# Patient Record
Sex: FEMALE | Race: WHITE | HISPANIC OR LATINO | Employment: OTHER | URBAN - METROPOLITAN AREA
[De-identification: names, ages, dates, MRNs, and addresses within clinical notes are randomized per-mention and may not be internally consistent; named-entity substitution may affect disease eponyms.]

---

## 2017-01-01 ENCOUNTER — OUTPATIENT INFUSION SERVICES (OUTPATIENT)
Dept: ONCOLOGY | Facility: MEDICAL CENTER | Age: 72
End: 2017-01-01
Attending: INTERNAL MEDICINE
Payer: MEDICARE

## 2017-01-01 VITALS
RESPIRATION RATE: 18 BRPM | SYSTOLIC BLOOD PRESSURE: 119 MMHG | WEIGHT: 143.74 LBS | TEMPERATURE: 98.6 F | HEART RATE: 70 BPM | HEIGHT: 60 IN | OXYGEN SATURATION: 94 % | BODY MASS INDEX: 28.22 KG/M2 | DIASTOLIC BLOOD PRESSURE: 58 MMHG

## 2017-01-01 DIAGNOSIS — C24.1 AMPULLARY CARCINOMA (HCC): ICD-10-CM

## 2017-01-01 PROCEDURE — 96375 TX/PRO/DX INJ NEW DRUG ADDON: CPT

## 2017-01-01 PROCEDURE — 700105 HCHG RX REV CODE 258

## 2017-01-01 PROCEDURE — 96417 CHEMO IV INFUS EACH ADDL SEQ: CPT

## 2017-01-01 PROCEDURE — 700111 HCHG RX REV CODE 636 W/ 250 OVERRIDE (IP)

## 2017-01-01 PROCEDURE — 96413 CHEMO IV INFUSION 1 HR: CPT

## 2017-01-01 PROCEDURE — 700111 HCHG RX REV CODE 636 W/ 250 OVERRIDE (IP): Performed by: NURSE PRACTITIONER

## 2017-01-01 PROCEDURE — A4212 NON CORING NEEDLE OR STYLET: HCPCS

## 2017-01-01 PROCEDURE — 700105 HCHG RX REV CODE 258: Performed by: NURSE PRACTITIONER

## 2017-01-01 RX ORDER — DEXAMETHASONE SODIUM PHOSPHATE 4 MG/ML
12 INJECTION, SOLUTION INTRA-ARTICULAR; INTRALESIONAL; INTRAMUSCULAR; INTRAVENOUS; SOFT TISSUE ONCE
Status: COMPLETED | OUTPATIENT
Start: 2017-01-01 | End: 2017-01-01

## 2017-01-01 RX ADMIN — DEXAMETHASONE SODIUM PHOSPHATE 12 MG: 4 INJECTION, SOLUTION INTRAMUSCULAR; INTRAVENOUS at 13:48

## 2017-01-01 RX ADMIN — HEPARIN 500 UNITS: 100 SYRINGE at 16:41

## 2017-01-01 RX ADMIN — GEMCITABINE 1661 MG: 1 INJECTION, POWDER, LYOPHILIZED, FOR SOLUTION INTRAVENOUS at 15:53

## 2017-01-01 RX ADMIN — PACLITAXEL 207.5 MG: 100 INJECTION, POWDER, LYOPHILIZED, FOR SUSPENSION INTRAVENOUS at 15:12

## 2017-01-01 ASSESSMENT — PAIN SCALES - GENERAL: PAINLEVEL: NO PAIN

## 2017-01-01 ASSESSMENT — ENCOUNTER SYMPTOMS
CONSTIPATION: 0
TINGLING: 1

## 2017-01-03 ENCOUNTER — OFFICE VISIT (OUTPATIENT)
Dept: HEMATOLOGY ONCOLOGY | Facility: MEDICAL CENTER | Age: 72
End: 2017-01-03
Payer: MEDICARE

## 2017-01-03 VITALS
BODY MASS INDEX: 24.91 KG/M2 | DIASTOLIC BLOOD PRESSURE: 72 MMHG | TEMPERATURE: 97 F | RESPIRATION RATE: 16 BRPM | WEIGHT: 126.9 LBS | OXYGEN SATURATION: 98 % | HEIGHT: 60 IN | HEART RATE: 101 BPM | SYSTOLIC BLOOD PRESSURE: 118 MMHG

## 2017-01-03 DIAGNOSIS — R12 HEART BURN: ICD-10-CM

## 2017-01-03 DIAGNOSIS — C78.7 METASTATIC ADENOCARCINOMA TO LIVER (HCC): ICD-10-CM

## 2017-01-03 DIAGNOSIS — C24.1 AMPULLARY CARCINOMA (HCC): ICD-10-CM

## 2017-01-03 PROCEDURE — 99213 OFFICE O/P EST LOW 20 MIN: CPT | Performed by: NURSE PRACTITIONER

## 2017-01-03 RX ORDER — RANITIDINE 150 MG/1
150 TABLET ORAL 2 TIMES DAILY
Qty: 60 TAB | Refills: 3 | Status: SHIPPED | OUTPATIENT
Start: 2017-01-03 | End: 2017-04-04 | Stop reason: CLARIF

## 2017-01-03 ASSESSMENT — ENCOUNTER SYMPTOMS
CONSTIPATION: 0
SHORTNESS OF BREATH: 0
NAUSEA: 0
WEIGHT LOSS: 0
FEVER: 0
HEADACHES: 0
WHEEZING: 0
TINGLING: 1
DIARRHEA: 0
PALPITATIONS: 0
VOMITING: 0
CHILLS: 0
DIZZINESS: 0
COUGH: 0
INSOMNIA: 0

## 2017-01-03 NOTE — MR AVS SNAPSHOT
Elin Abby Poole   1/3/2017 10:30 AM   Office Visit   MRN: [id number]    Department:  [facility]   Dept Phone:  [phone]    Description:  Female : 1945   Provider:  Gabby Galvan A.P.N.           Reason for Visit     Follow-Up 1 wk       Allergies as of 1/3/2017     Not on File      You were diagnosed with     Ampullary carcinoma (HCC)   [151538]       Metastatic adenocarcinoma to liver (HCC)   [493175]       Heart burn   [439675]         Vital Signs     Blood Pressure Pulse Temperature Respirations Height Weight    118/72 mmHg 101 36.1 °C (97 °F) 16 1.524 m (5') 57.561 kg (126 lb 14.4 oz)    Body Mass Index Oxygen Saturation Breastfeeding? Smoking Status          24.78 kg/m2 98% No Never Smoker         Basic Information     Date Of Birth Sex Race Ethnicity Preferred Language    1945 Female White Non- English      Your appointments     2017  2:00 PM   Non Provider 1 with ONC RN 1   [facility] (--)    [address] [address] [phone]           You will be receiving a confirmation call a few days before your appointment from our automated call confirmation system.            2017  3:00 PM   ONCOLOGY EST PATIENT 30 MIN with Gabby Galvan A.P.N.   [facility] (--)    75 [facility] [address]  [facility] [id number] [phone]            2017  4:00 PM   Est Chemo 1 with RN 6   [facility] ([facility])    [address] [address]   [phone]            2017  3:00 PM   Est Chemo 1 with RN 3   [facility] ([facility])    [address]  [facility] [address]   [phone]            2017  1:00 PM   Non Provider 1 with ONC RN 1   [facility] (--)    [address] [id number] [phone]           You will be receiving a confirmation call a few days before your appointment from our automated call confirmation  system.            Jan 25, 2017  2:20 PM   ONCOLOGY EST PATIENT 30 MIN with Talya Krause M.D.   [facility] (--)    [address], [address]  [place] [id number] [phone]            Jan 25, 2017  3:00 PM   Est Chemo 1 with RN 4   [facility] ([facility])    [address] [facility] [address]  [place] [id number] [phone]              Problem List              ICD-10-CM Priority Class Noted - Resolved    Ampullary carcinoma (HCC) C24.1   8/15/2016 - Present    Cholecystitis with cholelithiasis K80.10   8/15/2016 - Present    Metastatic adenocarcinoma to liver (HCC) C78.7   8/15/2016 - Present    Obstructive jaundice due to malignant neoplasm K83.8   8/15/2016 - Present    Leukocytosis D72.829   10/21/2016 - Present    Sepsis (HCC) A41.9   10/21/2016 - Present    Metabolic acidosis E87.2   10/22/2016 - Present      Health Maintenance        Date Due Completion Dates    IMM DTaP/Tdap/Td Vaccine (1 - Tdap) 6/19/1964 ---    PAP SMEAR 6/19/1966 ---    COLONOSCOPY 6/19/1995 ---    IMM ZOSTER VACCINE 6/19/2005 ---    IMM PNEUMOCOCCAL 65+ (ADULT) LOW/MEDIUM RISK SERIES (2 of 2 - PPSV23) 10/15/2014 10/15/2013    MAMMOGRAM 1/14/2016 1/14/2015, 1/14/2015, 1/14/2015    IMM INFLUENZA (1) 9/1/2016 10/15/2015    BONE DENSITY 9/26/2019 9/26/2014            Current Immunizations     13-VALENT PCV PREVNAR 10/15/2013    Influenza Vaccine Adult HD 10/15/2015      Below and/or attached are the medications your provider expects you to take. Review all of your home medications and newly ordered medications with your provider and/or pharmacist. Follow medication instructions as directed by your provider and/or pharmacist. Please keep your medication list with you and share with your provider. Update the information when medications are discontinued, doses are changed, or new medications (including over-the-counter products) are added; and carry medication information at all times in the event of emergency situations      Allergies:  No Known Allergies          Medications  Valid as of: January 03, 2017 - 11:17 AM    Generic Name Brand Name Tablet Size Instructions for use    Aspirin (Tablet Delayed Response) ECOTRIN 325 MG Take 325 mg by mouth every morning.        Losartan Potassium-HCTZ (Tab) HYZAAR 100-25 MG Take 1 Tab by mouth every morning. Indications: High Blood Pressure        Misc Natural Products (Powder) FIBER 7  Take  by mouth.        Ondansetron HCl (Tab) ZOFRAN 4 MG Take 1 Tab by mouth every four hours as needed for Nausea/Vomiting.        Potassium Chloride Mariama CR (Tab CR) K-DUR 10 MEQ Take 2 Tabs by mouth every morning.        Prochlorperazine Maleate (Tab) COMPAZINE 10 MG Take 10 mg by mouth every 6 hours as needed for Nausea/Vomiting (alternating Q3H with Zofran).        RaNITidine HCl (Tab) ZANTAC 150 MG Take 1 Tab by mouth 2 times a day.        .                 Medicines prescribed today were sent to:     [facility] #[id number] - [place], [place] - [address]    [address] [address]    Phone: [phone] Fax: [phone]    Open 24 Hours?: No    [facility] - [place], [place] - [address]    [address] [address]    Phone: [phone] Fax: [phone]    Open 24 Hours?: No      Medication refill instructions:       If your prescription bottle indicates you have medication refills left, it is not necessary to call your provider’s office. Please contact your pharmacy and they will refill your medication.    If your prescription bottle indicates you do not have any refills left, you may request refills at any time through one of the following ways: The online STEARCLEAR system (except Urgent Care), by calling your provider’s office, or by asking your pharmacy to contact your provider’s office with a refill request. Medication refills are processed only during regular business hours and may not be available until the next business day. Your provider may  request additional information or to have a follow-up visit with you prior to refilling your medication.   *Please Note: Medication refills are assigned a new Rx number when refilled electronically. Your pharmacy may indicate that no refills were authorized even though a new prescription for the same medication is available at the pharmacy. Please request the medicine by name with the pharmacy before contacting your provider for a refill.           ENEFprohart Access Code: Activation code not generated  Current Swapper Tradet Status: Active

## 2017-01-03 NOTE — PROGRESS NOTES
Subjective:      Elin Poole is a 71 y.o. 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  She is currently on her week off from treatment with Joppa/Abraxane.  Patient is accompanied by her son for today's visit.     Fever - None  Chills - None  Appetite - Appetite is slowly improving. She has gained 1 pounds since last week.   Fatigue - Still present very slightly but it is improving and she naps as needed. She is a little more active during the day.   N/V - She has not had nausea this last week. She did  prescription for Zofran 4 mg now.   Constipation/Diarrhea - Normal BMs per patient's routine. Last BM was yesterday. Diarrhea has resolved. Her C-Diff test was negative last week. Reviewed with patient and son how to take Imodium if needed.   Pain - She does still have some pain in her knees which has affected her ability to get out and move around more often.   Neuropathy - She does have some on the bottom of her toes intermittently. None in her hands.   Other - Denies mouth sores.     Not on File  Current Outpatient Prescriptions on File Prior to Visit   Medication Sig Dispense Refill   • ondansetron (ZOFRAN) 4 MG Tab tablet Take 1 Tab by mouth every four hours as needed for Nausea/Vomiting. 30 Tab 3   • potassium chloride SA (K-DUR) 10 MEQ Tab CR Take 2 Tabs by mouth every morning. 60 Tab 1   • Misc Natural Products (FIBER 7) Powder Take  by mouth.     • omeprazole (PRILOSEC) 20 MG delayed-release capsule Take 20 mg by mouth every day.  5   • aspirin EC (ECOTRIN) 325 MG Tablet Delayed Response Take 325 mg by mouth every morning.     • prochlorperazine (COMPAZINE) 10 MG Tab Take 10 mg by mouth every 6 hours as needed for Nausea/Vomiting (alternating Q3H with Zofran).     • losartan-hydrochlorothiazide (HYZAAR) 100-25 MG per tablet Take 1 Tab by mouth every morning. Indications: High  Blood Pressure       No current facility-administered medications on file prior to visit.       Review of Systems   Constitutional: Negative for fever, chills, weight loss and malaise/fatigue.   Respiratory: Negative for cough, shortness of breath and wheezing.    Cardiovascular: Positive for leg swelling (very mild intermittently). Negative for chest pain and palpitations.   Gastrointestinal: Negative for nausea, vomiting, diarrhea and constipation.   Genitourinary: Negative for dysuria.   Musculoskeletal: Positive for joint pain (bilateral knee pain).   Neurological: Positive for tingling (mild in her toes). Negative for dizziness and headaches.   Psychiatric/Behavioral: The patient does not have insomnia.           Objective:     /72 mmHg  Pulse 101  Temp(Src) 36.1 °C (97 °F)  Resp 16  Ht 1.524 m (5')  Wt 57.561 kg (126 lb 14.4 oz)  BMI 24.78 kg/m2  SpO2 98%  Breastfeeding? No     Physical Exam   Constitutional: She is oriented to person, place, and time. She appears well-developed and well-nourished. No distress.   HENT:   Head: Normocephalic and atraumatic.   Mouth/Throat: Oropharynx is clear and moist. No oropharyngeal exudate.   Eyes: Conjunctivae and EOM are normal. Pupils are equal, round, and reactive to light. Right eye exhibits no discharge. Left eye exhibits no discharge. No scleral icterus.   Cardiovascular: Regular rhythm, normal heart sounds and intact distal pulses.  Exam reveals no gallop and no friction rub.    No murmur heard.  Slightly tachycardic   Pulmonary/Chest: Effort normal and breath sounds normal. No respiratory distress. She has no wheezes.   Abdominal: Soft. Bowel sounds are normal. She exhibits no distension. There is no tenderness.   Musculoskeletal: She exhibits tenderness.   Ambulates with a walker for stability and d/t pain in her knees.    Neurological: She is alert and oriented to person, place, and time.   Skin: Skin is warm and dry. No rash noted. She is not  diaphoretic. No erythema. No pallor.   Psychiatric: She has a normal mood and affect. Her behavior is normal.   Vitals reviewed.              Assessment/Plan:     1. Ampullary carcinoma (HCC)  ranitidine (ZANTAC) 150 MG Tab   2. Metastatic adenocarcinoma to liver (HCC)     3. Heart burn  ranitidine (ZANTAC) 150 MG Tab       Plan  1. Patient is feeling much better and doing better this week. She stated her fatigue is not as severe and the diarrhea has resolved completely. She did have a formed stool yesterday. Her C. difficile test was negative last week. Patient is slowly improving and feeling better. Patient had repeat labs on Friday, December 30 and her labs had improved. Her sodium level had gone up to 133 and her ANC and platelet count had increased as well from Tuesday, December 27. Discussed with Dr. Krause in the past and patient's CT scan did show good response to treatment. Dr. Krause mentioned recommendation to go ahead and treat patient with single agent Gemzar due to tolerability with the Gemzar and Abraxane. I discussed with the patient and the son today and they're in agreement to proceed with single agent Gemzar. I will have patient start with single agent Gemzar next week by giving her another week off this week to fully recover. Patient and son are in agreement with the plan. I discussed this with Dr. Duran, covering physician for Dr. Krause and he is also in agreement for patient to start treatment next week.     2. Patient requesting refill for Zantac. Her medical record states that she is taking omeprazole but according to the patient and the son she is taking Zantac. Refill was given.     3. I will have patient follow-up with me prior to her next cycle of chemotherapy start next week or sooner if needed.

## 2017-01-11 ENCOUNTER — OUTPATIENT INFUSION SERVICES (OUTPATIENT)
Dept: ONCOLOGY | Facility: MEDICAL CENTER | Age: 72
End: 2017-01-11
Attending: INTERNAL MEDICINE
Payer: MEDICARE

## 2017-01-11 ENCOUNTER — HOSPITAL ENCOUNTER (OUTPATIENT)
Dept: RADIOLOGY | Facility: MEDICAL CENTER | Age: 72
End: 2017-01-11
Attending: NURSE PRACTITIONER
Payer: MEDICARE

## 2017-01-11 ENCOUNTER — OFFICE VISIT (OUTPATIENT)
Dept: HEMATOLOGY ONCOLOGY | Facility: MEDICAL CENTER | Age: 72
End: 2017-01-11
Payer: MEDICARE

## 2017-01-11 ENCOUNTER — HOSPITAL ENCOUNTER (OUTPATIENT)
Facility: MEDICAL CENTER | Age: 72
End: 2017-01-11
Attending: NURSE PRACTITIONER
Payer: MEDICARE

## 2017-01-11 ENCOUNTER — NON-PROVIDER VISIT (OUTPATIENT)
Dept: HEMATOLOGY ONCOLOGY | Facility: MEDICAL CENTER | Age: 72
End: 2017-01-11
Payer: MEDICARE

## 2017-01-11 VITALS
HEART RATE: 100 BPM | HEIGHT: 60 IN | BODY MASS INDEX: 24.91 KG/M2 | WEIGHT: 126.9 LBS | OXYGEN SATURATION: 96 % | SYSTOLIC BLOOD PRESSURE: 96 MMHG | TEMPERATURE: 98.5 F | DIASTOLIC BLOOD PRESSURE: 54 MMHG | RESPIRATION RATE: 16 BRPM

## 2017-01-11 VITALS
BODY MASS INDEX: 24.71 KG/M2 | OXYGEN SATURATION: 98 % | HEART RATE: 100 BPM | DIASTOLIC BLOOD PRESSURE: 68 MMHG | WEIGHT: 125.88 LBS | TEMPERATURE: 97.2 F | RESPIRATION RATE: 18 BRPM | HEIGHT: 60 IN | SYSTOLIC BLOOD PRESSURE: 106 MMHG

## 2017-01-11 VITALS
SYSTOLIC BLOOD PRESSURE: 96 MMHG | HEIGHT: 60 IN | TEMPERATURE: 98.5 F | WEIGHT: 126.9 LBS | BODY MASS INDEX: 24.91 KG/M2 | HEART RATE: 100 BPM | OXYGEN SATURATION: 96 % | RESPIRATION RATE: 16 BRPM | DIASTOLIC BLOOD PRESSURE: 54 MMHG

## 2017-01-11 DIAGNOSIS — C24.1 AMPULLARY CARCINOMA (HCC): ICD-10-CM

## 2017-01-11 DIAGNOSIS — C78.7 METASTATIC ADENOCARCINOMA TO LIVER (HCC): ICD-10-CM

## 2017-01-11 LAB
ALBUMIN SERPL BCP-MCNC: 2.3 G/DL (ref 3.2–4.9)
ALBUMIN/GLOB SERPL: 0.7 G/DL
ALP SERPL-CCNC: 177 U/L (ref 30–99)
ALT SERPL-CCNC: 20 U/L (ref 2–50)
ANION GAP SERPL CALC-SCNC: 8 MMOL/L (ref 0–11.9)
APPEARANCE UR: CLEAR
AST SERPL-CCNC: 29 U/L (ref 12–45)
BACTERIA #/AREA URNS HPF: ABNORMAL /HPF
BASOPHILS # BLD AUTO: 0.07 K/UL (ref 0–0.12)
BASOPHILS NFR BLD AUTO: 0.4 % (ref 0–1.8)
BILIRUB SERPL-MCNC: 0.5 MG/DL (ref 0.1–1.5)
BILIRUB UR QL STRIP.AUTO: NEGATIVE
BUN SERPL-MCNC: 14 MG/DL (ref 8–22)
CALCIUM SERPL-MCNC: 8.2 MG/DL (ref 8.5–10.5)
CHLORIDE SERPL-SCNC: 105 MMOL/L (ref 96–112)
CO2 SERPL-SCNC: 22 MMOL/L (ref 20–33)
COLOR UR: YELLOW
CREAT SERPL-MCNC: 0.74 MG/DL (ref 0.5–1.4)
CULTURE IF INDICATED INDCX: YES UA CULTURE
EOSINOPHIL # BLD: 0.04 K/UL (ref 0–0.51)
EOSINOPHIL NFR BLD AUTO: 0.2 % (ref 0–6.9)
EPI CELLS #/AREA URNS HPF: ABNORMAL /HPF
ERYTHROCYTE [DISTWIDTH] IN BLOOD BY AUTOMATED COUNT: 54.3 FL (ref 35.9–50)
GLOBULIN SER CALC-MCNC: 3.2 G/DL (ref 1.9–3.5)
GLUCOSE SERPL-MCNC: 93 MG/DL (ref 65–99)
GLUCOSE UR STRIP.AUTO-MCNC: NEGATIVE MG/DL
HCT VFR BLD AUTO: 39.8 % (ref 37–47)
HGB BLD-MCNC: 13.6 G/DL (ref 12–16)
HYALINE CASTS #/AREA URNS LPF: ABNORMAL /LPF
IMM GRANULOCYTES # BLD AUTO: 0.6 K/UL (ref 0–0.11)
IMM GRANULOCYTES NFR BLD AUTO: 3.1 % (ref 0–0.9)
KETONES UR STRIP.AUTO-MCNC: NEGATIVE MG/DL
LEUKOCYTE ESTERASE UR QL STRIP.AUTO: ABNORMAL
LYMPHOCYTES # BLD: 2.23 K/UL (ref 1–4.8)
LYMPHOCYTES NFR BLD AUTO: 11.5 % (ref 22–41)
MCH RBC QN AUTO: 32.3 PG (ref 27–33)
MCHC RBC AUTO-ENTMCNC: 34.2 G/DL (ref 33.6–35)
MCV RBC AUTO: 94.5 FL (ref 81.4–97.8)
MICRO URNS: ABNORMAL
MONOCYTES # BLD: 1.96 K/UL (ref 0–0.85)
MONOCYTES NFR BLD AUTO: 10.1 % (ref 0–13.4)
MUCOUS THREADS #/AREA URNS HPF: ABNORMAL /HPF
NEUTROPHILS # BLD: 14.44 K/UL (ref 2–7.15)
NEUTROPHILS NFR BLD AUTO: 74.7 % (ref 44–72)
NITRITE UR QL STRIP.AUTO: NEGATIVE
NRBC # BLD AUTO: 0 K/UL
NRBC BLD-RTO: 0 /100 WBC
PH UR STRIP.AUTO: 6.5 [PH]
PLATELET # BLD AUTO: 355 K/UL (ref 164–446)
PMV BLD AUTO: 10.1 FL (ref 9–12.9)
POTASSIUM SERPL-SCNC: 4.1 MMOL/L (ref 3.6–5.5)
PROT SERPL-MCNC: 5.5 G/DL (ref 6–8.2)
PROT UR QL STRIP: NEGATIVE MG/DL
RBC # BLD AUTO: 4.21 M/UL (ref 4.2–5.4)
RBC # URNS HPF: ABNORMAL /HPF
RBC UR QL AUTO: NEGATIVE
SODIUM SERPL-SCNC: 135 MMOL/L (ref 135–145)
SP GR UR STRIP.AUTO: 1.01
WBC # BLD AUTO: 19.3 K/UL (ref 4.8–10.8)
WBC #/AREA URNS HPF: ABNORMAL /HPF

## 2017-01-11 PROCEDURE — 81001 URINALYSIS AUTO W/SCOPE: CPT

## 2017-01-11 PROCEDURE — 87086 URINE CULTURE/COLONY COUNT: CPT

## 2017-01-11 PROCEDURE — 36591 DRAW BLOOD OFF VENOUS DEVICE: CPT | Performed by: NURSE PRACTITIONER

## 2017-01-11 PROCEDURE — 99213 OFFICE O/P EST LOW 20 MIN: CPT | Performed by: NURSE PRACTITIONER

## 2017-01-11 PROCEDURE — 96523 IRRIG DRUG DELIVERY DEVICE: CPT

## 2017-01-11 RX ADMIN — Medication 500 UNITS: at 14:15

## 2017-01-11 ASSESSMENT — ENCOUNTER SYMPTOMS
WEIGHT LOSS: 0
CONSTIPATION: 0
VOMITING: 0
CHILLS: 0
TINGLING: 1
INSOMNIA: 0
FEVER: 0
HEADACHES: 0
DIARRHEA: 0
DIZZINESS: 0
PALPITATIONS: 0
COUGH: 0
WHEEZING: 0
SHORTNESS OF BREATH: 0
NAUSEA: 1

## 2017-01-11 NOTE — PROGRESS NOTES
Subjective:      Elin Poole is a 71 y.o. female who presents for Follow-Up for ampullary carcinoma.        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  She is here for cycle 1, day 1 of single agent Gemzar.  Patient is accompanied by her son for today's visit.  Patient completed 3 full cycles of Gemzar and Abraxane but d/t tolerability she will go to single agent Gemzar.     Fever - None  Chills - She is cold d/t the weather but not associated with a fever.   Appetite - Her appetite is okay. Her weight is stable.   Fatigue - She does have fatigue but it is slowly improving with the week off.   N/V - She does have an underlying nausea feeling but no vomiting. She takes the anti-emetics as needed.   Constipation/Diarrhea - Normal BMs per patient's routine. Last BM was this morning.  Pain - Pain in her knees are still present. It appears that the marijuana oil is helping her pain as well.   Neuropathy - She does have numbness and tingling in her feet. It gets better at times. Sometimes she will notice that she cannot walk but today she is okay and can walk without her walker. This is only present in her feet.   Other - She does c/o dry lip.     Not on File  Current Outpatient Prescriptions on File Prior to Visit   Medication Sig Dispense Refill   • ranitidine (ZANTAC) 150 MG Tab Take 1 Tab by mouth 2 times a day. 60 Tab 3   • ondansetron (ZOFRAN) 4 MG Tab tablet Take 1 Tab by mouth every four hours as needed for Nausea/Vomiting. 30 Tab 3   • potassium chloride SA (K-DUR) 10 MEQ Tab CR Take 2 Tabs by mouth every morning. 60 Tab 1   • Misc Natural Products (FIBER 7) Powder Take  by mouth.     • aspirin EC (ECOTRIN) 325 MG Tablet Delayed Response Take 325 mg by mouth every morning.     • prochlorperazine (COMPAZINE) 10 MG Tab Take 10 mg by mouth every 6 hours as needed for Nausea/Vomiting (alternating Q3H with Zofran).     • losartan-hydrochlorothiazide (HYZAAR) 100-25 MG per tablet Take 1  Tab by mouth every morning. Indications: High Blood Pressure       No current facility-administered medications on file prior to visit.       Review of Systems   Constitutional: Positive for malaise/fatigue. Negative for fever, chills and weight loss.   Respiratory: Negative for cough, shortness of breath and wheezing.    Cardiovascular: Negative for chest pain, palpitations and leg swelling.   Gastrointestinal: Positive for nausea. Negative for vomiting, diarrhea and constipation.   Genitourinary: Negative for dysuria.   Musculoskeletal: Positive for joint pain (knees).   Skin: Negative for itching and rash.   Neurological: Positive for tingling (feet). Negative for dizziness and headaches.   Psychiatric/Behavioral: The patient does not have insomnia.           Objective:     BP 96/54 mmHg  Pulse 100  Temp(Src) 36.9 °C (98.5 °F)  Resp 16  Ht 1.524 m (5')  Wt 57.561 kg (126 lb 14.4 oz)  BMI 24.78 kg/m2  SpO2 96%     Physical Exam   Constitutional: She is oriented to person, place, and time. No distress.   HENT:   Head: Normocephalic and atraumatic.   Mouth/Throat: Oropharynx is clear and moist. No oropharyngeal exudate.   Alopecia   Cardiovascular: Normal rate, regular rhythm, normal heart sounds and intact distal pulses.  Exam reveals no gallop and no friction rub.    No murmur heard.  Pulmonary/Chest: Effort normal and breath sounds normal. No respiratory distress. She has no wheezes.   Lung sounds clear throughout.   Abdominal: Soft. Bowel sounds are normal. She exhibits no distension. There is no tenderness.   Musculoskeletal: She exhibits no edema or tenderness.   Patient ambulating with a walker for stability.   Neurological: She is alert and oriented to person, place, and time.   Skin: Skin is warm and dry. No rash noted. She is not diaphoretic. No erythema. No pallor.   Psychiatric: She has a normal mood and affect. Her behavior is normal.   Vitals reviewed.      Outpatient [facility]fusion Services on  01/11/2017   Component Date Value Ref Range Status   • Micro Urine Req 01/11/2017 Microscopic   Final   • Color 01/11/2017 Yellow   Final   • Character 01/11/2017 Clear   Final   • Specific Gravity 01/11/2017 1.012  <1.035 Final   • Ph 01/11/2017 6.5  5.0-8.0 Final   • Glucose 01/11/2017 Negative  Negative mg/dL Final   • Ketones 01/11/2017 Negative  Negative mg/dL Final   • Protein 01/11/2017 Negative  Negative mg/dL Final   • Bilirubin 01/11/2017 Negative  Negative Final   • Nitrite 01/11/2017 Negative  Negative Final   • Leukocyte Esterase 01/11/2017 Small* Negative Final   • Occult Blood 01/11/2017 Negative  Negative Final   • Culture Indicated 01/11/2017 Yes   Final   • WBC 01/11/2017 2-5   Final    Comment: Female  <12 Yr 0-2  >12 Yr 0-5  Male   None     • RBC 01/11/2017 0-2   Final    Comment: Female  >12 Yr 0-2  Male   None     • Bacteria 01/11/2017 Few* None /hpf Final   • Epithelial Cells 01/11/2017 Few   Final   • Mucous Threads 01/11/2017 Few   Final   • Hyaline Cast 01/11/2017 0-2   Final   Hospital Outpatient Visit on 01/11/2017   Component Date Value Ref Range Status   • Sodium 01/11/2017 135  135 - 145 mmol/L Final   • Potassium 01/11/2017 4.1  3.6 - 5.5 mmol/L Final   • Chloride 01/11/2017 105  96 - 112 mmol/L Final   • Co2 01/11/2017 22  20 - 33 mmol/L Final   • Anion Gap 01/11/2017 8.0  0.0 - 11.9 Final   • Glucose 01/11/2017 93  65 - 99 mg/dL Final   • Bun 01/11/2017 14  8 - 22 mg/dL Final   • Creatinine 01/11/2017 0.74  0.50 - 1.40 mg/dL Final   • Calcium 01/11/2017 8.2* 8.5 - 10.5 mg/dL Final   • AST(SGOT) 01/11/2017 29  12 - 45 U/L Final   • ALT(SGPT) 01/11/2017 20  2 - 50 U/L Final   • Alkaline Phosphatase 01/11/2017 177* 30 - 99 U/L Final   • Total Bilirubin 01/11/2017 0.5  0.1 - 1.5 mg/dL Final   • Albumin 01/11/2017 2.3* 3.2 - 4.9 g/dL Final   • Total Protein 01/11/2017 5.5* 6.0 - 8.2 g/dL Final   • Globulin 01/11/2017 3.2  1.9 - 3.5 g/dL Final   • A-G Ratio 01/11/2017 0.7   Final   • WBC  01/11/2017 19.3* 4.8 - 10.8 K/uL Final   • RBC 01/11/2017 4.21  4.20 - 5.40 M/uL Final   • Hemoglobin 01/11/2017 13.6  12.0 - 16.0 g/dL Final   • Hematocrit 01/11/2017 39.8  37.0 - 47.0 % Final   • MCV 01/11/2017 94.5  81.4 - 97.8 fL Final   • MCH 01/11/2017 32.3  27.0 - 33.0 pg Final   • MCHC 01/11/2017 34.2  33.6 - 35.0 g/dL Final   • RDW 01/11/2017 54.3* 35.9 - 50.0 fL Final   • Platelet Count 01/11/2017 355  164 - 446 K/uL Final   • MPV 01/11/2017 10.1  9.0 - 12.9 fL Final   • Neutrophils-Polys 01/11/2017 74.70* 44.00 - 72.00 % Final   • Lymphocytes 01/11/2017 11.50* 22.00 - 41.00 % Final   • Monocytes 01/11/2017 10.10  0.00 - 13.40 % Final   • Eosinophils 01/11/2017 0.20  0.00 - 6.90 % Final   • Basophils 01/11/2017 0.40  0.00 - 1.80 % Final   • Immature Granulocytes 01/11/2017 3.10* 0.00 - 0.90 % Final   • Nucleated RBC 01/11/2017 0.00   Final   • Neutrophils (Absolute) 01/11/2017 14.44* 2.00 - 7.15 K/uL Final    Includes immature neutrophils, if present.   • Lymphs (Absolute) 01/11/2017 2.23  1.00 - 4.80 K/uL Final   • Monos (Absolute) 01/11/2017 1.96* 0.00 - 0.85 K/uL Final   • Eos (Absolute) 01/11/2017 0.04  0.00 - 0.51 K/uL Final   • Baso (Absolute) 01/11/2017 0.07  0.00 - 0.12 K/uL Final   • Immature Granulocytes (abs) 01/11/2017 0.60* 0.00 - 0.11 K/uL Final   • NRBC (Absolute) 01/11/2017 0.00   Final   • GFR If  01/11/2017 >60  >60 mL/min/1.73 m 2 Final   • GFR If Non  01/11/2017 >60  >60 mL/min/1.73 m 2 Final          Assessment/Plan:     1. Ampullary carcinoma (HCC)  COMMUNICATION PRIOR AUTH    CBC(OP [facility] ONLY)    CBC WITH DIFFERENTIAL    COMP METABOLIC PANEL    DX-CHEST-2 VIEWS   2. Metastatic adenocarcinoma to liver (HCC)  DX-CHEST-2 VIEWS     Plan  1. Patient is scheduled to receive her first cycle of single agent Gemzar. She was receiving Gemzar and Abraxane and completed 3 full cycles, but due to tolerability Dr. Krause would like to start patient on  single agent Gemzar. Her most recent CT scan shows very stable disease noted as well. Patient stated she is been having fatigue which is very slowly improving, but still present. I did review patient's CBC and CMP and she does have leukocytosis noted. Patient denies any signs or symptoms of infection. She denies fevers, cough, pain or burning with urination. Her lung sounds were clear throughout. Patient slightly hypotensive today as well with elevated white blood cell count. After discussion with Dr. Krause we will hold treatment one more week and proceed with a urinalysis and chest x-ray to be completed. Patient is in agreement with the plan. I will have her follow-up in one week to be seen in anticipation for starting cycle one of single agent Gemzar.    2. Patient's chest x-ray came back negative. UA came back with very small amount of leukocytes and few bacteria. Patient with a history of C. difficile in the past, so at this time we will wait for culture to be completed to determine whether we need to treat patient with antibiotics are not for UTI. I discussed with Dr. Krause and she is in agreement to wait until we get the culture back before starting patient on antibiotic and determine if it's needed.    3. Patient to follow up in one week or sooner if needed.

## 2017-01-11 NOTE — MR AVS SNAPSHOT
Elin Mora Virgilio   2017 2:00 PM   Non-Provider Visit   MRN: [id number]    Department:  [facility]   Dept Phone:  [phone]    Description:  Female : 1945   Provider:  ONC RN 1           Reason for Visit     Labs Only           Allergies as of 2017     Not on File      Vital Signs     Blood Pressure Pulse Temperature Respirations Height Weight    96/54 mmHg 100 36.9 °C (98.5 °F) 16 1.524 m (5') 57.561 kg (126 lb 14.4 oz)    Body Mass Index Oxygen Saturation Smoking Status             24.78 kg/m2 96% Never Smoker          Basic Information     Date Of Birth Sex Race Ethnicity Preferred Language    1945 Female White Non- English      Your appointments     2017  4:00 PM   Est Chemo 1 with RN 6   [facility] ([facility])    [address] [facility] [address]  [place] [address] [phone]            2017  3:00 PM   Est Chemo 1 with RN 3   [facility] ([facility])    [address] [facility] [address]  [place] [id number] [phone]            2017  1:00 PM   Non Provider 1 with ONC RN 1   [facility] (--)    [address]  [facility] [address] [phone]           You will be receiving a confirmation call a few days before your appointment from our automated call confirmation system.            2017  2:20 PM   ONCOLOGY EST PATIENT 30 MIN with Talya Krause M.D.   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   8/15/2016 - Present    Cholecystitis with cholelithiasis K80.10   8/15/2016 - Present    Metastatic adenocarcinoma to liver (HCC) C78.7   8/15/2016 - Present    Obstructive jaundice due to malignant  neoplasm K83.8   8/15/2016 - Present    Leukocytosis D72.829   10/21/2016 - Present    Sepsis (HCC) A41.9   10/21/2016 - Present    Metabolic acidosis E87.2   10/22/2016 - Present      Health Maintenance        Date Due Completion Dates    IMM DTaP/Tdap/Td Vaccine (1 - Tdap) 6/19/1964 ---    PAP SMEAR 6/19/1966 ---    COLONOSCOPY 6/19/1995 ---    IMM ZOSTER VACCINE 6/19/2005 ---    IMM PNEUMOCOCCAL 65+ (ADULT) LOW/MEDIUM RISK SERIES (2 of 2 - PPSV23) 10/15/2014 10/15/2013    MAMMOGRAM 1/14/2016 1/14/2015, 1/14/2015, 1/14/2015    IMM INFLUENZA (1) 9/1/2016 10/15/2015    BONE DENSITY 9/26/2019 9/26/2014            Current Immunizations     13-VALENT PCV PREVNAR 10/15/2013    Influenza Vaccine Adult HD 10/15/2015      Below and/or attached are the medications your provider expects you to take. Review all of your home medications and newly ordered medications with your provider and/or pharmacist. Follow medication instructions as directed by your provider and/or pharmacist. Please keep your medication list with you and share with your provider. Update the information when medications are discontinued, doses are changed, or new medications (including over-the-counter products) are added; and carry medication information at all times in the event of emergency situations     Allergies:  No Known Allergies          Medications  Valid as of: January 11, 2017 -  3:15 PM    Generic Name Brand Name Tablet Size Instructions for use    Aspirin (Tablet Delayed Response) ECOTRIN 325 MG Take 325 mg by mouth every morning.        Losartan Potassium-HCTZ (Tab) HYZAAR 100-25 MG Take 1 Tab by mouth every morning. Indications: High Blood Pressure        Misc Natural Products (Powder) FIBER 7  Take  by mouth.        Ondansetron HCl (Tab) ZOFRAN 4 MG Take 1 Tab by mouth every four hours as needed for Nausea/Vomiting.        Potassium Chloride Mariama CR (Tab CR) K-DUR 10 MEQ Take 2 Tabs by mouth every morning.        Prochlorperazine Maleate  (Tab) COMPAZINE 10 MG Take 10 mg by mouth every 6 hours as needed for Nausea/Vomiting (alternating Q3H with Zofran).        RaNITidine HCl (Tab) ZANTAC 150 MG Take 1 Tab by mouth 2 times a day.        .                 Medicines prescribed today were sent to:     [facility]/PHARMACY #[id number] - [place], [place] - [address]    [address] [address]    Phone: [phone] Fax: [phone]    Open 24 Hours?: No    [facility] - [place], [place] - [address]    [address] [address]    Phone: [phone] Fax: [phone]    Open 24 Hours?: No      Medication refill instructions:       If your prescription bottle indicates you have medication refills left, it is not necessary to call your provider’s office. Please contact your pharmacy and they will refill your medication.    If your prescription bottle indicates you do not have any refills left, you may request refills at any time through one of the following ways: The online [facility] system (except Urgent Care), by calling your provider’s office, or by asking your pharmacy to contact your provider’s office with a refill request. Medication refills are processed only during regular business hours and may not be available until the next business day. Your provider may request additional information or to have a follow-up visit with you prior to refilling your medication.   *Please Note: Medication refills are assigned a new Rx number when refilled electronically. Your pharmacy may indicate that no refills were authorized even though a new prescription for the same medication is available at the pharmacy. Please request the medicine by name with the pharmacy before contacting your provider for a refill.           [facility] Access Code: Activation code not generated  Current [facility] Status: Active

## 2017-01-11 NOTE — NON-PROVIDER
Pt presents to clinic today for labs and prechemo appointment with ELISE Galvan.  Plan of care discussed with patient and son who is also in the room.  They verbalize agreement with plan.  Port accessed in sterile fashion; brisk blood return noted.  Labs drawn per orders in [place].  Port flushed per protocol with heparin and NS.  Port remains accessed for Gemzar infusion later this afternoon.  Pt tolerated well.

## 2017-01-11 NOTE — MR AVS SNAPSHOT
Elin Mora Virgilio   2017 3:00 PM   Office Visit   MRN: [id number]    Department:  [facility]   Dept Phone:  [phone]    Description:  Female : 1945   Provider:  Gabby Galvan A.P.N.           Reason for Visit     Follow-Up prechemo      Allergies as of 2017     Not on File      You were diagnosed with     Ampullary carcinoma (HCC)   [649966]       Metastatic adenocarcinoma to liver (HCC)   [426410]         Vital Signs     Blood Pressure Pulse Temperature Respirations Height Weight    96/54 mmHg 100 36.9 °C (98.5 °F) 16 1.524 m (5') 57.561 kg (126 lb 14.4 oz)    Body Mass Index Oxygen Saturation Smoking Status             24.78 kg/m2 96% Never Smoker          Basic Information     Date Of Birth Sex Race Ethnicity Preferred Language    1945 Female White Non- English      Your appointments     2017  4:00 PM   Est Chemo 1 with RN 6   [facility] ([facility])    [address] [facility] [address]  [place] [address] [phone]            2017  1:30 PM   Non Provider 1 with ONC RN 1   [facility] (--)    [address] [facility] [address]  [facility] [address] [phone]           You will be receiving a confirmation call a few days before your appointment from our automated call confirmation system.            2017  2:00 PM   ONCOLOGY EST PATIENT 30 MIN with Gabby Galvan, A.P.N.   [facility] (--)    [address] [facility] [facility] [address]  [place] [address] [phone]            2017  3:00 PM   Est Chemo 1 with RN 3   [facility] ([facility])    [address] [facility] [address]  [place] [address]   [phone]            2017  1:00 PM   Non Provider 1 with ONC RN 1   [facility] (--)    [address] [facility] [address]  [facility] [address] [phone]           You will be receiving a confirmation call a few days before your appointment from our automated call confirmation system.            2017  2:20 PM    ONCOLOGY EST PATIENT 30 MIN with Talya Krause M.D.   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   8/15/2016 - Present    Cholecystitis with cholelithiasis K80.10   8/15/2016 - Present    Metastatic adenocarcinoma to liver (HCC) C78.7   8/15/2016 - Present    Obstructive jaundice due to malignant neoplasm K83.8   8/15/2016 - Present    Leukocytosis D72.829   10/21/2016 - Present    Sepsis (HCC) A41.9   10/21/2016 - Present    Metabolic acidosis E87.2   10/22/2016 - Present      Health Maintenance        Date Due Completion Dates    IMM DTaP/Tdap/Td Vaccine (1 - Tdap) 6/19/1964 ---    PAP SMEAR 6/19/1966 ---    COLONOSCOPY 6/19/1995 ---    IMM ZOSTER VACCINE 6/19/2005 ---    IMM PNEUMOCOCCAL 65+ (ADULT) LOW/MEDIUM RISK SERIES (2 of 2 - PPSV23) 10/15/2014 10/15/2013    MAMMOGRAM 1/14/2016 1/14/2015, 1/14/2015, 1/14/2015    IMM INFLUENZA (1) 9/1/2016 10/15/2015    BONE DENSITY 9/26/2019 9/26/2014            Current Immunizations     13-VALENT PCV PREVNAR 10/15/2013    Influenza Vaccine Adult HD 10/15/2015      Below and/or attached are the medications your provider expects you to take. Review all of your home medications and newly ordered medications with your provider and/or pharmacist. Follow medication instructions as directed by your provider and/or pharmacist. Please keep your medication list with you and share with your provider. Update the information when medications are discontinued, doses are changed, or new medications (including over-the-counter products) are added; and carry medication information at all times in the event of emergency situations     Allergies:  No Known Allergies             Medications  Valid as of: January 11, 2017 -  3:59 PM    Generic Name Brand Name Tablet Size Instructions for use    Aspirin (Tablet Delayed Response) ECOTRIN 325 MG Take 325 mg by mouth every morning.        Losartan Potassium-HCTZ (Tab) HYZAAR 100-25 MG Take 1 Tab by mouth every morning. Indications: High Blood Pressure        Misc Natural Products (Powder) FIBER 7  Take  by mouth.        Ondansetron HCl (Tab) ZOFRAN 4 MG Take 1 Tab by mouth every four hours as needed for Nausea/Vomiting.        Potassium Chloride Mariama CR (Tab CR) K-DUR 10 MEQ Take 2 Tabs by mouth every morning.        Prochlorperazine Maleate (Tab) COMPAZINE 10 MG Take 10 mg by mouth every 6 hours as needed for Nausea/Vomiting (alternating Q3H with Zofran).        RaNITidine HCl (Tab) ZANTAC 150 MG Take 1 Tab by mouth 2 times a day.        .                 Medicines prescribed today were sent to:     [facility]/PHARMACY #[id number] - [place], [place] - [address]    [address] [facility] [address]    Phone: [phone] Fax: [phone]    Open 24 Hours?: No    [facility] - [place], [place] - [address] [facility] [facility]    [address] [facility] [address]    Phone: [phone] Fax: [phone]    Open 24 Hours?: No      Medication refill instructions:       If your prescription bottle indicates you have medication refills left, it is not necessary to call your provider’s office. Please contact your pharmacy and they will refill your medication.    If your prescription bottle indicates you do not have any refills left, you may request refills at any time through one of the following ways: The online [facility] system (except Urgent Care), by calling your provider’s office, or by asking your pharmacy to contact your provider’s office with a refill request. Medication refills are processed only during regular business hours and may not be available until the next business day. Your provider may request additional information or to  have a follow-up visit with you prior to refilling your medication.   *Please Note: Medication refills are assigned a new Rx number when refilled electronically. Your pharmacy may indicate that no refills were authorized even though a new prescription for the same medication is available at the pharmacy. Please request the medicine by name with the pharmacy before contacting your provider for a refill.        Your To Do List     Future Labs/Procedures Complete By Expires    DX-CHEST-2 VIEWS  As directed 1/11/2018    Standing Orders Interval Expires    CBC WITH DIFFERENTIAL   1/11/2018    CBC(OP [facility]ION [facility] ONLY)   1/11/2018    COMP METABOLIC PANEL   1/11/2018         MyChart Access Code: Activation code not generated  Current MyChart Status: Active

## 2017-01-12 NOTE — PROGRESS NOTES
Patient arrives to Infusion Center. She was originally scheduled for single-agent Gemzar, however patient's WBC = 19.3. Treatment plan deferred to next week. UA collected per order. Port de-accessed. Gauze applied over site. Patient to go next door to get a chest x-ray done. Next appointment scheduled. Discharged to self care; no apparent distress noted.

## 2017-01-13 LAB
BACTERIA UR CULT: NORMAL
SIGNIFICANT IND 70042: NORMAL
SITE SITE: NORMAL
SOURCE SOURCE: NORMAL

## 2017-01-18 ENCOUNTER — OFFICE VISIT (OUTPATIENT)
Dept: HEMATOLOGY ONCOLOGY | Facility: MEDICAL CENTER | Age: 72
End: 2017-01-18
Payer: MEDICARE

## 2017-01-18 ENCOUNTER — NON-PROVIDER VISIT (OUTPATIENT)
Dept: HEMATOLOGY ONCOLOGY | Facility: MEDICAL CENTER | Age: 72
End: 2017-01-18
Payer: MEDICARE

## 2017-01-18 ENCOUNTER — HOSPITAL ENCOUNTER (OUTPATIENT)
Facility: MEDICAL CENTER | Age: 72
End: 2017-01-18
Attending: NURSE PRACTITIONER
Payer: MEDICARE

## 2017-01-18 ENCOUNTER — OUTPATIENT INFUSION SERVICES (OUTPATIENT)
Dept: ONCOLOGY | Facility: MEDICAL CENTER | Age: 72
End: 2017-01-18
Attending: INTERNAL MEDICINE
Payer: MEDICARE

## 2017-01-18 ENCOUNTER — TELEPHONE (OUTPATIENT)
Dept: HEMATOLOGY ONCOLOGY | Facility: MEDICAL CENTER | Age: 72
End: 2017-01-18

## 2017-01-18 VITALS
DIASTOLIC BLOOD PRESSURE: 68 MMHG | HEART RATE: 100 BPM | RESPIRATION RATE: 18 BRPM | OXYGEN SATURATION: 98 % | BODY MASS INDEX: 24.67 KG/M2 | TEMPERATURE: 97.7 F | SYSTOLIC BLOOD PRESSURE: 114 MMHG | WEIGHT: 125.66 LBS | HEIGHT: 60 IN

## 2017-01-18 VITALS
BODY MASS INDEX: 24.94 KG/M2 | WEIGHT: 127 LBS | HEART RATE: 103 BPM | HEIGHT: 60 IN | DIASTOLIC BLOOD PRESSURE: 68 MMHG | SYSTOLIC BLOOD PRESSURE: 102 MMHG | RESPIRATION RATE: 16 BRPM | TEMPERATURE: 98.8 F | OXYGEN SATURATION: 97 %

## 2017-01-18 DIAGNOSIS — C78.7 METASTATIC ADENOCARCINOMA TO LIVER (HCC): ICD-10-CM

## 2017-01-18 DIAGNOSIS — C24.1 AMPULLARY CARCINOMA (HCC): ICD-10-CM

## 2017-01-18 LAB
ALBUMIN SERPL BCP-MCNC: 2.7 G/DL (ref 3.2–4.9)
ALBUMIN/GLOB SERPL: 0.7 G/DL
ALP SERPL-CCNC: 181 U/L (ref 30–99)
ALT SERPL-CCNC: 23 U/L (ref 2–50)
ANION GAP SERPL CALC-SCNC: 9 MMOL/L (ref 0–11.9)
ANISOCYTOSIS BLD QL SMEAR: ABNORMAL
AST SERPL-CCNC: 37 U/L (ref 12–45)
BASOPHILS # BLD AUTO: 0 K/UL (ref 0–0.12)
BASOPHILS NFR BLD AUTO: 0 % (ref 0–1.8)
BILIRUB SERPL-MCNC: 0.5 MG/DL (ref 0.1–1.5)
BUN SERPL-MCNC: 10 MG/DL (ref 8–22)
CALCIUM SERPL-MCNC: 8.8 MG/DL (ref 8.5–10.5)
CHLORIDE SERPL-SCNC: 104 MMOL/L (ref 96–112)
CO2 SERPL-SCNC: 20 MMOL/L (ref 20–33)
CREAT SERPL-MCNC: 0.61 MG/DL (ref 0.5–1.4)
EOSINOPHIL # BLD: 0.12 K/UL (ref 0–0.51)
EOSINOPHIL NFR BLD AUTO: 1 % (ref 0–6.9)
ERYTHROCYTE [DISTWIDTH] IN BLOOD BY AUTOMATED COUNT: 57.6 FL (ref 35.9–50)
GLOBULIN SER CALC-MCNC: 4 G/DL (ref 1.9–3.5)
GLUCOSE SERPL-MCNC: 120 MG/DL (ref 65–99)
HCT VFR BLD AUTO: 41.6 % (ref 37–47)
HGB BLD-MCNC: 14.1 G/DL (ref 12–16)
LYMPHOCYTES # BLD: 2.18 K/UL (ref 1–4.8)
LYMPHOCYTES NFR BLD AUTO: 18 % (ref 22–41)
MANUAL DIFF BLD: NORMAL
MCH RBC QN AUTO: 31.8 PG (ref 27–33)
MCHC RBC AUTO-ENTMCNC: 33.9 G/DL (ref 33.6–35)
MCV RBC AUTO: 93.7 FL (ref 81.4–97.8)
MONOCYTES # BLD: 1.69 K/UL (ref 0–0.85)
MONOCYTES NFR BLD AUTO: 14 % (ref 0–13.4)
MORPHOLOGY BLD-IMP: NORMAL
NEUTROPHILS # BLD: 8.11 K/UL (ref 2–7.15)
NEUTROPHILS NFR BLD AUTO: 67 % (ref 44–72)
PLATELET # BLD AUTO: 250 K/UL (ref 164–446)
PLATELET BLD QL SMEAR: NORMAL
PMV BLD AUTO: 10.5 FL (ref 9–12.9)
POTASSIUM SERPL-SCNC: 5.2 MMOL/L (ref 3.6–5.5)
PROT SERPL-MCNC: 6.7 G/DL (ref 6–8.2)
RBC # BLD AUTO: 4.44 M/UL (ref 4.2–5.4)
RBC BLD AUTO: PRESENT
SODIUM SERPL-SCNC: 133 MMOL/L (ref 135–145)
WBC # BLD AUTO: 12.1 K/UL (ref 4.8–10.8)

## 2017-01-18 PROCEDURE — 36591 DRAW BLOOD OFF VENOUS DEVICE: CPT | Performed by: NURSE PRACTITIONER

## 2017-01-18 PROCEDURE — 99213 OFFICE O/P EST LOW 20 MIN: CPT | Performed by: NURSE PRACTITIONER

## 2017-01-18 PROCEDURE — 700105 HCHG RX REV CODE 258: Performed by: NURSE PRACTITIONER

## 2017-01-18 PROCEDURE — 96413 CHEMO IV INFUSION 1 HR: CPT

## 2017-01-18 PROCEDURE — 96375 TX/PRO/DX INJ NEW DRUG ADDON: CPT

## 2017-01-18 PROCEDURE — 700111 HCHG RX REV CODE 636 W/ 250 OVERRIDE (IP): Mod: JW

## 2017-01-18 PROCEDURE — 700105 HCHG RX REV CODE 258

## 2017-01-18 PROCEDURE — 700101 HCHG RX REV CODE 250: Performed by: NURSE PRACTITIONER

## 2017-01-18 PROCEDURE — 700111 HCHG RX REV CODE 636 W/ 250 OVERRIDE (IP): Performed by: NURSE PRACTITIONER

## 2017-01-18 PROCEDURE — 36593 DECLOT VASCULAR DEVICE: CPT

## 2017-01-18 RX ORDER — ONDANSETRON 2 MG/ML
8 INJECTION INTRAMUSCULAR; INTRAVENOUS ONCE
Status: COMPLETED | OUTPATIENT
Start: 2017-01-18 | End: 2017-01-18

## 2017-01-18 RX ADMIN — HEPARIN 500 UNITS: 100 SYRINGE at 17:05

## 2017-01-18 RX ADMIN — Medication 20 ML: at 17:05

## 2017-01-18 RX ADMIN — GEMCITABINE HYDROCHLORIDE 1550 MG: 1 INJECTION, POWDER, LYOPHILIZED, FOR SOLUTION INTRAVENOUS at 16:24

## 2017-01-18 RX ADMIN — ALTEPLASE 2 MG: 2.2 INJECTION, POWDER, LYOPHILIZED, FOR SOLUTION INTRAVENOUS at 15:12

## 2017-01-18 RX ADMIN — ONDANSETRON 8 MG: 2 INJECTION, SOLUTION INTRAMUSCULAR; INTRAVENOUS at 15:59

## 2017-01-18 ASSESSMENT — ENCOUNTER SYMPTOMS
WEIGHT LOSS: 0
TINGLING: 1
VOMITING: 0
MYALGIAS: 0
COUGH: 0
NAUSEA: 1
DIARRHEA: 0
WHEEZING: 0
DIZZINESS: 0
FEVER: 0
CHILLS: 0
WEAKNESS: 1
PALPITATIONS: 0
CONSTIPATION: 0
HEADACHES: 0
SHORTNESS OF BREATH: 0

## 2017-01-18 NOTE — NON-PROVIDER
Pt here today for labs and pre chemo appointment with ELISE Galvan.  She is due for c1d1 of Gemzar.  Port accessed in sterile fashion; unable to receive blood return after flushing with NS and several position changes.  Port has been left accessed for alteplase administration in infusion services.  Labs were drawn peripherally from [facility].  Pt tolerated well.

## 2017-01-18 NOTE — PROGRESS NOTES
Subjective:      Elin Poole is a 71 y.o. female who presents for Follow-Up for metastatic ampullary carcinoma.           HPI    Patient seen today in follow up for metastatic ampullary carcinoma.  She is here for cycle 1, day 1 of single agent Gemzar.  Patient is accompanied by her son for today's visit.     Fever - None  Chills - None  Appetite - Her appetite is okay and slowly improving. She has gained 2 pounds in the last week.   Fatigue - Present but slowly improving since being off treatment.   N/V - Sometimes she will feel a slight nausea before eating and will take anti-emetic with positive results.   Constipation/Diarrhea - Patient stated she had one diarrhea episode which has resolved and she is having normal soft to formed stool. Last BM was yesterday.   Pain - None  Neuropathy - Patient with persistent neuropathy in her toes. She is able to ambulate and and feel the ground when walking.   Other - Denies mouth sores.     No Known Allergies  Current Outpatient Prescriptions on File Prior to Visit   Medication Sig Dispense Refill   • ranitidine (ZANTAC) 150 MG Tab Take 1 Tab by mouth 2 times a day. 60 Tab 3   • ondansetron (ZOFRAN) 4 MG Tab tablet Take 1 Tab by mouth every four hours as needed for Nausea/Vomiting. 30 Tab 3   • potassium chloride SA (K-DUR) 10 MEQ Tab CR Take 2 Tabs by mouth every morning. 60 Tab 1   • aspirin EC (ECOTRIN) 325 MG Tablet Delayed Response Take 325 mg by mouth every morning.     • losartan-hydrochlorothiazide (HYZAAR) 100-25 MG per tablet Take 1 Tab by mouth every morning. Indications: High Blood Pressure     • Misc Natural Products (FIBER 7) Powder Take  by mouth.     • prochlorperazine (COMPAZINE) 10 MG Tab Take 10 mg by mouth every 6 hours as needed for Nausea/Vomiting (alternating Q3H with Zofran).       No current facility-administered medications on file prior to visit.       Review of Systems   Constitutional: Positive for malaise/fatigue. Negative for fever,  chills and weight loss.   Respiratory: Negative for cough, shortness of breath and wheezing.    Cardiovascular: Negative for chest pain, palpitations and leg swelling.   Gastrointestinal: Positive for nausea. Negative for vomiting, diarrhea and constipation.   Genitourinary: Negative for dysuria.   Musculoskeletal: Negative for myalgias.   Neurological: Positive for tingling (more so in her toes, not worsening) and weakness (slowly improving). Negative for dizziness and headaches.          Objective:     /68 mmHg  Pulse 117  Temp(Src) 35.6 °C (96 °F)  Resp 16  Ht 1.524 m (5')  Wt 57.607 kg (127 lb)  BMI 24.80 kg/m2  SpO2 97%     Physical Exam   Constitutional: She is oriented to person, place, and time. She appears well-developed and well-nourished. No distress.   HENT:   Head: Normocephalic and atraumatic.   Mouth/Throat: Oropharynx is clear and moist. No oropharyngeal exudate.   Eyes: Conjunctivae and EOM are normal. Pupils are equal, round, and reactive to light. Right eye exhibits no discharge. Left eye exhibits no discharge. No scleral icterus.   Cardiovascular: Normal rate, regular rhythm and normal heart sounds.  Exam reveals no gallop and no friction rub.    No murmur heard.  Pulmonary/Chest: Effort normal and breath sounds normal. No respiratory distress. She has no wheezes.   Abdominal: Soft. Bowel sounds are normal. She exhibits no distension. There is no tenderness.   Musculoskeletal: She exhibits no edema or tenderness.   Ambulates with a walker for stability   Neurological: She is alert and oriented to person, place, and time.   Skin: Skin is warm and dry. No rash noted. She is not diaphoretic. No erythema. No pallor.   Psychiatric: Her behavior is normal.   Vitals reviewed.              Assessment/Plan:     1. Metastatic adenocarcinoma to liver (CMS-HCC)     2. Ampullary carcinoma (CMS-HCC)       Plan  1. Patient is scheduled to receive her first cycle of single agent Gemzar today. We  have postponed her chemotherapy the last 2 weeks due to symptoms and questionable infection. Patient's white blood cell count was elevated last week at 19.3 and is down to 12.1 today. I did do a chest x-ray and urine which was negative for infection. I have not received the differential of the CBC or the CMP just yet. Patient into the office slightly tachycardic at 117. Repeat pulse showed her to be at 103 after sitting down in the office. Temporal temperature was 98.8. Her blood pressure is slightly better this week at 102/68. Patient denies fevers or chills at home. If all other labs meet parameters okay to proceed with treatment today. I did discuss the case with Dr. Duran as well and he is in agreement with the plan.    2. Patient is to follow up in one week or sooner if needed prior to day 8 of her treatment.

## 2017-01-18 NOTE — PROGRESS NOTES
Pharmacy Chemotherapy calculation:    Patient Name: Elin Poole  DX: Metastatic Pancreatic Cancer (poorly differentiated adenocarcinoma)    Cycle 1, Day 1  Previous treatment = 12/20/16- gemcitabine + abraxane    Protocol: Gemzar   Gemcitabine 1000mg/m2 IV over 30 min on Days 1, 8, and 15  q28 days until DP/UT  NCCN Guidelines for Pancreatic Cancer V.2.2016  Tasia VELAZQUEZ, et al - J Clin Oncol. 1997 Jun;15(6):2403-13.  Alexanderptlouisa JACQUELINE, et al - ALEXIS. 2010 Sep 8;304(10):1073-81.     /68 mmHg  Pulse 100  Temp(Src) 36.5 °C (97.7 °F)  Resp 18  Ht 1.524 m (5')  Wt 57 kg (125 lb 10.6 oz)  BMI 24.54 kg/m2  SpO2 98% Body surface area is 1.55 meters squared.     ANC~ 8110 Plt = 250k   Hgb = 14.1     SCr = 0.61mg/dL CrCl ~ 66mL/min   LFT's = WNL, except AP = 181 TBili = 0.5        Gemcitabine 1000mg/m2 IV x 1.55m2 = 1550mg    <5% difference, ok to treat with final dose = 1550mg IV      RODOLFO Balderas Pharm.D.

## 2017-01-18 NOTE — MR AVS SNAPSHOT
Elin Mora Virgilio   2017 1:30 PM   Appointment   MRN: [id number]    Department:  [facility]   Dept Phone:  [phone]    Description:  Female : 1945   Provider:  ONC RN 1           Allergies as of 2017     Not on File      Vital Signs     Smoking Status                   Never Smoker            Basic Information     Date Of Birth Sex Race Ethnicity Preferred Language    1945 Female White Non- English      Your appointments     2017  3:00 PM   Est Chemo 1 with RN 3   [facility] ([facility])    [address] [facility] [place] [address]  [facility] [id number] [phone]            2017  1:00 PM   Non Provider 1 with ONC RN 1   [facility] (--)    [address], [address]  [facility] [address] [phone]           You will be receiving a confirmation call a few days before your appointment from our automated call confirmation system.            2017  2:20 PM   ONCOLOGY EST PATIENT 30 MIN with Talya Krause M.D.   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   8/15/2016 - Present    Cholecystitis with cholelithiasis K80.10   8/15/2016 - Present    Metastatic adenocarcinoma to liver (CMS-HCC) C78.7   8/15/2016 - Present    Obstructive jaundice due to malignant neoplasm K83.8   8/15/2016 - Present    Leukocytosis D72.829   10/21/2016 - Present    Sepsis (CMS-HCC) A41.9   10/21/2016 - Present    Metabolic acidosis E87.2   10/22/2016 - Present      Health Maintenance        Date Due Completion Dates    IMM DTaP/Tdap/Td Vaccine (1 - Tdap) 1964 ---    PAP SMEAR 1966 ---    COLONOSCOPY 1995 ---    IMM ZOSTER VACCINE  6/19/2005 ---    IMM PNEUMOCOCCAL 65+ (ADULT) LOW/MEDIUM RISK SERIES (2 of 2 - PPSV23) 10/15/2014 10/15/2013    MAMMOGRAM 1/14/2016 1/14/2015, 1/14/2015, 1/14/2015    IMM INFLUENZA (1) 9/1/2016 10/15/2015    BONE DENSITY 9/26/2019 9/26/2014            Current Immunizations     13-VALENT PCV PREVNAR 10/15/2013    Influenza Vaccine Adult HD 10/15/2015      Below and/or attached are the medications your provider expects you to take. Review all of your home medications and newly ordered medications with your provider and/or pharmacist. Follow medication instructions as directed by your provider and/or pharmacist. Please keep your medication list with you and share with your provider. Update the information when medications are discontinued, doses are changed, or new medications (including over-the-counter products) are added; and carry medication information at all times in the event of emergency situations     Allergies:  No Known Allergies          Medications  Valid as of: January 18, 2017 -  2:08 PM    Generic Name Brand Name Tablet Size Instructions for use    Aspirin (Tablet Delayed Response) ECOTRIN 325 MG Take 325 mg by mouth every morning.        Losartan Potassium-HCTZ (Tab) HYZAAR 100-25 MG Take 1 Tab by mouth every morning. Indications: High Blood Pressure        Misc Natural Products (Powder) FIBER 7  Take  by mouth.        Ondansetron HCl (Tab) ZOFRAN 4 MG Take 1 Tab by mouth every four hours as needed for Nausea/Vomiting.        Potassium Chloride Mariama CR (Tab CR) K-DUR 10 MEQ Take 2 Tabs by mouth every morning.        Prochlorperazine Maleate (Tab) COMPAZINE 10 MG Take 10 mg by mouth every 6 hours as needed for Nausea/Vomiting (alternating Q3H with Zofran).        RaNITidine HCl (Tab) ZANTAC 150 MG Take 1 Tab by mouth 2 times a day.        .                 Medicines prescribed today were sent to:     [facility]/PHARMACY #[id number] - [place], [place] - [address]    [address] [place] [address]       Phone: [phone] Fax: [phone]    Open 24 Hours?: No    [facility] - [place], [place] - [address] [facility]    [address] [facility] [address]    Phone: [phone] Fax: [phone]    Open 24 Hours?: No      Medication refill instructions:       If your prescription bottle indicates you have medication refills left, it is not necessary to call your provider’s office. Please contact your pharmacy and they will refill your medication.    If your prescription bottle indicates you do not have any refills left, you may request refills at any time through one of the following ways: The online [facility] [employer] system (except Urgent Care), by calling your provider’s office, or by asking your pharmacy to contact your provider’s office with a refill request. Medication refills are processed only during regular business hours and may not be available until the next business day. Your provider may request additional information or to have a follow-up visit with you prior to refilling your medication.   *Please Note: Medication refills are assigned a new Rx number when refilled electronically. Your pharmacy may indicate that no refills were authorized even though a new prescription for the same medication is available at the pharmacy. Please request the medicine by name with the pharmacy before contacting your provider for a refill.           [facility] Access Code: Activation code not generated  Current [facility] Status: Active

## 2017-01-18 NOTE — MR AVS SNAPSHOT
Elin Abby Virgilio   2017 2:00 PM   Office Visit   MRN: [id number]    Department:  [facility]   Dept Phone:  [phone]    Description:  Female : 1945   Provider:  FERNANDO GomezPCHRIS.           Reason for Visit     Follow-Up           Allergies as of 2017     No Known Allergies      You were diagnosed with     Metastatic adenocarcinoma to liver (CMS-HCC)   [506209]       Ampullary carcinoma (CMS-HCC)   [913987]         Vital Signs     Blood Pressure Pulse Temperature Respirations Height Weight    102/68 mmHg 103 37.1 °C (98.8 °F) 16 1.524 m (5') 57.607 kg (127 lb)    Body Mass Index Oxygen Saturation Smoking Status             24.80 kg/m2 97% Never Smoker          Basic Information     Date Of Birth Sex Race Ethnicity Preferred Language    1945 Female White Non- English      Your appointments     2017  3:00 PM   Est Chemo 1 with RN 3   [facility] ([facility])    [address] [facility] [address]  [place] [address] [phone]            2017  1:00 PM   Non Provider 1 with ONC RN 1   [facility] (--)    [address], [facility] [address]  [facility] [address] [phone]           You will be receiving a confirmation call a few days before your appointment from our automated call confirmation system.            2017  2:20 PM   ONCOLOGY EST PATIENT 30 MIN with Talya Krause M.D.   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   8/15/2016 - Present    Cholecystitis with  cholelithiasis K80.10   8/15/2016 - Present    Metastatic adenocarcinoma to liver (CMS-HCC) C78.7   8/15/2016 - Present    Obstructive jaundice due to malignant neoplasm K83.8   8/15/2016 - Present    Leukocytosis D72.829   10/21/2016 - Present    Sepsis (CMS-HCC) A41.9   10/21/2016 - Present    Metabolic acidosis E87.2   10/22/2016 - Present      Health Maintenance        Date Due Completion Dates    IMM DTaP/Tdap/Td Vaccine (1 - Tdap) 6/19/1964 ---    PAP SMEAR 6/19/1966 ---    COLONOSCOPY 6/19/1995 ---    IMM ZOSTER VACCINE 6/19/2005 ---    IMM PNEUMOCOCCAL 65+ (ADULT) LOW/MEDIUM RISK SERIES (2 of 2 - PPSV23) 10/15/2014 10/15/2013    MAMMOGRAM 1/14/2016 1/14/2015, 1/14/2015, 1/14/2015    IMM INFLUENZA (1) 9/1/2016 10/15/2015    BONE DENSITY 9/26/2019 9/26/2014            Current Immunizations     13-VALENT PCV PREVNAR 10/15/2013    Influenza Vaccine Adult HD 10/15/2015      Below and/or attached are the medications your provider expects you to take. Review all of your home medications and newly ordered medications with your provider and/or pharmacist. Follow medication instructions as directed by your provider and/or pharmacist. Please keep your medication list with you and share with your provider. Update the information when medications are discontinued, doses are changed, or new medications (including over-the-counter products) are added; and carry medication information at all times in the event of emergency situations     Allergies:  No Known Allergies          Medications  Valid as of: January 18, 2017 -  2:38 PM    Generic Name Brand Name Tablet Size Instructions for use    Aspirin (Tablet Delayed Response) ECOTRIN 325 MG Take 325 mg by mouth every morning.        Losartan Potassium-HCTZ (Tab) HYZAAR 100-25 MG Take 1 Tab by mouth every morning. Indications: High Blood Pressure        Misc Natural Products (Powder) FIBER 7  Take  by mouth.        Ondansetron HCl (Tab) ZOFRAN 4 MG Take 1 Tab by mouth every  four hours as needed for Nausea/Vomiting.        Potassium Chloride Mariama CR (Tab CR) K-DUR 10 MEQ Take 2 Tabs by mouth every morning.        Prochlorperazine Maleate (Tab) COMPAZINE 10 MG Take 10 mg by mouth every 6 hours as needed for Nausea/Vomiting (alternating Q3H with Zofran).        RaNITidine HCl (Tab) ZANTAC 150 MG Take 1 Tab by mouth 2 times a day.        .                 Medicines prescribed today were sent to:     [facility]/PHARMACY #[id number] - [place], [place] - [address]    [address] [facility] [address]    Phone: [phone] Fax: [phone]    Open 24 Hours?: [facility] - [place], [place] - [address]    [address] [facility] [address]    Phone: [phone] Fax: [phone]    Open 24 Hours?: No      Medication refill instructions:       If your prescription bottle indicates you have medication refills left, it is not necessary to call your provider’s office. Please contact your pharmacy and they will refill your medication.    If your prescription bottle indicates you do not have any refills left, you may request refills at any time through one of the following ways: The online [employer] system (except Urgent Care), by calling your provider’s office, or by asking your pharmacy to contact your provider’s office with a refill request. Medication refills are processed only during regular business hours and may not be available until the next business day. Your provider may request additional information or to have a follow-up visit with you prior to refilling your medication.   *Please Note: Medication refills are assigned a new Rx number when refilled electronically. Your pharmacy may indicate that no refills were authorized even though a new prescription for the same medication is available at the pharmacy. Please request the medicine by name with the pharmacy before contacting your provider for a refill.        Referral     A referral request has been sent  to our patient care coordination department. Please allow 3-5 business days for us to process this request and contact you either by phone or mail. If you do not hear from us by the 5th business day, please call us at [phone].           [email or web address] Access Code: Activation code not generated  Current [email or web address] Status: Active

## 2017-01-18 NOTE — PROGRESS NOTES
Chemotherapy Verification - PRIMARY RN      Height = 152.4 cm  Weight = 57 kg  BSA = 1.55 m^2       Medication: Gemzar  Dose: 1000 mg/m^2  Calculated Dose: 1550 mg                             (In mg/m2, AUC, mg/kg)     I confirm this process was performed independently with the BSA and all final chemotherapy dosing calculations congruent.  Any discrepancies of 5% or greater have been addressed with the chemotherapy pharmacist. The resolution of the discrepancy has been documented in the EPIC progress notes.

## 2017-01-19 NOTE — PROGRESS NOTES
Chemotherapy Verification - SECONDARY RN       Height = 60in  Weight = 57kg  BSA = 1.55       Medication: Gemzar  Dose: 1000mg/m2  Calculated Dose: 1550mg                             (In mg/m2, AUC, mg/kg)               I confirm that this process was performed independently.

## 2017-01-19 NOTE — PROGRESS NOTES
Patient here for Day 1, Cycle 1 of Gemzar. Port previously accessed; no blood return noted. Alteplase given per MAR. After 30 minutes; brisk blood return noted. Pre-medications given per MAR. Gemzar given per MAR. Heparin instilled prior to de-accessing port. Port de-accessed; gauze applied over site. Next appointment scheduled. Discharged to self care; no apparent distress noted.

## 2017-01-24 ENCOUNTER — TELEPHONE (OUTPATIENT)
Dept: HEMATOLOGY ONCOLOGY | Facility: MEDICAL CENTER | Age: 72
End: 2017-01-24

## 2017-01-24 NOTE — TELEPHONE ENCOUNTER
Patient son called back and I notified him that Dr. Krause will be discussing the potassium during her appointment tomorrow on 01/25/2017.

## 2017-01-24 NOTE — TELEPHONE ENCOUNTER
I called left message regarding a medication refill on potassium chloride SA (K-DUR) 10 MEQ Tab CR pre Gabby Wallisp APRN Dr Krause can refill it tomorrow and will recheck her potassium level .

## 2017-01-25 ENCOUNTER — OUTPATIENT INFUSION SERVICES (OUTPATIENT)
Dept: ONCOLOGY | Facility: MEDICAL CENTER | Age: 72
End: 2017-01-25
Attending: INTERNAL MEDICINE
Payer: MEDICARE

## 2017-01-25 ENCOUNTER — OFFICE VISIT (OUTPATIENT)
Dept: HEMATOLOGY ONCOLOGY | Facility: MEDICAL CENTER | Age: 72
End: 2017-01-25
Payer: MEDICARE

## 2017-01-25 ENCOUNTER — HOSPITAL ENCOUNTER (OUTPATIENT)
Facility: MEDICAL CENTER | Age: 72
End: 2017-01-25
Attending: NURSE PRACTITIONER
Payer: MEDICARE

## 2017-01-25 ENCOUNTER — NON-PROVIDER VISIT (OUTPATIENT)
Dept: HEMATOLOGY ONCOLOGY | Facility: MEDICAL CENTER | Age: 72
End: 2017-01-25
Payer: MEDICARE

## 2017-01-25 VITALS
OXYGEN SATURATION: 97 % | WEIGHT: 126.1 LBS | SYSTOLIC BLOOD PRESSURE: 122 MMHG | HEIGHT: 60 IN | TEMPERATURE: 97.2 F | HEART RATE: 110 BPM | DIASTOLIC BLOOD PRESSURE: 74 MMHG | RESPIRATION RATE: 16 BRPM | BODY MASS INDEX: 24.76 KG/M2

## 2017-01-25 VITALS
HEART RATE: 110 BPM | HEIGHT: 60 IN | DIASTOLIC BLOOD PRESSURE: 74 MMHG | OXYGEN SATURATION: 97 % | SYSTOLIC BLOOD PRESSURE: 122 MMHG | RESPIRATION RATE: 16 BRPM | TEMPERATURE: 97.2 F

## 2017-01-25 DIAGNOSIS — C78.7 METASTATIC ADENOCARCINOMA TO LIVER (HCC): ICD-10-CM

## 2017-01-25 DIAGNOSIS — E87.6 HYPOKALEMIA: ICD-10-CM

## 2017-01-25 DIAGNOSIS — C24.1 AMPULLARY CARCINOMA (HCC): ICD-10-CM

## 2017-01-25 LAB
ANION GAP SERPL CALC-SCNC: 8 MMOL/L (ref 0–11.9)
BASOPHILS # BLD AUTO: 0.01 K/UL (ref 0–0.12)
BASOPHILS NFR BLD AUTO: 0.1 % (ref 0–1.8)
BUN SERPL-MCNC: 9 MG/DL (ref 8–22)
CALCIUM SERPL-MCNC: 8.5 MG/DL (ref 8.5–10.5)
CHLORIDE SERPL-SCNC: 105 MMOL/L (ref 96–112)
CO2 SERPL-SCNC: 22 MMOL/L (ref 20–33)
CREAT SERPL-MCNC: 0.63 MG/DL (ref 0.5–1.4)
EOSINOPHIL # BLD: 0.07 K/UL (ref 0–0.51)
EOSINOPHIL NFR BLD AUTO: 0.9 % (ref 0–6.9)
ERYTHROCYTE [DISTWIDTH] IN BLOOD BY AUTOMATED COUNT: 52 FL (ref 35.9–50)
GLUCOSE SERPL-MCNC: 168 MG/DL (ref 65–99)
HCT VFR BLD AUTO: 33.8 % (ref 37–47)
HGB BLD-MCNC: 11.4 G/DL (ref 12–16)
LYMPHOCYTES # BLD: 1.5 K/UL (ref 1–4.8)
LYMPHOCYTES NFR BLD AUTO: 20.2 % (ref 22–41)
MCH RBC QN AUTO: 31.7 PG (ref 27–33)
MCHC RBC AUTO-ENTMCNC: 33.7 G/DL (ref 33.6–35)
MCV RBC AUTO: 93.9 FL (ref 81.4–97.8)
MONOCYTES # BLD: 0.64 K/UL (ref 0–0.85)
MONOCYTES NFR BLD AUTO: 8.6 % (ref 0–13.4)
NEUTROPHILS # BLD: 5.19 K/UL (ref 2–7.15)
NEUTROPHILS NFR BLD AUTO: 70.2 % (ref 44–72)
PLATELET # BLD AUTO: 194 K/UL (ref 164–446)
PMV BLD AUTO: 8.8 FL (ref 9–12.9)
POTASSIUM SERPL-SCNC: 3.8 MMOL/L (ref 3.6–5.5)
RBC # BLD AUTO: 3.6 M/UL (ref 4.2–5.4)
SODIUM SERPL-SCNC: 135 MMOL/L (ref 135–145)
WBC # BLD AUTO: 7.4 K/UL (ref 4.8–10.8)

## 2017-01-25 PROCEDURE — 80048 BASIC METABOLIC PNL TOTAL CA: CPT

## 2017-01-25 PROCEDURE — 36592 COLLECT BLOOD FROM PICC: CPT | Performed by: NURSE PRACTITIONER

## 2017-01-25 PROCEDURE — 3014F SCREEN MAMMO DOC REV: CPT | Performed by: INTERNAL MEDICINE

## 2017-01-25 PROCEDURE — 4040F PNEUMOC VAC/ADMIN/RCVD: CPT | Performed by: INTERNAL MEDICINE

## 2017-01-25 PROCEDURE — 1036F TOBACCO NON-USER: CPT | Performed by: INTERNAL MEDICINE

## 2017-01-25 PROCEDURE — G8420 CALC BMI NORM PARAMETERS: HCPCS | Performed by: INTERNAL MEDICINE

## 2017-01-25 PROCEDURE — 99214 OFFICE O/P EST MOD 30 MIN: CPT | Performed by: INTERNAL MEDICINE

## 2017-01-25 PROCEDURE — 85025 COMPLETE CBC W/AUTO DIFF WBC: CPT

## 2017-01-25 PROCEDURE — G8484 FLU IMMUNIZE NO ADMIN: HCPCS | Performed by: INTERNAL MEDICINE

## 2017-01-25 PROCEDURE — 1101F PT FALLS ASSESS-DOCD LE1/YR: CPT | Mod: 8P | Performed by: INTERNAL MEDICINE

## 2017-01-25 PROCEDURE — 3017F COLORECTAL CA SCREEN DOC REV: CPT | Performed by: INTERNAL MEDICINE

## 2017-01-25 PROCEDURE — G8432 DEP SCR NOT DOC, RNG: HCPCS | Performed by: INTERNAL MEDICINE

## 2017-01-25 RX ORDER — SENNA AND DOCUSATE SODIUM 50; 8.6 MG/1; MG/1
1 TABLET, FILM COATED ORAL 2 TIMES DAILY
Qty: 60 TAB | Refills: 3 | Status: SHIPPED | OUTPATIENT
Start: 2017-01-25 | End: 2018-01-01

## 2017-01-25 NOTE — MR AVS SNAPSHOT
Elin Abby Virgilio   2017 2:20 PM   Office Visit   MRN: [id number]    Department:  [facility]   Dept Phone:  [phone]    Description:  Female : 1945   Provider:  Talya Krause M.D.           Reason for Visit     Follow-Up Prechemo      Allergies as of 2017     No Known Allergies      You were diagnosed with     Ampullary carcinoma (CMS-HCC)   [996687]       Metastatic adenocarcinoma to liver (CMS-HCC)   [889407]       Hypokalemia   [907055]         Vital Signs     Blood Pressure Pulse Temperature Respirations Height Weight    122/74 mmHg 110 36.2 °C (97.2 °F) 16 1.524 m (5') 57.2 kg (126 lb 1.7 oz)    Body Mass Index Oxygen Saturation Breastfeeding? Smoking Status          24.63 kg/m2 97% No Never Smoker         Basic Information     Date Of Birth Sex Race Ethnicity Preferred Language    1945 Female White Non- English      Your appointments     2017  3:00 PM   Est Chemo 1 with RN 3   [facility] ([facility])    [address]  [facility] [address]   [phone]            2017  1:40 PM   ONCOLOGY EST PATIENT 30 MIN with Talya Krause M.D.   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   8/15/2016 - Present    Cholecystitis with cholelithiasis K80.10   8/15/2016 - Present    Metastatic adenocarcinoma to liver (CMS-HCC) C78.7   8/15/2016 - Present    Obstructive jaundice due to malignant neoplasm K83.8   8/15/2016 - Present    Leukocytosis D72.829   10/21/2016 - Present    Sepsis (CMS-[facility]) A41.9   10/21/2016 - Present    Metabolic acidosis E87.2   10/22/2016 - Present      Health Maintenance        Date Due Completion Dates    IMM DTaP/Tdap/Td Vaccine (1 - Tdap) 6/19/1964 ---    PAP SMEAR 6/19/1966 ---    COLONOSCOPY 6/19/1995 ---    IMM ZOSTER VACCINE 6/19/2005 ---    IMM PNEUMOCOCCAL 65+ (ADULT) LOW/MEDIUM RISK SERIES (2 of 2 - PPSV23) 10/15/2014 10/15/2013    MAMMOGRAM 1/14/2016 1/14/2015, 1/14/2015, 1/14/2015    IMM INFLUENZA (1) 9/1/2016 10/15/2015    BONE DENSITY 9/26/2019 9/26/2014            Current Immunizations     13-VALENT PCV PREVNAR 10/15/2013    Influenza Vaccine Adult HD 10/15/2015      Below and/or attached are the medications your provider expects you to take. Review all of your home medications and newly ordered medications with your provider and/or pharmacist. Follow medication instructions as directed by your provider and/or pharmacist. Please keep your medication list with you and share with your provider. Update the information when medications are discontinued, doses are changed, or new medications (including over-the-counter products) are added; and carry medication information at all times in the event of emergency situations     Allergies:  No Known Allergies          Medications  Valid as of: January 25, 2017 -  2:25 PM    Generic Name Brand Name Tablet Size Instructions for use    Aspirin (Tablet Delayed Response) ECOTRIN 325 MG Take 325 mg by mouth every morning.        Losartan Potassium-HCTZ (Tab) HYZAAR 100-25 MG Take 1 Tab by mouth every morning. Indications: High Blood Pressure        Misc Natural Products (Powder) FIBER 7  Take  by mouth.        Ondansetron HCl (Tab) ZOFRAN 4 MG Take 1 Tab by mouth every four hours as needed for Nausea/Vomiting.        Prochlorperazine Maleate (Tab) COMPAZINE 10 MG Take 10 mg by mouth every 6 hours as needed for Nausea/Vomiting (alternating Q3H with Zofran).        RaNITidine HCl (Tab) ZANTAC 150 MG Take 1 Tab by  mouth 2 times a day.        Sennosides-Docusate Sodium (Tab) SENOKOT-S 8.6-50 MG Take 1 Tab by mouth 2 times a day.        .                 Medicines prescribed today were sent to:     [facility]/PHARMACY #[id number] - [place], [place] - [address]    [address] [facility] [address]    Phone: [phone] Fax: [phone]    Open 24 Hours?: [facility] - [place], [place] - [address]    [address] [address]    Phone: [phone] Fax: [phone]    Open 24 Hours?: No      Medication refill instructions:       If your prescription bottle indicates you have medication refills left, it is not necessary to call your provider’s office. Please contact your pharmacy and they will refill your medication.    If your prescription bottle indicates you do not have any refills left, you may request refills at any time through one of the following ways: The online [employer] system (except Urgent Care), by calling your provider’s office, or by asking your pharmacy to contact your provider’s office with a refill request. Medication refills are processed only during regular business hours and may not be available until the next business day. Your provider may request additional information or to have a follow-up visit with you prior to refilling your medication.   *Please Note: Medication refills are assigned a new Rx number when refilled electronically. Your pharmacy may indicate that no refills were authorized even though a new prescription for the same medication is available at the pharmacy. Please request the medicine by name with the pharmacy before contacting your provider for a refill.           [employer] Access Code: Activation code not generated  Current [employer] Status: Active

## 2017-01-25 NOTE — PROGRESS NOTES
Follow Up Note:  Oncology    Date: 1/25/17  Time: 2:20 pm      Referring Physician: Dr. Kurtis Jacobs   Primary Care:  Wally Knox D.O.  Surgeon: Dr. Kurtis Jacobs     Diagnosis: Metastatic adenocarcinoma of ampulla of vater    Chief compliant: She is here for a follow up visit    History of Presenting Illness:  Elin Poole is a 71 y.o. female with adenocarcinoma of likely primary ampulla of vater neck no staining 7/2016. At diagnosis she was found to have elevated liver enzymes as well as acute renal insufficiency. She underwent further imaging as well as stent placement with decompression. Biopsy was positive for adenocarcinoma primary ampulla abated. He was seen by Dr. Jacobs. Initially she was felt not to have any evidence of metastatic disease. She underwent an attempted Whipple’s/2016 however was found to have multiple liver lesions. Surgery was aborted and she underwent wedge resection of the liver which was positive for poorly differentiated adenocarcinoma. Post surgical imaging including PET scan showed numerous hepatic lesions with increased uptake as well as uptake in the liver or pancreas at the ampulla. He was also found to have mild activity in bilateral pulmonary hilar area and a small pulmonary nodule in the right middle lobe without uptake. She underwent chemotherapy with gemcitabine and Abraxane and tolerated 3 cycles fairly well. After the third cycle however she started to have increased fatigue as well as diarrhea. CT scan showed response therapy. Post CT scan she continued to have diarrhea that lasted about 3 days.  Her last dose of combination chemotherapy was on 12/30/16. Secondary to increased fatigue she was given a short in break from chemotherapy and was started back on single agent gemcitabine    Interval history:  She is here for a follow-up visit. She was started on single agent gemcitabine on 1/18/17. She was well 1-2 days after chemotherapy  however started to have increased fatigue from Friday. The fatigue persisted through the weekend and patient states that yesterday was more significant fatigue than before. Yesterday she had to rest most of the day secondary to the fatigue. Today she states that she is starting to feel better. She has noticed worsening of her knee pain especially with the weather changes.  She has been having intermittent constipation but is trying to have a bowel movement every day. She denies any more diarrhea. She has not been taking stool softeners. She has noticed persistent lack of taste and hence low appetite. She has had fairly stable weight. She continues to have intermittent nausea but no vomiting. She has noticed mild numbness in her bilateral toes which has been stable. She denies any fevers, chills or night sweats.         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[facility])            Allergies as of 01/25/2017   • (No Known Allergies)         Current outpatient prescriptions:   •  ranitidine (ZANTAC) 150 MG Tab, Take 1 Tab by mouth 2 times a day., Disp: 60 Tab, Rfl: 3  •  ondansetron (ZOFRAN) 4 MG Tab tablet, Take 1 Tab by mouth every four hours as needed for Nausea/Vomiting., Disp: 30 Tab, Rfl: 3  •  potassium chloride SA (K-DUR) 10 MEQ Tab CR, Take 2 Tabs by mouth every morning., Disp: 60 Tab, Rfl: 1  •  aspirin EC (ECOTRIN) 325 MG Tablet Delayed Response, Take 325 mg by mouth every morning., Disp: , Rfl:   •  prochlorperazine (COMPAZINE) 10 MG Tab, Take 10 mg by mouth every 6 hours as needed for Nausea/Vomiting (alternating Q3H with Zofran)., Disp: , Rfl:   •  Misc Natural Products (FIBER 7) Powder, Take  by mouth., Disp: , Rfl:   •   losartan-hydrochlorothiazide (HYZAAR) 100-25 MG per tablet, Take 1 Tab by mouth every morning. Indications: High Blood Pressure, Disp: , Rfl:     Review of Systems:  All other review of systems are negative except what was mentioned above in the HPI.  Constitutional: Negative for fever and chills. Negative for weight loss. Positive for  Malaise/fatigue incrased.    HENT: Negative for ear pain and nosebleeds     Eyes: Negative for blurred vision.    Respiratory: Negative for cough, sputum production, and shortness of breath.    Cardiovascular: Negative for chest pain and leg swelling.    Gastrointestinal: Positive for intermittent nausea. Negative for vomiting and abdominal pain.    Genitourinary: Negative for dysuria    Musculoskeletal: Negative for myalgias. Positive for joint pain.    Skin: Negative for rash and itching.    Neurological: Negative for dizziness, Positive for mild sensory change, Negative for focal weakness and headaches.    Endo/Heme/Allergies: No bruise/bleed easily.    Psychiatric/Behavioral: Negative for depression and memory loss.      Physical Exam:  Filed Vitals:    01/25/17 1352   BP: 122/74   Pulse: 110   Temp: 36.2 °C (97.2 °F)   Resp: 16   Height: 1.524 m (5')   Weight: 57.2 kg (126 lb 1.7 oz)   SpO2: 97%       General: Not in acute distress, alert and oriented x 3  HEENT: normalcephalic, atraumatic, extra ocular muscles intact, moist oral mucus membranes, and oral cavity without any lesions.  Neck: Supple neck and full range of motion  Lymph nodes: No palpable bilateral cervical and supraclavicular lymphadenopathy.    CVS: regular rate and rhythm  RESP: Clear to auscultate bilaterally.   ABD: Soft, non distended, positive bowel sounds, and no palpable hepatomegaly.    EXT: No edema or cyanosis  CNS: Alert and oriented x3, muscle strength grossly intact, and normal gait.     Labs:  Hospital Outpatient Visit on 01/25/2017   Component Date Value Ref Range Status   • WBC 01/25/2017 7.4  4.8  - 10.8 K/uL Final   • RBC 01/25/2017 3.60* 4.20 - 5.40 M/uL Final   • Hemoglobin 01/25/2017 11.4* 12.0 - 16.0 g/dL Final   • Hematocrit 01/25/2017 33.8* 37.0 - 47.0 % Final   • MCV 01/25/2017 93.9  81.4 - 97.8 fL Final   • MCH 01/25/2017 31.7  27.0 - 33.0 pg Final   • MCHC 01/25/2017 33.7  33.6 - 35.0 g/dL Final   • RDW 01/25/2017 52.0* 35.9 - 50.0 fL Final   • Platelet Count 01/25/2017 194  164 - 446 K/uL Final   • MPV 01/25/2017 8.8* 9.0 - 12.9 fL Final   • Neutrophils-Polys 01/25/2017 70.20  44.00 - 72.00 % Final   • Lymphocytes 01/25/2017 20.20* 22.00 - 41.00 % Final   • Monocytes 01/25/2017 8.60  0.00 - 13.40 % Final   • Eosinophils 01/25/2017 0.90  0.00 - 6.90 % Final   • Basophils 01/25/2017 0.10  0.00 - 1.80 % Final   • Neutrophils (Absolute) 01/25/2017 5.19  2.00 - 7.15 K/uL Final    Includes immature neutrophils, if present.   • Lymphs (Absolute) 01/25/2017 1.50  1.00 - 4.80 K/uL Final   • Monos (Absolute) 01/25/2017 0.64  0.00 - 0.85 K/uL Final   • Eos (Absolute) 01/25/2017 0.07  0.00 - 0.51 K/uL Final   • Baso (Absolute) 01/25/2017 0.01  0.00 - 0.12 K/uL Final   • Sodium 01/25/2017 135  135 - 145 mmol/L Final   • Potassium 01/25/2017 3.8  3.6 - 5.5 mmol/L Final   • Chloride 01/25/2017 105  96 - 112 mmol/L Final   • Co2 01/25/2017 22  20 - 33 mmol/L Final   • Glucose 01/25/2017 168* 65 - 99 mg/dL Final   • Bun 01/25/2017 9  8 - 22 mg/dL Final   • Creatinine 01/25/2017 0.63  0.50 - 1.40 mg/dL Final   • Calcium 01/25/2017 8.5  8.5 - 10.5 mg/dL Final   • Anion Gap 01/25/2017 8.0  0.0 - 11.9 Final   • GFR If  01/25/2017 >60  >60 mL/min/1.73 m 2 Final   • GFR If Non  01/25/2017 >60  >60 mL/min/1.73 m 2 Final   ]    Assessment & Plan:    1. Metastatic poorly differentiated adenocarcinoma of likely primary ampulla: She was found to have multiple liver lesions at diagnosis as well as bilateral hilar adenopathy and small pulmonary nodule. She is S/P 3 cycles of gemcitabine and  Abraxane after which she developed increased fatigue. CT scans have shown response to therapy. After a short treatment break she was started back on single agent gemcitabine. After the first dose of gemcitabine she has noticed increased fatigue which has been persistent. Patient also has low appetite secondary to alteration in her taste. She has 2 rest most of the day yesterday secondary to persistent fatigue. I recommend holding chemotherapy until she has further recovery. She was found to have fairly good responses on imaging hands I will give her a 2 week break. Based on her recovery plan is to continue her on single agent gemcitabine.    2. Nutrition: He has a lower appetite secondary to alteration in her taste. Patient however has been able to maintain her weight. We’ll continue to monitor closely.    3. Constipation: Patient has been having intermittent constipation. I recommend that she start on a daily stool softener with Brittany-Colace. She is cautioned in regards to diarrhea. She is advised to maintain one to 3 bowel movements a day.    4. Hypokalemia: Patient had history of hyperkalemia likely secondary to her diarrhea. Her potassium levels have been in the normal range. I will hold potassium and we’ll continue to monitor closely.    5. Normocytic anemia: Her hemoglobin continues to fluctuate. Clinically she is asymptomatic. We’ll continue to monitor.    6. She is advised to follow up again in 2 weeks or sooner as needed.          she agreed and verbalized her agreement and understanding with the current plan. I answered all questions and concerns she has at this time and advised her to call at any time in the interim with questions or concerns in regards to her care. Thank you for allowing me to participate in her care, I will continue to follow.    Please note that this dictation was created using voice recognition software. I have made every reasonable attempt to correct obvious errors, but I expect that  there are errors of grammar and possibly content that I did not discover before finalizing the note.

## 2017-01-25 NOTE — MR AVS SNAPSHOT
Elin Abby Poole   2017 1:00 PM   Appointment   MRN: [id number]    Department:  [facility]   Dept Phone:  [phone]    Description:  Female : 1945   Provider:  ONC RN 1           Allergies as of 2017     No Known Allergies      Vital Signs     Smoking Status                   Never Smoker            Basic Information     Date Of Birth Sex Race Ethnicity Preferred Language    1945 Female White Non- English      Your appointments     2017  2:20 PM   ONCOLOGY EST PATIENT 30 MIN with Talya Krause M.D.   [facility] (--)    [address], [address]  [facility] [address]   [phone]            2017  3:00 PM   Est Chemo 1 with RN 3   [facility] ([facility])    [address]  [facility] [id number] [phone]            2017  3:00 PM   Est Chemo 1 with RN 3   Infusion [facility] ([facility])    [address]  [facility] [id number] [phone]              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   8/15/2016 - Present    Cholecystitis with cholelithiasis K80.10   8/15/2016 - Present    Metastatic adenocarcinoma to liver (CMS-HCC) C78.7   8/15/2016 - Present    Obstructive jaundice due to malignant neoplasm K83.8   8/15/2016 - Present    Leukocytosis D72.829   10/21/2016 - Present    Sepsis (CMS-HCC) A41.9   10/21/2016 - Present    Metabolic acidosis E87.2   10/22/2016 - Present      Health Maintenance        Date Due Completion Dates    IMM DTaP/Tdap/Td Vaccine (1 - Tdap) 1964 ---    PAP SMEAR 1966 ---    COLONOSCOPY 1995 ---    IMM ZOSTER VACCINE 2005 ---    IMM PNEUMOCOCCAL 65+ (ADULT) LOW/MEDIUM RISK SERIES (2 of 2 - PPSV23) 10/15/2014 10/15/2013    MAMMOGRAM 2016, 2015, 2015    IMM INFLUENZA (1) 2016 10/15/2015    BONE DENSITY 2019            Current Immunizations     13-VALENT  PCV PREVNAR 10/15/2013    Influenza Vaccine Adult HD 10/15/2015      Below and/or attached are the medications your provider expects you to take. Review all of your home medications and newly ordered medications with your provider and/or pharmacist. Follow medication instructions as directed by your provider and/or pharmacist. Please keep your medication list with you and share with your provider. Update the information when medications are discontinued, doses are changed, or new medications (including over-the-counter products) are added; and carry medication information at all times in the event of emergency situations     Allergies:  No Known Allergies          Medications  Valid as of: January 25, 2017 -  1:33 PM    Generic Name Brand Name Tablet Size Instructions for use    Aspirin (Tablet Delayed Response) ECOTRIN 325 MG Take 325 mg by mouth every morning.        Losartan Potassium-HCTZ (Tab) HYZAAR 100-25 MG Take 1 Tab by mouth every morning. Indications: High Blood Pressure        Misc Natural Products (Powder) FIBER 7  Take  by mouth.        Ondansetron HCl (Tab) ZOFRAN 4 MG Take 1 Tab by mouth every four hours as needed for Nausea/Vomiting.        Potassium Chloride Mariama CR (Tab CR) K-DUR 10 MEQ Take 2 Tabs by mouth every morning.        Prochlorperazine Maleate (Tab) COMPAZINE 10 MG Take 10 mg by mouth every 6 hours as needed for Nausea/Vomiting (alternating Q3H with Zofran).        RaNITidine HCl (Tab) ZANTAC 150 MG Take 1 Tab by mouth 2 times a day.        .                 Medicines prescribed today were sent to:     [facility]/PHARMACY #[id number] - [place], [place] - [address]    [address] [address]    Phone: [phone] Fax: [phone]    Open 24 Hours?: No    [facility] - [place], [place] - [address]    [address] [address]    Phone: [phone] Fax: [phone]    Open 24 Hours?: No      Medication refill instructions:       If your  prescription bottle indicates you have medication refills left, it is not necessary to call your provider’s office. Please contact your pharmacy and they will refill your medication.    If your prescription bottle indicates you do not have any refills left, you may request refills at any time through one of the following ways: The online [employer] system (except Urgent Care), by calling your provider’s office, or by asking your pharmacy to contact your provider’s office with a refill request. Medication refills are processed only during regular business hours and may not be available until the next business day. Your provider may request additional information or to have a follow-up visit with you prior to refilling your medication.   *Please Note: Medication refills are assigned a new Rx number when refilled electronically. Your pharmacy may indicate that no refills were authorized even though a new prescription for the same medication is available at the pharmacy. Please request the medicine by name with the pharmacy before contacting your provider for a refill.           [employer] Access Code: Activation code not generated  Current [employer] Status: Active

## 2017-01-26 ENCOUNTER — NON-PROVIDER VISIT (OUTPATIENT)
Dept: HEMATOLOGY ONCOLOGY | Facility: MEDICAL CENTER | Age: 72
End: 2017-01-26
Payer: MEDICARE

## 2017-01-26 ASSESSMENT — PAIN SCALES - GENERAL: PAINLEVEL: NO PAIN

## 2017-01-26 NOTE — PROGRESS NOTES
Pt presents for port de-accessed from yesterday.  Treatment was held, per Dr. Krause.  Heparin instilled yesterday.  Port de-accessed and covered with sterile gauze and tap.  Pt tolerated well and left clinic in NAD.

## 2017-01-26 NOTE — MR AVS SNAPSHOT
Elin Mora Virgilio   2017 1:00 PM   Non-Provider Visit   MRN: [id number]    Department:  [facility]   Dept Phone:  [phone]    Description:  Female : 1945   Provider:  ONC RN 1           Reason for Visit     Other port de-access      Allergies as of 2017     No Known Allergies      Vital Signs     Smoking Status                   Never Smoker            Basic Information     Date Of Birth Sex Race Ethnicity Preferred Language    1945 Female White Non- English      Your appointments     2017  1:00 PM   Non Provider 1 with ONC RN 1   [facility] (--)    [address], [address]  [facility] [address] [phone]           You will be receiving a confirmation call a few days before your appointment from our automated call confirmation system.            2017  1:40 PM   ONCOLOGY EST PATIENT 30 MIN with Talya Krause M.D.   [facility] (--)    [address]  [facility] [address] [phone]            2017  3:00 PM   Est Chemo 1 with RN 3   Infusion Services ([facility])    [address]  [facility] [address]   [phone]            Feb 15, 2017  3:00 PM   Est Chemo 1 with RN 3   Infusion [facility] ([facility])    [address]  [facility] [address]   [phone]              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   8/15/2016 - Present    Cholecystitis with cholelithiasis K80.10   8/15/2016 - Present    Obstructive jaundice due to malignant neoplasm K83.8   8/15/2016 - Present    Leukocytosis D72.829   10/21/2016 - Present    Sepsis (CMS-HCC) A41.9   10/21/2016 - Present    Metabolic acidosis E87.2   10/22/2016 - Present      Health Maintenance        Date Due Completion Dates    IMM DTaP/Tdap/Td Vaccine (1 - Tdap) 1964 ---    PAP SMEAR 1966 ---    COLONOSCOPY 1995 ---    IMM ZOSTER VACCINE 2005 ---    IMM  PNEUMOCOCCAL 65+ (ADULT) LOW/MEDIUM RISK SERIES (2 of 2 - PPSV23) 10/15/2014 10/15/2013    MAMMOGRAM 1/14/2016 1/14/2015, 1/14/2015, 1/14/2015    IMM INFLUENZA (1) 9/1/2016 10/15/2015    BONE DENSITY 9/26/2019 9/26/2014            Current Immunizations     13-VALENT PCV PREVNAR 10/15/2013    Influenza Vaccine Adult HD 10/15/2015      Below and/or attached are the medications your provider expects you to take. Review all of your home medications and newly ordered medications with your provider and/or pharmacist. Follow medication instructions as directed by your provider and/or pharmacist. Please keep your medication list with you and share with your provider. Update the information when medications are discontinued, doses are changed, or new medications (including over-the-counter products) are added; and carry medication information at all times in the event of emergency situations     Allergies:  No Known Allergies          Medications  Valid as of: January 26, 2017 -  1:39 PM    Generic Name Brand Name Tablet Size Instructions for use    Aspirin (Tablet Delayed Response) ECOTRIN 325 MG Take 325 mg by mouth every morning.        Losartan Potassium-HCTZ (Tab) HYZAAR 100-25 MG Take 1 Tab by mouth every morning. Indications: High Blood Pressure        Misc Natural Products (Powder) FIBER 7  Take  by mouth.        Ondansetron HCl (Tab) ZOFRAN 4 MG Take 1 Tab by mouth every four hours as needed for Nausea/Vomiting.        Prochlorperazine Maleate (Tab) COMPAZINE 10 MG Take 10 mg by mouth every 6 hours as needed for Nausea/Vomiting (alternating Q3H with Zofran).        RaNITidine HCl (Tab) ZANTAC 150 MG Take 1 Tab by mouth 2 times a day.        Sennosides-Docusate Sodium (Tab) SENOKOT-S 8.6-50 MG Take 1 Tab by mouth 2 times a day.        .                 Medicines prescribed today were sent to:     [facility]/PHARMACY #[id number] - [place], [place] - [address]    [address] [address]    Phone:  [phone] Fax: [phone]    Open 24 Hours?: No    [facility] - [place], [place] - [address]    [address] [address]    Phone: [phone] Fax: [phone]    Open 24 Hours?: No      Medication refill instructions:       If your prescription bottle indicates you have medication refills left, it is not necessary to call your provider’s office. Please contact your pharmacy and they will refill your medication.    If your prescription bottle indicates you do not have any refills left, you may request refills at any time through one of the following ways: The online [employer] system (except Urgent Care), by calling your provider’s office, or by asking your pharmacy to contact your provider’s office with a refill request. Medication refills are processed only during regular business hours and may not be available until the next business day. Your provider may request additional information or to have a follow-up visit with you prior to refilling your medication.   *Please Note: Medication refills are assigned a new Rx number when refilled electronically. Your pharmacy may indicate that no refills were authorized even though a new prescription for the same medication is available at the pharmacy. Please request the medicine by name with the pharmacy before contacting your provider for a refill.           [employer] Access Code: Activation code not generated  Current [employer] Status: Active

## 2017-02-01 ENCOUNTER — APPOINTMENT (OUTPATIENT)
Dept: ONCOLOGY | Facility: MEDICAL CENTER | Age: 72
End: 2017-02-01
Attending: INTERNAL MEDICINE
Payer: MEDICARE

## 2017-02-08 ENCOUNTER — HOSPITAL ENCOUNTER (OUTPATIENT)
Facility: MEDICAL CENTER | Age: 72
End: 2017-02-08
Attending: INTERNAL MEDICINE
Payer: MEDICARE

## 2017-02-08 ENCOUNTER — OUTPATIENT INFUSION SERVICES (OUTPATIENT)
Dept: ONCOLOGY | Facility: MEDICAL CENTER | Age: 72
End: 2017-02-08
Attending: INTERNAL MEDICINE
Payer: MEDICARE

## 2017-02-08 ENCOUNTER — OFFICE VISIT (OUTPATIENT)
Dept: HEMATOLOGY ONCOLOGY | Facility: MEDICAL CENTER | Age: 72
End: 2017-02-08
Payer: MEDICARE

## 2017-02-08 ENCOUNTER — NON-PROVIDER VISIT (OUTPATIENT)
Dept: HEMATOLOGY ONCOLOGY | Facility: MEDICAL CENTER | Age: 72
End: 2017-02-08
Payer: MEDICARE

## 2017-02-08 VITALS
HEART RATE: 94 BPM | OXYGEN SATURATION: 97 % | HEIGHT: 60 IN | RESPIRATION RATE: 16 BRPM | BODY MASS INDEX: 24.07 KG/M2 | DIASTOLIC BLOOD PRESSURE: 70 MMHG | WEIGHT: 122.58 LBS | SYSTOLIC BLOOD PRESSURE: 110 MMHG | TEMPERATURE: 99.3 F

## 2017-02-08 VITALS
BODY MASS INDEX: 23.95 KG/M2 | OXYGEN SATURATION: 97 % | WEIGHT: 122 LBS | HEART RATE: 94 BPM | DIASTOLIC BLOOD PRESSURE: 70 MMHG | HEIGHT: 60 IN | TEMPERATURE: 99.3 F | RESPIRATION RATE: 16 BRPM | SYSTOLIC BLOOD PRESSURE: 110 MMHG

## 2017-02-08 DIAGNOSIS — C24.1 AMPULLARY CARCINOMA (HCC): ICD-10-CM

## 2017-02-08 LAB
ALBUMIN SERPL BCP-MCNC: 2.5 G/DL (ref 3.2–4.9)
ALBUMIN/GLOB SERPL: 0.9 G/DL
ALP SERPL-CCNC: 153 U/L (ref 30–99)
ALT SERPL-CCNC: 20 U/L (ref 2–50)
ANION GAP SERPL CALC-SCNC: 8 MMOL/L (ref 0–11.9)
AST SERPL-CCNC: 24 U/L (ref 12–45)
BASOPHILS # BLD AUTO: 0.01 K/UL (ref 0–0.12)
BASOPHILS NFR BLD AUTO: 0.1 % (ref 0–1.8)
BILIRUB SERPL-MCNC: 0.4 MG/DL (ref 0.1–1.5)
BUN SERPL-MCNC: 8 MG/DL (ref 8–22)
CALCIUM SERPL-MCNC: 8.3 MG/DL (ref 8.5–10.5)
CHLORIDE SERPL-SCNC: 102 MMOL/L (ref 96–112)
CO2 SERPL-SCNC: 22 MMOL/L (ref 20–33)
CREAT SERPL-MCNC: 0.56 MG/DL (ref 0.5–1.4)
EOSINOPHIL # BLD: 0.06 K/UL (ref 0–0.51)
EOSINOPHIL NFR BLD AUTO: 0.6 % (ref 0–6.9)
ERYTHROCYTE [DISTWIDTH] IN BLOOD BY AUTOMATED COUNT: 56.1 FL (ref 35.9–50)
GLOBULIN SER CALC-MCNC: 2.9 G/DL (ref 1.9–3.5)
GLUCOSE SERPL-MCNC: 89 MG/DL (ref 65–99)
HCT VFR BLD AUTO: 38.1 % (ref 37–47)
HGB BLD-MCNC: 13.1 G/DL (ref 12–16)
LYMPHOCYTES # BLD: 1.89 K/UL (ref 1–4.8)
LYMPHOCYTES NFR BLD AUTO: 17.8 % (ref 22–41)
MCH RBC QN AUTO: 32.5 PG (ref 27–33)
MCHC RBC AUTO-ENTMCNC: 34.4 G/DL (ref 33.6–35)
MCV RBC AUTO: 94.5 FL (ref 81.4–97.8)
MONOCYTES # BLD: 1.22 K/UL (ref 0–0.85)
MONOCYTES NFR BLD AUTO: 11.5 % (ref 0–13.4)
NEUTROPHILS # BLD: 7.45 K/UL (ref 2–7.15)
NEUTROPHILS NFR BLD AUTO: 70 % (ref 44–72)
PLATELET # BLD AUTO: 309 K/UL (ref 164–446)
PMV BLD AUTO: 9.5 FL (ref 9–12.9)
POTASSIUM SERPL-SCNC: 3.2 MMOL/L (ref 3.6–5.5)
PROT SERPL-MCNC: 5.4 G/DL (ref 6–8.2)
RBC # BLD AUTO: 4.03 M/UL (ref 4.2–5.4)
SODIUM SERPL-SCNC: 132 MMOL/L (ref 135–145)
WBC # BLD AUTO: 10.6 K/UL (ref 4.8–10.8)

## 2017-02-08 PROCEDURE — 85025 COMPLETE CBC W/AUTO DIFF WBC: CPT

## 2017-02-08 PROCEDURE — 99214 OFFICE O/P EST MOD 30 MIN: CPT | Performed by: INTERNAL MEDICINE

## 2017-02-08 PROCEDURE — 1036F TOBACCO NON-USER: CPT | Performed by: INTERNAL MEDICINE

## 2017-02-08 PROCEDURE — 1101F PT FALLS ASSESS-DOCD LE1/YR: CPT | Mod: 8P | Performed by: INTERNAL MEDICINE

## 2017-02-08 PROCEDURE — 4040F PNEUMOC VAC/ADMIN/RCVD: CPT | Performed by: INTERNAL MEDICINE

## 2017-02-08 PROCEDURE — G8420 CALC BMI NORM PARAMETERS: HCPCS | Performed by: INTERNAL MEDICINE

## 2017-02-08 PROCEDURE — 3017F COLORECTAL CA SCREEN DOC REV: CPT | Performed by: INTERNAL MEDICINE

## 2017-02-08 PROCEDURE — 80053 COMPREHEN METABOLIC PANEL: CPT

## 2017-02-08 PROCEDURE — G8432 DEP SCR NOT DOC, RNG: HCPCS | Performed by: INTERNAL MEDICINE

## 2017-02-08 PROCEDURE — 3014F SCREEN MAMMO DOC REV: CPT | Performed by: INTERNAL MEDICINE

## 2017-02-08 PROCEDURE — G8484 FLU IMMUNIZE NO ADMIN: HCPCS | Performed by: INTERNAL MEDICINE

## 2017-02-08 RX ORDER — MEGESTROL ACETATE 40 MG/ML
800 SUSPENSION ORAL DAILY
Qty: 600 ML | Refills: 1 | Status: SHIPPED | OUTPATIENT
Start: 2017-02-08 | End: 2018-01-01

## 2017-02-08 RX ORDER — FAMOTIDINE 20 MG/1
20 TABLET, FILM COATED ORAL 2 TIMES DAILY
Qty: 60 TAB | Refills: 3 | Status: SHIPPED | OUTPATIENT
Start: 2017-02-08 | End: 2018-01-01

## 2017-02-08 RX ADMIN — Medication 500 UNITS: at 13:50

## 2017-02-08 ASSESSMENT — PAIN SCALES - GENERAL
PAINLEVEL: 2=MINIMAL-SLIGHT
PAINLEVEL: 2=MINIMAL-SLIGHT

## 2017-02-08 NOTE — MR AVS SNAPSHOT
Elin Mora Virgilio   2017 1:40 PM   Office Visit   MRN: [id number]    Department:  [facility]   Dept Phone:  [phone]    Description:  Female : 1945   Provider:  Talya Krause M.D.           Reason for Visit     Follow-Up prechemo      Allergies as of 2017     No Known Allergies      You were diagnosed with     Ampullary carcinoma (CMS-HCC)   [655212]         Vital Signs     Blood Pressure Pulse Temperature Respirations Height Weight    110/70 mmHg 94 37.4 °C (99.3 °F) 16 1.524 m (5') 55.6 kg (122 lb 9.2 oz)    Body Mass Index Oxygen Saturation Breastfeeding? Smoking Status          23.94 kg/m2 97% No Never Smoker         Basic Information     Date Of Birth Sex Race Ethnicity Preferred Language    1945 Female White Non- English      Your appointments     2017  2:20 PM   ONCOLOGY EST PATIENT 30 MIN with Talya Krause M.D.   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   8/15/2016 - Present    Cholecystitis with cholelithiasis K80.10   8/15/2016 - Present    Obstructive jaundice due to malignant neoplasm K83.8   8/15/2016 - Present    Leukocytosis D72.829   10/21/2016 - Present    Sepsis (CMS-HCC) A41.9   10/21/2016 - Present    Metabolic acidosis E87.2   10/22/2016 - Present      Health Maintenance        Date Due Completion Dates    IMM DTaP/Tdap/Td Vaccine (1 - Tdap) 1964 ---    PAP SMEAR 1966 ---    COLONOSCOPY 1995 ---    IMM ZOSTER VACCINE 2005 ---    IMM PNEUMOCOCCAL 65+ (ADULT) LOW/MEDIUM RISK SERIES (2 of 2 - PPSV23) 10/15/2014 10/15/2013    MAMMOGRAM 2016, 2015, 2015    IMM INFLUENZA (1) 2016 10/15/2015    BONE DENSITY 2019            Current Immunizations     13-VALENT PCV PREVNAR 10/15/2013    Influenza Vaccine  Adult HD 10/15/2015      Below and/or attached are the medications your provider expects you to take. Review all of your home medications and newly ordered medications with your provider and/or pharmacist. Follow medication instructions as directed by your provider and/or pharmacist. Please keep your medication list with you and share with your provider. Update the information when medications are discontinued, doses are changed, or new medications (including over-the-counter products) are added; and carry medication information at all times in the event of emergency situations     Allergies:  No Known Allergies          Medications  Valid as of: February 08, 2017 -  3:25 PM    Generic Name Brand Name Tablet Size Instructions for use    Aspirin (Tablet Delayed Response) ECOTRIN 325 MG Take 325 mg by mouth every morning.        Famotidine (Tab) PEPCID 20 MG Take 1 Tab by mouth 2 times a day.        Losartan Potassium-HCTZ (Tab) HYZAAR 100-25 MG Take 1 Tab by mouth every morning. Indications: High Blood Pressure        Megestrol Acetate (Suspension) MEGACE 400 MG/10ML Take 20 mL by mouth every day.        Misc Natural Products (Powder) FIBER 7  Take  by mouth.        Ondansetron HCl (Tab) ZOFRAN 4 MG Take 1 Tab by mouth every four hours as needed for Nausea/Vomiting.        Prochlorperazine Maleate (Tab) COMPAZINE 10 MG Take 10 mg by mouth every 6 hours as needed for Nausea/Vomiting (alternating Q3H with Zofran).        RaNITidine HCl (Tab) ZANTAC 150 MG Take 1 Tab by mouth 2 times a day.        Sennosides-Docusate Sodium (Tab) SENOKOT-S 8.6-50 MG Take 1 Tab by mouth 2 times a day.        .                 Medicines prescribed today were sent to:     [facility]/PHARMACY #[id number] - [place], [place] - [address]    [address] [address]    Phone: [phone] Fax: [phone]    Open 24 Hours?: [facility] - [place], [place] - [address]    [address]  [address]    Phone: [phone] Fax: [phone]    Open 24 Hours?: No      Medication refill instructions:       If your prescription bottle indicates you have medication refills left, it is not necessary to call your provider’s office. Please contact your pharmacy and they will refill your medication.    If your prescription bottle indicates you do not have any refills left, you may request refills at any time through one of the following ways: The online [employer] system (except Urgent Care), by calling your provider’s office, or by asking your pharmacy to contact your provider’s office with a refill request. Medication refills are processed only during regular business hours and may not be available until the next business day. Your provider may request additional information or to have a follow-up visit with you prior to refilling your medication.   *Please Note: Medication refills are assigned a new Rx number when refilled electronically. Your pharmacy may indicate that no refills were authorized even though a new prescription for the same medication is available at the pharmacy. Please request the medicine by name with the pharmacy before contacting your provider for a refill.           [employer] Access Code: Activation code not generated  Current [employer] Status: Active

## 2017-02-08 NOTE — PROGRESS NOTES
Follow Up Note:  Oncology    Date: 2/8/17  Time: 1:40 pm      Referring Physician: Dr. Kurtis Jacobs   Primary Care:  Wally Knox D.O.  Surgeon: Dr. Kurtis Jacobs     Diagnosis: Metastatic adenocarcinoma of ampulla of vater    Chief compliant: She is here for a follow up visit    History of Presenting Illness:  Elin Poole is a 71 y.o. female in [facility] 7/2016 with adenocarcinoma of primary ampulla. She was diagnosed secondary to elevated liver enzymes and was also found to have acute renal insufficiency. She underwent ERCP with stent placement and good decompression. Biopsy was positive for adenocarcinoma of primary ampulla. She was seen by Dr. Jacobs and underwent an attempted Whipple’s 8/2016. The surgery was aborted secondary to multiple liver lesions. She underwent wedge resection of the liver which was positive for poorly differentiated adenocarcinoma. PET scan showed uptake in multiple hepatic lesions as well as his uptake in the ampulla. He also had uptake in bilateral hilar area with increased uptake and small pulmonary nodules in the right middle lobe without uptake. She was started on chemotherapy with gemcitabine and Abraxane. She tolerated the treatment fairly well initially. After cycle 3 of chemotherapy she started to have increased fatigue and diarrhea. Repeat imaging showed response to therapy. The diarrhea did improve and her regimen was changed to single agent gemcitabine. She received chemotherapy on 1/18/17. Further cycles have been held secondary to increased fatigue.    Interval history:   She is here for a follow-up visit is accompanied by her son who was present in the room. She continues to have fatigue without significant improvement since her last visit. She has noticed lack of taste sensation and also low appetite. She has some weight loss since her last visit. She is having good days and bad days. She has about 3 good days in a week. She has  noticed a mild intermittent discomfort in the epigastric area. She denies any nausea, vomiting or diarrhea. She has started to use CBD oils and pills for the past one month and this has not improved her appetite. She denies any dizziness, headaches, cough or shortness of breath. She denies any fevers, chills or night sweats.       Past Medical History   Diagnosis Date   • Jaundice    • Hypertension    • Heart burn    • Indigestion    • Dental disorder      upper partial   • Cancer (CMS-HCC) 2016     Ampullary CA   • Cataract 09-     bilat.,no surgery   • Asthma    • Arthritis      hands/fingers/right knee   • Ampullary carcinoma (CMS-[facility])            Allergies as of 02/08/2017   • (No Known Allergies)         Current outpatient prescriptions:   •  sennosides-docusate sodium (SENOKOT-S) 8.6-50 MG tablet, Take 1 Tab by mouth 2 times a day., Disp: 60 Tab, Rfl: 3  •  ranitidine (ZANTAC) 150 MG Tab, Take 1 Tab by mouth 2 times a day., Disp: 60 Tab, Rfl: 3  •  ondansetron (ZOFRAN) 4 MG Tab tablet, Take 1 Tab by mouth every four hours as needed for Nausea/Vomiting., Disp: 30 Tab, Rfl: 3  •  Misc Natural Products (FIBER 7) Powder, Take  by mouth., Disp: , Rfl:   •  aspirin EC (ECOTRIN) 325 MG Tablet Delayed Response, Take 325 mg by mouth every morning., Disp: , Rfl:   •  prochlorperazine (COMPAZINE) 10 MG Tab, Take 10 mg by mouth every 6 hours as needed for Nausea/Vomiting (alternating Q3H with Zofran)., Disp: , Rfl:   •  losartan-hydrochlorothiazide (HYZAAR) 100-25 MG per tablet, Take 1 Tab by mouth every morning. Indications: High Blood Pressure, Disp: , Rfl:     Review of Systems:  All other review of systems are negative except what was mentioned above in the HPI.  Constitutional: Negative for fever and chills. Positive for weight loss. Positive for Malaise/fatigue.    HENT: Negative for ear pain and nosebleeds     Eyes: Negative for blurred vision.    Respiratory: Negative for cough, sputum production, and  shortness of breath.    Cardiovascular: Negative for chest pain and leg swelling.    Gastrointestinal: Positive for intermittent nausea. Positive of epigastric discomfort.  Negative for vomiting and abdominal pain.    Genitourinary: Negative for dysuria    Musculoskeletal: Negative for myalgias. Positive for joint pain stable.    Skin: Negative for rash and itching.    Neurological: Negative for dizziness, Positive for mild sensory change minimal, Negative for focal weakness and headaches.    Endo/Heme/Allergies: No bruise/bleed easily.    Psychiatric/Behavioral: Negative for depression and memory loss.      Physical Exam:  Filed Vitals:    02/08/17 1409   BP: 110/70   Pulse: 94   Temp: 37.4 °C (99.3 °F)   Resp: 16   Height: 1.524 m (5')   Weight: 55.6 kg (122 lb 9.2 oz)   SpO2: 97%       General: Not in acute distress, alert and oriented x 3  HEENT: normalcephalic, atraumatic, extra ocular muscles intact, moist oral mucus membranes, and oral cavity without any lesions.  Neck: Supple neck and full range of motion  Lymph nodes: No palpable bilateral cervical and supraclavicular lymphadenopathy.    CVS: regular rate and rhythm  RESP: Clear to auscultate bilaterally.   ABD: Soft, non distended, positive bowel sounds, and no palpable hepatomegaly.    EXT: No edema  CNS: Alert and oriented x3, muscle strength grossly intact, and normal gait.     Labs:  Hospital Outpatient Visit on 02/08/2017   Component Date Value Ref Range Status   • WBC 02/08/2017 10.6  4.8 - 10.8 K/uL Final   • RBC 02/08/2017 4.03* 4.20 - 5.40 M/uL Final   • Hemoglobin 02/08/2017 13.1  12.0 - 16.0 g/dL Final   • Hematocrit 02/08/2017 38.1  37.0 - 47.0 % Final   • MCV 02/08/2017 94.5  81.4 - 97.8 fL Final   • MCH 02/08/2017 32.5  27.0 - 33.0 pg Final   • MCHC 02/08/2017 34.4  33.6 - 35.0 g/dL Final   • RDW 02/08/2017 56.1* 35.9 - 50.0 fL Final   • Platelet Count 02/08/2017 309  164 - 446 K/uL Final   • MPV 02/08/2017 9.5  9.0 - 12.9 fL Final   •  Neutrophils-Polys 02/08/2017 70.00  44.00 - 72.00 % Final   • Lymphocytes 02/08/2017 17.80* 22.00 - 41.00 % Final   • Monocytes 02/08/2017 11.50  0.00 - 13.40 % Final   • Eosinophils 02/08/2017 0.60  0.00 - 6.90 % Final   • Basophils 02/08/2017 0.10  0.00 - 1.80 % Final   • Neutrophils (Absolute) 02/08/2017 7.45* 2.00 - 7.15 K/uL Final    Includes immature neutrophils, if present.   • Lymphs (Absolute) 02/08/2017 1.89  1.00 - 4.80 K/uL Final   • Monos (Absolute) 02/08/2017 1.22* 0.00 - 0.85 K/uL Final   • Eos (Absolute) 02/08/2017 0.06  0.00 - 0.51 K/uL Final   • Baso (Absolute) 02/08/2017 0.01  0.00 - 0.12 K/uL Final   • Sodium 02/08/2017 132* 135 - 145 mmol/L Final   • Potassium 02/08/2017 3.2* 3.6 - 5.5 mmol/L Final   • Chloride 02/08/2017 102  96 - 112 mmol/L Final   • Co2 02/08/2017 22  20 - 33 mmol/L Final   • Anion Gap 02/08/2017 8.0  0.0 - 11.9 Final   • Glucose 02/08/2017 89  65 - 99 mg/dL Final   • Bun 02/08/2017 8  8 - 22 mg/dL Final   • Creatinine 02/08/2017 0.56  0.50 - 1.40 mg/dL Final   • Calcium 02/08/2017 8.3* 8.5 - 10.5 mg/dL Final   • AST(SGOT) 02/08/2017 24  12 - 45 U/L Final   • ALT(SGPT) 02/08/2017 20  2 - 50 U/L Final   • Alkaline Phosphatase 02/08/2017 153* 30 - 99 U/L Final   • Total Bilirubin 02/08/2017 0.4  0.1 - 1.5 mg/dL Final   • Albumin 02/08/2017 2.5* 3.2 - 4.9 g/dL Final   • Total Protein 02/08/2017 5.4* 6.0 - 8.2 g/dL Final   • Globulin 02/08/2017 2.9  1.9 - 3.5 g/dL Final   • A-G Ratio 02/08/2017 0.9   Final   • GFR If  02/08/2017 >60  >60 mL/min/1.73 m 2 Final   • GFR If Non  02/08/2017 >60  >60 mL/min/1.73 m 2 Final   ]    Assessment & Plan:  1. Metastatic poorly differentiated adenocarcinoma of likely primary ampulla: She was found to have multiple liver lesions as well as lateral hilar adenopathy at diagnosis. She was treated with gemcitabine and Abraxane ×3 cycles. Repeat imaging showed good response. Secondary to fatigue she was started on  single agent gemcitabine. She however continues to have significant fatigue without improvement since her last visit. Secondary to fatigue I recommend holding chemotherapy for 2 more weeks. We’ll reevaluate in 2 weeks and plan is to restart her on single agent gemcitabine if she has some recovery.  2. Nutrition: She has noticed alteration in her taste as well as low appetite. She has not been eating as much secondary to this. She has also lost some weight since her last visit. She has been on CBD pills and orders without significant improvement. I will start her on me gaze for appetite simulation. She was recommended nutritional consultation. He would prefer a phone consultation for the nutritionist which has been placed. She is advised to increase her caloric intake.  3. Bowel movements: Patient has had intermittent constipation as well as diarrhea in the past. At present she is having regular bowel movements without significant diarrhea or constipation.  6. Normocytic anemia: Her hemoglobin has improved and normalized. We’ll continue to monitor.  7. Epigastric discomfort: I will start her on Pepcid twice a day.  8. She is advised to follow up again in 2 weeks or sooner as needed.      she agreed and verbalized her agreement and understanding with the current plan. I answered all questions and concerns she has at this time and advised her to call at any time in the interim with questions or concerns in regards to her care. Thank you for allowing me to participate in her care, I will continue to follow.    Please note that this dictation was created using voice recognition software. I have made every reasonable attempt to correct obvious errors, but I expect that there are errors of grammar and possibly content that I did not discover before finalizing the note.

## 2017-02-08 NOTE — PROGRESS NOTES
Pt presents via wheelchair for labs via port access and prechemo appointment with Dr. Krause.  Pt is accompanied by her son for today's visit.  Plan of care discussed and pt and son verbalize agreement.  She does state that she has no noticed much improve in her fatigue.      1350 Port accessed in sterile fashion; brisk blood return noted.  Labs drawn per written orders in [place] from Dr. Krause.  Port flushed per protocol with NS and heparin.  Port left accessed for possible treatment today.  Pt tolerated well.

## 2017-02-08 NOTE — MR AVS SNAPSHOT
Elin Abby Virgilio   2017 1:00 PM   Appointment   MRN: [id number]    Department:  [facility]   Dept Phone:  [phone]    Description:  Female : 1945   Provider:  ONC RN 1           Allergies as of 2017     No Known Allergies      Vital Signs     Smoking Status                   Never Smoker            Basic Information     Date Of Birth Sex Race Ethnicity Preferred Language    1945 Female White Non- English      Your appointments     2017  3:00 PM   Est Chemo 1 with RN 3   [facility]    [email or web address]  [place] [id number] [phone]            Feb 15, 2017  3:00 PM   Est Chemo 1 with RN 3   [facility] ([email or web address]    [email or web address]  [place] [address] [phone]              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   8/15/2016 - Present    Cholecystitis with cholelithiasis K80.10   8/15/2016 - Present    Obstructive jaundice due to malignant neoplasm K83.8   8/15/2016 - Present    Leukocytosis D72.829   10/21/2016 - Present    Sepsis (CMS-[facility]) A41.9   10/21/2016 - Present    Metabolic acidosis E87.2   10/22/2016 - Present      Health Maintenance        Date Due Completion Dates    IMM DTaP/Tdap/Td Vaccine (1 - Tdap) 1964 ---    PAP SMEAR 1966 ---    COLONOSCOPY 1995 ---    IMM ZOSTER VACCINE 2005 ---    IMM PNEUMOCOCCAL 65+ (ADULT) LOW/MEDIUM RISK SERIES (2 of 2 - PPSV23) 10/15/2014 10/15/2013    MAMMOGRAM 2016, 2015, 2015    IMM INFLUENZA (1) 2016 10/15/2015    BONE DENSITY 2019            Current Immunizations     13-VALENT PCV PREVNAR 10/15/2013    Influenza Vaccine Adult HD 10/15/2015      Below and/or attached are the medications your provider expects you to take. Review all of your home medications and newly ordered medications with your provider and/or pharmacist. Follow  medication instructions as directed by your provider and/or pharmacist. Please keep your medication list with you and share with your provider. Update the information when medications are discontinued, doses are changed, or new medications (including over-the-counter products) are added; and carry medication information at all times in the event of emergency situations     Allergies:  No Known Allergies          Medications  Valid as of: February 08, 2017 -  2:18 PM    Generic Name Brand Name Tablet Size Instructions for use    Aspirin (Tablet Delayed Response) ECOTRIN 325 MG Take 325 mg by mouth every morning.        Losartan Potassium-HCTZ (Tab) HYZAAR 100-25 MG Take 1 Tab by mouth every morning. Indications: High Blood Pressure        Misc Natural Products (Powder) FIBER 7  Take  by mouth.        Ondansetron HCl (Tab) ZOFRAN 4 MG Take 1 Tab by mouth every four hours as needed for Nausea/Vomiting.        Prochlorperazine Maleate (Tab) COMPAZINE 10 MG Take 10 mg by mouth every 6 hours as needed for Nausea/Vomiting (alternating Q3H with Zofran).        RaNITidine HCl (Tab) ZANTAC 150 MG Take 1 Tab by mouth 2 times a day.        Sennosides-Docusate Sodium (Tab) SENOKOT-S 8.6-50 MG Take 1 Tab by mouth 2 times a day.        .                 Medicines prescribed today were sent to:     [facility]/PHARMACY #[id number] - [place], [place] - [address]    [address] [address]    Phone: [phone] Fax: [phone]    Open 24 Hours?: [facility] - [place], [place] - [address]    [address] [address]    Phone: [phone] Fax: [phone]    Open 24 Hours?: No      Medication refill instructions:       If your prescription bottle indicates you have medication refills left, it is not necessary to call your provider’s office. Please contact your pharmacy and they will refill your medication.    If your prescription bottle indicates you do not have any refills left,  you may request refills at any time through one of the following ways: The online [employer] system (except Urgent Care), by calling your provider’s office, or by asking your pharmacy to contact your provider’s office with a refill request. Medication refills are processed only during regular business hours and may not be available until the next business day. Your provider may request additional information or to have a follow-up visit with you prior to refilling your medication.   *Please Note: Medication refills are assigned a new Rx number when refilled electronically. Your pharmacy may indicate that no refills were authorized even though a new prescription for the same medication is available at the pharmacy. Please request the medicine by name with the pharmacy before contacting your provider for a refill.           [employer] Access Code: Activation code not generated  Current [employer] Status: Active

## 2017-02-13 ENCOUNTER — TELEPHONE (OUTPATIENT)
Dept: HEMATOLOGY ONCOLOGY | Facility: MEDICAL CENTER | Age: 72
End: 2017-02-13

## 2017-02-14 ENCOUNTER — TELEPHONE (OUTPATIENT)
Dept: HEMATOLOGY ONCOLOGY | Facility: MEDICAL CENTER | Age: 72
End: 2017-02-14

## 2017-02-14 NOTE — TELEPHONE ENCOUNTER
Pt son (Sea) called asking if we had sent authorization for her Megestrol ( Megrace) 400mg. They had called the pharmacy and they said they still needed auth from Dr. Krause. Son also stated that Mireya MCKEON was working on the prior auth for this medication.  Do you know anything about this?

## 2017-02-14 NOTE — TELEPHONE ENCOUNTER
[facility]:     RD referral received via [employer], called pt's son to schedule in person consultation, reports will have his mother (pt) call back to schedule an appointment.     If pt does not return phone call, RD to attempt to f/u on Wednesday 2/22 while she is here for MD appointment.

## 2017-02-15 ENCOUNTER — APPOINTMENT (OUTPATIENT)
Dept: ONCOLOGY | Facility: MEDICAL CENTER | Age: 72
End: 2017-02-15
Attending: INTERNAL MEDICINE
Payer: MEDICARE

## 2017-02-15 ENCOUNTER — TELEPHONE (OUTPATIENT)
Dept: HEMATOLOGY ONCOLOGY | Facility: MEDICAL CENTER | Age: 72
End: 2017-02-15

## 2017-02-15 NOTE — TELEPHONE ENCOUNTER
Called insurance and got the approval for her megestrol. Then called patient and spoke with Sea her son and told him we did get the approval.

## 2017-02-22 ENCOUNTER — OFFICE VISIT (OUTPATIENT)
Dept: HEMATOLOGY ONCOLOGY | Facility: MEDICAL CENTER | Age: 72
End: 2017-02-22
Payer: MEDICARE

## 2017-02-22 ENCOUNTER — TELEPHONE (OUTPATIENT)
Dept: HEMATOLOGY ONCOLOGY | Facility: MEDICAL CENTER | Age: 72
End: 2017-02-22

## 2017-02-22 VITALS
OXYGEN SATURATION: 98 % | TEMPERATURE: 97.7 F | SYSTOLIC BLOOD PRESSURE: 118 MMHG | DIASTOLIC BLOOD PRESSURE: 64 MMHG | RESPIRATION RATE: 12 BRPM | BODY MASS INDEX: 24.19 KG/M2 | WEIGHT: 123.24 LBS | HEART RATE: 88 BPM | HEIGHT: 60 IN

## 2017-02-22 DIAGNOSIS — C24.1 AMPULLARY CARCINOMA (HCC): ICD-10-CM

## 2017-02-22 PROCEDURE — 3014F SCREEN MAMMO DOC REV: CPT | Performed by: NURSE PRACTITIONER

## 2017-02-22 PROCEDURE — G8432 DEP SCR NOT DOC, RNG: HCPCS | Performed by: NURSE PRACTITIONER

## 2017-02-22 PROCEDURE — 4040F PNEUMOC VAC/ADMIN/RCVD: CPT | Performed by: NURSE PRACTITIONER

## 2017-02-22 PROCEDURE — G8484 FLU IMMUNIZE NO ADMIN: HCPCS | Performed by: NURSE PRACTITIONER

## 2017-02-22 PROCEDURE — 99213 OFFICE O/P EST LOW 20 MIN: CPT | Performed by: NURSE PRACTITIONER

## 2017-02-22 PROCEDURE — 3017F COLORECTAL CA SCREEN DOC REV: CPT | Performed by: NURSE PRACTITIONER

## 2017-02-22 PROCEDURE — 1036F TOBACCO NON-USER: CPT | Performed by: NURSE PRACTITIONER

## 2017-02-22 PROCEDURE — 1101F PT FALLS ASSESS-DOCD LE1/YR: CPT | Performed by: NURSE PRACTITIONER

## 2017-02-22 PROCEDURE — G8420 CALC BMI NORM PARAMETERS: HCPCS | Performed by: NURSE PRACTITIONER

## 2017-02-22 ASSESSMENT — ENCOUNTER SYMPTOMS
VOMITING: 0
CHILLS: 0
WEIGHT LOSS: 0
PALPITATIONS: 0
BACK PAIN: 0
NAUSEA: 1
DIARRHEA: 0
TINGLING: 1
HEADACHES: 0
FEVER: 0
SHORTNESS OF BREATH: 0
MYALGIAS: 0
COUGH: 0
CONSTIPATION: 0
WHEEZING: 0

## 2017-02-22 ASSESSMENT — PAIN SCALES - GENERAL: PAINLEVEL: NO PAIN

## 2017-02-22 NOTE — MR AVS SNAPSHOT
Elin Mora Virgilio   2017 2:20 PM   Office Visit   MRN: [id number]    Department:  [facility]   Dept Phone:  [phone]    Description:  Female : 1945   Provider:  GREG Gomez.P.N.           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)   [[id number]]         Vital Signs     Blood Pressure Pulse Temperature Respirations Height Weight    118/64 mmHg 88 36.5 °C (97.7 °F) 12 1.524 m (5') 55.9 kg (123 lb 3.8 oz)    Body Mass Index Oxygen Saturation Smoking Status             24.07 kg/m2 98% Never Smoker          Basic Information     Date Of Birth Sex Race Ethnicity Preferred Language    1945 Female White Non- English      Your appointments     2017  1:00 PM   Non Provider 1 with ONC RN 1   [facility] (--)    [address] [facility] [address]  [facility] [address] [phone]           You will be receiving a confirmation call a few days before your appointment from our automated call confirmation system.            2017  2:00 PM   Est Chemo 1 with RN 3   [facility] ([place])    [address] [facility] [address]  [place] [address] [phone]            Mar 06, 2017  1:30 PM   Non Provider 1 with ONC RN 2   [facility] (--)    [address], [facility] [address]  [facility] [address] [phone]           You will be receiving a confirmation call a few days before your appointment from our automated call confirmation system.            Mar 06, 2017  2:00 PM   ONCOLOGY EST PATIENT 30 MIN with Gabby Galvan, A.P.N.   [facility] (--)    [address], [facility] [address]  [facility] [address] [phone]            Mar 06, 2017  3:00 PM   Est Chemo 1 with RN 6   [facility] ([place])    [address] [facility] [address]  [place] [address]   [phone]            Mar 13, 2017  1:00 PM   Non Provider 1 with ONC RN 1   [facility] (--)    [address] [facility], [address]  [place] [address] [phone]           You will be receiving a confirmation call a few days before your appointment from our automated call confirmation system.            Mar 13, 2017  2:00 PM   ONCOLOGY EST PATIENT 30 MIN with ALVA Gomez   [facility] (--)    [address] [facility]  [place] [address] [phone]            Mar 13, 2017  3:00 PM   Est Chemo 1 with RN 3   [facility] ([facility])    [address] [facility] [address]  [place] [address] [phone]              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   8/15/2016 - Present    Cholecystitis with cholelithiasis K80.10   8/15/2016 - Present    Obstructive jaundice due to malignant neoplasm K83.8   8/15/2016 - Present    Leukocytosis D72.829   10/21/2016 - Present    Sepsis (CMS-HCC) A41.9   10/21/2016 - Present    Metabolic acidosis E87.2   10/22/2016 - Present      Health Maintenance        Date Due Completion Dates    IMM DTaP/Tdap/Td Vaccine (1 - Tdap) 6/19/1964 ---    PAP SMEAR 6/19/1966 ---    COLONOSCOPY 6/19/1995 ---    IMM ZOSTER VACCINE 6/19/2005 ---    IMM PNEUMOCOCCAL 65+ (ADULT) LOW/MEDIUM RISK SERIES (2 of 2 - PPSV23) 10/15/2014 10/15/2013    MAMMOGRAM 1/14/2016 1/14/2015, 1/14/2015, 1/14/2015    IMM INFLUENZA (1) 9/1/2016 10/15/2015    BONE DENSITY 9/26/2019 9/26/2014            Current Immunizations     13-VALENT PCV PREVNAR 10/15/2013    Influenza Vaccine Adult HD 10/15/2015      Below and/or attached are the medications your provider expects you to take. Review all of your home medications and newly ordered medications with your provider and/or pharmacist. Follow medication instructions as directed by your provider and/or pharmacist. Please keep your medication list with you and share with your provider. Update the information when medications are discontinued, doses are changed, or new  medications (including over-the-counter products) are added; and carry medication information at all times in the event of emergency situations     Allergies:  No Known Allergies          Medications  Valid as of: February 22, 2017 -  3:27 PM    Generic Name Brand Name Tablet Size Instructions for use    Aspirin (Tablet Delayed Response) ECOTRIN 325 MG Take 325 mg by mouth every morning.        Famotidine (Tab) PEPCID 20 MG Take 1 Tab by mouth 2 times a day.        Losartan Potassium-HCTZ (Tab) HYZAAR 100-25 MG Take 1 Tab by mouth every morning. Indications: High Blood Pressure        Megestrol Acetate (Suspension) MEGACE 400 MG/10ML Take 20 mL by mouth every day.        Misc Natural Products (Powder) FIBER 7  Take  by mouth.        Ondansetron HCl (Tab) ZOFRAN 4 MG Take 1 Tab by mouth every four hours as needed for Nausea/Vomiting.        Prochlorperazine Maleate (Tab) COMPAZINE 10 MG Take 10 mg by mouth every 6 hours as needed for Nausea/Vomiting (alternating Q3H with Zofran).        RaNITidine HCl (Tab) ZANTAC 150 MG Take 1 Tab by mouth 2 times a day.        Sennosides-Docusate Sodium (Tab) SENOKOT-S 8.6-50 MG Take 1 Tab by mouth 2 times a day.        .                 Medicines prescribed today were sent to:     [facility]/PHARMACY #[id number] - [facility] [address] [facility]    [address] [facility] [address]    Phone: [phone] Fax: [phone]    Open 24 Hours?: [facility] - [place], [place] - [address] [facility]    [address] [facility] [address]    Phone: [phone] Fax: [phone]    Open 24 Hours?: No      Medication refill instructions:       If your prescription bottle indicates you have medication refills left, it is not necessary to call your provider’s office. Please contact your pharmacy and they will refill your medication.    If your prescription bottle indicates you do not have any refills left, you may request refills at any time through one of the following  ways: The online [employer] system (except Urgent Care), by calling your provider’s office, or by asking your pharmacy to contact your provider’s office with a refill request. Medication refills are processed only during regular business hours and may not be available until the next business day. Your provider may request additional information or to have a follow-up visit with you prior to refilling your medication.   *Please Note: Medication refills are assigned a new Rx number when refilled electronically. Your pharmacy may indicate that no refills were authorized even though a new prescription for the same medication is available at the pharmacy. Please request the medicine by name with the pharmacy before contacting your provider for a refill.           [employer] Access Code: Activation code not generated  Current Neurop[employer] Status: Active

## 2017-02-22 NOTE — TELEPHONE ENCOUNTER
Nutrition Update: patient was scheduled to meet with dietitian s/p APN appointment, but they've left campus already, per nurse mack.  RD contacted patient's son at phone number provided by NN and left .  Please have patient contact dietitian at [phone] to re-schedule interview.  Thank you.

## 2017-02-24 DIAGNOSIS — C24.1 AMPULLARY CARCINOMA (HCC): ICD-10-CM

## 2017-02-27 ENCOUNTER — NON-PROVIDER VISIT (OUTPATIENT)
Dept: HEMATOLOGY ONCOLOGY | Facility: MEDICAL CENTER | Age: 72
End: 2017-02-27
Payer: MEDICARE

## 2017-02-27 ENCOUNTER — OUTPATIENT INFUSION SERVICES (OUTPATIENT)
Dept: ONCOLOGY | Facility: MEDICAL CENTER | Age: 72
End: 2017-02-27
Attending: INTERNAL MEDICINE
Payer: MEDICARE

## 2017-02-27 ENCOUNTER — HOSPITAL ENCOUNTER (OUTPATIENT)
Facility: MEDICAL CENTER | Age: 72
End: 2017-02-27
Attending: INTERNAL MEDICINE
Payer: MEDICARE

## 2017-02-27 VITALS
WEIGHT: 126.76 LBS | RESPIRATION RATE: 18 BRPM | TEMPERATURE: 97.1 F | HEART RATE: 85 BPM | DIASTOLIC BLOOD PRESSURE: 59 MMHG | SYSTOLIC BLOOD PRESSURE: 118 MMHG | BODY MASS INDEX: 24.89 KG/M2 | HEIGHT: 60 IN | OXYGEN SATURATION: 99 %

## 2017-02-27 DIAGNOSIS — C24.1 AMPULLARY CARCINOMA (HCC): ICD-10-CM

## 2017-02-27 LAB
ALBUMIN SERPL BCP-MCNC: 2.6 G/DL (ref 3.2–4.9)
ALBUMIN/GLOB SERPL: 0.7 G/DL
ALP SERPL-CCNC: 86 U/L (ref 30–99)
ALT SERPL-CCNC: 10 U/L (ref 2–50)
ANION GAP SERPL CALC-SCNC: 6 MMOL/L (ref 0–11.9)
AST SERPL-CCNC: 13 U/L (ref 12–45)
BASOPHILS # BLD AUTO: 0.04 K/UL (ref 0–0.12)
BASOPHILS NFR BLD AUTO: 0.3 % (ref 0–1.8)
BILIRUB SERPL-MCNC: 0.3 MG/DL (ref 0.1–1.5)
BUN SERPL-MCNC: 15 MG/DL (ref 8–22)
CALCIUM SERPL-MCNC: 8.6 MG/DL (ref 8.5–10.5)
CHLORIDE SERPL-SCNC: 109 MMOL/L (ref 96–112)
CO2 SERPL-SCNC: 21 MMOL/L (ref 20–33)
CREAT SERPL-MCNC: 0.66 MG/DL (ref 0.5–1.4)
EOSINOPHIL # BLD: 0.04 K/UL (ref 0–0.51)
EOSINOPHIL NFR BLD AUTO: 0.3 % (ref 0–6.9)
ERYTHROCYTE [DISTWIDTH] IN BLOOD BY AUTOMATED COUNT: 59.2 FL (ref 35.9–50)
GLOBULIN SER CALC-MCNC: 3.5 G/DL (ref 1.9–3.5)
GLUCOSE SERPL-MCNC: 155 MG/DL (ref 65–99)
HCT VFR BLD AUTO: 36.9 % (ref 37–47)
HGB BLD-MCNC: 12.3 G/DL (ref 12–16)
IMM GRANULOCYTES # BLD AUTO: 0.05 K/UL (ref 0–0.11)
IMM GRANULOCYTES NFR BLD AUTO: 0.4 % (ref 0–0.9)
LYMPHOCYTES # BLD: 2.28 K/UL (ref 1–4.8)
LYMPHOCYTES NFR BLD AUTO: 18.9 % (ref 22–41)
MCH RBC QN AUTO: 32.5 PG (ref 27–33)
MCHC RBC AUTO-ENTMCNC: 33.3 G/DL (ref 33.6–35)
MCV RBC AUTO: 97.4 FL (ref 81.4–97.8)
MONOCYTES # BLD: 0.58 K/UL (ref 0–0.85)
MONOCYTES NFR BLD AUTO: 4.8 % (ref 0–13.4)
NEUTROPHILS # BLD: 9.08 K/UL (ref 2–7.15)
NEUTROPHILS NFR BLD AUTO: 75.3 % (ref 44–72)
NRBC # BLD AUTO: 0 K/UL
NRBC BLD-RTO: 0 /100 WBC
PLATELET # BLD AUTO: 301 K/UL (ref 164–446)
PMV BLD AUTO: 9.3 FL (ref 9–12.9)
POTASSIUM SERPL-SCNC: 3.5 MMOL/L (ref 3.6–5.5)
PROT SERPL-MCNC: 6.1 G/DL (ref 6–8.2)
RBC # BLD AUTO: 3.79 M/UL (ref 4.2–5.4)
SODIUM SERPL-SCNC: 136 MMOL/L (ref 135–145)
WBC # BLD AUTO: 12.1 K/UL (ref 4.8–10.8)

## 2017-02-27 PROCEDURE — 96413 CHEMO IV INFUSION 1 HR: CPT

## 2017-02-27 PROCEDURE — 36591 DRAW BLOOD OFF VENOUS DEVICE: CPT | Performed by: INTERNAL MEDICINE

## 2017-02-27 PROCEDURE — 700111 HCHG RX REV CODE 636 W/ 250 OVERRIDE (IP): Performed by: NURSE PRACTITIONER

## 2017-02-27 PROCEDURE — 700102 HCHG RX REV CODE 250 W/ 637 OVERRIDE(OP): Performed by: INTERNAL MEDICINE

## 2017-02-27 PROCEDURE — 700105 HCHG RX REV CODE 258

## 2017-02-27 PROCEDURE — 700111 HCHG RX REV CODE 636 W/ 250 OVERRIDE (IP): Mod: JW

## 2017-02-27 PROCEDURE — 700105 HCHG RX REV CODE 258: Performed by: NURSE PRACTITIONER

## 2017-02-27 PROCEDURE — 96375 TX/PRO/DX INJ NEW DRUG ADDON: CPT

## 2017-02-27 PROCEDURE — 700111 HCHG RX REV CODE 636 W/ 250 OVERRIDE (IP)

## 2017-02-27 PROCEDURE — A9270 NON-COVERED ITEM OR SERVICE: HCPCS | Performed by: INTERNAL MEDICINE

## 2017-02-27 RX ORDER — POTASSIUM CHLORIDE 20 MEQ/1
40 TABLET, EXTENDED RELEASE ORAL ONCE
Status: COMPLETED | OUTPATIENT
Start: 2017-02-27 | End: 2017-02-27

## 2017-02-27 RX ORDER — ONDANSETRON 2 MG/ML
8 INJECTION INTRAMUSCULAR; INTRAVENOUS ONCE
Status: COMPLETED | OUTPATIENT
Start: 2017-02-27 | End: 2017-02-27

## 2017-02-27 RX ADMIN — POTASSIUM CHLORIDE 40 MEQ: 1500 TABLET, EXTENDED RELEASE ORAL at 15:43

## 2017-02-27 RX ADMIN — HEPARIN 500 UNITS: 100 SYRINGE at 15:45

## 2017-02-27 RX ADMIN — ONDANSETRON 8 MG: 2 INJECTION, SOLUTION INTRAMUSCULAR; INTRAVENOUS at 14:34

## 2017-02-27 RX ADMIN — GEMCITABINE 1562 MG: 1 INJECTION, POWDER, LYOPHILIZED, FOR SOLUTION INTRAVENOUS at 15:08

## 2017-02-27 NOTE — PROGRESS NOTES
"Pharmacy Chemotherapy calculation:    Patient Name: Elin Poole  DX: Metastatic Pancreatic Cancer (poorly differentiated adenocarcinoma)    Cycle 2, Day 1 Previous treatment = C1D1 1/18/17 (Days 8 and 15 canceled for SE)    Protocol: Gemzar   Gemcitabine 1000mg/m2 IV over 30 min on Days 1, 8, and 15  q28 days until DP/UT  NCCN Guidelines for Pancreatic Cancer V.2.2016  Tasia VELAZQUEZ, et al - J Clin Oncol. 1997 Jun;15(6):2408-13.  Shelia PHILLIPS et al - ALEXIS. 2010 Sep 8;304(10):1077-92.     /59 mmHg  Pulse 85  Temp(Src) 36.2 °C (97.1 °F)  Resp 18  Ht 1.518 m (4' 11.75\")  Wt 57.5 kg (126 lb 12.2 oz)  BMI 24.95 kg/m2  SpO2 99% Body surface area is 1.56 meters squared.     ANC~9080 Plt = 301k   Hgb = 12.3     SCr = 0.66mg/dL CrCl ~67mL/min   LFT's = WNL TBili = 0.3        Gemcitabine 1000mg/m2 IV x 1.56 m2 = 1560 mg    <5% difference, ok to treat with final dose = 1562 mg IV      Janell Karimi, PharmD             "

## 2017-02-27 NOTE — MR AVS SNAPSHOT
Elin Mora Virgilio   2017 1:00 PM   Non-Provider Visit   MRN: [id number]    Department:  [facility]   Dept Phone:  [phone]    Description:  Female : 1945   Provider:  ONC RN 1           Allergies as of 2017     No Known Allergies      You were diagnosed with     Ampullary carcinoma (CMS-HCC)   [[id number]]         Vital Signs     Smoking Status                   Never Smoker            Basic Information     Date Of Birth Sex Race Ethnicity Preferred Language    1945 Female White Non- English      Your appointments     2017  2:00 PM   Est Chemo 1 with RN 3   [facility] ([facility] [place])    [address] [facility] [address]  [facility] [address] [phone]            Mar 06, 2017  1:30 PM   Non Provider 1 with ONC RN 2   [facility] (--)    [address], [facility] [address]  [facility] [address] [phone]           You will be receiving a confirmation call a few days before your appointment from our automated call confirmation system.            Mar 06, 2017  2:00 PM   ONCOLOGY EST PATIENT 30 MIN with Gabby Galvan, A.P.NCecil   [facility] (--)    [address], [facility] [address]  [facility] [address] [phone]            Mar 06, 2017  3:00 PM   Est Chemo 1 with RN 6   [facility] ([facility] [place])    [address] [facility] [address]  [facility] [address] [phone]            Mar 13, 2017  1:00 PM   Non Provider 1 with ONC RN 1   [facility] (--)    [address], [facility] [address]  [facility] [address] [phone]           You will be receiving a confirmation call a few days before your appointment from our automated call confirmation system.            Mar 13, 2017  2:00 PM   ONCOLOGY EST PATIENT 30 MIN with Gabby Galvan A.P.NCecil   [facility] (--)    [address] [facility]  [place] [address] [phone]            Mar 13, 2017  3:00 PM   Est Chemo 1 with RN 3   [facility] ([facility])    [address] 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   8/15/2016 - Present    Cholecystitis with cholelithiasis K80.10   8/15/2016 - Present    Obstructive jaundice due to malignant neoplasm K83.8   8/15/2016 - Present    Leukocytosis D72.829   10/21/2016 - Present    Sepsis (CMS-HCC) A41.9   10/21/2016 - Present    Metabolic acidosis E87.2   10/22/2016 - Present      Health Maintenance        Date Due Completion Dates    IMM DTaP/Tdap/Td Vaccine (1 - Tdap) 6/19/1964 ---    PAP SMEAR 6/19/1966 ---    COLONOSCOPY 6/19/1995 ---    IMM ZOSTER VACCINE 6/19/2005 ---    IMM PNEUMOCOCCAL 65+ (ADULT) LOW/MEDIUM RISK SERIES (2 of 2 - PPSV23) 10/15/2014 10/15/2013    MAMMOGRAM 1/14/2016 1/14/2015, 1/14/2015, 1/14/2015    IMM INFLUENZA (1) 9/1/2016 10/15/2015    BONE DENSITY 9/26/2019 9/26/2014            Current Immunizations     13-VALENT PCV PREVNAR 10/15/2013    Influenza Vaccine Adult HD 10/15/2015      Below and/or attached are the medications your provider expects you to take. Review all of your home medications and newly ordered medications with your provider and/or pharmacist. Follow medication instructions as directed by your provider and/or pharmacist. Please keep your medication list with you and share with your provider. Update the information when medications are discontinued, doses are changed, or new medications (including over-the-counter products) are added; and carry medication information at all times in the event of emergency situations     Allergies:  No Known Allergies          Medications  Valid as of: February 27, 2017 -  1:27 PM    Generic Name Brand Name Tablet Size Instructions for use    Aspirin (Tablet Delayed Response) ECOTRIN 325 MG Take 325 mg by mouth every morning.        Famotidine (Tab) PEPCID 20 MG Take 1 Tab by mouth 2 times a day.        Losartan Potassium-HCTZ (Tab) HYZAAR 100-25 MG Take 1 Tab  by mouth every morning. Indications: High Blood Pressure        Megestrol Acetate (Suspension) MEGACE 400 MG/10ML Take 20 mL by mouth every day.        Misc Natural Products (Powder) FIBER 7  Take  by mouth.        Ondansetron HCl (Tab) ZOFRAN 4 MG Take 1 Tab by mouth every four hours as needed for Nausea/Vomiting.        Prochlorperazine Maleate (Tab) COMPAZINE 10 MG Take 10 mg by mouth every 6 hours as needed for Nausea/Vomiting (alternating Q3H with Zofran).        RaNITidine HCl (Tab) ZANTAC 150 MG Take 1 Tab by mouth 2 times a day.        Sennosides-Docusate Sodium (Tab) SENOKOT-S 8.6-50 MG Take 1 Tab by mouth 2 times a day.        .                 Medicines prescribed today were sent to:     [facility]/PHARMACY #[id number] - [place], [place] - [address] [facility]    [address] [facility] [address]    Phone: [phone] Fax: [phone]    Open 24 Hours?: [facility] - [place], [place] - [address]    [address] [facility] [address]    Phone: [phone] Fax: [phone]    Open 24 Hours?: No      Medication refill instructions:       If your prescription bottle indicates you have medication refills left, it is not necessary to call your provider’s office. Please contact your pharmacy and they will refill your medication.    If your prescription bottle indicates you do not have any refills left, you may request refills at any time through one of the following ways: The online [employer] system (except Urgent Care), by calling your provider’s office, or by asking your pharmacy to contact your provider’s office with a refill request. Medication refills are processed only during regular business hours and may not be available until the next business day. Your provider may request additional information or to have a follow-up visit with you prior to refilling your medication.   *Please Note: Medication refills are assigned a new Rx number when refilled electronically. Your pharmacy may  indicate that no refills were authorized even though a new prescription for the same medication is available at the pharmacy. Please request the medicine by name with the pharmacy before contacting your provider for a refill.           [employer] Access Code: Activation code not generated  Current [employer] Status: Active

## 2017-02-27 NOTE — PROGRESS NOTES
Pt presents via wheelchair for labs via port access prior to chemotherapy this afternoon.  Pt is accompanied by her daughter for today's visit.  Plan of care discussed and pt and daughter verbalize agreement.  She does state that she has no noticed much improve in her fatigue.  Port accessed in sterile fashion;brisk blood return.  Labs drawn per orders in [place].  Port flushed per protocol with NS, no heparin instilled as she is going directly to [facility] for chemotherapy.  Port remains accessed.  Pt tolerated well.

## 2017-02-27 NOTE — PROGRESS NOTES
Chemotherapy Verification - SECONDARY RN       Height = 151.8 cm  Weight = 57.5 kg  BSA = 1.557 m2       Medication: Gemzar  Dose: 1000 mg/m2  Calculated Dose: 1557 mg                             (In mg/m2, AUC, mg/kg)       I confirm that this process was performed independently.

## 2017-02-27 NOTE — PROGRESS NOTES
Chemotherapy Verification - PRIMARY RN      Height = 151cm  Weight = 57.5kg  BSA = 1.55m2       Medication: gemzar  Dose: 1000mg/m2  Calculated Dose: 1552mg      I confirm this process was performed independently with the BSA and all final chemotherapy dosing calculations congruent.  Any discrepancies of 5% or greater have been addressed with the chemotherapy pharmacist. The resolution of the discrepancy has been documented in the EPIC progress notes.

## 2017-02-27 NOTE — PROGRESS NOTES
"Pharmacy chemotherapy verification:    Patient Name: Elin Poole  Dx: pancreatic cancer (ampullary adenocarcinoma)    Protocol: gemcitabine (Gemzar)   Gemcitabine 1000mg/m2 IV over 30 min on Days 1, 8, and 15  Q28 days until DP/UT  NCCN Guidelines for Pancreatic Cancer V.2.2016  Tasia VELAZQUEZ, et al - J Clin Oncol. 1997 Jun;15(6):2408-13.  Neoptlouisa JACQUELINE, et al - ALEXIS. 2010 Sep 8;304(10):1078-70.    Allergies:  Review of patient's allergies indicates no known allergies.     /59 mmHg  Pulse 85  Temp(Src) 36.2 °C (97.1 °F)  Resp 18  Ht 1.518 m (4' 11.75\")  Wt 57.5 kg (126 lb 12.2 oz)  BMI 24.95 kg/m2  SpO2 99% Body surface area is 1.56 meters squared.     ANC~ 9080    Plt = 301k   Hgb = 12.3       SCr = 0.66 mg/dL CrCl ~ 67 mL/min (using min SCr = 0.7)  LFT's = WNL T bili = 0.3    Drug Order   (Drug name, dose, route, IV Fluid & volume, frequency, number of doses) Cycle: 2 Day 1 (days 8 and 15 skipped due to significant fatigue)  Previous treatment: 1/18/17     Medication = Gemcitabine   Base Dose= 1000 mg/m2  Calc Dose: Base Dose x 1.56 m2 = 1560 mg  Final Dose = 1562 mg  Route = IV  Fluid & Volume =  mL  Admin Duration = Over 30 min          <5% difference, ok to treat with final dose       By my signature below, I confirm this process was performed independently with the BSA and all final chemotherapy dosing calculations congruent. I have reviewed the above chemotherapy order and that my calculation of the final dose and BSA (when applicable) corroborate those calculations of the  pharmacist. Discrepancies of 5% or greater in the written dose have been addressed and documented within the [facility] Progress notes.    Keaton Salazar, PharmD    "

## 2017-02-28 ENCOUNTER — TELEPHONE (OUTPATIENT)
Dept: HEMATOLOGY ONCOLOGY | Facility: MEDICAL CENTER | Age: 72
End: 2017-02-28

## 2017-02-28 NOTE — PROGRESS NOTES
Pt arrives ambulatory to [facility] accompanied by family.  Pt is here for chemo.  Port was accessed in MD office, brisk blood return noted.  Lab results reviewed.  Chemo infused as ordered, pt tolerated well, no evidence of adverse effects noted or expressed.  Port flushed per heparin protocol, NCN removed intact, pressure drsg to site.  Pt dc'd to care of family in no apparent distress.  Future appts confirmed.

## 2017-03-01 ENCOUNTER — TELEPHONE (OUTPATIENT)
Dept: HEMATOLOGY ONCOLOGY | Facility: MEDICAL CENTER | Age: 72
End: 2017-03-01

## 2017-03-01 NOTE — TELEPHONE ENCOUNTER
Pt called stating that the day she went in for her chemo they told her she needed to take potassium, she would like to know if she needs to continue taking the potassium.

## 2017-03-01 NOTE — TELEPHONE ENCOUNTER
Patient's daughter would like to know if the patient is suppose to continue oral potassium. She stated that patient was given potassium at the infusion center but they would like to know if the patient should continue. Please advise, if there is not answer please leave a message.

## 2017-03-06 ENCOUNTER — NON-PROVIDER VISIT (OUTPATIENT)
Dept: HEMATOLOGY ONCOLOGY | Facility: MEDICAL CENTER | Age: 72
End: 2017-03-06
Payer: MEDICARE

## 2017-03-06 ENCOUNTER — OUTPATIENT INFUSION SERVICES (OUTPATIENT)
Dept: ONCOLOGY | Facility: MEDICAL CENTER | Age: 72
End: 2017-03-06
Attending: INTERNAL MEDICINE
Payer: MEDICARE

## 2017-03-06 ENCOUNTER — OFFICE VISIT (OUTPATIENT)
Dept: HEMATOLOGY ONCOLOGY | Facility: MEDICAL CENTER | Age: 72
End: 2017-03-06
Payer: MEDICARE

## 2017-03-06 ENCOUNTER — HOSPITAL ENCOUNTER (OUTPATIENT)
Facility: MEDICAL CENTER | Age: 72
End: 2017-03-06
Attending: NURSE PRACTITIONER
Payer: MEDICARE

## 2017-03-06 VITALS
DIASTOLIC BLOOD PRESSURE: 51 MMHG | OXYGEN SATURATION: 98 % | SYSTOLIC BLOOD PRESSURE: 113 MMHG | WEIGHT: 126.54 LBS | BODY MASS INDEX: 24.84 KG/M2 | RESPIRATION RATE: 18 BRPM | HEART RATE: 86 BPM | TEMPERATURE: 98.4 F | HEIGHT: 60 IN

## 2017-03-06 VITALS
HEART RATE: 92 BPM | BODY MASS INDEX: 24.24 KG/M2 | RESPIRATION RATE: 18 BRPM | OXYGEN SATURATION: 100 % | SYSTOLIC BLOOD PRESSURE: 118 MMHG | WEIGHT: 123.46 LBS | DIASTOLIC BLOOD PRESSURE: 66 MMHG | HEIGHT: 60 IN | TEMPERATURE: 98.1 F

## 2017-03-06 VITALS
OXYGEN SATURATION: 100 % | BODY MASS INDEX: 24.24 KG/M2 | WEIGHT: 123.46 LBS | RESPIRATION RATE: 18 BRPM | SYSTOLIC BLOOD PRESSURE: 118 MMHG | DIASTOLIC BLOOD PRESSURE: 66 MMHG | TEMPERATURE: 98.1 F | HEART RATE: 92 BPM | HEIGHT: 60 IN

## 2017-03-06 DIAGNOSIS — C24.1 AMPULLARY CARCINOMA (HCC): ICD-10-CM

## 2017-03-06 LAB
ANION GAP SERPL CALC-SCNC: 7 MMOL/L (ref 0–11.9)
BASOPHILS # BLD AUTO: 0.01 K/UL (ref 0–0.12)
BASOPHILS NFR BLD AUTO: 0.2 % (ref 0–1.8)
BUN SERPL-MCNC: 18 MG/DL (ref 8–22)
CALCIUM SERPL-MCNC: 8.9 MG/DL (ref 8.5–10.5)
CHLORIDE SERPL-SCNC: 107 MMOL/L (ref 96–112)
CO2 SERPL-SCNC: 22 MMOL/L (ref 20–33)
CREAT SERPL-MCNC: 0.61 MG/DL (ref 0.5–1.4)
EOSINOPHIL # BLD: 0.01 K/UL (ref 0–0.51)
EOSINOPHIL NFR BLD AUTO: 0.2 % (ref 0–6.9)
ERYTHROCYTE [DISTWIDTH] IN BLOOD BY AUTOMATED COUNT: 54.8 FL (ref 35.9–50)
GLUCOSE SERPL-MCNC: 129 MG/DL (ref 65–99)
HCT VFR BLD AUTO: 34.5 % (ref 37–47)
HGB BLD-MCNC: 11.8 G/DL (ref 12–16)
IMM GRANULOCYTES # BLD AUTO: 0.11 K/UL (ref 0–0.11)
IMM GRANULOCYTES NFR BLD AUTO: 2.4 % (ref 0–0.9)
LYMPHOCYTES # BLD: 1.67 K/UL (ref 1–4.8)
LYMPHOCYTES NFR BLD AUTO: 36.5 % (ref 22–41)
MCH RBC QN AUTO: 33.1 PG (ref 27–33)
MCHC RBC AUTO-ENTMCNC: 34.2 G/DL (ref 33.6–35)
MCV RBC AUTO: 96.9 FL (ref 81.4–97.8)
MONOCYTES # BLD: 0.47 K/UL (ref 0–0.85)
MONOCYTES NFR BLD AUTO: 10.3 % (ref 0–13.4)
NEUTROPHILS # BLD: 2.31 K/UL (ref 2–7.15)
NEUTROPHILS NFR BLD AUTO: 50.4 % (ref 44–72)
NRBC # BLD AUTO: 0.02 K/UL
NRBC BLD-RTO: 0.4 /100 WBC
PLATELET # BLD AUTO: 156 K/UL (ref 164–446)
PMV BLD AUTO: 8.8 FL (ref 9–12.9)
POTASSIUM SERPL-SCNC: 3.8 MMOL/L (ref 3.6–5.5)
RBC # BLD AUTO: 3.56 M/UL (ref 4.2–5.4)
SODIUM SERPL-SCNC: 136 MMOL/L (ref 135–145)
WBC # BLD AUTO: 4.6 K/UL (ref 4.8–10.8)

## 2017-03-06 PROCEDURE — 700105 HCHG RX REV CODE 258: Performed by: NURSE PRACTITIONER

## 2017-03-06 PROCEDURE — 700111 HCHG RX REV CODE 636 W/ 250 OVERRIDE (IP): Mod: JW

## 2017-03-06 PROCEDURE — 700111 HCHG RX REV CODE 636 W/ 250 OVERRIDE (IP): Performed by: NURSE PRACTITIONER

## 2017-03-06 PROCEDURE — 36591 DRAW BLOOD OFF VENOUS DEVICE: CPT | Performed by: NURSE PRACTITIONER

## 2017-03-06 PROCEDURE — G8420 CALC BMI NORM PARAMETERS: HCPCS | Performed by: NURSE PRACTITIONER

## 2017-03-06 PROCEDURE — 1036F TOBACCO NON-USER: CPT | Performed by: NURSE PRACTITIONER

## 2017-03-06 PROCEDURE — 96375 TX/PRO/DX INJ NEW DRUG ADDON: CPT

## 2017-03-06 PROCEDURE — 4040F PNEUMOC VAC/ADMIN/RCVD: CPT | Performed by: NURSE PRACTITIONER

## 2017-03-06 PROCEDURE — 3014F SCREEN MAMMO DOC REV: CPT | Performed by: NURSE PRACTITIONER

## 2017-03-06 PROCEDURE — G8432 DEP SCR NOT DOC, RNG: HCPCS | Performed by: NURSE PRACTITIONER

## 2017-03-06 PROCEDURE — 1101F PT FALLS ASSESS-DOCD LE1/YR: CPT | Performed by: NURSE PRACTITIONER

## 2017-03-06 PROCEDURE — G8484 FLU IMMUNIZE NO ADMIN: HCPCS | Performed by: NURSE PRACTITIONER

## 2017-03-06 PROCEDURE — 99213 OFFICE O/P EST LOW 20 MIN: CPT | Performed by: NURSE PRACTITIONER

## 2017-03-06 PROCEDURE — 3017F COLORECTAL CA SCREEN DOC REV: CPT | Performed by: NURSE PRACTITIONER

## 2017-03-06 PROCEDURE — 96413 CHEMO IV INFUSION 1 HR: CPT

## 2017-03-06 PROCEDURE — 700105 HCHG RX REV CODE 258

## 2017-03-06 PROCEDURE — 700111 HCHG RX REV CODE 636 W/ 250 OVERRIDE (IP)

## 2017-03-06 RX ORDER — SODIUM CHLORIDE 9 MG/ML
INJECTION, SOLUTION INTRAVENOUS CONTINUOUS
Status: DISCONTINUED | OUTPATIENT
Start: 2017-03-06 | End: 2017-03-06 | Stop reason: HOSPADM

## 2017-03-06 RX ORDER — ONDANSETRON 2 MG/ML
8 INJECTION INTRAMUSCULAR; INTRAVENOUS ONCE
Status: COMPLETED | OUTPATIENT
Start: 2017-03-06 | End: 2017-03-06

## 2017-03-06 RX ADMIN — SODIUM CHLORIDE: 9 INJECTION, SOLUTION INTRAVENOUS at 15:33

## 2017-03-06 RX ADMIN — ONDANSETRON 8 MG: 2 INJECTION, SOLUTION INTRAMUSCULAR; INTRAVENOUS at 15:38

## 2017-03-06 RX ADMIN — GEMCITABINE 1562 MG: 1 INJECTION, POWDER, LYOPHILIZED, FOR SOLUTION INTRAVENOUS at 16:17

## 2017-03-06 RX ADMIN — HEPARIN 500 UNITS: 100 SYRINGE at 17:04

## 2017-03-06 ASSESSMENT — ENCOUNTER SYMPTOMS
CONSTIPATION: 0
MYALGIAS: 0
CHILLS: 0
PALPITATIONS: 0
WHEEZING: 0
TINGLING: 1
DIARRHEA: 0
WEIGHT LOSS: 1
FEVER: 0
COUGH: 0
DIZZINESS: 0
SHORTNESS OF BREATH: 0
VOMITING: 0
HEADACHES: 0
NAUSEA: 0

## 2017-03-06 ASSESSMENT — PAIN SCALES - GENERAL
PAINLEVEL: 6=MODERATE PAIN

## 2017-03-06 NOTE — PROGRESS NOTES
Subjective:      Elin Poole is a 71 y.o. female who presents for Follow-Up for ampullary carcinoma.         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  She is here for cycle 2, day 8 of single agent Gemzar.  Patient is accompanied by her son for today's visit.     Patient is doing well since being back on chemotherapy. She states she has not had any fevers or chills. She is eating well and feels her appetite is great with the addition of Megace. However, she has lost 3 pounds and was quite surprised. We will continue to monitor her weight closely. Patient denies any chest pain, heart palpitations. She does have some edema noted in the right lower extremity. She stated she has a very bad knees on the right side as well which is a little bit swollen today. She does have some mild edema noted on the left side as well. She denies coughing, wheezing, shortness of breath. She denies any nausea or vomiting diarrhea or constipation. She stated she had one night where she had multiple formed stools in the evening but has discontinued the Senokot and is taking it only if needed. She stated when she stopped the Senokot her bowel movements have been back to once a day. She denies any pain, pain or burning with urination, and states she does have some mild tingling on the tops of her feet at times. Patient did have a little bit of fatigue on days 3 and 4 but is slowly improving as the days go on. She is eager and ready to start treatment today.    No Known Allergies  Current Outpatient Prescriptions on File Prior to Visit   Medication Sig Dispense Refill   • megestrol (MEGACE) 400 MG/10ML Suspension Take 20 mL by mouth every day. 600 mL 1   • famotidine (PEPCID) 20 MG Tab Take 1 Tab by mouth 2 times a day. 60 Tab 3   • ondansetron (ZOFRAN) 4 MG Tab tablet Take 1 Tab by mouth every four hours as needed for Nausea/Vomiting. 30 Tab 3   • aspirin EC (ECOTRIN) 325 MG Tablet Delayed Response Take 325  "mg by mouth every morning.     • losartan-hydrochlorothiazide (HYZAAR) 100-25 MG per tablet Take 1 Tab by mouth every morning. Indications: High Blood Pressure     • sennosides-docusate sodium (SENOKOT-S) 8.6-50 MG tablet Take 1 Tab by mouth 2 times a day. 60 Tab 3   • ranitidine (ZANTAC) 150 MG Tab Take 1 Tab by mouth 2 times a day. 60 Tab 3   • Misc Natural Products (FIBER 7) Powder Take  by mouth.     • prochlorperazine (COMPAZINE) 10 MG Tab Take 10 mg by mouth every 6 hours as needed for Nausea/Vomiting (alternating Q3H with Zofran).       No current facility-administered medications on file prior to visit.         Review of Systems   Constitutional: Positive for weight loss and malaise/fatigue (mild but improving). Negative for fever and chills.   Respiratory: Negative for cough, shortness of breath and wheezing.    Cardiovascular: Positive for leg swelling (more so in the right lower extremity). Negative for chest pain and palpitations.   Gastrointestinal: Negative for nausea, vomiting, diarrhea and constipation.   Genitourinary: Negative for dysuria.   Musculoskeletal: Negative for myalgias.   Skin: Negative for itching and rash.   Neurological: Positive for tingling (mild on the top of her foot). Negative for dizziness and headaches.          Objective:     /66 mmHg  Pulse 92  Temp(Src) 36.7 °C (98.1 °F) (Temporal)  Resp 18  Ht 1.518 m (4' 11.76\")  Wt 56 kg (123 lb 7.3 oz)  BMI 24.30 kg/m2  SpO2 100%  Breastfeeding? No     Physical Exam   Constitutional: She is oriented to person, place, and time. She appears well-developed and well-nourished. No distress.   HENT:   Head: Normocephalic and atraumatic.   Mouth/Throat: Oropharynx is clear and moist. No oropharyngeal exudate.   Alopecia   Eyes: Conjunctivae and EOM are normal. Pupils are equal, round, and reactive to light. Right eye exhibits no discharge. Left eye exhibits no discharge. No scleral icterus.   Cardiovascular: Normal rate, regular " rhythm, normal heart sounds and intact distal pulses.  Exam reveals no gallop and no friction rub.    No murmur heard.  Pulmonary/Chest: Effort normal and breath sounds normal. No respiratory distress. She has no wheezes.   Musculoskeletal: She exhibits edema (lower extremity edema - R>L).   Weakness and fatigue but tolerable. Using wheelchair to the clinic d/t distance to travel.    Neurological: She is alert and oriented to person, place, and time.   Skin: Skin is warm and dry. No rash noted. She is not diaphoretic. No erythema. No pallor.   Psychiatric: She has a normal mood and affect. Her behavior is normal.   Vitals reviewed.      Hospital Outpatient Visit on 03/06/2017   Component Date Value Ref Range Status   • WBC 03/06/2017 4.6* 4.8 - 10.8 K/uL Final   • RBC 03/06/2017 3.56* 4.20 - 5.40 M/uL Final   • Hemoglobin 03/06/2017 11.8* 12.0 - 16.0 g/dL Final   • Hematocrit 03/06/2017 34.5* 37.0 - 47.0 % Final   • MCV 03/06/2017 96.9  81.4 - 97.8 fL Final   • MCH 03/06/2017 33.1* 27.0 - 33.0 pg Final   • MCHC 03/06/2017 34.2  33.6 - 35.0 g/dL Final   • RDW 03/06/2017 54.8* 35.9 - 50.0 fL Final   • Platelet Count 03/06/2017 156* 164 - 446 K/uL Final   • MPV 03/06/2017 8.8* 9.0 - 12.9 fL Final   • Neutrophils-Polys 03/06/2017 50.40  44.00 - 72.00 % Final   • Lymphocytes 03/06/2017 36.50  22.00 - 41.00 % Final   • Monocytes 03/06/2017 10.30  0.00 - 13.40 % Final   • Eosinophils 03/06/2017 0.20  0.00 - 6.90 % Final   • Basophils 03/06/2017 0.20  0.00 - 1.80 % Final   • Immature Granulocytes 03/06/2017 2.40* 0.00 - 0.90 % Final   • Nucleated RBC 03/06/2017 0.40   Final   • Neutrophils (Absolute) 03/06/2017 2.31  2.00 - 7.15 K/uL Final    Includes immature neutrophils, if present.   • Lymphs (Absolute) 03/06/2017 1.67  1.00 - 4.80 K/uL Final   • Monos (Absolute) 03/06/2017 0.47  0.00 - 0.85 K/uL Final   • Eos (Absolute) 03/06/2017 0.01  0.00 - 0.51 K/uL Final   • Baso (Absolute) 03/06/2017 0.01  0.00 - 0.12 K/uL Final    • Immature Granulocytes (abs) 03/06/2017 0.11  0.00 - 0.11 K/uL Final   • NRBC (Absolute) 03/06/2017 0.02   Final   • Sodium 03/06/2017 136  135 - 145 mmol/L Final   • Potassium 03/06/2017 3.8  3.6 - 5.5 mmol/L Final   • Chloride 03/06/2017 107  96 - 112 mmol/L Final   • Co2 03/06/2017 22  20 - 33 mmol/L Final   • Glucose 03/06/2017 129* 65 - 99 mg/dL Final   • Bun 03/06/2017 18  8 - 22 mg/dL Final   • Creatinine 03/06/2017 0.61  0.50 - 1.40 mg/dL Final   • Calcium 03/06/2017 8.9  8.5 - 10.5 mg/dL Final   • Anion Gap 03/06/2017 7.0  0.0 - 11.9 Final   • GFR If  03/06/2017 >60  >60 mL/min/1.73 m 2 Final   • GFR If Non  03/06/2017 >60  >60 mL/min/1.73 m 2 Final        Assessment/Plan:     1. Ampullary carcinoma (CMS-HCC)       Plan  1. Patient is scheduled for cycle 2, day 8 of single agent Gemzar today. I reviewed her CBC and BMP and okay to proceed with treatment as planned.    2. Hypokalemia - patient has been intermittent with hypokalemia requiring some potassium replacement. Repeated a BMP today and her potassium is at 3.8. Spoke with patient and son and she does not need to be taking at home potassium at this time and we will continue to monitor her potassium at next week's labs.    3. Patient and son questioning if she is going to be having a repeat CT scan. Last CT scan was approximately 3 months ago. I discussed with the patient and her son and stated that we would talk with Dr. Krause to see if she wanted to have a repeat CT scan and I would call them back once I'm able to get an answer. Both patient and son were in agreement with the plan.    4. Patient's to follow up in one week prior to day 15 of her second cycle of treatment, or sooner if needed.

## 2017-03-06 NOTE — MR AVS SNAPSHOT
Elin Mora Virgilio   3/6/2017 1:30 PM   Appointment   MRN: [id number]    Department:  [facility]   Dept Phone:  [phone]    Description:  Female : 1945   Provider:  ONC RN 2           Allergies as of 3/6/2017     No Known Allergies      Vital Signs     Smoking Status                   Never Smoker            Basic Information     Date Of Birth Sex Race Ethnicity Preferred Language    1945 Female White Non- English      Your appointments     Mar 06, 2017  2:00 PM   ONCOLOGY EST PATIENT 30 MIN with GREG Gomez.P.NCecil   [facility] (--)    [address]  [facility] [id number] [phone]            Mar 06, 2017  3:00 PM   Est Chemo 1 with RN 6   [facility] ([facility] [place])    [address]  [facility] [address] [phone]            Mar 13, 2017  1:00 PM   Non Provider 1 with ONC RN 1   [facility] (--)    [address], [facility] [address]  [facility] [address] [phone]           You will be receiving a confirmation call a few days before your appointment from our automated call confirmation system.            Mar 13, 2017  2:00 PM   ONCOLOGY EST PATIENT 30 MIN with GREG Gomez.P.NCecil   [facility] (--)    [address], [address]  [facility] [address] [phone]            Mar 13, 2017  3:00 PM   Est Chemo 1 with RN 3   [facility] ([facility] [place])    [address] [address]  [facility] [address] [phone]              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   8/15/2016 - Present    Cholecystitis with cholelithiasis K80.10   8/15/2016 - Present    Obstructive jaundice due to malignant neoplasm K83.8   8/15/2016 - Present    Leukocytosis D72.829   10/21/2016 - Present    Sepsis (CMS-HCC) A41.9   10/21/2016 - Present    Metabolic acidosis E87.2   10/22/2016 - Present      Health Maintenance        Date Due Completion Dates    IMM  DTaP/Tdap/Td Vaccine (1 - Tdap) 6/19/1964 ---    PAP SMEAR 6/19/1966 ---    COLONOSCOPY 6/19/1995 ---    IMM ZOSTER VACCINE 6/19/2005 ---    IMM PNEUMOCOCCAL 65+ (ADULT) LOW/MEDIUM RISK SERIES (2 of 2 - PPSV23) 10/15/2014 10/15/2013    MAMMOGRAM 1/14/2016 1/14/2015, 1/14/2015, 1/14/2015    IMM INFLUENZA (1) 9/1/2016 10/15/2015    BONE DENSITY 9/26/2019 9/26/2014            Current Immunizations     13-VALENT PCV PREVNAR 10/15/2013    Influenza Vaccine Adult HD 10/15/2015      Below and/or attached are the medications your provider expects you to take. Review all of your home medications and newly ordered medications with your provider and/or pharmacist. Follow medication instructions as directed by your provider and/or pharmacist. Please keep your medication list with you and share with your provider. Update the information when medications are discontinued, doses are changed, or new medications (including over-the-counter products) are added; and carry medication information at all times in the event of emergency situations     Allergies:  No Known Allergies          Medications  Valid as of: March 06, 2017 -  1:58 PM    Generic Name Brand Name Tablet Size Instructions for use    Aspirin (Tablet Delayed Response) ECOTRIN 325 MG Take 325 mg by mouth every morning.        Famotidine (Tab) PEPCID 20 MG Take 1 Tab by mouth 2 times a day.        Losartan Potassium-HCTZ (Tab) HYZAAR 100-25 MG Take 1 Tab by mouth every morning. Indications: High Blood Pressure        Megestrol Acetate (Suspension) MEGACE 400 MG/10ML Take 20 mL by mouth every day.        Misc Natural Products (Powder) FIBER 7  Take  by mouth.        Ondansetron HCl (Tab) ZOFRAN 4 MG Take 1 Tab by mouth every four hours as needed for Nausea/Vomiting.        Prochlorperazine Maleate (Tab) COMPAZINE 10 MG Take 10 mg by mouth every 6 hours as needed for Nausea/Vomiting (alternating Q3H with Zofran).        RaNITidine HCl (Tab) ZANTAC 150 MG Take 1 Tab by mouth  2 times a day.        Sennosides-Docusate Sodium (Tab) SENOKOT-S 8.6-50 MG Take 1 Tab by mouth 2 times a day.        .                 Medicines prescribed today were sent to:     [facility]ARMACY #[id number] - [place], [place] - [address]    [address] [address]    Phone: [phone] Fax: [phone]    Open 24 Hours?:     [facility] - [place], [place] - [address]    [address] [address]    Phone: [phone] Fax: [phone]    Open 24 Hours?: No      Medication refill instructions:       If your prescription bottle indicates you have medication refills left, it is not necessary to call your provider’s office. Please contact your pharmacy and they will refill your medication.    If your prescription bottle indicates you do not have any refills left, you may request refills at any time through one of the following ways: The online [employer] system (except Urgent Care), by calling your provider’s office, or by asking your pharmacy to contact your provider’s office with a refill request. Medication refills are processed only during regular business hours and may not be available until the next business day. Your provider may request additional information or to have a follow-up visit with you prior to refilling your medication.   *Please Note: Medication refills are assigned a new Rx number when refilled electronically. Your pharmacy may indicate that no refills were authorized even though a new prescription for the same medication is available at the pharmacy. Please request the medicine by name with the pharmacy before contacting your provider for a refill.           [employer] Access Code: Activation code not generated  Current [employer] Status: Active

## 2017-03-06 NOTE — PROGRESS NOTES
"Pharmacy chemotherapy verification:    Patient Name: Elin Poole  Dx: pancreatic cancer (ampullary adenocarcinoma)    Protocol: gemcitabine (Gemzar)   Gemcitabine 1000mg/m2 IV over 30 min on Days 1, 8, and 15  Q28 days until DP/UT  NCCN Guidelines for Pancreatic Cancer V.2.2016  Tasia VELAZQUEZ, et al - J Clin Oncol. 1997 Jun;15(6):240-13.  Alexanderptlouisa JACQUELINE, et al - ALEXIS. 2010 Sep 8;304(10):1074-60.    Allergies:  Review of patient's allergies indicates no known allergies.     /51 mmHg  Pulse 86  Temp(Src) 36.9 °C (98.4 °F)  Resp 18  Ht 1.518 m (4' 11.76\")  Wt 57.4 kg (126 lb 8.7 oz)  BMI 24.91 kg/m2  SpO2 98% Body surface area is 1.56 meters squared.     Labs 3/6/17:   ANC~ 2310 Plt = 156k   Hgb = 11.8   SCr = 0.61 mg/dL CrCl ~ 65 mL/min (min Scr 0.7)   2/27/17:  LFT's = WNL T bili = 0.3    Drug Order   (Drug name, dose, route, IV Fluid & volume, frequency, number of doses) Cycle: 2 Day 8   Previous treatment: C2D1 = 2/27/17      Medication = Gemcitabine   Base Dose= 1000 mg/m2  Calc Dose: Base Dose x 1.56 m2 = 1560 mg  Final Dose = 1562 mg  Route = IV  Fluid & Volume =  mL  Admin Duration = Over 30 min          <5% difference, ok to treat with final dose       By my signature below, I confirm this process was performed independently with the BSA and all final chemotherapy dosing calculations congruent. I have reviewed the above chemotherapy order and that my calculation of the final dose and BSA (when applicable) corroborate those calculations of the  pharmacist. Discrepancies of 5% or greater in the written dose have been addressed and documented within the [facility] Progress notes.    Sapphire SainiD.       "

## 2017-03-06 NOTE — PROGRESS NOTES
"Pharmacy Chemotherapy calculation:    Patient Name: Elin Poole  DX: Metastatic Pancreatic Cancer (poorly differentiated adenocarcinoma)    Cycle 2, Day 8 Previous treatment = 2/27/17  Protocol: Gemzar   Gemcitabine 1000mg/m2 IV over 30 min on Days 1, 8, and 15  q28 days until DP/UT  NCCN Guidelines for Pancreatic Cancer V.2.2016  Tasia VELAZQUEZ, et al - J Clin Oncol. 1997 Jun;15(6):2403-13.  Neoptlouisa JACQUELINE, et al - ALEXIS. 2010 Sep 8;304(10):1073-81.     /51 mmHg  Pulse 86  Temp(Src) 36.9 °C (98.4 °F)  Resp 18  Ht 1.518 m (4' 11.76\")  Wt 57.4 kg (126 lb 8.7 oz)  BMI 24.91 kg/m2  SpO2 98% Body surface area is 1.56 meters squared.     ANC~2310 Plt = 156k   Hgb = 11.8  SCr = 0.61 mg/dL CrCl ~66 mL/min  (min Cr 0.7)    2/27/17: LFT's = WNL TBili = 0.3     Gemcitabine 1000mg/m2 IV x 1.56 m2 = 1560 mg    <5% difference, ok to treat with final dose = 1562 mg IV    Keaton Salazar, PharmD    "

## 2017-03-06 NOTE — MR AVS SNAPSHOT
"        Elintito Poole   3/6/2017 2:00 PM   Office Visit   MRN: [id number]    Department:  [facility]   Dept Phone:  [phone]    Description:  Female : 1945   Provider:  FERNANDO GomezP.N.           Reason for Visit     Follow-Up           Allergies as of 3/6/2017     No Known Allergies      You were diagnosed with     Ampullary carcinoma (CMS-HCC)   [[id number]]         Vital Signs     Blood Pressure Pulse Temperature Respirations Height Weight    118/66 mmHg 92 36.7 °C (98.1 °F) (Temporal) 18 1.518 m (4' 11.76\") 56 kg (123 lb 7.3 oz)    Body Mass Index Oxygen Saturation Breastfeeding? Smoking Status          24.30 kg/m2 100% No Never Smoker         Basic Information     Date Of Birth Sex Race Ethnicity Preferred Language    1945 Female White Non- English      Your appointments     Mar 13, 2017  1:00 PM   Non Provider 1 with ONC RN 1   [facility] (--)    [address], [facility] [address]  [facility] [address] [phone]           You will be receiving a confirmation call a few days before your appointment from our automated call confirmation system.            Mar 13, 2017  2:00 PM   ONCOLOGY EST PATIENT 30 MIN with GREG Gomez.P.NCecil   [facility] (--)    [address], [facility] [address]  [facility] [address] [phone]            Mar 13, 2017  3:00 PM   Est Chemo 1 with RN 3   [facility] ([facility])    [address] [facility] [address]  [facility] [id number] [phone]              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   8/15/2016 - Present    Cholecystitis with cholelithiasis K80.10   8/15/2016 - Present    Obstructive jaundice due to malignant neoplasm K83.8   8/15/2016 - Present    Leukocytosis D72.829   10/21/2016 - Present    Sepsis (CMS-HCC) A41.9   10/21/2016 - Present    Metabolic acidosis E87.2   10/22/2016 - Present      Health Maintenance        Date Due Completion Dates    IMM " DTaP/Tdap/Td Vaccine (1 - Tdap) 6/19/1964 ---    PAP SMEAR 6/19/1966 ---    COLONOSCOPY 6/19/1995 ---    IMM ZOSTER VACCINE 6/19/2005 ---    IMM PNEUMOCOCCAL 65+ (ADULT) LOW/MEDIUM RISK SERIES (2 of 2 - PPSV23) 10/15/2014 10/15/2013    MAMMOGRAM 1/14/2016 1/14/2015, 1/14/2015, 1/14/2015    IMM INFLUENZA (1) 9/1/2016 10/15/2015    BONE DENSITY 9/26/2019 9/26/2014            Current Immunizations     13-VALENT PCV PREVNAR 10/15/2013    Influenza Vaccine Adult HD 10/15/2015      Below and/or attached are the medications your provider expects you to take. Review all of your home medications and newly ordered medications with your provider and/or pharmacist. Follow medication instructions as directed by your provider and/or pharmacist. Please keep your medication list with you and share with your provider. Update the information when medications are discontinued, doses are changed, or new medications (including over-the-counter products) are added; and carry medication information at all times in the event of emergency situations     Allergies:  No Known Allergies          Medications  Valid as of: March 06, 2017 -  3:09 PM    Generic Name Brand Name Tablet Size Instructions for use    Aspirin (Tablet Delayed Response) ECOTRIN 325 MG Take 325 mg by mouth every morning.        Famotidine (Tab) PEPCID 20 MG Take 1 Tab by mouth 2 times a day.        Losartan Potassium-HCTZ (Tab) HYZAAR 100-25 MG Take 1 Tab by mouth every morning. Indications: High Blood Pressure        Megestrol Acetate (Suspension) MEGACE 400 MG/10ML Take 20 mL by mouth every day.        Misc Natural Products (Powder) FIBER 7  Take  by mouth.        Ondansetron HCl (Tab) ZOFRAN 4 MG Take 1 Tab by mouth every four hours as needed for Nausea/Vomiting.        Prochlorperazine Maleate (Tab) COMPAZINE 10 MG Take 10 mg by mouth every 6 hours as needed for Nausea/Vomiting (alternating Q3H with Zofran).        RaNITidine HCl (Tab) ZANTAC 150 MG Take 1 Tab by mouth  2 times a day.        Sennosides-Docusate Sodium (Tab) SENOKOT-S 8.6-50 MG Take 1 Tab by mouth 2 times a day.        .                 Medicines prescribed today were sent to:     [facility]/PHARMACY #[id number] - [place], [place] - [address]    [address] [address]    Phone: [phone] Fax: [phone]    Open 24 Hours?:     [facility] - [place], [place] - [address]    [address] [address]    Phone: [phone] Fax: [phone]    Open 24 Hours?: No      Medication refill instructions:       If your prescription bottle indicates you have medication refills left, it is not necessary to call your provider’s office. Please contact your pharmacy and they will refill your medication.    If your prescription bottle indicates you do not have any refills left, you may request refills at any time through one of the following ways: The online [employer] system (except Urgent Care), by calling your provider’s office, or by asking your pharmacy to contact your provider’s office with a refill request. Medication refills are processed only during regular business hours and may not be available until the next business day. Your provider may request additional information or to have a follow-up visit with you prior to refilling your medication.   *Please Note: Medication refills are assigned a new Rx number when refilled electronically. Your pharmacy may indicate that no refills were authorized even though a new prescription for the same medication is available at the pharmacy. Please request the medicine by name with the pharmacy before contacting your provider for a refill.           [employer] Access Code: Activation code not generated  Current [employer] Status: Active

## 2017-03-06 NOTE — NON-PROVIDER
Patient arrived ambulatory with her four wheel walker accompanied by her son.  Pt seen by RN today for port access/lab draw prior to seeing ELISE Mancilla.  Plan of care discussed and pt in agreement.  Port accessed using sterile technique with brisk blood return.  CBC w/diff and CMP drawn per written treatment plan orders by Dr. Krause. Port flushed with 20mls NS. Port remains accessed for chemotherapy today at 3pm.

## 2017-03-06 NOTE — PROGRESS NOTES
Chemotherapy Verification - PRIMARY RN      Height = 59.76 inches  Weight = 57.4 kg  BSA = 1.552 m2       Medication: Gemzar  Dose: 1000 mg/m2  Calculated Dose: 1552 mg                             (In mg/m2, AUC, mg/kg)     I confirm this process was performed independently with the BSA and all final chemotherapy dosing calculations congruent.  Any discrepancies of 5% or greater have been addressed with the chemotherapy pharmacist. The resolution of the discrepancy has been documented in the EPIC progress notes.

## 2017-03-07 NOTE — PROGRESS NOTES
Pt to infusion services via wheelchair and accompanied by son.  Pt here for day 8, cycle 2 of Gemzar.  Plan of care reviewed.  Pt verbalizes understanding.  Pt reports + fatigue, otherwise no complaints or concerns offered.  Pt tells me she had labs done and was seen by APRN prior to appointment today.  Lab results reviewed and within parameters established by MD for treatment today.  Pt with right chest port previously accessed from lab draw in MD office earlier today.  Port flushed with normal saline.  Port flushes easily; + blood return verified.  Pre medication administered per MD orders.  Gemzar administered per MD orders.  Gemzar infusing at this time.  Pt resting in chair.  Call light hand.  Son at chairside.

## 2017-03-07 NOTE — PROGRESS NOTES
Gemzar infusion completed without incident.  Line flushed clear with normal saline.  Port flushed with normal saline; continued + blood return verified.  Heparin instilled.  Port de-accessed; needle intact.  Pressure dressing applied.  Pt released to self care and care of son in no apparent distress after completion of treatment, via wheelchair for transport.  Pt to return in one week; next appointment scheduled.

## 2017-03-07 NOTE — PROGRESS NOTES
Chemotherapy Verification - SECONDARY RN       Height = 151.8 cm  Weight = 57.4 kg  BSA = 1.555 m2       Medication: Gemzar  Dose: 1000 mg/m2  Calculated Dose: 1555.7 mg                             (In mg/m2, AUC, mg/kg)       I confirm that this process was performed independently.

## 2017-03-08 DIAGNOSIS — C24.1 AMPULLARY CARCINOMA (HCC): ICD-10-CM

## 2017-03-08 NOTE — PROGRESS NOTES
Discussed with Dr. Krause and she would like to proceed with a 3 month follow-up CT scan. This has been ordered and will schedule patient had a CT scan after the completion of this cycle of chemotherapy.

## 2017-03-10 DIAGNOSIS — C24.1 AMPULLARY CARCINOMA (HCC): ICD-10-CM

## 2017-03-13 ENCOUNTER — NON-PROVIDER VISIT (OUTPATIENT)
Dept: HEMATOLOGY ONCOLOGY | Facility: MEDICAL CENTER | Age: 72
End: 2017-03-13
Payer: MEDICARE

## 2017-03-13 ENCOUNTER — HOSPITAL ENCOUNTER (OUTPATIENT)
Facility: MEDICAL CENTER | Age: 72
End: 2017-03-13
Attending: NURSE PRACTITIONER
Payer: MEDICARE

## 2017-03-13 ENCOUNTER — OUTPATIENT INFUSION SERVICES (OUTPATIENT)
Dept: ONCOLOGY | Facility: MEDICAL CENTER | Age: 72
End: 2017-03-13
Attending: INTERNAL MEDICINE
Payer: MEDICARE

## 2017-03-13 ENCOUNTER — OFFICE VISIT (OUTPATIENT)
Dept: HEMATOLOGY ONCOLOGY | Facility: MEDICAL CENTER | Age: 72
End: 2017-03-13
Payer: MEDICARE

## 2017-03-13 VITALS
WEIGHT: 128.31 LBS | DIASTOLIC BLOOD PRESSURE: 59 MMHG | BODY MASS INDEX: 25.19 KG/M2 | TEMPERATURE: 98 F | HEIGHT: 60 IN | HEART RATE: 98 BPM | SYSTOLIC BLOOD PRESSURE: 113 MMHG | OXYGEN SATURATION: 97 % | RESPIRATION RATE: 18 BRPM

## 2017-03-13 VITALS
SYSTOLIC BLOOD PRESSURE: 106 MMHG | RESPIRATION RATE: 16 BRPM | OXYGEN SATURATION: 100 % | TEMPERATURE: 98.8 F | HEART RATE: 84 BPM | DIASTOLIC BLOOD PRESSURE: 62 MMHG | BODY MASS INDEX: 25.24 KG/M2 | WEIGHT: 128.2 LBS

## 2017-03-13 DIAGNOSIS — C24.1 AMPULLARY CARCINOMA (HCC): ICD-10-CM

## 2017-03-13 LAB
ANION GAP SERPL CALC-SCNC: 8 MMOL/L (ref 0–11.9)
BASOPHILS # BLD AUTO: 0.01 K/UL (ref 0–0.12)
BASOPHILS NFR BLD AUTO: 0.2 % (ref 0–1.8)
BUN SERPL-MCNC: 13 MG/DL (ref 8–22)
CALCIUM SERPL-MCNC: 8.3 MG/DL (ref 8.5–10.5)
CHLORIDE SERPL-SCNC: 105 MMOL/L (ref 96–112)
CO2 SERPL-SCNC: 19 MMOL/L (ref 20–33)
CREAT SERPL-MCNC: 0.64 MG/DL (ref 0.5–1.4)
EOSINOPHIL # BLD: 0 K/UL (ref 0–0.51)
EOSINOPHIL NFR BLD AUTO: 0 % (ref 0–6.9)
ERYTHROCYTE [DISTWIDTH] IN BLOOD BY AUTOMATED COUNT: 52.3 FL (ref 35.9–50)
GLUCOSE SERPL-MCNC: 140 MG/DL (ref 65–99)
HCT VFR BLD AUTO: 31.2 % (ref 37–47)
HGB BLD-MCNC: 10.5 G/DL (ref 12–16)
IMM GRANULOCYTES # BLD AUTO: 0.2 K/UL (ref 0–0.11)
IMM GRANULOCYTES NFR BLD AUTO: 3.5 % (ref 0–0.9)
LYMPHOCYTES # BLD: 1.37 K/UL (ref 1–4.8)
LYMPHOCYTES NFR BLD AUTO: 24 % (ref 22–41)
MCH RBC QN AUTO: 32.7 PG (ref 27–33)
MCHC RBC AUTO-ENTMCNC: 33.7 G/DL (ref 33.6–35)
MCV RBC AUTO: 97.2 FL (ref 81.4–97.8)
MONOCYTES # BLD: 0.26 K/UL (ref 0–0.85)
MONOCYTES NFR BLD AUTO: 4.6 % (ref 0–13.4)
NEUTROPHILS # BLD: 3.86 K/UL (ref 2–7.15)
NEUTROPHILS NFR BLD AUTO: 67.7 % (ref 44–72)
NRBC # BLD AUTO: 0.03 K/UL
NRBC BLD-RTO: 0.5 /100 WBC
PLATELET # BLD AUTO: 134 K/UL (ref 164–446)
PMV BLD AUTO: 9.3 FL (ref 9–12.9)
POTASSIUM SERPL-SCNC: 3.3 MMOL/L (ref 3.6–5.5)
RBC # BLD AUTO: 3.21 M/UL (ref 4.2–5.4)
SODIUM SERPL-SCNC: 132 MMOL/L (ref 135–145)
WBC # BLD AUTO: 5.7 K/UL (ref 4.8–10.8)

## 2017-03-13 PROCEDURE — 3014F SCREEN MAMMO DOC REV: CPT | Performed by: NURSE PRACTITIONER

## 2017-03-13 PROCEDURE — 1036F TOBACCO NON-USER: CPT | Performed by: NURSE PRACTITIONER

## 2017-03-13 PROCEDURE — 700111 HCHG RX REV CODE 636 W/ 250 OVERRIDE (IP)

## 2017-03-13 PROCEDURE — 700111 HCHG RX REV CODE 636 W/ 250 OVERRIDE (IP): Mod: JW

## 2017-03-13 PROCEDURE — 99213 OFFICE O/P EST LOW 20 MIN: CPT | Performed by: NURSE PRACTITIONER

## 2017-03-13 PROCEDURE — 700105 HCHG RX REV CODE 258: Performed by: NURSE PRACTITIONER

## 2017-03-13 PROCEDURE — 36591 DRAW BLOOD OFF VENOUS DEVICE: CPT | Performed by: NURSE PRACTITIONER

## 2017-03-13 PROCEDURE — 700105 HCHG RX REV CODE 258

## 2017-03-13 PROCEDURE — 1101F PT FALLS ASSESS-DOCD LE1/YR: CPT | Performed by: NURSE PRACTITIONER

## 2017-03-13 PROCEDURE — 96375 TX/PRO/DX INJ NEW DRUG ADDON: CPT

## 2017-03-13 PROCEDURE — 3017F COLORECTAL CA SCREEN DOC REV: CPT | Performed by: NURSE PRACTITIONER

## 2017-03-13 PROCEDURE — 4040F PNEUMOC VAC/ADMIN/RCVD: CPT | Performed by: NURSE PRACTITIONER

## 2017-03-13 PROCEDURE — G8432 DEP SCR NOT DOC, RNG: HCPCS | Performed by: NURSE PRACTITIONER

## 2017-03-13 PROCEDURE — 700111 HCHG RX REV CODE 636 W/ 250 OVERRIDE (IP): Performed by: NURSE PRACTITIONER

## 2017-03-13 PROCEDURE — 700102 HCHG RX REV CODE 250 W/ 637 OVERRIDE(OP): Performed by: INTERNAL MEDICINE

## 2017-03-13 PROCEDURE — 96413 CHEMO IV INFUSION 1 HR: CPT

## 2017-03-13 PROCEDURE — G8420 CALC BMI NORM PARAMETERS: HCPCS | Performed by: NURSE PRACTITIONER

## 2017-03-13 PROCEDURE — A9270 NON-COVERED ITEM OR SERVICE: HCPCS | Performed by: INTERNAL MEDICINE

## 2017-03-13 PROCEDURE — G8484 FLU IMMUNIZE NO ADMIN: HCPCS | Performed by: NURSE PRACTITIONER

## 2017-03-13 RX ORDER — ONDANSETRON 2 MG/ML
8 INJECTION INTRAMUSCULAR; INTRAVENOUS ONCE
Status: COMPLETED | OUTPATIENT
Start: 2017-03-13 | End: 2017-03-13

## 2017-03-13 RX ORDER — POTASSIUM CHLORIDE 20 MEQ/1
40 TABLET, EXTENDED RELEASE ORAL ONCE
Status: COMPLETED | OUTPATIENT
Start: 2017-03-13 | End: 2017-03-13

## 2017-03-13 RX ORDER — SODIUM CHLORIDE 9 MG/ML
INJECTION, SOLUTION INTRAVENOUS CONTINUOUS
Status: DISCONTINUED | OUTPATIENT
Start: 2017-03-13 | End: 2017-03-13 | Stop reason: HOSPADM

## 2017-03-13 RX ADMIN — HEPARIN 500 UNITS: 100 SYRINGE at 17:03

## 2017-03-13 RX ADMIN — ONDANSETRON 8 MG: 2 INJECTION, SOLUTION INTRAMUSCULAR; INTRAVENOUS at 15:28

## 2017-03-13 RX ADMIN — GEMCITABINE 1569 MG: 1 INJECTION, POWDER, LYOPHILIZED, FOR SOLUTION INTRAVENOUS at 16:13

## 2017-03-13 RX ADMIN — POTASSIUM CHLORIDE 40 MEQ: 1500 TABLET, EXTENDED RELEASE ORAL at 15:27

## 2017-03-13 RX ADMIN — SODIUM CHLORIDE: 9 INJECTION, SOLUTION INTRAVENOUS at 15:27

## 2017-03-13 ASSESSMENT — PAIN SCALES - GENERAL
PAINLEVEL: NO PAIN
PAINLEVEL: NO PAIN

## 2017-03-13 ASSESSMENT — ENCOUNTER SYMPTOMS
ABDOMINAL PAIN: 0
PALPITATIONS: 0
VOMITING: 0
FEVER: 0
HEADACHES: 0
WEIGHT LOSS: 0
COUGH: 0
TINGLING: 0
CHILLS: 0
MYALGIAS: 0
NAUSEA: 0
CONSTIPATION: 0
WHEEZING: 0
DIZZINESS: 0
DIARRHEA: 0
SHORTNESS OF BREATH: 0

## 2017-03-13 NOTE — MR AVS SNAPSHOT
Elin Mora Virgilio   3/13/2017 1:00 PM   Appointment   MRN: [id number]    Department:  [facility]   Dept Phone:  [phone]    Description:  Female : 1945   Provider:  ONC RN 1           Allergies as of 3/13/2017     No Known Allergies      Vital Signs     Smoking Status                   Never Smoker            Basic Information     Date Of Birth Sex Race Ethnicity Preferred Language    1945 Female White Non- English      Your appointments     Mar 13, 2017  2:00 PM   ONCOLOGY EST PATIENT 30 MIN with ALVA Gomez   [facility] (--)    [address], [address]  [facility] [address]   [phone]            Mar 13, 2017  3:00 PM   Est Chemo 1 with RN 3   [facility] ([facility])    [address]  [place] [address]   [phone]            Mar 20, 2017  3:00 PM   CT BODY WITH with [facility] 1   [facility] [place] ([place])    [address]  [place] [id number] [phone]           Taking medications as regularly scheduled is strongly encouraged.  *For Abdominal CT-Patient needs to  oral contrast and instruction from the department at least 2 hours prior to exam. Patient may  contrast at any imaging facility.            Mar 24, 2017  1:40 PM   ONCOLOGY EST PATIENT 30 MIN with Talya Krause M.D.   [facility] (--)    [address], [address]  [facility] [address]   [phone]            Mar 27, 2017  1:30 PM   Non Provider 1 with ONC RN 1   [facility] (--)    [address], [address]  [facility] [address]   [phone]           You will be receiving a confirmation call a few days before your appointment from our automated call confirmation system.            Mar 27, 2017  2:30 PM   Est Chemo 1 with RN 5   [facility] ([facility])    [address]  [place] [address]   [phone]            2017  1:00 PM   Non Provider 1 with ONC RN 1   [facility] (--)     [address] [facility] [address]  [facility] [address] [phone]           You will be receiving a confirmation call a few days before your appointment from our automated call confirmation system.            Apr 03, 2017  2:00 PM   ONCOLOGY EST PATIENT 30 MIN with ALVA Gomez   [facility] (--)    [address], [facility] [address]  [facility] [address]   [phone]            Apr 03, 2017  3:00 PM   Est Chemo 1 with RN 4   [facility] ([facility]    [address]  [facility] [address]   [phone]            Apr 10, 2017  1:00 PM   Non Provider 1 with ONC RN 1   [facility] (--)    [address], [facility] [address]  [facility] [address]   [phone]           You will be receiving a confirmation call a few days before your appointment from our automated call confirmation system.            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   8/15/2016 - Present    Cholecystitis with cholelithiasis K80.10   8/15/2016 - Present    Obstructive jaundice due to malignant neoplasm K83.8   8/15/2016 - Present    Leukocytosis D72.829   10/21/2016 - Present    Sepsis (CMS-HCC) A41.9   10/21/2016 - Present    Metabolic acidosis E87.2   10/22/2016 - Present      Health Maintenance        Date Due Completion Dates    IMM DTaP/Tdap/Td Vaccine (1 - Tdap) 6/19/1964 ---    PAP SMEAR 6/19/1966 ---    COLONOSCOPY 6/19/1995 ---    IMM ZOSTER VACCINE 6/19/2005 ---    IMM PNEUMOCOCCAL 65+ (ADULT) LOW/MEDIUM RISK SERIES (2 of 2 - PPSV23) 10/15/2014 10/15/2013    MAMMOGRAM 1/14/2016 1/14/2015, 1/14/2015, 1/14/2015     IMM INFLUENZA (1) 9/1/2016 10/15/2015    BONE DENSITY 9/26/2019 9/26/2014            Current Immunizations     13-VALENT PCV PREVNAR 10/15/2013    Influenza Vaccine Adult HD 10/15/2015      Below and/or attached are the medications your provider expects you to take. Review all of your home medications and newly ordered medications with your provider and/or pharmacist. Follow medication instructions as directed by your provider and/or pharmacist. Please keep your medication list with you and share with your provider. Update the information when medications are discontinued, doses are changed, or new medications (including over-the-counter products) are added; and carry medication information at all times in the event of emergency situations     Allergies:  No Known Allergies          Medications  Valid as of: March 13, 2017 -  1:25 PM    Generic Name Brand Name Tablet Size Instructions for use    Aspirin (Tablet Delayed Response) ECOTRIN 325 MG Take 325 mg by mouth every morning.        Famotidine (Tab) PEPCID 20 MG Take 1 Tab by mouth 2 times a day.        Losartan Potassium-HCTZ (Tab) HYZAAR 100-25 MG Take 1 Tab by mouth every morning. Indications: High Blood Pressure        Megestrol Acetate (Suspension) MEGACE 400 MG/10ML Take 20 mL by mouth every day.        Misc Natural Products (Powder) FIBER 7  Take  by mouth.        Ondansetron HCl (Tab) ZOFRAN 4 MG Take 1 Tab by mouth every four hours as needed for Nausea/Vomiting.        Prochlorperazine Maleate (Tab) COMPAZINE 10 MG Take 10 mg by mouth every 6 hours as needed for Nausea/Vomiting (alternating Q3H with Zofran).        RaNITidine HCl (Tab) ZANTAC 150 MG Take 1 Tab by mouth 2 times a day.        Sennosides-Docusate Sodium (Tab) SENOKOT-S 8.6-50 MG Take 1 Tab by mouth 2 times a day.        .                 Medicines prescribed today were sent to:     [facility][id number] - [place], [place] - [address]    [address] [place] [address]     Phone: [phone] Fax: [phone]    Open 24 Hours?: No    [facility] - [place], [place] - [address]    [address] [address]    Phone: [phone] Fax: [phone]    Open 24 Hours?: No      Medication refill instructions:       If your prescription bottle indicates you have medication refills left, it is not necessary to call your provider’s office. Please contact your pharmacy and they will refill your medication.    If your prescription bottle indicates you do not have any refills left, you may request refills at any time through one of the following ways: The online GetMaid system (except Urgent Care), by calling your provider’s office, or by asking your pharmacy to contact your provider’s office with a refill request. Medication refills are processed only during regular business hours and may not be available until the next business day. Your provider may request additional information or to have a follow-up visit with you prior to refilling your medication.   *Please Note: Medication refills are assigned a new Rx number when refilled electronically. Your pharmacy may indicate that no refills were authorized even though a new prescription for the same medication is available at the pharmacy. Please request the medicine by name with the pharmacy before contacting your provider for a refill.           GetMaid Access Code: Activation code not generated  Current GetMaid Status: Active

## 2017-03-13 NOTE — PROGRESS NOTES
Subjective:      Elin Poole is a 71 y.o. female who presents for Follow-Up for ampullary carcinoma.         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 She is accompanied today by her son. Patient is scheduled for cycle 2, day 15 of single agent Gemzar. She states she is feeling okay. She does have some fatigue but it is stable. Patient stated yesterday she was in her house dancing and feeling well. She denies any fevers or chills. Her appetite has been good and improved since getting on Megace. Her weight is up approximately 2 pounds since last seen last week. She denies any nausea or vomiting or diarrhea or constipation. Her last normal bowel movement was this morning. She denies any pain. She does have some mild peripheral neuropathy in her lower feet. She has been experiencing some edema in her lower extremities more so on the right than the left. An evaluation of her most recent labs patient with a low albumin level of 2.6 proximally 2 weeks ago which may be the cause of her edema. Patient denies any coughing wheezing or shortness of breath. She denies any chest pain or heart palpitations. She is urinating without difficulty and denies any pain at this time.    No Known Allergies  Current Outpatient Prescriptions on File Prior to Visit   Medication Sig Dispense Refill   • megestrol (MEGACE) 400 MG/10ML Suspension Take 20 mL by mouth every day. 600 mL 1   • famotidine (PEPCID) 20 MG Tab Take 1 Tab by mouth 2 times a day. 60 Tab 3   • ondansetron (ZOFRAN) 4 MG Tab tablet Take 1 Tab by mouth every four hours as needed for Nausea/Vomiting. 30 Tab 3   • aspirin EC (ECOTRIN) 325 MG Tablet Delayed Response Take 325 mg by mouth every morning.     • prochlorperazine (COMPAZINE) 10 MG Tab Take 10 mg by mouth every 6 hours as needed for Nausea/Vomiting (alternating Q3H with Zofran).     • losartan-hydrochlorothiazide (HYZAAR) 100-25 MG per tablet Take 1 Tab by mouth every morning.  Indications: High Blood Pressure     • sennosides-docusate sodium (SENOKOT-S) 8.6-50 MG tablet Take 1 Tab by mouth 2 times a day. 60 Tab 3   • ranitidine (ZANTAC) 150 MG Tab Take 1 Tab by mouth 2 times a day. 60 Tab 3   • Misc Natural Products (FIBER 7) Powder Take  by mouth.       No current facility-administered medications on file prior to visit.       Review of Systems   Constitutional: Positive for malaise/fatigue. Negative for fever, chills and weight loss.   Respiratory: Negative for cough, shortness of breath and wheezing.    Cardiovascular: Positive for leg swelling (right is greater than left of the lower extremity). Negative for chest pain and palpitations.   Gastrointestinal: Negative for nausea, vomiting, abdominal pain, diarrhea and constipation.   Genitourinary: Negative for dysuria.   Musculoskeletal: Negative for myalgias.   Neurological: Negative for dizziness, tingling and headaches.          Objective:     /62 mmHg  Pulse 84  Temp(Src) 37.1 °C (98.8 °F)  Resp 16  Wt 58.151 kg (128 lb 3.2 oz)  SpO2 100%     Physical Exam   Constitutional: She is oriented to person, place, and time. No distress.   HENT:   Head: Normocephalic and atraumatic.   Mouth/Throat: Oropharynx is clear and moist. No oropharyngeal exudate.   Alopecia   Eyes: Conjunctivae and EOM are normal. Pupils are equal, round, and reactive to light. Right eye exhibits no discharge. Left eye exhibits no discharge. No scleral icterus.   Cardiovascular: Normal rate, regular rhythm, normal heart sounds and intact distal pulses.  Exam reveals no gallop and no friction rub.    No murmur heard.  Pulmonary/Chest: Effort normal and breath sounds normal. No respiratory distress. She has no wheezes.   Abdominal: Soft. Bowel sounds are normal. She exhibits no distension. There is no tenderness.   Musculoskeletal: She exhibits edema (Lower extremity edema noted right greater than left.) and tenderness (Bilateral knee pain).   Patient with  increased weakness and pain in her knees. She is in a wheelchair due to significant distance to office. Patient is able to get around and move around in her home with a walker.   Neurological: She is alert and oriented to person, place, and time.   Skin: Skin is warm and dry. No rash noted. She is not diaphoretic. No erythema. No pallor.   Psychiatric: She has a normal mood and affect. Her behavior is normal.   Vitals reviewed.      Hospital Outpatient Visit on 03/13/2017   Component Date Value Ref Range Status   • WBC 03/13/2017 5.7  4.8 - 10.8 K/uL Final   • RBC 03/13/2017 3.21* 4.20 - 5.40 M/uL Final   • Hemoglobin 03/13/2017 10.5* 12.0 - 16.0 g/dL Final   • Hematocrit 03/13/2017 31.2* 37.0 - 47.0 % Final   • MCV 03/13/2017 97.2  81.4 - 97.8 fL Final   • MCH 03/13/2017 32.7  27.0 - 33.0 pg Final   • MCHC 03/13/2017 33.7  33.6 - 35.0 g/dL Final   • RDW 03/13/2017 52.3* 35.9 - 50.0 fL Final   • Platelet Count 03/13/2017 134* 164 - 446 K/uL Final   • MPV 03/13/2017 9.3  9.0 - 12.9 fL Final   • Neutrophils-Polys 03/13/2017 67.70  44.00 - 72.00 % Final   • Lymphocytes 03/13/2017 24.00  22.00 - 41.00 % Final   • Monocytes 03/13/2017 4.60  0.00 - 13.40 % Final   • Eosinophils 03/13/2017 0.00  0.00 - 6.90 % Final   • Basophils 03/13/2017 0.20  0.00 - 1.80 % Final   • Immature Granulocytes 03/13/2017 3.50* 0.00 - 0.90 % Final   • Nucleated RBC 03/13/2017 0.50   Final   • Neutrophils (Absolute) 03/13/2017 3.86  2.00 - 7.15 K/uL Final    Includes immature neutrophils, if present.   • Lymphs (Absolute) 03/13/2017 1.37  1.00 - 4.80 K/uL Final   • Monos (Absolute) 03/13/2017 0.26  0.00 - 0.85 K/uL Final   • Eos (Absolute) 03/13/2017 0.00  0.00 - 0.51 K/uL Final   • Baso (Absolute) 03/13/2017 0.01  0.00 - 0.12 K/uL Final   • Immature Granulocytes (abs) 03/13/2017 0.20* 0.00 - 0.11 K/uL Final   • NRBC (Absolute) 03/13/2017 0.03   Final   • Sodium 03/13/2017 132* 135 - 145 mmol/L Final   • Potassium 03/13/2017 3.3* 3.6 - 5.5  mmol/L Final   • Chloride 03/13/2017 105  96 - 112 mmol/L Final   • Co2 03/13/2017 19* 20 - 33 mmol/L Final   • Glucose 03/13/2017 140* 65 - 99 mg/dL Final   • Bun 03/13/2017 13  8 - 22 mg/dL Final   • Creatinine 03/13/2017 0.64  0.50 - 1.40 mg/dL Final   • Calcium 03/13/2017 8.3* 8.5 - 10.5 mg/dL Final   • Anion Gap 03/13/2017 8.0  0.0 - 11.9 Final   • GFR If  03/13/2017 >60  >60 mL/min/1.73 m 2 Final   • GFR If Non  03/13/2017 >60  >60 mL/min/1.73 m 2 Final          Assessment/Plan:     1. Ampullary carcinoma (CMS-HCC)         Plan  1. Patient is doing very well and feeling okay today. She does have persistent fatigue but it is stable. I reviewed her CBC and BMP and okay to proceed with cycle 2, day 15 of single agent Gemzar today.    2. Request Dr. Krause patient has not had a CT scan done in approximately 3 months. She is scheduled for CT scan of the chest, abdomen, and pelvis next week and is scheduled to follow-up with Dr. Krause to review the results prior to the next cycle of chemotherapy.    3. Patient has had intermittent hypokalemia. Today her potassium is 3.3. We will have the infusion nurse replace her potassium per electrolyte protocol and we will continue to monitor her potassium.    4. Lab results show mild hypocalcemia. Serum calcium is 8.3 but adjusted with her most recent albumin at 2.6 her calcium dissection 9.42 today.    5. Patient is to follow-up in approximately 2 weeks to review CT scan with Dr. Krause or sooner if needed.

## 2017-03-13 NOTE — PROGRESS NOTES
Pt to infusion services via wheelchair and accompanied by son.  Pt here for scheduled chemotherapy, day 15, cycle 2 of Gemzar.  Plan of care reviewed.  Pt verbalizes understanding.  No complaints or concerns offered.  Pt with right chest port previously accessed from lab draw at MD office today.  Lab results reviewed and within parameters established for treatment today, Potassium = 3.3 - will replace per electrolyte replacement protocol.  Port flushed with normal saline; + blood return verified.  KDur 40 mEq PO x one dose administered per MD order for electrolyte replacement per protocol/per MAR.  Pre medication administered per MD orders.

## 2017-03-13 NOTE — PROGRESS NOTES
"Pharmacy chemotherapy verification:    Patient Name: Elin Poole  Dx: pancreatic cancer (ampullary adenocarcinoma)    Protocol: gemcitabine (Gemzar)   Gemcitabine 1000mg/m2 IV over 30 min on Days 1, 8, and 15  Q28 days until DP/UT  NCCN Guidelines for Pancreatic Cancer V.2.2016  Tasia VELAZQUEZ, et al - J Clin Oncol. 1997 Jun;15(6):2403-13.  Neoptolemceli JACQUELINE, et al - ALEXIS. 2010 Sep 8;304(10):1078-10.    Allergies:  Review of patient's allergies indicates no known allergies.     /59 mmHg  Pulse 98  Temp(Src) 36.7 °C (98 °F)  Resp 18  Ht 1.53 m (5' 0.24\")  Wt 58.2 kg (128 lb 4.9 oz)  BMI 24.86 kg/m2  SpO2 97% Body surface area is 1.57 meters squared.     3/13/17:   ANC~3860 Plt = 134k   Hgb = 10.5   SCr = 0.64 mg/dL CrCl ~67 mL/min (min Scr 0.7)   2/27/17:  LFT's = WNL T bili = 0.3    Drug Order   (Drug name, dose, route, IV Fluid & volume, frequency, number of doses) Cycle: 2 Day 15   Previous treatment: C2D8 3/15/17      Medication = Gemcitabine   Base Dose= 1000 mg/m2  Calc Dose: Base Dose x 1.57 m2 = 1570 mg  Final Dose = 1569 mg  Route = IV  Fluid & Volume =  mL  Admin Duration = Over 30 min          <5% difference, ok to treat with final dose       By my signature below, I confirm this process was performed independently with the BSA and all final chemotherapy dosing calculations congruent. I have reviewed the above chemotherapy order and that my calculation of the final dose and BSA (when applicable) corroborate those calculations of the  pharmacist. Discrepancies of 5% or greater in the written dose have been addressed and documented within the [facility] Progress notes.    Janell Karimi, PharmD         "

## 2017-03-13 NOTE — NON-PROVIDER
1:10pm Patient presents today for port access/lab draw prior to prechemo appointment with ELISE Mancilla. Port accessed using sterile technique with brisk blood return verified.  Labs drawn per written treatment plan. Port flushed with 30ml NS.  Port remains accessed for chemotherapy appointment today. Patient tolerated well. Associated dx- ampullary carcinoma.

## 2017-03-13 NOTE — PROGRESS NOTES
Chemotherapy Verification - PRIMARY RN      Height = 60.24 inches  Weight = 58.2 kg  BSA = 1.57 m2       Medication: Gemzar  Dose: 1000 mg/m2  Calculated Dose: 1570 mg                             (In mg/m2, AUC, mg/kg)         I confirm this process was performed independently with the BSA and all final chemotherapy dosing calculations congruent.  Any discrepancies of 5% or greater have been addressed with the chemotherapy pharmacist. The resolution of the discrepancy has been documented in the EPIC progress notes.

## 2017-03-13 NOTE — PROGRESS NOTES
Chemotherapy Verification - SECONDARY RN       Height = 153 cm  Weight = 58.2 kg  BSA = 1.57 m2       Medication: GEmzar  Dose: 1000 mg/m2  Calculated Dose: 1570 mg                             (In mg/m2, AUC, mg/kg)     I confirm that this process was performed independently.

## 2017-03-13 NOTE — PROGRESS NOTES
"Pharmacy Chemotherapy calculation:    Patient Name: Elin Poole  DX: Metastatic Pancreatic Cancer (poorly differentiated adenocarcinoma)    Cycle 2, Day 15   Previous treatment = 3/6/17    Protocol: Gemzar   Gemcitabine 1000mg/m2 IV over 30 min on Days 1, 8, and 15  q28 days until DP/UT  NCCN Guidelines for Pancreatic Cancer V.2.2016  Tasia VELAZQUEZ, et al - J Clin Oncol. 1997 Jun;15(6):2403-13.  Neoptlouisa JACQUELINE, et al - ALEXIS. 2010 Sep 8;304(10):1073-81.     /59 mmHg  Pulse 98  Temp(Src) 36.7 °C (98 °F)  Resp 18  Ht 1.53 m (5' 0.24\")  Wt 58.2 kg (128 lb 4.9 oz)  BMI 24.86 kg/m2  SpO2 97% Body surface area is 1.57 meters squared.     ANC~ 3860 Plt = 134k   Hgb = 10.5     SCr = 0.64mg/dL CrCl ~ 68mL/min (min Scr = 0.7)     2/27/17: LFT's = WNL TBili = 0.3     Gemcitabine 1000mg/m2 IV x 1.57m2 = 1570mg    <5% difference, ok to treat with final dose = 1569mg IV    RODOLFO Balderas, Pharm.D.      "

## 2017-03-13 NOTE — MR AVS SNAPSHOT
Elin Mora Virgilio   3/13/2017 2:00 PM   Office Visit   MRN: [id number]    Department:  [facility]   Dept Phone:  [phone]    Description:  Female : 1945   Provider:  Gabby Galvan ACecilPCHRIS.           Reason for Visit     Follow-Up prechemo      Allergies as of 3/13/2017     No Known Allergies      You were diagnosed with     Ampullary carcinoma (CMS-HCC)   [[id number]]         Vital Signs     Blood Pressure Pulse Temperature Respirations Weight Oxygen Saturation    106/62 mmHg 84 37.1 °C (98.8 °F) 16 58.151 kg (128 lb 3.2 oz) 100%    Smoking Status                   Never Smoker            Basic Information     Date Of Birth Sex Race Ethnicity Preferred Language    1945 Female White Non- English      Your appointments     Mar 13, 2017  3:00 PM   Est Chemo 1 with RN 3   [facility] ([facility])    [address]  [place] [id number] [phone]            Mar 20, 2017  3:00 PM   CT BODY WITH with [facility] CT 1   [facility] [place] [facility]    [address] [facility] [place] [address] [phone]           Taking medications as regularly scheduled is strongly encouraged.  *For Abdominal CT-Patient needs to  oral contrast and instruction from the department at least 2 hours prior to exam. Patient may  contrast at any imaging facility.            Mar 24, 2017  1:40 PM   ONCOLOGY EST PATIENT 30 MIN with Talya Krause M.D.   [facility] (--)    [address], [address]  [facility] [address] [phone]            Mar 27, 2017  1:30 PM   Non Provider 1 with ONC RN 1   [facility] (--)    [address], [address]  [facility] [address] [phone]           You will be receiving a confirmation call a few days before your appointment from our automated call confirmation system.            Mar 27, 2017  2:30 PM   Est Chemo 1 with RN 5   [facility] ([facility])    [address] [facility] [address]  [place] [address]      [phone]            Apr 03, 2017  1:00 PM   Non Provider 1 with ONC RN 1   [facility] (--)    [address], [address]  [place] [address] [phone]           You will be receiving a confirmation call a few days before your appointment from our automated call confirmation system.            Apr 03, 2017  2:00 PM   ONCOLOGY EST PATIENT 30 MIN with GREG Gomez.P.NCecil   [facility] (--)    [address] [facility], [address]  [place] [id number] [phone]            Apr 03, 2017  3:00 PM   Est Chemo 1 with RN 4   [facility] ([facility])    [address] [facility] [address]  [place] [id number] [phone]            Apr 10, 2017  1:00 PM   Non Provider 1 with ONC RN 1   [facility] (--)    [address] [facility], [address]  [place] [address] [phone]           You will be receiving a confirmation call a few days before your appointment from our automated call confirmation system.            Apr 10, 2017  2:00 PM   ONCOLOGY EST PATIENT 30 MIN with GREG Gomez.P.NCecil   [facility] (--)    [address] [facility], [address]  [place] [address] [phone]            Apr 10, 2017  3:00 PM   Est Chemo 1 with RN 3   [facility] ([facility])    [address] [facility] [address]  [place] [address]   [phone]              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   8/15/2016 - Present    Cholecystitis with cholelithiasis K80.10   8/15/2016 - Present    Obstructive jaundice due to malignant neoplasm K83.8   8/15/2016 - Present    Leukocytosis D72.829   10/21/2016 - Present    Sepsis (CMS-HCC) A41.9   10/21/2016 - Present    Metabolic acidosis E87.2   10/22/2016 - Present      Health Maintenance        Date Due Completion Dates    IMM DTaP/Tdap/Td Vaccine (1 - Tdap) 6/19/1964 ---    PAP SMEAR 6/19/1966 ---    COLONOSCOPY 6/19/1995 ---    IMM ZOSTER VACCINE 6/19/2005 ---    IMM PNEUMOCOCCAL 65+ (ADULT) LOW/MEDIUM  RISK SERIES (2 of 2 - PPSV23) 10/15/2014 10/15/2013    MAMMOGRAM 1/14/2016 1/14/2015, 1/14/2015, 1/14/2015    IMM INFLUENZA (1) 9/1/2016 10/15/2015    BONE DENSITY 9/26/2019 9/26/2014            Current Immunizations     13-VALENT PCV PREVNAR 10/15/2013    Influenza Vaccine Adult HD 10/15/2015      Below and/or attached are the medications your provider expects you to take. Review all of your home medications and newly ordered medications with your provider and/or pharmacist. Follow medication instructions as directed by your provider and/or pharmacist. Please keep your medication list with you and share with your provider. Update the information when medications are discontinued, doses are changed, or new medications (including over-the-counter products) are added; and carry medication information at all times in the event of emergency situations     Allergies:  No Known Allergies          Medications  Valid as of: March 13, 2017 -  2:12 PM    Generic Name Brand Name Tablet Size Instructions for use    Aspirin (Tablet Delayed Response) ECOTRIN 325 MG Take 325 mg by mouth every morning.        Famotidine (Tab) PEPCID 20 MG Take 1 Tab by mouth 2 times a day.        Losartan Potassium-HCTZ (Tab) HYZAAR 100-25 MG Take 1 Tab by mouth every morning. Indications: High Blood Pressure        Megestrol Acetate (Suspension) MEGACE 400 MG/10ML Take 20 mL by mouth every day.        Misc Natural Products (Powder) FIBER 7  Take  by mouth.        Ondansetron HCl (Tab) ZOFRAN 4 MG Take 1 Tab by mouth every four hours as needed for Nausea/Vomiting.        Prochlorperazine Maleate (Tab) COMPAZINE 10 MG Take 10 mg by mouth every 6 hours as needed for Nausea/Vomiting (alternating Q3H with Zofran).        RaNITidine HCl (Tab) ZANTAC 150 MG Take 1 Tab by mouth 2 times a day.        Sennosides-Docusate Sodium (Tab) SENOKOT-S 8.6-50 MG Take 1 Tab by mouth 2 times a day.        .                 Medicines prescribed today were sent to:      [id number] - [place], [place] - [address]    [address] [address]    Phone: [phone] Fax: [phone]    Open 24 Hours?: No    [facility] - [place], [place] - [address]    [address] [address]    Phone: [phone] Fax: [phone]    Open 24 Hours?: No      Medication refill instructions:       If your prescription bottle indicates you have medication refills left, it is not necessary to call your provider’s office. Please contact your pharmacy and they will refill your medication.    If your prescription bottle indicates you do not have any refills left, you may request refills at any time through one of the following ways: The online [employer] system (except Urgent Care), by calling your provider’s office, or by asking your pharmacy to contact your provider’s office with a refill request. Medication refills are processed only during regular business hours and may not be available until the next business day. Your provider may request additional information or to have a follow-up visit with you prior to refilling your medication.   *Please Note: Medication refills are assigned a new Rx number when refilled electronically. Your pharmacy may indicate that no refills were authorized even though a new prescription for the same medication is available at the pharmacy. Please request the medicine by name with the pharmacy before contacting your provider for a refill.           [employer] Access Code: Activation code not generated  Current [employer] Status: Active

## 2017-03-14 NOTE — PROGRESS NOTES
Late entry for 1705:  Gemzar administered per MD orders.  Gemzar infusion completed without incident.  Line flushed clear with normal saline.  Port flushed with normal saline; continued + blood return verified.  Port flushed with normal saline and heparin.  Port de-accessed; needle intact.  Pressure dressing applied. Pt released to self care and care of son in no apparent distress after completion of treatment, via wheelchair for transportation.  Pt to return in 2 weeks; next appointments scheduled.

## 2017-03-20 ENCOUNTER — HOSPITAL ENCOUNTER (OUTPATIENT)
Dept: RADIOLOGY | Facility: MEDICAL CENTER | Age: 72
End: 2017-03-20
Attending: NURSE PRACTITIONER
Payer: MEDICARE

## 2017-03-20 ENCOUNTER — TELEPHONE (OUTPATIENT)
Dept: HEMATOLOGY ONCOLOGY | Facility: MEDICAL CENTER | Age: 72
End: 2017-03-20

## 2017-03-20 DIAGNOSIS — C24.1 AMPULLARY CARCINOMA (HCC): ICD-10-CM

## 2017-03-20 DIAGNOSIS — I82.499 DEEP VEIN THROMBOSIS (DVT) OF OTHER VEIN OF LOWER EXTREMITY: ICD-10-CM

## 2017-03-20 PROCEDURE — 71260 CT THORAX DX C+: CPT

## 2017-03-21 NOTE — TELEPHONE ENCOUNTER
Received call from radiology.  On CT, she is found to have new DVT. Spoke to patient's son Bony who stated that she has been having swelling of her left leg. I will order ultrasound of lower extremities. She will also be started on a loading dose of Xarelto. He is also advised to look for signs and symptoms of pulmonary embolus.

## 2017-03-23 ENCOUNTER — HOSPITAL ENCOUNTER (OUTPATIENT)
Dept: RADIOLOGY | Facility: MEDICAL CENTER | Age: 72
End: 2017-03-23
Attending: INTERNAL MEDICINE
Payer: MEDICARE

## 2017-03-23 DIAGNOSIS — C24.1 CANCER OF AMPULLA OF VATER (HCC): ICD-10-CM

## 2017-03-23 PROCEDURE — 93970 EXTREMITY STUDY: CPT

## 2017-03-24 ENCOUNTER — OFFICE VISIT (OUTPATIENT)
Dept: HEMATOLOGY ONCOLOGY | Facility: MEDICAL CENTER | Age: 72
End: 2017-03-24
Payer: MEDICARE

## 2017-03-24 VITALS
RESPIRATION RATE: 14 BRPM | SYSTOLIC BLOOD PRESSURE: 110 MMHG | DIASTOLIC BLOOD PRESSURE: 78 MMHG | HEIGHT: 60 IN | BODY MASS INDEX: 25.97 KG/M2 | HEART RATE: 75 BPM | TEMPERATURE: 98.7 F | WEIGHT: 132.28 LBS | OXYGEN SATURATION: 97 %

## 2017-03-24 DIAGNOSIS — C24.1 AMPULLARY CARCINOMA (HCC): ICD-10-CM

## 2017-03-24 DIAGNOSIS — I82.499 DEEP VEIN THROMBOSIS (DVT) OF OTHER VEIN OF LOWER EXTREMITY: ICD-10-CM

## 2017-03-24 PROCEDURE — 4040F PNEUMOC VAC/ADMIN/RCVD: CPT | Performed by: INTERNAL MEDICINE

## 2017-03-24 PROCEDURE — 1036F TOBACCO NON-USER: CPT | Performed by: INTERNAL MEDICINE

## 2017-03-24 PROCEDURE — G8432 DEP SCR NOT DOC, RNG: HCPCS | Performed by: INTERNAL MEDICINE

## 2017-03-24 PROCEDURE — G8419 CALC BMI OUT NRM PARAM NOF/U: HCPCS | Performed by: INTERNAL MEDICINE

## 2017-03-24 PROCEDURE — 3014F SCREEN MAMMO DOC REV: CPT | Performed by: INTERNAL MEDICINE

## 2017-03-24 PROCEDURE — 1101F PT FALLS ASSESS-DOCD LE1/YR: CPT | Performed by: INTERNAL MEDICINE

## 2017-03-24 PROCEDURE — 99214 OFFICE O/P EST MOD 30 MIN: CPT | Performed by: INTERNAL MEDICINE

## 2017-03-24 PROCEDURE — G8484 FLU IMMUNIZE NO ADMIN: HCPCS | Performed by: INTERNAL MEDICINE

## 2017-03-24 RX ORDER — LOSARTAN POTASSIUM AND HYDROCHLOROTHIAZIDE 12.5; 5 MG/1; MG/1
1 TABLET ORAL DAILY
Qty: 30 TAB | Refills: 3 | Status: SHIPPED | OUTPATIENT
Start: 2017-03-24 | End: 2017-10-11 | Stop reason: SDUPTHER

## 2017-03-24 ASSESSMENT — PAIN SCALES - GENERAL: PAINLEVEL: NO PAIN

## 2017-03-24 NOTE — PROGRESS NOTES
Follow Up Note:  Oncology    Date: 3/24/17  Time: 1:40 pm      Referring Physician: Dr. Kurtis Jacobs   Primary Care:  Wally Knox D.O.  Surgeon: Dr. Kurtis Jacobs     Diagnosis: Metastatic adenocarcinoma of ampulla of vater    Chief compliant: She is here for a follow up visit    History of Presenting Illness:  Elin Poole is a 71 y.o. female with adenocarcinoma of primary ampulla diagnosed in 7/2016.  She was diagnosed due to elevated liver enzymes and acute renal insufficiency.  She is s/p ERCP with stent with good decompression. Biopsy was positive for adenocarcinoma of primary ampulla. She was seen by Dr. Jacobs and underwent an attempted Whipple’s in 8/2016. The surgery was aborted secondary to multiple liver lesions and s/p wedge resection of the liver which was positive for poorly differentiated adenocarcinoma.  PET scan showed uptake in multiple hepatic lesions and uptake in the ampulla. He also had uptake in bilateral hilar area with increased uptake and small pulmonary nodules in the right middle lobe without uptake. She was treated with gemcitabine and Abraxane. She tolerated the treatment fairly well initially. After cycle 3 of chemotherapy she started to have increased fatigue and diarrhea. Repeat imaging showed response to therapy. The diarrhea improve and her regimen was changed to single agent gemcitabine. She received chemotherapy on 1/18/17. Further cycles have been held secondary to increased fatigue.   3/20/17 CT CAP showed thrombus seen in the right external iliac and common femoral vein. Previous foci in the liver are no longer seen and no residual or recurrent mass seen in the surgical bed.  She was started on Eliquis.      Interval history:   She is here for a follow-up visit is accompanied by her son and daughter who was present in the room.  She has had right leg swelling which started 1 week ago and this had started to improve.  She was started  Eliquis 2 days ago.  She continues to have fatigue which has improved.  She has been eating well. She denies any epigastric pain.  She denies diarrhea and constipation.  She denies fevers, chills and night sweats.         Past Medical History   Diagnosis Date   • Jaundice    • Hypertension    • Heart burn    • Indigestion    • Dental disorder      upper partial   • Cancer (CMS-HCC) 2016     Ampullary CA   • Cataract 09-     bilat.,no surgery   • Asthma    • Arthritis      hands/fingers/right knee   • Ampullary carcinoma (CMS-HCC)            Allergies as of 03/24/2017   • (No Known Allergies)         Current outpatient prescriptions:   •  rivaroxaban (XARELTO) 15 MG Tab tablet, Take 1 Tab by mouth 2 Times a Day., Disp: 42 Tab, Rfl: 0  •  rivaroxaban (XARELTO) 15 MG Tab tablet, Take 1 Tab by mouth 2 Times a Day., Disp: 42 Tab, Rfl: 0  •  apixaban (ELIQUIS) 5mg Tab, Take 1 Tab by mouth 2 Times a Day. Take 10 mg (2 pills) BID x 7 days followed by 5 mg BID, Disp: 60 Tab, Rfl: 1  •  megestrol (MEGACE) 400 MG/10ML Suspension, Take 20 mL by mouth every day., Disp: 600 mL, Rfl: 1  •  famotidine (PEPCID) 20 MG Tab, Take 1 Tab by mouth 2 times a day., Disp: 60 Tab, Rfl: 3  •  aspirin EC (ECOTRIN) 325 MG Tablet Delayed Response, Take 325 mg by mouth every morning., Disp: , Rfl:   •  prochlorperazine (COMPAZINE) 10 MG Tab, Take 10 mg by mouth every 6 hours as needed for Nausea/Vomiting (alternating Q3H with Zofran)., Disp: , Rfl:   •  losartan-hydrochlorothiazide (HYZAAR) 100-25 MG per tablet, Take 1 Tab by mouth every morning. Indications: High Blood Pressure, Disp: , Rfl:   •  sennosides-docusate sodium (SENOKOT-S) 8.6-50 MG tablet, Take 1 Tab by mouth 2 times a day., Disp: 60 Tab, Rfl: 3  •  ranitidine (ZANTAC) 150 MG Tab, Take 1 Tab by mouth 2 times a day., Disp: 60 Tab, Rfl: 3  •  ondansetron (ZOFRAN) 4 MG Tab tablet, Take 1 Tab by mouth every four hours as needed for Nausea/Vomiting., Disp: 30 Tab, Rfl: 3  •  Misc  "Natural Products (FIBER 7) Powder, Take  by mouth., Disp: , Rfl:     Review of Systems:  All other review of systems are negative except what was mentioned above in the HPI.  Constitutional: Negative for fever and chills. Positive for Malaise/fatigue.    HENT: Negative for ear pain and nosebleeds     Eyes: Negative for blurred vision.    Respiratory: Negative for cough, sputum production, and shortness of breath.    Cardiovascular: Negative for chest pain. Positive for leg swelling.    Gastrointestinal: Negative for nausea, vomiting and abdominal pain.    Genitourinary: Negative for dysuria    Musculoskeletal: Negative for myalgias. Positive for joint pain stable.    Skin: Negative for rash and itching.    Neurological: Negative for dizziness.  Negative for focal weakness and headaches.    Endo/Heme/Allergies: No bruise/bleed easily.    Psychiatric/Behavioral: Negative for depression and memory loss.      Physical Exam:  Filed Vitals:    03/24/17 1359   BP: 110/78   Pulse: 75   Temp: 37.1 °C (98.7 °F)   Resp: 14   Height: 1.53 m (5' 0.24\")   Weight: 60 kg (132 lb 4.4 oz)   SpO2: 97%       General: Not in acute distress, alert and oriented x 3  HEENT: normalcephalic, atraumatic, extra ocular muscles intact, moist oral mucus membranes, and oral cavity without any lesions.  Neck: Supple neck and full range of motion  Lymph nodes: No palpable bilateral cervical and supraclavicular lymphadenopathy.    CVS: regular rate and rhythm  RESP: Clear to auscultate bilaterally.   ABD: Soft, non distended, positive bowel sounds, and no palpable hepatomegaly.    EXT: bilateral leg swelling. More of the right than left  CNS: Alert and oriented x3, muscle strength grossly intact, and normal gait.     Labs:  No visits with results within 1 Day(s) from this visit.  Latest known visit with results is:    Hospital Outpatient Visit on 03/13/2017   Component Date Value Ref Range Status   • WBC 03/13/2017 5.7  4.8 - 10.8 K/uL Final   • RBC " 2017 3.21* 4.20 - 5.40 M/uL Final   • Hemoglobin 2017 10.5* 12.0 - 16.0 g/dL Final   • Hematocrit 2017 31.2* 37.0 - 47.0 % Final   • MCV 2017 97.2  81.4 - 97.8 fL Final   • MCH 2017 32.7  27.0 - 33.0 pg Final   • MCHC 2017 33.7  33.6 - 35.0 g/dL Final   • RDW 2017 52.3* 35.9 - 50.0 fL Final   • Platelet Count 2017 134* 164 - 446 K/uL Final   • MPV 2017 9.3  9.0 - 12.9 fL Final   • Neutrophils-Polys 2017 67.70  44.00 - 72.00 % Final   • Lymphocytes 2017 24.00  22.00 - 41.00 % Final   • Monocytes 2017 4.60  0.00 - 13.40 % Final   • Eosinophils 2017 0.00  0.00 - 6.90 % Final   • Basophils 2017 0.20  0.00 - 1.80 % Final   • Immature Granulocytes 2017 3.50* 0.00 - 0.90 % Final   • Nucleated RBC 2017 0.50   Final   • Neutrophils (Absolute) 2017 3.86  2.00 - 7.15 K/uL Final    Includes immature neutrophils, if present.   • Lymphs (Absolute) 2017 1.37  1.00 - 4.80 K/uL Final   • Monos (Absolute) 2017 0.26  0.00 - 0.85 K/uL Final   • Eos (Absolute) 2017 0.00  0.00 - 0.51 K/uL Final   • Baso (Absolute) 2017 0.01  0.00 - 0.12 K/uL Final   • Immature Granulocytes (abs) 2017 0.20* 0.00 - 0.11 K/uL Final   • NRBC (Absolute) 2017 0.03   Final   • Sodium 2017 132* 135 - 145 mmol/L Final   • Potassium 2017 3.3* 3.6 - 5.5 mmol/L Final   • Chloride 2017 105  96 - 112 mmol/L Final   • Co2 2017 19* 20 - 33 mmol/L Final   • Glucose 2017 140* 65 - 99 mg/dL Final   • Bun 2017 13  8 - 22 mg/dL Final   • Creatinine 2017 0.64  0.50 - 1.40 mg/dL Final   • Calcium 2017 8.3* 8.5 - 10.5 mg/dL Final   • Anion Gap 2017 8.0  0.0 - 11.9 Final   • GFR If  2017 >60  >60 mL/min/1.73 m 2 Final   • GFR If Non  2017 >60  >60 mL/min/1.73 m 2 Final   ]    Imagin. 3/23/17 ultrasound: Extensive RIGHT leg DVT involving  external iliac, common femoral, and  popliteal veins with extension into the proximal greater saphenous vein. No evidence for DVT in the LEFT lower extremity.  2. 3/20/17 CT CAP: Thrombus seen in the right external iliac and common femoral vein. Further evaluation with DVT ultrasound is recommended. Previous foci in the liver are no longer seen. No residual or recurrent mass seen in the surgical bed. No metastasis seen in the chest or pelvis.    Assessment & Plan:  1. 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 She completed 3 cycles of gemcitabine and Abraxane with good responses. Her regimen was then decreased to single agent gemcitabine. She had significant fatigue has chemotherapy has been held. Most recent imaging showed sustaining responses. She is continued on observation. Consider restarting single agent gemcitabine when she has clinical improvement.  2. Extensive DVT: On CT scan she was found to have right external iliac and common femoral DVT. Ultrasound showed extensive right leg DVT involving external iliac, common femoral and popliteal veins as well as the saphenous veins. She was initially started on Eliquis. May need to escalate her anticoagulation. I will refer her to Coumadin clinic.  3. Nutrition: He has noticed improvement in her appetite and appears to be maintaining her caloric intake.  6. Normocytic anemia: Her hemoglobin has been fluctuating but has been fairly stable. Continue to monitor.  7. Epigastric discomfort: Improved with Pepcid twice a day.  8. She is advised to follow up again in 3 weeks or sooner as needed.    I informed the patient that I will be leaving me know shortly. I went over various options for transition of care. After detailed discussion, she will be scheduled to see Dr. Cedeno.    she agreed and verbalized her agreement and understanding with the current plan. I  answered all questions and concerns she has at this time and advised her to call at any time in the interim with questions or concerns in regards to her care. Thank you for allowing me to participate in her care.    Please note that this dictation was created using voice recognition software. I have made every reasonable attempt to correct obvious errors, but I expect that there are errors of grammar and possibly content that I did not discover before finalizing the note.

## 2017-03-24 NOTE — MR AVS SNAPSHOT
"        Elin Mora Virgilio   3/24/2017 1:40 PM   Office Visit   MRN: [id number]    Department:  [facility]   Dept Phone:  [phone]    Description:  Female : 1945   Provider:  Talya Krause M.D.           Reason for Visit     Follow-Up           Allergies as of 3/24/2017     No Known Allergies      You were diagnosed with     Deep vein thrombosis (DVT) of other vein of lower extremity (CMS-HCC)   [9269727]       Ampullary carcinoma (CMS-HCC)   [632368]         Vital Signs     Blood Pressure Pulse Temperature Respirations Height Weight    110/78 mmHg 75 37.1 °C (98.7 °F) 14 1.53 m (5' 0.24\") 60 kg (132 lb 4.4 oz)    Body Mass Index Oxygen Saturation Breastfeeding? Smoking Status          25.63 kg/m2 97% No Never Smoker         Basic Information     Date Of Birth Sex Race Ethnicity Preferred Language    1945 Female White  Origin (Mongolian,Mosotho,Afghan,Paraguayan, etc) English      Your appointments     Mar 27, 2017  1:30 PM   Non Provider 1 with ONC RN 1   [facility] (--)    [address], [facility] [address]  [facility] [address] [phone]           You will be receiving a confirmation call a few days before your appointment from our automated call confirmation system.            2017  1:30 PM   ONCOLOGY EST PATIENT 30 MIN with ONC RN 1   [facility] (--)    [address], [address]  [facility] [address] [phone]            2017  2:40 PM   ONCOLOGY EST PATIENT 30 MIN with Claude Cedeno M.D.   [facility] (--)    [address], [address]  [facility] [address] [phone]              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   8/15/2016 - Present    Cholecystitis with cholelithiasis K80.10   8/15/2016 - Present    Obstructive jaundice due to malignant neoplasm K83.8   8/15/2016 - Present    Leukocytosis D72.829   10/21/2016 - Present    Sepsis (CMS-HCC) A41.9   " 10/21/2016 - Present    Metabolic acidosis E87.2   10/22/2016 - Present      Health Maintenance        Date Due Completion Dates    IMM DTaP/Tdap/Td Vaccine (1 - Tdap) 6/19/1964 ---    PAP SMEAR 6/19/1966 ---    COLONOSCOPY 6/19/1995 ---    IMM ZOSTER VACCINE 6/19/2005 ---    IMM PNEUMOCOCCAL 65+ (ADULT) LOW/MEDIUM RISK SERIES (2 of 2 - PPSV23) 10/15/2014 10/15/2013    MAMMOGRAM 1/14/2016 1/14/2015, 1/14/2015, 1/14/2015    IMM INFLUENZA (1) 9/1/2016 10/15/2015    BONE DENSITY 9/26/2019 9/26/2014            Current Immunizations     13-VALENT PCV PREVNAR 10/15/2013    Influenza Vaccine Adult HD 10/15/2015      Below and/or attached are the medications your provider expects you to take. Review all of your home medications and newly ordered medications with your provider and/or pharmacist. Follow medication instructions as directed by your provider and/or pharmacist. Please keep your medication list with you and share with your provider. Update the information when medications are discontinued, doses are changed, or new medications (including over-the-counter products) are added; and carry medication information at all times in the event of emergency situations     Allergies:  No Known Allergies          Medications  Valid as of: March 24, 2017 -  3:13 PM    Generic Name Brand Name Tablet Size Instructions for use    Apixaban (Tab) ELIQUIS 5mg Take 1 Tab by mouth 2 Times a Day. Take 10 mg (2 pills) BID x 7 days followed by 5 mg BID        Aspirin (Tablet Delayed Response) ECOTRIN 325 MG Take 325 mg by mouth every morning.        Famotidine (Tab) PEPCID 20 MG Take 1 Tab by mouth 2 times a day.        Losartan Potassium-HCTZ (Tab) HYZAAR 50-12.5 MG Take 1 Tab by mouth every day.        Megestrol Acetate (Suspension) MEGACE 400 MG/10ML Take 20 mL by mouth every day.        Misc Natural Products (Powder) FIBER 7  Take  by mouth.        Ondansetron HCl (Tab) ZOFRAN 4 MG Take 1 Tab by mouth every four hours as needed for  Nausea/Vomiting.        Prochlorperazine Maleate (Tab) COMPAZINE 10 MG Take 10 mg by mouth every 6 hours as needed for Nausea/Vomiting (alternating Q3H with Zofran).        RaNITidine HCl (Tab) ZANTAC 150 MG Take 1 Tab by mouth 2 times a day.        Sennosides-Docusate Sodium (Tab) SENOKOT-S 8.6-50 MG Take 1 Tab by mouth 2 times a day.        .                 Medicines prescribed today were sent to:     [id number] - [place], [place] - [address]    [address] [address]    Phone: [phone] Fax: [phone]    Open 24 Hours?: No    [facility] - [place], [place] - [address]    [address] [address]    Phone: [phone] Fax: [phone]    Open 24 Hours?: No    [id number] - [place], [place] - [address]    [address] [facility] [address]    Phone: [phone] Fax: [phone]    Open 24 Hours?: No      Medication refill instructions:       If your prescription bottle indicates you have medication refills left, it is not necessary to call your provider’s office. Please contact your pharmacy and they will refill your medication.    If your prescription bottle indicates you do not have any refills left, you may request refills at any time through one of the following ways: The online [email or web address] system (except Urgent Care), by calling your provider’s office, or by asking your pharmacy to contact your provider’s office with a refill request. Medication refills are processed only during regular business hours and may not be available until the next business day. Your provider may request additional information or to have a follow-up visit with you prior to refilling your medication.   *Please Note: Medication refills are assigned a new Rx number when refilled electronically. Your pharmacy may indicate that no refills were authorized even though a new prescription for the same medication is available at the pharmacy. Please request the  medicine by name with the pharmacy before contacting your provider for a refill.           MyChart Access Code: Activation code not generated  Current MyChart Status: Active

## 2017-03-24 NOTE — Clinical Note
[facility]    [address], [address]  [place] [address]      Date:3/27/2017                     Reference to Elin Poole    Follow Up Note:  Oncology      Date: 3/24/17  Time: 1:40 pm          Referring Physician: Dr. Kurtis Jacobs   Primary Care:  Wally Knox D.O.  Surgeon: Dr. Kurtis Jacobs       Diagnosis: Metastatic adenocarcinoma of ampulla of vater      Chief compliant: She is here for a follow up visit      History of Presenting Illness:  Elin Poole is a 71 y.o. female with adenocarcinoma of primary ampulla diagnosed in 7/2016.  She was diagnosed due to elevated liver enzymes and acute renal insufficiency.  She is s/p ERCP with stent with good decompression. Biopsy was positive for adenocarcinoma of primary ampulla. She was seen by Dr. Jacobs and underwent an attempted Whipple’s in 8/2016. The surgery was aborted secondary to multiple liver lesions and s/p wedge resection of the liver which was positive for poorly differentiated adenocarcinoma.  PET scan showed uptake in multiple hepatic lesions and uptake in the ampulla. He also had uptake in bilateral hilar area with increased uptake and small pulmonary nodules in the right middle lobe without uptake. She was treated with gemcitabine and Abraxane. She tolerated the treatment fairly well initially. After cycle 3 of chemotherapy she started to have increased fatigue and diarrhea. Repeat imaging showed response to therapy. The diarrhea improve and her regimen was changed to single agent gemcitabine. She received chemotherapy on 1/18/17. Further cycles have been held secondary to increased fatigue.   3/20/17 CT CAP showed thrombus seen in the right external iliac and common femoral vein. Previous foci in the liver are no longer seen and no residual or recurrent mass seen in the surgical bed.  She was started on Eliquis.        Interval history:    She is here for a follow-up  visit is accompanied by her son and daughter who was present in the room.  She has had right leg swelling which started 1 week ago and this had started to improve.  She was started Eliquis 2 days ago.  She continues to have fatigue which has improved.  She has been eating well. She denies any epigastric pain.  She denies diarrhea and constipation.  She denies fevers, chills and night sweats.             Past Medical History   Past Medical History    Diagnosis  Date    •  Jaundice      •  Hypertension      •  Heart burn      •  Indigestion      •  Dental disorder          upper partial    •  Cancer (CMS-HCC)  2016        Ampullary CA    •  Cataract  09-        bilat.,no surgery    •  Asthma      •  Arthritis          hands/fingers/right knee    •  Ampullary carcinoma (CMS-HCC)                       Allergies as of 03/24/2017    •  (No Known Allergies)          Current outpatient prescriptions:    •  rivaroxaban (XARELTO) 15 MG Tab tablet, Take 1 Tab by mouth 2 Times a Day., Disp: 42 Tab, Rfl: 0  •  rivaroxaban (XARELTO) 15 MG Tab tablet, Take 1 Tab by mouth 2 Times a Day., Disp: 42 Tab, Rfl: 0  •  apixaban (ELIQUIS) 5mg Tab, Take 1 Tab by mouth 2 Times a Day. Take 10 mg (2 pills) BID x 7 days followed by 5 mg BID, Disp: 60 Tab, Rfl: 1  •  megestrol (MEGACE) 400 MG/10ML Suspension, Take 20 mL by mouth every day., Disp: 600 mL, Rfl: 1  •  famotidine (PEPCID) 20 MG Tab, Take 1 Tab by mouth 2 times a day., Disp: 60 Tab, Rfl: 3  •  aspirin EC (ECOTRIN) 325 MG Tablet Delayed Response, Take 325 mg by mouth every morning., Disp: , Rfl:    •  prochlorperazine (COMPAZINE) 10 MG Tab, Take 10 mg by mouth every 6 hours as needed for Nausea/Vomiting (alternating Q3H with Zofran)., Disp: , Rfl:    •  losartan-hydrochlorothiazide (HYZAAR) 100-25 MG per tablet, Take 1 Tab by mouth every morning. Indications: High Blood Pressure, Disp: , Rfl:    •  sennosides-docusate sodium (SENOKOT-S) 8.6-50 MG tablet, Take 1 Tab by mouth 2  "times a day., Disp: 60 Tab, Rfl: 3  •  ranitidine (ZANTAC) 150 MG Tab, Take 1 Tab by mouth 2 times a day., Disp: 60 Tab, Rfl: 3  •  ondansetron (ZOFRAN) 4 MG Tab tablet, Take 1 Tab by mouth every four hours as needed for Nausea/Vomiting., Disp: 30 Tab, Rfl: 3  •  Misc Natural Products (FIBER 7) Powder, Take  by mouth., Disp: , Rfl:       Review of Systems:  All other review of systems are negative except what was mentioned above in the HPI.  Constitutional: Negative for fever and chills. Positive for Malaise/fatigue.    HENT: Negative for ear pain and nosebleeds     Eyes: Negative for blurred vision.    Respiratory: Negative for cough, sputum production, and shortness of breath.     Cardiovascular: Negative for chest pain. Positive for leg swelling.    Gastrointestinal: Negative for nausea, vomiting and abdominal pain.    Genitourinary: Negative for dysuria    Musculoskeletal: Negative for myalgias. Positive for joint pain stable.    Skin: Negative for rash and itching.    Neurological: Negative for dizziness.  Negative for focal weakness and headaches.    Endo/Heme/Allergies: No bruise/bleed easily.    Psychiatric/Behavioral: Negative for depression and memory loss.        Physical Exam:   Vitals   Filed Vitals:      03/24/17 1359    BP:  110/78    Pulse:  75    Temp:  37.1 °C (98.7 °F)    Resp:  14    Height:  1.53 m (5' 0.24\")    Weight:  60 kg (132 lb 4.4 oz)    SpO2:  97%             General: Not in acute distress, alert and oriented x 3  HEENT: normalcephalic, atraumatic, extra ocular muscles intact, moist oral mucus membranes, and oral cavity without any lesions.  Neck: Supple neck and full range of motion  Lymph nodes: No palpable bilateral cervical and supraclavicular lymphadenopathy.     CVS: regular rate and rhythm  RESP: Clear to auscultate bilaterally.    ABD: Soft, non distended, positive bowel sounds, and no palpable hepatomegaly.    EXT: bilateral leg swelling. More of the right than left  CNS: Alert " and oriented x3, muscle strength grossly intact, and normal gait.       Labs:  No visits with results within 1 Day(s) from this visit.  Latest known visit with results is:      Hospital Outpatient Visit on 03/13/2017    Component  Date  Value  Ref Range  Status    •  WBC  03/13/2017  5.7   4.8 - 10.8 K/uL  Final    •  RBC  03/13/2017  3.21*  4.20 - 5.40 M/uL  Final    •  Hemoglobin  03/13/2017  10.5*  12.0 - 16.0 g/dL  Final    •  Hematocrit  03/13/2017  31.2*  37.0 - 47.0 %  Final    •  MCV  03/13/2017  97.2   81.4 - 97.8 fL  Final    •  MCH  03/13/2017  32.7   27.0 - 33.0 pg  Final    •  MCHC  03/13/2017  33.7   33.6 - 35.0 g/dL  Final    •  RDW  03/13/2017  52.3*  35.9 - 50.0 fL  Final    •  Platelet Count  03/13/2017  134*  164 - 446 K/uL  Final    •  MPV  03/13/2017  9.3   9.0 - 12.9 fL  Final    •  Neutrophils-Polys  03/13/2017  67.70   44.00 - 72.00 %  Final    •  Lymphocytes  03/13/2017  24.00   22.00 - 41.00 %  Final    •  Monocytes  03/13/2017  4.60   0.00 - 13.40 %  Final    •  Eosinophils  03/13/2017  0.00   0.00 - 6.90 %  Final    •  Basophils  03/13/2017  0.20   0.00 - 1.80 %  Final    •  Immature Granulocytes  03/13/2017  3.50*  0.00 - 0.90 %  Final    •  Nucleated RBC  03/13/2017  0.50     Final    •  Neutrophils (Absolute)  03/13/2017  3.86   2.00 - 7.15 K/uL  Final      Includes immature neutrophils, if present.    •  Lymphs (Absolute)  03/13/2017  1.37   1.00 - 4.80 K/uL  Final    •  Monos (Absolute)  03/13/2017  0.26   0.00 - 0.85 K/uL  Final    •  Eos (Absolute)  03/13/2017  0.00   0.00 - 0.51 K/uL  Final    •  Baso (Absolute)  03/13/2017  0.01   0.00 - 0.12 K/uL  Final    •  Immature Granulocytes (abs)  03/13/2017  0.20*  0.00 - 0.11 K/uL  Final    •  NRBC (Absolute)  03/13/2017  0.03     Final    •  Sodium  03/13/2017  132*  135 - 145 mmol/L  Final    •  Potassium  03/13/2017  3.3*  3.6 - 5.5 mmol/L  Final    •  Chloride  03/13/2017  105   96 - 112 mmol/L  Final    •  Co2  03/13/2017  19*  20  - 33 mmol/L  Final    •  Glucose  2017  140*  65 - 99 mg/dL  Final    •  Bun  2017  13   8 - 22 mg/dL  Final    •  Creatinine  2017  0.64   0.50 - 1.40 mg/dL  Final    •  Calcium  2017  8.3*  8.5 - 10.5 mg/dL  Final    •  Anion Gap  2017  8.0   0.0 - 11.9  Final    •  GFR If   2017  >60   >60 mL/min/1.73 m 2  Final    •  GFR If Non   2017  >60   >60 mL/min/1.73 m 2  Final      ]      Imagin. 3/23/17 ultrasound: Extensive RIGHT leg DVT involving external iliac, common femoral, and  popliteal veins with extension into the proximal greater saphenous vein. No evidence for DVT in the LEFT lower extremity.  2. 3/20/17 CT CAP: Thrombus seen in the right external iliac and common femoral vein. Further evaluation with DVT ultrasound is recommended. Previous foci in the liver are no longer seen. No residual or recurrent mass seen in the surgical bed. No metastasis seen in the chest or pelvis.      Assessment & Plan:  Metastatic poorly differentiated adenocarcinoma of likely primary ampulla: She had multiple liver lesions at diagnosis as well as hilar adenopathy. She completed 3 cycles of gemcitabine and Abraxane with good responses. Her regimen was then decreased to single agent gemcitabine. She had significant fatigue has chemotherapy has been held. Most recent imaging showed sustaining responses. She is continued on observation. Consider restarting single agent gemcitabine when she has clinical improvement.  Extensive DVT: On CT scan she was found to have right external iliac and common femoral DVT. Ultrasound showed extensive right leg DVT involving external iliac, common femoral and popliteal veins as well as the saphenous veins. She was initially started on Eliquis. May need to escalate her anticoagulation. I will refer her to Coumadin clinic.  Nutrition: He has noticed improvement in her appetite and appears to be maintaining her caloric  intake.  Normocytic anemia: Her hemoglobin has been fluctuating but has been fairly stable. Continue to monitor.  Epigastric discomfort: Improved with Pepcid twice a day.  She is advised to follow up again in 3 weeks or sooner as needed.      I informed the patient that I will be leaving me know shortly. I went over various options for transition of care. After detailed discussion, she will be scheduled to see Dr. Cedeno.      she agreed and verbalized her agreement and understanding with the current plan. I answered all questions and concerns she has at this time and advised her to call at any time in the interim with questions or concerns in regards to her care. Thank you for allowing me to participate in her care.      Please note that this dictation was created using voice recognition software. I have made every reasonable attempt to correct obvious errors, but I expect that there are errors of grammar and possibly content that I did not discover before finalizing the note.      Sincerely,  Talya Krause  [address], [address]   [phone]

## 2017-03-27 ENCOUNTER — APPOINTMENT (OUTPATIENT)
Dept: HEMATOLOGY ONCOLOGY | Facility: MEDICAL CENTER | Age: 72
End: 2017-03-27
Payer: MEDICARE

## 2017-03-27 ENCOUNTER — APPOINTMENT (OUTPATIENT)
Dept: ONCOLOGY | Facility: MEDICAL CENTER | Age: 72
End: 2017-03-27
Attending: INTERNAL MEDICINE
Payer: MEDICARE

## 2017-04-03 ENCOUNTER — APPOINTMENT (OUTPATIENT)
Dept: ONCOLOGY | Facility: MEDICAL CENTER | Age: 72
End: 2017-04-03
Attending: INTERNAL MEDICINE
Payer: MEDICARE

## 2017-04-03 DIAGNOSIS — C24.1 AMPULLARY CARCINOMA (HCC): ICD-10-CM

## 2017-04-03 RX ORDER — OMEPRAZOLE 20 MG/1
20 CAPSULE, DELAYED RELEASE ORAL DAILY
Qty: 30 CAP | Refills: 3 | Status: SHIPPED | OUTPATIENT
Start: 2017-04-03 | End: 2017-04-04 | Stop reason: CLARIF

## 2017-04-03 NOTE — PROGRESS NOTES
Discussed with Dr. Hebert and she would like patient to be on ranitidine and omeprazole. Patient was prescribed famotidine and ranitidine. We'll have patient discontinue the famotidine and start the omeprazole. Omeprazole has been sent to her pharmacy.

## 2017-04-04 ENCOUNTER — ANTICOAGULATION VISIT (OUTPATIENT)
Dept: VASCULAR LAB | Facility: MEDICAL CENTER | Age: 72
End: 2017-04-04
Attending: INTERNAL MEDICINE
Payer: MEDICARE

## 2017-04-04 ENCOUNTER — TELEPHONE (OUTPATIENT)
Dept: HEMATOLOGY ONCOLOGY | Facility: MEDICAL CENTER | Age: 72
End: 2017-04-04

## 2017-04-04 DIAGNOSIS — Z79.01 LONG TERM (CURRENT) USE OF ANTICOAGULANTS: ICD-10-CM

## 2017-04-04 DIAGNOSIS — C24.1 AMPULLARY CARCINOMA (HCC): ICD-10-CM

## 2017-04-04 PROBLEM — I82.409 DEEP VEIN THROMBOSIS (HCC): Status: ACTIVE | Noted: 2017-04-04

## 2017-04-04 LAB — INR PPP: 1.1 (ref 2–3.5)

## 2017-04-04 PROCEDURE — 99213 OFFICE O/P EST LOW 20 MIN: CPT

## 2017-04-04 PROCEDURE — 85610 PROTHROMBIN TIME: CPT

## 2017-04-04 NOTE — TELEPHONE ENCOUNTER
Called patient and spoke with patient's son Sea.  Per Bimalp, APRN, pt will start taking prilosec and stop the pepcid.  Sea verbalizes understanding of new instructions.

## 2017-04-04 NOTE — MR AVS SNAPSHOT
Elin Mora Virgilio   2017 7:45 AM   Anticoagulation Visit   MRN: [id number]    Department:  Vascular Medicine   Dept Phone:  [phone]    Description:  Female : 1945   Provider:  [facility] EXAM 5           Allergies as of 2017     No Known Allergies      You were diagnosed with     Long term (current) use of anticoagulants   [V58.61.ICD-9-CM]       Ampullary carcinoma (CMS-HCC)   [515962]         Vital Signs     Smoking Status                   Never Smoker            Basic Information     Date Of Birth Sex Race Ethnicity Preferred Language    1945 Female White  Origin (Serbian,Martiniquais,Uruguayan,Forrest, etc) English      Your appointments     2017  2:30 PM   ONCOLOGY EST PATIENT 30 MIN with ONC RN 1   [facility] (--)    [address], [facility] [address]  [facility] [id number] [phone]            2017  3:20 PM   ONCOLOGY NEW PATIENT 60 MIN with Claude Cedeno M.D.   [facility] (--)    [address] [facility] [address]  [facility] [id number] [phone]            May 02, 2017  9:30 AM   Established Patient with [facility] EXAM 4   [facility]  (--)    [address] [facility] [address] [phone]              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   8/15/2016 - Present    Cholecystitis with cholelithiasis K80.10   8/15/2016 - Present    Obstructive jaundice due to malignant neoplasm K83.8   8/15/2016 - Present    Leukocytosis D72.829   10/21/2016 - Present    Sepsis (CMS-HCC) A41.9   10/21/2016 - Present    Metabolic acidosis E87.2   10/22/2016 - Present    Deep vein thrombosis (CMS-HCC) [I82.409] I82.409   2017 - Present    Long term (current) use of anticoagulants [Z79.01] Z79.01   2017 - Present      Health Maintenance        Date Due Completion Dates    IMM DTaP/Tdap/Td Vaccine (1 - Tdap) 1964 ---    PAP SMEAR  6/19/1966 ---    COLONOSCOPY 6/19/1995 ---    IMM ZOSTER VACCINE 6/19/2005 ---    IMM PNEUMOCOCCAL 65+ (ADULT) LOW/MEDIUM RISK SERIES (2 of 2 - PPSV23) 10/15/2014 10/15/2013    MAMMOGRAM 1/14/2016 1/14/2015, 1/14/2015, 1/14/2015    BONE DENSITY 9/26/2019 9/26/2014            Results     POCT Protime      Component    INR    1.1                        Current Immunizations     13-VALENT PCV PREVNAR 10/15/2013    Influenza Vaccine Adult HD 10/15/2015      Below and/or attached are the medications your provider expects you to take. Review all of your home medications and newly ordered medications with your provider and/or pharmacist. Follow medication instructions as directed by your provider and/or pharmacist. Please keep your medication list with you and share with your provider. Update the information when medications are discontinued, doses are changed, or new medications (including over-the-counter products) are added; and carry medication information at all times in the event of emergency situations     Allergies:  No Known Allergies          Medications  Valid as of: April 04, 2017 -  7:59 AM    Generic Name Brand Name Tablet Size Instructions for use    Apixaban (Tab) ELIQUIS 5mg Take 1 Tab by mouth 2 Times a Day. Take 10 mg (2 pills) BID x 7 days followed by 5 mg BID        Famotidine (Tab) PEPCID 20 MG Take 1 Tab by mouth 2 times a day.        Losartan Potassium-HCTZ (Tab) HYZAAR 50-12.5 MG Take 1 Tab by mouth every day.        Megestrol Acetate (Suspension) MEGACE 400 MG/10ML Take 20 mL by mouth every day.        Misc Natural Products (Powder) FIBER 7  Take  by mouth.        Ondansetron HCl (Tab) ZOFRAN 4 MG Take 1 Tab by mouth every four hours as needed for Nausea/Vomiting.        Prochlorperazine Maleate (Tab) COMPAZINE 10 MG Take 10 mg by mouth every 6 hours as needed for Nausea/Vomiting (alternating Q3H with Zofran).        Sennosides-Docusate Sodium (Tab) SENOKOT-S 8.6-50 MG Take 1 Tab by mouth 2 times  a day.        .                 Medicines prescribed today were sent to:     [id number] [place], [place] - [address] [place] [address]    [address] [place] [address]    Phone: [phone] Fax: [phone]    Open 24 Hours?: No    [facility] - [place], [place] - [address]    [address] [place] [address]    Phone: [phone] Fax: [phone]    Open 24 Hours?: No    [id number] - [place], [place] - [address] [place] [address]    [address] [place] [address] [place] [address]    Phone: [phone] Fax: [phone]    Open 24 Hours?: No      Medication refill instructions:       If your prescription bottle indicates you have medication refills left, it is not necessary to call your provider’s office. Please contact your pharmacy and they will refill your medication.    If your prescription bottle indicates you do not have any refills left, you may request refills at any time through one of the following ways: The online [employer] system (except Urgent Care), by calling your provider’s office, or by asking your pharmacy to contact your provider’s office with a refill request. Medication refills are processed only during regular business hours and may not be available until the next business day. Your provider may request additional information or to have a follow-up visit with you prior to refilling your medication.   *Please Note: Medication refills are assigned a new Rx number when refilled electronically. Your pharmacy may indicate that no refills were authorized even though a new prescription for the same medication is available at the pharmacy. Please request the medicine by name with the pharmacy before contacting your provider for a refill.        Your To Do List     Future Labs/Procedures Complete By Expires    COMP METABOLIC PANEL  As directed 4/4/2018      Warfarin Dosing Calendar   April 2017 Details    Sun Mon Tue Wed Thu Fri Sat           1                 2               3                 4   1.1      See details      5               6               7               8                 9               10               11               12               13               14               15                 16               17               18               19               20               21               22                 23               24               25               26               27               28               29                 30                      Date Details   04/04 This INR check   INR: 1.1      Date of next INR: No date specified              MyChart Access Code: Activation code not generated  Current MyChart Status: Active

## 2017-04-04 NOTE — PROGRESS NOTES
Anticoagulation Summary as of 4/4/2017     INR goal    Selected INR 1.1 (4/4/2017)   Maintenance plan No maintenance plan   Plan last modified Barry Leal, PHARMD (4/4/2017)   Next INR check    Target end date     Indications   Ampullary carcinoma (CMS-HCC) [C24.1]  Deep vein thrombosis (CMS-HCC) [I82.409] [I82.409]  Long term (current) use of anticoagulants [Z79.01] [Z79.01]         Anticoagulation Episode Summary     INR check location [facility]    Preferred lab     Send INR reminders to     Comments DOAC      Anticoagulation Care Providers     Provider Role Specialty Phone number    [facility] Responsible  [phone]    Wally Knox D.O. Responsible Family Medicine [phone]    Talya Krause M.D. Responsible Oncology [phone]        Anticoagulation Patient Findings      Past Medical History   Diagnosis Date   • Jaundice    • Hypertension    • Heart burn    • Indigestion    • Dental disorder      upper partial   • Cancer (CMS-HCC) 2016     Ampullary CA   • Cataract 09-     bilat.,no surgery   • Asthma    • Arthritis      hands/fingers/right knee   • Ampullary carcinoma (CMS-HCC)        Current Outpatient Prescriptions on File Prior to Visit   Medication Sig Dispense Refill   • losartan-hydrochlorothiazide (HYZAAR) 50-12.5 MG per tablet Take 1 Tab by mouth every day. 30 Tab 3   • apixaban (ELIQUIS) 5mg Tab Take 1 Tab by mouth 2 Times a Day. Take 10 mg (2 pills) BID x 7 days followed by 5 mg BID 60 Tab 1   • megestrol (MEGACE) 400 MG/10ML Suspension Take 20 mL by mouth every day. 600 mL 1   • famotidine (PEPCID) 20 MG Tab Take 1 Tab by mouth 2 times a day. 60 Tab 3   • sennosides-docusate sodium (SENOKOT-S) 8.6-50 MG tablet Take 1 Tab by mouth 2 times a day. 60 Tab 3   • ondansetron (ZOFRAN) 4 MG Tab tablet Take 1 Tab by mouth every four hours as needed for Nausea/Vomiting. 30 Tab 3   • Misc Natural Products (FIBER 7) Powder Take  by mouth.     •  prochlorperazine (COMPAZINE) 10 MG Tab Take 10 mg by mouth every 6 hours as needed for Nausea/Vomiting (alternating Q3H with Zofran).       No current facility-administered medications on file prior to visit.       Lab Results   Component Value Date/Time    SODIUM 132* 03/13/2017 01:08 PM    POTASSIUM 3.3* 03/13/2017 01:08 PM    CHLORIDE 105 03/13/2017 01:08 PM    CO2 19* 03/13/2017 01:08 PM    GLUCOSE 140* 03/13/2017 01:08 PM    BUN 13 03/13/2017 01:08 PM    CREATININE 0.64 03/13/2017 01:08 PM          Elin Poole referred to the [facility] for a thrombus seen in the right external iliac and common femoral vein.  She currently has a Ampullarry Carcinoma, Dr. Krause, Oncologist, has her on Eliquis. We will continue with Eliquis 10mg bid for 7 days then 5mg bid thereafter.  With her poor appetite she is a good candidate for a DOAC.  We also talked about Lovenox shots but she would like to use Eliquis.  We do not have a lot of data with Eliquis and cancer but she would likely have a low TTR on Warfarin. Her late  was on warfarin and she does not want to use it if possible. This is her 1st DVT, she has no family history but will likely need to continue on a anticoagulant with the risk factor of CA.     Pt gave verbal consent  to leave a VM with detailed medical information regarding INR and dose of warfarin.    Pt tolerating medication well, no s/s of bleeding.     Med rec done with pt (see above)    Pt education done (s/s of bleeding, when to f/u with clinic vs ER, foods with vitamin K, etc)    Follow up appointment in 4 week(s) with labs     Barry Leal, ANKURD

## 2017-04-06 ENCOUNTER — TELEPHONE (OUTPATIENT)
Dept: VASCULAR LAB | Facility: MEDICAL CENTER | Age: 72
End: 2017-04-06

## 2017-04-06 NOTE — TELEPHONE ENCOUNTER
Initial anticoagulation clinic note and most recent oncology note reviewed.    Patient started on anticoagulation with eliquis for extensive malignancy associated DVT.    Pending further recommendations from oncology, we will continue with indefinite anticoagulation. After 6-12 months can consider decreasing dose of eliquis to 2.5 twice a day    Michael J. Bloch, MD  [facility] Center    CC:    Wally Krause

## 2017-04-07 LAB — INR BLD: 1.1 (ref 0.9–1.2)

## 2017-04-12 DIAGNOSIS — C24.1 AMPULLARY CARCINOMA (HCC): ICD-10-CM

## 2017-04-13 ENCOUNTER — HOSPITAL ENCOUNTER (OUTPATIENT)
Facility: MEDICAL CENTER | Age: 72
End: 2017-04-13
Attending: INTERNAL MEDICINE
Payer: MEDICARE

## 2017-04-13 ENCOUNTER — TELEPHONE (OUTPATIENT)
Dept: VASCULAR LAB | Facility: MEDICAL CENTER | Age: 72
End: 2017-04-13

## 2017-04-13 ENCOUNTER — NON-PROVIDER VISIT (OUTPATIENT)
Dept: HEMATOLOGY ONCOLOGY | Facility: MEDICAL CENTER | Age: 72
End: 2017-04-13
Payer: MEDICARE

## 2017-04-13 ENCOUNTER — OFFICE VISIT (OUTPATIENT)
Dept: HEMATOLOGY ONCOLOGY | Facility: MEDICAL CENTER | Age: 72
End: 2017-04-13
Payer: MEDICARE

## 2017-04-13 VITALS
SYSTOLIC BLOOD PRESSURE: 120 MMHG | HEART RATE: 86 BPM | TEMPERATURE: 97.6 F | OXYGEN SATURATION: 99 % | BODY MASS INDEX: 27.09 KG/M2 | HEIGHT: 60 IN | DIASTOLIC BLOOD PRESSURE: 62 MMHG | WEIGHT: 138 LBS | RESPIRATION RATE: 16 BRPM

## 2017-04-13 DIAGNOSIS — C24.1 AMPULLARY CARCINOMA (HCC): ICD-10-CM

## 2017-04-13 DIAGNOSIS — Z79.01 LONG TERM (CURRENT) USE OF ANTICOAGULANTS: ICD-10-CM

## 2017-04-13 LAB
ALBUMIN SERPL BCP-MCNC: 2.9 G/DL (ref 3.2–4.9)
ALBUMIN/GLOB SERPL: 0.9 G/DL
ALP SERPL-CCNC: 76 U/L (ref 30–99)
ALT SERPL-CCNC: 14 U/L (ref 2–50)
ANION GAP SERPL CALC-SCNC: 9 MMOL/L (ref 0–11.9)
AST SERPL-CCNC: 15 U/L (ref 12–45)
BASOPHILS # BLD AUTO: 0.5 % (ref 0–1.8)
BASOPHILS # BLD: 0.07 K/UL (ref 0–0.12)
BILIRUB SERPL-MCNC: 0.4 MG/DL (ref 0.1–1.5)
BUN SERPL-MCNC: 19 MG/DL (ref 8–22)
CALCIUM SERPL-MCNC: 8.5 MG/DL (ref 8.5–10.5)
CHLORIDE SERPL-SCNC: 104 MMOL/L (ref 96–112)
CO2 SERPL-SCNC: 20 MMOL/L (ref 20–33)
CREAT SERPL-MCNC: 0.77 MG/DL (ref 0.5–1.4)
EOSINOPHIL # BLD AUTO: 0.04 K/UL (ref 0–0.51)
EOSINOPHIL NFR BLD: 0.3 % (ref 0–6.9)
ERYTHROCYTE [DISTWIDTH] IN BLOOD BY AUTOMATED COUNT: 61 FL (ref 35.9–50)
GFR SERPL CREATININE-BSD FRML MDRD: >60 ML/MIN/1.73 M 2
GLOBULIN SER CALC-MCNC: 3.3 G/DL (ref 1.9–3.5)
GLUCOSE SERPL-MCNC: 172 MG/DL (ref 65–99)
HCT VFR BLD AUTO: 38.2 % (ref 37–47)
HGB BLD-MCNC: 12.5 G/DL (ref 12–16)
IMM GRANULOCYTES # BLD AUTO: 0.43 K/UL (ref 0–0.11)
IMM GRANULOCYTES NFR BLD AUTO: 2.9 % (ref 0–0.9)
LYMPHOCYTES # BLD AUTO: 1.76 K/UL (ref 1–4.8)
LYMPHOCYTES NFR BLD: 11.9 % (ref 22–41)
MCH RBC QN AUTO: 34.4 PG (ref 27–33)
MCHC RBC AUTO-ENTMCNC: 32.7 G/DL (ref 33.6–35)
MCV RBC AUTO: 105.2 FL (ref 81.4–97.8)
MONOCYTES # BLD AUTO: 1.09 K/UL (ref 0–0.85)
MONOCYTES NFR BLD AUTO: 7.3 % (ref 0–13.4)
NEUTROPHILS # BLD AUTO: 11.44 K/UL (ref 2–7.15)
NEUTROPHILS NFR BLD: 77.1 % (ref 44–72)
NRBC # BLD AUTO: 0 K/UL
NRBC BLD AUTO-RTO: 0 /100 WBC
PLATELET # BLD AUTO: 205 K/UL (ref 164–446)
PMV BLD AUTO: 9.5 FL (ref 9–12.9)
POTASSIUM SERPL-SCNC: 3.4 MMOL/L (ref 3.6–5.5)
PROT SERPL-MCNC: 6.2 G/DL (ref 6–8.2)
RBC # BLD AUTO: 3.63 M/UL (ref 4.2–5.4)
SODIUM SERPL-SCNC: 133 MMOL/L (ref 135–145)
WBC # BLD AUTO: 14.8 K/UL (ref 4.8–10.8)

## 2017-04-13 PROCEDURE — 4040F PNEUMOC VAC/ADMIN/RCVD: CPT | Performed by: INTERNAL MEDICINE

## 2017-04-13 PROCEDURE — G8419 CALC BMI OUT NRM PARAM NOF/U: HCPCS | Performed by: INTERNAL MEDICINE

## 2017-04-13 PROCEDURE — 1036F TOBACCO NON-USER: CPT | Performed by: INTERNAL MEDICINE

## 2017-04-13 PROCEDURE — G8432 DEP SCR NOT DOC, RNG: HCPCS | Performed by: INTERNAL MEDICINE

## 2017-04-13 PROCEDURE — 36591 DRAW BLOOD OFF VENOUS DEVICE: CPT | Performed by: INTERNAL MEDICINE

## 2017-04-13 PROCEDURE — 1101F PT FALLS ASSESS-DOCD LE1/YR: CPT | Performed by: INTERNAL MEDICINE

## 2017-04-13 PROCEDURE — 85025 COMPLETE CBC W/AUTO DIFF WBC: CPT

## 2017-04-13 PROCEDURE — 80053 COMPREHEN METABOLIC PANEL: CPT

## 2017-04-13 PROCEDURE — 99215 OFFICE O/P EST HI 40 MIN: CPT | Performed by: INTERNAL MEDICINE

## 2017-04-13 PROCEDURE — 3014F SCREEN MAMMO DOC REV: CPT | Performed by: INTERNAL MEDICINE

## 2017-04-13 ASSESSMENT — PAIN SCALES - GENERAL
PAINLEVEL: NO PAIN
PAINLEVEL: NO PAIN

## 2017-04-13 NOTE — MR AVS SNAPSHOT
"        Elin Poole   2017 3:20 PM   Office Visit   MRN: [id number]    Department:  [facility]   Dept Phone:  [phone]    Description:  Female : 1945   Provider:  Claude Cedeno M.D.           Reason for Visit     New Patient 3wk FV       Allergies as of 2017     No Known Allergies      You were diagnosed with     Ampullary carcinoma (CMS-HCC)   [718864]         Vital Signs     Blood Pressure Pulse Temperature Respirations Height Weight    120/62 mmHg 86 36.4 °C (97.6 °F) 16 1.525 m (5' 0.04\") 62.596 kg (138 lb)    Body Mass Index Oxygen Saturation Smoking Status             26.92 kg/m2 99% Never Smoker          Basic Information     Date Of Birth Sex Race Ethnicity Preferred Language    1945 Female White  Origin (Mohawk,Haitian,Czech,Forrest, etc) English      Your appointments     May 02, 2017  9:30 AM   Established Patient with [facility] EXAM 4   [facility]  (--)    [address] [facility] [address]   [phone]            2017  2:40 PM   ONCOLOGY EST PATIENT 30 MIN with Claude Cedeno M.D.   [facility] (--)    [address], [address]  [facility] [id number] [phone]              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   8/15/2016 - Present    Cholecystitis with cholelithiasis K80.10   8/15/2016 - Present    Obstructive jaundice due to malignant neoplasm K83.8   8/15/2016 - Present    Leukocytosis D72.829   10/21/2016 - Present    Sepsis (CMS-HCC) A41.9   10/21/2016 - Present    Metabolic acidosis E87.2   10/22/2016 - Present    Deep vein thrombosis (CMS-HCC) [I82.409] I82.409   2017 - Present    Long term (current) use of anticoagulants [Z79.01] Z79.01   2017 - Present      Health Maintenance        Date Due Completion Dates    IMM DTaP/Tdap/Td Vaccine (1 - Tdap) 1964 ---    PAP SMEAR 1966 " ---    COLONOSCOPY 6/19/1995 ---    IMM ZOSTER VACCINE 6/19/2005 ---    IMM PNEUMOCOCCAL 65+ (ADULT) LOW/MEDIUM RISK SERIES (2 of 2 - PPSV23) 10/15/2014 10/15/2013    MAMMOGRAM 1/14/2016 1/14/2015, 1/14/2015, 1/14/2015    BONE DENSITY 9/26/2019 9/26/2014            Current Immunizations     13-VALENT PCV PREVNAR 10/15/2013    Influenza Vaccine Adult HD 10/15/2015      Below and/or attached are the medications your provider expects you to take. Review all of your home medications and newly ordered medications with your provider and/or pharmacist. Follow medication instructions as directed by your provider and/or pharmacist. Please keep your medication list with you and share with your provider. Update the information when medications are discontinued, doses are changed, or new medications (including over-the-counter products) are added; and carry medication information at all times in the event of emergency situations     Allergies:  No Known Allergies          Medications  Valid as of: April 13, 2017 -  4:00 PM    Generic Name Brand Name Tablet Size Instructions for use    Apixaban (Tab) ELIQUIS 5mg Take 1 Tab by mouth 2 Times a Day. Take 10 mg (2 pills) BID x 7 days followed by 5 mg BID        Famotidine (Tab) PEPCID 20 MG Take 1 Tab by mouth 2 times a day.        Losartan Potassium-HCTZ (Tab) HYZAAR 50-12.5 MG Take 1 Tab by mouth every day.        Megestrol Acetate (Suspension) MEGACE 400 MG/10ML Take 20 mL by mouth every day.        Misc Natural Products (Powder) FIBER 7  Take  by mouth.        Ondansetron HCl (Tab) ZOFRAN 4 MG Take 1 Tab by mouth every four hours as needed for Nausea/Vomiting.        Prochlorperazine Maleate (Tab) COMPAZINE 10 MG Take 10 mg by mouth every 6 hours as needed for Nausea/Vomiting (alternating Q3H with Zofran).        Sennosides-Docusate Sodium (Tab) SENOKOT-S 8.6-50 MG Take 1 Tab by mouth 2 times a day.        .                 Medicines prescribed today were sent to:      [id number] - [place], [place] - [address] [facility] [address]    [address] [facility] [address]    Phone: [phone] Fax: [phone]    Open 24 Hours?: No    [facility] - [place], [place] - [address]    [address] [facility] [place] [address]    Phone: [phone] Fax: [phone]    Open 24 Hours?: No    [id number] - [place], [place] - [address] [facility] [facility]    [address] [place] [address] [place] [address]    Phone: [phone] Fax: [phone]    Open 24 Hours?: No      Medication refill instructions:       If your prescription bottle indicates you have medication refills left, it is not necessary to call your provider’s office. Please contact your pharmacy and they will refill your medication.    If your prescription bottle indicates you do not have any refills left, you may request refills at any time through one of the following ways: The online [employer] system (except Urgent Care), by calling your provider’s office, or by asking your pharmacy to contact your provider’s office with a refill request. Medication refills are processed only during regular business hours and may not be available until the next business day. Your provider may request additional information or to have a follow-up visit with you prior to refilling your medication.   *Please Note: Medication refills are assigned a new Rx number when refilled electronically. Your pharmacy may indicate that no refills were authorized even though a new prescription for the same medication is available at the pharmacy. Please request the medicine by name with the pharmacy before contacting your provider for a refill.        Your To Do List     Future Labs/Procedures Complete By Expires    CBC WITH DIFFERENTIAL  6/19/2017 4/13/2018    COMP METABOLIC PANEL  6/20/2017 4/13/2018    CT ABDOMEN-PELVIS WITH & W/O IV CONTRAST  6/27/2017 4/13/2018         Zapproved Access Code: Activation code not generated  Current Zapproved Status: Active

## 2017-04-13 NOTE — PROGRESS NOTES
Patient arrived ambulatory via wheelchair accompanied by her son.  Pt seen by RN today for port access/lab draw prior to establishing care with Dr. Cedeno.  Plan of care discussed and pt in agreement.  Port accessed using sterile technique with brisk blood return.  CBC w/diff and CMP drawn per written treatment plan orders by. Port flushed with 20mls NS and heparin per protocol. De-accessed and covered with sterile gauze and tape.  Pt tolerated well.

## 2017-04-13 NOTE — MR AVS SNAPSHOT
Elin Poole   2017 2:30 PM   Appointment   MRN: [id number]    Department:  [facility]   Dept Phone:  [phone]    Description:  Female : 1945   Provider:  ONC RN 1           Allergies as of 2017     No Known Allergies      Vital Signs     Smoking Status                   Never Smoker            Basic Information     Date Of Birth Sex Race Ethnicity Preferred Language    1945 Female White  Origin (Sierra Leonean,Turkish,Brazilian,Malawian, etc) English      Your appointments     2017  3:20 PM   ONCOLOGY NEW PATIENT 60 MIN with Claude Cedeno M.D.   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   8/15/2016 - Present    Cholecystitis with cholelithiasis K80.10   8/15/2016 - Present    Obstructive jaundice due to malignant neoplasm K83.8   8/15/2016 - Present    Leukocytosis D72.829   10/21/2016 - Present    Sepsis (CMS-HCC) A41.9   10/21/2016 - Present    Metabolic acidosis E87.2   10/22/2016 - Present    Deep vein thrombosis (CMS-HCC) [I82.409] I82.409   2017 - Present    Long term (current) use of anticoagulants [Z79.01] Z79.01   2017 - Present      Health Maintenance        Date Due Completion Dates    IMM DTaP/Tdap/Td Vaccine (1 - Tdap) 1964 ---    PAP SMEAR 1966 ---    COLONOSCOPY 1995 ---    IMM ZOSTER VACCINE 2005 ---    IMM PNEUMOCOCCAL 65+ (ADULT) LOW/MEDIUM RISK SERIES (2 of 2 - PPSV23) 10/15/2014 10/15/2013    MAMMOGRAM 2016, 2015, 2015    BONE DENSITY 2019 2014            Current Immunizations     13-VALENT PCV PREVNAR  10/15/2013    Influenza Vaccine Adult HD 10/15/2015      Below and/or attached are the medications your provider expects you to take. Review all of your home medications and newly ordered medications with your provider and/or pharmacist. Follow medication instructions as directed by your provider and/or pharmacist. Please keep your medication list with you and share with your provider. Update the information when medications are discontinued, doses are changed, or new medications (including over-the-counter products) are added; and carry medication information at all times in the event of emergency situations     Allergies:  No Known Allergies          Medications  Valid as of: April 13, 2017 -  3:03 PM    Generic Name Brand Name Tablet Size Instructions for use    Apixaban (Tab) ELIQUIS 5mg Take 1 Tab by mouth 2 Times a Day. Take 10 mg (2 pills) BID x 7 days followed by 5 mg BID        Famotidine (Tab) PEPCID 20 MG Take 1 Tab by mouth 2 times a day.        Losartan Potassium-HCTZ (Tab) HYZAAR 50-12.5 MG Take 1 Tab by mouth every day.        Megestrol Acetate (Suspension) MEGACE 400 MG/10ML Take 20 mL by mouth every day.        Misc Natural Products (Powder) FIBER 7  Take  by mouth.        Ondansetron HCl (Tab) ZOFRAN 4 MG Take 1 Tab by mouth every four hours as needed for Nausea/Vomiting.        Prochlorperazine Maleate (Tab) COMPAZINE 10 MG Take 10 mg by mouth every 6 hours as needed for Nausea/Vomiting (alternating Q3H with Zofran).        Sennosides-Docusate Sodium (Tab) SENOKOT-S 8.6-50 MG Take 1 Tab by mouth 2 times a day.        .                 Medicines prescribed today were sent to:     [id number] - [place], [place] - [address] [facility]    [address] [facility] [address]    Phone: [phone] Fax: [phone]    Open 24 Hours?: [facility] - [place], [place] - [address] [facility]    [address] [facility] [address]    Phone: [phone] Fax: [phone]    Open  24 Hours?: No    [id number] - [place], [place] - [address] [place] [address]    [address] [place] [address] [place] [address]    Phone: [phone] Fax: [phone]    Open 24 Hours?: No      Medication refill instructions:       If your prescription bottle indicates you have medication refills left, it is not necessary to call your provider’s office. Please contact your pharmacy and they will refill your medication.    If your prescription bottle indicates you do not have any refills left, you may request refills at any time through one of the following ways: The online [employer] system (except Urgent Care), by calling your provider’s office, or by asking your pharmacy to contact your provider’s office with a refill request. Medication refills are processed only during regular business hours and may not be available until the next business day. Your provider may request additional information or to have a follow-up visit with you prior to refilling your medication.   *Please Note: Medication refills are assigned a new Rx number when refilled electronically. Your pharmacy may indicate that no refills were authorized even though a new prescription for the same medication is available at the pharmacy. Please request the medicine by name with the pharmacy before contacting your provider for a refill.           [employer] Access Code: Activation code not generated  Current [employer] Status: Active

## 2017-04-14 NOTE — TELEPHONE ENCOUNTER
Blood work notable for normal renal function, but K low.  Will ask MA to contact patient and have him call his PCP for further recommendations about what to do about his potassium.    Michael Bloch, MD  Anticoagulation Clinic    Cc:  RICHARD Knox

## 2017-04-14 NOTE — PROGRESS NOTES
Date of visit: 4/13/2017  7:37 PM      Referring Physician: Dr. Kurtis Jacobs   Primary Care:  Wally Knox D.O.  Surgeon: Dr. Kurtis Jacobs     Diagnosis: Metastatic adenocarcinoma of ampulla of vater    Chief compliant: She is here for a follow up visit and to establish care.    History of Presenting Illness:  Elin Poole is a 71 y.o. female with adenocarcinoma of primary ampulla diagnosed in 7/2016.  She was diagnosed due to elevated liver enzymes and acute renal insufficiency.  She is s/p ERCP with stent with good decompression. Biopsy was positive for adenocarcinoma of primary ampulla. She was seen by Dr. Jacobs and underwent an attempted Whipple’s in 8/2016. The surgery was aborted secondary to multiple liver lesions and s/p wedge resection of the liver which was positive for poorly differentiated adenocarcinoma.  PET scan showed uptake in multiple hepatic lesions and uptake in the ampulla. He also had uptake in bilateral hilar area with increased uptake and small pulmonary nodules in the right middle lobe without uptake. She was treated with gemcitabine and Abraxane. She tolerated the treatment fairly well initially. After cycle 3 of chemotherapy she started to have increased fatigue and diarrhea. Repeat imaging showed response to therapy. The diarrhea improve and her regimen was changed to single agent gemcitabine. She received chemotherapy on 1/18/17. Further cycles have been held secondary to increased fatigue.   3/20/17 CT CAP showed thrombus seen in the right external iliac and common femoral vein. Previous foci in the liver are no longer seen and no residual or recurrent mass seen in the surgical bed.  She was started on Eliquis.      She is here for a 3 week visit. Overall her fatigue symptoms have improved significantly. She still have significant lower extremity edema. However, this is improving. She is able to ambulate more. No bleeding with Eliquis      Past  Medical History:      Past Medical History   Diagnosis Date   • Jaundice    • Hypertension    • Heart burn    • Indigestion    • Dental disorder      upper partial   • Cancer (CMS-HCC) 2016     Ampullary CA   • Cataract 09-     bilat.,no surgery   • Asthma    • Arthritis      hands/fingers/right knee   • Ampullary carcinoma (CMS-HCC)        Past surgical history:       Past Surgical History   Procedure Laterality Date   • Stent placement  7/2016     gallbladder   • Whipple procedure  8/15/2016     Procedure: Exploratoy Laparotomy, Da-en-y;  Surgeon: Kurtis Jacobs M.D.;  Location: SURGERY [facility];  Service:    • Node dissection  8/15/2016     Procedure: omental flap, Liver Wedge, cholecystectomy ;  Surgeon: Kurtis Jacobs M.D.;  Location: SURGERY [facility];  Service:    • Cholecystectomy     • Cath placement Right 9/9/2016     Procedure: CATH PLACEMENT cephalic power port  ;  Surgeon: Kurtis Jacobs M.D.;  Location: SURGERY [facility];  Service:        Allergies:       Review of patient's allergies indicates no known allergies.    Medications:         Current Outpatient Prescriptions   Medication Sig Dispense Refill   • apixaban (ELIQUIS) 5mg Tab Take 1 Tab by mouth 2 Times a Day. Take 10 mg (2 pills) BID x 7 days followed by 5 mg BID 60 Tab 1   • megestrol (MEGACE) 400 MG/10ML Suspension Take 20 mL by mouth every day. 600 mL 1   • losartan-hydrochlorothiazide (HYZAAR) 50-12.5 MG per tablet Take 1 Tab by mouth every day. 30 Tab 3   • famotidine (PEPCID) 20 MG Tab Take 1 Tab by mouth 2 times a day. 60 Tab 3   • sennosides-docusate sodium (SENOKOT-S) 8.6-50 MG tablet Take 1 Tab by mouth 2 times a day. 60 Tab 3   • ondansetron (ZOFRAN) 4 MG Tab tablet Take 1 Tab by mouth every four hours as needed for Nausea/Vomiting. 30 Tab 3   • Misc Natural Products (FIBER 7) Powder Take  by mouth.     • prochlorperazine (COMPAZINE) 10 MG Tab Take 10 mg by mouth every 6 hours  "as needed for Nausea/Vomiting (alternating Q3H with Zofran).       No current facility-administered medications for this visit.         Social History:     Social History     Social History   • Marital Status:      Spouse Name: N/A   • Number of Children: N/A   • Years of Education: N/A     Occupational History   • Not on file.     Social History Main Topics   • Smoking status: Never Smoker    • Smokeless tobacco: Never Used   • Alcohol Use: No   • Drug Use: No   • Sexual Activity: Not on file     Other Topics Concern   • Not on file     Social History Narrative       Family History:    No family history on file.    Review of Systems:  All other review of systems are negative except what was mentioned above in the HPI.    Constitutional: Negative for fever, chills, weight loss, improving but persistent malaise/fatigue.    HEENT: No new auditory or visual complaints. No sore throat and neck pain.     Respiratory: Negative for cough, sputum production, shortness of breath and wheezing.    Cardiovascular: Negative for chest pain, palpitations, orthopnea and leg swelling.    Gastrointestinal: Negative for heartburn, nausea, vomiting and abdominal pain.    Genitourinary: Negative for dysuria, hematuria    Musculoskeletal: No new arthralgias or myalgias . Right lower episode is willing  Skin: Negative for rash and itching.    Neurological: Negative for focal weakness and headaches.    Endo/Heme/Allergies: No abnormal bleed/bruise.    Psychiatric/Behavioral: No new depression/anxiety.    Physical Exam:  Vitals: /62 mmHg  Pulse 86  Temp(Src) 36.4 °C (97.6 °F)  Resp 16  Ht 1.525 m (5' 0.04\")  Wt 62.596 kg (138 lb)  BMI 26.92 kg/m2  SpO2 99%    General: Not in acute distress, alert and oriented x 3  HEENT: No pallor, icterus. Oropharynx clear.   Neck: Supple, no palpable masses.  Lymph nodes: No palpable cervical, supraclavicular, axillary or inguinal lymphadenopathy.    CVS: regular rate and rhythm, no " rubs or gallops  RESP: Clear to auscultate bilaterally, no wheezing or crackles.   ABD: Soft, non tender, non distended, positive bowel sounds, no palpable organomegaly  EXT: 1-2+ right pedal edema  CNS: Alert and oriented x3, No focal deficits.  Skin- No rash      Labs:   Hospital Outpatient Visit on 04/13/2017   Component Date Value Ref Range Status   • Sodium 04/13/2017 133* 135 - 145 mmol/L Final   • Potassium 04/13/2017 3.4* 3.6 - 5.5 mmol/L Final   • Chloride 04/13/2017 104  96 - 112 mmol/L Final   • Co2 04/13/2017 20  20 - 33 mmol/L Final   • Anion Gap 04/13/2017 9.0  0.0 - 11.9 Final   • Glucose 04/13/2017 172* 65 - 99 mg/dL Final   • Bun 04/13/2017 19  8 - 22 mg/dL Final   • Creatinine 04/13/2017 0.77  0.50 - 1.40 mg/dL Final   • Calcium 04/13/2017 8.5  8.5 - 10.5 mg/dL Final   • AST(SGOT) 04/13/2017 15  12 - 45 U/L Final   • ALT(SGPT) 04/13/2017 14  2 - 50 U/L Final   • Alkaline Phosphatase 04/13/2017 76  30 - 99 U/L Final   • Total Bilirubin 04/13/2017 0.4  0.1 - 1.5 mg/dL Final   • Albumin 04/13/2017 2.9* 3.2 - 4.9 g/dL Final   • Total Protein 04/13/2017 6.2  6.0 - 8.2 g/dL Final   • Globulin 04/13/2017 3.3  1.9 - 3.5 g/dL Final   • A-G Ratio 04/13/2017 0.9   Final   • WBC 04/13/2017 14.8* 4.8 - 10.8 K/uL Final   • RBC 04/13/2017 3.63* 4.20 - 5.40 M/uL Final   • Hemoglobin 04/13/2017 12.5  12.0 - 16.0 g/dL Final   • Hematocrit 04/13/2017 38.2  37.0 - 47.0 % Final   • MCV 04/13/2017 105.2* 81.4 - 97.8 fL Final   • MCH 04/13/2017 34.4* 27.0 - 33.0 pg Final   • MCHC 04/13/2017 32.7* 33.6 - 35.0 g/dL Final   • RDW 04/13/2017 61.0* 35.9 - 50.0 fL Final   • Platelet Count 04/13/2017 205  164 - 446 K/uL Final   • MPV 04/13/2017 9.5  9.0 - 12.9 fL Final   • Neutrophils-Polys 04/13/2017 77.10* 44.00 - 72.00 % Final   • Lymphocytes 04/13/2017 11.90* 22.00 - 41.00 % Final   • Monocytes 04/13/2017 7.30  0.00 - 13.40 % Final   • Eosinophils 04/13/2017 0.30  0.00 - 6.90 % Final   • Basophils 04/13/2017 0.50  0.00 -  1.80 % Final   • Immature Granulocytes 2017 2.90* 0.00 - 0.90 % Final   • Nucleated RBC 2017 0.00   Final   • Neutrophils (Absolute) 2017 11.44* 2.00 - 7.15 K/uL Final    Includes immature neutrophils, if present.   • Lymphs (Absolute) 2017 1.76  1.00 - 4.80 K/uL Final   • Monos (Absolute) 2017 1.09* 0.00 - 0.85 K/uL Final   • Eos (Absolute) 2017 0.04  0.00 - 0.51 K/uL Final   • Baso (Absolute) 2017 0.07  0.00 - 0.12 K/uL Final   • Immature Granulocytes (abs) 2017 0.43* 0.00 - 0.11 K/uL Final   • NRBC (Absolute) 2017 0.00   Final   • GFR If  2017 >60  >60 mL/min/1.73 m 2 Final   • GFR If Non  2017 >60  >60 mL/min/1.73 m 2 Final     Imagin. 3/23/17 ultrasound: Extensive RIGHT leg DVT involving external iliac, common femoral, and  popliteal veins with extension into the proximal greater saphenous vein. No evidence for DVT in the LEFT lower extremity.  2. 3/20/17 CT CAP: Thrombus seen in the right external iliac and common femoral vein. Further evaluation with DVT ultrasound is recommended. Previous foci in the liver are no longer seen. No residual or recurrent mass seen in the surgical bed. No metastasis seen in the chest or pelvis.    Assessment & Plan:  1. 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 She completed 3 cycles of gemcitabine and Abraxane with good responses. Her regimen was then decreased to single agent gemcitabine. She had significant fatigue has chemotherapy has been held. Most recent imaging showed sustaining responses. She is continued on observation. I will do a restaging CT scan in 6 weeks from now. She does not have CA 19-9 elevation . Consider restarting single agent gemcitabine on alternate weeks if the CT scan shows new lesions . Her performance status is gradually improving, she is unable  to start chemotherapy at this time.  2. Extensive DVT: On CT scan she was found to have right external iliac and common femoral DVT. Ultrasound showed extensive right leg DVT involving external iliac, common femoral and popliteal veins as well as the saphenous veins. She is on Eliquis. Recent data shows safety of DOACs in malignancy induced DVT. However, the efficacy data is still unclear, even though there is suggestion of equivalent efficacy.. Coumadin may be difficult for her. If the DVT symptoms are not improving, I will repeat ultrasound and start her on Lovenox . Instructed her to go off of Megace , which may have precipitated the DVT.  3. Nutrition: He has noticed improvement in her appetite and appears to be maintaining her caloric intake.  4. Epigastric discomfort: Improved with Pepcid twice a day.  Hypokalemia-secondary to Hyzaar-instructed her to continue KCl 20 mg daily  She is advised to follow up again in 6 weeks with a restaging CT scan      Please note that this dictation was created using voice recognition software. I have made every reasonable attempt to correct obvious errors, but I expect that there are errors of grammar and possibly content that I did not discover before finalizing the note.      SIGNATURES:  Claude Cedeno    CC:  Wally Knox D.O.  No ref. provider found

## 2017-04-21 ENCOUNTER — TELEPHONE (OUTPATIENT)
Dept: VASCULAR LAB | Facility: MEDICAL CENTER | Age: 72
End: 2017-04-21

## 2017-04-21 NOTE — TELEPHONE ENCOUNTER
I left a message when 1st given to me. I am calling her right now. I let her know that her kidney function was fine, but her Potassium was low and to contact her PCP ASAP. She said Ok will do.   ===View-only below this line===    ----- Message -----     From: Michael J Bloch, M.D.     Sent: 4/20/2017  10:23 PM       To: [facility], *  Subject: RE: low potassium - PLEASE ADDRESS ASAP          ML:    I DON'T SEE THAT THIS HAS BEEN ADDRESSED YET.  PLS CALL PATIENT ASAP.    THANKS    ----- Message -----     From: Michael J Bloch, M.D.     Sent: 4/17/2017   7:00 PM       To: [facility], *  Subject: RE: low potassium                                Ml:    See below.  Did you talk to this patient?  I don't see a note.    Bloch  ----- Message -----     From: Michael J Bloch, M.D.     Sent: 4/13/2017  10:34 PM       To: [facility], *  Subject: low potassium                                    Ml:  Please call patient and let her know that on her recent blood work her kidney function was normal but her potassium was low.      She should get in touch with her PCP to find out what to do about that low potassium as soon as possible.    Please document conversation.    Let me know if unable to reach her or any other issues

## 2017-04-21 NOTE — TELEPHONE ENCOUNTER
----- Message from Michael J Bloch, M.D. sent at 4/20/2017 10:23 PM PDT -----  Regarding: RE: low potassium - PLEASE ADDRESS ASAP  ML:    I DON'T SEE THAT THIS HAS BEEN ADDRESSED YET.  PLS CALL PATIENT ASAP.    THANKS    ----- Message -----     From: Michael J Bloch, M.D.     Sent: 4/17/2017   7:00 PM       To: [facility], Med Ass't, #  Subject: RE: low potassium                                Ml:    See below.  Did you talk to this patient?  I don't see a note.    Bloch  ----- Message -----     From: Michael J Bloch, M.D.     Sent: 4/13/2017  10:34 PM       To: [facility], Med Ass't, #  Subject: low potassium                                    Ml:  Please call patient and let her know that on her recent blood work her kidney function was normal but her potassium was low.      She should get in touch with her PCP to find out what to do about that low potassium as soon as possible.    Please document conversation.    Let me know if unable to reach her or any other issues

## 2017-04-26 ENCOUNTER — HOSPITAL ENCOUNTER (OUTPATIENT)
Dept: LAB | Facility: MEDICAL CENTER | Age: 72
End: 2017-04-26
Attending: INTERNAL MEDICINE
Payer: MEDICARE

## 2017-04-26 DIAGNOSIS — C24.1 AMPULLARY CARCINOMA (HCC): ICD-10-CM

## 2017-04-26 LAB
ALBUMIN SERPL BCP-MCNC: 3 G/DL (ref 3.2–4.9)
ALBUMIN/GLOB SERPL: 0.8 G/DL
ALP SERPL-CCNC: 94 U/L (ref 30–99)
ALT SERPL-CCNC: 15 U/L (ref 2–50)
ANION GAP SERPL CALC-SCNC: 7 MMOL/L (ref 0–11.9)
AST SERPL-CCNC: 15 U/L (ref 12–45)
BASOPHILS # BLD AUTO: 0.5 % (ref 0–1.8)
BASOPHILS # BLD: 0.05 K/UL (ref 0–0.12)
BILIRUB SERPL-MCNC: 0.3 MG/DL (ref 0.1–1.5)
BUN SERPL-MCNC: 22 MG/DL (ref 8–22)
CALCIUM SERPL-MCNC: 8.8 MG/DL (ref 8.5–10.5)
CHLORIDE SERPL-SCNC: 110 MMOL/L (ref 96–112)
CO2 SERPL-SCNC: 24 MMOL/L (ref 20–33)
CREAT SERPL-MCNC: 0.87 MG/DL (ref 0.5–1.4)
EOSINOPHIL # BLD AUTO: 0.1 K/UL (ref 0–0.51)
EOSINOPHIL NFR BLD: 1.1 % (ref 0–6.9)
ERYTHROCYTE [DISTWIDTH] IN BLOOD BY AUTOMATED COUNT: 57.1 FL (ref 35.9–50)
ERYTHROCYTE [DISTWIDTH] IN BLOOD BY AUTOMATED COUNT: 58.2 FL (ref 35.9–50)
GFR SERPL CREATININE-BSD FRML MDRD: >60 ML/MIN/1.73 M 2
GLOBULIN SER CALC-MCNC: 3.8 G/DL (ref 1.9–3.5)
GLUCOSE SERPL-MCNC: 81 MG/DL (ref 65–99)
HCT VFR BLD AUTO: 41.4 % (ref 37–47)
HCT VFR BLD AUTO: 41.6 % (ref 37–47)
HGB BLD-MCNC: 13.3 G/DL (ref 12–16)
HGB BLD-MCNC: 13.4 G/DL (ref 12–16)
IMM GRANULOCYTES # BLD AUTO: 0.19 K/UL (ref 0–0.11)
IMM GRANULOCYTES NFR BLD AUTO: 2.1 % (ref 0–0.9)
LYMPHOCYTES # BLD AUTO: 2.52 K/UL (ref 1–4.8)
LYMPHOCYTES NFR BLD: 27.5 % (ref 22–41)
MCH RBC QN AUTO: 33.5 PG (ref 27–33)
MCH RBC QN AUTO: 33.7 PG (ref 27–33)
MCHC RBC AUTO-ENTMCNC: 32.1 G/DL (ref 33.6–35)
MCHC RBC AUTO-ENTMCNC: 32.2 G/DL (ref 33.6–35)
MCV RBC AUTO: 104.3 FL (ref 81.4–97.8)
MCV RBC AUTO: 104.5 FL (ref 81.4–97.8)
MONOCYTES # BLD AUTO: 0.57 K/UL (ref 0–0.85)
MONOCYTES NFR BLD AUTO: 6.2 % (ref 0–13.4)
NEUTROPHILS # BLD AUTO: 5.72 K/UL (ref 2–7.15)
NEUTROPHILS NFR BLD: 62.6 % (ref 44–72)
NRBC # BLD AUTO: 0 K/UL
NRBC BLD AUTO-RTO: 0 /100 WBC
PLATELET # BLD AUTO: 180 K/UL (ref 164–446)
PLATELET # BLD AUTO: 184 K/UL (ref 164–446)
PMV BLD AUTO: 10 FL (ref 9–12.9)
PMV BLD AUTO: 10 FL (ref 9–12.9)
POTASSIUM SERPL-SCNC: 4.1 MMOL/L (ref 3.6–5.5)
PROT SERPL-MCNC: 6.8 G/DL (ref 6–8.2)
RBC # BLD AUTO: 3.97 M/UL (ref 4.2–5.4)
RBC # BLD AUTO: 3.98 M/UL (ref 4.2–5.4)
SODIUM SERPL-SCNC: 141 MMOL/L (ref 135–145)
WBC # BLD AUTO: 9.2 K/UL (ref 4.8–10.8)
WBC # BLD AUTO: 9.7 K/UL (ref 4.8–10.8)

## 2017-04-26 PROCEDURE — 80053 COMPREHEN METABOLIC PANEL: CPT

## 2017-04-26 PROCEDURE — 36415 COLL VENOUS BLD VENIPUNCTURE: CPT

## 2017-04-26 PROCEDURE — 85025 COMPLETE CBC W/AUTO DIFF WBC: CPT

## 2017-04-26 PROCEDURE — 85027 COMPLETE CBC AUTOMATED: CPT

## 2017-05-02 ENCOUNTER — ANTICOAGULATION VISIT (OUTPATIENT)
Dept: VASCULAR LAB | Facility: MEDICAL CENTER | Age: 72
End: 2017-05-02
Attending: INTERNAL MEDICINE
Payer: MEDICARE

## 2017-05-02 VITALS — DIASTOLIC BLOOD PRESSURE: 68 MMHG | HEART RATE: 79 BPM | SYSTOLIC BLOOD PRESSURE: 135 MMHG

## 2017-05-02 DIAGNOSIS — Z79.01 LONG TERM (CURRENT) USE OF ANTICOAGULANTS: ICD-10-CM

## 2017-05-02 DIAGNOSIS — C24.1 AMPULLARY CARCINOMA (HCC): ICD-10-CM

## 2017-05-02 LAB — INR PPP: 1.2 (ref 2–3.5)

## 2017-05-02 PROCEDURE — 85610 PROTHROMBIN TIME: CPT

## 2017-05-02 PROCEDURE — 99212 OFFICE O/P EST SF 10 MIN: CPT

## 2017-05-02 NOTE — MR AVS SNAPSHOT
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  *For Abdominal CT-Patient needs to  oral contrast and instruction from the department at least 2 hours prior to exam. Patient may  contrast at any imaging facility.            Jul 10, 2017  2:40 PM   ONCOLOGY EST PATIENT 30 MIN with Claude Cedeno M.D.   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   8/15/2016 - Present    Cholecystitis with cholelithiasis K80.10   8/15/2016 - Present    Obstructive jaundice due to malignant neoplasm K83.1, C80.1   8/15/2016 - Present    Leukocytosis D72.829   10/21/2016 - Present    Sepsis (CMS-HCC) A41.9   10/21/2016 - Present    Metabolic acidosis E87.2   10/22/2016 - Present    Deep vein thrombosis (CMS-[facility]) [I82.409] I82.409   4/4/2017 - Present    Long term (current) use of anticoagulants [Z79.01] Z79.01   4/4/2017 - Present      Health Maintenance        Date Due Completion Dates    IMM DTaP/Tdap/Td Vaccine (1 - Tdap) 6/19/1964 ---    PAP SMEAR 6/19/1966 ---    COLONOSCOPY 6/19/1995 ---    IMM ZOSTER VACCINE 6/19/2005 ---    IMM PNEUMOCOCCAL 65+ (ADULT) LOW/MEDIUM RISK SERIES (2 of 2 - PPSV23) 10/15/2014 10/15/2013    MAMMOGRAM 1/14/2016 1/14/2015    BONE DENSITY 9/26/2019 9/26/2014            Results     POCT Protime      Component    INR    1.2                        Current Immunizations     13-VALENT PCV PREVNAR 10/15/2013    Influenza Vaccine Adult HD 10/15/2015      Below and/or attached are the medications your provider expects you to take. Review all of your home medications and newly ordered medications with your provider and/or pharmacist. Follow medication instructions as directed by your provider and/or pharmacist. Please keep your medication list with you and share with your provider. Update the information when medications are discontinued, doses are changed, or new medications (including over-the-counter products) are added; and carry medication information at all times in the event of emergency situations     Allergies:  No Known Allergies          Medications  Valid as of: May 02, 2017 -  9:31 AM    Generic Name Brand Name Tablet Size Instructions for use    Apixaban (Tab) ELIQUIS 5mg Take 1 Tab by mouth 2 Times a Day. Take 10 mg (2 pills) BID x 7 days followed by 5 mg BID        Famotidine (Tab) PEPCID 20 MG Take 1 Tab by mouth 2 times a day.        Losartan Potassium-HCTZ (Tab) HYZAAR 50-12.5 MG Take 1 Tab by mouth every day.        Megestrol Acetate (Suspension) MEGACE 400 MG/10ML Take 20 mL by mouth every day.        Misc Natural Products (Powder) FIBER 7  Take  by mouth.        Ondansetron HCl (Tab) ZOFRAN 4 MG Take 1 Tab by mouth every four hours as needed for  Nausea/Vomiting.        Prochlorperazine Maleate (Tab) COMPAZINE 10 MG Take 10 mg by mouth every 6 hours as needed for Nausea/Vomiting (alternating Q3H with Zofran).        Sennosides-Docusate Sodium (Tab) SENOKOT-S 8.6-50 MG Take 1 Tab by mouth 2 times a day.        .                 Medicines prescribed today were sent to:     [id number] - [place], [place] - [address]    [address] [facility] [place] [address]    Phone: [phone] Fax: [phone]    Open 24 Hours?: No    [facility] - [place], [place] - [address]    [address] [facility] [place] [address]    Phone: [phone] Fax: [phone]    Open 24 Hours?: No    [id number] - [place], [place] - [address] [place] [address]    [address] [place] [address] [place] [address]    Phone: [phone] Fax: [phone]    Open 24 Hours?: No      Medication refill instructions:       If your prescription bottle indicates you have medication refills left, it is not necessary to call your provider’s office. Please contact your pharmacy and they will refill your medication.    If your prescription bottle indicates you do not have any refills left, you may request refills at any time through one of the following ways: The online [employer] system (except Urgent Care), by calling your provider’s office, or by asking your pharmacy to contact your provider’s office with a refill request. Medication refills are processed only during regular business hours and may not be available until the next business day. Your provider may request additional information or to have a follow-up visit with you prior to refilling your medication.   *Please Note: Medication refills are assigned a new Rx number when refilled electronically. Your pharmacy may indicate that no refills were authorized even though a new prescription for the same medication is available at the pharmacy. Please request the medicine by name with the pharmacy before contacting your provider for a  refill.        Your To Do List     Future Labs/Procedures Complete By Expires    COMP METABOLIC PANEL  As directed 5/2/2018      Warfarin Dosing Calendar   May 2017 Details    Sun Mon Tue Wed Thu Fri Sat      1               2   1.2      See details      3               4               5               6                 7               8               9               10               11               12               13                 14               15               16               17               18               19               20                 21               22               23               24               25               26               27                 28               29               30               31                   Date Details   05/02 This INR check   INR: 1.2      Date of next INR: No date specified              MyChart Access Code: Activation code not generated  Current MyChart Status: Active

## 2017-05-02 NOTE — PROGRESS NOTES
Anticoagulation Summary as of 5/2/2017     INR goal    Selected INR 1.2 (5/2/2017)   Maintenance plan No maintenance plan   Plan last modified Barry Leal, PHARMD (4/4/2017)   Next INR check    Target end date Indefinite    Indications   Ampullary carcinoma (CMS-HCC) [C24.1]  Deep vein thrombosis (CMS-[facility]) [I82.409] [I82.409]  Long term (current) use of anticoagulants [Z79.01] [Z79.01]         Anticoagulation Episode Summary     INR check location [facility]    Preferred lab     Send INR reminders to     Comments DOAC      Anticoagulation Care Providers     Provider Role Specialty Phone number    [facility] Responsible  [phone]    Wally Knox D.O. Responsible Family Medicine [phone]    Talya Krause M.D. Responsible Oncology [phone]        Anticoagulation Patient Findings     Eliquis f/u with labs today     Health Status Since Last Assessment  Any new relevant medical problems, ED visits/hospitalizations, no  Any embolic events (stroke / TIA / systemic embolism), no    Adherence with DOAC Therapy  1 or more missed doses in an average week, no      Bleeding Risk Assessment  Severe epistaxis, no Hemoptysis , no  Excessive or unusual bruising / hematomas , no  GIB / melena / BRBPR / hematemesis , no  Hematuria? Abnormal vaginal bleeding , no  Concerning daily headache or subdural hematoma symptoms ,no  Decreasing hemoglobin or new anemia , no  Latest hemoglobin:   Lab Results   Component Value Date/Time    WBC 9.2 04/26/2017 09:02 AM    WBC 9.7 04/26/2017 09:02 AM    RBC 3.98* 04/26/2017 09:02 AM    RBC 3.97* 04/26/2017 09:02 AM    HEMOGLOBIN 13.4 04/26/2017 09:02 AM    HEMOGLOBIN 13.3 04/26/2017 09:02 AM    HEMATOCRIT 41.6 04/26/2017 09:02 AM    HEMATOCRIT 41.4 04/26/2017 09:02 AM    .5* 04/26/2017 09:02 AM    .3* 04/26/2017 09:02 AM    MCH 33.7* 04/26/2017 09:02 AM    MCH 33.5* 04/26/2017 09:02 AM    MCHC 32.2* 04/26/2017 09:02 AM    MCHC 32.1*  04/26/2017 09:02 AM    MPV 10.0 04/26/2017 09:02 AM    MPV 10.0 04/26/2017 09:02 AM    NEUTROPHILS-POLYS 62.60 04/26/2017 09:02 AM    LYMPHOCYTES 27.50 04/26/2017 09:02 AM    MONOCYTES 6.20 04/26/2017 09:02 AM    EOSINOPHILS 1.10 04/26/2017 09:02 AM    BASOPHILS 0.50 04/26/2017 09:02 AM    ANISOCYTOSIS 1+ 01/18/2017 01:45 PM      EtOH overuse, no  Falls, presyncope, syncope, or seizures, no  Uncontrolled hypertension, no  CREATININE CLEARANCE /    Creatinine Clearance/Renal Function  Latest eGFR / creatinine:  Lab Results   Component Value Date/Time    SODIUM 141 04/26/2017 09:02 AM    POTASSIUM 4.1 04/26/2017 09:02 AM    CHLORIDE 110 04/26/2017 09:02 AM    CO2 24 04/26/2017 09:02 AM    GLUCOSE 81 04/26/2017 09:02 AM    BUN 22 04/26/2017 09:02 AM    CREATININE 0.87 04/26/2017 09:02 AM      • Is eGFR less than 50ml/min, no  Drug Interactions  ASA / other antiplatelets., no  NSAID, no  Other drug interactions, no (Review med list / OTCs;)  Current Outpatient Prescriptions on File Prior to Visit   Medication Sig Dispense Refill   • losartan-hydrochlorothiazide (HYZAAR) 50-12.5 MG per tablet Take 1 Tab by mouth every day. 30 Tab 3   • apixaban (ELIQUIS) 5mg Tab Take 1 Tab by mouth 2 Times a Day. Take 10 mg (2 pills) BID x 7 days followed by 5 mg BID 60 Tab 1   • megestrol (MEGACE) 400 MG/10ML Suspension Take 20 mL by mouth every day. 600 mL 1   • famotidine (PEPCID) 20 MG Tab Take 1 Tab by mouth 2 times a day. 60 Tab 3   • sennosides-docusate sodium (SENOKOT-S) 8.6-50 MG tablet Take 1 Tab by mouth 2 times a day. 60 Tab 3   • ondansetron (ZOFRAN) 4 MG Tab tablet Take 1 Tab by mouth every four hours as needed for Nausea/Vomiting. 30 Tab 3   • Misc Natural Products (FIBER 7) Powder Take  by mouth.     • prochlorperazine (COMPAZINE) 10 MG Tab Take 10 mg by mouth every 6 hours as needed for Nausea/Vomiting (alternating Q3H with Zofran).       No current facility-administered medications on file prior to visit.        Examination  Blood pressure:, WNL       Final Assessment and Recommendations:  Patient appears stable from the anticoagulation standpoint  Benefits of continued DOAC therapy outweigh risks for this patient  Recommend continue current DOAC at same dose of Eliquis 5mg bid     Follow up:  Will follow up with patient in 6 months with labs     Barry Leal, ANKURD

## 2017-05-12 LAB — INR BLD: 1.2 (ref 0.9–1.2)

## 2017-05-15 ENCOUNTER — TELEPHONE (OUTPATIENT)
Dept: VASCULAR LAB | Facility: MEDICAL CENTER | Age: 72
End: 2017-05-15

## 2017-05-15 DIAGNOSIS — I82.499 DEEP VEIN THROMBOSIS (DVT) OF OTHER VEIN OF LOWER EXTREMITY: ICD-10-CM

## 2017-05-17 ENCOUNTER — TELEPHONE (OUTPATIENT)
Dept: HEMATOLOGY ONCOLOGY | Facility: MEDICAL CENTER | Age: 72
End: 2017-05-17

## 2017-05-17 NOTE — TELEPHONE ENCOUNTER
----- Message from Your Healthcare Team sent at 5/12/2017  6:25 PM PDT -----  Regarding: Prescription Question  Contact: [phone]  I called the office for a RX refill on monday called and it wasnt sent to the drug store i dont have any left and it is my eliquis    urgent jeremie keller    birthday [birth year]

## 2017-05-17 NOTE — TELEPHONE ENCOUNTER
Patient contact his office regarding the fact that she ran out of her Eliquis medication. She stated that she's been trying to get her medication refilled since Monday. However after reviewing the chart she was sent in a referral for her medication on Monday, May 15 by the nurse practitioner in the anticoagulation clinic and given 3 refills. We contact the patient's pharmacy and she had picked up her medication on Monday. Contacted the patient today and she stated she had picked up the medication and she had her medicine and has been taking it.

## 2017-05-17 NOTE — TELEPHONE ENCOUNTER
----- Message from Your Healthcare Team sent at 5/15/2017  6:54 AM PDT -----  Regarding: Prescription Question  Contact: [phone]  i have tried to get my RX since monday and i cant seam to get it CALLED IN TO  [facility] i dont have any more   ELIQUIS 5MGS PLEASE .

## 2017-06-07 ENCOUNTER — HOSPITAL ENCOUNTER (OUTPATIENT)
Dept: LAB | Facility: MEDICAL CENTER | Age: 72
End: 2017-06-07
Attending: FAMILY MEDICINE
Payer: MEDICARE

## 2017-06-07 LAB
ALBUMIN SERPL BCP-MCNC: 3.3 G/DL (ref 3.2–4.9)
ALBUMIN/GLOB SERPL: 0.9 G/DL
ALP SERPL-CCNC: 120 U/L (ref 30–99)
ALT SERPL-CCNC: 14 U/L (ref 2–50)
ANION GAP SERPL CALC-SCNC: 6 MMOL/L (ref 0–11.9)
AST SERPL-CCNC: 24 U/L (ref 12–45)
BILIRUB SERPL-MCNC: 0.7 MG/DL (ref 0.1–1.5)
BUN SERPL-MCNC: 12 MG/DL (ref 8–22)
CALCIUM SERPL-MCNC: 9.7 MG/DL (ref 8.5–10.5)
CHLORIDE SERPL-SCNC: 105 MMOL/L (ref 96–112)
CHOLEST SERPL-MCNC: 215 MG/DL (ref 100–199)
CO2 SERPL-SCNC: 27 MMOL/L (ref 20–33)
CREAT SERPL-MCNC: 0.91 MG/DL (ref 0.5–1.4)
GFR SERPL CREATININE-BSD FRML MDRD: >60 ML/MIN/1.73 M 2
GLOBULIN SER CALC-MCNC: 3.8 G/DL (ref 1.9–3.5)
GLUCOSE SERPL-MCNC: 93 MG/DL (ref 65–99)
HDLC SERPL-MCNC: 66 MG/DL
LDLC SERPL CALC-MCNC: 127 MG/DL
POTASSIUM SERPL-SCNC: 3.6 MMOL/L (ref 3.6–5.5)
PROT SERPL-MCNC: 7.1 G/DL (ref 6–8.2)
SODIUM SERPL-SCNC: 138 MMOL/L (ref 135–145)
TRIGL SERPL-MCNC: 111 MG/DL (ref 0–149)

## 2017-06-07 PROCEDURE — 80061 LIPID PANEL: CPT

## 2017-06-07 PROCEDURE — 80053 COMPREHEN METABOLIC PANEL: CPT

## 2017-06-07 PROCEDURE — 36415 COLL VENOUS BLD VENIPUNCTURE: CPT

## 2017-06-19 ENCOUNTER — HOSPITAL ENCOUNTER (OUTPATIENT)
Dept: RADIOLOGY | Facility: MEDICAL CENTER | Age: 72
End: 2017-06-19
Attending: FAMILY MEDICINE
Payer: MEDICARE

## 2017-06-19 ENCOUNTER — HOSPITAL ENCOUNTER (OUTPATIENT)
Dept: RADIOLOGY | Facility: MEDICAL CENTER | Age: 72
End: 2017-06-19
Attending: INTERNAL MEDICINE
Payer: MEDICARE

## 2017-06-19 DIAGNOSIS — M25.561 RIGHT KNEE PAIN, UNSPECIFIED CHRONICITY: ICD-10-CM

## 2017-06-19 DIAGNOSIS — M25.511 RIGHT SHOULDER PAIN, UNSPECIFIED CHRONICITY: ICD-10-CM

## 2017-06-19 DIAGNOSIS — C24.1 AMPULLARY CARCINOMA (HCC): ICD-10-CM

## 2017-06-19 PROCEDURE — 74178 CT ABD&PLV WO CNTR FLWD CNTR: CPT

## 2017-06-19 PROCEDURE — 73564 X-RAY EXAM KNEE 4 OR MORE: CPT | Mod: RT

## 2017-06-19 PROCEDURE — 73030 X-RAY EXAM OF SHOULDER: CPT | Mod: RT

## 2017-06-19 PROCEDURE — 700117 HCHG RX CONTRAST REV CODE 255: Performed by: INTERNAL MEDICINE

## 2017-06-19 RX ADMIN — IOHEXOL 100 ML: 350 INJECTION, SOLUTION INTRAVENOUS at 11:15

## 2017-07-10 ENCOUNTER — HOSPITAL ENCOUNTER (OUTPATIENT)
Facility: MEDICAL CENTER | Age: 72
End: 2017-07-10
Attending: INTERNAL MEDICINE
Payer: MEDICARE

## 2017-07-10 ENCOUNTER — OFFICE VISIT (OUTPATIENT)
Dept: HEMATOLOGY ONCOLOGY | Facility: MEDICAL CENTER | Age: 72
End: 2017-07-10
Payer: MEDICARE

## 2017-07-10 ENCOUNTER — NON-PROVIDER VISIT (OUTPATIENT)
Dept: HEMATOLOGY ONCOLOGY | Facility: MEDICAL CENTER | Age: 72
End: 2017-07-10
Payer: MEDICARE

## 2017-07-10 VITALS
HEIGHT: 60 IN | TEMPERATURE: 98.2 F | WEIGHT: 142.42 LBS | RESPIRATION RATE: 16 BRPM | BODY MASS INDEX: 27.96 KG/M2 | OXYGEN SATURATION: 97 % | SYSTOLIC BLOOD PRESSURE: 128 MMHG | DIASTOLIC BLOOD PRESSURE: 70 MMHG | HEART RATE: 83 BPM

## 2017-07-10 DIAGNOSIS — C24.1 AMPULLARY CARCINOMA (HCC): ICD-10-CM

## 2017-07-10 LAB
ALBUMIN SERPL BCP-MCNC: 3.4 G/DL (ref 3.2–4.9)
ALBUMIN/GLOB SERPL: 0.9 G/DL
ALP SERPL-CCNC: 118 U/L (ref 30–99)
ALT SERPL-CCNC: 15 U/L (ref 2–50)
ANION GAP SERPL CALC-SCNC: 8 MMOL/L (ref 0–11.9)
AST SERPL-CCNC: 19 U/L (ref 12–45)
BASOPHILS # BLD AUTO: 0.9 % (ref 0–1.8)
BASOPHILS # BLD: 0.05 K/UL (ref 0–0.12)
BILIRUB SERPL-MCNC: 0.4 MG/DL (ref 0.1–1.5)
BUN SERPL-MCNC: 16 MG/DL (ref 8–22)
CALCIUM SERPL-MCNC: 8.6 MG/DL (ref 8.5–10.5)
CHLORIDE SERPL-SCNC: 103 MMOL/L (ref 96–112)
CO2 SERPL-SCNC: 24 MMOL/L (ref 20–33)
CREAT SERPL-MCNC: 0.79 MG/DL (ref 0.5–1.4)
EOSINOPHIL # BLD AUTO: 0.38 K/UL (ref 0–0.51)
EOSINOPHIL NFR BLD: 6.6 % (ref 0–6.9)
ERYTHROCYTE [DISTWIDTH] IN BLOOD BY AUTOMATED COUNT: 43.8 FL (ref 35.9–50)
GFR SERPL CREATININE-BSD FRML MDRD: >60 ML/MIN/1.73 M 2
GLOBULIN SER CALC-MCNC: 3.7 G/DL (ref 1.9–3.5)
GLUCOSE SERPL-MCNC: 107 MG/DL (ref 65–99)
HCT VFR BLD AUTO: 41.9 % (ref 37–47)
HGB BLD-MCNC: 14.1 G/DL (ref 12–16)
LYMPHOCYTES # BLD AUTO: 1.21 K/UL (ref 1–4.8)
LYMPHOCYTES NFR BLD: 20.8 % (ref 22–41)
MANUAL DIFF BLD: NORMAL
MCH RBC QN AUTO: 31.1 PG (ref 27–33)
MCHC RBC AUTO-ENTMCNC: 33.7 G/DL (ref 33.6–35)
MCV RBC AUTO: 92.5 FL (ref 81.4–97.8)
MONOCYTES # BLD AUTO: 0.22 K/UL (ref 0–0.85)
MONOCYTES NFR BLD AUTO: 3.8 % (ref 0–13.4)
MORPHOLOGY BLD-IMP: NORMAL
NEUTROPHILS # BLD AUTO: 3.94 K/UL (ref 2–7.15)
NEUTROPHILS NFR BLD: 67.9 % (ref 44–72)
NRBC # BLD AUTO: 0 K/UL
NRBC BLD AUTO-RTO: 0 /100 WBC
PLATELET # BLD AUTO: 180 K/UL (ref 164–446)
PLATELET BLD QL SMEAR: NORMAL
PMV BLD AUTO: 9.8 FL (ref 9–12.9)
POTASSIUM SERPL-SCNC: 3.7 MMOL/L (ref 3.6–5.5)
PROT SERPL-MCNC: 7.1 G/DL (ref 6–8.2)
RBC # BLD AUTO: 4.53 M/UL (ref 4.2–5.4)
RBC BLD AUTO: NORMAL
SODIUM SERPL-SCNC: 135 MMOL/L (ref 135–145)
WBC # BLD AUTO: 5.8 K/UL (ref 4.8–10.8)

## 2017-07-10 PROCEDURE — 85007 BL SMEAR W/DIFF WBC COUNT: CPT

## 2017-07-10 PROCEDURE — 85027 COMPLETE CBC AUTOMATED: CPT

## 2017-07-10 PROCEDURE — 99214 OFFICE O/P EST MOD 30 MIN: CPT | Performed by: INTERNAL MEDICINE

## 2017-07-10 PROCEDURE — 80053 COMPREHEN METABOLIC PANEL: CPT

## 2017-07-10 PROCEDURE — 36591 DRAW BLOOD OFF VENOUS DEVICE: CPT | Performed by: INTERNAL MEDICINE

## 2017-07-10 ASSESSMENT — PAIN SCALES - GENERAL
PAINLEVEL: NO PAIN
PAINLEVEL: NO PAIN

## 2017-07-10 NOTE — MR AVS SNAPSHOT
Elin Mora Virgilio   7/10/2017 4:00 PM   Appointment   MRN: [id number]    Department:  [facility]   Dept Phone:  [phone]    Description:  Female : 1945   Provider:  ONC RN 2           Allergies as of 7/10/2017     No Known Allergies      Vital Signs     Smoking Status                   Never Smoker            Basic Information     Date Of Birth Sex Race Ethnicity Preferred Language    1945 Female White  Origin (Estonian,Bahraini,Swazi,[place]i, etc) English      Your appointments     Aug 21, 2017  1:00 PM   Non Provider 1 with ONC RN 2   [facility] (--)    [address], [address]  [place] [address] [phone]           You will be receiving a confirmation call a few days before your appointment from our automated call confirmation system.            Sep 25, 2017  1:00 PM   Non Provider 1 with ONC RN 2   [facility] (--)    [address], [address]  [place] [address] [phone]           You will be receiving a confirmation call a few days before your appointment from our automated call confirmation system.            Oct 09, 2017  1:00 PM   Non Provider 1 with ONC RN 2   [facility] (--)    [address], [address]  [place] [address] [phone]           You will be receiving a confirmation call a few days before your appointment from our automated call confirmation system.            2017 10:00 AM   CT BODY WITH with SureVisit[facility] CT 1   [facility] [place] [facility]    [address]  [place] [address] [phone]           Some exams require specific prep instructions that would have been given to you at time of scheduling. If you have any additional questions about the prep instructions, please call Imaging Scheduling at [phone] and press #2.            2017  1:20 PM   ONCOLOGY EST PATIENT 30 MIN with Claude Cedeno M.D.   [facility] (--)    [address], [address]  [place]  [address]   [phone]            Nov 07, 2017  9:30 AM   Established Patient with [facility] EXAM 4   [facility]  (--)    [address]  [place] [address]   [phone]            Nov 13, 2017  1:00 PM   Non Provider 1 with ONC RN 2   [facility] (--)    [address], [address]  [facility] [address]   [phone]           You will be receiving a confirmation call a few days before your appointment from our automated call confirmation system.              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   8/15/2016 - Present    Cholecystitis with cholelithiasis K80.10   8/15/2016 - Present    Obstructive jaundice due to malignant neoplasm K83.1, C80.1   8/15/2016 - Present    Leukocytosis D72.829   10/21/2016 - Present    Sepsis (CMS-[facility]) A41.9   10/21/2016 - Present    Metabolic acidosis E87.2   10/22/2016 - Present    Deep vein thrombosis (CMS-[facility]) [I82.409] I82.409   4/4/2017 - Present    Long term (current) use of anticoagulants [Z79.01] Z79.01   4/4/2017 - Present      Health Maintenance        Date Due Completion Dates    IMM DTaP/Tdap/Td Vaccine (1 - Tdap) 6/19/1964 ---    PAP SMEAR 6/19/1966 ---    COLONOSCOPY 6/19/1995 ---    IMM ZOSTER VACCINE 6/19/2005 ---    IMM PNEUMOCOCCAL 65+ (ADULT) LOW/MEDIUM RISK SERIES (2 of 2 - PPSV23) 10/15/2014 10/15/2013    MAMMOGRAM 1/14/2016 1/14/2015    IMM INFLUENZA (1) 9/1/2017 10/15/2015    BONE DENSITY 9/26/2019 9/26/2014            Current Immunizations     13-VALENT PCV PREVNAR 10/15/2013    Influenza Vaccine Adult HD 10/15/2015      Below and/or attached are the medications your provider expects you to take. Review all of your home medications and newly ordered medications with your provider and/or pharmacist. Follow medication instructions as directed by your provider and/or pharmacist. Please keep your medication list with you and share  with your provider. Update the information when medications are discontinued, doses are changed, or new medications (including over-the-counter products) are added; and carry medication information at all times in the event of emergency situations     Allergies:  No Known Allergies          Medications  Valid as of: July 10, 2017 -  4:42 PM    Generic Name Brand Name Tablet Size Instructions for use    Apixaban (Tab) ELIQUIS 5mg Take 1 Tab by mouth 2 Times a Day. Take 10 mg (2 pills) BID x 7 days followed by 5 mg BID        Apixaban (Tab) ELIQUIS 5mg Take 1 Tab by mouth 2 Times a Day.        Famotidine (Tab) PEPCID 20 MG Take 1 Tab by mouth 2 times a day.        Losartan Potassium-HCTZ (Tab) HYZAAR 50-12.5 MG Take 1 Tab by mouth every day.        Megestrol Acetate (Suspension) MEGACE 400 MG/10ML Take 20 mL by mouth every day.        Misc Natural Products (Powder) FIBER 7  Take  by mouth.        Ondansetron HCl (Tab) ZOFRAN 4 MG Take 1 Tab by mouth every four hours as needed for Nausea/Vomiting.        Potassium Chloride Mariama CR (Tab CR) K-DUR 10 MEQ TAKE 2 TABS BY MOUTH EVERY MORNING.        Prochlorperazine Maleate (Tab) COMPAZINE 10 MG Take 10 mg by mouth every 6 hours as needed for Nausea/Vomiting (alternating Q3H with Zofran).        Sennosides-Docusate Sodium (Tab) SENOKOT-S 8.6-50 MG Take 1 Tab by mouth 2 times a day.        .                 Medicines prescribed today were sent to:     [id number] - [place], [place] - [address]    [address] [address]    Phone: [phone] Fax: [phone]    Open 24 Hours?: No    [facility] - [place], [place] - [address]    [address] [address]    Phone: [phone] Fax: [phone]    Open 24 Hours?: No    [id number] - [place] - [address]    [address] [facility] [address]    Phone: [phone] Fax: [phone]    Open 24 Hours?: No      Medication refill instructions:        If your prescription bottle indicates you have medication refills left, it is not necessary to call your provider’s office. Please contact your pharmacy and they will refill your medication.    If your prescription bottle indicates you do not have any refills left, you may request refills at any time through one of the following ways: The online 4meee system (except Urgent Care), by calling your provider’s office, or by asking your pharmacy to contact your provider’s office with a refill request. Medication refills are processed only during regular business hours and may not be available until the next business day. Your provider may request additional information or to have a follow-up visit with you prior to refilling your medication.   *Please Note: Medication refills are assigned a new Rx number when refilled electronically. Your pharmacy may indicate that no refills were authorized even though a new prescription for the same medication is available at the pharmacy. Please request the medicine by name with the pharmacy before contacting your provider for a refill.           4meee Access Code: Activation code not generated  Current 4meee Status: Active

## 2017-07-10 NOTE — MR AVS SNAPSHOT
Elin Mora Virgilio   7/10/2017 2:40 PM   Office Visit   MRN: [id number]    Department:  [facility]   Dept Phone:  [phone]    Description:  Female : 1945   Provider:  Claude Cedeno M.D.           Reason for Visit     Follow-Up           Allergies as of 7/10/2017     No Known Allergies      You were diagnosed with     Ampullary carcinoma (CMS-HCC)   [590287]         Vital Signs     Blood Pressure Pulse Temperature Respirations Height Weight    128/70 mmHg 83 36.8 °C (98.2 °F) 16 1.524 m (5') 64.6 kg (142 lb 6.7 oz)    Body Mass Index Oxygen Saturation Smoking Status             27.81 kg/m2 97% Never Smoker          Basic Information     Date Of Birth Sex Race Ethnicity Preferred Language    1945 Female White  Origin (Q[place]ari,[place] citizen,Chinese,[place], etc) English      Your appointments     Jul 10, 2017  4:00 PM   Non Provider 1 with ONC RN 2   [facility] (--)    [address], [facility] [address]  [facility] [address] [phone]           You will be receiving a confirmation call a few days before your appointment from our automated call confirmation system.            Aug 21, 2017  1:00 PM   Non Provider 1 with ONC RN 2   [facility] (--)    [address], [facility] [address]  [place] [address] [phone]           You will be receiving a confirmation call a few days before your appointment from our automated call confirmation system.            Sep 25, 2017  1:00 PM   Non Provider 1 with ONC RN 2   [facility] (--)    [address], [facility] [address]  [facility] [address] [phone]           You will be receiving a confirmation call a few days before your appointment from our automated call confirmation system.            Oct 09, 2017  1:00 PM   Non Provider 1 with ONC RN 2   [facility] (--)    [address], [facility] [address]  [place] [address] [phone]           You will be receiving a confirmation call a few days before your  appointment from our automated call confirmation system.            Nov 01, 2017 10:00 AM   CT BODY WITH with [facility] CT 1   [facility]    [address]  [facility] [address] [phone]           Some exams require specific prep instructions that would have been given to you at time of scheduling. If you have any additional questions about the prep instructions, please call Imaging Scheduling at [phone] and press #2.            Nov 06, 2017  1:20 PM   ONCOLOGY EST PATIENT 30 MIN with Claude Cedeno M.D.   [facility] (--)    [address], [address]  [facility] [address] [phone]            Nov 07, 2017  9:30 AM   Established Patient with [facility] EXAM 4   [facility]  (--)    [address] [address]   [phone]            Nov 13, 2017  1:00 PM   Non Provider 1 with ONC RN 2   [facility] (--)    [address]  [facility] [address] [phone]           You will be receiving a confirmation call a few days before your appointment from our automated call confirmation system.              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   8/15/2016 - Present    Cholecystitis with cholelithiasis K80.10   8/15/2016 - Present    Obstructive jaundice due to malignant neoplasm K83.1, C80.1   8/15/2016 - Present    Leukocytosis D72.829   10/21/2016 - Present    Sepsis (CMS-HCC) A41.9   10/21/2016 - Present    Metabolic acidosis E87.2   10/22/2016 - Present    Deep vein thrombosis (CMS-HCC) [I82.409] I82.409   4/4/2017 - Present    Long term (current) use of anticoagulants [Z79.01] Z79.01   4/4/2017 - Present      Health Maintenance        Date Due Completion Dates    IMM DTaP/Tdap/Td Vaccine (1 - Tdap) 6/19/1964 ---    PAP SMEAR 6/19/1966 ---    COLONOSCOPY 6/19/1995 ---    IMM ZOSTER VACCINE 6/19/2005 ---    IMM PNEUMOCOCCAL 65+  (ADULT) LOW/MEDIUM RISK SERIES (2 of 2 - PPSV23) 10/15/2014 10/15/2013    MAMMOGRAM 1/14/2016 1/14/2015    IMM INFLUENZA (1) 9/1/2017 10/15/2015    BONE DENSITY 9/26/2019 9/26/2014            Current Immunizations     13-VALENT PCV PREVNAR 10/15/2013    Influenza Vaccine Adult HD 10/15/2015      Below and/or attached are the medications your provider expects you to take. Review all of your home medications and newly ordered medications with your provider and/or pharmacist. Follow medication instructions as directed by your provider and/or pharmacist. Please keep your medication list with you and share with your provider. Update the information when medications are discontinued, doses are changed, or new medications (including over-the-counter products) are added; and carry medication information at all times in the event of emergency situations     Allergies:  No Known Allergies          Medications  Valid as of: July 10, 2017 -  3:59 PM    Generic Name Brand Name Tablet Size Instructions for use    Apixaban (Tab) ELIQUIS 5mg Take 1 Tab by mouth 2 Times a Day. Take 10 mg (2 pills) BID x 7 days followed by 5 mg BID        Apixaban (Tab) ELIQUIS 5mg Take 1 Tab by mouth 2 Times a Day.        Famotidine (Tab) PEPCID 20 MG Take 1 Tab by mouth 2 times a day.        Losartan Potassium-HCTZ (Tab) HYZAAR 50-12.5 MG Take 1 Tab by mouth every day.        Megestrol Acetate (Suspension) MEGACE 400 MG/10ML Take 20 mL by mouth every day.        Misc Natural Products (Powder) FIBER 7  Take  by mouth.        Ondansetron HCl (Tab) ZOFRAN 4 MG Take 1 Tab by mouth every four hours as needed for Nausea/Vomiting.        Potassium Chloride Mariama CR (Tab CR) K-DUR 10 MEQ TAKE 2 TABS BY MOUTH EVERY MORNING.        Prochlorperazine Maleate (Tab) COMPAZINE 10 MG Take 10 mg by mouth every 6 hours as needed for Nausea/Vomiting (alternating Q3H with Zofran).        Sennosides-Docusate Sodium (Tab) SENOKOT-S 8.6-50 MG Take 1 Tab by mouth 2 times a  day.        .                 Medicines prescribed today were sent to:     [id number] - [place], [place] - [address]    [address] [facility] [place] [address]    Phone: [phone] Fax: [phone]    Open 24 Hours?: No    [facility] - [place], [place] - [address]    [address] [facility] [place] [address]    Phone: [phone] Fax: [phone]    Open 24 Hours?: No    [id number] - [place], [place] - [address] [place] [address]    [address] [place] [address] [place] [address]    Phone: [phone] Fax: [phone]    Open 24 Hours?: No      Medication refill instructions:       If your prescription bottle indicates you have medication refills left, it is not necessary to call your provider’s office. Please contact your pharmacy and they will refill your medication.    If your prescription bottle indicates you do not have any refills left, you may request refills at any time through one of the following ways: The online edenes system (except Urgent Care), by calling your provider’s office, or by asking your pharmacy to contact your provider’s office with a refill request. Medication refills are processed only during regular business hours and may not be available until the next business day. Your provider may request additional information or to have a follow-up visit with you prior to refilling your medication.   *Please Note: Medication refills are assigned a new Rx number when refilled electronically. Your pharmacy may indicate that no refills were authorized even though a new prescription for the same medication is available at the pharmacy. Please request the medicine by name with the pharmacy before contacting your provider for a refill.        Your To Do List     Future Labs/Procedures Complete By Expires    CT-ABDOMEN WITH  As directed 7/10/2018    Standing Orders Interval Expires    CBC WITH DIFFERENTIAL  4 mo until 7/10/2018 7/10/2018    COMP METABOLIC PANEL  4mo until 7/10/2018  7/10/2018      Referral     A referral request has been sent to our patient care coordination department. Please allow 3-5 business days for us to process this request and contact you either by phone or mail. If you do not hear from us by the 5th business day, please call us at [phone].           Online [employer] Access Code: Activation code not generated  Current Online [employer] Status: Active

## 2017-07-12 NOTE — NON-PROVIDER
Late entry  Elin Poole is a 72 y.o. female here for a non-provider visit for: Port flush with labs  on 7/12/2017 at 4 PM    Procedure Performed: Port Flush with Blood Draw:  Accessed using sterile technique with brisk blood return verified. Port flushed per protocol with NS and heparin.  Port deaccessed and covered with sterile gauze/paper tape.     Anatomical site: Right chest    Equipment used: Port Access Kit    Labs drawn: CBC w/diff and CMP    Ordering Provider: Dr. Claude Ortiz By: Ana Guillen R.N.

## 2017-08-21 ENCOUNTER — NON-PROVIDER VISIT (OUTPATIENT)
Dept: HEMATOLOGY ONCOLOGY | Facility: MEDICAL CENTER | Age: 72
End: 2017-08-21
Payer: MEDICARE

## 2017-08-21 VITALS
HEIGHT: 60 IN | RESPIRATION RATE: 18 BRPM | BODY MASS INDEX: 28.76 KG/M2 | HEART RATE: 86 BPM | OXYGEN SATURATION: 97 % | DIASTOLIC BLOOD PRESSURE: 64 MMHG | SYSTOLIC BLOOD PRESSURE: 118 MMHG | TEMPERATURE: 97.6 F | WEIGHT: 146.5 LBS

## 2017-08-21 DIAGNOSIS — C24.1 AMPULLARY CARCINOMA (HCC): ICD-10-CM

## 2017-08-21 PROCEDURE — 36591 DRAW BLOOD OFF VENOUS DEVICE: CPT | Performed by: INTERNAL MEDICINE

## 2017-08-21 ASSESSMENT — PAIN SCALES - GENERAL: PAINLEVEL: NO PAIN

## 2017-09-22 RX ORDER — LIDOCAINE HYDROCHLORIDE 10 MG/ML
0.5 INJECTION, SOLUTION INFILTRATION; PERINEURAL PRN
Status: CANCELLED | OUTPATIENT
Start: 2017-10-02

## 2017-09-25 ENCOUNTER — NON-PROVIDER VISIT (OUTPATIENT)
Dept: HEMATOLOGY ONCOLOGY | Facility: MEDICAL CENTER | Age: 72
End: 2017-09-25
Payer: MEDICARE

## 2017-09-25 ENCOUNTER — TELEPHONE (OUTPATIENT)
Dept: HEMATOLOGY ONCOLOGY | Facility: MEDICAL CENTER | Age: 72
End: 2017-09-25

## 2017-09-25 VITALS
DIASTOLIC BLOOD PRESSURE: 64 MMHG | BODY MASS INDEX: 28.65 KG/M2 | SYSTOLIC BLOOD PRESSURE: 124 MMHG | RESPIRATION RATE: 18 BRPM | HEIGHT: 60 IN | HEART RATE: 98 BPM | WEIGHT: 145.94 LBS | TEMPERATURE: 97.9 F | OXYGEN SATURATION: 98 %

## 2017-09-25 DIAGNOSIS — C24.1 AMPULLARY CARCINOMA (HCC): ICD-10-CM

## 2017-09-25 ASSESSMENT — PAIN SCALES - GENERAL: PAINLEVEL: NO PAIN

## 2017-09-25 NOTE — TELEPHONE ENCOUNTER
Made appointment for lab draw on 10/9/2017. Attempted to reach pt about appointment. Informed son and son verbally agreed to let Elin know.

## 2017-09-25 NOTE — PROGRESS NOTES
Pt here for port flush  Denies pain  Port accessed with sterile field  (+) blood return  Flushed without difficulty per protocol  Port heparinized   Port de accessed  Reviewed and corrected schedule to match Q 6 week port flush  Pt has no further needs or questions.

## 2017-10-09 ENCOUNTER — HOSPITAL ENCOUNTER (OUTPATIENT)
Dept: LAB | Facility: MEDICAL CENTER | Age: 72
End: 2017-10-09
Attending: FAMILY MEDICINE
Payer: MEDICARE

## 2017-10-09 ENCOUNTER — APPOINTMENT (OUTPATIENT)
Dept: HEMATOLOGY ONCOLOGY | Facility: MEDICAL CENTER | Age: 72
End: 2017-10-09
Payer: MEDICARE

## 2017-10-09 ENCOUNTER — HOSPITAL ENCOUNTER (OUTPATIENT)
Dept: LAB | Facility: MEDICAL CENTER | Age: 72
End: 2017-10-09
Attending: INTERNAL MEDICINE
Payer: MEDICARE

## 2017-10-09 DIAGNOSIS — Z79.01 LONG TERM CURRENT USE OF ANTICOAGULANT THERAPY: ICD-10-CM

## 2017-10-09 LAB
ALBUMIN SERPL BCP-MCNC: 3.3 G/DL (ref 3.2–4.9)
ALBUMIN SERPL BCP-MCNC: 3.4 G/DL (ref 3.2–4.9)
ALBUMIN/GLOB SERPL: 0.8 G/DL
ALBUMIN/GLOB SERPL: 0.9 G/DL
ALP SERPL-CCNC: 125 U/L (ref 30–99)
ALP SERPL-CCNC: 129 U/L (ref 30–99)
ALT SERPL-CCNC: 18 U/L (ref 2–50)
ALT SERPL-CCNC: 19 U/L (ref 2–50)
ANION GAP SERPL CALC-SCNC: 7 MMOL/L (ref 0–11.9)
ANION GAP SERPL CALC-SCNC: 9 MMOL/L (ref 0–11.9)
AST SERPL-CCNC: 27 U/L (ref 12–45)
AST SERPL-CCNC: 29 U/L (ref 12–45)
BILIRUB SERPL-MCNC: 0.4 MG/DL (ref 0.1–1.5)
BILIRUB SERPL-MCNC: 0.4 MG/DL (ref 0.1–1.5)
BUN SERPL-MCNC: 11 MG/DL (ref 8–22)
BUN SERPL-MCNC: 11 MG/DL (ref 8–22)
CALCIUM SERPL-MCNC: 9.7 MG/DL (ref 8.5–10.5)
CALCIUM SERPL-MCNC: 9.8 MG/DL (ref 8.5–10.5)
CHLORIDE SERPL-SCNC: 102 MMOL/L (ref 96–112)
CHLORIDE SERPL-SCNC: 103 MMOL/L (ref 96–112)
CHOLEST SERPL-MCNC: 208 MG/DL (ref 100–199)
CO2 SERPL-SCNC: 26 MMOL/L (ref 20–33)
CO2 SERPL-SCNC: 28 MMOL/L (ref 20–33)
CREAT SERPL-MCNC: 0.75 MG/DL (ref 0.5–1.4)
CREAT SERPL-MCNC: 0.76 MG/DL (ref 0.5–1.4)
ERYTHROCYTE [DISTWIDTH] IN BLOOD BY AUTOMATED COUNT: 41.3 FL (ref 35.9–50)
GFR SERPL CREATININE-BSD FRML MDRD: >60 ML/MIN/1.73 M 2
GFR SERPL CREATININE-BSD FRML MDRD: >60 ML/MIN/1.73 M 2
GLOBULIN SER CALC-MCNC: 3.8 G/DL (ref 1.9–3.5)
GLOBULIN SER CALC-MCNC: 3.9 G/DL (ref 1.9–3.5)
GLUCOSE SERPL-MCNC: 85 MG/DL (ref 65–99)
GLUCOSE SERPL-MCNC: 85 MG/DL (ref 65–99)
HCT VFR BLD AUTO: 43.8 % (ref 37–47)
HDLC SERPL-MCNC: 65 MG/DL
HGB BLD-MCNC: 14.6 G/DL (ref 12–16)
LDLC SERPL CALC-MCNC: 117 MG/DL
MCH RBC QN AUTO: 30.5 PG (ref 27–33)
MCHC RBC AUTO-ENTMCNC: 33.3 G/DL (ref 33.6–35)
MCV RBC AUTO: 91.4 FL (ref 81.4–97.8)
POTASSIUM SERPL-SCNC: 3.8 MMOL/L (ref 3.6–5.5)
POTASSIUM SERPL-SCNC: 4 MMOL/L (ref 3.6–5.5)
PROT SERPL-MCNC: 7.2 G/DL (ref 6–8.2)
PROT SERPL-MCNC: 7.2 G/DL (ref 6–8.2)
RBC # BLD AUTO: 4.79 M/UL (ref 4.2–5.4)
SODIUM SERPL-SCNC: 137 MMOL/L (ref 135–145)
SODIUM SERPL-SCNC: 138 MMOL/L (ref 135–145)
TRIGL SERPL-MCNC: 129 MG/DL (ref 0–149)
WBC # BLD AUTO: 6.2 K/UL (ref 4.8–10.8)

## 2017-10-09 PROCEDURE — 85041 AUTOMATED RBC COUNT: CPT

## 2017-10-09 PROCEDURE — 85018 HEMOGLOBIN: CPT

## 2017-10-09 PROCEDURE — 80061 LIPID PANEL: CPT

## 2017-10-09 PROCEDURE — 36415 COLL VENOUS BLD VENIPUNCTURE: CPT

## 2017-10-09 PROCEDURE — 80053 COMPREHEN METABOLIC PANEL: CPT

## 2017-10-09 PROCEDURE — 80053 COMPREHEN METABOLIC PANEL: CPT | Mod: 91

## 2017-10-09 PROCEDURE — 85048 AUTOMATED LEUKOCYTE COUNT: CPT

## 2017-10-09 PROCEDURE — 85014 HEMATOCRIT: CPT

## 2017-10-11 ENCOUNTER — TELEPHONE (OUTPATIENT)
Dept: HEMATOLOGY ONCOLOGY | Facility: MEDICAL CENTER | Age: 72
End: 2017-10-11

## 2017-10-11 DIAGNOSIS — C24.1 AMPULLARY CARCINOMA (HCC): ICD-10-CM

## 2017-10-11 RX ORDER — LOSARTAN POTASSIUM AND HYDROCHLOROTHIAZIDE 12.5; 5 MG/1; MG/1
1 TABLET ORAL DAILY
Qty: 30 TAB | Refills: 0 | Status: SHIPPED | OUTPATIENT
Start: 2017-10-11 | End: 2018-01-01 | Stop reason: CLARIF

## 2017-10-12 NOTE — TELEPHONE ENCOUNTER
One month refill for BP meds given to patient but patient to have PCP refill BP meds from now on. Oncology office to only give one more month refill.

## 2017-11-01 ENCOUNTER — HOSPITAL ENCOUNTER (OUTPATIENT)
Dept: RADIOLOGY | Facility: MEDICAL CENTER | Age: 72
End: 2017-11-01
Attending: INTERNAL MEDICINE
Payer: MEDICARE

## 2017-11-01 DIAGNOSIS — C24.1 AMPULLARY CARCINOMA (HCC): ICD-10-CM

## 2017-11-01 PROCEDURE — 700117 HCHG RX CONTRAST REV CODE 255: Performed by: INTERNAL MEDICINE

## 2017-11-01 PROCEDURE — 74160 CT ABDOMEN W/CONTRAST: CPT

## 2017-11-01 RX ADMIN — IOHEXOL 100 ML: 350 INJECTION, SOLUTION INTRAVENOUS at 10:30

## 2017-11-03 DIAGNOSIS — C24.1 AMPULLARY CARCINOMA (HCC): ICD-10-CM

## 2017-11-06 ENCOUNTER — NON-PROVIDER VISIT (OUTPATIENT)
Dept: HEMATOLOGY ONCOLOGY | Facility: MEDICAL CENTER | Age: 72
End: 2017-11-06
Payer: MEDICARE

## 2017-11-06 ENCOUNTER — TELEPHONE (OUTPATIENT)
Dept: HEMATOLOGY ONCOLOGY | Facility: MEDICAL CENTER | Age: 72
End: 2017-11-06

## 2017-11-06 ENCOUNTER — HOSPITAL ENCOUNTER (OUTPATIENT)
Facility: MEDICAL CENTER | Age: 72
End: 2017-11-06
Attending: INTERNAL MEDICINE
Payer: MEDICARE

## 2017-11-06 ENCOUNTER — OFFICE VISIT (OUTPATIENT)
Dept: HEMATOLOGY ONCOLOGY | Facility: MEDICAL CENTER | Age: 72
End: 2017-11-06
Payer: MEDICARE

## 2017-11-06 VITALS
WEIGHT: 148.48 LBS | BODY MASS INDEX: 29.15 KG/M2 | RESPIRATION RATE: 16 BRPM | HEART RATE: 87 BPM | DIASTOLIC BLOOD PRESSURE: 74 MMHG | TEMPERATURE: 97.9 F | OXYGEN SATURATION: 96 % | HEIGHT: 60 IN | SYSTOLIC BLOOD PRESSURE: 132 MMHG

## 2017-11-06 DIAGNOSIS — C24.1 AMPULLARY CARCINOMA (HCC): ICD-10-CM

## 2017-11-06 LAB
ALBUMIN SERPL BCP-MCNC: 3.6 G/DL (ref 3.2–4.9)
ALBUMIN/GLOB SERPL: 1 G/DL
ALP SERPL-CCNC: 130 U/L (ref 30–99)
ALT SERPL-CCNC: 13 U/L (ref 2–50)
ANION GAP SERPL CALC-SCNC: 8 MMOL/L (ref 0–11.9)
AST SERPL-CCNC: 25 U/L (ref 12–45)
BASOPHILS # BLD AUTO: 0.5 % (ref 0–1.8)
BASOPHILS # BLD: 0.03 K/UL (ref 0–0.12)
BILIRUB SERPL-MCNC: 0.4 MG/DL (ref 0.1–1.5)
BUN SERPL-MCNC: 13 MG/DL (ref 8–22)
CALCIUM SERPL-MCNC: 9.4 MG/DL (ref 8.5–10.5)
CANCER AG19-9 SERPL-ACNC: 3.7 U/ML (ref 0–35)
CHLORIDE SERPL-SCNC: 102 MMOL/L (ref 96–112)
CO2 SERPL-SCNC: 26 MMOL/L (ref 20–33)
CREAT SERPL-MCNC: 0.87 MG/DL (ref 0.5–1.4)
EOSINOPHIL # BLD AUTO: 0.07 K/UL (ref 0–0.51)
EOSINOPHIL NFR BLD: 1.1 % (ref 0–6.9)
ERYTHROCYTE [DISTWIDTH] IN BLOOD BY AUTOMATED COUNT: 43.2 FL (ref 35.9–50)
GFR SERPL CREATININE-BSD FRML MDRD: >60 ML/MIN/1.73 M 2
GLOBULIN SER CALC-MCNC: 3.7 G/DL (ref 1.9–3.5)
GLUCOSE SERPL-MCNC: 117 MG/DL (ref 65–99)
HCT VFR BLD AUTO: 42.3 % (ref 37–47)
HGB BLD-MCNC: 14.6 G/DL (ref 12–16)
IMM GRANULOCYTES # BLD AUTO: 0.02 K/UL (ref 0–0.11)
IMM GRANULOCYTES NFR BLD AUTO: 0.3 % (ref 0–0.9)
LYMPHOCYTES # BLD AUTO: 2.08 K/UL (ref 1–4.8)
LYMPHOCYTES NFR BLD: 31.2 % (ref 22–41)
MCH RBC QN AUTO: 31.1 PG (ref 27–33)
MCHC RBC AUTO-ENTMCNC: 34.5 G/DL (ref 33.6–35)
MCV RBC AUTO: 90 FL (ref 81.4–97.8)
MONOCYTES # BLD AUTO: 0.36 K/UL (ref 0–0.85)
MONOCYTES NFR BLD AUTO: 5.4 % (ref 0–13.4)
NEUTROPHILS # BLD AUTO: 4.1 K/UL (ref 2–7.15)
NEUTROPHILS NFR BLD: 61.5 % (ref 44–72)
NRBC # BLD AUTO: 0 K/UL
NRBC BLD AUTO-RTO: 0 /100 WBC
PLATELET # BLD AUTO: 243 K/UL (ref 164–446)
PMV BLD AUTO: 10.3 FL (ref 9–12.9)
POTASSIUM SERPL-SCNC: 3.8 MMOL/L (ref 3.6–5.5)
PROT SERPL-MCNC: 7.3 G/DL (ref 6–8.2)
RBC # BLD AUTO: 4.7 M/UL (ref 4.2–5.4)
SODIUM SERPL-SCNC: 136 MMOL/L (ref 135–145)
WBC # BLD AUTO: 6.7 K/UL (ref 4.8–10.8)

## 2017-11-06 PROCEDURE — 85025 COMPLETE CBC W/AUTO DIFF WBC: CPT

## 2017-11-06 PROCEDURE — 80053 COMPREHEN METABOLIC PANEL: CPT

## 2017-11-06 PROCEDURE — 86301 IMMUNOASSAY TUMOR CA 19-9: CPT

## 2017-11-06 PROCEDURE — 99215 OFFICE O/P EST HI 40 MIN: CPT | Performed by: INTERNAL MEDICINE

## 2017-11-06 PROCEDURE — 36591 DRAW BLOOD OFF VENOUS DEVICE: CPT | Performed by: INTERNAL MEDICINE

## 2017-11-06 RX ORDER — ONDANSETRON 8 MG/1
8 TABLET, ORALLY DISINTEGRATING ORAL
Status: CANCELLED | OUTPATIENT
Start: 2017-12-01

## 2017-11-06 RX ORDER — ONDANSETRON 8 MG/1
8 TABLET, ORALLY DISINTEGRATING ORAL
Status: CANCELLED | OUTPATIENT
Start: 2017-11-18

## 2017-11-06 RX ORDER — ONDANSETRON 2 MG/ML
4 INJECTION INTRAMUSCULAR; INTRAVENOUS
Status: CANCELLED | OUTPATIENT
Start: 2017-11-18

## 2017-11-06 RX ORDER — CYCLOSPORINE 0.5 MG/ML
EMULSION OPHTHALMIC
COMMUNITY
Start: 2017-10-31 | End: 2017-11-17

## 2017-11-06 RX ORDER — LIDOCAINE HYDROCHLORIDE 10 MG/ML
0.5 INJECTION, SOLUTION INFILTRATION; PERINEURAL SEE ADMIN INSTRUCTIONS
Status: CANCELLED | OUTPATIENT
Start: 2017-11-18

## 2017-11-06 RX ORDER — SODIUM CHLORIDE 9 MG/ML
INJECTION, SOLUTION INTRAVENOUS CONTINUOUS
Status: CANCELLED | OUTPATIENT
Start: 2017-11-18

## 2017-11-06 RX ORDER — SODIUM CHLORIDE 9 MG/ML
INJECTION, SOLUTION INTRAVENOUS CONTINUOUS
Status: CANCELLED | OUTPATIENT
Start: 2017-12-01

## 2017-11-06 RX ORDER — LIDOCAINE HYDROCHLORIDE 10 MG/ML
0.5 INJECTION, SOLUTION INFILTRATION; PERINEURAL SEE ADMIN INSTRUCTIONS
Status: CANCELLED | OUTPATIENT
Start: 2017-12-01

## 2017-11-06 RX ORDER — PROCHLORPERAZINE MALEATE 10 MG
10 TABLET ORAL EVERY 6 HOURS PRN
Status: CANCELLED | OUTPATIENT
Start: 2017-12-01

## 2017-11-06 RX ORDER — PROCHLORPERAZINE MALEATE 10 MG
10 TABLET ORAL EVERY 6 HOURS PRN
Status: CANCELLED | OUTPATIENT
Start: 2017-11-18

## 2017-11-06 RX ORDER — ONDANSETRON 2 MG/ML
4 INJECTION INTRAMUSCULAR; INTRAVENOUS
Status: CANCELLED | OUTPATIENT
Start: 2017-12-01

## 2017-11-06 ASSESSMENT — PAIN SCALES - GENERAL: PAINLEVEL: NO PAIN

## 2017-11-06 NOTE — PROGRESS NOTES
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     History of Presenting Illness:  Elin Poole is a 71 y.o. female with adenocarcinoma of primary ampulla diagnosed in 7/2016.  She was diagnosed due to elevated liver enzymes and acute renal insufficiency.  She is s/p ERCP with stent with good decompression. Biopsy was positive for adenocarcinoma of primary ampulla. She was seen by Dr. Jacobs and underwent an attempted Whipple’s in 8/2016. The surgery was aborted secondary to multiple liver lesions and s/p wedge resection of the liver which was positive for poorly differentiated adenocarcinoma.  PET scan showed uptake in multiple hepatic lesions and uptake in the ampulla. He also had uptake in bilateral hilar area with increased uptake and small pulmonary nodules in the right middle lobe without uptake. She was treated with gemcitabine and Abraxane. She tolerated the treatment fairly well initially. After cycle 3 of chemotherapy she started to have increased fatigue and diarrhea. Repeat imaging showed response to therapy. The diarrhea improve and her regimen was changed to single agent gemcitabine. She received chemotherapy on 1/18/17. Further cycles have been held secondary to increased fatigue.   3/20/17 CT CAP showed thrombus seen in the right external iliac and common femoral vein. Previous foci in the liver are no longer seen and no residual or recurrent mass seen in the surgical bed.  She was started on Eliquis.    -11/1/17-interim CT scan shows disease progression with a new enhancing mass in the uncinate process in. Ampullary area with some mass effect on the distal CBD. There is also new adenopathy surrounding the mass. new tumor thrombus in the posterior SMV with 180 degrees abutment of the adjacent mass. Other vascular structures are patent without  encasement.. No liver metastases mentioned.    She is mostly asymptomatic and denies having any jaundice or abdominal pain  Past Medical History:      Past Medical History:   Diagnosis Date   • Ampullary carcinoma (CMS-HCC)    • Arthritis     hands/fingers/right knee   • Asthma    • Cancer (CMS-HCC) 2016    Ampullary CA   • Cataract 09-    bilat.,no surgery   • Dental disorder     upper partial   • Heart burn    • Hypertension    • Indigestion    • Jaundice        Past surgical history:       Past Surgical History:   Procedure Laterality Date   • CATH PLACEMENT Right 9/9/2016    Procedure: CATH PLACEMENT cephalic power port  ;  Surgeon: Kurtis Jacobs M.D.;  Location: SURGERY [facility];  Service:    • WHIPPLE PROCEDURE  8/15/2016    Procedure: Exploratoy Laparotomy, Da-en-y;  Surgeon: Kurtis Jacobs M.D.;  Location: SURGERY [facility];  Service:    • NODE DISSECTION  8/15/2016    Procedure: omental flap, Liver Wedge, cholecystectomy ;  Surgeon: Kurtis Jacobs M.D.;  Location: SURGERY [facility];  Service:    • STENT PLACEMENT  7/2016    gallbladder   • CHOLECYSTECTOMY         Allergies:       Review of patient's allergies indicates no known allergies.    Medications:         Current Outpatient Prescriptions   Medication Sig Dispense Refill   • RESTASIS 0.05 % ophthalmic emulsion      • losartan-hydrochlorothiazide (HYZAAR) 50-12.5 MG per tablet Take 1 Tab by mouth every day. 30 Tab 0   • potassium chloride SA (K-DUR) 10 MEQ Tab CR TAKE 2 TABS BY MOUTH EVERY MORNING. 60 Tab 3   • apixaban (ELIQUIS) 5mg Tab Take 1 Tab by mouth 2 Times a Day. Take 10 mg (2 pills) BID x 7 days followed by 5 mg BID 60 Tab 1   • famotidine (PEPCID) 20 MG Tab Take 1 Tab by mouth 2 times a day. 60 Tab 3   • apixaban (ELIQUIS) 5mg Tab Take 1 Tab by mouth 2 Times a Day. 60 Tab 6   • megestrol (MEGACE) 400 MG/10ML Suspension Take 20 mL by mouth every day. 600 mL 1   • sennosides-docusate  sodium (SENOKOT-S) 8.6-50 MG tablet Take 1 Tab by mouth 2 times a day. 60 Tab 3   • ondansetron (ZOFRAN) 4 MG Tab tablet Take 1 Tab by mouth every four hours as needed for Nausea/Vomiting. 30 Tab 3   • Misc Natural Products (FIBER 7) Powder Take  by mouth.     • prochlorperazine (COMPAZINE) 10 MG Tab Take 10 mg by mouth every 6 hours as needed for Nausea/Vomiting (alternating Q3H with Zofran).       No current facility-administered medications for this visit.          Social History:     Social History     Social History   • Marital status:      Spouse name: N/A   • Number of children: N/A   • Years of education: N/A     Occupational History   • Not on file.     Social History Main Topics   • Smoking status: Never Smoker   • Smokeless tobacco: Never Used   • Alcohol use No   • Drug use: No   • Sexual activity: Not on file     Other Topics Concern   • Not on file     Social History Narrative   • No narrative on file       Family History:    No family history on file.    Review of Systems:  All other review of systems are negative except what was mentioned above in the HPI.    Constitutional: Negative for fever, chills, weight loss and malaise/fatigue.    HEENT: No new auditory or visual complaints. No sore throat and neck pain.     Respiratory: Negative for cough, sputum production, shortness of breath and wheezing.    Cardiovascular: Negative for chest pain, palpitations, orthopnea and leg swelling.    Gastrointestinal: Negative for heartburn, nausea, vomiting and abdominal pain.    Genitourinary: Negative for dysuria, hematuria    Musculoskeletal: No new arthralgias or myalgias   Skin: Negative for rash and itching.    Neurological: Negative for focal weakness and headaches.    Endo/Heme/Allergies: No abnormal bleed/bruise.    Psychiatric/Behavioral: No new depression/anxiety.    Physical Exam:  Vitals: /74   Pulse 87   Temp 36.6 °C (97.9 °F)   Resp 16   Ht 1.524 m (5')   Wt 67.4 kg (148 lb 7.7  oz)   SpO2 96%   Breastfeeding? No   BMI 29.00 kg/m²     General: Not in acute distress, alert and oriented x 3  HEENT: No pallor, icterus. Oropharynx clear.   Neck: Supple, no palpable masses.  Lymph nodes: No palpable cervical, supraclavicular, axillary or inguinal lymphadenopathy.    CVS: regular rate and rhythm, no rubs or gallops  RESP: Clear to auscultate bilaterally, no wheezing or crackles.   ABD: Soft, non tender, non distended, positive bowel sounds, no palpable organomegaly  EXT: No edema or cyanosis  CNS: Alert and oriented x3, No focal deficits.  Skin- No rash      Labs:   No visits with results within 1 Week(s) from this visit.   Latest known visit with results is:   Hospital Outpatient Visit on 10/09/2017   Component Date Value Ref Range Status   • Sodium 10/09/2017 137  135 - 145 mmol/L Final   • Potassium 10/09/2017 4.0  3.6 - 5.5 mmol/L Final   • Chloride 10/09/2017 102  96 - 112 mmol/L Final   • Co2 10/09/2017 26  20 - 33 mmol/L Final   • Anion Gap 10/09/2017 9.0  0.0 - 11.9 Final   • Glucose 10/09/2017 85  65 - 99 mg/dL Final   • Bun 10/09/2017 11  8 - 22 mg/dL Final   • Creatinine 10/09/2017 0.75  0.50 - 1.40 mg/dL Final   • Calcium 10/09/2017 9.7  8.5 - 10.5 mg/dL Final   • AST(SGOT) 10/09/2017 29  12 - 45 U/L Final   • ALT(SGPT) 10/09/2017 18  2 - 50 U/L Final   • Alkaline Phosphatase 10/09/2017 129* 30 - 99 U/L Final   • Total Bilirubin 10/09/2017 0.4  0.1 - 1.5 mg/dL Final   • Albumin 10/09/2017 3.4  3.2 - 4.9 g/dL Final   • Total Protein 10/09/2017 7.2  6.0 - 8.2 g/dL Final   • Globulin 10/09/2017 3.8* 1.9 - 3.5 g/dL Final   • A-G Ratio 10/09/2017 0.9  g/dL Final   • WBC 10/09/2017 6.2  4.8 - 10.8 K/uL Final   • RBC 10/09/2017 4.79  4.20 - 5.40 M/uL Final   • Hemoglobin 10/09/2017 14.6  12.0 - 16.0 g/dL Final   • Hematocrit 10/09/2017 43.8  37.0 - 47.0 % Final   • MCV 10/09/2017 91.4  81.4 - 97.8 fL Final   • MCH 10/09/2017 30.5  27.0 - 33.0 pg Final   • MCHC 10/09/2017 33.3* 33.6 - 35.0  g/dL Final   • RDW 10/09/2017 41.3  35.9 - 50.0 fL Final   • GFR If  10/09/2017 >60  >60 mL/min/1.73 m 2 Final   • GFR If Non  10/09/2017 >60  >60 mL/min/1.73 m 2 Final             Assessment and Plan:  1. 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 She completed 3 cycles of gemcitabine and Abraxane with good responses. Her regimen was then decreased to single agent gemcitabine. She had significant fatigue has chemotherapy has been held since early 2017.    Reviewed the recent CT images and reports with the patient and family. Informed her that she is having significant local progression. No mention of liver metastasis. I will also obtain a staging CT of the lungs. Long discussion about further management option. I recommended her resuming gemcitabine and Abraxane. Given her prior issues. I will switch her to alternate week treatment. I did recheck her LFTs and she has not developed any obstructive jaundice. Hopefully, she will have good response to gemcitabine and Abraxane. The plan will be to obtain restaging studies and consider Xeloda-based radiation if she has only local disease up to 3 months of chemotherapy. We will obtain NGS testing from her tumor specimen for MSI/TMB and BRCA testing. She did not develop gemcitabine induced febrile reaction and I instructed her to preemptively take Tylenol for day 2 and 3. She does not have any CA 19-9. Elevation to monitor.    Complex patient requiring complex decision making. Time spent with the patient and the family 45 minutes, 30 minutes was spent reviewing the clinical data, imaging, discussion regarding further chemotherapy and symptom alleviation.  She agreed and verbalized her agreement and understanding with the current plan.  I answered all questions and concerns she has at this time         Please note that this dictation was  created using voice recognition software. I have made every reasonable attempt to correct obvious errors, but I expect that there are errors of grammar and possibly content that I did not discover before finalizing the note.      SIGNATURES:  Claude Cedeno    CC:  Wally Knox D.O.  No ref. provider found

## 2017-11-06 NOTE — PROGRESS NOTES
Elin Poole is a 72 y.o. female here for a non-provider visit for: Lab Draws  on 11/6/2017 at 2:58 PM    Procedure Performed: Port Access with Blood Draw: Accessed using sterile technique with brisk blood return verified. Port flushed per protocol with NS and heparin.  Port deaccessed and covered with sterile gauze/paper tape.     Anatomical site: R chest    Equipment used: Port Access Kit    Labs drawn: CBC w/diff, CMP and CA 19-9    Ordering Provider: Dr. Claude Ortiz By: Meron Smith R.N.

## 2017-11-07 ENCOUNTER — ANTICOAGULATION VISIT (OUTPATIENT)
Dept: VASCULAR LAB | Facility: MEDICAL CENTER | Age: 72
End: 2017-11-07
Attending: INTERNAL MEDICINE
Payer: MEDICARE

## 2017-11-07 VITALS — HEART RATE: 62 BPM | SYSTOLIC BLOOD PRESSURE: 147 MMHG | DIASTOLIC BLOOD PRESSURE: 72 MMHG

## 2017-11-07 DIAGNOSIS — I82.4Y9 DEEP VEIN THROMBOSIS (DVT) OF PROXIMAL LOWER EXTREMITY, UNSPECIFIED CHRONICITY, UNSPECIFIED LATERALITY (HCC): ICD-10-CM

## 2017-11-07 PROCEDURE — 90662 IIV NO PRSV INCREASED AG IM: CPT

## 2017-11-07 PROCEDURE — 99211 OFF/OP EST MAY X REQ PHY/QHP: CPT | Mod: 25

## 2017-11-07 PROCEDURE — 90471 IMMUNIZATION ADMIN: CPT

## 2017-11-07 NOTE — PROGRESS NOTES
OP Anticoagulation Service Note    Date: 11/7/2017    Anticoaguation   Pt is on anticoagulation for DVT, duration of treatment indefinite     Health Status Since Last Assessment   Patient denies any new relevant medical problems, ED visits or hospitalizations   Patient denies any embolic events (stroke/tia/systemic embolism)    Adherence with DOAC Therapy   Pt has NOT missed any doses in the average week    Bleeding Risk Assessment     Denies Epistaxis   Pt denies any excessive or unusual bleeding/hematomas.  Pt denies any GI bleeds or hematemesis.  Pt denies any concerning daily headache or sub dural hematoma symptoms.     Pt denies any hematuria or abnormal vaginal bleeding.   Latest Hemoglobin 14.6   ETOH overuse : she does not drink ETOH     Creatinine Clearance/Renal Function     Latest SCr = 0.87     Drug Interactions   ASA/other antiplatelets : pt avoids   NSAID : pt avoids   Other drug interactions  NONE    Examination   Blood Pressure WNL   Symptomatic hypotension NONE   Significant gait impairment/imbalance/high risk for falls? NONE       Final Assessment and Recommendations:   Patient appears stable from the anticoagulation staindpoint.     Benefits of continued DOAC therapy ouyweigh risks for this patient   Recommend pt continue with current DOAC therapy  (with dose adjustment)     Pt consented to and received Influenza HD vaccine today. She was told by Dr. Cedeno it is ok to get this. She is starting chemotherapy in 10 days    Medication Eliquis 5 mg twice daily for remainder of treatment     DO NOT STOP anticoagulation unless directed by provider or our clinic.     Next Appointment: Tuesday, May 7th, 2018 at 9:30 AM     For questions, please contact Outpatient Anticoagulation Service [phone].     Zita Nguyen, DanaD

## 2017-11-14 ENCOUNTER — HOSPITAL ENCOUNTER (OUTPATIENT)
Dept: RADIOLOGY | Facility: MEDICAL CENTER | Age: 72
End: 2017-11-14
Attending: INTERNAL MEDICINE
Payer: MEDICARE

## 2017-11-14 DIAGNOSIS — C24.1 AMPULLARY CARCINOMA (HCC): ICD-10-CM

## 2017-11-14 PROCEDURE — 71260 CT THORAX DX C+: CPT

## 2017-11-14 PROCEDURE — 700117 HCHG RX CONTRAST REV CODE 255: Performed by: INTERNAL MEDICINE

## 2017-11-14 RX ADMIN — IOHEXOL 100 ML: 350 INJECTION, SOLUTION INTRAVENOUS at 14:40

## 2017-11-17 ENCOUNTER — OUTPATIENT INFUSION SERVICES (OUTPATIENT)
Dept: ONCOLOGY | Facility: MEDICAL CENTER | Age: 72
End: 2017-11-17
Attending: INTERNAL MEDICINE
Payer: MEDICARE

## 2017-11-17 VITALS
HEIGHT: 61 IN | TEMPERATURE: 98 F | DIASTOLIC BLOOD PRESSURE: 47 MMHG | OXYGEN SATURATION: 96 % | RESPIRATION RATE: 18 BRPM | BODY MASS INDEX: 27.72 KG/M2 | WEIGHT: 146.83 LBS | SYSTOLIC BLOOD PRESSURE: 132 MMHG | HEART RATE: 57 BPM

## 2017-11-17 DIAGNOSIS — C24.1 AMPULLARY CARCINOMA (HCC): ICD-10-CM

## 2017-11-17 LAB
ALBUMIN SERPL BCP-MCNC: 3.6 G/DL (ref 3.2–4.9)
ALBUMIN/GLOB SERPL: 1 G/DL
ALP SERPL-CCNC: 131 U/L (ref 30–99)
ALT SERPL-CCNC: 14 U/L (ref 2–50)
ANION GAP SERPL CALC-SCNC: 6 MMOL/L (ref 0–11.9)
AST SERPL-CCNC: 24 U/L (ref 12–45)
BASOPHILS # BLD AUTO: 0.5 % (ref 0–1.8)
BASOPHILS # BLD: 0.04 K/UL (ref 0–0.12)
BILIRUB SERPL-MCNC: 0.4 MG/DL (ref 0.1–1.5)
BUN SERPL-MCNC: 13 MG/DL (ref 8–22)
CALCIUM SERPL-MCNC: 9.4 MG/DL (ref 8.5–10.5)
CHLORIDE SERPL-SCNC: 103 MMOL/L (ref 96–112)
CO2 SERPL-SCNC: 25 MMOL/L (ref 20–33)
CREAT SERPL-MCNC: 0.83 MG/DL (ref 0.5–1.4)
EOSINOPHIL # BLD AUTO: 0.08 K/UL (ref 0–0.51)
EOSINOPHIL NFR BLD: 1.1 % (ref 0–6.9)
ERYTHROCYTE [DISTWIDTH] IN BLOOD BY AUTOMATED COUNT: 43.2 FL (ref 35.9–50)
GFR SERPL CREATININE-BSD FRML MDRD: >60 ML/MIN/1.73 M 2
GLOBULIN SER CALC-MCNC: 3.7 G/DL (ref 1.9–3.5)
GLUCOSE SERPL-MCNC: 109 MG/DL (ref 65–99)
HCT VFR BLD AUTO: 42.3 % (ref 37–47)
HGB BLD-MCNC: 14.4 G/DL (ref 12–16)
IMM GRANULOCYTES # BLD AUTO: 0.03 K/UL (ref 0–0.11)
IMM GRANULOCYTES NFR BLD AUTO: 0.4 % (ref 0–0.9)
LYMPHOCYTES # BLD AUTO: 2.58 K/UL (ref 1–4.8)
LYMPHOCYTES NFR BLD: 34 % (ref 22–41)
MCH RBC QN AUTO: 30.3 PG (ref 27–33)
MCHC RBC AUTO-ENTMCNC: 34 G/DL (ref 33.6–35)
MCV RBC AUTO: 88.9 FL (ref 81.4–97.8)
MONOCYTES # BLD AUTO: 0.36 K/UL (ref 0–0.85)
MONOCYTES NFR BLD AUTO: 4.7 % (ref 0–13.4)
NEUTROPHILS # BLD AUTO: 4.5 K/UL (ref 2–7.15)
NEUTROPHILS NFR BLD: 59.3 % (ref 44–72)
NRBC # BLD AUTO: 0 K/UL
NRBC BLD AUTO-RTO: 0 /100 WBC
PLATELET # BLD AUTO: 250 K/UL (ref 164–446)
PMV BLD AUTO: 9.5 FL (ref 9–12.9)
POTASSIUM SERPL-SCNC: 3.5 MMOL/L (ref 3.6–5.5)
PROT SERPL-MCNC: 7.3 G/DL (ref 6–8.2)
RBC # BLD AUTO: 4.76 M/UL (ref 4.2–5.4)
SODIUM SERPL-SCNC: 134 MMOL/L (ref 135–145)
WBC # BLD AUTO: 7.6 K/UL (ref 4.8–10.8)

## 2017-11-17 PROCEDURE — 80053 COMPREHEN METABOLIC PANEL: CPT

## 2017-11-17 PROCEDURE — 96413 CHEMO IV INFUSION 1 HR: CPT

## 2017-11-17 PROCEDURE — 85025 COMPLETE CBC W/AUTO DIFF WBC: CPT

## 2017-11-17 PROCEDURE — 700102 HCHG RX REV CODE 250 W/ 637 OVERRIDE(OP): Performed by: INTERNAL MEDICINE

## 2017-11-17 PROCEDURE — 700111 HCHG RX REV CODE 636 W/ 250 OVERRIDE (IP)

## 2017-11-17 PROCEDURE — 96417 CHEMO IV INFUS EACH ADDL SEQ: CPT

## 2017-11-17 PROCEDURE — 96375 TX/PRO/DX INJ NEW DRUG ADDON: CPT

## 2017-11-17 PROCEDURE — A4212 NON CORING NEEDLE OR STYLET: HCPCS

## 2017-11-17 PROCEDURE — 700111 HCHG RX REV CODE 636 W/ 250 OVERRIDE (IP): Performed by: INTERNAL MEDICINE

## 2017-11-17 PROCEDURE — 700105 HCHG RX REV CODE 258: Performed by: INTERNAL MEDICINE

## 2017-11-17 PROCEDURE — 700105 HCHG RX REV CODE 258

## 2017-11-17 PROCEDURE — A9270 NON-COVERED ITEM OR SERVICE: HCPCS | Performed by: INTERNAL MEDICINE

## 2017-11-17 RX ORDER — PREDNISOLONE ACETATE 10 MG/ML
SUSPENSION/ DROPS OPHTHALMIC
Refills: 2 | COMMUNITY
Start: 2017-09-09 | End: 2018-01-01

## 2017-11-17 RX ORDER — PROCHLORPERAZINE MALEATE 10 MG
10 TABLET ORAL EVERY 6 HOURS PRN
Qty: 30 TAB | Refills: 6 | Status: SHIPPED | OUTPATIENT
Start: 2017-11-17 | End: 2018-01-01

## 2017-11-17 RX ORDER — ONDANSETRON 4 MG/1
4 TABLET, FILM COATED ORAL EVERY 4 HOURS PRN
Qty: 30 TAB | Refills: 6 | Status: SHIPPED | OUTPATIENT
Start: 2017-11-17 | End: 2018-01-01

## 2017-11-17 RX ORDER — OFLOXACIN 3 MG/ML
SOLUTION/ DROPS OPHTHALMIC
Refills: 2 | COMMUNITY
Start: 2017-09-08 | End: 2018-01-01

## 2017-11-17 RX ORDER — DEXAMETHASONE SODIUM PHOSPHATE 4 MG/ML
12 INJECTION, SOLUTION INTRA-ARTICULAR; INTRALESIONAL; INTRAMUSCULAR; INTRAVENOUS; SOFT TISSUE ONCE
Status: COMPLETED | OUTPATIENT
Start: 2017-11-17 | End: 2017-11-17

## 2017-11-17 RX ORDER — SODIUM CHLORIDE 9 MG/ML
INJECTION, SOLUTION INTRAVENOUS CONTINUOUS
Status: DISCONTINUED | OUTPATIENT
Start: 2017-11-17 | End: 2017-11-21 | Stop reason: HOSPADM

## 2017-11-17 RX ORDER — POTASSIUM CHLORIDE 20 MEQ/1
10 TABLET, EXTENDED RELEASE ORAL ONCE
Status: DISCONTINUED | OUTPATIENT
Start: 2017-11-17 | End: 2017-11-17

## 2017-11-17 RX ORDER — POTASSIUM CHLORIDE 20 MEQ/1
40 TABLET, EXTENDED RELEASE ORAL ONCE
Status: COMPLETED | OUTPATIENT
Start: 2017-11-17 | End: 2017-11-17

## 2017-11-17 RX ADMIN — GEMCITABINE 1691 MG: 1 INJECTION, POWDER, LYOPHILIZED, FOR SOLUTION INTRAVENOUS at 16:47

## 2017-11-17 RX ADMIN — HEPARIN 500 UNITS: 100 SYRINGE at 17:34

## 2017-11-17 RX ADMIN — SODIUM CHLORIDE: 9 INJECTION, SOLUTION INTRAVENOUS at 14:33

## 2017-11-17 RX ADMIN — POTASSIUM CHLORIDE 40 MEQ: 1500 TABLET, EXTENDED RELEASE ORAL at 15:25

## 2017-11-17 RX ADMIN — DEXAMETHASONE SODIUM PHOSPHATE 12 MG: 4 INJECTION, SOLUTION INTRAMUSCULAR; INTRAVENOUS at 15:24

## 2017-11-17 RX ADMIN — PACLITAXEL 211.5 MG: 100 INJECTION, POWDER, LYOPHILIZED, FOR SUSPENSION INTRAVENOUS at 16:01

## 2017-11-17 ASSESSMENT — PAIN SCALES - GENERAL: PAINLEVEL: NO PAIN

## 2017-11-17 NOTE — PROGRESS NOTES
Chemotherapy Verification - SECONDARY RN       Height = 153.7cm  Weight = 66.6kg  BSA = 1.69m^2    Medication: Abraxane  Dose: 125mg/m^2 Calculated Dose: 211.25mg                             (In mg/m2, AUC, mg/kg)     Medication: Gemcitabine  Dose: 1000mg/m^2  Calculated Dose: 1690mg                             (In mg/m2, AUC, mg/kg)      I confirm that this process was performed independently.

## 2017-11-17 NOTE — PROGRESS NOTES
"Pharmacy Chemotherapy Calculations    Dx: Pancreatic Cancer (Ampullary carcinoma)    Cycle: 1 (Previous treatment = s/p gemcitabine monotherapy last 3/13/17 and 3 cycles Albumin bound Paclitaxel + gemcitabine last 12/20/16)  Regimen and Dosing Reference  Protocol: gemcitabine(gemzar) + albumin bound-paclitaxel (abraxane)    albumin bound-paclitaxel  125mg/m2 IV on days 1,8, and 15--MD dosing day 1 and day 15  Gemcitabine 1000mg/m2 IV over 30 min on days 1,8, and 15--MD dosing day 1 and day 15    28-day cycle until disease progression or unacceptable toxicity  NCCN Guidelines for Pancreatic adenocarcinoma V.2.2016  Jacob Pond DD, et al - N Engl J Med. 2013 Oct 31;369(18):1691-701.    Blood pressure 132/47, pulse (!) 57, temperature 36.7 °C (98 °F), resp. rate 18, height 1.537 m (5' 0.5\"), weight 66.6 kg (146 lb 13.2 oz), SpO2 96 %.      Ht:  60.5 in      Wt: 66.6 kg     BSA = 1.69 m2  Labs 11/17/17 ANC ~ 4500     Plt = 250 k  Hgb = 14.4   Scr =  0.83      Crcl ~64 ml/min    LFT = AST/ALT/AlkPhos/Tbili = 24/14/131/0.4  K = 3.5 replace with 40 mEq po    Albumin bound paclitaxel 125 mg/m2 x 1.69 m2 = 211.25 mg  <5% diff, ok to treat with final written dose =  211.5 mg    Gemcitabine 1000 mg/m2 x 1.69 m2 = 1690 mg  <5% diff, ok to treat with final written dose =  1691 mg      Yany Christiansen, DanaD      "

## 2017-11-17 NOTE — PROGRESS NOTES
"Pharmacy Chemotherapy Note    Second Check    Patient Name: JANELLE BALDERAS  MRN: [id number]    Oncologist: Pao    Protocol: OP PANCREATIC albumin-bound PACLItaxel + gemcitabine        Albumin bound Paclitaxel 125 mg/m2 over 30 minutes on Days 1, 8 , and 15  Gemcitabine 1000 mg/m2 IV ovr 30 minutes on Days 1,8, and 15    *Dosing Reference*  NCCN Guidelines for pancreatic adenocarcinoma V.2.2016  Jacob Pond DD, et al. N Engl J Med.2013:369(18):2027-729    PREVIOUS CHEMO:  Gemcitabine 1000 mg/m2 x 2 cycles (1/2017-3/2017), stopped due to disease progression  12/20/16- gemcitabine + abraxane (was stopped due to side effects, put on Single agent)      SIGNIFICANT EVENTS:  NONE    Dx: Pancreatic Adenocarcinoma         /47   Pulse (!) 57   Temp 36.7 °C (98 °F)   Resp 18   Ht 1.537 m (5' 0.5\")   Wt 66.6 kg (146 lb 13.2 oz)   SpO2 96%   BMI 28.20 kg/m²  Body surface area is 1.69 meters squared.    LABS 11/17/2017  ANC: 4500      WBC: 7.6     Plt: 250k   Hgb/Hct: 14.4/42.3     SCr: 0.83mg/dL CrCl: 64 mL/min     K: 3.5  Na: 134          Glu: 109    LFT's: 24/14/131 TBili = 0.4      Drug Order   (Drug name, dose, route, IV Fluid & volume, frequency, number of doses) Cycle: 1 Day 1     Previous treatment: Gemcitabine x 2 cycles     Medication = Gemcitabine  Base Dose= 1000 mg/m2  Calc Dose: Base Dose x 1.69 m2 = 1690 mg  Final Dose = 1691 mg  Route = IV  Fluid & Volume =  mL  Admin Duration = Over 30 min          <5% difference, OK to treat with final dose    Medication = Albumin Bound Paclitaxel  Base Dose= 125 mg/m2  Calc Dose: Base Dose x 1.69 m2 = 211.25 mg  Final Dose = 211.25 mg  Route = IV  Fluid & Volume (5 mg/ml) = 42.3 mL in an empty bag  Admin Duration = Over 30 min          <5% difference, OK to treat with final dose      By my signature below, I confirm this process was performed independently with the BSA and all final chemotherapy dosing calculations congruent. I have reviewed the above " chemotherapy order and that my calculation of the final dose and BSA (when applicable) corroborate those calculations of the  pharmacist. Discrepancies of 5% or greater in the written dose have been addressed and documented within the EPIC Progress notes.    JESSIE Pillai, DanaD

## 2017-11-17 NOTE — PROGRESS NOTES
"Chemotherapy Verification - SECONDARY RN       Height = 60.5\"  Weight = 66.6 kg  BSA = 1.68 m2       Medication: Abraxane  Dose: 125 mg/m2  Calculated Dose: 210.3 mg                             Medication: Gemzar  Dose: 1000 mg/m2  Calculated Dose: 1680 mg                               I confirm that this process was performed independently.   "

## 2017-11-17 NOTE — PROGRESS NOTES
Chemotherapy Verification - PRIMARY RN      Height = 153.7 cm  Weight = 66.6 kg  BSA = 1.69 m2       Medication: Gemzar  Dose: 1000 mg/m2  Calculated Dose: 1690 mg                                  Medication: Abraxane  Dose: 125 mg/m2  Calculated Dose: 211.2 mg                                  I confirm this process was performed independently with the BSA and all final chemotherapy dosing calculations congruent.  Any discrepancies of 5% or greater have been addressed with the chemotherapy pharmacist. The resolution of the discrepancy has been documented in the EPIC progress notes.

## 2017-11-18 NOTE — PROGRESS NOTES
Pt arrived to [facility] ambulatory, here with son, for D1C1 Gemzar/Abraxane. Pt with R chest port in place, declining offers of lidocaine to numb site. Port accessed in sterile field. Labs drawn as ordered. Pt stated she recently got her flu shot, 11/7. Call placed to Dr. Cedeno's office and clarified that chemo is OK to proceed with recent flu shot and OK to proceed. Lab results reviewed and WNL for chemo except potassium. Call placed to Dr. Cedeno's office and orders received to replace potassium per electrolyte replacement protocol. Pt opting for po replacement. Premed given by Greta AVENDANO. Potassium given. Abraxane and Gemzar infused as prescribed. Pt yogi well. Line flushed clear. Port flushed and blood return observed. Heparin instilled. Needle removed intact. Gauze dressing applied. Printout of future appts provided. Pt discharged home under care of son in no apparent distress.

## 2017-11-22 ENCOUNTER — OFFICE VISIT (OUTPATIENT)
Dept: HEMATOLOGY ONCOLOGY | Facility: MEDICAL CENTER | Age: 72
End: 2017-11-22
Payer: MEDICARE

## 2017-11-22 VITALS
RESPIRATION RATE: 14 BRPM | HEART RATE: 115 BPM | DIASTOLIC BLOOD PRESSURE: 68 MMHG | SYSTOLIC BLOOD PRESSURE: 98 MMHG | WEIGHT: 146.72 LBS | OXYGEN SATURATION: 96 % | BODY MASS INDEX: 28.18 KG/M2 | TEMPERATURE: 96.6 F

## 2017-11-22 DIAGNOSIS — C24.1 AMPULLARY CARCINOMA (HCC): ICD-10-CM

## 2017-11-22 PROCEDURE — 99213 OFFICE O/P EST LOW 20 MIN: CPT | Performed by: INTERNAL MEDICINE

## 2017-11-22 ASSESSMENT — PAIN SCALES - GENERAL: PAINLEVEL: NO PAIN

## 2017-11-22 NOTE — PROGRESS NOTES
Date of visit: 11/22/2017  11:38 AM      Chief Complaint- metastatic ampullary carcinoma, here for toxicity check      History of presenting illness: Elin Poole  is a 72 y.o. year old female who was restarted on gemcitabine and Abraxane for her progressive metastatic ampullary carcinoma. Please refer to my note from 11/6/2017 for further details. She tolerated the first dose of gemcitabine and Abraxane very well with no acute complaints. CT of the lung showed nonspecific 4 mm pulmonary nodule    Past Medical History:      Past Medical History:   Diagnosis Date   • Ampullary carcinoma (CMS-HCC)    • Arthritis     hands/fingers/right knee   • Asthma    • Cancer (CMS-HCC) 2016    Ampullary CA   • Cataract 09-    bilat.,no surgery   • Dental disorder     upper partial   • Heart burn    • Hypertension    • Indigestion    • Jaundice        Past surgical history:       Past Surgical History:   Procedure Laterality Date   • CATH PLACEMENT Right 9/9/2016    Procedure: CATH PLACEMENT cephalic power port  ;  Surgeon: Kurtis Jacobs M.D.;  Location: SURGERY [facility];  Service:    • WHIPPLE PROCEDURE  8/15/2016    Procedure: Exploratoy Laparotomy, Da-[id number];  Surgeon: Kurtis Jacobs M.D.;  Location: SURGERY [facility];  Service:    • NODE DISSECTION  8/15/2016    Procedure: omental flap, Liver Wedge, cholecystectomy ;  Surgeon: Kurtis Jacobs M.D.;  Location: SURGERY [facility];  Service:    • STENT PLACEMENT  7/2016    gallbladder   • CHOLECYSTECTOMY         Allergies:       Patient has no allergy information on record.    Medications:         Current Outpatient Prescriptions   Medication Sig Dispense Refill   • apixaban (ELIQUIS) 5mg Tab Take 1 Tab by mouth 2 Times a Day. 60 Tab 5   • losartan-hydrochlorothiazide (HYZAAR) 50-12.5 MG per tablet Take 1 Tab by mouth every day. 30 Tab 0   • potassium chloride SA (K-DUR) 10 MEQ Tab CR TAKE 2 TABS BY MOUTH EVERY  MORNING. 60 Tab 3   • ondansetron (ZOFRAN) 4 MG Tab tablet Take 1 Tab by mouth every four hours as needed for Nausea/Vomiting (for nausea, vomiting). 30 Tab 6   • prochlorperazine (COMPAZINE) 10 MG Tab Take 1 Tab by mouth every 6 hours as needed (for nausea, vomiting). 30 Tab 6   • prednisoLONE acetate (PRED FORTE) 1 % Suspension 1 DROP IN LEFT EYE FOUR TIMES A DAY START AFTER SURGERY  2   • ofloxacin (OCUFLOX) 0.3 % Solution INSTILLM 1 DROP INTO LEFT EYE 4 TIMES A DAY START 2 DAYS PRIOR TO SURGERY  2   • Polyethyl Glycol-Propyl Glycol (SYSTANE OP) by Ophthalmic route.     • megestrol (MEGACE) 400 MG/10ML Suspension Take 20 mL by mouth every day. 600 mL 1   • famotidine (PEPCID) 20 MG Tab Take 1 Tab by mouth 2 times a day. 60 Tab 3   • sennosides-docusate sodium (SENOKOT-S) 8.6-50 MG tablet Take 1 Tab by mouth 2 times a day. 60 Tab 3   • ondansetron (ZOFRAN) 4 MG Tab tablet Take 1 Tab by mouth every four hours as needed for Nausea/Vomiting. 30 Tab 3   • Misc Natural Products (FIBER 7) Powder Take  by mouth.     • prochlorperazine (COMPAZINE) 10 MG Tab Take 10 mg by mouth every 6 hours as needed for Nausea/Vomiting (alternating Q3H with Zofran).       No current facility-administered medications for this visit.          Social History:     Social History     Social History   • Marital status:      Spouse name: N/A   • Number of children: N/A   • Years of education: N/A     Occupational History   • Not on file.     Social History Main Topics   • Smoking status: Never Smoker   • Smokeless tobacco: Never Used   • Alcohol use No   • Drug use: No   • Sexual activity: Not on file     Other Topics Concern   • Not on file     Social History Narrative   • No narrative on file       Family History:    No family history on file.    Review of Systems:  All other review of systems are negative except what was mentioned above in the HPI.    Constitutional: Negative for fever, chills, weight loss and malaise/fatigue.     HEENT: No new auditory or visual complaints. No sore throat and neck pain.     Respiratory: Negative for cough, sputum production, shortness of breath and wheezing.    Cardiovascular: Negative for chest pain, palpitations, orthopnea and leg swelling.    Gastrointestinal: Negative for heartburn, nausea, vomiting and abdominal pain.    Genitourinary: Negative for dysuria, hematuria    Musculoskeletal: No new arthralgias or myalgias   Skin: Negative for rash and itching.    Neurological: Negative for focal weakness and headaches.    Endo/Heme/Allergies: No abnormal bleed/bruise.    Psychiatric/Behavioral: No new depression/anxiety.    Physical Exam:  Vitals: BP (!) 98/68   Pulse (!) 115   Temp 35.9 °C (96.6 °F)   Resp 14   Wt 66.6 kg (146 lb 11.5 oz)   SpO2 96%   BMI 28.18 kg/m²     General: Not in acute distress, alert and oriented x 3  HEENT: No pallor, icterus. Oropharynx clear.   Neck: Supple, no palpable masses.  Lymph nodes: No palpable cervical, supraclavicular, axillary or inguinal lymphadenopathy.    CVS: regular rate and rhythm, no rubs or gallops  RESP: Clear to auscultate bilaterally, no wheezing or crackles.   ABD: Soft, non tender, non distended, positive bowel sounds, no palpable organomegaly  EXT: No edema or cyanosis  CNS: Alert and oriented x3, No focal deficits.  Skin- No rash      Labs:   Outpatient Infusion Services on 11/17/2017   Component Date Value Ref Range Status   • WBC 11/17/2017 7.6  4.8 - 10.8 K/uL Final   • RBC 11/17/2017 4.76  4.20 - 5.40 M/uL Final   • Hemoglobin 11/17/2017 14.4  12.0 - 16.0 g/dL Final   • Hematocrit 11/17/2017 42.3  37.0 - 47.0 % Final   • MCV 11/17/2017 88.9  81.4 - 97.8 fL Final   • MCH 11/17/2017 30.3  27.0 - 33.0 pg Final   • MCHC 11/17/2017 34.0  33.6 - 35.0 g/dL Final   • RDW 11/17/2017 43.2  35.9 - 50.0 fL Final   • Platelet Count 11/17/2017 250  164 - 446 K/uL Final   • MPV 11/17/2017 9.5  9.0 - 12.9 fL Final   • Neutrophils-Polys 11/17/2017 59.30   44.00 - 72.00 % Final   • Lymphocytes 11/17/2017 34.00  22.00 - 41.00 % Final   • Monocytes 11/17/2017 4.70  0.00 - 13.40 % Final   • Eosinophils 11/17/2017 1.10  0.00 - 6.90 % Final   • Basophils 11/17/2017 0.50  0.00 - 1.80 % Final   • Immature Granulocytes 11/17/2017 0.40  0.00 - 0.90 % Final   • Nucleated RBC 11/17/2017 0.00  /100 WBC Final   • Neutrophils (Absolute) 11/17/2017 4.50  2.00 - 7.15 K/uL Final   • Lymphs (Absolute) 11/17/2017 2.58  1.00 - 4.80 K/uL Final   • Monos (Absolute) 11/17/2017 0.36  0.00 - 0.85 K/uL Final   • Eos (Absolute) 11/17/2017 0.08  0.00 - 0.51 K/uL Final   • Baso (Absolute) 11/17/2017 0.04  0.00 - 0.12 K/uL Final   • Immature Granulocytes (abs) 11/17/2017 0.03  0.00 - 0.11 K/uL Final   • NRBC (Absolute) 11/17/2017 0.00  K/uL Final   • Sodium 11/17/2017 134* 135 - 145 mmol/L Final   • Potassium 11/17/2017 3.5* 3.6 - 5.5 mmol/L Final   • Chloride 11/17/2017 103  96 - 112 mmol/L Final   • Co2 11/17/2017 25  20 - 33 mmol/L Final   • Anion Gap 11/17/2017 6.0  0.0 - 11.9 Final   • Glucose 11/17/2017 109* 65 - 99 mg/dL Final   • Bun 11/17/2017 13  8 - 22 mg/dL Final   • Creatinine 11/17/2017 0.83  0.50 - 1.40 mg/dL Final   • Calcium 11/17/2017 9.4  8.5 - 10.5 mg/dL Final   • AST(SGOT) 11/17/2017 24  12 - 45 U/L Final   • ALT(SGPT) 11/17/2017 14  2 - 50 U/L Final   • Alkaline Phosphatase 11/17/2017 131* 30 - 99 U/L Final   • Total Bilirubin 11/17/2017 0.4  0.1 - 1.5 mg/dL Final   • Albumin 11/17/2017 3.6  3.2 - 4.9 g/dL Final   • Total Protein 11/17/2017 7.3  6.0 - 8.2 g/dL Final   • Globulin 11/17/2017 3.7* 1.9 - 3.5 g/dL Final   • A-G Ratio 11/17/2017 1.0  g/dL Final   • GFR If  11/17/2017 >60  >60 mL/min/1.73 m 2 Final   • GFR If Non  11/17/2017 >60  >60 mL/min/1.73 m 2 Final             Assessment and Plan:  1. 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 She completed 3 cycles of gemcitabine and Abraxane with good responses. Her regimen was then decreased to single agent gemcitabine. She had significant fatigue has chemotherapy has been held since early 2017. She had recent progression mainly in the pancreatic bed. The plan will be to continue alternate week gemcitabine and Abraxane. We will consider referring her for any available clinical trials as she is not a candidate for any of our open trials.    She will proceed with the next dose next week and return to clinic in 3 weeks    Please note that this dictation was created using voice recognition software. I have made every reasonable attempt to correct obvious errors, but I expect that there are errors of grammar and possibly content that I did not discover before finalizing the note.      SIGNATURES:  Claude Cedeno    CC:  Wally Knox D.O.  No ref. provider found

## 2017-12-01 ENCOUNTER — OUTPATIENT INFUSION SERVICES (OUTPATIENT)
Dept: ONCOLOGY | Facility: MEDICAL CENTER | Age: 72
End: 2017-12-01
Attending: INTERNAL MEDICINE
Payer: MEDICARE

## 2017-12-01 VITALS
HEART RATE: 58 BPM | HEIGHT: 61 IN | BODY MASS INDEX: 28.22 KG/M2 | WEIGHT: 149.47 LBS | SYSTOLIC BLOOD PRESSURE: 135 MMHG | RESPIRATION RATE: 18 BRPM | OXYGEN SATURATION: 97 % | DIASTOLIC BLOOD PRESSURE: 51 MMHG | TEMPERATURE: 98.6 F

## 2017-12-01 DIAGNOSIS — C24.1 AMPULLARY CARCINOMA (HCC): ICD-10-CM

## 2017-12-01 LAB
ALBUMIN SERPL BCP-MCNC: 3.4 G/DL (ref 3.2–4.9)
ALBUMIN/GLOB SERPL: 1 G/DL
ALP SERPL-CCNC: 121 U/L (ref 30–99)
ALT SERPL-CCNC: 92 U/L (ref 2–50)
ANION GAP SERPL CALC-SCNC: 9 MMOL/L (ref 0–11.9)
AST SERPL-CCNC: 89 U/L (ref 12–45)
BASOPHILS # BLD AUTO: 1.1 % (ref 0–1.8)
BASOPHILS # BLD: 0.05 K/UL (ref 0–0.12)
BILIRUB SERPL-MCNC: 0.3 MG/DL (ref 0.1–1.5)
BUN SERPL-MCNC: 12 MG/DL (ref 8–22)
CALCIUM SERPL-MCNC: 8.9 MG/DL (ref 8.5–10.5)
CHLORIDE SERPL-SCNC: 103 MMOL/L (ref 96–112)
CO2 SERPL-SCNC: 25 MMOL/L (ref 20–33)
CREAT SERPL-MCNC: 0.85 MG/DL (ref 0.5–1.4)
EOSINOPHIL # BLD AUTO: 0.09 K/UL (ref 0–0.51)
EOSINOPHIL NFR BLD: 2 % (ref 0–6.9)
ERYTHROCYTE [DISTWIDTH] IN BLOOD BY AUTOMATED COUNT: 44 FL (ref 35.9–50)
GFR SERPL CREATININE-BSD FRML MDRD: >60 ML/MIN/1.73 M 2
GLOBULIN SER CALC-MCNC: 3.4 G/DL (ref 1.9–3.5)
GLUCOSE SERPL-MCNC: 100 MG/DL (ref 65–99)
HCT VFR BLD AUTO: 39.5 % (ref 37–47)
HGB BLD-MCNC: 13.4 G/DL (ref 12–16)
IMM GRANULOCYTES # BLD AUTO: 0.14 K/UL (ref 0–0.11)
IMM GRANULOCYTES NFR BLD AUTO: 3.1 % (ref 0–0.9)
LYMPHOCYTES # BLD AUTO: 1.76 K/UL (ref 1–4.8)
LYMPHOCYTES NFR BLD: 39.6 % (ref 22–41)
MCH RBC QN AUTO: 30.8 PG (ref 27–33)
MCHC RBC AUTO-ENTMCNC: 33.9 G/DL (ref 33.6–35)
MCV RBC AUTO: 90.8 FL (ref 81.4–97.8)
MONOCYTES # BLD AUTO: 0.44 K/UL (ref 0–0.85)
MONOCYTES NFR BLD AUTO: 9.9 % (ref 0–13.4)
NEUTROPHILS # BLD AUTO: 1.97 K/UL (ref 2–7.15)
NEUTROPHILS NFR BLD: 44.3 % (ref 44–72)
NRBC # BLD AUTO: 0 K/UL
NRBC BLD AUTO-RTO: 0 /100 WBC
PLATELET # BLD AUTO: 291 K/UL (ref 164–446)
PMV BLD AUTO: 9.3 FL (ref 9–12.9)
POTASSIUM SERPL-SCNC: 3.8 MMOL/L (ref 3.6–5.5)
PROT SERPL-MCNC: 6.8 G/DL (ref 6–8.2)
RBC # BLD AUTO: 4.35 M/UL (ref 4.2–5.4)
SODIUM SERPL-SCNC: 137 MMOL/L (ref 135–145)
WBC # BLD AUTO: 4.5 K/UL (ref 4.8–10.8)

## 2017-12-01 PROCEDURE — 700111 HCHG RX REV CODE 636 W/ 250 OVERRIDE (IP): Performed by: INTERNAL MEDICINE

## 2017-12-01 PROCEDURE — 96417 CHEMO IV INFUS EACH ADDL SEQ: CPT

## 2017-12-01 PROCEDURE — 80053 COMPREHEN METABOLIC PANEL: CPT

## 2017-12-01 PROCEDURE — A4212 NON CORING NEEDLE OR STYLET: HCPCS

## 2017-12-01 PROCEDURE — 700111 HCHG RX REV CODE 636 W/ 250 OVERRIDE (IP): Mod: JW

## 2017-12-01 PROCEDURE — 700105 HCHG RX REV CODE 258: Performed by: INTERNAL MEDICINE

## 2017-12-01 PROCEDURE — 96375 TX/PRO/DX INJ NEW DRUG ADDON: CPT

## 2017-12-01 PROCEDURE — 96413 CHEMO IV INFUSION 1 HR: CPT

## 2017-12-01 PROCEDURE — 85025 COMPLETE CBC W/AUTO DIFF WBC: CPT

## 2017-12-01 PROCEDURE — 700105 HCHG RX REV CODE 258

## 2017-12-01 RX ORDER — SODIUM CHLORIDE 9 MG/ML
INJECTION, SOLUTION INTRAVENOUS CONTINUOUS
Status: DISCONTINUED | OUTPATIENT
Start: 2017-12-01 | End: 2017-12-01 | Stop reason: HOSPADM

## 2017-12-01 RX ORDER — CYCLOSPORINE 0.5 MG/ML
1 EMULSION OPHTHALMIC 2 TIMES DAILY
COMMUNITY
Start: 2017-11-15 | End: 2018-01-01

## 2017-12-01 RX ORDER — DEXAMETHASONE SODIUM PHOSPHATE 4 MG/ML
12 INJECTION, SOLUTION INTRA-ARTICULAR; INTRALESIONAL; INTRAMUSCULAR; INTRAVENOUS; SOFT TISSUE ONCE
Status: COMPLETED | OUTPATIENT
Start: 2017-12-01 | End: 2017-12-01

## 2017-12-01 RX ADMIN — PACLITAXEL 212.5 MG: 100 INJECTION, POWDER, LYOPHILIZED, FOR SUSPENSION INTRAVENOUS at 15:48

## 2017-12-01 RX ADMIN — SODIUM CHLORIDE: 9 INJECTION, SOLUTION INTRAVENOUS at 14:58

## 2017-12-01 RX ADMIN — HEPARIN 500 UNITS: 100 SYRINGE at 17:40

## 2017-12-01 RX ADMIN — DEXAMETHASONE SODIUM PHOSPHATE 12 MG: 4 INJECTION, SOLUTION INTRAMUSCULAR; INTRAVENOUS at 14:58

## 2017-12-01 RX ADMIN — GEMCITABINE 1699 MG: 1 INJECTION, POWDER, LYOPHILIZED, FOR SOLUTION INTRAVENOUS at 16:42

## 2017-12-01 ASSESSMENT — PAIN SCALES - GENERAL: PAINLEVEL: NO PAIN

## 2017-12-01 NOTE — PROGRESS NOTES
"Pharmacy Chemotherapy Calculations    Dx: Pancreatic Cancer (Ampullary carcinoma)    Cycle: 1 day 15 (Previous treatment = 11/17/17 = cycle 1day 1--s/p gemcitabine monotherapy last 3/13/17 and 3 cycles Albumin bound Paclitaxel + gemcitabine last 12/20/16)  Regimen and Dosing Reference  Protocol: gemcitabine(gemzar) + albumin bound-paclitaxel (abraxane)    albumin bound-paclitaxel  125mg/m2 IV on days 1,8, and 15--MD dosing day 1 and day 15  Gemcitabine 1000mg/m2 IV over 30 min on days 1,8, and 15--MD dosing day 1 and day 15    28-day cycle until disease progression or unacceptable toxicity  NCCN Guidelines for Pancreatic adenocarcinoma V.2.2016  Von Dejah DD, et al - N Engl J Med. 2013 Oct 31;369(39):1698-702.    Blood pressure 135/51, pulse (!) 58, temperature 37 °C (98.6 °F), resp. rate 18, height 1.537 m (5' 0.51\"), weight 67.8 kg (149 lb 7.6 oz), SpO2 97 %.  Body surface area is 1.7 meters squared.      Ht:  60.5 in      Wt: 67.8 kg     BSA = 1.7 m2  Labs 12/1/17  ANC ~ 1970     Plt = 291 k  Hgb = 13.4  Scr =  0.85     Crcl ~64 ml/min    LFT = AST/ALT/AlkPhos/Tbili = 89/92/121/0.3  No dose adjustment recommended, MD aware and ok to proceed  K = 3.8    Albumin bound paclitaxel 125 mg/m2 x 1.7 m2 = 212.5 mg  <5% diff, ok to treat with final written dose =  212.5 mg    Gemcitabine 1000 mg/m2 x 1.7 m2 = 1700 mg  <5% diff, ok to treat with final written dose =  1699 mg      Yany Christiansen, PharmD      "

## 2017-12-01 NOTE — PROGRESS NOTES
Chemotherapy Verification - SECONDARY RN       Height = 153.7 cm  Weight = 67.8 kg  BSA = 1.7 m^2       Medication: Abraxane  Dose: 125 mg/m^2  Calculated Dose: 212.5 mg                             (In mg/m2, AUC, mg/kg)     Medication: Gemzar  Dose: 1000 mg/m^2  Calculated Dose: 1700 mg                             (In mg/m2, AUC, mg/kg)    I confirm that this process was performed independently.

## 2017-12-01 NOTE — PROGRESS NOTES
"Pharmacy Chemotherapy Note    Second Check    Patient Name: JANELLE BALDERAS  MRN: [id number]  Dx: Pancreatic Adenocarcinoma        Protocol: Albumin-bound PACLItaxel + gemcitabine      Albumin bound Paclitaxel 125 mg/m2 over 30 minutes on Days 1, 8 , and 15  Gemcitabine 1000 mg/m2 IV ovr 30 minutes on Days 1,8, and 15  NCCN Guidelines for pancreatic adenocarcinoma V.2.2016  Jacob Pond DD, et al. N Engl J Med.2013:369(18):1691-704    PREVIOUS CHEMO:  Gemcitabine 1000 mg/m2 x 2 cycles (1/2017-3/2017), stopped due to disease progression  12/20/16- gemcitabine + abraxane (was stopped due to side effects, put on Single agent)    /51   Pulse (!) 58   Temp 37 °C (98.6 °F)   Resp 18   Ht 1.537 m (5' 0.51\")   Wt 67.8 kg (149 lb 7.6 oz)   SpO2 97%   BMI 28.70 kg/m²  Body surface area is 1.7 meters squared.    Labs 12/1/17:  ANC~ 1970 Plt = 291k   Hgb = 13.4     SCr = 0.85 mg/dL CrCl ~ 64 mL/min   AST/ALT/AP = 89/92/121 (no dose adjustment recommended) TBili = 0.3  K+ = 3.8     Drug Order   (Drug name, dose, route, IV Fluid & volume, frequency, number of doses) Cycle: 1 Day 15     Previous treatment: C1D1: 11/17/17  Gemcitabine x 2 cycles     Medication = Gemcitabine  Base Dose= 1000 mg/m2  Calc Dose: Base Dose x 1.7 m2 = 1700 mg  Final Dose = 1699 mg  Route = IV  Fluid & Volume =  mL  Admin Duration = Over 30 min          <5% difference, OK to treat with final dose    Medication = Albumin Bound Paclitaxel  Base Dose= 125 mg/m2  Calc Dose: Base Dose x 1.7 m2 = 212.5 mg  Final Dose = 212.5 mg  Route = IV  Fluid & Volume (5 mg/ml) = 42.5 mL in an empty bag  Admin Duration = Over 30 min          <5% difference, OK to treat with final dose      By my signature below, I confirm this process was performed independently with the BSA and all final chemotherapy dosing calculations congruent. I have reviewed the above chemotherapy order and that my calculation of the final dose and BSA (when applicable) corroborate " those calculations of the  pharmacist. Discrepancies of 5% or greater in the written dose have been addressed and documented within the EPIC Progress notes.    Sapphire Garvey, PharmD

## 2017-12-01 NOTE — PROGRESS NOTES
Chemotherapy Verification - PRIMARY RN      Height = 153.7 cm  Weight = 67.8 kg  BSA = 1.7 m2       Medication: Abraxane  Dose: 125 mg/m2  Calculated Dose: 212.5 mg                                 Medication: Gemzar  Dose: 1000 mg/m2  Calculated Dose: 1700 mg                                I confirm this process was performed independently with the BSA and all final chemotherapy dosing calculations congruent.  Any discrepancies of 5% or greater have been addressed with the chemotherapy pharmacist. The resolution of the discrepancy has been documented in the EPIC progress notes.

## 2017-12-01 NOTE — PROGRESS NOTES
Chemotherapy Verification - SECONDARY RN       Height = 60.61 in  Weight = 149 lb  BSA = 1.7m2       Medication: Abraxane  Dose: 125 mg/m2  Calculated Dose: 212.5 mg                             (In mg/m2, AUC, mg/kg)     Medication: Gemzar  Dose: 1000 mg/m2  Calculated Dose: 1700 mg                             (In mg/m2, AUC, mg/kg)      I confirm that this process was performed independently.

## 2017-12-02 NOTE — PROGRESS NOTES
Pt arrived to [facility] ambulatory, here for D15 Abraxane/Gemzar. Pt with R chest port in place, declining offers of lidocaine to numb site. Port accessed in sterile field, brisk blood return observed. Labs drawn as ordered. Results reviewed and WNL for chemo to proceed except ALT/AST significantly elevated. Call placed to Dr. Cedeno and discussed results. Per Dr. Cedeno, OK to proceed with treatment. Pt will need to be seen in MD office next week for recheck of CMP. Discussed lab results with pt and informed her that she will need to see Dr. Cedeno next week. Pt and son verbalized understanding. Premed given. Abraxane infused over 30 min, Gemzar over one hour. Pt yogi both well. Line flushed clear. Port flushed and heparin instilled. Needle de-accessed, tip intact. Gauze dressing placed. At time of discharge, no appt in place for pt to see Dr. Cedeno. Encouraged pt to call Dr. Cedeno's office on Monday to get appt. Son verbalized he would assist with this. Pt discharged home under care of son in no apparent distress. Returns in two weeks for chemo.

## 2017-12-06 RX ORDER — APIXABAN 5 MG/1
5 TABLET, FILM COATED ORAL 2 TIMES DAILY
Qty: 60 TAB | Refills: 6 | Status: SHIPPED | OUTPATIENT
Start: 2017-12-06 | End: 2018-01-01

## 2017-12-12 ENCOUNTER — TELEPHONE (OUTPATIENT)
Dept: HEMATOLOGY ONCOLOGY | Facility: MEDICAL CENTER | Age: 72
End: 2017-12-12

## 2017-12-12 NOTE — TELEPHONE ENCOUNTER
I called left message regarding her refill on her BP medication losartan-hydrochlorothiazide (HYZAAR) 50-12.5 MG per tablet . We had refused her request she need to call her PCP ASAP

## 2017-12-14 ENCOUNTER — NON-PROVIDER VISIT (OUTPATIENT)
Dept: HEMATOLOGY ONCOLOGY | Facility: MEDICAL CENTER | Age: 72
End: 2017-12-14
Payer: MEDICARE

## 2017-12-14 ENCOUNTER — OFFICE VISIT (OUTPATIENT)
Dept: HEMATOLOGY ONCOLOGY | Facility: MEDICAL CENTER | Age: 72
End: 2017-12-14
Payer: MEDICARE

## 2017-12-14 ENCOUNTER — HOSPITAL ENCOUNTER (OUTPATIENT)
Facility: MEDICAL CENTER | Age: 72
End: 2017-12-14
Attending: NURSE PRACTITIONER
Payer: MEDICARE

## 2017-12-14 VITALS
SYSTOLIC BLOOD PRESSURE: 111 MMHG | WEIGHT: 146.61 LBS | TEMPERATURE: 98.4 F | OXYGEN SATURATION: 98 % | HEART RATE: 72 BPM | DIASTOLIC BLOOD PRESSURE: 60 MMHG | HEIGHT: 61 IN | BODY MASS INDEX: 27.68 KG/M2 | RESPIRATION RATE: 16 BRPM

## 2017-12-14 VITALS
TEMPERATURE: 98.4 F | BODY MASS INDEX: 27.56 KG/M2 | DIASTOLIC BLOOD PRESSURE: 60 MMHG | RESPIRATION RATE: 16 BRPM | HEIGHT: 61 IN | WEIGHT: 146 LBS | SYSTOLIC BLOOD PRESSURE: 111 MMHG | OXYGEN SATURATION: 98 % | HEART RATE: 72 BPM

## 2017-12-14 DIAGNOSIS — C24.1 AMPULLARY CARCINOMA (HCC): ICD-10-CM

## 2017-12-14 LAB
ALBUMIN SERPL BCP-MCNC: 3.7 G/DL (ref 3.2–4.9)
ALBUMIN/GLOB SERPL: 1.1 G/DL
ALP SERPL-CCNC: 156 U/L (ref 30–99)
ALT SERPL-CCNC: 75 U/L (ref 2–50)
ANION GAP SERPL CALC-SCNC: 12 MMOL/L (ref 0–11.9)
AST SERPL-CCNC: 51 U/L (ref 12–45)
BASOPHILS # BLD AUTO: 0.9 % (ref 0–1.8)
BASOPHILS # BLD: 0.05 K/UL (ref 0–0.12)
BILIRUB SERPL-MCNC: 0.4 MG/DL (ref 0.1–1.5)
BUN SERPL-MCNC: 14 MG/DL (ref 8–22)
CALCIUM SERPL-MCNC: 9.3 MG/DL (ref 8.5–10.5)
CHLORIDE SERPL-SCNC: 102 MMOL/L (ref 96–112)
CO2 SERPL-SCNC: 21 MMOL/L (ref 20–33)
CREAT SERPL-MCNC: 0.76 MG/DL (ref 0.5–1.4)
EOSINOPHIL # BLD AUTO: 0.06 K/UL (ref 0–0.51)
EOSINOPHIL NFR BLD: 1 % (ref 0–6.9)
ERYTHROCYTE [DISTWIDTH] IN BLOOD BY AUTOMATED COUNT: 47.1 FL (ref 35.9–50)
GFR SERPL CREATININE-BSD FRML MDRD: >60 ML/MIN/1.73 M 2
GLOBULIN SER CALC-MCNC: 3.4 G/DL (ref 1.9–3.5)
GLUCOSE SERPL-MCNC: 126 MG/DL (ref 65–99)
HCT VFR BLD AUTO: 40.5 % (ref 37–47)
HGB BLD-MCNC: 13.7 G/DL (ref 12–16)
IMM GRANULOCYTES # BLD AUTO: 0.05 K/UL (ref 0–0.11)
IMM GRANULOCYTES NFR BLD AUTO: 0.9 % (ref 0–0.9)
LYMPHOCYTES # BLD AUTO: 1.39 K/UL (ref 1–4.8)
LYMPHOCYTES NFR BLD: 23.8 % (ref 22–41)
MCH RBC QN AUTO: 30.6 PG (ref 27–33)
MCHC RBC AUTO-ENTMCNC: 33.8 G/DL (ref 33.6–35)
MCV RBC AUTO: 90.6 FL (ref 81.4–97.8)
MONOCYTES # BLD AUTO: 0.89 K/UL (ref 0–0.85)
MONOCYTES NFR BLD AUTO: 15.3 % (ref 0–13.4)
NEUTROPHILS # BLD AUTO: 3.39 K/UL (ref 2–7.15)
NEUTROPHILS NFR BLD: 58.1 % (ref 44–72)
NRBC # BLD AUTO: 0.03 K/UL
NRBC BLD AUTO-RTO: 0.5 /100 WBC
PLATELET # BLD AUTO: 203 K/UL (ref 164–446)
PMV BLD AUTO: 9.9 FL (ref 9–12.9)
POTASSIUM SERPL-SCNC: 3.7 MMOL/L (ref 3.6–5.5)
PROT SERPL-MCNC: 7.1 G/DL (ref 6–8.2)
RBC # BLD AUTO: 4.47 M/UL (ref 4.2–5.4)
SODIUM SERPL-SCNC: 135 MMOL/L (ref 135–145)
WBC # BLD AUTO: 5.8 K/UL (ref 4.8–10.8)

## 2017-12-14 PROCEDURE — 36415 COLL VENOUS BLD VENIPUNCTURE: CPT | Performed by: NURSE PRACTITIONER

## 2017-12-14 PROCEDURE — 85025 COMPLETE CBC W/AUTO DIFF WBC: CPT

## 2017-12-14 PROCEDURE — 99213 OFFICE O/P EST LOW 20 MIN: CPT | Performed by: NURSE PRACTITIONER

## 2017-12-14 PROCEDURE — 80053 COMPREHEN METABOLIC PANEL: CPT

## 2017-12-14 RX ORDER — SODIUM CHLORIDE 9 MG/ML
INJECTION, SOLUTION INTRAVENOUS CONTINUOUS
Status: CANCELLED | OUTPATIENT
Start: 2017-01-01

## 2017-12-14 RX ORDER — LIDOCAINE HYDROCHLORIDE 10 MG/ML
0.5 INJECTION, SOLUTION INFILTRATION; PERINEURAL SEE ADMIN INSTRUCTIONS
Status: CANCELLED | OUTPATIENT
Start: 2017-12-15

## 2017-12-14 RX ORDER — ONDANSETRON 2 MG/ML
4 INJECTION INTRAMUSCULAR; INTRAVENOUS
Status: CANCELLED | OUTPATIENT
Start: 2018-01-01

## 2017-12-14 RX ORDER — LIDOCAINE HYDROCHLORIDE 10 MG/ML
0.5 INJECTION, SOLUTION INFILTRATION; PERINEURAL SEE ADMIN INSTRUCTIONS
Status: CANCELLED | OUTPATIENT
Start: 2017-01-01

## 2017-12-14 RX ORDER — ONDANSETRON 2 MG/ML
4 INJECTION INTRAMUSCULAR; INTRAVENOUS
Status: CANCELLED | OUTPATIENT
Start: 2017-12-15

## 2017-12-14 RX ORDER — PROCHLORPERAZINE MALEATE 10 MG
10 TABLET ORAL EVERY 6 HOURS PRN
Status: CANCELLED | OUTPATIENT
Start: 2017-01-01

## 2017-12-14 RX ORDER — PROCHLORPERAZINE MALEATE 10 MG
10 TABLET ORAL EVERY 6 HOURS PRN
Status: CANCELLED | OUTPATIENT
Start: 2017-12-15

## 2017-12-14 RX ORDER — PROCHLORPERAZINE MALEATE 10 MG
10 TABLET ORAL EVERY 6 HOURS PRN
Status: CANCELLED | OUTPATIENT
Start: 2018-01-01

## 2017-12-14 RX ORDER — SODIUM CHLORIDE 9 MG/ML
INJECTION, SOLUTION INTRAVENOUS CONTINUOUS
Status: CANCELLED | OUTPATIENT
Start: 2017-12-15

## 2017-12-14 RX ORDER — ONDANSETRON 2 MG/ML
4 INJECTION INTRAMUSCULAR; INTRAVENOUS
Status: CANCELLED | OUTPATIENT
Start: 2017-01-01

## 2017-12-14 RX ORDER — LIDOCAINE HYDROCHLORIDE 10 MG/ML
0.5 INJECTION, SOLUTION INFILTRATION; PERINEURAL SEE ADMIN INSTRUCTIONS
Status: CANCELLED | OUTPATIENT
Start: 2018-01-01

## 2017-12-14 RX ORDER — SODIUM CHLORIDE 9 MG/ML
INJECTION, SOLUTION INTRAVENOUS CONTINUOUS
Status: CANCELLED | OUTPATIENT
Start: 2018-01-01

## 2017-12-14 RX ORDER — ONDANSETRON 8 MG/1
8 TABLET, ORALLY DISINTEGRATING ORAL
Status: CANCELLED | OUTPATIENT
Start: 2017-01-01

## 2017-12-14 RX ORDER — ONDANSETRON 8 MG/1
8 TABLET, ORALLY DISINTEGRATING ORAL
Status: CANCELLED | OUTPATIENT
Start: 2018-01-01

## 2017-12-14 RX ORDER — ONDANSETRON 8 MG/1
8 TABLET, ORALLY DISINTEGRATING ORAL
Status: CANCELLED | OUTPATIENT
Start: 2017-12-15

## 2017-12-14 ASSESSMENT — PAIN SCALES - GENERAL
PAINLEVEL: 2=MINIMAL-SLIGHT
PAINLEVEL: 2=MINIMAL-SLIGHT

## 2017-12-14 ASSESSMENT — ENCOUNTER SYMPTOMS
SHORTNESS OF BREATH: 0
CONSTIPATION: 0
MYALGIAS: 0
DIZZINESS: 0
WHEEZING: 0
TINGLING: 1
FEVER: 0
COUGH: 0
DIARRHEA: 0
WEIGHT LOSS: 0
PALPITATIONS: 0
VOMITING: 0
NAUSEA: 1
CHILLS: 0

## 2017-12-14 NOTE — PROGRESS NOTES
Subjective:      Elin Poole is a 72 y.o. female who presents for Follow-Up (prechemo) for ampullary carcinoma.         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 She is accompanied by her family for today's visit. Patient is scheduled to receive cycle #2, day one of Gemzar and Abraxane. We are alternating her treatment to the day 1, day 15, every 28 days.    Patient is doing well and tolerating treatment well. She does c/o fatigue which has been common for patient when on chemo. She denies any fevers, chills, or weight loss. Her appetite has been very stable. She denies coughing, wheezing or shortness of breath. She denies chest pain, hypertension since swelling in her legs. Patient did experience some mild nausea yesterday, and stated she had some more this morning and then she finally took an antinausea medication with positive results. She has not vomited. She is having normal formed bowel movements and her last one was this morning. She is 40 without difficulty and denies any pain. 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     No Known Allergies  Current Outpatient Prescriptions on File Prior to Visit   Medication Sig Dispense Refill   • potassium chloride SA (K-DUR) 10 MEQ Tab CR TAKE 2 TABS BY MOUTH EVERY MORNING. 60 Tab 3   • ELIQUIS 5 MG Tab TAKE 1 TAB BY MOUTH 2 TIMES A DAY. 60 Tab 6   • ondansetron (ZOFRAN) 4 MG Tab tablet Take 1 Tab by mouth every four hours as needed for Nausea/Vomiting (for nausea, vomiting). 30 Tab 6   • losartan-hydrochlorothiazide (HYZAAR) 50-12.5 MG per tablet Take 1 Tab by mouth every day. 30 Tab 0   • famotidine (PEPCID) 20 MG Tab Take 1 Tab by mouth 2 times a day. 60 Tab 3   • RESTASIS 0.05 % ophthalmic emulsion      • prochlorperazine (COMPAZINE) 10 MG Tab Take 1 Tab by mouth every 6 hours as needed (for nausea, vomiting). 30 Tab 6   • prednisoLONE acetate (PRED FORTE)  "1 % Suspension 1 DROP IN LEFT EYE FOUR TIMES A DAY START AFTER SURGERY  2   • ofloxacin (OCUFLOX) 0.3 % Solution INSTILLM 1 DROP INTO LEFT EYE 4 TIMES A DAY START 2 DAYS PRIOR TO SURGERY  2   • Polyethyl Glycol-Propyl Glycol (SYSTANE OP) by Ophthalmic route.     • megestrol (MEGACE) 400 MG/10ML Suspension Take 20 mL by mouth every day. 600 mL 1   • sennosides-docusate sodium (SENOKOT-S) 8.6-50 MG tablet Take 1 Tab by mouth 2 times a day. 60 Tab 3   • Misc Natural Products (FIBER 7) Powder Take  by mouth.     • prochlorperazine (COMPAZINE) 10 MG Tab Take 10 mg by mouth every 6 hours as needed for Nausea/Vomiting (alternating Q3H with Zofran).       No current facility-administered medications on file prior to visit.        Review of Systems   Constitutional: Positive for malaise/fatigue. Negative for chills, fever and weight loss.   Respiratory: Negative for cough, shortness of breath and wheezing.    Cardiovascular: Negative for chest pain, palpitations and leg swelling.   Gastrointestinal: Positive for nausea. Negative for constipation, diarrhea and vomiting.   Genitourinary: Negative for dysuria.   Musculoskeletal: Negative for myalgias.   Neurological: Positive for tingling (residual neuropathy from previous treatment, not worsening. ). Negative for dizziness.          Objective:     /60   Pulse 72   Temp 36.9 °C (98.4 °F)   Resp 16   Ht 1.537 m (5' 0.51\")   Wt 66.5 kg (146 lb 9.7 oz)   SpO2 98%   BMI 28.15 kg/m²      Physical Exam   Constitutional: She is oriented to person, place, and time. She appears well-developed and well-nourished. No distress.   HENT:   Head: Normocephalic and atraumatic.   Mouth/Throat: Oropharynx is clear and moist. No oropharyngeal exudate.   Alopecia   Cardiovascular: Normal rate, regular rhythm and normal heart sounds.  Exam reveals no gallop and no friction rub.    No murmur heard.  Pulmonary/Chest: Effort normal and breath sounds normal. No respiratory distress. She " has no wheezes.   Abdominal: Soft. Bowel sounds are normal. She exhibits no distension. There is no tenderness.   Musculoskeletal: Normal range of motion. She exhibits no edema or tenderness.   Neurological: She is alert and oriented to person, place, and time.   Skin: Skin is warm and dry. She is not diaphoretic.   Psychiatric: She has a normal mood and affect. Her behavior is normal.   Vitals reviewed.      Hospital Outpatient Visit on 12/14/2017   Component Date Value Ref Range Status   • WBC 12/14/2017 5.8  4.8 - 10.8 K/uL Final   • RBC 12/14/2017 4.47  4.20 - 5.40 M/uL Final   • Hemoglobin 12/14/2017 13.7  12.0 - 16.0 g/dL Final   • Hematocrit 12/14/2017 40.5  37.0 - 47.0 % Final   • MCV 12/14/2017 90.6  81.4 - 97.8 fL Final   • MCH 12/14/2017 30.6  27.0 - 33.0 pg Final   • MCHC 12/14/2017 33.8  33.6 - 35.0 g/dL Final   • RDW 12/14/2017 47.1  35.9 - 50.0 fL Final   • Platelet Count 12/14/2017 203  164 - 446 K/uL Final   • MPV 12/14/2017 9.9  9.0 - 12.9 fL Final   • Neutrophils-Polys 12/14/2017 58.10  44.00 - 72.00 % Final   • Lymphocytes 12/14/2017 23.80  22.00 - 41.00 % Final   • Monocytes 12/14/2017 15.30* 0.00 - 13.40 % Final   • Eosinophils 12/14/2017 1.00  0.00 - 6.90 % Final   • Basophils 12/14/2017 0.90  0.00 - 1.80 % Final   • Immature Granulocytes 12/14/2017 0.90  0.00 - 0.90 % Final   • Nucleated RBC 12/14/2017 0.50  /100 WBC Final   • Neutrophils (Absolute) 12/14/2017 3.39  2.00 - 7.15 K/uL Final   • Lymphs (Absolute) 12/14/2017 1.39  1.00 - 4.80 K/uL Final   • Monos (Absolute) 12/14/2017 0.89* 0.00 - 0.85 K/uL Final   • Eos (Absolute) 12/14/2017 0.06  0.00 - 0.51 K/uL Final   • Baso (Absolute) 12/14/2017 0.05  0.00 - 0.12 K/uL Final   • Immature Granulocytes (abs) 12/14/2017 0.05  0.00 - 0.11 K/uL Final   • NRBC (Absolute) 12/14/2017 0.03  K/uL Final   • Co2 12/14/2017 21  20 - 33 mmol/L Final   • Glucose 12/14/2017 126* 65 - 99 mg/dL Final   • Bun 12/14/2017 14  8 - 22 mg/dL Final   • Creatinine  12/14/2017 0.76  0.50 - 1.40 mg/dL Final   • Calcium 12/14/2017 9.3  8.5 - 10.5 mg/dL Final   • ALT(SGPT) 12/14/2017 75* 2 - 50 U/L Final   • Total Bilirubin 12/14/2017 0.4  0.1 - 1.5 mg/dL Final   • Total Protein 12/14/2017 7.1  6.0 - 8.2 g/dL Final   • Sodium 12/14/2017 135  135 - 145 mmol/L Final   • Potassium 12/14/2017 3.7  3.6 - 5.5 mmol/L Final   • Chloride 12/14/2017 102  96 - 112 mmol/L Final   • Anion Gap 12/14/2017 12.0* 0.0 - 11.9 Final   • AST(SGOT) 12/14/2017 51* 12 - 45 U/L Final   • Alkaline Phosphatase 12/14/2017 156* 30 - 99 U/L Final   • Albumin 12/14/2017 3.7  3.2 - 4.9 g/dL Final   • Globulin 12/14/2017 3.4  1.9 - 3.5 g/dL Final   • A-G Ratio 12/14/2017 1.1  g/dL Final   • GFR If  12/14/2017 >60  >60 mL/min/1.73 m 2 Final   • GFR If Non  12/14/2017 >60  >60 mL/min/1.73 m 2 Final              Assessment/Plan:     1. Ampullary carcinoma (CMS-HCC)  CBC WITH DIFFERENTIAL    COMP METABOLIC PANEL     Plan  1. Patient is doing very well and tolerating treatment well. She has completed one full cycle of chemotherapy and is scheduled to receive cycle #2, day #1 of Gemzar and Abraxane. Patient is scheduled to receive chemotherapy every other week due to tolerability from previous chemotherapy. I did review patient's CBC and CMP and labs are within acceptable limits to proceed with treatment tomorrow as planned. I discussed with Dr. Cedeno patient's liver function tests and we will continue to monitor. This is likely related to Gemzar.    2. I did confer with patient that she has discussed with her primary care provider and blood pressure medication will now be refilled by her PCP. I did refill patient's potassium. She states she has been taking 10 mEq by mouth twice a day and we have been monitoring her potassium with each lab. She is to continue on the current dose that she has been taking it 10 mEq.    3. Patient is to follow up again in 2 weeks or sooner if needed.

## 2017-12-14 NOTE — PROGRESS NOTES
Elin Poole is a 72 y.o. female here for a non-provider visit for: Lab Draws  on 12/14/2017 at 11:28 AM    Procedure Performed: Venipuncture     Anatomical site: Right Antecubital Area (AC) and Left Hand    Equipment used: 21g Butterfly     Labs drawn: CBC w/diff and CMP    Ordering Provider: ELISE Mancilla    Angel By: Tamika Reich, Med Ass'jesús Henley Medical Assistant attempted blood draw on right ac, unsuccessful. Successful blood draw by Tamika Reich Medical Assistant on left hand using 21g butterfly.

## 2017-12-14 NOTE — PROGRESS NOTES
"Pharmacy Chemotherapy Verification  Patient Name: Elin Poole  Dx: Pancreatic Cancer (Ampullary carcinoma)    Cycle: 2 Day 1 (Previous treatment = C1D15 12/1/17; s/p gemcitabine monotherapy last 3/13/17 and 3 cycles Albumin bound Paclitaxel + Gemcitabine last 12/20/16)  Regimen and Dosing Reference  Protocol: Gemcitabine( Gemzar) + Albumin bound-paclitaxel (Abraxane)    Albumin bound-paclitaxel  125 mg/m2 IV on days 1,8, and 15--MD dosing day 1 and day 15  Gemcitabine 1000 mg/m2 IV over 30 min on days 1,8, and 15--MD dosing day 1 and day 15    28-day cycle until disease progression or unacceptable toxicity  NCCN Guidelines for Pancreatic adenocarcinoma V.2.2016  Von Dejah DD, et al - N Engl J Med. 2013 Oct 31;369(18):1698-702.    Allergies:Patient has no known allergies.  Blood pressure 133/74, pulse 74, temperature 36.6 °C (97.9 °F), resp. rate 18, height 1.537 m (5' 0.51\"), weight 66.6 kg (146 lb 13.2 oz), SpO2 97 %.  Body surface area is 1.69 meters squared.    Labs 12/14/17  ANC ~ 3390     Plt = 203 k  Hgb = 13.7  SCr = 0.76     CrCl ~69.8 ml/min     AST/ALT/AlkPhos =  51/75/156 Tbili = 0.4  K = 3.7    Albumin bound paclitaxel 125 mg/m2 x 1.69 m2 = 211.25 mg   <5% diff, OK to treat with final written dose = 211.5 mg IV    Gemcitabine 1000 mg/m2 x 1.69 m2 = 1690 mg   <5% diff, OK to treat with final written dose = 1691 mg IV    Indu Hammond, PharmD, BCOP      "

## 2017-12-15 ENCOUNTER — OUTPATIENT INFUSION SERVICES (OUTPATIENT)
Dept: ONCOLOGY | Facility: MEDICAL CENTER | Age: 72
End: 2017-12-15
Attending: INTERNAL MEDICINE
Payer: MEDICARE

## 2017-12-15 VITALS
BODY MASS INDEX: 27.72 KG/M2 | OXYGEN SATURATION: 97 % | RESPIRATION RATE: 18 BRPM | SYSTOLIC BLOOD PRESSURE: 133 MMHG | DIASTOLIC BLOOD PRESSURE: 74 MMHG | WEIGHT: 146.83 LBS | HEART RATE: 74 BPM | HEIGHT: 61 IN | TEMPERATURE: 97.9 F

## 2017-12-15 DIAGNOSIS — C24.1 AMPULLARY CARCINOMA (HCC): ICD-10-CM

## 2017-12-15 PROCEDURE — 700111 HCHG RX REV CODE 636 W/ 250 OVERRIDE (IP): Mod: JW

## 2017-12-15 PROCEDURE — 700111 HCHG RX REV CODE 636 W/ 250 OVERRIDE (IP): Performed by: NURSE PRACTITIONER

## 2017-12-15 PROCEDURE — 700105 HCHG RX REV CODE 258

## 2017-12-15 PROCEDURE — 700105 HCHG RX REV CODE 258: Performed by: NURSE PRACTITIONER

## 2017-12-15 PROCEDURE — 96417 CHEMO IV INFUS EACH ADDL SEQ: CPT

## 2017-12-15 PROCEDURE — 96413 CHEMO IV INFUSION 1 HR: CPT

## 2017-12-15 PROCEDURE — 96375 TX/PRO/DX INJ NEW DRUG ADDON: CPT

## 2017-12-15 PROCEDURE — A4212 NON CORING NEEDLE OR STYLET: HCPCS

## 2017-12-15 PROCEDURE — 700111 HCHG RX REV CODE 636 W/ 250 OVERRIDE (IP)

## 2017-12-15 RX ORDER — ONDANSETRON 2 MG/ML
4 INJECTION INTRAMUSCULAR; INTRAVENOUS
Status: DISCONTINUED | OUTPATIENT
Start: 2017-12-15 | End: 2017-12-15 | Stop reason: HOSPADM

## 2017-12-15 RX ORDER — DEXAMETHASONE SODIUM PHOSPHATE 4 MG/ML
12 INJECTION, SOLUTION INTRA-ARTICULAR; INTRALESIONAL; INTRAMUSCULAR; INTRAVENOUS; SOFT TISSUE ONCE
Status: COMPLETED | OUTPATIENT
Start: 2017-12-15 | End: 2017-12-15

## 2017-12-15 RX ADMIN — PACLITAXEL 211.5 MG: 100 INJECTION, POWDER, LYOPHILIZED, FOR SUSPENSION INTRAVENOUS at 11:28

## 2017-12-15 RX ADMIN — DEXAMETHASONE SODIUM PHOSPHATE 12 MG: 4 INJECTION, SOLUTION INTRAMUSCULAR; INTRAVENOUS at 10:48

## 2017-12-15 RX ADMIN — GEMCITABINE 1691 MG: 1 INJECTION, POWDER, LYOPHILIZED, FOR SOLUTION INTRAVENOUS at 12:31

## 2017-12-15 RX ADMIN — HEPARIN 500 UNITS: 100 SYRINGE at 13:12

## 2017-12-15 RX ADMIN — ONDANSETRON 4 MG: 2 INJECTION, SOLUTION INTRAMUSCULAR; INTRAVENOUS at 10:57

## 2017-12-15 ASSESSMENT — PAIN SCALES - GENERAL: PAINLEVEL: NO PAIN

## 2017-12-15 NOTE — PROGRESS NOTES
Pt is here for her scheduled chemotherapy. Port accessed in sterile fashion - port tilted, but accessible. Labs reviewed. Pt has no concerns. Family present. Premedicated as ordered. During dexamethasone push pt has an episode of nausea - resolved with Zofran IV. Treatment completed without an incident. Discharged home to self care. Next appointment verified.

## 2017-12-15 NOTE — PROGRESS NOTES
Chemotherapy Verification - SECONDARY RN       Height = 153.7 cm  Weight = 66.6 kg  BSA = 1.686 m2       Medication: Abraxane  Dose: 125 mg/m2  Calculated Dose: 210.75 mg                             (In mg/m2, AUC, mg/kg)     Medication: Gemzar  Dose: 1000 mg/m2  Calculated Dose: 1686 mg                             (In mg/m2, AUC, mg/kg)      I confirm that this process was performed independently.

## 2017-12-15 NOTE — PROGRESS NOTES
Chemotherapy Verification - PRIMARY RN      Height = 153.7cm  Weight = 66.6kg  BSA = 1.69m2       Medication: Gemzar  Dose: 1000mg/m2  Calculated Dose: 1690mg                              Medication: Abraxane  Dose: 125mg/m2  Calculated Dose: 211.25mg                                   I confirm this process was performed independently with the BSA and all final chemotherapy dosing calculations congruent.  Any discrepancies of 5% or greater have been addressed with the chemotherapy pharmacist. The resolution of the discrepancy has been documented in the EPIC progress notes.

## 2017-12-15 NOTE — PROGRESS NOTES
"Pharmacy Chemotherapy Note    Second Check    Patient Name: JANELLE BALDERAS  MRN: [id number]  Dx: Pancreatic Adenocarcinoma        Protocol: Albumin-bound PACLItaxel + gemcitabine      Albumin bound Paclitaxel 125 mg/m2 over 30 minutes on Days 1, 8 , and 15  Gemcitabine 1000 mg/m2 IV ovr 30 minutes on Days 1,8, and 15  NCCN Guidelines for pancreatic adenocarcinoma V.2.2016  Jacob Pond DD, et al. N Engl J Med.2013:369(18):1691-705    PREVIOUS CHEMO:  Gemcitabine 1000 mg/m2 x 2 cycles (1/2017-3/2017), stopped due to disease progression  12/20/16- gemcitabine + abraxane (was stopped due to side effects, put on Single agent)    /74   Pulse 74   Temp 36.6 °C (97.9 °F)   Resp 18   Ht 1.537 m (5' 0.51\")   Wt 66.6 kg (146 lb 13.2 oz)   SpO2 97%   BMI 28.19 kg/m²  Body surface area is 1.69 meters squared.    Labs 12/14/17:  ANC~ 3390 Plt = 203k   Hgb/Hct = 13.7/40.5     SCr = 0.76 mg/dL CrCl ~ 70 mL/min   AST/ALT/AP = 51/75/156 (no dose adjustment recommended) TBili = 0.4  K+ = 3.7     Drug Order   (Drug name, dose, route, IV Fluid & volume, frequency, number of doses) Cycle: 2 Day 1     Previous treatment: C1D15: 12/1/2017  Gemcitabine x 2 cycles     Medication = Gemcitabine  Base Dose= 1000 mg/m2  Calc Dose: Base Dose x Body surface area is 1.69 meters squared. m2 = 1690 mg  Final Dose = 1691 mg  Route = IV  Fluid & Volume =  mL  Admin Duration = Over 30 min          <5% difference, OK to treat with final dose    Medication = Albumin Bound Paclitaxel  Base Dose= 125 mg/m2  Calc Dose: Base Dose x Body surface area is 1.69 meters squared. m2 = 211.2 mg  Final Dose = 211.5 mg  Route = IV  Fluid & Volume (5 mg/ml) = 42.3 mL in an empty bag  Admin Duration = Over 30 min          <5% difference, OK to treat with final dose      By my signature below, I confirm this process was performed independently with the BSA and all final chemotherapy dosing calculations congruent. I have reviewed the above chemotherapy " order and that my calculation of the final dose and BSA (when applicable) corroborate those calculations of the  pharmacist. Discrepancies of 5% or greater in the written dose have been addressed and documented within the EPIC Progress notes.    JESSIE Pillai, DanaD

## 2017-12-20 RX ORDER — ONDANSETRON 2 MG/ML
4 INJECTION INTRAMUSCULAR; INTRAVENOUS
Status: CANCELLED | OUTPATIENT
Start: 2018-01-01

## 2017-12-20 RX ORDER — PROCHLORPERAZINE MALEATE 10 MG
10 TABLET ORAL EVERY 6 HOURS PRN
Status: CANCELLED | OUTPATIENT
Start: 2018-01-01

## 2017-12-20 RX ORDER — LIDOCAINE HYDROCHLORIDE 10 MG/ML
0.5 INJECTION, SOLUTION INFILTRATION; PERINEURAL SEE ADMIN INSTRUCTIONS
Status: CANCELLED | OUTPATIENT
Start: 2018-01-01

## 2017-12-20 RX ORDER — ONDANSETRON 8 MG/1
8 TABLET, ORALLY DISINTEGRATING ORAL
Status: CANCELLED | OUTPATIENT
Start: 2018-01-01

## 2017-12-20 RX ORDER — SODIUM CHLORIDE 9 MG/ML
INJECTION, SOLUTION INTRAVENOUS CONTINUOUS
Status: CANCELLED | OUTPATIENT
Start: 2018-01-01

## 2017-12-27 DIAGNOSIS — C24.1 AMPULLARY CARCINOMA (HCC): ICD-10-CM

## 2017-12-28 ENCOUNTER — OFFICE VISIT (OUTPATIENT)
Dept: HEMATOLOGY ONCOLOGY | Facility: MEDICAL CENTER | Age: 72
End: 2017-12-28
Payer: MEDICARE

## 2017-12-28 ENCOUNTER — HOSPITAL ENCOUNTER (OUTPATIENT)
Facility: MEDICAL CENTER | Age: 72
End: 2017-12-28
Attending: NURSE PRACTITIONER
Payer: MEDICARE

## 2017-12-28 ENCOUNTER — NON-PROVIDER VISIT (OUTPATIENT)
Dept: HEMATOLOGY ONCOLOGY | Facility: MEDICAL CENTER | Age: 72
End: 2017-12-28
Payer: MEDICARE

## 2017-12-28 VITALS
TEMPERATURE: 98.5 F | HEART RATE: 74 BPM | WEIGHT: 144.73 LBS | RESPIRATION RATE: 16 BRPM | SYSTOLIC BLOOD PRESSURE: 120 MMHG | HEIGHT: 60 IN | DIASTOLIC BLOOD PRESSURE: 66 MMHG | OXYGEN SATURATION: 98 % | BODY MASS INDEX: 28.41 KG/M2

## 2017-12-28 DIAGNOSIS — Z51.11 ENCOUNTER FOR ANTINEOPLASTIC CHEMOTHERAPY: ICD-10-CM

## 2017-12-28 DIAGNOSIS — C24.1 AMPULLARY CARCINOMA (HCC): ICD-10-CM

## 2017-12-28 DIAGNOSIS — Z79.01 LONG TERM CURRENT USE OF ANTICOAGULANT THERAPY: ICD-10-CM

## 2017-12-28 DIAGNOSIS — I82.531 CHRONIC DEEP VEIN THROMBOSIS (DVT) OF POPLITEAL VEIN OF RIGHT LOWER EXTREMITY (HCC): ICD-10-CM

## 2017-12-28 LAB
BASOPHILS # BLD AUTO: 1.1 % (ref 0–1.8)
BASOPHILS # BLD: 0.04 K/UL (ref 0–0.12)
EOSINOPHIL # BLD AUTO: 0.12 K/UL (ref 0–0.51)
EOSINOPHIL NFR BLD: 3.3 % (ref 0–6.9)
ERYTHROCYTE [DISTWIDTH] IN BLOOD BY AUTOMATED COUNT: 58.6 FL (ref 35.9–50)
HCT VFR BLD AUTO: 39.6 % (ref 37–47)
HGB BLD-MCNC: 13.2 G/DL (ref 12–16)
IMM GRANULOCYTES # BLD AUTO: 0.06 K/UL (ref 0–0.11)
IMM GRANULOCYTES NFR BLD AUTO: 1.6 % (ref 0–0.9)
LYMPHOCYTES # BLD AUTO: 1.53 K/UL (ref 1–4.8)
LYMPHOCYTES NFR BLD: 41.7 % (ref 22–41)
MCH RBC QN AUTO: 32 PG (ref 27–33)
MCHC RBC AUTO-ENTMCNC: 33.3 G/DL (ref 33.6–35)
MCV RBC AUTO: 95.9 FL (ref 81.4–97.8)
MONOCYTES # BLD AUTO: 0.74 K/UL (ref 0–0.85)
MONOCYTES NFR BLD AUTO: 20.2 % (ref 0–13.4)
NEUTROPHILS # BLD AUTO: 1.18 K/UL (ref 2–7.15)
NEUTROPHILS NFR BLD: 32.1 % (ref 44–72)
NRBC # BLD AUTO: 0 K/UL
NRBC BLD-RTO: 0 /100 WBC
PLATELET # BLD AUTO: 246 K/UL (ref 164–446)
PMV BLD AUTO: 9.9 FL (ref 9–12.9)
RBC # BLD AUTO: 4.13 M/UL (ref 4.2–5.4)
WBC # BLD AUTO: 3.7 K/UL (ref 4.8–10.8)

## 2017-12-28 PROCEDURE — 36592 COLLECT BLOOD FROM PICC: CPT | Performed by: INTERNAL MEDICINE

## 2017-12-28 PROCEDURE — 85025 COMPLETE CBC W/AUTO DIFF WBC: CPT

## 2017-12-28 PROCEDURE — 99214 OFFICE O/P EST MOD 30 MIN: CPT | Performed by: NURSE PRACTITIONER

## 2017-12-28 ASSESSMENT — PAIN SCALES - GENERAL: PAINLEVEL: NO PAIN

## 2017-12-28 ASSESSMENT — ENCOUNTER SYMPTOMS
WHEEZING: 0
MYALGIAS: 0
COUGH: 0
WEIGHT LOSS: 1
CHILLS: 0
PALPITATIONS: 0
NAUSEA: 0
FEVER: 0
VOMITING: 0
HEADACHES: 0
SHORTNESS OF BREATH: 0
DIARRHEA: 0
DIZZINESS: 0

## 2017-12-28 NOTE — PROGRESS NOTES
Subjective:      Elin Poole is a 72 y.o. female who presents for Follow-Up (prechemo), for evaluation prior to cycle 2 day 15 of Abraxane/Gemzar for progressive metastatic ampullary carcinoma.      HPI   Ms. Poole presents today for evaluation prior to cycle 2 day 15 of Abraxane/Gemzar for progressive metastatic ampullary carcinoma. Cycles consist of days 1 and 15 every 28 days. She is accompanied by her son.    Patient reports that she is tolerating treatment fairly well. She has lost 3 pounds since her last visit but reports that she is actually eating well and denies nausea, vomiting. She is otherwise asymptomatic.       No Known Allergies    Current Outpatient Prescriptions on File Prior to Visit   Medication Sig Dispense Refill   • potassium chloride SA (K-DUR) 10 MEQ Tab CR TAKE 2 TABS BY MOUTH EVERY MORNING. 60 Tab 3   • ELIQUIS 5 MG Tab TAKE 1 TAB BY MOUTH 2 TIMES A DAY. 60 Tab 6   • RESTASIS 0.05 % ophthalmic emulsion      • losartan-hydrochlorothiazide (HYZAAR) 50-12.5 MG per tablet Take 1 Tab by mouth every day. 30 Tab 0   • famotidine (PEPCID) 20 MG Tab Take 1 Tab by mouth 2 times a day. 60 Tab 3   • ondansetron (ZOFRAN) 4 MG Tab tablet Take 1 Tab by mouth every four hours as needed for Nausea/Vomiting (for nausea, vomiting). 30 Tab 6   • prochlorperazine (COMPAZINE) 10 MG Tab Take 1 Tab by mouth every 6 hours as needed (for nausea, vomiting). 30 Tab 6   • prednisoLONE acetate (PRED FORTE) 1 % Suspension 1 DROP IN LEFT EYE FOUR TIMES A DAY START AFTER SURGERY  2   • ofloxacin (OCUFLOX) 0.3 % Solution INSTILLM 1 DROP INTO LEFT EYE 4 TIMES A DAY START 2 DAYS PRIOR TO SURGERY  2   • Polyethyl Glycol-Propyl Glycol (SYSTANE OP) by Ophthalmic route.     • megestrol (MEGACE) 400 MG/10ML Suspension Take 20 mL by mouth every day. 600 mL 1   • sennosides-docusate sodium (SENOKOT-S) 8.6-50 MG tablet Take 1 Tab by mouth 2 times a day. 60 Tab 3   • Misc Natural Products (FIBER 7) Powder Take  by mouth.  "    • prochlorperazine (COMPAZINE) 10 MG Tab Take 10 mg by mouth every 6 hours as needed for Nausea/Vomiting (alternating Q3H with Zofran).       No current facility-administered medications on file prior to visit.        Review of Systems   Constitutional: Positive for weight loss (down 3 pounds). Negative for chills, fever and malaise/fatigue (completing ususal ADLs).   Respiratory: Negative for cough, shortness of breath and wheezing.    Cardiovascular: Negative for chest pain, palpitations and leg swelling.   Gastrointestinal: Negative for constipation (Last BM today), diarrhea, nausea (took zofran with cycle 1) and vomiting.   Genitourinary: Negative for dysuria.   Musculoskeletal: Positive for joint pain (bilateral knees ache more on day 3, use topical creams). Negative for myalgias (days 1-3 following dosing).        RLE DVT - on Eloquis   Neurological: Positive for tingling (Baseline at both feet bottoms - prior to chemo). Negative for dizziness and headaches.        Objective:     /66   Pulse 74   Temp 36.9 °C (98.5 °F)   Resp 16   Ht 1.525 m (5' 0.05\")   Wt 65.6 kg (144 lb 11.7 oz)   SpO2 98%   BMI 28.22 kg/m²      Physical Exam   Constitutional: She is oriented to person, place, and time. She appears well-developed and well-nourished. No distress.   HENT:   Head: Normocephalic and atraumatic.   Mouth/Throat: Oropharynx is clear and moist. No oropharyngeal exudate.   Eyes: Conjunctivae and EOM are normal. Pupils are equal, round, and reactive to light. Right eye exhibits no discharge. Left eye exhibits no discharge. No scleral icterus.   Neck: Normal range of motion. Neck supple. No thyromegaly present.   Cardiovascular: Normal rate, regular rhythm, normal heart sounds and intact distal pulses.  Exam reveals no gallop and no friction rub.    No murmur heard.  Pulmonary/Chest: Effort normal and breath sounds normal. No respiratory distress. She has no wheezes.   Abdominal: Soft. Bowel sounds " are normal. She exhibits no distension. There is no tenderness.   Musculoskeletal: Normal range of motion. She exhibits no edema or tenderness.   Lymphadenopathy:        Head (right side): No submental, no submandibular, no tonsillar, no preauricular, no posterior auricular and no occipital adenopathy present.        Head (left side): No submental, no submandibular, no tonsillar, no preauricular, no posterior auricular and no occipital adenopathy present.     She has no cervical adenopathy.        Right cervical: No superficial cervical and no deep cervical adenopathy present.       Left cervical: No superficial cervical and no deep cervical adenopathy present.        Right: No supraclavicular adenopathy present.        Left: No supraclavicular adenopathy present.   Neurological: She is alert and oriented to person, place, and time.   Skin: Skin is warm and dry. No rash noted. She is not diaphoretic. No erythema. No pallor.   Psychiatric: She has a normal mood and affect. Her behavior is normal.   Vitals reviewed.    Hospital Outpatient Visit on 12/28/2017   Component Date Value Ref Range Status   • WBC 12/28/2017 3.7* 4.8 - 10.8 K/uL Final   • RBC 12/28/2017 4.13* 4.20 - 5.40 M/uL Final   • Hemoglobin 12/28/2017 13.2  12.0 - 16.0 g/dL Final   • Hematocrit 12/28/2017 39.6  37.0 - 47.0 % Final   • MCV 12/28/2017 95.9  81.4 - 97.8 fL Final   • MCH 12/28/2017 32.0  27.0 - 33.0 pg Final   • MCHC 12/28/2017 33.3* 33.6 - 35.0 g/dL Final   • RDW 12/28/2017 58.6* 35.9 - 50.0 fL Final   • Platelet Count 12/28/2017 246  164 - 446 K/uL Final   • MPV 12/28/2017 9.9  9.0 - 12.9 fL Final   • Neutrophils-Polys 12/28/2017 32.10* 44.00 - 72.00 % Final   • Lymphocytes 12/28/2017 41.70* 22.00 - 41.00 % Final   • Monocytes 12/28/2017 20.20* 0.00 - 13.40 % Final   • Eosinophils 12/28/2017 3.30  0.00 - 6.90 % Final   • Basophils 12/28/2017 1.10  0.00 - 1.80 % Final   • Immature Granulocytes 12/28/2017 1.60* 0.00 - 0.90 % Final   •  Nucleated RBC 12/28/2017 0.00  /100 WBC Final   • Neutrophils (Absolute) 12/28/2017 1.18* 2.00 - 7.15 K/uL Final   • Lymphs (Absolute) 12/28/2017 1.53  1.00 - 4.80 K/uL Final   • Monos (Absolute) 12/28/2017 0.74  0.00 - 0.85 K/uL Final   • Eos (Absolute) 12/28/2017 0.12  0.00 - 0.51 K/uL Final   • Baso (Absolute) 12/28/2017 0.04  0.00 - 0.12 K/uL Final   • Immature Granulocytes (abs) 12/28/2017 0.06  0.00 - 0.11 K/uL Final   • NRBC (Absolute) 12/28/2017 0.00  K/uL Final        Assessment/Plan:     1. Ampullary carcinoma (CMS-HCC)  CT-CHEST,ABDOMEN WITH   2. Long term (current) use of anticoagulants [Z79.01]     3. Chronic deep vein thrombosis (DVT) of popliteal vein of right lower extremity (CMS-[facility])     4. Encounter for antineoplastic chemotherapy         1.  RLE DVT: Stable. Continues on Eliquis    2.  Ampullary carcinoma: Stable. She continues on Abraxane/Gemzar days 1 and 15 every 28 days. CBC has been reviewed and found to be within acceptable limits. She will proceed with cycle 2 day 15 of treatment and return in 2 weeks for evaluation prior to cycle 3, sooner as needed. Given that she is tolerating treatment very well she will need to be evaluated prior to each cycle, with labs in between, not prior to each infusion.    CT will be completed after completion of cycle 3 for discussion prior to cycle 4 visit.        Please note that this dictation was created using voice recognition software. I have made every reasonable attempt to correct obvious errors, but I expect that there are errors of grammar and possibly content that I did not discover before finalizing the note.

## 2017-12-29 NOTE — PROGRESS NOTES
"Pharmacy Chemotherapy Note    Second Check    Patient Name: JANELLE BALDERAS  MRN: [id number]  Dx: Pancreatic Adenocarcinoma        Protocol: Albumin-bound PACLItaxel + gemcitabine      Albumin bound Paclitaxel 125 mg/m2 over 30 minutes on Days 1, 8 , and 15-MD dosing day 1 and 15  Gemcitabine 1000 mg/m2 IV ovr 30 minutes on Days 1,8, and 15-MD dosing day 1 and 15    NCCN Guidelines for pancreatic adenocarcinoma V.2.2016  Jacob Pond DD, et al. N Engl J Med.2013:369(18):1695-909    PREVIOUS CHEMO:  Gemcitabine 1000 mg/m2 x 2 cycles (1/2017-3/2017), stopped due to disease progression  12/20/16- gemcitabine + abraxane (was stopped due to side effects, put on Single agent)    /58   Pulse 70   Temp 37 °C (98.6 °F)   Resp 18   Ht 1.53 m (5' 0.24\")   Wt 65.2 kg (143 lb 11.8 oz)   SpO2 94%   BMI 27.85 kg/m²  Body surface area is 1.66 meters squared.    LABS 12/28/2017:  ANC~ 1180 Plt = 246k   Hgb/Hct = 13.2/39.6    LABS 12/14/2017:     SCr = 0.76 mg/dL CrCl ~ 70 mL/min   AST/ALT/AP = 51/75/156 (no dose adjustment recommended) TBili = 0.4  K+ = 3.7     Drug Order   (Drug name, dose, route, IV Fluid & volume, frequency, number of doses) Cycle: 2 Day 15     Previous treatment: C2D1: 12/15/2017   Gemcitabine x 2 cycles     Medication = Gemcitabine  Base Dose= 1000 mg/m2  Calc Dose: Base Dose x Body surface area is 1.66 meters squared. m2 = 1660 mg  Final Dose = 1661 mg  Route = IV  Fluid & Volume =  mL  Admin Duration = Over 30 min          <5% difference, OK to treat with final dose    Medication = Albumin Bound Paclitaxel  Base Dose= 125 mg/m2  Calc Dose: Base Dose x Body surface area is 1.66 meters squared. m2 = 207.5 mg  Final Dose = 207.5 mg  Route = IV  Fluid & Volume (5 mg/ml) = 41.5 mL in an empty bag  Admin Duration = Over 30 min          <5% difference, OK to treat with final dose      By my signature below, I confirm this process was performed independently with the BSA and all final chemotherapy " dosing calculations congruent. I have reviewed the above chemotherapy order and that my calculation of the final dose and BSA (when applicable) corroborate those calculations of the  pharmacist. Discrepancies of 5% or greater in the written dose have been addressed and documented within the EPIC Progress notes.    JESSIE Pillai, PharmD

## 2017-12-29 NOTE — PROGRESS NOTES
Chemotherapy Verification - SECONDARY RN       Height = 60.24 in  Weight = 65.2 kg  BSA = 1.66 m2       Medication: Abraxane  Dose: 125 mg/m2  Calculated Dose: 207.5 mg                             (In mg/m2, AUC, mg/kg)     Medication: Gemzar  Dose: 1000 mg/m2  Calculated Dose: 1660 mg                             (In mg/m2, AUC, mg/kg)    I confirm that this process was performed independently.

## 2017-12-29 NOTE — PROGRESS NOTES
Elin Poole is a 72 y.o. female here for a non-provider visit for: Lab Draws  on 12/28/2017 at 4:24 PM    Procedure Performed: Venipuncture     Anatomical site: Right Wrist     Equipment used: 25 Butterfly    Labs drawn: CBC w/diff    Ordering Provider: Dr. Claude Ortiz By: Meron Smith R.N.

## 2017-12-29 NOTE — PROGRESS NOTES
Chemotherapy Verification - PRIMARY RN      Height = 153 cm  Weight = 65.2 kg  BSA = 1.66 m^2       Medication: Abraxane  Dose: 125 mg/m^2  Calculated Dose: 207.5 mg                             (In mg/m2, AUC, mg/kg)     Medication: Gemzar  Dose: 1000 mg/m^2  Calculated Dose: 1660 mg                             (In mg/m2, AUC, mg/kg)    I confirm this process was performed independently with the BSA and all final chemotherapy dosing calculations congruent.  Any discrepancies of 5% or greater have been addressed with the chemotherapy pharmacist. The resolution of the discrepancy has been documented in the EPIC progress notes.

## 2017-12-29 NOTE — PROGRESS NOTES
"Pharmacy Chemotherapy Verification    Patient Name: Elin Pooel    Dx: Pancreatic Cancer (Ampullary carcinoma)    Cycle: 2 Day 15 (Previous treatment = C2D1 12/15/17; s/p gemcitabine monotherapy last 3/13/17 and 3 cycles Albumin bound Paclitaxel + Gemcitabine last 12/20/16)  Regimen and Dosing Reference  Protocol: Gemcitabine( Gemzar) + Albumin bound-paclitaxel (Abraxane)    Albumin bound-paclitaxel  125 mg/m2 IV on days 1,8, and 15--MD dosing day 1 and day 15  Gemcitabine 1000 mg/m2 IV over 30 min on days 1,8, and 15--MD dosing day 1 and day 15    28-day cycle until disease progression or unacceptable toxicity  NCCN Guidelines for Pancreatic adenocarcinoma V.2.2016  Von Dejah DD, et al - N Engl J Med. 2013 Oct 31;369(29):1698-700.    Allergies:Patient has no known allergies.  Blood pressure 119/58, pulse 70, temperature 37 °C (98.6 °F), resp. rate 18, height 1.53 m (5' 0.24\"), weight 65.2 kg (143 lb 11.8 oz), SpO2 94 %.  Body surface area is 1.66 meters squared.     Labs 12/28/17  ANC ~ 1180 (OK if > 1000)     Plt = 246 k  Hgb = 13.2      12/14/17  SCr = 0.76     CrCl ~70 ml/min     K = 3.7  AST/ALT/AlkPhos =  51/75/156 Tbili = 0.4 No liver adjustment recommended    Albumin bound paclitaxel 125 mg/m2 x 1.66 m2 = 208 mg   <5% diff, OK to treat with final dose = 207.5 mg IV    Gemcitabine 1000 mg/m2 x 1.66 m2 = 1660 mg   <5% diff, OK to treat with final dose = 1661 mg IV    Keaton Salazar, PharmD    "

## 2017-12-30 NOTE — PROGRESS NOTES
Patient here Abraxane/Gemzar. Port accessed using sterile technique; brisk blood return noted. Pre-medication given MAR. Abraxane given per MAR. Gemzar given per MAR. Heparin instilled prior to de-accessing port. Port de-accessed; gauze/tape applied over site. Next appointment scheduled. Discharged to self care; no apparent distress noted.

## 2018-01-01 ENCOUNTER — HOME CARE VISIT (OUTPATIENT)
Dept: HOME HEALTH SERVICES | Facility: HOME HEALTHCARE | Age: 73
End: 2018-01-01
Payer: MEDICARE

## 2018-01-01 ENCOUNTER — OFFICE VISIT (OUTPATIENT)
Dept: HEMATOLOGY ONCOLOGY | Facility: MEDICAL CENTER | Age: 73
End: 2018-01-01
Payer: MEDICARE

## 2018-01-01 ENCOUNTER — TELEPHONE (OUTPATIENT)
Dept: HEMATOLOGY ONCOLOGY | Facility: MEDICAL CENTER | Age: 73
End: 2018-01-01

## 2018-01-01 ENCOUNTER — APPOINTMENT (OUTPATIENT)
Dept: RADIOLOGY | Facility: MEDICAL CENTER | Age: 73
DRG: 853 | End: 2018-01-01
Attending: HOSPITALIST
Payer: MEDICARE

## 2018-01-01 ENCOUNTER — OUTPATIENT INFUSION SERVICES (OUTPATIENT)
Dept: ONCOLOGY | Facility: MEDICAL CENTER | Age: 73
End: 2018-01-01
Attending: INTERNAL MEDICINE
Payer: MEDICARE

## 2018-01-01 ENCOUNTER — APPOINTMENT (OUTPATIENT)
Dept: RADIOLOGY | Facility: MEDICAL CENTER | Age: 73
DRG: 853 | End: 2018-01-01
Attending: INTERNAL MEDICINE
Payer: MEDICARE

## 2018-01-01 ENCOUNTER — APPOINTMENT (OUTPATIENT)
Dept: RADIOLOGY | Facility: MEDICAL CENTER | Age: 73
End: 2018-01-01
Attending: RADIOLOGY
Payer: MEDICARE

## 2018-01-01 ENCOUNTER — HOME CARE VISIT (OUTPATIENT)
Dept: HOSPICE | Facility: HOSPICE | Age: 73
End: 2018-01-01
Payer: MEDICARE

## 2018-01-01 ENCOUNTER — TELEPHONE (OUTPATIENT)
Dept: VASCULAR LAB | Facility: MEDICAL CENTER | Age: 73
End: 2018-01-01

## 2018-01-01 ENCOUNTER — HOSPITAL ENCOUNTER (OUTPATIENT)
Dept: RADIATION ONCOLOGY | Facility: MEDICAL CENTER | Age: 73
End: 2018-11-08

## 2018-01-01 ENCOUNTER — APPOINTMENT (OUTPATIENT)
Dept: RADIOLOGY | Facility: MEDICAL CENTER | Age: 73
DRG: 314 | End: 2018-01-01
Attending: FAMILY MEDICINE
Payer: MEDICARE

## 2018-01-01 ENCOUNTER — HOSPITAL ENCOUNTER (OUTPATIENT)
Facility: MEDICAL CENTER | Age: 73
End: 2018-06-20
Attending: NURSE PRACTITIONER
Payer: MEDICARE

## 2018-01-01 ENCOUNTER — HOSPITAL ENCOUNTER (OUTPATIENT)
Facility: MEDICAL CENTER | Age: 73
End: 2018-02-16
Attending: INTERNAL MEDICINE
Payer: MEDICARE

## 2018-01-01 ENCOUNTER — DOCUMENTATION (OUTPATIENT)
Dept: VASCULAR LAB | Facility: MEDICAL CENTER | Age: 73
End: 2018-01-01

## 2018-01-01 ENCOUNTER — ANTICOAGULATION VISIT (OUTPATIENT)
Dept: VASCULAR LAB | Facility: MEDICAL CENTER | Age: 73
End: 2018-01-01
Attending: INTERNAL MEDICINE
Payer: MEDICARE

## 2018-01-01 ENCOUNTER — APPOINTMENT (OUTPATIENT)
Dept: HEMATOLOGY ONCOLOGY | Facility: MEDICAL CENTER | Age: 73
End: 2018-01-01
Payer: MEDICARE

## 2018-01-01 ENCOUNTER — APPOINTMENT (OUTPATIENT)
Dept: RADIOLOGY | Facility: MEDICAL CENTER | Age: 73
DRG: 314 | End: 2018-01-01
Attending: INTERNAL MEDICINE
Payer: MEDICARE

## 2018-01-01 ENCOUNTER — TELEPHONE (OUTPATIENT)
Dept: INFECTIOUS DISEASES | Facility: MEDICAL CENTER | Age: 73
End: 2018-01-01

## 2018-01-01 ENCOUNTER — HOSPITAL ENCOUNTER (OUTPATIENT)
Dept: RADIATION ONCOLOGY | Facility: MEDICAL CENTER | Age: 73
End: 2018-08-31
Attending: RADIOLOGY
Payer: MEDICARE

## 2018-01-01 ENCOUNTER — HOSPITAL ENCOUNTER (INPATIENT)
Facility: MEDICAL CENTER | Age: 73
LOS: 11 days | DRG: 640 | End: 2018-12-28
Attending: EMERGENCY MEDICINE | Admitting: HOSPITALIST
Payer: MEDICARE

## 2018-01-01 ENCOUNTER — HOSPITAL ENCOUNTER (OUTPATIENT)
Dept: RADIOLOGY | Facility: MEDICAL CENTER | Age: 73
End: 2018-03-27
Attending: NURSE PRACTITIONER
Payer: MEDICARE

## 2018-01-01 ENCOUNTER — NON-PROVIDER VISIT (OUTPATIENT)
Dept: HEMATOLOGY ONCOLOGY | Facility: MEDICAL CENTER | Age: 73
End: 2018-01-01
Payer: MEDICARE

## 2018-01-01 ENCOUNTER — HOSPITAL ENCOUNTER (OUTPATIENT)
Dept: CARDIOLOGY | Facility: MEDICAL CENTER | Age: 73
End: 2018-03-16
Attending: FAMILY MEDICINE
Payer: MEDICARE

## 2018-01-01 ENCOUNTER — HOSPITAL ENCOUNTER (OUTPATIENT)
Facility: MEDICAL CENTER | Age: 73
End: 2018-03-02
Attending: INTERNAL MEDICINE
Payer: MEDICARE

## 2018-01-01 ENCOUNTER — PATIENT OUTREACH (OUTPATIENT)
Dept: OTHER | Facility: MEDICAL CENTER | Age: 73
End: 2018-01-01

## 2018-01-01 ENCOUNTER — PATIENT OUTREACH (OUTPATIENT)
Dept: HEALTH INFORMATION MANAGEMENT | Facility: OTHER | Age: 73
End: 2018-01-01

## 2018-01-01 ENCOUNTER — HOSPITAL ENCOUNTER (OUTPATIENT)
Dept: RADIOLOGY | Facility: MEDICAL CENTER | Age: 73
End: 2018-07-12
Attending: INTERNAL MEDICINE
Payer: MEDICARE

## 2018-01-01 ENCOUNTER — OFFICE VISIT (OUTPATIENT)
Dept: INFECTIOUS DISEASES | Facility: MEDICAL CENTER | Age: 73
End: 2018-01-01
Payer: MEDICARE

## 2018-01-01 ENCOUNTER — HOSPITAL ENCOUNTER (OUTPATIENT)
Facility: MEDICAL CENTER | Age: 73
End: 2018-01-11
Attending: NURSE PRACTITIONER
Payer: MEDICARE

## 2018-01-01 ENCOUNTER — HOSPITAL ENCOUNTER (OUTPATIENT)
Facility: MEDICAL CENTER | Age: 73
End: 2018-04-23
Attending: NURSE PRACTITIONER
Payer: MEDICARE

## 2018-01-01 ENCOUNTER — HOSPITAL ENCOUNTER (OUTPATIENT)
Facility: MEDICAL CENTER | Age: 73
End: 2018-02-26
Attending: NURSE PRACTITIONER
Payer: MEDICARE

## 2018-01-01 ENCOUNTER — APPOINTMENT (OUTPATIENT)
Dept: RADIOLOGY | Facility: MEDICAL CENTER | Age: 73
DRG: 640 | End: 2018-01-01
Attending: HOSPITALIST
Payer: MEDICARE

## 2018-01-01 ENCOUNTER — HOSPITAL ENCOUNTER (OUTPATIENT)
Dept: LAB | Facility: MEDICAL CENTER | Age: 73
End: 2018-10-17
Attending: FAMILY MEDICINE
Payer: MEDICARE

## 2018-01-01 ENCOUNTER — HOME HEALTH ADMISSION (OUTPATIENT)
Dept: HOME HEALTH SERVICES | Facility: HOME HEALTHCARE | Age: 73
End: 2018-01-01
Payer: MEDICARE

## 2018-01-01 ENCOUNTER — TELEPHONE (OUTPATIENT)
Dept: HEALTH INFORMATION MANAGEMENT | Facility: OTHER | Age: 73
End: 2018-01-01

## 2018-01-01 ENCOUNTER — APPOINTMENT (OUTPATIENT)
Dept: RADIOLOGY | Facility: MEDICAL CENTER | Age: 73
DRG: 640 | End: 2018-01-01
Attending: EMERGENCY MEDICINE
Payer: MEDICARE

## 2018-01-01 ENCOUNTER — HOSPITAL ENCOUNTER (INPATIENT)
Facility: MEDICAL CENTER | Age: 73
LOS: 24 days | DRG: 853 | End: 2018-12-06
Attending: EMERGENCY MEDICINE | Admitting: INTERNAL MEDICINE
Payer: MEDICARE

## 2018-01-01 ENCOUNTER — HOSPITAL ENCOUNTER (OUTPATIENT)
Facility: MEDICAL CENTER | Age: 73
End: 2018-10-09
Attending: RADIOLOGY | Admitting: RADIOLOGY
Payer: MEDICARE

## 2018-01-01 ENCOUNTER — ANTICOAGULATION VISIT (OUTPATIENT)
Dept: VASCULAR LAB | Facility: MEDICAL CENTER | Age: 73
End: 2018-01-01
Attending: PSYCHIATRY & NEUROLOGY
Payer: MEDICARE

## 2018-01-01 ENCOUNTER — APPOINTMENT (OUTPATIENT)
Dept: RADIOLOGY | Facility: MEDICAL CENTER | Age: 73
DRG: 314 | End: 2018-01-01
Attending: HOSPITALIST
Payer: MEDICARE

## 2018-01-01 ENCOUNTER — HOSPITAL ENCOUNTER (OUTPATIENT)
Facility: MEDICAL CENTER | Age: 73
End: 2018-04-30
Attending: NURSE PRACTITIONER
Payer: MEDICARE

## 2018-01-01 ENCOUNTER — HOSPITAL ENCOUNTER (OUTPATIENT)
Dept: RADIOLOGY | Facility: MEDICAL CENTER | Age: 73
End: 2018-09-10
Attending: SURGERY

## 2018-01-01 ENCOUNTER — APPOINTMENT (OUTPATIENT)
Dept: CARDIOLOGY | Facility: MEDICAL CENTER | Age: 73
DRG: 853 | End: 2018-01-01
Attending: HOSPITALIST
Payer: MEDICARE

## 2018-01-01 ENCOUNTER — HOSPITAL ENCOUNTER (OUTPATIENT)
Facility: MEDICAL CENTER | Age: 73
End: 2018-05-09
Attending: INTERNAL MEDICINE
Payer: MEDICARE

## 2018-01-01 ENCOUNTER — HOSPITAL ENCOUNTER (OUTPATIENT)
Dept: RADIOLOGY | Facility: MEDICAL CENTER | Age: 73
End: 2018-05-03
Attending: NURSE PRACTITIONER
Payer: MEDICARE

## 2018-01-01 ENCOUNTER — HOSPITAL ENCOUNTER (OUTPATIENT)
Dept: RADIOLOGY | Facility: MEDICAL CENTER | Age: 73
End: 2018-02-05
Attending: NURSE PRACTITIONER
Payer: MEDICARE

## 2018-01-01 ENCOUNTER — HOSPITAL ENCOUNTER (OUTPATIENT)
Dept: LAB | Facility: MEDICAL CENTER | Age: 73
End: 2018-10-17
Attending: RADIOLOGY
Payer: MEDICARE

## 2018-01-01 ENCOUNTER — HOSPITAL ENCOUNTER (OUTPATIENT)
Facility: MEDICAL CENTER | Age: 73
End: 2018-03-16
Attending: INTERNAL MEDICINE
Payer: MEDICARE

## 2018-01-01 ENCOUNTER — HOSPITAL ENCOUNTER (INPATIENT)
Facility: MEDICAL CENTER | Age: 73
LOS: 5 days | DRG: 314 | End: 2018-10-30
Attending: EMERGENCY MEDICINE | Admitting: HOSPITALIST
Payer: MEDICARE

## 2018-01-01 ENCOUNTER — APPOINTMENT (OUTPATIENT)
Dept: RADIOLOGY | Facility: MEDICAL CENTER | Age: 73
DRG: 853 | End: 2018-01-01
Attending: EMERGENCY MEDICINE
Payer: MEDICARE

## 2018-01-01 ENCOUNTER — HOSPITAL ENCOUNTER (OUTPATIENT)
Facility: MEDICAL CENTER | Age: 73
End: 2018-06-29
Attending: INTERNAL MEDICINE
Payer: MEDICARE

## 2018-01-01 ENCOUNTER — OUTPATIENT INFUSION SERVICES (OUTPATIENT)
Dept: ONCOLOGY | Facility: MEDICAL CENTER | Age: 73
End: 2018-01-01
Attending: PSYCHIATRY & NEUROLOGY
Payer: MEDICARE

## 2018-01-01 ENCOUNTER — HOSPITAL ENCOUNTER (INPATIENT)
Facility: MEDICAL CENTER | Age: 73
LOS: 1 days | DRG: 951 | End: 2018-12-29
Attending: INTERNAL MEDICINE | Admitting: INTERNAL MEDICINE
Payer: COMMERCIAL

## 2018-01-01 ENCOUNTER — HOSPITAL ENCOUNTER (OUTPATIENT)
Dept: LAB | Facility: MEDICAL CENTER | Age: 73
End: 2018-08-27
Attending: NURSE PRACTITIONER
Payer: MEDICARE

## 2018-01-01 ENCOUNTER — HOSPITAL ENCOUNTER (OUTPATIENT)
Facility: MEDICAL CENTER | Age: 73
End: 2018-03-19
Attending: NURSE PRACTITIONER
Payer: MEDICARE

## 2018-01-01 ENCOUNTER — HOSPITAL ENCOUNTER (OUTPATIENT)
Facility: MEDICAL CENTER | Age: 73
End: 2018-03-16
Attending: NURSE PRACTITIONER
Payer: MEDICARE

## 2018-01-01 ENCOUNTER — HOSPITAL ENCOUNTER (OUTPATIENT)
Facility: MEDICAL CENTER | Age: 73
End: 2018-05-04
Attending: NURSE PRACTITIONER
Payer: MEDICARE

## 2018-01-01 ENCOUNTER — HOSPITAL ENCOUNTER (OUTPATIENT)
Dept: RADIATION ONCOLOGY | Facility: MEDICAL CENTER | Age: 73
End: 2018-11-30
Attending: RADIOLOGY
Payer: MEDICARE

## 2018-01-01 ENCOUNTER — APPOINTMENT (OUTPATIENT)
Dept: RADIOLOGY | Facility: MEDICAL CENTER | Age: 73
DRG: 640 | End: 2018-01-01
Attending: INTERNAL MEDICINE
Payer: MEDICARE

## 2018-01-01 ENCOUNTER — HOSPITAL ENCOUNTER (OUTPATIENT)
Facility: MEDICAL CENTER | Age: 73
End: 2018-10-01
Attending: RADIOLOGY | Admitting: RADIOLOGY
Payer: MEDICARE

## 2018-01-01 ENCOUNTER — HOSPITAL ENCOUNTER (OUTPATIENT)
Dept: RADIOLOGY | Facility: MEDICAL CENTER | Age: 73
End: 2018-10-22
Attending: RADIOLOGY

## 2018-01-01 ENCOUNTER — HOSPITAL ENCOUNTER (OUTPATIENT)
Facility: MEDICAL CENTER | Age: 73
End: 2018-02-08
Attending: INTERNAL MEDICINE
Payer: MEDICARE

## 2018-01-01 ENCOUNTER — HOSPITAL ENCOUNTER (OUTPATIENT)
Facility: MEDICAL CENTER | Age: 73
End: 2018-05-23
Attending: INTERNAL MEDICINE
Payer: MEDICARE

## 2018-01-01 ENCOUNTER — HOSPITAL ENCOUNTER (OUTPATIENT)
Facility: MEDICAL CENTER | Age: 73
End: 2018-06-06
Attending: NURSE PRACTITIONER
Payer: MEDICARE

## 2018-01-01 ENCOUNTER — APPOINTMENT (OUTPATIENT)
Dept: RADIOLOGY | Facility: MEDICAL CENTER | Age: 73
DRG: 314 | End: 2018-01-01
Attending: EMERGENCY MEDICINE
Payer: MEDICARE

## 2018-01-01 ENCOUNTER — HOSPICE ADMISSION (OUTPATIENT)
Dept: HOSPICE | Facility: HOSPICE | Age: 73
End: 2018-01-01
Payer: MEDICARE

## 2018-01-01 ENCOUNTER — HOSPITAL ENCOUNTER (OUTPATIENT)
Dept: RADIOLOGY | Facility: MEDICAL CENTER | Age: 73
End: 2018-08-06
Attending: INTERNAL MEDICINE
Payer: MEDICARE

## 2018-01-01 VITALS
BODY MASS INDEX: 27.57 KG/M2 | RESPIRATION RATE: 18 BRPM | SYSTOLIC BLOOD PRESSURE: 124 MMHG | OXYGEN SATURATION: 98 % | WEIGHT: 140.43 LBS | HEART RATE: 93 BPM | TEMPERATURE: 97.7 F | DIASTOLIC BLOOD PRESSURE: 70 MMHG | HEIGHT: 60 IN

## 2018-01-01 VITALS
HEIGHT: 61 IN | SYSTOLIC BLOOD PRESSURE: 132 MMHG | TEMPERATURE: 97.9 F | OXYGEN SATURATION: 98 % | DIASTOLIC BLOOD PRESSURE: 70 MMHG | RESPIRATION RATE: 16 BRPM | WEIGHT: 137.79 LBS | HEART RATE: 102 BPM | BODY MASS INDEX: 26.01 KG/M2

## 2018-01-01 VITALS
HEIGHT: 60 IN | DIASTOLIC BLOOD PRESSURE: 57 MMHG | TEMPERATURE: 98.2 F | HEART RATE: 94 BPM | RESPIRATION RATE: 18 BRPM | WEIGHT: 134.7 LBS | SYSTOLIC BLOOD PRESSURE: 107 MMHG | OXYGEN SATURATION: 98 % | BODY MASS INDEX: 26.45 KG/M2

## 2018-01-01 VITALS
RESPIRATION RATE: 18 BRPM | HEIGHT: 60 IN | TEMPERATURE: 97.8 F | HEART RATE: 103 BPM | WEIGHT: 138.45 LBS | BODY MASS INDEX: 27.18 KG/M2 | OXYGEN SATURATION: 97 % | SYSTOLIC BLOOD PRESSURE: 120 MMHG | DIASTOLIC BLOOD PRESSURE: 69 MMHG

## 2018-01-01 VITALS — BODY MASS INDEX: 23.63 KG/M2 | HEIGHT: 66 IN | WEIGHT: 147 LBS

## 2018-01-01 VITALS
OXYGEN SATURATION: 98 % | SYSTOLIC BLOOD PRESSURE: 141 MMHG | BODY MASS INDEX: 27.75 KG/M2 | DIASTOLIC BLOOD PRESSURE: 60 MMHG | HEIGHT: 61 IN | RESPIRATION RATE: 16 BRPM | WEIGHT: 147 LBS | TEMPERATURE: 98 F | HEART RATE: 65 BPM

## 2018-01-01 VITALS
BODY MASS INDEX: 27.12 KG/M2 | DIASTOLIC BLOOD PRESSURE: 52 MMHG | WEIGHT: 143.63 LBS | OXYGEN SATURATION: 98 % | RESPIRATION RATE: 14 BRPM | HEART RATE: 78 BPM | TEMPERATURE: 98.7 F | HEIGHT: 61 IN | SYSTOLIC BLOOD PRESSURE: 104 MMHG

## 2018-01-01 VITALS
SYSTOLIC BLOOD PRESSURE: 124 MMHG | OXYGEN SATURATION: 98 % | DIASTOLIC BLOOD PRESSURE: 63 MMHG | TEMPERATURE: 97.7 F | RESPIRATION RATE: 18 BRPM | HEART RATE: 94 BPM

## 2018-01-01 VITALS
RESPIRATION RATE: 16 BRPM | OXYGEN SATURATION: 97 % | HEIGHT: 61 IN | HEART RATE: 90 BPM | WEIGHT: 139.44 LBS | SYSTOLIC BLOOD PRESSURE: 110 MMHG | TEMPERATURE: 97.5 F | BODY MASS INDEX: 26.33 KG/M2 | DIASTOLIC BLOOD PRESSURE: 74 MMHG

## 2018-01-01 VITALS
RESPIRATION RATE: 18 BRPM | HEIGHT: 60 IN | WEIGHT: 140.65 LBS | SYSTOLIC BLOOD PRESSURE: 109 MMHG | BODY MASS INDEX: 28.52 KG/M2 | OXYGEN SATURATION: 98 % | BODY MASS INDEX: 27.61 KG/M2 | TEMPERATURE: 98 F | DIASTOLIC BLOOD PRESSURE: 60 MMHG | SYSTOLIC BLOOD PRESSURE: 112 MMHG | HEART RATE: 78 BPM | HEIGHT: 60 IN | TEMPERATURE: 98.9 F | RESPIRATION RATE: 18 BRPM | HEART RATE: 85 BPM | DIASTOLIC BLOOD PRESSURE: 72 MMHG | OXYGEN SATURATION: 97 % | WEIGHT: 145.28 LBS

## 2018-01-01 VITALS
DIASTOLIC BLOOD PRESSURE: 52 MMHG | HEIGHT: 60 IN | OXYGEN SATURATION: 99 % | SYSTOLIC BLOOD PRESSURE: 105 MMHG | RESPIRATION RATE: 16 BRPM | WEIGHT: 143.08 LBS | HEART RATE: 75 BPM | BODY MASS INDEX: 28.09 KG/M2 | TEMPERATURE: 96.9 F

## 2018-01-01 VITALS
SYSTOLIC BLOOD PRESSURE: 102 MMHG | RESPIRATION RATE: 18 BRPM | OXYGEN SATURATION: 98 % | TEMPERATURE: 98.4 F | BODY MASS INDEX: 26.74 KG/M2 | DIASTOLIC BLOOD PRESSURE: 60 MMHG | WEIGHT: 141.65 LBS | HEART RATE: 92 BPM | HEIGHT: 61 IN

## 2018-01-01 VITALS
HEART RATE: 75 BPM | WEIGHT: 147.71 LBS | SYSTOLIC BLOOD PRESSURE: 118 MMHG | RESPIRATION RATE: 20 BRPM | BODY MASS INDEX: 27.89 KG/M2 | DIASTOLIC BLOOD PRESSURE: 70 MMHG | OXYGEN SATURATION: 96 % | HEIGHT: 61 IN | TEMPERATURE: 97.2 F

## 2018-01-01 VITALS
HEIGHT: 61 IN | WEIGHT: 141.09 LBS | RESPIRATION RATE: 16 BRPM | TEMPERATURE: 99.4 F | OXYGEN SATURATION: 99 % | HEART RATE: 92 BPM | SYSTOLIC BLOOD PRESSURE: 102 MMHG | BODY MASS INDEX: 26.64 KG/M2 | DIASTOLIC BLOOD PRESSURE: 60 MMHG

## 2018-01-01 VITALS
WEIGHT: 141.98 LBS | HEART RATE: 80 BPM | TEMPERATURE: 98.8 F | DIASTOLIC BLOOD PRESSURE: 62 MMHG | HEIGHT: 60 IN | BODY MASS INDEX: 27.87 KG/M2 | RESPIRATION RATE: 14 BRPM | SYSTOLIC BLOOD PRESSURE: 108 MMHG | OXYGEN SATURATION: 97 %

## 2018-01-01 VITALS
OXYGEN SATURATION: 96 % | HEART RATE: 88 BPM | TEMPERATURE: 97.5 F | RESPIRATION RATE: 18 BRPM | BODY MASS INDEX: 27.38 KG/M2 | BODY MASS INDEX: 27.05 KG/M2 | TEMPERATURE: 98.6 F | OXYGEN SATURATION: 97 % | HEIGHT: 60 IN | HEART RATE: 100 BPM | RESPIRATION RATE: 14 BRPM | SYSTOLIC BLOOD PRESSURE: 110 MMHG | WEIGHT: 139.44 LBS | SYSTOLIC BLOOD PRESSURE: 104 MMHG | DIASTOLIC BLOOD PRESSURE: 84 MMHG | HEIGHT: 60 IN | WEIGHT: 137.79 LBS | DIASTOLIC BLOOD PRESSURE: 62 MMHG

## 2018-01-01 VITALS
OXYGEN SATURATION: 97 % | DIASTOLIC BLOOD PRESSURE: 71 MMHG | RESPIRATION RATE: 18 BRPM | HEART RATE: 79 BPM | TEMPERATURE: 97 F | SYSTOLIC BLOOD PRESSURE: 101 MMHG

## 2018-01-01 VITALS
TEMPERATURE: 97.7 F | BODY MASS INDEX: 24.66 KG/M2 | HEIGHT: 62 IN | HEART RATE: 119 BPM | RESPIRATION RATE: 18 BRPM | WEIGHT: 134 LBS | OXYGEN SATURATION: 96 % | SYSTOLIC BLOOD PRESSURE: 98 MMHG | DIASTOLIC BLOOD PRESSURE: 48 MMHG

## 2018-01-01 VITALS
DIASTOLIC BLOOD PRESSURE: 55 MMHG | TEMPERATURE: 99 F | HEART RATE: 85 BPM | OXYGEN SATURATION: 100 % | HEIGHT: 60 IN | BODY MASS INDEX: 27.79 KG/M2 | SYSTOLIC BLOOD PRESSURE: 127 MMHG | RESPIRATION RATE: 18 BRPM | WEIGHT: 141.54 LBS

## 2018-01-01 VITALS
OXYGEN SATURATION: 96 % | WEIGHT: 141.76 LBS | HEIGHT: 60 IN | SYSTOLIC BLOOD PRESSURE: 98 MMHG | RESPIRATION RATE: 16 BRPM | HEART RATE: 90 BPM | TEMPERATURE: 98.7 F | DIASTOLIC BLOOD PRESSURE: 64 MMHG | BODY MASS INDEX: 27.83 KG/M2

## 2018-01-01 VITALS
DIASTOLIC BLOOD PRESSURE: 62 MMHG | TEMPERATURE: 97.9 F | HEIGHT: 60 IN | WEIGHT: 143.74 LBS | BODY MASS INDEX: 28.22 KG/M2 | HEART RATE: 75 BPM | OXYGEN SATURATION: 97 % | RESPIRATION RATE: 18 BRPM | SYSTOLIC BLOOD PRESSURE: 122 MMHG

## 2018-01-01 VITALS
TEMPERATURE: 97.5 F | WEIGHT: 142.86 LBS | OXYGEN SATURATION: 98 % | BODY MASS INDEX: 28.05 KG/M2 | WEIGHT: 137.46 LBS | RESPIRATION RATE: 16 BRPM | DIASTOLIC BLOOD PRESSURE: 70 MMHG | HEART RATE: 86 BPM | DIASTOLIC BLOOD PRESSURE: 58 MMHG | SYSTOLIC BLOOD PRESSURE: 110 MMHG | RESPIRATION RATE: 18 BRPM | HEIGHT: 60 IN | SYSTOLIC BLOOD PRESSURE: 134 MMHG | HEART RATE: 69 BPM | HEIGHT: 60 IN | BODY MASS INDEX: 26.99 KG/M2 | OXYGEN SATURATION: 97 % | TEMPERATURE: 98.1 F

## 2018-01-01 VITALS
DIASTOLIC BLOOD PRESSURE: 56 MMHG | OXYGEN SATURATION: 98 % | SYSTOLIC BLOOD PRESSURE: 143 MMHG | HEIGHT: 60 IN | HEART RATE: 62 BPM | BODY MASS INDEX: 26.84 KG/M2 | WEIGHT: 136.69 LBS | RESPIRATION RATE: 18 BRPM | SYSTOLIC BLOOD PRESSURE: 93 MMHG | DIASTOLIC BLOOD PRESSURE: 62 MMHG | HEART RATE: 75 BPM

## 2018-01-01 VITALS
WEIGHT: 147.93 LBS | TEMPERATURE: 98.5 F | HEIGHT: 66 IN | RESPIRATION RATE: 16 BRPM | HEART RATE: 122 BPM | SYSTOLIC BLOOD PRESSURE: 151 MMHG | BODY MASS INDEX: 23.77 KG/M2 | DIASTOLIC BLOOD PRESSURE: 55 MMHG | OXYGEN SATURATION: 87 %

## 2018-01-01 VITALS
HEART RATE: 95 BPM | DIASTOLIC BLOOD PRESSURE: 60 MMHG | BODY MASS INDEX: 26.9 KG/M2 | SYSTOLIC BLOOD PRESSURE: 106 MMHG | OXYGEN SATURATION: 97 % | HEIGHT: 60 IN | TEMPERATURE: 98 F | WEIGHT: 137 LBS

## 2018-01-01 VITALS
HEART RATE: 94 BPM | OXYGEN SATURATION: 98 % | TEMPERATURE: 98.3 F | RESPIRATION RATE: 16 BRPM | WEIGHT: 135.58 LBS | BODY MASS INDEX: 26.62 KG/M2 | HEIGHT: 60 IN | SYSTOLIC BLOOD PRESSURE: 102 MMHG | DIASTOLIC BLOOD PRESSURE: 47 MMHG

## 2018-01-01 VITALS
SYSTOLIC BLOOD PRESSURE: 109 MMHG | RESPIRATION RATE: 18 BRPM | TEMPERATURE: 98.9 F | BODY MASS INDEX: 27.57 KG/M2 | HEART RATE: 87 BPM | OXYGEN SATURATION: 96 % | WEIGHT: 140.43 LBS | DIASTOLIC BLOOD PRESSURE: 64 MMHG | HEIGHT: 60 IN

## 2018-01-01 VITALS
HEART RATE: 96 BPM | SYSTOLIC BLOOD PRESSURE: 113 MMHG | DIASTOLIC BLOOD PRESSURE: 78 MMHG | BODY MASS INDEX: 27.7 KG/M2 | OXYGEN SATURATION: 97 % | TEMPERATURE: 98 F | WEIGHT: 141.09 LBS | HEIGHT: 60 IN | RESPIRATION RATE: 18 BRPM

## 2018-01-01 VITALS
DIASTOLIC BLOOD PRESSURE: 54 MMHG | OXYGEN SATURATION: 97 % | HEART RATE: 94 BPM | RESPIRATION RATE: 18 BRPM | TEMPERATURE: 97 F | SYSTOLIC BLOOD PRESSURE: 96 MMHG

## 2018-01-01 VITALS
WEIGHT: 141.87 LBS | HEIGHT: 60 IN | HEART RATE: 80 BPM | SYSTOLIC BLOOD PRESSURE: 108 MMHG | RESPIRATION RATE: 14 BRPM | TEMPERATURE: 98.7 F | BODY MASS INDEX: 27.85 KG/M2 | OXYGEN SATURATION: 97 % | DIASTOLIC BLOOD PRESSURE: 62 MMHG

## 2018-01-01 VITALS
DIASTOLIC BLOOD PRESSURE: 70 MMHG | HEIGHT: 61 IN | HEART RATE: 78 BPM | SYSTOLIC BLOOD PRESSURE: 102 MMHG | TEMPERATURE: 98 F | WEIGHT: 137.68 LBS | RESPIRATION RATE: 18 BRPM | BODY MASS INDEX: 25.99 KG/M2 | OXYGEN SATURATION: 99 %

## 2018-01-01 VITALS
DIASTOLIC BLOOD PRESSURE: 62 MMHG | BODY MASS INDEX: 26.92 KG/M2 | HEIGHT: 60 IN | WEIGHT: 137.13 LBS | OXYGEN SATURATION: 98 % | RESPIRATION RATE: 18 BRPM | TEMPERATURE: 98 F | SYSTOLIC BLOOD PRESSURE: 104 MMHG | HEART RATE: 98 BPM

## 2018-01-01 VITALS
HEART RATE: 62 BPM | RESPIRATION RATE: 16 BRPM | TEMPERATURE: 98.2 F | DIASTOLIC BLOOD PRESSURE: 64 MMHG | OXYGEN SATURATION: 99 % | SYSTOLIC BLOOD PRESSURE: 106 MMHG

## 2018-01-01 VITALS
HEART RATE: 88 BPM | OXYGEN SATURATION: 100 % | SYSTOLIC BLOOD PRESSURE: 104 MMHG | TEMPERATURE: 97.7 F | WEIGHT: 104.25 LBS | BODY MASS INDEX: 19.68 KG/M2 | DIASTOLIC BLOOD PRESSURE: 62 MMHG | RESPIRATION RATE: 16 BRPM | HEIGHT: 61 IN

## 2018-01-01 VITALS
DIASTOLIC BLOOD PRESSURE: 72 MMHG | WEIGHT: 143.19 LBS | SYSTOLIC BLOOD PRESSURE: 108 MMHG | OXYGEN SATURATION: 97 % | HEART RATE: 89 BPM | TEMPERATURE: 97.5 F | BODY MASS INDEX: 28.11 KG/M2 | RESPIRATION RATE: 16 BRPM | HEIGHT: 60 IN

## 2018-01-01 VITALS
TEMPERATURE: 97.8 F | OXYGEN SATURATION: 98 % | DIASTOLIC BLOOD PRESSURE: 70 MMHG | WEIGHT: 137.9 LBS | HEIGHT: 61 IN | SYSTOLIC BLOOD PRESSURE: 132 MMHG | BODY MASS INDEX: 26.04 KG/M2 | HEART RATE: 102 BPM | RESPIRATION RATE: 16 BRPM

## 2018-01-01 VITALS
HEART RATE: 119 BPM | BODY MASS INDEX: 26.47 KG/M2 | TEMPERATURE: 97.7 F | OXYGEN SATURATION: 96 % | RESPIRATION RATE: 18 BRPM | DIASTOLIC BLOOD PRESSURE: 48 MMHG | HEIGHT: 60 IN | SYSTOLIC BLOOD PRESSURE: 98 MMHG | WEIGHT: 134.81 LBS

## 2018-01-01 VITALS
HEIGHT: 60 IN | WEIGHT: 138.23 LBS | OXYGEN SATURATION: 99 % | BODY MASS INDEX: 27.14 KG/M2 | DIASTOLIC BLOOD PRESSURE: 65 MMHG | SYSTOLIC BLOOD PRESSURE: 117 MMHG | HEART RATE: 86 BPM | RESPIRATION RATE: 18 BRPM | TEMPERATURE: 97.5 F

## 2018-01-01 VITALS
HEIGHT: 60 IN | OXYGEN SATURATION: 99 % | DIASTOLIC BLOOD PRESSURE: 65 MMHG | TEMPERATURE: 98.1 F | BODY MASS INDEX: 26.99 KG/M2 | HEART RATE: 114 BPM | RESPIRATION RATE: 16 BRPM | SYSTOLIC BLOOD PRESSURE: 102 MMHG | WEIGHT: 137.46 LBS

## 2018-01-01 VITALS
RESPIRATION RATE: 16 BRPM | DIASTOLIC BLOOD PRESSURE: 54 MMHG | BODY MASS INDEX: 26.11 KG/M2 | SYSTOLIC BLOOD PRESSURE: 98 MMHG | TEMPERATURE: 98.2 F | HEART RATE: 109 BPM | WEIGHT: 133 LBS | OXYGEN SATURATION: 98 % | HEIGHT: 60 IN

## 2018-01-01 VITALS
OXYGEN SATURATION: 99 % | BODY MASS INDEX: 27.35 KG/M2 | DIASTOLIC BLOOD PRESSURE: 45 MMHG | TEMPERATURE: 97.1 F | HEIGHT: 60 IN | WEIGHT: 139.33 LBS | SYSTOLIC BLOOD PRESSURE: 108 MMHG | HEART RATE: 69 BPM | RESPIRATION RATE: 18 BRPM

## 2018-01-01 VITALS
OXYGEN SATURATION: 97 % | RESPIRATION RATE: 18 BRPM | HEART RATE: 88 BPM | DIASTOLIC BLOOD PRESSURE: 60 MMHG | SYSTOLIC BLOOD PRESSURE: 114 MMHG | TEMPERATURE: 97.3 F

## 2018-01-01 VITALS
HEART RATE: 113 BPM | WEIGHT: 138.56 LBS | DIASTOLIC BLOOD PRESSURE: 70 MMHG | SYSTOLIC BLOOD PRESSURE: 110 MMHG | OXYGEN SATURATION: 97 % | BODY MASS INDEX: 27.2 KG/M2 | TEMPERATURE: 98.6 F | HEIGHT: 60 IN | RESPIRATION RATE: 18 BRPM

## 2018-01-01 VITALS
OXYGEN SATURATION: 97 % | SYSTOLIC BLOOD PRESSURE: 108 MMHG | WEIGHT: 143.08 LBS | DIASTOLIC BLOOD PRESSURE: 72 MMHG | RESPIRATION RATE: 16 BRPM | RESPIRATION RATE: 16 BRPM | DIASTOLIC BLOOD PRESSURE: 65 MMHG | BODY MASS INDEX: 26.99 KG/M2 | HEIGHT: 60 IN | WEIGHT: 137.46 LBS | HEIGHT: 60 IN | SYSTOLIC BLOOD PRESSURE: 102 MMHG | BODY MASS INDEX: 28.09 KG/M2 | HEART RATE: 114 BPM | TEMPERATURE: 97.5 F | TEMPERATURE: 98.1 F | HEART RATE: 89 BPM | OXYGEN SATURATION: 99 %

## 2018-01-01 VITALS
DIASTOLIC BLOOD PRESSURE: 56 MMHG | SYSTOLIC BLOOD PRESSURE: 114 MMHG | WEIGHT: 143.96 LBS | BODY MASS INDEX: 28.26 KG/M2 | RESPIRATION RATE: 14 BRPM | TEMPERATURE: 99 F | HEART RATE: 77 BPM | HEIGHT: 60 IN | OXYGEN SATURATION: 98 %

## 2018-01-01 VITALS
DIASTOLIC BLOOD PRESSURE: 70 MMHG | OXYGEN SATURATION: 97 % | HEART RATE: 113 BPM | SYSTOLIC BLOOD PRESSURE: 110 MMHG | RESPIRATION RATE: 18 BRPM | BODY MASS INDEX: 27.22 KG/M2 | HEIGHT: 60 IN | WEIGHT: 138.67 LBS | TEMPERATURE: 98.6 F

## 2018-01-01 VITALS
RESPIRATION RATE: 18 BRPM | OXYGEN SATURATION: 97 % | HEART RATE: 103 BPM | SYSTOLIC BLOOD PRESSURE: 122 MMHG | TEMPERATURE: 97.3 F | DIASTOLIC BLOOD PRESSURE: 72 MMHG

## 2018-01-01 VITALS
SYSTOLIC BLOOD PRESSURE: 114 MMHG | HEIGHT: 60 IN | TEMPERATURE: 99 F | OXYGEN SATURATION: 98 % | HEART RATE: 77 BPM | DIASTOLIC BLOOD PRESSURE: 56 MMHG | RESPIRATION RATE: 14 BRPM | BODY MASS INDEX: 28.26 KG/M2 | WEIGHT: 143.96 LBS

## 2018-01-01 VITALS
HEART RATE: 75 BPM | BODY MASS INDEX: 27.26 KG/M2 | OXYGEN SATURATION: 97 % | RESPIRATION RATE: 15 BRPM | HEIGHT: 61 IN | DIASTOLIC BLOOD PRESSURE: 71 MMHG | SYSTOLIC BLOOD PRESSURE: 124 MMHG | TEMPERATURE: 97.7 F | WEIGHT: 144.4 LBS

## 2018-01-01 VITALS
HEIGHT: 60 IN | TEMPERATURE: 97.4 F | BODY MASS INDEX: 27.33 KG/M2 | OXYGEN SATURATION: 95 % | RESPIRATION RATE: 16 BRPM | WEIGHT: 139.22 LBS | SYSTOLIC BLOOD PRESSURE: 108 MMHG | DIASTOLIC BLOOD PRESSURE: 62 MMHG | HEART RATE: 117 BPM

## 2018-01-01 VITALS
BODY MASS INDEX: 27.53 KG/M2 | WEIGHT: 145.8 LBS | TEMPERATURE: 97.2 F | OXYGEN SATURATION: 97 % | SYSTOLIC BLOOD PRESSURE: 133 MMHG | RESPIRATION RATE: 16 BRPM | DIASTOLIC BLOOD PRESSURE: 53 MMHG | HEIGHT: 61 IN | HEART RATE: 72 BPM

## 2018-01-01 VITALS
BODY MASS INDEX: 27.29 KG/M2 | SYSTOLIC BLOOD PRESSURE: 104 MMHG | HEIGHT: 60 IN | OXYGEN SATURATION: 96 % | DIASTOLIC BLOOD PRESSURE: 62 MMHG | WEIGHT: 139 LBS | TEMPERATURE: 97.5 F | HEART RATE: 88 BPM | RESPIRATION RATE: 14 BRPM

## 2018-01-01 VITALS
WEIGHT: 141.2 LBS | BODY MASS INDEX: 27.72 KG/M2 | DIASTOLIC BLOOD PRESSURE: 62 MMHG | HEIGHT: 60 IN | OXYGEN SATURATION: 96 % | RESPIRATION RATE: 16 BRPM | SYSTOLIC BLOOD PRESSURE: 118 MMHG | HEART RATE: 91 BPM | TEMPERATURE: 97.1 F

## 2018-01-01 VITALS
TEMPERATURE: 97 F | SYSTOLIC BLOOD PRESSURE: 110 MMHG | OXYGEN SATURATION: 98 % | RESPIRATION RATE: 16 BRPM | HEART RATE: 87 BPM | DIASTOLIC BLOOD PRESSURE: 62 MMHG

## 2018-01-01 VITALS
RESPIRATION RATE: 18 BRPM | TEMPERATURE: 97.5 F | WEIGHT: 145.94 LBS | SYSTOLIC BLOOD PRESSURE: 136 MMHG | HEIGHT: 61 IN | DIASTOLIC BLOOD PRESSURE: 66 MMHG | OXYGEN SATURATION: 95 % | HEART RATE: 73 BPM | BODY MASS INDEX: 27.55 KG/M2

## 2018-01-01 VITALS
WEIGHT: 141.76 LBS | OXYGEN SATURATION: 96 % | SYSTOLIC BLOOD PRESSURE: 98 MMHG | TEMPERATURE: 98.7 F | DIASTOLIC BLOOD PRESSURE: 64 MMHG | RESPIRATION RATE: 16 BRPM | BODY MASS INDEX: 27.83 KG/M2 | HEIGHT: 60 IN | HEART RATE: 90 BPM

## 2018-01-01 VITALS
DIASTOLIC BLOOD PRESSURE: 56 MMHG | OXYGEN SATURATION: 98 % | TEMPERATURE: 97.2 F | RESPIRATION RATE: 18 BRPM | BODY MASS INDEX: 26.27 KG/M2 | WEIGHT: 133.82 LBS | SYSTOLIC BLOOD PRESSURE: 97 MMHG | HEIGHT: 60 IN | HEART RATE: 101 BPM

## 2018-01-01 VITALS
BODY MASS INDEX: 27.09 KG/M2 | RESPIRATION RATE: 18 BRPM | WEIGHT: 138.01 LBS | SYSTOLIC BLOOD PRESSURE: 104 MMHG | OXYGEN SATURATION: 97 % | HEIGHT: 60 IN | DIASTOLIC BLOOD PRESSURE: 62 MMHG | TEMPERATURE: 97.5 F | HEART RATE: 87 BPM

## 2018-01-01 VITALS
BODY MASS INDEX: 27.31 KG/M2 | DIASTOLIC BLOOD PRESSURE: 64 MMHG | WEIGHT: 139.11 LBS | HEIGHT: 60 IN | RESPIRATION RATE: 18 BRPM | TEMPERATURE: 98.1 F | SYSTOLIC BLOOD PRESSURE: 115 MMHG | OXYGEN SATURATION: 96 % | HEART RATE: 98 BPM

## 2018-01-01 VITALS
TEMPERATURE: 97 F | OXYGEN SATURATION: 98 % | DIASTOLIC BLOOD PRESSURE: 64 MMHG | HEART RATE: 91 BPM | RESPIRATION RATE: 18 BRPM | SYSTOLIC BLOOD PRESSURE: 126 MMHG

## 2018-01-01 VITALS
DIASTOLIC BLOOD PRESSURE: 83 MMHG | RESPIRATION RATE: 18 BRPM | SYSTOLIC BLOOD PRESSURE: 119 MMHG | TEMPERATURE: 98 F | WEIGHT: 144.4 LBS | OXYGEN SATURATION: 98 % | HEIGHT: 60 IN | BODY MASS INDEX: 28.35 KG/M2 | HEART RATE: 84 BPM

## 2018-01-01 VITALS
TEMPERATURE: 97.5 F | DIASTOLIC BLOOD PRESSURE: 91 MMHG | OXYGEN SATURATION: 95 % | RESPIRATION RATE: 16 BRPM | HEART RATE: 90 BPM | HEIGHT: 61 IN | WEIGHT: 129.19 LBS | BODY MASS INDEX: 24.39 KG/M2 | SYSTOLIC BLOOD PRESSURE: 137 MMHG

## 2018-01-01 VITALS
OXYGEN SATURATION: 99 % | BODY MASS INDEX: 27.35 KG/M2 | WEIGHT: 139.33 LBS | HEIGHT: 60 IN | TEMPERATURE: 97.5 F | DIASTOLIC BLOOD PRESSURE: 51 MMHG | RESPIRATION RATE: 18 BRPM | SYSTOLIC BLOOD PRESSURE: 110 MMHG | HEART RATE: 85 BPM

## 2018-01-01 VITALS
TEMPERATURE: 97.8 F | DIASTOLIC BLOOD PRESSURE: 60 MMHG | HEART RATE: 104 BPM | OXYGEN SATURATION: 98 % | SYSTOLIC BLOOD PRESSURE: 110 MMHG | RESPIRATION RATE: 18 BRPM

## 2018-01-01 VITALS
HEIGHT: 60 IN | WEIGHT: 136.69 LBS | SYSTOLIC BLOOD PRESSURE: 107 MMHG | BODY MASS INDEX: 26.84 KG/M2 | OXYGEN SATURATION: 100 % | TEMPERATURE: 97.3 F | RESPIRATION RATE: 16 BRPM | HEART RATE: 95 BPM | DIASTOLIC BLOOD PRESSURE: 46 MMHG

## 2018-01-01 VITALS
DIASTOLIC BLOOD PRESSURE: 54 MMHG | OXYGEN SATURATION: 98 % | TEMPERATURE: 98.2 F | SYSTOLIC BLOOD PRESSURE: 98 MMHG | HEART RATE: 109 BPM | HEIGHT: 60 IN | BODY MASS INDEX: 26.14 KG/M2 | RESPIRATION RATE: 16 BRPM | WEIGHT: 133.16 LBS

## 2018-01-01 VITALS
BODY MASS INDEX: 28.87 KG/M2 | HEIGHT: 60 IN | SYSTOLIC BLOOD PRESSURE: 114 MMHG | HEART RATE: 88 BPM | RESPIRATION RATE: 18 BRPM | WEIGHT: 147.05 LBS | TEMPERATURE: 99 F | OXYGEN SATURATION: 97 % | DIASTOLIC BLOOD PRESSURE: 69 MMHG

## 2018-01-01 VITALS
HEIGHT: 60 IN | SYSTOLIC BLOOD PRESSURE: 99 MMHG | HEART RATE: 68 BPM | OXYGEN SATURATION: 95 % | BODY MASS INDEX: 27.79 KG/M2 | DIASTOLIC BLOOD PRESSURE: 73 MMHG | RESPIRATION RATE: 18 BRPM | TEMPERATURE: 97 F | WEIGHT: 141.54 LBS

## 2018-01-01 VITALS
HEART RATE: 72 BPM | DIASTOLIC BLOOD PRESSURE: 47 MMHG | OXYGEN SATURATION: 98 % | SYSTOLIC BLOOD PRESSURE: 108 MMHG | RESPIRATION RATE: 18 BRPM | BODY MASS INDEX: 27.92 KG/M2 | WEIGHT: 142.2 LBS | TEMPERATURE: 98.2 F | HEIGHT: 60 IN

## 2018-01-01 VITALS
HEART RATE: 78 BPM | TEMPERATURE: 98 F | DIASTOLIC BLOOD PRESSURE: 70 MMHG | BODY MASS INDEX: 25.86 KG/M2 | HEIGHT: 61 IN | OXYGEN SATURATION: 99 % | WEIGHT: 137 LBS | SYSTOLIC BLOOD PRESSURE: 102 MMHG | RESPIRATION RATE: 18 BRPM

## 2018-01-01 VITALS
HEART RATE: 104 BPM | OXYGEN SATURATION: 96 % | RESPIRATION RATE: 18 BRPM | DIASTOLIC BLOOD PRESSURE: 62 MMHG | TEMPERATURE: 97 F | SYSTOLIC BLOOD PRESSURE: 105 MMHG

## 2018-01-01 VITALS
DIASTOLIC BLOOD PRESSURE: 60 MMHG | SYSTOLIC BLOOD PRESSURE: 110 MMHG | TEMPERATURE: 97.8 F | HEART RATE: 88 BPM | OXYGEN SATURATION: 98 % | RESPIRATION RATE: 18 BRPM

## 2018-01-01 VITALS
SYSTOLIC BLOOD PRESSURE: 118 MMHG | TEMPERATURE: 98.6 F | WEIGHT: 149.03 LBS | BODY MASS INDEX: 28.14 KG/M2 | RESPIRATION RATE: 18 BRPM | HEART RATE: 78 BPM | OXYGEN SATURATION: 97 % | HEIGHT: 61 IN | DIASTOLIC BLOOD PRESSURE: 79 MMHG

## 2018-01-01 VITALS
WEIGHT: 137 LBS | BODY MASS INDEX: 26.76 KG/M2 | SYSTOLIC BLOOD PRESSURE: 130 MMHG | DIASTOLIC BLOOD PRESSURE: 80 MMHG | HEART RATE: 70 BPM

## 2018-01-01 DIAGNOSIS — D70.1 CHEMOTHERAPY-INDUCED NEUTROPENIA (HCC): ICD-10-CM

## 2018-01-01 DIAGNOSIS — E87.6 HYPOKALEMIA: ICD-10-CM

## 2018-01-01 DIAGNOSIS — A41.51 E. COLI SEPSIS (HCC): ICD-10-CM

## 2018-01-01 DIAGNOSIS — C24.1 AMPULLARY CARCINOMA (HCC): ICD-10-CM

## 2018-01-01 DIAGNOSIS — T45.1X5A CHEMOTHERAPY-INDUCED NEUTROPENIA (HCC): ICD-10-CM

## 2018-01-01 DIAGNOSIS — R63.0 POOR APPETITE: ICD-10-CM

## 2018-01-01 DIAGNOSIS — Z00.6 RESEARCH STUDY PATIENT: ICD-10-CM

## 2018-01-01 DIAGNOSIS — C80.1 OBSTRUCTIVE JAUNDICE DUE TO MALIGNANT NEOPLASM (HCC): ICD-10-CM

## 2018-01-01 DIAGNOSIS — I82.531 CHRONIC DEEP VEIN THROMBOSIS (DVT) OF POPLITEAL VEIN OF RIGHT LOWER EXTREMITY (HCC): ICD-10-CM

## 2018-01-01 DIAGNOSIS — Z51.11 ENCOUNTER FOR ANTINEOPLASTIC CHEMOTHERAPY: ICD-10-CM

## 2018-01-01 DIAGNOSIS — R05.9 COUGH: ICD-10-CM

## 2018-01-01 DIAGNOSIS — K59.1 FUNCTIONAL DIARRHEA: ICD-10-CM

## 2018-01-01 DIAGNOSIS — C78.7 SECONDARY MALIGNANT NEOPLASM OF LIVER (HCC): ICD-10-CM

## 2018-01-01 DIAGNOSIS — C24.1 AMPULLARY CARCINOMA (HCC): Primary | ICD-10-CM

## 2018-01-01 DIAGNOSIS — R19.7 DIARRHEA, UNSPECIFIED TYPE: ICD-10-CM

## 2018-01-01 DIAGNOSIS — Z79.01 LONG TERM (CURRENT) USE OF ANTICOAGULANTS: ICD-10-CM

## 2018-01-01 DIAGNOSIS — E86.0 DEHYDRATION: ICD-10-CM

## 2018-01-01 DIAGNOSIS — Z86.718 HISTORY OF DVT (DEEP VEIN THROMBOSIS): ICD-10-CM

## 2018-01-01 DIAGNOSIS — J90 PLEURAL EFFUSION: ICD-10-CM

## 2018-01-01 DIAGNOSIS — E87.1 HYPONATREMIA: ICD-10-CM

## 2018-01-01 DIAGNOSIS — B49 FUNGEMIA: ICD-10-CM

## 2018-01-01 DIAGNOSIS — K83.1 OBSTRUCTIVE JAUNDICE DUE TO MALIGNANT NEOPLASM (HCC): ICD-10-CM

## 2018-01-01 DIAGNOSIS — I72.9 PSEUDOANEURYSM FOLLOWING PROCEDURE (HCC): ICD-10-CM

## 2018-01-01 DIAGNOSIS — R11.0 CHEMOTHERAPY-INDUCED NAUSEA: ICD-10-CM

## 2018-01-01 DIAGNOSIS — Z01.812 PRE-OPERATIVE LABORATORY EXAMINATION: ICD-10-CM

## 2018-01-01 DIAGNOSIS — Z86.19 HISTORY OF CLOSTRIDIUM DIFFICILE INFECTION: ICD-10-CM

## 2018-01-01 DIAGNOSIS — C24.1 MALIGNANT NEOPLASM OF AMPULLA OF VATER (HCC): ICD-10-CM

## 2018-01-01 DIAGNOSIS — R63.4 WEIGHT LOSS: ICD-10-CM

## 2018-01-01 DIAGNOSIS — A41.9 SEPSIS, DUE TO UNSPECIFIED ORGANISM: ICD-10-CM

## 2018-01-01 DIAGNOSIS — Z79.01 LONG TERM CURRENT USE OF ANTICOAGULANT THERAPY: ICD-10-CM

## 2018-01-01 DIAGNOSIS — C80.1 CANCER (HCC): Primary | ICD-10-CM

## 2018-01-01 DIAGNOSIS — R78.81 BACTEREMIA: ICD-10-CM

## 2018-01-01 DIAGNOSIS — D72.829 LEUKOCYTOSIS, UNSPECIFIED TYPE: ICD-10-CM

## 2018-01-01 DIAGNOSIS — R00.0 TACHYCARDIA: ICD-10-CM

## 2018-01-01 DIAGNOSIS — T45.1X5A CHEMOTHERAPY-INDUCED NAUSEA: ICD-10-CM

## 2018-01-01 DIAGNOSIS — N39.0 ACUTE UTI: ICD-10-CM

## 2018-01-01 DIAGNOSIS — C80.1 CANCER (HCC): ICD-10-CM

## 2018-01-01 DIAGNOSIS — R53.1 GENERALIZED WEAKNESS: ICD-10-CM

## 2018-01-01 DIAGNOSIS — C78.7 METASTATIC ADENOCARCINOMA TO LIVER (HCC): ICD-10-CM

## 2018-01-01 DIAGNOSIS — T81.718A PSEUDOANEURYSM FOLLOWING PROCEDURE (HCC): ICD-10-CM

## 2018-01-01 DIAGNOSIS — R25.1 SHAKING: ICD-10-CM

## 2018-01-01 DIAGNOSIS — K13.79 MOUTH SORES: ICD-10-CM

## 2018-01-01 DIAGNOSIS — C78.7 LIVER METASTASIS: Primary | ICD-10-CM

## 2018-01-01 DIAGNOSIS — D72.828 OTHER ELEVATED WHITE BLOOD CELL (WBC) COUNT: ICD-10-CM

## 2018-01-01 DIAGNOSIS — K75.0 LIVER ABSCESS: ICD-10-CM

## 2018-01-01 DIAGNOSIS — I82.411 DVT OF DEEP FEMORAL VEIN, RIGHT (HCC): ICD-10-CM

## 2018-01-01 LAB
ABO GROUP BLD: NORMAL
ALBUMIN SERPL BCP-MCNC: 1.9 G/DL (ref 3.2–4.9)
ALBUMIN SERPL BCP-MCNC: 2 G/DL (ref 3.2–4.9)
ALBUMIN SERPL BCP-MCNC: 2.1 G/DL (ref 3.2–4.9)
ALBUMIN SERPL BCP-MCNC: 2.2 G/DL (ref 3.2–4.9)
ALBUMIN SERPL BCP-MCNC: 2.2 G/DL (ref 3.2–4.9)
ALBUMIN SERPL BCP-MCNC: 2.3 G/DL (ref 3.2–4.9)
ALBUMIN SERPL BCP-MCNC: 2.4 G/DL (ref 3.2–4.9)
ALBUMIN SERPL BCP-MCNC: 2.6 G/DL (ref 3.2–4.9)
ALBUMIN SERPL BCP-MCNC: 2.6 G/DL (ref 3.2–4.9)
ALBUMIN SERPL BCP-MCNC: 2.7 G/DL (ref 3.2–4.9)
ALBUMIN SERPL BCP-MCNC: 2.7 G/DL (ref 3.2–4.9)
ALBUMIN SERPL BCP-MCNC: 2.8 G/DL (ref 3.2–4.9)
ALBUMIN SERPL BCP-MCNC: 2.8 G/DL (ref 3.2–4.9)
ALBUMIN SERPL BCP-MCNC: 2.9 G/DL (ref 3.2–4.9)
ALBUMIN SERPL BCP-MCNC: 3 G/DL (ref 3.2–4.9)
ALBUMIN SERPL BCP-MCNC: 3.2 G/DL (ref 3.2–4.9)
ALBUMIN SERPL BCP-MCNC: 3.3 G/DL (ref 3.2–4.9)
ALBUMIN SERPL BCP-MCNC: 3.3 G/DL (ref 3.2–4.9)
ALBUMIN SERPL BCP-MCNC: 3.4 G/DL (ref 3.2–4.9)
ALBUMIN SERPL BCP-MCNC: 3.6 G/DL (ref 3.2–4.9)
ALBUMIN SERPL BCP-MCNC: 3.8 G/DL (ref 3.2–4.9)
ALBUMIN SERPL BCP-MCNC: 3.9 G/DL (ref 3.2–4.9)
ALBUMIN/GLOB SERPL: 0.4 G/DL
ALBUMIN/GLOB SERPL: 0.5 G/DL
ALBUMIN/GLOB SERPL: 0.6 G/DL
ALBUMIN/GLOB SERPL: 0.6 G/DL
ALBUMIN/GLOB SERPL: 0.7 G/DL
ALBUMIN/GLOB SERPL: 0.8 G/DL
ALBUMIN/GLOB SERPL: 0.9 G/DL
ALBUMIN/GLOB SERPL: 1 G/DL
ALBUMIN/GLOB SERPL: 1.1 G/DL
ALBUMIN/GLOB SERPL: 1.2 G/DL
ALBUMIN/GLOB SERPL: 1.4 G/DL
ALP SERPL-CCNC: 100 U/L (ref 30–99)
ALP SERPL-CCNC: 104 U/L (ref 30–99)
ALP SERPL-CCNC: 107 U/L (ref 30–99)
ALP SERPL-CCNC: 123 U/L (ref 30–99)
ALP SERPL-CCNC: 127 U/L (ref 30–99)
ALP SERPL-CCNC: 130 U/L (ref 30–99)
ALP SERPL-CCNC: 132 U/L (ref 30–99)
ALP SERPL-CCNC: 133 U/L (ref 30–99)
ALP SERPL-CCNC: 134 U/L (ref 30–99)
ALP SERPL-CCNC: 135 U/L (ref 30–99)
ALP SERPL-CCNC: 137 U/L (ref 30–99)
ALP SERPL-CCNC: 137 U/L (ref 30–99)
ALP SERPL-CCNC: 139 U/L (ref 30–99)
ALP SERPL-CCNC: 143 U/L (ref 30–99)
ALP SERPL-CCNC: 145 U/L (ref 30–99)
ALP SERPL-CCNC: 146 U/L (ref 30–99)
ALP SERPL-CCNC: 147 U/L (ref 30–99)
ALP SERPL-CCNC: 147 U/L (ref 30–99)
ALP SERPL-CCNC: 153 U/L (ref 30–99)
ALP SERPL-CCNC: 156 U/L (ref 30–99)
ALP SERPL-CCNC: 160 U/L (ref 30–99)
ALP SERPL-CCNC: 169 U/L (ref 30–99)
ALP SERPL-CCNC: 177 U/L (ref 30–99)
ALP SERPL-CCNC: 192 U/L (ref 30–99)
ALP SERPL-CCNC: 193 U/L (ref 30–99)
ALP SERPL-CCNC: 218 U/L (ref 30–99)
ALP SERPL-CCNC: 218 U/L (ref 30–99)
ALP SERPL-CCNC: 235 U/L (ref 30–99)
ALP SERPL-CCNC: 251 U/L (ref 30–99)
ALP SERPL-CCNC: 267 U/L (ref 30–99)
ALP SERPL-CCNC: 69 U/L (ref 30–99)
ALP SERPL-CCNC: 73 U/L (ref 30–99)
ALP SERPL-CCNC: 74 U/L (ref 30–99)
ALP SERPL-CCNC: 82 U/L (ref 30–99)
ALP SERPL-CCNC: 87 U/L (ref 30–99)
ALP SERPL-CCNC: 89 U/L (ref 30–99)
ALP SERPL-CCNC: 89 U/L (ref 30–99)
ALP SERPL-CCNC: 91 U/L (ref 30–99)
ALP SERPL-CCNC: 94 U/L (ref 30–99)
ALP SERPL-CCNC: 94 U/L (ref 30–99)
ALP SERPL-CCNC: 96 U/L (ref 30–99)
ALT SERPL-CCNC: 10 U/L (ref 2–50)
ALT SERPL-CCNC: 11 U/L (ref 2–50)
ALT SERPL-CCNC: 12 U/L (ref 2–50)
ALT SERPL-CCNC: 13 U/L (ref 2–50)
ALT SERPL-CCNC: 14 U/L (ref 2–50)
ALT SERPL-CCNC: 15 U/L (ref 2–50)
ALT SERPL-CCNC: 16 U/L (ref 2–50)
ALT SERPL-CCNC: 17 U/L (ref 2–50)
ALT SERPL-CCNC: 18 U/L (ref 2–50)
ALT SERPL-CCNC: 19 U/L (ref 2–50)
ALT SERPL-CCNC: 20 U/L (ref 2–50)
ALT SERPL-CCNC: 21 U/L (ref 2–50)
ALT SERPL-CCNC: 22 U/L (ref 2–50)
ALT SERPL-CCNC: 24 U/L (ref 2–50)
ALT SERPL-CCNC: 24 U/L (ref 2–50)
ALT SERPL-CCNC: 27 U/L (ref 2–50)
ALT SERPL-CCNC: 28 U/L (ref 2–50)
ALT SERPL-CCNC: 30 U/L (ref 2–50)
ALT SERPL-CCNC: 31 U/L (ref 2–50)
ALT SERPL-CCNC: 33 U/L (ref 2–50)
ALT SERPL-CCNC: 9 U/L (ref 2–50)
ALT SERPL-CCNC: 9 U/L (ref 2–50)
AMMONIA PLAS-SCNC: 29 UMOL/L (ref 11–45)
AMYLASE FLD-CCNC: 18 U/L
ANION GAP SERPL CALC-SCNC: 10 MMOL/L (ref 0–11.9)
ANION GAP SERPL CALC-SCNC: 11 MMOL/L (ref 0–11.9)
ANION GAP SERPL CALC-SCNC: 11 MMOL/L (ref 0–11.9)
ANION GAP SERPL CALC-SCNC: 17 MMOL/L (ref 0–11.9)
ANION GAP SERPL CALC-SCNC: 4 MMOL/L (ref 0–11.9)
ANION GAP SERPL CALC-SCNC: 5 MMOL/L (ref 0–11.9)
ANION GAP SERPL CALC-SCNC: 6 MMOL/L (ref 0–11.9)
ANION GAP SERPL CALC-SCNC: 7 MMOL/L (ref 0–11.9)
ANION GAP SERPL CALC-SCNC: 8 MMOL/L (ref 0–11.9)
ANION GAP SERPL CALC-SCNC: 9 MMOL/L (ref 0–11.9)
ANISOCYTOSIS BLD QL SMEAR: ABNORMAL
APPEARANCE FLD: NORMAL
APPEARANCE FLD: NORMAL
APPEARANCE UR: ABNORMAL
APPEARANCE UR: CLEAR
APPEARANCE UR: CLEAR
APTT PPP: 34.8 SEC (ref 24.7–36)
APTT PPP: 41 SEC (ref 24.7–36)
AST SERPL-CCNC: 11 U/L (ref 12–45)
AST SERPL-CCNC: 12 U/L (ref 12–45)
AST SERPL-CCNC: 14 U/L (ref 12–45)
AST SERPL-CCNC: 14 U/L (ref 12–45)
AST SERPL-CCNC: 15 U/L (ref 12–45)
AST SERPL-CCNC: 16 U/L (ref 12–45)
AST SERPL-CCNC: 17 U/L (ref 12–45)
AST SERPL-CCNC: 17 U/L (ref 12–45)
AST SERPL-CCNC: 18 U/L (ref 12–45)
AST SERPL-CCNC: 19 U/L (ref 12–45)
AST SERPL-CCNC: 20 U/L (ref 12–45)
AST SERPL-CCNC: 21 U/L (ref 12–45)
AST SERPL-CCNC: 22 U/L (ref 12–45)
AST SERPL-CCNC: 23 U/L (ref 12–45)
AST SERPL-CCNC: 23 U/L (ref 12–45)
AST SERPL-CCNC: 24 U/L (ref 12–45)
AST SERPL-CCNC: 24 U/L (ref 12–45)
AST SERPL-CCNC: 25 U/L (ref 12–45)
AST SERPL-CCNC: 26 U/L (ref 12–45)
AST SERPL-CCNC: 26 U/L (ref 12–45)
AST SERPL-CCNC: 27 U/L (ref 12–45)
AST SERPL-CCNC: 28 U/L (ref 12–45)
AST SERPL-CCNC: 28 U/L (ref 12–45)
AST SERPL-CCNC: 29 U/L (ref 12–45)
AST SERPL-CCNC: 32 U/L (ref 12–45)
AST SERPL-CCNC: 33 U/L (ref 12–45)
AST SERPL-CCNC: 34 U/L (ref 12–45)
AST SERPL-CCNC: 37 U/L (ref 12–45)
AST SERPL-CCNC: 38 U/L (ref 12–45)
AST SERPL-CCNC: 40 U/L (ref 12–45)
AST SERPL-CCNC: 40 U/L (ref 12–45)
AST SERPL-CCNC: 45 U/L (ref 12–45)
AST SERPL-CCNC: 45 U/L (ref 12–45)
AST SERPL-CCNC: 66 U/L (ref 12–45)
AST SERPL-CCNC: 7 U/L (ref 12–45)
AST SERPL-CCNC: 74 U/L (ref 12–45)
BACTERIA #/AREA URNS HPF: ABNORMAL /HPF
BACTERIA #/AREA URNS HPF: NEGATIVE /HPF
BACTERIA BLD CULT: ABNORMAL
BACTERIA BLD CULT: NORMAL
BACTERIA FLD AEROBE CULT: NORMAL
BACTERIA UR CULT: NORMAL
BACTERIA WND AEROBE CULT: ABNORMAL
BACTERIA WND AEROBE CULT: NORMAL
BARCODED ABORH UBTYP: 5100
BARCODED PRD CODE UBPRD: NORMAL
BARCODED UNIT NUM UBUNT: NORMAL
BASOPHILS # BLD AUTO: 0 % (ref 0–1.8)
BASOPHILS # BLD AUTO: 0.1 % (ref 0–1.8)
BASOPHILS # BLD AUTO: 0.1 % (ref 0–1.8)
BASOPHILS # BLD AUTO: 0.2 % (ref 0–1.8)
BASOPHILS # BLD AUTO: 0.2 % (ref 0–1.8)
BASOPHILS # BLD AUTO: 0.3 % (ref 0–1.8)
BASOPHILS # BLD AUTO: 0.3 % (ref 0–1.8)
BASOPHILS # BLD AUTO: 0.4 % (ref 0–1.8)
BASOPHILS # BLD AUTO: 0.5 % (ref 0–1.8)
BASOPHILS # BLD AUTO: 0.6 % (ref 0–1.8)
BASOPHILS # BLD AUTO: 0.7 % (ref 0–1.8)
BASOPHILS # BLD AUTO: 0.7 % (ref 0–1.8)
BASOPHILS # BLD AUTO: 0.9 % (ref 0–1.8)
BASOPHILS # BLD AUTO: 0.9 % (ref 0–1.8)
BASOPHILS # BLD AUTO: 1.3 % (ref 0–1.8)
BASOPHILS # BLD AUTO: 1.8 % (ref 0–1.8)
BASOPHILS # BLD: 0 K/UL (ref 0–0.12)
BASOPHILS # BLD: 0.01 K/UL (ref 0–0.12)
BASOPHILS # BLD: 0.02 K/UL (ref 0–0.12)
BASOPHILS # BLD: 0.03 K/UL (ref 0–0.12)
BASOPHILS # BLD: 0.04 K/UL (ref 0–0.12)
BASOPHILS # BLD: 0.05 K/UL (ref 0–0.12)
BASOPHILS # BLD: 0.06 K/UL (ref 0–0.12)
BASOPHILS # BLD: 0.06 K/UL (ref 0–0.12)
BILIRUB SERPL-MCNC: 0.1 MG/DL (ref 0.1–1.5)
BILIRUB SERPL-MCNC: 0.3 MG/DL (ref 0.1–1.5)
BILIRUB SERPL-MCNC: 0.4 MG/DL (ref 0.1–1.5)
BILIRUB SERPL-MCNC: 0.5 MG/DL (ref 0.1–1.5)
BILIRUB SERPL-MCNC: 0.6 MG/DL (ref 0.1–1.5)
BILIRUB SERPL-MCNC: 0.7 MG/DL (ref 0.1–1.5)
BILIRUB SERPL-MCNC: 0.8 MG/DL (ref 0.1–1.5)
BILIRUB UR QL STRIP.AUTO: ABNORMAL
BILIRUB UR QL STRIP.AUTO: NEGATIVE
BILIRUB UR QL STRIP.AUTO: NEGATIVE
BLD GP AB SCN SERPL QL: NORMAL
BLOOD CULTURE HOLD CXBCH: NORMAL
BODY FLD TYPE: NORMAL
BUN SERPL-MCNC: 10 MG/DL (ref 8–22)
BUN SERPL-MCNC: 11 MG/DL (ref 8–22)
BUN SERPL-MCNC: 12 MG/DL (ref 8–22)
BUN SERPL-MCNC: 13 MG/DL (ref 8–22)
BUN SERPL-MCNC: 14 MG/DL (ref 8–22)
BUN SERPL-MCNC: 15 MG/DL (ref 8–22)
BUN SERPL-MCNC: 15 MG/DL (ref 8–22)
BUN SERPL-MCNC: 16 MG/DL (ref 8–22)
BUN SERPL-MCNC: 17 MG/DL (ref 8–22)
BUN SERPL-MCNC: 17 MG/DL (ref 8–22)
BUN SERPL-MCNC: 5 MG/DL (ref 8–22)
BUN SERPL-MCNC: 6 MG/DL (ref 8–22)
BUN SERPL-MCNC: 7 MG/DL (ref 8–22)
BUN SERPL-MCNC: 8 MG/DL (ref 8–22)
BUN SERPL-MCNC: 9 MG/DL (ref 8–22)
BURR CELLS BLD QL SMEAR: NORMAL
C DIFF DNA SPEC QL NAA+PROBE: NEGATIVE
C DIFF TOX GENS STL QL NAA+PROBE: NEGATIVE
CALCIUM SERPL-MCNC: 7.5 MG/DL (ref 8.5–10.5)
CALCIUM SERPL-MCNC: 7.6 MG/DL (ref 8.5–10.5)
CALCIUM SERPL-MCNC: 7.7 MG/DL (ref 8.5–10.5)
CALCIUM SERPL-MCNC: 7.8 MG/DL (ref 8.5–10.5)
CALCIUM SERPL-MCNC: 7.9 MG/DL (ref 8.5–10.5)
CALCIUM SERPL-MCNC: 8 MG/DL (ref 8.5–10.5)
CALCIUM SERPL-MCNC: 8.1 MG/DL (ref 8.5–10.5)
CALCIUM SERPL-MCNC: 8.2 MG/DL (ref 8.5–10.5)
CALCIUM SERPL-MCNC: 8.3 MG/DL (ref 8.5–10.5)
CALCIUM SERPL-MCNC: 8.4 MG/DL (ref 8.5–10.5)
CALCIUM SERPL-MCNC: 8.5 MG/DL (ref 8.5–10.5)
CALCIUM SERPL-MCNC: 8.5 MG/DL (ref 8.5–10.5)
CALCIUM SERPL-MCNC: 8.6 MG/DL (ref 8.5–10.5)
CALCIUM SERPL-MCNC: 8.7 MG/DL (ref 8.5–10.5)
CALCIUM SERPL-MCNC: 8.8 MG/DL (ref 8.5–10.5)
CALCIUM SERPL-MCNC: 8.9 MG/DL (ref 8.5–10.5)
CALCIUM SERPL-MCNC: 8.9 MG/DL (ref 8.5–10.5)
CALCIUM SERPL-MCNC: 9 MG/DL (ref 8.5–10.5)
CALCIUM SERPL-MCNC: 9.1 MG/DL (ref 8.5–10.5)
CALCIUM SERPL-MCNC: 9.1 MG/DL (ref 8.5–10.5)
CALCIUM SERPL-MCNC: 9.2 MG/DL (ref 8.5–10.5)
CALCIUM SERPL-MCNC: 9.3 MG/DL (ref 8.5–10.5)
CALCIUM SERPL-MCNC: 9.6 MG/DL (ref 8.5–10.5)
CALCIUM SERPL-MCNC: 9.8 MG/DL (ref 8.5–10.5)
CHLORIDE SERPL-SCNC: 100 MMOL/L (ref 96–112)
CHLORIDE SERPL-SCNC: 101 MMOL/L (ref 96–112)
CHLORIDE SERPL-SCNC: 101 MMOL/L (ref 96–112)
CHLORIDE SERPL-SCNC: 102 MMOL/L (ref 96–112)
CHLORIDE SERPL-SCNC: 102 MMOL/L (ref 96–112)
CHLORIDE SERPL-SCNC: 103 MMOL/L (ref 96–112)
CHLORIDE SERPL-SCNC: 104 MMOL/L (ref 96–112)
CHLORIDE SERPL-SCNC: 105 MMOL/L (ref 96–112)
CHLORIDE SERPL-SCNC: 106 MMOL/L (ref 96–112)
CHLORIDE SERPL-SCNC: 107 MMOL/L (ref 96–112)
CHLORIDE SERPL-SCNC: 107 MMOL/L (ref 96–112)
CHLORIDE SERPL-SCNC: 108 MMOL/L (ref 96–112)
CHLORIDE SERPL-SCNC: 109 MMOL/L (ref 96–112)
CHLORIDE SERPL-SCNC: 110 MMOL/L (ref 96–112)
CHLORIDE SERPL-SCNC: 111 MMOL/L (ref 96–112)
CHLORIDE SERPL-SCNC: 112 MMOL/L (ref 96–112)
CHLORIDE SERPL-SCNC: 112 MMOL/L (ref 96–112)
CHLORIDE SERPL-SCNC: 113 MMOL/L (ref 96–112)
CHLORIDE SERPL-SCNC: 114 MMOL/L (ref 96–112)
CHLORIDE SERPL-SCNC: 115 MMOL/L (ref 96–112)
CHLORIDE SERPL-SCNC: 116 MMOL/L (ref 96–112)
CHLORIDE SERPL-SCNC: 86 MMOL/L (ref 96–112)
CHLORIDE SERPL-SCNC: 88 MMOL/L (ref 96–112)
CHLORIDE SERPL-SCNC: 88 MMOL/L (ref 96–112)
CHLORIDE SERPL-SCNC: 89 MMOL/L (ref 96–112)
CHLORIDE SERPL-SCNC: 90 MMOL/L (ref 96–112)
CHLORIDE SERPL-SCNC: 91 MMOL/L (ref 96–112)
CHLORIDE SERPL-SCNC: 91 MMOL/L (ref 96–112)
CHLORIDE SERPL-SCNC: 92 MMOL/L (ref 96–112)
CHLORIDE SERPL-SCNC: 93 MMOL/L (ref 96–112)
CHLORIDE SERPL-SCNC: 94 MMOL/L (ref 96–112)
CHLORIDE SERPL-SCNC: 95 MMOL/L (ref 96–112)
CHLORIDE SERPL-SCNC: 97 MMOL/L (ref 96–112)
CHLORIDE SERPL-SCNC: 98 MMOL/L (ref 96–112)
CHLORIDE SERPL-SCNC: 99 MMOL/L (ref 96–112)
CHOLEST SERPL-MCNC: 173 MG/DL (ref 100–199)
CHOLEST SERPL-MCNC: 82 MG/DL (ref 100–199)
CK SERPL-CCNC: 12 U/L (ref 0–154)
CK SERPL-CCNC: 14 U/L (ref 0–154)
CK SERPL-CCNC: <10 U/L (ref 0–154)
CO2 SERPL-SCNC: 10 MMOL/L (ref 20–33)
CO2 SERPL-SCNC: 11 MMOL/L (ref 20–33)
CO2 SERPL-SCNC: 13 MMOL/L (ref 20–33)
CO2 SERPL-SCNC: 14 MMOL/L (ref 20–33)
CO2 SERPL-SCNC: 15 MMOL/L (ref 20–33)
CO2 SERPL-SCNC: 16 MMOL/L (ref 20–33)
CO2 SERPL-SCNC: 16 MMOL/L (ref 20–33)
CO2 SERPL-SCNC: 17 MMOL/L (ref 20–33)
CO2 SERPL-SCNC: 18 MMOL/L (ref 20–33)
CO2 SERPL-SCNC: 19 MMOL/L (ref 20–33)
CO2 SERPL-SCNC: 20 MMOL/L (ref 20–33)
CO2 SERPL-SCNC: 21 MMOL/L (ref 20–33)
CO2 SERPL-SCNC: 22 MMOL/L (ref 20–33)
CO2 SERPL-SCNC: 23 MMOL/L (ref 20–33)
CO2 SERPL-SCNC: 24 MMOL/L (ref 20–33)
CO2 SERPL-SCNC: 25 MMOL/L (ref 20–33)
CO2 SERPL-SCNC: 27 MMOL/L (ref 20–33)
CO2 SERPL-SCNC: 27 MMOL/L (ref 20–33)
CO2 SERPL-SCNC: 29 MMOL/L (ref 20–33)
CO2 SERPL-SCNC: 29 MMOL/L (ref 20–33)
COLOR FLD: NORMAL
COLOR FLD: YELLOW
COLOR UR: ABNORMAL
COLOR UR: YELLOW
COLOR UR: YELLOW
COMMENT 1642: NORMAL
COMMENT 1642: NORMAL
COMPONENT R 8504R: NORMAL
CREAT SERPL-MCNC: 0.39 MG/DL (ref 0.5–1.4)
CREAT SERPL-MCNC: 0.42 MG/DL (ref 0.5–1.4)
CREAT SERPL-MCNC: 0.43 MG/DL (ref 0.5–1.4)
CREAT SERPL-MCNC: 0.45 MG/DL (ref 0.5–1.4)
CREAT SERPL-MCNC: 0.47 MG/DL (ref 0.5–1.4)
CREAT SERPL-MCNC: 0.48 MG/DL (ref 0.5–1.4)
CREAT SERPL-MCNC: 0.49 MG/DL (ref 0.5–1.4)
CREAT SERPL-MCNC: 0.49 MG/DL (ref 0.5–1.4)
CREAT SERPL-MCNC: 0.51 MG/DL (ref 0.5–1.4)
CREAT SERPL-MCNC: 0.52 MG/DL (ref 0.5–1.4)
CREAT SERPL-MCNC: 0.52 MG/DL (ref 0.5–1.4)
CREAT SERPL-MCNC: 0.53 MG/DL (ref 0.5–1.4)
CREAT SERPL-MCNC: 0.53 MG/DL (ref 0.5–1.4)
CREAT SERPL-MCNC: 0.54 MG/DL (ref 0.5–1.4)
CREAT SERPL-MCNC: 0.55 MG/DL (ref 0.5–1.4)
CREAT SERPL-MCNC: 0.56 MG/DL (ref 0.5–1.4)
CREAT SERPL-MCNC: 0.57 MG/DL (ref 0.5–1.4)
CREAT SERPL-MCNC: 0.58 MG/DL (ref 0.5–1.4)
CREAT SERPL-MCNC: 0.59 MG/DL (ref 0.5–1.4)
CREAT SERPL-MCNC: 0.61 MG/DL (ref 0.5–1.4)
CREAT SERPL-MCNC: 0.62 MG/DL (ref 0.5–1.4)
CREAT SERPL-MCNC: 0.63 MG/DL (ref 0.5–1.4)
CREAT SERPL-MCNC: 0.63 MG/DL (ref 0.5–1.4)
CREAT SERPL-MCNC: 0.67 MG/DL (ref 0.5–1.4)
CREAT SERPL-MCNC: 0.69 MG/DL (ref 0.5–1.4)
CREAT SERPL-MCNC: 0.7 MG/DL (ref 0.5–1.4)
CREAT SERPL-MCNC: 0.7 MG/DL (ref 0.5–1.4)
CREAT SERPL-MCNC: 0.71 MG/DL (ref 0.5–1.4)
CREAT SERPL-MCNC: 0.72 MG/DL (ref 0.5–1.4)
CREAT SERPL-MCNC: 0.74 MG/DL (ref 0.5–1.4)
CREAT SERPL-MCNC: 0.77 MG/DL (ref 0.5–1.4)
CREAT SERPL-MCNC: 0.78 MG/DL (ref 0.5–1.4)
CREAT SERPL-MCNC: 0.79 MG/DL (ref 0.5–1.4)
CREAT SERPL-MCNC: 0.8 MG/DL (ref 0.5–1.4)
CREAT SERPL-MCNC: 0.8 MG/DL (ref 0.5–1.4)
CREAT SERPL-MCNC: 0.81 MG/DL (ref 0.5–1.4)
CREAT SERPL-MCNC: 0.83 MG/DL (ref 0.5–1.4)
CREAT SERPL-MCNC: 0.84 MG/DL (ref 0.5–1.4)
CREAT SERPL-MCNC: 0.84 MG/DL (ref 0.5–1.4)
CREAT SERPL-MCNC: 0.86 MG/DL (ref 0.5–1.4)
CREAT SERPL-MCNC: 0.93 MG/DL (ref 0.5–1.4)
CREAT SERPL-MCNC: 0.94 MG/DL (ref 0.5–1.4)
CREAT SERPL-MCNC: 1 MG/DL (ref 0.5–1.4)
CREAT SERPL-MCNC: 1.05 MG/DL (ref 0.5–1.4)
CREAT SERPL-MCNC: 1.09 MG/DL (ref 0.5–1.4)
CREAT UR-MCNC: 40.3 MG/DL
CRP SERPL HS-MCNC: 15.84 MG/DL (ref 0–0.75)
CSF COMMENTS 1658: NORMAL
CSF COMMENTS 1658: NORMAL
CYTOLOGY REG CYTOL: NORMAL
CYTOLOGY REG CYTOL: NORMAL
EKG IMPRESSION: NORMAL
EOSINOPHIL # BLD AUTO: 0 K/UL (ref 0–0.51)
EOSINOPHIL # BLD AUTO: 0.02 K/UL (ref 0–0.51)
EOSINOPHIL # BLD AUTO: 0.03 K/UL (ref 0–0.51)
EOSINOPHIL # BLD AUTO: 0.04 K/UL (ref 0–0.51)
EOSINOPHIL # BLD AUTO: 0.04 K/UL (ref 0–0.51)
EOSINOPHIL # BLD AUTO: 0.05 K/UL (ref 0–0.51)
EOSINOPHIL # BLD AUTO: 0.06 K/UL (ref 0–0.51)
EOSINOPHIL # BLD AUTO: 0.07 K/UL (ref 0–0.51)
EOSINOPHIL # BLD AUTO: 0.08 K/UL (ref 0–0.51)
EOSINOPHIL # BLD AUTO: 0.09 K/UL (ref 0–0.51)
EOSINOPHIL # BLD AUTO: 0.1 K/UL (ref 0–0.51)
EOSINOPHIL # BLD AUTO: 0.1 K/UL (ref 0–0.51)
EOSINOPHIL # BLD AUTO: 0.12 K/UL (ref 0–0.51)
EOSINOPHIL # BLD AUTO: 0.12 K/UL (ref 0–0.51)
EOSINOPHIL # BLD AUTO: 0.16 K/UL (ref 0–0.51)
EOSINOPHIL # BLD AUTO: 0.18 K/UL (ref 0–0.51)
EOSINOPHIL # BLD AUTO: 0.19 K/UL (ref 0–0.51)
EOSINOPHIL # BLD AUTO: 0.33 K/UL (ref 0–0.51)
EOSINOPHIL # BLD AUTO: 0.66 K/UL (ref 0–0.51)
EOSINOPHIL NFR BLD: 0 % (ref 0–6.9)
EOSINOPHIL NFR BLD: 0.2 % (ref 0–6.9)
EOSINOPHIL NFR BLD: 0.3 % (ref 0–6.9)
EOSINOPHIL NFR BLD: 0.3 % (ref 0–6.9)
EOSINOPHIL NFR BLD: 0.6 % (ref 0–6.9)
EOSINOPHIL NFR BLD: 0.8 % (ref 0–6.9)
EOSINOPHIL NFR BLD: 0.9 % (ref 0–6.9)
EOSINOPHIL NFR BLD: 1 % (ref 0–6.9)
EOSINOPHIL NFR BLD: 1.1 % (ref 0–6.9)
EOSINOPHIL NFR BLD: 1.2 % (ref 0–6.9)
EOSINOPHIL NFR BLD: 1.3 % (ref 0–6.9)
EOSINOPHIL NFR BLD: 1.4 % (ref 0–6.9)
EOSINOPHIL NFR BLD: 1.7 % (ref 0–6.9)
EOSINOPHIL NFR BLD: 1.8 % (ref 0–6.9)
EOSINOPHIL NFR BLD: 1.8 % (ref 0–6.9)
EOSINOPHIL NFR BLD: 1.9 % (ref 0–6.9)
EOSINOPHIL NFR BLD: 2.4 % (ref 0–6.9)
EOSINOPHIL NFR BLD: 2.6 % (ref 0–6.9)
EOSINOPHIL NFR BLD: 2.6 % (ref 0–6.9)
EOSINOPHIL NFR BLD: 3.2 % (ref 0–6.9)
EOSINOPHIL NFR BLD: 3.4 % (ref 0–6.9)
EOSINOPHIL NFR BLD: 3.6 % (ref 0–6.9)
EPI CELLS #/AREA URNS HPF: ABNORMAL /HPF
EPI CELLS #/AREA URNS HPF: ABNORMAL /HPF
ERYTHROCYTE [DISTWIDTH] IN BLOOD BY AUTOMATED COUNT: 41.8 FL (ref 35.9–50)
ERYTHROCYTE [DISTWIDTH] IN BLOOD BY AUTOMATED COUNT: 42.9 FL (ref 35.9–50)
ERYTHROCYTE [DISTWIDTH] IN BLOOD BY AUTOMATED COUNT: 43.1 FL (ref 35.9–50)
ERYTHROCYTE [DISTWIDTH] IN BLOOD BY AUTOMATED COUNT: 43.6 FL (ref 35.9–50)
ERYTHROCYTE [DISTWIDTH] IN BLOOD BY AUTOMATED COUNT: 43.7 FL (ref 35.9–50)
ERYTHROCYTE [DISTWIDTH] IN BLOOD BY AUTOMATED COUNT: 43.8 FL (ref 35.9–50)
ERYTHROCYTE [DISTWIDTH] IN BLOOD BY AUTOMATED COUNT: 43.8 FL (ref 35.9–50)
ERYTHROCYTE [DISTWIDTH] IN BLOOD BY AUTOMATED COUNT: 44 FL (ref 35.9–50)
ERYTHROCYTE [DISTWIDTH] IN BLOOD BY AUTOMATED COUNT: 47.8 FL (ref 35.9–50)
ERYTHROCYTE [DISTWIDTH] IN BLOOD BY AUTOMATED COUNT: 47.8 FL (ref 35.9–50)
ERYTHROCYTE [DISTWIDTH] IN BLOOD BY AUTOMATED COUNT: 48.2 FL (ref 35.9–50)
ERYTHROCYTE [DISTWIDTH] IN BLOOD BY AUTOMATED COUNT: 48.3 FL (ref 35.9–50)
ERYTHROCYTE [DISTWIDTH] IN BLOOD BY AUTOMATED COUNT: 48.6 FL (ref 35.9–50)
ERYTHROCYTE [DISTWIDTH] IN BLOOD BY AUTOMATED COUNT: 48.9 FL (ref 35.9–50)
ERYTHROCYTE [DISTWIDTH] IN BLOOD BY AUTOMATED COUNT: 49 FL (ref 35.9–50)
ERYTHROCYTE [DISTWIDTH] IN BLOOD BY AUTOMATED COUNT: 49.2 FL (ref 35.9–50)
ERYTHROCYTE [DISTWIDTH] IN BLOOD BY AUTOMATED COUNT: 49.3 FL (ref 35.9–50)
ERYTHROCYTE [DISTWIDTH] IN BLOOD BY AUTOMATED COUNT: 49.3 FL (ref 35.9–50)
ERYTHROCYTE [DISTWIDTH] IN BLOOD BY AUTOMATED COUNT: 49.4 FL (ref 35.9–50)
ERYTHROCYTE [DISTWIDTH] IN BLOOD BY AUTOMATED COUNT: 49.7 FL (ref 35.9–50)
ERYTHROCYTE [DISTWIDTH] IN BLOOD BY AUTOMATED COUNT: 49.8 FL (ref 35.9–50)
ERYTHROCYTE [DISTWIDTH] IN BLOOD BY AUTOMATED COUNT: 50.6 FL (ref 35.9–50)
ERYTHROCYTE [DISTWIDTH] IN BLOOD BY AUTOMATED COUNT: 51 FL (ref 35.9–50)
ERYTHROCYTE [DISTWIDTH] IN BLOOD BY AUTOMATED COUNT: 51.2 FL (ref 35.9–50)
ERYTHROCYTE [DISTWIDTH] IN BLOOD BY AUTOMATED COUNT: 51.7 FL (ref 35.9–50)
ERYTHROCYTE [DISTWIDTH] IN BLOOD BY AUTOMATED COUNT: 52.1 FL (ref 35.9–50)
ERYTHROCYTE [DISTWIDTH] IN BLOOD BY AUTOMATED COUNT: 52.5 FL (ref 35.9–50)
ERYTHROCYTE [DISTWIDTH] IN BLOOD BY AUTOMATED COUNT: 52.6 FL (ref 35.9–50)
ERYTHROCYTE [DISTWIDTH] IN BLOOD BY AUTOMATED COUNT: 53.1 FL (ref 35.9–50)
ERYTHROCYTE [DISTWIDTH] IN BLOOD BY AUTOMATED COUNT: 53.2 FL (ref 35.9–50)
ERYTHROCYTE [DISTWIDTH] IN BLOOD BY AUTOMATED COUNT: 53.4 FL (ref 35.9–50)
ERYTHROCYTE [DISTWIDTH] IN BLOOD BY AUTOMATED COUNT: 53.6 FL (ref 35.9–50)
ERYTHROCYTE [DISTWIDTH] IN BLOOD BY AUTOMATED COUNT: 54.1 FL (ref 35.9–50)
ERYTHROCYTE [DISTWIDTH] IN BLOOD BY AUTOMATED COUNT: 54.4 FL (ref 35.9–50)
ERYTHROCYTE [DISTWIDTH] IN BLOOD BY AUTOMATED COUNT: 54.4 FL (ref 35.9–50)
ERYTHROCYTE [DISTWIDTH] IN BLOOD BY AUTOMATED COUNT: 54.5 FL (ref 35.9–50)
ERYTHROCYTE [DISTWIDTH] IN BLOOD BY AUTOMATED COUNT: 54.6 FL (ref 35.9–50)
ERYTHROCYTE [DISTWIDTH] IN BLOOD BY AUTOMATED COUNT: 54.8 FL (ref 35.9–50)
ERYTHROCYTE [DISTWIDTH] IN BLOOD BY AUTOMATED COUNT: 54.9 FL (ref 35.9–50)
ERYTHROCYTE [DISTWIDTH] IN BLOOD BY AUTOMATED COUNT: 55.1 FL (ref 35.9–50)
ERYTHROCYTE [DISTWIDTH] IN BLOOD BY AUTOMATED COUNT: 55.2 FL (ref 35.9–50)
ERYTHROCYTE [DISTWIDTH] IN BLOOD BY AUTOMATED COUNT: 55.6 FL (ref 35.9–50)
ERYTHROCYTE [DISTWIDTH] IN BLOOD BY AUTOMATED COUNT: 56 FL (ref 35.9–50)
ERYTHROCYTE [DISTWIDTH] IN BLOOD BY AUTOMATED COUNT: 56.2 FL (ref 35.9–50)
ERYTHROCYTE [DISTWIDTH] IN BLOOD BY AUTOMATED COUNT: 56.5 FL (ref 35.9–50)
ERYTHROCYTE [DISTWIDTH] IN BLOOD BY AUTOMATED COUNT: 58.2 FL (ref 35.9–50)
ERYTHROCYTE [DISTWIDTH] IN BLOOD BY AUTOMATED COUNT: 58.4 FL (ref 35.9–50)
ERYTHROCYTE [DISTWIDTH] IN BLOOD BY AUTOMATED COUNT: 58.5 FL (ref 35.9–50)
ERYTHROCYTE [DISTWIDTH] IN BLOOD BY AUTOMATED COUNT: 58.5 FL (ref 35.9–50)
ERYTHROCYTE [DISTWIDTH] IN BLOOD BY AUTOMATED COUNT: 59 FL (ref 35.9–50)
ERYTHROCYTE [DISTWIDTH] IN BLOOD BY AUTOMATED COUNT: 59.4 FL (ref 35.9–50)
ERYTHROCYTE [DISTWIDTH] IN BLOOD BY AUTOMATED COUNT: 61.5 FL (ref 35.9–50)
ERYTHROCYTE [DISTWIDTH] IN BLOOD BY AUTOMATED COUNT: 62.6 FL (ref 35.9–50)
ERYTHROCYTE [DISTWIDTH] IN BLOOD BY AUTOMATED COUNT: 63.7 FL (ref 35.9–50)
ERYTHROCYTE [DISTWIDTH] IN BLOOD BY AUTOMATED COUNT: 64.6 FL (ref 35.9–50)
ERYTHROCYTE [DISTWIDTH] IN BLOOD BY AUTOMATED COUNT: 65.2 FL (ref 35.9–50)
ERYTHROCYTE [DISTWIDTH] IN BLOOD BY AUTOMATED COUNT: 65.9 FL (ref 35.9–50)
ERYTHROCYTE [DISTWIDTH] IN BLOOD BY AUTOMATED COUNT: 66.5 FL (ref 35.9–50)
ERYTHROCYTE [SEDIMENTATION RATE] IN BLOOD BY WESTERGREN METHOD: >120 MM/HOUR (ref 0–30)
FERRITIN SERPL-MCNC: 324.7 NG/ML (ref 10–291)
FLUAV RNA SPEC QL NAA+PROBE: NEGATIVE
FLUBV RNA SPEC QL NAA+PROBE: NEGATIVE
FUNGAL MIC INTERP  7292: NORMAL
FUNGUS SPEC CULT: NORMAL
GLOBULIN SER CALC-MCNC: 2.3 G/DL (ref 1.9–3.5)
GLOBULIN SER CALC-MCNC: 2.4 G/DL (ref 1.9–3.5)
GLOBULIN SER CALC-MCNC: 2.6 G/DL (ref 1.9–3.5)
GLOBULIN SER CALC-MCNC: 2.7 G/DL (ref 1.9–3.5)
GLOBULIN SER CALC-MCNC: 2.8 G/DL (ref 1.9–3.5)
GLOBULIN SER CALC-MCNC: 2.9 G/DL (ref 1.9–3.5)
GLOBULIN SER CALC-MCNC: 3 G/DL (ref 1.9–3.5)
GLOBULIN SER CALC-MCNC: 3 G/DL (ref 1.9–3.5)
GLOBULIN SER CALC-MCNC: 3.1 G/DL (ref 1.9–3.5)
GLOBULIN SER CALC-MCNC: 3.1 G/DL (ref 1.9–3.5)
GLOBULIN SER CALC-MCNC: 3.2 G/DL (ref 1.9–3.5)
GLOBULIN SER CALC-MCNC: 3.3 G/DL (ref 1.9–3.5)
GLOBULIN SER CALC-MCNC: 3.3 G/DL (ref 1.9–3.5)
GLOBULIN SER CALC-MCNC: 3.4 G/DL (ref 1.9–3.5)
GLOBULIN SER CALC-MCNC: 3.5 G/DL (ref 1.9–3.5)
GLOBULIN SER CALC-MCNC: 3.8 G/DL (ref 1.9–3.5)
GLOBULIN SER CALC-MCNC: 3.9 G/DL (ref 1.9–3.5)
GLOBULIN SER CALC-MCNC: 4 G/DL (ref 1.9–3.5)
GLOBULIN SER CALC-MCNC: 4.1 G/DL (ref 1.9–3.5)
GLOBULIN SER CALC-MCNC: 4.2 G/DL (ref 1.9–3.5)
GLOBULIN SER CALC-MCNC: 4.3 G/DL (ref 1.9–3.5)
GLOBULIN SER CALC-MCNC: 4.4 G/DL (ref 1.9–3.5)
GLOBULIN SER CALC-MCNC: 4.5 G/DL (ref 1.9–3.5)
GLOBULIN SER CALC-MCNC: 4.5 G/DL (ref 1.9–3.5)
GLOBULIN SER CALC-MCNC: 4.6 G/DL (ref 1.9–3.5)
GLOBULIN SER CALC-MCNC: 4.7 G/DL (ref 1.9–3.5)
GLOBULIN SER CALC-MCNC: 4.8 G/DL (ref 1.9–3.5)
GLOBULIN SER CALC-MCNC: 4.9 G/DL (ref 1.9–3.5)
GLOBULIN SER CALC-MCNC: 4.9 G/DL (ref 1.9–3.5)
GLOBULIN SER CALC-MCNC: 5.1 G/DL (ref 1.9–3.5)
GLOBULIN SER CALC-MCNC: 5.2 G/DL (ref 1.9–3.5)
GLOBULIN SER CALC-MCNC: 5.3 G/DL (ref 1.9–3.5)
GLOBULIN SER CALC-MCNC: 5.8 G/DL (ref 1.9–3.5)
GLUCOSE BLD-MCNC: 112 MG/DL (ref 65–99)
GLUCOSE BLD-MCNC: 119 MG/DL (ref 65–99)
GLUCOSE BLD-MCNC: 121 MG/DL (ref 65–99)
GLUCOSE BLD-MCNC: 138 MG/DL (ref 65–99)
GLUCOSE BLD-MCNC: 139 MG/DL (ref 65–99)
GLUCOSE BLD-MCNC: 152 MG/DL (ref 65–99)
GLUCOSE BLD-MCNC: 85 MG/DL (ref 65–99)
GLUCOSE BLD-MCNC: 86 MG/DL (ref 65–99)
GLUCOSE BLD-MCNC: 92 MG/DL (ref 65–99)
GLUCOSE FLD-MCNC: 99 MG/DL
GLUCOSE SERPL-MCNC: 100 MG/DL (ref 65–99)
GLUCOSE SERPL-MCNC: 102 MG/DL (ref 65–99)
GLUCOSE SERPL-MCNC: 102 MG/DL (ref 65–99)
GLUCOSE SERPL-MCNC: 103 MG/DL (ref 65–99)
GLUCOSE SERPL-MCNC: 104 MG/DL (ref 65–99)
GLUCOSE SERPL-MCNC: 105 MG/DL (ref 65–99)
GLUCOSE SERPL-MCNC: 105 MG/DL (ref 65–99)
GLUCOSE SERPL-MCNC: 106 MG/DL (ref 65–99)
GLUCOSE SERPL-MCNC: 107 MG/DL (ref 65–99)
GLUCOSE SERPL-MCNC: 108 MG/DL (ref 65–99)
GLUCOSE SERPL-MCNC: 109 MG/DL (ref 65–99)
GLUCOSE SERPL-MCNC: 110 MG/DL (ref 65–99)
GLUCOSE SERPL-MCNC: 111 MG/DL (ref 65–99)
GLUCOSE SERPL-MCNC: 112 MG/DL (ref 65–99)
GLUCOSE SERPL-MCNC: 112 MG/DL (ref 65–99)
GLUCOSE SERPL-MCNC: 113 MG/DL (ref 65–99)
GLUCOSE SERPL-MCNC: 114 MG/DL (ref 65–99)
GLUCOSE SERPL-MCNC: 115 MG/DL (ref 65–99)
GLUCOSE SERPL-MCNC: 116 MG/DL (ref 65–99)
GLUCOSE SERPL-MCNC: 119 MG/DL (ref 65–99)
GLUCOSE SERPL-MCNC: 121 MG/DL (ref 65–99)
GLUCOSE SERPL-MCNC: 122 MG/DL (ref 65–99)
GLUCOSE SERPL-MCNC: 122 MG/DL (ref 65–99)
GLUCOSE SERPL-MCNC: 123 MG/DL (ref 65–99)
GLUCOSE SERPL-MCNC: 126 MG/DL (ref 65–99)
GLUCOSE SERPL-MCNC: 129 MG/DL (ref 65–99)
GLUCOSE SERPL-MCNC: 130 MG/DL (ref 65–99)
GLUCOSE SERPL-MCNC: 130 MG/DL (ref 65–99)
GLUCOSE SERPL-MCNC: 132 MG/DL (ref 65–99)
GLUCOSE SERPL-MCNC: 132 MG/DL (ref 65–99)
GLUCOSE SERPL-MCNC: 135 MG/DL (ref 65–99)
GLUCOSE SERPL-MCNC: 135 MG/DL (ref 65–99)
GLUCOSE SERPL-MCNC: 139 MG/DL (ref 65–99)
GLUCOSE SERPL-MCNC: 141 MG/DL (ref 65–99)
GLUCOSE SERPL-MCNC: 147 MG/DL (ref 65–99)
GLUCOSE SERPL-MCNC: 150 MG/DL (ref 65–99)
GLUCOSE SERPL-MCNC: 169 MG/DL (ref 65–99)
GLUCOSE SERPL-MCNC: 170 MG/DL (ref 65–99)
GLUCOSE SERPL-MCNC: 171 MG/DL (ref 65–99)
GLUCOSE SERPL-MCNC: 173 MG/DL (ref 65–99)
GLUCOSE SERPL-MCNC: 178 MG/DL (ref 65–99)
GLUCOSE SERPL-MCNC: 198 MG/DL (ref 65–99)
GLUCOSE SERPL-MCNC: 198 MG/DL (ref 65–99)
GLUCOSE SERPL-MCNC: 66 MG/DL (ref 65–99)
GLUCOSE SERPL-MCNC: 66 MG/DL (ref 65–99)
GLUCOSE SERPL-MCNC: 68 MG/DL (ref 65–99)
GLUCOSE SERPL-MCNC: 68 MG/DL (ref 65–99)
GLUCOSE SERPL-MCNC: 74 MG/DL (ref 65–99)
GLUCOSE SERPL-MCNC: 75 MG/DL (ref 65–99)
GLUCOSE SERPL-MCNC: 76 MG/DL (ref 65–99)
GLUCOSE SERPL-MCNC: 79 MG/DL (ref 65–99)
GLUCOSE SERPL-MCNC: 79 MG/DL (ref 65–99)
GLUCOSE SERPL-MCNC: 82 MG/DL (ref 65–99)
GLUCOSE SERPL-MCNC: 84 MG/DL (ref 65–99)
GLUCOSE SERPL-MCNC: 86 MG/DL (ref 65–99)
GLUCOSE SERPL-MCNC: 89 MG/DL (ref 65–99)
GLUCOSE SERPL-MCNC: 91 MG/DL (ref 65–99)
GLUCOSE SERPL-MCNC: 91 MG/DL (ref 65–99)
GLUCOSE SERPL-MCNC: 93 MG/DL (ref 65–99)
GLUCOSE SERPL-MCNC: 93 MG/DL (ref 65–99)
GLUCOSE SERPL-MCNC: 94 MG/DL (ref 65–99)
GLUCOSE SERPL-MCNC: 94 MG/DL (ref 65–99)
GLUCOSE SERPL-MCNC: 95 MG/DL (ref 65–99)
GLUCOSE SERPL-MCNC: 96 MG/DL (ref 65–99)
GLUCOSE SERPL-MCNC: 99 MG/DL (ref 65–99)
GLUCOSE UR STRIP.AUTO-MCNC: NEGATIVE MG/DL
GRAM STN SPEC: ABNORMAL
GRAM STN SPEC: ABNORMAL
GRAM STN SPEC: NORMAL
HCT VFR BLD AUTO: 22.1 % (ref 37–47)
HCT VFR BLD AUTO: 22.3 % (ref 37–47)
HCT VFR BLD AUTO: 23.1 % (ref 37–47)
HCT VFR BLD AUTO: 26.8 % (ref 37–47)
HCT VFR BLD AUTO: 27 % (ref 37–47)
HCT VFR BLD AUTO: 27.1 % (ref 37–47)
HCT VFR BLD AUTO: 27.5 % (ref 37–47)
HCT VFR BLD AUTO: 27.7 % (ref 37–47)
HCT VFR BLD AUTO: 27.7 % (ref 37–47)
HCT VFR BLD AUTO: 28 % (ref 37–47)
HCT VFR BLD AUTO: 28.6 % (ref 37–47)
HCT VFR BLD AUTO: 28.7 % (ref 37–47)
HCT VFR BLD AUTO: 29.2 % (ref 37–47)
HCT VFR BLD AUTO: 29.6 % (ref 37–47)
HCT VFR BLD AUTO: 29.9 % (ref 37–47)
HCT VFR BLD AUTO: 30.2 % (ref 37–47)
HCT VFR BLD AUTO: 30.2 % (ref 37–47)
HCT VFR BLD AUTO: 30.5 % (ref 37–47)
HCT VFR BLD AUTO: 30.6 % (ref 37–47)
HCT VFR BLD AUTO: 30.7 % (ref 37–47)
HCT VFR BLD AUTO: 31 % (ref 37–47)
HCT VFR BLD AUTO: 31 % (ref 37–47)
HCT VFR BLD AUTO: 31.1 % (ref 37–47)
HCT VFR BLD AUTO: 31.3 % (ref 37–47)
HCT VFR BLD AUTO: 31.7 % (ref 37–47)
HCT VFR BLD AUTO: 32.1 % (ref 37–47)
HCT VFR BLD AUTO: 33.2 % (ref 37–47)
HCT VFR BLD AUTO: 33.4 % (ref 37–47)
HCT VFR BLD AUTO: 33.4 % (ref 37–47)
HCT VFR BLD AUTO: 34 % (ref 37–47)
HCT VFR BLD AUTO: 34.9 % (ref 37–47)
HCT VFR BLD AUTO: 35 % (ref 37–47)
HCT VFR BLD AUTO: 35.1 % (ref 37–47)
HCT VFR BLD AUTO: 35.2 % (ref 37–47)
HCT VFR BLD AUTO: 35.5 % (ref 37–47)
HCT VFR BLD AUTO: 35.5 % (ref 37–47)
HCT VFR BLD AUTO: 36.5 % (ref 37–47)
HCT VFR BLD AUTO: 36.5 % (ref 37–47)
HCT VFR BLD AUTO: 36.8 % (ref 37–47)
HCT VFR BLD AUTO: 37.1 % (ref 37–47)
HCT VFR BLD AUTO: 37.2 % (ref 37–47)
HCT VFR BLD AUTO: 37.2 % (ref 37–47)
HCT VFR BLD AUTO: 37.4 % (ref 37–47)
HCT VFR BLD AUTO: 37.8 % (ref 37–47)
HCT VFR BLD AUTO: 38.1 % (ref 37–47)
HCT VFR BLD AUTO: 38.2 % (ref 37–47)
HCT VFR BLD AUTO: 38.2 % (ref 37–47)
HCT VFR BLD AUTO: 38.6 % (ref 37–47)
HCT VFR BLD AUTO: 39.2 % (ref 37–47)
HCT VFR BLD AUTO: 39.6 % (ref 37–47)
HCT VFR BLD AUTO: 40.1 % (ref 37–47)
HCT VFR BLD AUTO: 41.1 % (ref 37–47)
HCT VFR BLD AUTO: 41.5 % (ref 37–47)
HCT VFR BLD AUTO: 42.9 % (ref 37–47)
HCT VFR BLD AUTO: 43 % (ref 37–47)
HCT VFR BLD AUTO: 44.6 % (ref 37–47)
HDLC SERPL-MCNC: 38 MG/DL
HDLC SERPL-MCNC: 65 MG/DL
HGB BLD-MCNC: 10 G/DL (ref 12–16)
HGB BLD-MCNC: 10 G/DL (ref 12–16)
HGB BLD-MCNC: 10.2 G/DL (ref 12–16)
HGB BLD-MCNC: 10.2 G/DL (ref 12–16)
HGB BLD-MCNC: 10.3 G/DL (ref 12–16)
HGB BLD-MCNC: 10.5 G/DL (ref 12–16)
HGB BLD-MCNC: 10.6 G/DL (ref 12–16)
HGB BLD-MCNC: 10.7 G/DL (ref 12–16)
HGB BLD-MCNC: 10.8 G/DL (ref 12–16)
HGB BLD-MCNC: 10.9 G/DL (ref 12–16)
HGB BLD-MCNC: 11.3 G/DL (ref 12–16)
HGB BLD-MCNC: 11.3 G/DL (ref 12–16)
HGB BLD-MCNC: 11.5 G/DL (ref 12–16)
HGB BLD-MCNC: 11.6 G/DL (ref 12–16)
HGB BLD-MCNC: 11.6 G/DL (ref 12–16)
HGB BLD-MCNC: 11.7 G/DL (ref 12–16)
HGB BLD-MCNC: 11.8 G/DL (ref 12–16)
HGB BLD-MCNC: 11.9 G/DL (ref 12–16)
HGB BLD-MCNC: 11.9 G/DL (ref 12–16)
HGB BLD-MCNC: 12 G/DL (ref 12–16)
HGB BLD-MCNC: 12.2 G/DL (ref 12–16)
HGB BLD-MCNC: 12.3 G/DL (ref 12–16)
HGB BLD-MCNC: 12.3 G/DL (ref 12–16)
HGB BLD-MCNC: 12.4 G/DL (ref 12–16)
HGB BLD-MCNC: 12.5 G/DL (ref 12–16)
HGB BLD-MCNC: 12.6 G/DL (ref 12–16)
HGB BLD-MCNC: 12.8 G/DL (ref 12–16)
HGB BLD-MCNC: 12.9 G/DL (ref 12–16)
HGB BLD-MCNC: 13 G/DL (ref 12–16)
HGB BLD-MCNC: 13.4 G/DL (ref 12–16)
HGB BLD-MCNC: 13.7 G/DL (ref 12–16)
HGB BLD-MCNC: 13.8 G/DL (ref 12–16)
HGB BLD-MCNC: 14.3 G/DL (ref 12–16)
HGB BLD-MCNC: 14.3 G/DL (ref 12–16)
HGB BLD-MCNC: 14.8 G/DL (ref 12–16)
HGB BLD-MCNC: 15 G/DL (ref 12–16)
HGB BLD-MCNC: 6.8 G/DL (ref 12–16)
HGB BLD-MCNC: 7 G/DL (ref 12–16)
HGB BLD-MCNC: 7.1 G/DL (ref 12–16)
HGB BLD-MCNC: 7.2 G/DL (ref 12–16)
HGB BLD-MCNC: 7.3 G/DL (ref 12–16)
HGB BLD-MCNC: 7.3 G/DL (ref 12–16)
HGB BLD-MCNC: 8 G/DL (ref 12–16)
HGB BLD-MCNC: 8.3 G/DL (ref 12–16)
HGB BLD-MCNC: 8.6 G/DL (ref 12–16)
HGB BLD-MCNC: 8.6 G/DL (ref 12–16)
HGB BLD-MCNC: 8.7 G/DL (ref 12–16)
HGB BLD-MCNC: 8.7 G/DL (ref 12–16)
HGB BLD-MCNC: 8.8 G/DL (ref 12–16)
HGB BLD-MCNC: 8.9 G/DL (ref 12–16)
HGB BLD-MCNC: 9.2 G/DL (ref 12–16)
HGB BLD-MCNC: 9.4 G/DL (ref 12–16)
HGB BLD-MCNC: 9.5 G/DL (ref 12–16)
HGB BLD-MCNC: 9.5 G/DL (ref 12–16)
HGB BLD-MCNC: 9.7 G/DL (ref 12–16)
HGB BLD-MCNC: 9.8 G/DL (ref 12–16)
HGB BLD-MCNC: 9.9 G/DL (ref 12–16)
HGB BLD-MCNC: 9.9 G/DL (ref 12–16)
HISTIOCYTES NFR FLD: 2 %
HYALINE CASTS #/AREA URNS LPF: ABNORMAL /LPF
HYALINE CASTS #/AREA URNS LPF: ABNORMAL /LPF
IMM GRANULOCYTES # BLD AUTO: 0 K/UL (ref 0–0.11)
IMM GRANULOCYTES # BLD AUTO: 0.01 K/UL (ref 0–0.11)
IMM GRANULOCYTES # BLD AUTO: 0.02 K/UL (ref 0–0.11)
IMM GRANULOCYTES # BLD AUTO: 0.04 K/UL (ref 0–0.11)
IMM GRANULOCYTES # BLD AUTO: 0.04 K/UL (ref 0–0.11)
IMM GRANULOCYTES # BLD AUTO: 0.05 K/UL (ref 0–0.11)
IMM GRANULOCYTES # BLD AUTO: 0.06 K/UL (ref 0–0.11)
IMM GRANULOCYTES # BLD AUTO: 0.07 K/UL (ref 0–0.11)
IMM GRANULOCYTES # BLD AUTO: 0.07 K/UL (ref 0–0.11)
IMM GRANULOCYTES # BLD AUTO: 0.08 K/UL (ref 0–0.11)
IMM GRANULOCYTES # BLD AUTO: 0.1 K/UL (ref 0–0.11)
IMM GRANULOCYTES # BLD AUTO: 0.11 K/UL (ref 0–0.11)
IMM GRANULOCYTES # BLD AUTO: 0.11 K/UL (ref 0–0.11)
IMM GRANULOCYTES # BLD AUTO: 0.14 K/UL (ref 0–0.11)
IMM GRANULOCYTES # BLD AUTO: 0.15 K/UL (ref 0–0.11)
IMM GRANULOCYTES # BLD AUTO: 0.27 K/UL (ref 0–0.11)
IMM GRANULOCYTES # BLD AUTO: 0.52 K/UL (ref 0–0.11)
IMM GRANULOCYTES NFR BLD AUTO: 0 % (ref 0–0.9)
IMM GRANULOCYTES NFR BLD AUTO: 0.2 % (ref 0–0.9)
IMM GRANULOCYTES NFR BLD AUTO: 0.2 % (ref 0–0.9)
IMM GRANULOCYTES NFR BLD AUTO: 0.3 % (ref 0–0.9)
IMM GRANULOCYTES NFR BLD AUTO: 0.4 % (ref 0–0.9)
IMM GRANULOCYTES NFR BLD AUTO: 0.5 % (ref 0–0.9)
IMM GRANULOCYTES NFR BLD AUTO: 0.5 % (ref 0–0.9)
IMM GRANULOCYTES NFR BLD AUTO: 0.6 % (ref 0–0.9)
IMM GRANULOCYTES NFR BLD AUTO: 0.6 % (ref 0–0.9)
IMM GRANULOCYTES NFR BLD AUTO: 0.7 % (ref 0–0.9)
IMM GRANULOCYTES NFR BLD AUTO: 0.7 % (ref 0–0.9)
IMM GRANULOCYTES NFR BLD AUTO: 0.8 % (ref 0–0.9)
IMM GRANULOCYTES NFR BLD AUTO: 1 % (ref 0–0.9)
IMM GRANULOCYTES NFR BLD AUTO: 1 % (ref 0–0.9)
IMM GRANULOCYTES NFR BLD AUTO: 1.1 % (ref 0–0.9)
IMM GRANULOCYTES NFR BLD AUTO: 1.4 % (ref 0–0.9)
IMM GRANULOCYTES NFR BLD AUTO: 1.6 % (ref 0–0.9)
IMM GRANULOCYTES NFR BLD AUTO: 3 % (ref 0–0.9)
IMM GRANULOCYTES NFR BLD AUTO: 3.2 % (ref 0–0.9)
IMM GRANULOCYTES NFR BLD AUTO: 4.8 % (ref 0–0.9)
INR PPP: 0.85 (ref 0.87–1.13)
INR PPP: 1.24 (ref 0.87–1.13)
INR PPP: 1.3 (ref 2–3.5)
INR PPP: 1.34 (ref 0.87–1.13)
INR PPP: 1.36 (ref 0.87–1.13)
INR PPP: 1.39 (ref 0.87–1.13)
INR PPP: 1.41 (ref 0.87–1.13)
INR PPP: 1.49 (ref 0.87–1.13)
INR PPP: 2.07 (ref 0.87–1.13)
INR PPP: 2.91 (ref 0.87–1.13)
IRON SATN MFR SERPL: 7 % (ref 15–55)
IRON SERPL-MCNC: 15 UG/DL (ref 40–170)
KETONES UR STRIP.AUTO-MCNC: ABNORMAL MG/DL
KETONES UR STRIP.AUTO-MCNC: NEGATIVE MG/DL
KETONES UR STRIP.AUTO-MCNC: NEGATIVE MG/DL
LACTATE BLD-SCNC: 1.2 MMOL/L (ref 0.5–2)
LACTATE BLD-SCNC: 1.6 MMOL/L (ref 0.5–2)
LACTATE BLD-SCNC: 2.3 MMOL/L (ref 0.5–2)
LACTATE BLD-SCNC: 2.8 MMOL/L (ref 0.5–2)
LACTATE BLD-SCNC: 2.9 MMOL/L (ref 0.5–2)
LACTATE BLD-SCNC: 4.2 MMOL/L (ref 0.5–2)
LACTATE BLD-SCNC: 7.4 MMOL/L (ref 0.5–2)
LDH FLD L TO P-CCNC: 203 U/L
LDLC SERPL CALC-MCNC: 29 MG/DL
LDLC SERPL CALC-MCNC: 87 MG/DL
LEUKOCYTE ESTERASE UR QL STRIP.AUTO: ABNORMAL
LEUKOCYTE ESTERASE UR QL STRIP.AUTO: NEGATIVE
LEUKOCYTE ESTERASE UR QL STRIP.AUTO: NEGATIVE
LG PLATELETS BLD QL SMEAR: NORMAL
LIPASE SERPL-CCNC: 102 U/L (ref 11–82)
LIPASE SERPL-CCNC: 47 U/L (ref 11–82)
LIPASE SERPL-CCNC: 95 U/L (ref 11–82)
LV EJECT FRACT  99904: 65
LV EJECT FRACT MOD 2C 99903: 80.8
LV EJECT FRACT MOD 4C 99902: 61.65
LV EJECT FRACT MOD BP 99901: 69.83
LYMPHOCYTES # BLD AUTO: 0.11 K/UL (ref 1–4.8)
LYMPHOCYTES # BLD AUTO: 0.36 K/UL (ref 1–4.8)
LYMPHOCYTES # BLD AUTO: 0.38 K/UL (ref 1–4.8)
LYMPHOCYTES # BLD AUTO: 0.46 K/UL (ref 1–4.8)
LYMPHOCYTES # BLD AUTO: 0.48 K/UL (ref 1–4.8)
LYMPHOCYTES # BLD AUTO: 0.55 K/UL (ref 1–4.8)
LYMPHOCYTES # BLD AUTO: 0.55 K/UL (ref 1–4.8)
LYMPHOCYTES # BLD AUTO: 0.59 K/UL (ref 1–4.8)
LYMPHOCYTES # BLD AUTO: 0.67 K/UL (ref 1–4.8)
LYMPHOCYTES # BLD AUTO: 0.68 K/UL (ref 1–4.8)
LYMPHOCYTES # BLD AUTO: 0.71 K/UL (ref 1–4.8)
LYMPHOCYTES # BLD AUTO: 0.72 K/UL (ref 1–4.8)
LYMPHOCYTES # BLD AUTO: 1.16 K/UL (ref 1–4.8)
LYMPHOCYTES # BLD AUTO: 1.19 K/UL (ref 1–4.8)
LYMPHOCYTES # BLD AUTO: 1.34 K/UL (ref 1–4.8)
LYMPHOCYTES # BLD AUTO: 1.44 K/UL (ref 1–4.8)
LYMPHOCYTES # BLD AUTO: 1.55 K/UL (ref 1–4.8)
LYMPHOCYTES # BLD AUTO: 1.58 K/UL (ref 1–4.8)
LYMPHOCYTES # BLD AUTO: 1.78 K/UL (ref 1–4.8)
LYMPHOCYTES # BLD AUTO: 1.85 K/UL (ref 1–4.8)
LYMPHOCYTES # BLD AUTO: 1.86 K/UL (ref 1–4.8)
LYMPHOCYTES # BLD AUTO: 1.9 K/UL (ref 1–4.8)
LYMPHOCYTES # BLD AUTO: 1.93 K/UL (ref 1–4.8)
LYMPHOCYTES # BLD AUTO: 1.98 K/UL (ref 1–4.8)
LYMPHOCYTES # BLD AUTO: 2.16 K/UL (ref 1–4.8)
LYMPHOCYTES # BLD AUTO: 2.19 K/UL (ref 1–4.8)
LYMPHOCYTES # BLD AUTO: 2.2 K/UL (ref 1–4.8)
LYMPHOCYTES # BLD AUTO: 2.24 K/UL (ref 1–4.8)
LYMPHOCYTES # BLD AUTO: 2.44 K/UL (ref 1–4.8)
LYMPHOCYTES # BLD AUTO: 2.58 K/UL (ref 1–4.8)
LYMPHOCYTES # BLD AUTO: 2.66 K/UL (ref 1–4.8)
LYMPHOCYTES # BLD AUTO: 3.06 K/UL (ref 1–4.8)
LYMPHOCYTES # BLD AUTO: 3.89 K/UL (ref 1–4.8)
LYMPHOCYTES # BLD AUTO: 7.14 K/UL (ref 1–4.8)
LYMPHOCYTES NFR BLD: 1.1 % (ref 22–41)
LYMPHOCYTES NFR BLD: 16.9 % (ref 22–41)
LYMPHOCYTES NFR BLD: 2.2 % (ref 22–41)
LYMPHOCYTES NFR BLD: 21.4 % (ref 22–41)
LYMPHOCYTES NFR BLD: 23.1 % (ref 22–41)
LYMPHOCYTES NFR BLD: 25.6 % (ref 22–41)
LYMPHOCYTES NFR BLD: 26.1 % (ref 22–41)
LYMPHOCYTES NFR BLD: 27.4 % (ref 22–41)
LYMPHOCYTES NFR BLD: 28.2 % (ref 22–41)
LYMPHOCYTES NFR BLD: 29.8 % (ref 22–41)
LYMPHOCYTES NFR BLD: 3.7 % (ref 22–41)
LYMPHOCYTES NFR BLD: 3.9 % (ref 22–41)
LYMPHOCYTES NFR BLD: 30.3 % (ref 22–41)
LYMPHOCYTES NFR BLD: 31.6 % (ref 22–41)
LYMPHOCYTES NFR BLD: 34.9 % (ref 22–41)
LYMPHOCYTES NFR BLD: 36.2 % (ref 22–41)
LYMPHOCYTES NFR BLD: 37.3 % (ref 22–41)
LYMPHOCYTES NFR BLD: 37.7 % (ref 22–41)
LYMPHOCYTES NFR BLD: 38.4 % (ref 22–41)
LYMPHOCYTES NFR BLD: 38.8 % (ref 22–41)
LYMPHOCYTES NFR BLD: 4.4 % (ref 22–41)
LYMPHOCYTES NFR BLD: 44.7 % (ref 22–41)
LYMPHOCYTES NFR BLD: 5.1 % (ref 22–41)
LYMPHOCYTES NFR BLD: 5.2 % (ref 22–41)
LYMPHOCYTES NFR BLD: 5.5 % (ref 22–41)
LYMPHOCYTES NFR BLD: 5.6 % (ref 22–41)
LYMPHOCYTES NFR BLD: 53.1 % (ref 22–41)
LYMPHOCYTES NFR BLD: 54.8 % (ref 22–41)
LYMPHOCYTES NFR BLD: 6.2 % (ref 22–41)
LYMPHOCYTES NFR BLD: 6.6 % (ref 22–41)
LYMPHOCYTES NFR BLD: 60.9 % (ref 22–41)
LYMPHOCYTES NFR BLD: 63.7 % (ref 22–41)
LYMPHOCYTES NFR BLD: 67.8 % (ref 22–41)
LYMPHOCYTES NFR BLD: 8 % (ref 22–41)
LYMPHOCYTES NFR FLD: 0 %
LYMPHOCYTES NFR FLD: 13 %
MACROCYTES BLD QL SMEAR: ABNORMAL
MAGNESIUM SERPL-MCNC: 1.5 MG/DL (ref 1.5–2.5)
MAGNESIUM SERPL-MCNC: 1.6 MG/DL (ref 1.5–2.5)
MAGNESIUM SERPL-MCNC: 1.7 MG/DL (ref 1.5–2.5)
MAGNESIUM SERPL-MCNC: 1.8 MG/DL (ref 1.5–2.5)
MAGNESIUM SERPL-MCNC: 1.9 MG/DL (ref 1.5–2.5)
MAGNESIUM SERPL-MCNC: 2 MG/DL (ref 1.5–2.5)
MAGNESIUM SERPL-MCNC: 2 MG/DL (ref 1.5–2.5)
MAGNESIUM SERPL-MCNC: 2.3 MG/DL (ref 1.5–2.5)
MANUAL DIFF BLD: ABNORMAL
MANUAL DIFF BLD: ABNORMAL
MANUAL DIFF BLD: NORMAL
MCH RBC QN AUTO: 27.7 PG (ref 27–33)
MCH RBC QN AUTO: 27.7 PG (ref 27–33)
MCH RBC QN AUTO: 27.8 PG (ref 27–33)
MCH RBC QN AUTO: 27.8 PG (ref 27–33)
MCH RBC QN AUTO: 28 PG (ref 27–33)
MCH RBC QN AUTO: 28 PG (ref 27–33)
MCH RBC QN AUTO: 28.2 PG (ref 27–33)
MCH RBC QN AUTO: 28.2 PG (ref 27–33)
MCH RBC QN AUTO: 28.3 PG (ref 27–33)
MCH RBC QN AUTO: 28.4 PG (ref 27–33)
MCH RBC QN AUTO: 28.5 PG (ref 27–33)
MCH RBC QN AUTO: 28.5 PG (ref 27–33)
MCH RBC QN AUTO: 28.6 PG (ref 27–33)
MCH RBC QN AUTO: 28.7 PG (ref 27–33)
MCH RBC QN AUTO: 28.7 PG (ref 27–33)
MCH RBC QN AUTO: 28.8 PG (ref 27–33)
MCH RBC QN AUTO: 28.8 PG (ref 27–33)
MCH RBC QN AUTO: 28.9 PG (ref 27–33)
MCH RBC QN AUTO: 29 PG (ref 27–33)
MCH RBC QN AUTO: 29.2 PG (ref 27–33)
MCH RBC QN AUTO: 29.3 PG (ref 27–33)
MCH RBC QN AUTO: 29.6 PG (ref 27–33)
MCH RBC QN AUTO: 29.6 PG (ref 27–33)
MCH RBC QN AUTO: 29.7 PG (ref 27–33)
MCH RBC QN AUTO: 29.7 PG (ref 27–33)
MCH RBC QN AUTO: 29.8 PG (ref 27–33)
MCH RBC QN AUTO: 29.8 PG (ref 27–33)
MCH RBC QN AUTO: 30.1 PG (ref 27–33)
MCH RBC QN AUTO: 30.3 PG (ref 27–33)
MCH RBC QN AUTO: 30.5 PG (ref 27–33)
MCH RBC QN AUTO: 30.7 PG (ref 27–33)
MCH RBC QN AUTO: 30.8 PG (ref 27–33)
MCH RBC QN AUTO: 30.9 PG (ref 27–33)
MCH RBC QN AUTO: 31 PG (ref 27–33)
MCH RBC QN AUTO: 31.1 PG (ref 27–33)
MCH RBC QN AUTO: 31.2 PG (ref 27–33)
MCH RBC QN AUTO: 31.3 PG (ref 27–33)
MCH RBC QN AUTO: 31.6 PG (ref 27–33)
MCH RBC QN AUTO: 31.6 PG (ref 27–33)
MCH RBC QN AUTO: 32.1 PG (ref 27–33)
MCH RBC QN AUTO: 32.1 PG (ref 27–33)
MCH RBC QN AUTO: 32.2 PG (ref 27–33)
MCH RBC QN AUTO: 32.2 PG (ref 27–33)
MCH RBC QN AUTO: 32.5 PG (ref 27–33)
MCH RBC QN AUTO: 32.6 PG (ref 27–33)
MCH RBC QN AUTO: 32.6 PG (ref 27–33)
MCH RBC QN AUTO: 32.9 PG (ref 27–33)
MCH RBC QN AUTO: 33.2 PG (ref 27–33)
MCH RBC QN AUTO: 34.2 PG (ref 27–33)
MCHC RBC AUTO-ENTMCNC: 30.7 G/DL (ref 33.6–35)
MCHC RBC AUTO-ENTMCNC: 31 G/DL (ref 33.6–35)
MCHC RBC AUTO-ENTMCNC: 31.1 G/DL (ref 33.6–35)
MCHC RBC AUTO-ENTMCNC: 31.3 G/DL (ref 33.6–35)
MCHC RBC AUTO-ENTMCNC: 31.5 G/DL (ref 33.6–35)
MCHC RBC AUTO-ENTMCNC: 31.5 G/DL (ref 33.6–35)
MCHC RBC AUTO-ENTMCNC: 31.6 G/DL (ref 33.6–35)
MCHC RBC AUTO-ENTMCNC: 31.7 G/DL (ref 33.6–35)
MCHC RBC AUTO-ENTMCNC: 31.7 G/DL (ref 33.6–35)
MCHC RBC AUTO-ENTMCNC: 31.8 G/DL (ref 33.6–35)
MCHC RBC AUTO-ENTMCNC: 31.9 G/DL (ref 33.6–35)
MCHC RBC AUTO-ENTMCNC: 31.9 G/DL (ref 33.6–35)
MCHC RBC AUTO-ENTMCNC: 32.1 G/DL (ref 33.6–35)
MCHC RBC AUTO-ENTMCNC: 32.2 G/DL (ref 33.6–35)
MCHC RBC AUTO-ENTMCNC: 32.3 G/DL (ref 33.6–35)
MCHC RBC AUTO-ENTMCNC: 32.3 G/DL (ref 33.6–35)
MCHC RBC AUTO-ENTMCNC: 32.4 G/DL (ref 33.6–35)
MCHC RBC AUTO-ENTMCNC: 32.5 G/DL (ref 33.6–35)
MCHC RBC AUTO-ENTMCNC: 32.6 G/DL (ref 33.6–35)
MCHC RBC AUTO-ENTMCNC: 32.6 G/DL (ref 33.6–35)
MCHC RBC AUTO-ENTMCNC: 32.7 G/DL (ref 33.6–35)
MCHC RBC AUTO-ENTMCNC: 32.8 G/DL (ref 33.6–35)
MCHC RBC AUTO-ENTMCNC: 32.8 G/DL (ref 33.6–35)
MCHC RBC AUTO-ENTMCNC: 32.9 G/DL (ref 33.6–35)
MCHC RBC AUTO-ENTMCNC: 33 G/DL (ref 33.6–35)
MCHC RBC AUTO-ENTMCNC: 33 G/DL (ref 33.6–35)
MCHC RBC AUTO-ENTMCNC: 33.1 G/DL (ref 33.6–35)
MCHC RBC AUTO-ENTMCNC: 33.1 G/DL (ref 33.6–35)
MCHC RBC AUTO-ENTMCNC: 33.2 G/DL (ref 33.6–35)
MCHC RBC AUTO-ENTMCNC: 33.2 G/DL (ref 33.6–35)
MCHC RBC AUTO-ENTMCNC: 33.3 G/DL (ref 33.6–35)
MCHC RBC AUTO-ENTMCNC: 33.3 G/DL (ref 33.6–35)
MCHC RBC AUTO-ENTMCNC: 33.4 G/DL (ref 33.6–35)
MCHC RBC AUTO-ENTMCNC: 33.4 G/DL (ref 33.6–35)
MCHC RBC AUTO-ENTMCNC: 33.6 G/DL (ref 33.6–35)
MCHC RBC AUTO-ENTMCNC: 33.8 G/DL (ref 33.6–35)
MCHC RBC AUTO-ENTMCNC: 33.9 G/DL (ref 33.6–35)
MCHC RBC AUTO-ENTMCNC: 34 G/DL (ref 33.6–35)
MCHC RBC AUTO-ENTMCNC: 34.2 G/DL (ref 33.6–35)
MCHC RBC AUTO-ENTMCNC: 34.3 G/DL (ref 33.6–35)
MCHC RBC AUTO-ENTMCNC: 34.5 G/DL (ref 33.6–35)
MCHC RBC AUTO-ENTMCNC: 34.8 G/DL (ref 33.6–35)
MCHC RBC AUTO-ENTMCNC: 34.9 G/DL (ref 33.6–35)
MCHC RBC AUTO-ENTMCNC: 35.4 G/DL (ref 33.6–35)
MCHC RBC AUTO-ENTMCNC: 36 G/DL (ref 33.6–35)
MCHC RBC AUTO-ENTMCNC: 36.1 G/DL (ref 33.6–35)
MCV RBC AUTO: 85.3 FL (ref 81.4–97.8)
MCV RBC AUTO: 85.7 FL (ref 81.4–97.8)
MCV RBC AUTO: 86.2 FL (ref 81.4–97.8)
MCV RBC AUTO: 86.6 FL (ref 81.4–97.8)
MCV RBC AUTO: 86.8 FL (ref 81.4–97.8)
MCV RBC AUTO: 87.1 FL (ref 81.4–97.8)
MCV RBC AUTO: 87.1 FL (ref 81.4–97.8)
MCV RBC AUTO: 87.3 FL (ref 81.4–97.8)
MCV RBC AUTO: 87.3 FL (ref 81.4–97.8)
MCV RBC AUTO: 87.4 FL (ref 81.4–97.8)
MCV RBC AUTO: 87.4 FL (ref 81.4–97.8)
MCV RBC AUTO: 87.6 FL (ref 81.4–97.8)
MCV RBC AUTO: 87.8 FL (ref 81.4–97.8)
MCV RBC AUTO: 87.9 FL (ref 81.4–97.8)
MCV RBC AUTO: 88 FL (ref 81.4–97.8)
MCV RBC AUTO: 88.1 FL (ref 81.4–97.8)
MCV RBC AUTO: 88.2 FL (ref 81.4–97.8)
MCV RBC AUTO: 88.2 FL (ref 81.4–97.8)
MCV RBC AUTO: 88.5 FL (ref 81.4–97.8)
MCV RBC AUTO: 88.7 FL (ref 81.4–97.8)
MCV RBC AUTO: 88.9 FL (ref 81.4–97.8)
MCV RBC AUTO: 89.1 FL (ref 81.4–97.8)
MCV RBC AUTO: 89.3 FL (ref 81.4–97.8)
MCV RBC AUTO: 89.6 FL (ref 81.4–97.8)
MCV RBC AUTO: 89.7 FL (ref 81.4–97.8)
MCV RBC AUTO: 90.1 FL (ref 81.4–97.8)
MCV RBC AUTO: 90.1 FL (ref 81.4–97.8)
MCV RBC AUTO: 90.3 FL (ref 81.4–97.8)
MCV RBC AUTO: 90.6 FL (ref 81.4–97.8)
MCV RBC AUTO: 90.6 FL (ref 81.4–97.8)
MCV RBC AUTO: 90.8 FL (ref 81.4–97.8)
MCV RBC AUTO: 90.9 FL (ref 81.4–97.8)
MCV RBC AUTO: 91.1 FL (ref 81.4–97.8)
MCV RBC AUTO: 91.7 FL (ref 81.4–97.8)
MCV RBC AUTO: 91.9 FL (ref 81.4–97.8)
MCV RBC AUTO: 92.1 FL (ref 81.4–97.8)
MCV RBC AUTO: 92.3 FL (ref 81.4–97.8)
MCV RBC AUTO: 92.7 FL (ref 81.4–97.8)
MCV RBC AUTO: 93.1 FL (ref 81.4–97.8)
MCV RBC AUTO: 93.3 FL (ref 81.4–97.8)
MCV RBC AUTO: 93.4 FL (ref 81.4–97.8)
MCV RBC AUTO: 93.5 FL (ref 81.4–97.8)
MCV RBC AUTO: 93.6 FL (ref 81.4–97.8)
MCV RBC AUTO: 94.7 FL (ref 81.4–97.8)
MCV RBC AUTO: 94.9 FL (ref 81.4–97.8)
MCV RBC AUTO: 95.1 FL (ref 81.4–97.8)
MCV RBC AUTO: 95.3 FL (ref 81.4–97.8)
MCV RBC AUTO: 95.4 FL (ref 81.4–97.8)
MCV RBC AUTO: 97 FL (ref 81.4–97.8)
MCV RBC AUTO: 97.1 FL (ref 81.4–97.8)
MCV RBC AUTO: 97.3 FL (ref 81.4–97.8)
MCV RBC AUTO: 97.6 FL (ref 81.4–97.8)
MCV RBC AUTO: 98.4 FL (ref 81.4–97.8)
MCV RBC AUTO: 99.2 FL (ref 81.4–97.8)
MESOTHL CELL NFR FLD: 18 %
METAMYELOCYTES NFR BLD MANUAL: 1.7 %
METAMYELOCYTES NFR BLD MANUAL: 1.8 %
METAMYELOCYTES NFR BLD MANUAL: 2.7 %
METAMYELOCYTES NFR BLD MANUAL: 2.7 %
MICRO URNS: ABNORMAL
MICRO URNS: ABNORMAL
MICRO URNS: NORMAL
MICROCYTES BLD QL SMEAR: ABNORMAL
MICROCYTES BLD QL SMEAR: ABNORMAL
MONOCYTES # BLD AUTO: 0 K/UL (ref 0–0.85)
MONOCYTES # BLD AUTO: 0.16 K/UL (ref 0–0.85)
MONOCYTES # BLD AUTO: 0.18 K/UL (ref 0–0.85)
MONOCYTES # BLD AUTO: 0.21 K/UL (ref 0–0.85)
MONOCYTES # BLD AUTO: 0.21 K/UL (ref 0–0.85)
MONOCYTES # BLD AUTO: 0.22 K/UL (ref 0–0.85)
MONOCYTES # BLD AUTO: 0.24 K/UL (ref 0–0.85)
MONOCYTES # BLD AUTO: 0.28 K/UL (ref 0–0.85)
MONOCYTES # BLD AUTO: 0.28 K/UL (ref 0–0.85)
MONOCYTES # BLD AUTO: 0.3 K/UL (ref 0–0.85)
MONOCYTES # BLD AUTO: 0.3 K/UL (ref 0–0.85)
MONOCYTES # BLD AUTO: 0.31 K/UL (ref 0–0.85)
MONOCYTES # BLD AUTO: 0.33 K/UL (ref 0–0.85)
MONOCYTES # BLD AUTO: 0.44 K/UL (ref 0–0.85)
MONOCYTES # BLD AUTO: 0.45 K/UL (ref 0–0.85)
MONOCYTES # BLD AUTO: 0.46 K/UL (ref 0–0.85)
MONOCYTES # BLD AUTO: 0.47 K/UL (ref 0–0.85)
MONOCYTES # BLD AUTO: 0.49 K/UL (ref 0–0.85)
MONOCYTES # BLD AUTO: 0.5 K/UL (ref 0–0.85)
MONOCYTES # BLD AUTO: 0.52 K/UL (ref 0–0.85)
MONOCYTES # BLD AUTO: 0.55 K/UL (ref 0–0.85)
MONOCYTES # BLD AUTO: 0.56 K/UL (ref 0–0.85)
MONOCYTES # BLD AUTO: 0.58 K/UL (ref 0–0.85)
MONOCYTES # BLD AUTO: 0.69 K/UL (ref 0–0.85)
MONOCYTES # BLD AUTO: 0.7 K/UL (ref 0–0.85)
MONOCYTES # BLD AUTO: 0.7 K/UL (ref 0–0.85)
MONOCYTES # BLD AUTO: 0.77 K/UL (ref 0–0.85)
MONOCYTES # BLD AUTO: 0.85 K/UL (ref 0–0.85)
MONOCYTES # BLD AUTO: 0.91 K/UL (ref 0–0.85)
MONOCYTES # BLD AUTO: 0.93 K/UL (ref 0–0.85)
MONOCYTES # BLD AUTO: 0.96 K/UL (ref 0–0.85)
MONOCYTES # BLD AUTO: 1.04 K/UL (ref 0–0.85)
MONOCYTES # BLD AUTO: 1.29 K/UL (ref 0–0.85)
MONOCYTES # BLD AUTO: 1.66 K/UL (ref 0–0.85)
MONOCYTES NFR BLD AUTO: 0 % (ref 0–13.4)
MONOCYTES NFR BLD AUTO: 0.9 % (ref 0–13.4)
MONOCYTES NFR BLD AUTO: 10.6 % (ref 0–13.4)
MONOCYTES NFR BLD AUTO: 11.5 % (ref 0–13.4)
MONOCYTES NFR BLD AUTO: 12.2 % (ref 0–13.4)
MONOCYTES NFR BLD AUTO: 13.4 % (ref 0–13.4)
MONOCYTES NFR BLD AUTO: 15.6 % (ref 0–13.4)
MONOCYTES NFR BLD AUTO: 2 % (ref 0–13.4)
MONOCYTES NFR BLD AUTO: 2.1 % (ref 0–13.4)
MONOCYTES NFR BLD AUTO: 2.3 % (ref 0–13.4)
MONOCYTES NFR BLD AUTO: 2.5 % (ref 0–13.4)
MONOCYTES NFR BLD AUTO: 20.2 % (ref 0–13.4)
MONOCYTES NFR BLD AUTO: 22.6 % (ref 0–13.4)
MONOCYTES NFR BLD AUTO: 23 % (ref 0–13.4)
MONOCYTES NFR BLD AUTO: 3 % (ref 0–13.4)
MONOCYTES NFR BLD AUTO: 3.1 % (ref 0–13.4)
MONOCYTES NFR BLD AUTO: 3.5 % (ref 0–13.4)
MONOCYTES NFR BLD AUTO: 5.2 % (ref 0–13.4)
MONOCYTES NFR BLD AUTO: 5.7 % (ref 0–13.4)
MONOCYTES NFR BLD AUTO: 5.8 % (ref 0–13.4)
MONOCYTES NFR BLD AUTO: 5.9 % (ref 0–13.4)
MONOCYTES NFR BLD AUTO: 6 % (ref 0–13.4)
MONOCYTES NFR BLD AUTO: 6.6 % (ref 0–13.4)
MONOCYTES NFR BLD AUTO: 6.8 % (ref 0–13.4)
MONOCYTES NFR BLD AUTO: 7.2 % (ref 0–13.4)
MONOCYTES NFR BLD AUTO: 7.5 % (ref 0–13.4)
MONOCYTES NFR BLD AUTO: 7.6 % (ref 0–13.4)
MONOCYTES NFR BLD AUTO: 7.8 % (ref 0–13.4)
MONOCYTES NFR BLD AUTO: 8.4 % (ref 0–13.4)
MONOCYTES NFR BLD AUTO: 8.4 % (ref 0–13.4)
MONOCYTES NFR BLD AUTO: 8.5 % (ref 0–13.4)
MONOCYTES NFR BLD AUTO: 8.7 % (ref 0–13.4)
MONOCYTES NFR BLD AUTO: 9 % (ref 0–13.4)
MONOCYTES NFR BLD AUTO: 9.2 % (ref 0–13.4)
MONONUC CELLS NFR FLD: 0 %
MONONUC CELLS NFR FLD: 20 %
MORPHOLOGY BLD-IMP: NORMAL
MUCOUS THREADS #/AREA URNS HPF: ABNORMAL /HPF
MYELOCYTES NFR BLD MANUAL: 0.8 %
MYELOCYTES NFR BLD MANUAL: 0.9 %
MYELOCYTES NFR BLD MANUAL: 1.8 %
NEUTROPHILS # BLD AUTO: 0.47 K/UL (ref 2–7.15)
NEUTROPHILS # BLD AUTO: 0.57 K/UL (ref 2–7.15)
NEUTROPHILS # BLD AUTO: 0.85 K/UL (ref 2–7.15)
NEUTROPHILS # BLD AUTO: 0.96 K/UL (ref 2–7.15)
NEUTROPHILS # BLD AUTO: 1.05 K/UL (ref 2–7.15)
NEUTROPHILS # BLD AUTO: 1.29 K/UL (ref 2–7.15)
NEUTROPHILS # BLD AUTO: 1.3 K/UL (ref 2–7.15)
NEUTROPHILS # BLD AUTO: 1.31 K/UL (ref 2–7.15)
NEUTROPHILS # BLD AUTO: 10.52 K/UL (ref 2–7.15)
NEUTROPHILS # BLD AUTO: 12.23 K/UL (ref 2–7.15)
NEUTROPHILS # BLD AUTO: 13.16 K/UL (ref 2–7.15)
NEUTROPHILS # BLD AUTO: 2.65 K/UL (ref 2–7.15)
NEUTROPHILS # BLD AUTO: 2.94 K/UL (ref 2–7.15)
NEUTROPHILS # BLD AUTO: 20.03 K/UL (ref 2–7.15)
NEUTROPHILS # BLD AUTO: 3.37 K/UL (ref 2–7.15)
NEUTROPHILS # BLD AUTO: 3.73 K/UL (ref 2–7.15)
NEUTROPHILS # BLD AUTO: 4.14 K/UL (ref 2–7.15)
NEUTROPHILS # BLD AUTO: 4.17 K/UL (ref 2–7.15)
NEUTROPHILS # BLD AUTO: 4.37 K/UL (ref 2–7.15)
NEUTROPHILS # BLD AUTO: 4.74 K/UL (ref 2–7.15)
NEUTROPHILS # BLD AUTO: 5.09 K/UL (ref 2–7.15)
NEUTROPHILS # BLD AUTO: 5.16 K/UL (ref 2–7.15)
NEUTROPHILS # BLD AUTO: 5.53 K/UL (ref 2–7.15)
NEUTROPHILS # BLD AUTO: 6.27 K/UL (ref 2–7.15)
NEUTROPHILS # BLD AUTO: 7.79 K/UL (ref 2–7.15)
NEUTROPHILS # BLD AUTO: 7.95 K/UL (ref 2–7.15)
NEUTROPHILS # BLD AUTO: 8.04 K/UL (ref 2–7.15)
NEUTROPHILS # BLD AUTO: 8.1 K/UL (ref 2–7.15)
NEUTROPHILS # BLD AUTO: 8.88 K/UL (ref 2–7.15)
NEUTROPHILS # BLD AUTO: 8.91 K/UL (ref 2–7.15)
NEUTROPHILS # BLD AUTO: 9.31 K/UL (ref 2–7.15)
NEUTROPHILS # BLD AUTO: 9.76 K/UL (ref 2–7.15)
NEUTROPHILS # BLD AUTO: 9.82 K/UL (ref 2–7.15)
NEUTROPHILS # BLD AUTO: 9.83 K/UL (ref 2–7.15)
NEUTROPHILS NFR BLD: 10.4 % (ref 44–72)
NEUTROPHILS NFR BLD: 10.7 % (ref 44–72)
NEUTROPHILS NFR BLD: 12.4 % (ref 44–72)
NEUTROPHILS NFR BLD: 28 % (ref 44–72)
NEUTROPHILS NFR BLD: 30.2 % (ref 44–72)
NEUTROPHILS NFR BLD: 32.2 % (ref 44–72)
NEUTROPHILS NFR BLD: 35.4 % (ref 44–72)
NEUTROPHILS NFR BLD: 36 % (ref 44–72)
NEUTROPHILS NFR BLD: 43 % (ref 44–72)
NEUTROPHILS NFR BLD: 45.5 % (ref 44–72)
NEUTROPHILS NFR BLD: 52.3 % (ref 44–72)
NEUTROPHILS NFR BLD: 52.5 % (ref 44–72)
NEUTROPHILS NFR BLD: 56.4 % (ref 44–72)
NEUTROPHILS NFR BLD: 57.5 % (ref 44–72)
NEUTROPHILS NFR BLD: 58.8 % (ref 44–72)
NEUTROPHILS NFR BLD: 58.8 % (ref 44–72)
NEUTROPHILS NFR BLD: 60.1 % (ref 44–72)
NEUTROPHILS NFR BLD: 60.7 % (ref 44–72)
NEUTROPHILS NFR BLD: 61.1 % (ref 44–72)
NEUTROPHILS NFR BLD: 65.3 % (ref 44–72)
NEUTROPHILS NFR BLD: 66.6 % (ref 44–72)
NEUTROPHILS NFR BLD: 78.3 % (ref 44–72)
NEUTROPHILS NFR BLD: 83.4 % (ref 44–72)
NEUTROPHILS NFR BLD: 86.6 % (ref 44–72)
NEUTROPHILS NFR BLD: 86.8 % (ref 44–72)
NEUTROPHILS NFR BLD: 87.1 % (ref 44–72)
NEUTROPHILS NFR BLD: 87.2 % (ref 44–72)
NEUTROPHILS NFR BLD: 88.7 % (ref 44–72)
NEUTROPHILS NFR BLD: 90.3 % (ref 44–72)
NEUTROPHILS NFR BLD: 90.9 % (ref 44–72)
NEUTROPHILS NFR BLD: 91 % (ref 44–72)
NEUTROPHILS NFR BLD: 91.2 % (ref 44–72)
NEUTROPHILS NFR BLD: 92.2 % (ref 44–72)
NEUTROPHILS NFR BLD: 95.8 % (ref 44–72)
NEUTROPHILS NFR FLD: 0 %
NEUTROPHILS NFR FLD: 46 %
NEUTS BAND NFR BLD MANUAL: 10.4 % (ref 0–10)
NEUTS BAND NFR BLD MANUAL: 2.5 % (ref 0–10)
NEUTS BAND NFR BLD MANUAL: 2.6 % (ref 0–10)
NEUTS BAND NFR BLD MANUAL: 26.8 % (ref 0–10)
NEUTS BAND NFR BLD MANUAL: 3.6 % (ref 0–10)
NEUTS BAND NFR BLD MANUAL: 7.2 % (ref 0–10)
NEUTS BAND NFR BLD MANUAL: 8 % (ref 0–10)
NITRITE UR QL STRIP.AUTO: NEGATIVE
NRBC # BLD AUTO: 0 K/UL
NRBC # BLD AUTO: 0.02 K/UL
NRBC # BLD AUTO: 0.03 K/UL
NRBC # BLD AUTO: 0.03 K/UL
NRBC # BLD AUTO: 0.08 K/UL
NRBC # BLD AUTO: 0.08 K/UL
NRBC BLD-RTO: 0 /100 WBC
NRBC BLD-RTO: 0.2 /100 WBC
NRBC BLD-RTO: 0.3 /100 WBC
NRBC BLD-RTO: 0.3 /100 WBC
NRBC BLD-RTO: 0.4 /100 WBC
NRBC BLD-RTO: 0.4 /100 WBC
NRBC BLD-RTO: 0.6 /100 WBC
NRBC BLD-RTO: 0.7 /100 WBC
OSMOLALITY UR: 437 MOSM/KG H2O (ref 300–900)
OVALOCYTES BLD QL SMEAR: NORMAL
PH FLD: 7 [PH]
PH UR STRIP.AUTO: 5.5 [PH]
PH UR STRIP.AUTO: 6 [PH]
PH UR STRIP.AUTO: 7 [PH]
PLATELET # BLD AUTO: 102 K/UL (ref 164–446)
PLATELET # BLD AUTO: 110 K/UL (ref 164–446)
PLATELET # BLD AUTO: 117 K/UL (ref 164–446)
PLATELET # BLD AUTO: 133 K/UL (ref 164–446)
PLATELET # BLD AUTO: 163 K/UL (ref 164–446)
PLATELET # BLD AUTO: 166 K/UL (ref 164–446)
PLATELET # BLD AUTO: 168 K/UL (ref 164–446)
PLATELET # BLD AUTO: 172 K/UL (ref 164–446)
PLATELET # BLD AUTO: 174 K/UL (ref 164–446)
PLATELET # BLD AUTO: 178 K/UL (ref 164–446)
PLATELET # BLD AUTO: 200 K/UL (ref 164–446)
PLATELET # BLD AUTO: 207 K/UL (ref 164–446)
PLATELET # BLD AUTO: 209 K/UL (ref 164–446)
PLATELET # BLD AUTO: 214 K/UL (ref 164–446)
PLATELET # BLD AUTO: 217 K/UL (ref 164–446)
PLATELET # BLD AUTO: 219 K/UL (ref 164–446)
PLATELET # BLD AUTO: 220 K/UL (ref 164–446)
PLATELET # BLD AUTO: 223 K/UL (ref 164–446)
PLATELET # BLD AUTO: 223 K/UL (ref 164–446)
PLATELET # BLD AUTO: 225 K/UL (ref 164–446)
PLATELET # BLD AUTO: 227 K/UL (ref 164–446)
PLATELET # BLD AUTO: 229 K/UL (ref 164–446)
PLATELET # BLD AUTO: 238 K/UL (ref 164–446)
PLATELET # BLD AUTO: 239 K/UL (ref 164–446)
PLATELET # BLD AUTO: 240 K/UL (ref 164–446)
PLATELET # BLD AUTO: 241 K/UL (ref 164–446)
PLATELET # BLD AUTO: 244 K/UL (ref 164–446)
PLATELET # BLD AUTO: 244 K/UL (ref 164–446)
PLATELET # BLD AUTO: 248 K/UL (ref 164–446)
PLATELET # BLD AUTO: 250 K/UL (ref 164–446)
PLATELET # BLD AUTO: 252 K/UL (ref 164–446)
PLATELET # BLD AUTO: 257 K/UL (ref 164–446)
PLATELET # BLD AUTO: 260 K/UL (ref 164–446)
PLATELET # BLD AUTO: 260 K/UL (ref 164–446)
PLATELET # BLD AUTO: 278 K/UL (ref 164–446)
PLATELET # BLD AUTO: 281 K/UL (ref 164–446)
PLATELET # BLD AUTO: 283 K/UL (ref 164–446)
PLATELET # BLD AUTO: 296 K/UL (ref 164–446)
PLATELET # BLD AUTO: 303 K/UL (ref 164–446)
PLATELET # BLD AUTO: 304 K/UL (ref 164–446)
PLATELET # BLD AUTO: 322 K/UL (ref 164–446)
PLATELET # BLD AUTO: 324 K/UL (ref 164–446)
PLATELET # BLD AUTO: 327 K/UL (ref 164–446)
PLATELET # BLD AUTO: 333 K/UL (ref 164–446)
PLATELET # BLD AUTO: 334 K/UL (ref 164–446)
PLATELET # BLD AUTO: 339 K/UL (ref 164–446)
PLATELET # BLD AUTO: 344 K/UL (ref 164–446)
PLATELET # BLD AUTO: 350 K/UL (ref 164–446)
PLATELET # BLD AUTO: 353 K/UL (ref 164–446)
PLATELET # BLD AUTO: 362 K/UL (ref 164–446)
PLATELET # BLD AUTO: 391 K/UL (ref 164–446)
PLATELET # BLD AUTO: 409 K/UL (ref 164–446)
PLATELET # BLD AUTO: 57 K/UL (ref 164–446)
PLATELET # BLD AUTO: 68 K/UL (ref 164–446)
PLATELET # BLD AUTO: 69 K/UL (ref 164–446)
PLATELET # BLD AUTO: 79 K/UL (ref 164–446)
PLATELET # BLD AUTO: 87 K/UL (ref 164–446)
PLATELET # BLD AUTO: 87 K/UL (ref 164–446)
PLATELET BLD QL SMEAR: NORMAL
PMV BLD AUTO: 10 FL (ref 9–12.9)
PMV BLD AUTO: 10.1 FL (ref 9–12.9)
PMV BLD AUTO: 10.1 FL (ref 9–12.9)
PMV BLD AUTO: 10.2 FL (ref 9–12.9)
PMV BLD AUTO: 10.3 FL (ref 9–12.9)
PMV BLD AUTO: 10.6 FL (ref 9–12.9)
PMV BLD AUTO: 10.7 FL (ref 9–12.9)
PMV BLD AUTO: 10.8 FL (ref 9–12.9)
PMV BLD AUTO: 10.8 FL (ref 9–12.9)
PMV BLD AUTO: 11 FL (ref 9–12.9)
PMV BLD AUTO: 11.1 FL (ref 9–12.9)
PMV BLD AUTO: 11.3 FL (ref 9–12.9)
PMV BLD AUTO: 11.5 FL (ref 9–12.9)
PMV BLD AUTO: 11.5 FL (ref 9–12.9)
PMV BLD AUTO: 11.7 FL (ref 9–12.9)
PMV BLD AUTO: 8.7 FL (ref 9–12.9)
PMV BLD AUTO: 9.2 FL (ref 9–12.9)
PMV BLD AUTO: 9.4 FL (ref 9–12.9)
PMV BLD AUTO: 9.5 FL (ref 9–12.9)
PMV BLD AUTO: 9.6 FL (ref 9–12.9)
PMV BLD AUTO: 9.7 FL (ref 9–12.9)
PMV BLD AUTO: 9.8 FL (ref 9–12.9)
PMV BLD AUTO: 9.9 FL (ref 9–12.9)
PMV BLD AUTO: 9.9 FL (ref 9–12.9)
POIKILOCYTOSIS BLD QL SMEAR: NORMAL
POLYCHROMASIA BLD QL SMEAR: NORMAL
POTASSIUM SERPL-SCNC: 2.5 MMOL/L (ref 3.6–5.5)
POTASSIUM SERPL-SCNC: 2.7 MMOL/L (ref 3.6–5.5)
POTASSIUM SERPL-SCNC: 2.7 MMOL/L (ref 3.6–5.5)
POTASSIUM SERPL-SCNC: 3 MMOL/L (ref 3.6–5.5)
POTASSIUM SERPL-SCNC: 3.1 MMOL/L (ref 3.6–5.5)
POTASSIUM SERPL-SCNC: 3.2 MMOL/L (ref 3.6–5.5)
POTASSIUM SERPL-SCNC: 3.3 MMOL/L (ref 3.6–5.5)
POTASSIUM SERPL-SCNC: 3.4 MMOL/L (ref 3.6–5.5)
POTASSIUM SERPL-SCNC: 3.5 MMOL/L (ref 3.6–5.5)
POTASSIUM SERPL-SCNC: 3.6 MMOL/L (ref 3.6–5.5)
POTASSIUM SERPL-SCNC: 3.7 MMOL/L (ref 3.6–5.5)
POTASSIUM SERPL-SCNC: 3.8 MMOL/L (ref 3.6–5.5)
POTASSIUM SERPL-SCNC: 3.9 MMOL/L (ref 3.6–5.5)
POTASSIUM SERPL-SCNC: 4 MMOL/L (ref 3.6–5.5)
POTASSIUM SERPL-SCNC: 4.1 MMOL/L (ref 3.6–5.5)
POTASSIUM SERPL-SCNC: 4.2 MMOL/L (ref 3.6–5.5)
POTASSIUM SERPL-SCNC: 4.3 MMOL/L (ref 3.6–5.5)
POTASSIUM SERPL-SCNC: 4.3 MMOL/L (ref 3.6–5.5)
POTASSIUM SERPL-SCNC: 4.4 MMOL/L (ref 3.6–5.5)
POTASSIUM SERPL-SCNC: 4.5 MMOL/L (ref 3.6–5.5)
POTASSIUM SERPL-SCNC: 4.6 MMOL/L (ref 3.6–5.5)
POTASSIUM SERPL-SCNC: 4.7 MMOL/L (ref 3.6–5.5)
POTASSIUM SERPL-SCNC: 4.8 MMOL/L (ref 3.6–5.5)
POTASSIUM SERPL-SCNC: 4.9 MMOL/L (ref 3.6–5.5)
POTASSIUM SERPL-SCNC: 4.9 MMOL/L (ref 3.6–5.5)
POTASSIUM SERPL-SCNC: 5 MMOL/L (ref 3.6–5.5)
POTASSIUM SERPL-SCNC: 5.1 MMOL/L (ref 3.6–5.5)
POTASSIUM SERPL-SCNC: 5.3 MMOL/L (ref 3.6–5.5)
POTASSIUM SERPL-SCNC: 5.4 MMOL/L (ref 3.6–5.5)
POTASSIUM SERPL-SCNC: 5.6 MMOL/L (ref 3.6–5.5)
PROCALCITONIN SERPL-MCNC: 2.04 NG/ML
PRODUCT TYPE UPROD: NORMAL
PROMYELOCYTES NFR BLD MANUAL: 0.9 %
PROMYELOCYTES NFR BLD MANUAL: 1.8 %
PROT FLD-MCNC: 4.6 G/DL
PROT SERPL-MCNC: 4.5 G/DL (ref 6–8.2)
PROT SERPL-MCNC: 5.1 G/DL (ref 6–8.2)
PROT SERPL-MCNC: 5.2 G/DL (ref 6–8.2)
PROT SERPL-MCNC: 5.4 G/DL (ref 6–8.2)
PROT SERPL-MCNC: 5.6 G/DL (ref 6–8.2)
PROT SERPL-MCNC: 5.8 G/DL (ref 6–8.2)
PROT SERPL-MCNC: 5.9 G/DL (ref 6–8.2)
PROT SERPL-MCNC: 6.1 G/DL (ref 6–8.2)
PROT SERPL-MCNC: 6.2 G/DL (ref 6–8.2)
PROT SERPL-MCNC: 6.3 G/DL (ref 6–8.2)
PROT SERPL-MCNC: 6.3 G/DL (ref 6–8.2)
PROT SERPL-MCNC: 6.4 G/DL (ref 6–8.2)
PROT SERPL-MCNC: 6.5 G/DL (ref 6–8.2)
PROT SERPL-MCNC: 6.6 G/DL (ref 6–8.2)
PROT SERPL-MCNC: 6.6 G/DL (ref 6–8.2)
PROT SERPL-MCNC: 6.7 G/DL (ref 6–8.2)
PROT SERPL-MCNC: 6.7 G/DL (ref 6–8.2)
PROT SERPL-MCNC: 6.8 G/DL (ref 6–8.2)
PROT SERPL-MCNC: 6.8 G/DL (ref 6–8.2)
PROT SERPL-MCNC: 7 G/DL (ref 6–8.2)
PROT SERPL-MCNC: 7.2 G/DL (ref 6–8.2)
PROT SERPL-MCNC: 7.3 G/DL (ref 6–8.2)
PROT SERPL-MCNC: 7.4 G/DL (ref 6–8.2)
PROT SERPL-MCNC: 7.5 G/DL (ref 6–8.2)
PROT SERPL-MCNC: 7.8 G/DL (ref 6–8.2)
PROT SERPL-MCNC: 7.9 G/DL (ref 6–8.2)
PROT SERPL-MCNC: 7.9 G/DL (ref 6–8.2)
PROT SERPL-MCNC: 8.6 G/DL (ref 6–8.2)
PROT UR QL STRIP: 100 MG/DL
PROT UR QL STRIP: 30 MG/DL
PROT UR QL STRIP: NEGATIVE MG/DL
PROTHROMBIN TIME: 11.7 SEC (ref 12–14.6)
PROTHROMBIN TIME: 15.7 SEC (ref 12–14.6)
PROTHROMBIN TIME: 16.7 SEC (ref 12–14.6)
PROTHROMBIN TIME: 16.9 SEC (ref 12–14.6)
PROTHROMBIN TIME: 17.2 SEC (ref 12–14.6)
PROTHROMBIN TIME: 17.4 SEC (ref 12–14.6)
PROTHROMBIN TIME: 18.1 SEC (ref 12–14.6)
PROTHROMBIN TIME: 23.4 SEC (ref 12–14.6)
PROTHROMBIN TIME: 30.5 SEC (ref 12–14.6)
RBC # BLD AUTO: 2.43 M/UL (ref 4.2–5.4)
RBC # BLD AUTO: 2.43 M/UL (ref 4.2–5.4)
RBC # BLD AUTO: 2.55 M/UL (ref 4.2–5.4)
RBC # BLD AUTO: 2.99 M/UL (ref 4.2–5.4)
RBC # BLD AUTO: 3.03 M/UL (ref 4.2–5.4)
RBC # BLD AUTO: 3.04 M/UL (ref 4.2–5.4)
RBC # BLD AUTO: 3.05 M/UL (ref 4.2–5.4)
RBC # BLD AUTO: 3.05 M/UL (ref 4.2–5.4)
RBC # BLD AUTO: 3.07 M/UL (ref 4.2–5.4)
RBC # BLD AUTO: 3.13 M/UL (ref 4.2–5.4)
RBC # BLD AUTO: 3.14 M/UL (ref 4.2–5.4)
RBC # BLD AUTO: 3.15 M/UL (ref 4.2–5.4)
RBC # BLD AUTO: 3.2 M/UL (ref 4.2–5.4)
RBC # BLD AUTO: 3.23 M/UL (ref 4.2–5.4)
RBC # BLD AUTO: 3.24 M/UL (ref 4.2–5.4)
RBC # BLD AUTO: 3.29 M/UL (ref 4.2–5.4)
RBC # BLD AUTO: 3.35 M/UL (ref 4.2–5.4)
RBC # BLD AUTO: 3.46 M/UL (ref 4.2–5.4)
RBC # BLD AUTO: 3.49 M/UL (ref 4.2–5.4)
RBC # BLD AUTO: 3.5 M/UL (ref 4.2–5.4)
RBC # BLD AUTO: 3.5 M/UL (ref 4.2–5.4)
RBC # BLD AUTO: 3.54 M/UL (ref 4.2–5.4)
RBC # BLD AUTO: 3.54 M/UL (ref 4.2–5.4)
RBC # BLD AUTO: 3.55 M/UL (ref 4.2–5.4)
RBC # BLD AUTO: 3.57 M/UL (ref 4.2–5.4)
RBC # BLD AUTO: 3.58 M/UL (ref 4.2–5.4)
RBC # BLD AUTO: 3.58 M/UL (ref 4.2–5.4)
RBC # BLD AUTO: 3.61 M/UL (ref 4.2–5.4)
RBC # BLD AUTO: 3.62 M/UL (ref 4.2–5.4)
RBC # BLD AUTO: 3.64 M/UL (ref 4.2–5.4)
RBC # BLD AUTO: 3.68 M/UL (ref 4.2–5.4)
RBC # BLD AUTO: 3.75 M/UL (ref 4.2–5.4)
RBC # BLD AUTO: 3.77 M/UL (ref 4.2–5.4)
RBC # BLD AUTO: 3.78 M/UL (ref 4.2–5.4)
RBC # BLD AUTO: 3.8 M/UL (ref 4.2–5.4)
RBC # BLD AUTO: 3.82 M/UL (ref 4.2–5.4)
RBC # BLD AUTO: 3.82 M/UL (ref 4.2–5.4)
RBC # BLD AUTO: 3.85 M/UL (ref 4.2–5.4)
RBC # BLD AUTO: 3.86 M/UL (ref 4.2–5.4)
RBC # BLD AUTO: 3.87 M/UL (ref 4.2–5.4)
RBC # BLD AUTO: 3.9 M/UL (ref 4.2–5.4)
RBC # BLD AUTO: 3.98 M/UL (ref 4.2–5.4)
RBC # BLD AUTO: 4.01 M/UL (ref 4.2–5.4)
RBC # BLD AUTO: 4.03 M/UL (ref 4.2–5.4)
RBC # BLD AUTO: 4.03 M/UL (ref 4.2–5.4)
RBC # BLD AUTO: 4.07 M/UL (ref 4.2–5.4)
RBC # BLD AUTO: 4.09 M/UL (ref 4.2–5.4)
RBC # BLD AUTO: 4.12 M/UL (ref 4.2–5.4)
RBC # BLD AUTO: 4.16 M/UL (ref 4.2–5.4)
RBC # BLD AUTO: 4.31 M/UL (ref 4.2–5.4)
RBC # BLD AUTO: 4.31 M/UL (ref 4.2–5.4)
RBC # BLD AUTO: 4.33 M/UL (ref 4.2–5.4)
RBC # BLD AUTO: 4.35 M/UL (ref 4.2–5.4)
RBC # BLD AUTO: 4.63 M/UL (ref 4.2–5.4)
RBC # BLD AUTO: 4.72 M/UL (ref 4.2–5.4)
RBC # BLD AUTO: 5.04 M/UL (ref 4.2–5.4)
RBC # FLD: 5000 CELLS/UL
RBC # URNS HPF: ABNORMAL /HPF
RBC # URNS HPF: ABNORMAL /HPF
RBC BLD AUTO: PRESENT
RBC UR QL AUTO: ABNORMAL
RBC UR QL AUTO: NEGATIVE
RBC UR QL AUTO: NEGATIVE
RH BLD: NORMAL
RHODAMINE-AURAMINE STN SPEC: NORMAL
SCHISTOCYTES BLD QL SMEAR: NORMAL
SIGNIFICANT IND 70042: ABNORMAL
SIGNIFICANT IND 70042: NORMAL
SITE SITE: ABNORMAL
SITE SITE: NORMAL
SMUDGE CELLS BLD QL SMEAR: NORMAL
SODIUM SERPL-SCNC: 110 MMOL/L (ref 135–145)
SODIUM SERPL-SCNC: 112 MMOL/L (ref 135–145)
SODIUM SERPL-SCNC: 112 MMOL/L (ref 135–145)
SODIUM SERPL-SCNC: 113 MMOL/L (ref 135–145)
SODIUM SERPL-SCNC: 114 MMOL/L (ref 135–145)
SODIUM SERPL-SCNC: 115 MMOL/L (ref 135–145)
SODIUM SERPL-SCNC: 116 MMOL/L (ref 135–145)
SODIUM SERPL-SCNC: 116 MMOL/L (ref 135–145)
SODIUM SERPL-SCNC: 117 MMOL/L (ref 135–145)
SODIUM SERPL-SCNC: 119 MMOL/L (ref 135–145)
SODIUM SERPL-SCNC: 121 MMOL/L (ref 135–145)
SODIUM SERPL-SCNC: 124 MMOL/L (ref 135–145)
SODIUM SERPL-SCNC: 126 MMOL/L (ref 135–145)
SODIUM SERPL-SCNC: 128 MMOL/L (ref 135–145)
SODIUM SERPL-SCNC: 129 MMOL/L (ref 135–145)
SODIUM SERPL-SCNC: 130 MMOL/L (ref 135–145)
SODIUM SERPL-SCNC: 131 MMOL/L (ref 135–145)
SODIUM SERPL-SCNC: 132 MMOL/L (ref 135–145)
SODIUM SERPL-SCNC: 133 MMOL/L (ref 135–145)
SODIUM SERPL-SCNC: 134 MMOL/L (ref 135–145)
SODIUM SERPL-SCNC: 135 MMOL/L (ref 135–145)
SODIUM SERPL-SCNC: 136 MMOL/L (ref 135–145)
SODIUM SERPL-SCNC: 137 MMOL/L (ref 135–145)
SODIUM SERPL-SCNC: 138 MMOL/L (ref 135–145)
SODIUM SERPL-SCNC: 139 MMOL/L (ref 135–145)
SODIUM SERPL-SCNC: 140 MMOL/L (ref 135–145)
SODIUM SERPL-SCNC: 140 MMOL/L (ref 135–145)
SODIUM UR-SCNC: 89 MMOL/L
SOURCE SOURCE: ABNORMAL
SOURCE SOURCE: NORMAL
SP GR UR STRIP.AUTO: 1.01
SP GR UR STRIP.AUTO: 1.02
SP GR UR STRIP.AUTO: 1.03
T4 FREE SERPL-MCNC: 0.7 NG/DL (ref 0.53–1.43)
TIBC SERPL-MCNC: 203 UG/DL (ref 250–450)
TRIGL SERPL-MCNC: 105 MG/DL (ref 0–149)
TRIGL SERPL-MCNC: 73 MG/DL (ref 0–149)
TSH SERPL DL<=0.005 MIU/L-ACNC: 5.66 UIU/ML (ref 0.38–5.33)
TSH SERPL DL<=0.005 MIU/L-ACNC: 8.77 UIU/ML (ref 0.38–5.33)
UNIT STATUS USTAT: NORMAL
UROBILINOGEN UR STRIP.AUTO-MCNC: 0.2 MG/DL
UROBILINOGEN UR STRIP.AUTO-MCNC: 0.2 MG/DL
UROBILINOGEN UR STRIP.AUTO-MCNC: 1 MG/DL
VARIANT LYMPHS BLD QL SMEAR: NORMAL
WBC # BLD AUTO: 10.2 K/UL (ref 4.8–10.8)
WBC # BLD AUTO: 10.3 K/UL (ref 4.8–10.8)
WBC # BLD AUTO: 10.5 K/UL (ref 4.8–10.8)
WBC # BLD AUTO: 10.6 K/UL (ref 4.8–10.8)
WBC # BLD AUTO: 10.7 K/UL (ref 4.8–10.8)
WBC # BLD AUTO: 10.7 K/UL (ref 4.8–10.8)
WBC # BLD AUTO: 10.8 K/UL (ref 4.8–10.8)
WBC # BLD AUTO: 10.9 K/UL (ref 4.8–10.8)
WBC # BLD AUTO: 11 K/UL (ref 4.8–10.8)
WBC # BLD AUTO: 11.7 K/UL (ref 4.8–10.8)
WBC # BLD AUTO: 12.1 K/UL (ref 4.8–10.8)
WBC # BLD AUTO: 12.8 K/UL (ref 4.8–10.8)
WBC # BLD AUTO: 12.9 K/UL (ref 4.8–10.8)
WBC # BLD AUTO: 13.3 K/UL (ref 4.8–10.8)
WBC # BLD AUTO: 13.4 K/UL (ref 4.8–10.8)
WBC # BLD AUTO: 15 K/UL (ref 4.8–10.8)
WBC # BLD AUTO: 15.3 K/UL (ref 4.8–10.8)
WBC # BLD AUTO: 15.6 K/UL (ref 4.8–10.8)
WBC # BLD AUTO: 16.1 K/UL (ref 4.8–10.8)
WBC # BLD AUTO: 16.7 K/UL (ref 4.8–10.8)
WBC # BLD AUTO: 17.4 K/UL (ref 4.8–10.8)
WBC # BLD AUTO: 18.2 K/UL (ref 4.8–10.8)
WBC # BLD AUTO: 18.4 K/UL (ref 4.8–10.8)
WBC # BLD AUTO: 2.8 K/UL (ref 4.8–10.8)
WBC # BLD AUTO: 22 K/UL (ref 4.8–10.8)
WBC # BLD AUTO: 3 K/UL (ref 4.8–10.8)
WBC # BLD AUTO: 3 K/UL (ref 4.8–10.8)
WBC # BLD AUTO: 3.3 K/UL (ref 4.8–10.8)
WBC # BLD AUTO: 3.5 K/UL (ref 4.8–10.8)
WBC # BLD AUTO: 3.7 K/UL (ref 4.8–10.8)
WBC # BLD AUTO: 4 K/UL (ref 4.8–10.8)
WBC # BLD AUTO: 4.6 K/UL (ref 4.8–10.8)
WBC # BLD AUTO: 5 K/UL (ref 4.8–10.8)
WBC # BLD AUTO: 5 K/UL (ref 4.8–10.8)
WBC # BLD AUTO: 5.5 K/UL (ref 4.8–10.8)
WBC # BLD AUTO: 5.9 K/UL (ref 4.8–10.8)
WBC # BLD AUTO: 6.1 K/UL (ref 4.8–10.8)
WBC # BLD AUTO: 6.8 K/UL (ref 4.8–10.8)
WBC # BLD AUTO: 7.1 K/UL (ref 4.8–10.8)
WBC # BLD AUTO: 7.1 K/UL (ref 4.8–10.8)
WBC # BLD AUTO: 7.3 K/UL (ref 4.8–10.8)
WBC # BLD AUTO: 7.3 K/UL (ref 4.8–10.8)
WBC # BLD AUTO: 7.4 K/UL (ref 4.8–10.8)
WBC # BLD AUTO: 7.6 K/UL (ref 4.8–10.8)
WBC # BLD AUTO: 7.7 K/UL (ref 4.8–10.8)
WBC # BLD AUTO: 7.8 K/UL (ref 4.8–10.8)
WBC # BLD AUTO: 8.1 K/UL (ref 4.8–10.8)
WBC # BLD AUTO: 8.4 K/UL (ref 4.8–10.8)
WBC # BLD AUTO: 8.4 K/UL (ref 4.8–10.8)
WBC # BLD AUTO: 8.9 K/UL (ref 4.8–10.8)
WBC # BLD AUTO: 9 K/UL (ref 4.8–10.8)
WBC # BLD AUTO: 9.3 K/UL (ref 4.8–10.8)
WBC # BLD AUTO: 9.3 K/UL (ref 4.8–10.8)
WBC # BLD AUTO: 9.7 K/UL (ref 4.8–10.8)
WBC # BLD AUTO: 9.8 K/UL (ref 4.8–10.8)
WBC # BLD AUTO: 9.8 K/UL (ref 4.8–10.8)
WBC # BLD AUTO: 9.9 K/UL (ref 4.8–10.8)
WBC # BLD AUTO: 9.9 K/UL (ref 4.8–10.8)
WBC # FLD: 2752 CELLS/UL
WBC # FLD: NORMAL CELLS/UL
WBC #/AREA URNS HPF: ABNORMAL /HPF
WBC #/AREA URNS HPF: ABNORMAL /HPF
WBC OTHER NFR FLD: 1 %

## 2018-01-01 PROCEDURE — 700102 HCHG RX REV CODE 250 W/ 637 OVERRIDE(OP): Performed by: INTERNAL MEDICINE

## 2018-01-01 PROCEDURE — 700111 HCHG RX REV CODE 636 W/ 250 OVERRIDE (IP): Performed by: INTERNAL MEDICINE

## 2018-01-01 PROCEDURE — 85007 BL SMEAR W/DIFF WBC COUNT: CPT

## 2018-01-01 PROCEDURE — 700111 HCHG RX REV CODE 636 W/ 250 OVERRIDE (IP): Performed by: NURSE PRACTITIONER

## 2018-01-01 PROCEDURE — A9270 NON-COVERED ITEM OR SERVICE: HCPCS | Performed by: INTERNAL MEDICINE

## 2018-01-01 PROCEDURE — 665999 HH PPS REVENUE DEBIT

## 2018-01-01 PROCEDURE — 700105 HCHG RX REV CODE 258: Performed by: INTERNAL MEDICINE

## 2018-01-01 PROCEDURE — 99213 OFFICE O/P EST LOW 20 MIN: CPT | Performed by: NURSE PRACTITIONER

## 2018-01-01 PROCEDURE — 99223 1ST HOSP IP/OBS HIGH 75: CPT | Performed by: INTERNAL MEDICINE

## 2018-01-01 PROCEDURE — 80053 COMPREHEN METABOLIC PANEL: CPT

## 2018-01-01 PROCEDURE — 77300 RADIATION THERAPY DOSE PLAN: CPT | Mod: 26 | Performed by: RADIOLOGY

## 2018-01-01 PROCEDURE — 99233 SBSQ HOSP IP/OBS HIGH 50: CPT | Performed by: INTERNAL MEDICINE

## 2018-01-01 PROCEDURE — 0F9030Z DRAINAGE OF LIVER WITH DRAINAGE DEVICE, PERCUTANEOUS APPROACH: ICD-10-PCS | Performed by: RADIOLOGY

## 2018-01-01 PROCEDURE — 700102 HCHG RX REV CODE 250 W/ 637 OVERRIDE(OP): Performed by: FAMILY MEDICINE

## 2018-01-01 PROCEDURE — 36569 INSJ PICC 5 YR+ W/O IMAGING: CPT

## 2018-01-01 PROCEDURE — 85610 PROTHROMBIN TIME: CPT | Mod: GZ

## 2018-01-01 PROCEDURE — 665998 HH PPS REVENUE CREDIT

## 2018-01-01 PROCEDURE — 96374 THER/PROPH/DIAG INJ IV PUSH: CPT

## 2018-01-01 PROCEDURE — G0498 CHEMO EXTEND IV INFUS W/PUMP: HCPCS

## 2018-01-01 PROCEDURE — 99291 CRITICAL CARE FIRST HOUR: CPT | Performed by: INTERNAL MEDICINE

## 2018-01-01 PROCEDURE — A4212 NON CORING NEEDLE OR STYLET: HCPCS

## 2018-01-01 PROCEDURE — 84145 PROCALCITONIN (PCT): CPT

## 2018-01-01 PROCEDURE — 87040 BLOOD CULTURE FOR BACTERIA: CPT

## 2018-01-01 PROCEDURE — G0152 HHCP-SERV OF OT,EA 15 MIN: HCPCS

## 2018-01-01 PROCEDURE — A9270 NON-COVERED ITEM OR SERVICE: HCPCS | Performed by: HOSPITALIST

## 2018-01-01 PROCEDURE — 700105 HCHG RX REV CODE 258: Performed by: HOSPITALIST

## 2018-01-01 PROCEDURE — 80048 BASIC METABOLIC PNL TOTAL CA: CPT

## 2018-01-01 PROCEDURE — 99285 EMERGENCY DEPT VISIT HI MDM: CPT

## 2018-01-01 PROCEDURE — 770004 HCHG ROOM/CARE - ONCOLOGY PRIVATE *

## 2018-01-01 PROCEDURE — 85025 COMPLETE CBC W/AUTO DIFF WBC: CPT

## 2018-01-01 PROCEDURE — 3E02340 INTRODUCTION OF INFLUENZA VACCINE INTO MUSCLE, PERCUTANEOUS APPROACH: ICD-10-PCS | Performed by: FAMILY MEDICINE

## 2018-01-01 PROCEDURE — 32555 ASPIRATE PLEURA W/ IMAGING: CPT | Mod: RT

## 2018-01-01 PROCEDURE — 99232 SBSQ HOSP IP/OBS MODERATE 35: CPT | Performed by: FAMILY MEDICINE

## 2018-01-01 PROCEDURE — 96413 CHEMO IV INFUSION 1 HR: CPT

## 2018-01-01 PROCEDURE — 51798 US URINE CAPACITY MEASURE: CPT

## 2018-01-01 PROCEDURE — 700111 HCHG RX REV CODE 636 W/ 250 OVERRIDE (IP): Performed by: RADIOLOGY

## 2018-01-01 PROCEDURE — 700105 HCHG RX REV CODE 258: Performed by: RADIOLOGY

## 2018-01-01 PROCEDURE — A4216 STERILE WATER/SALINE, 10 ML: HCPCS

## 2018-01-01 PROCEDURE — 83540 ASSAY OF IRON: CPT

## 2018-01-01 PROCEDURE — A9585 GADOBUTROL INJECTION: HCPCS | Performed by: INTERNAL MEDICINE

## 2018-01-01 PROCEDURE — 700111 HCHG RX REV CODE 636 W/ 250 OVERRIDE (IP): Mod: JG | Performed by: NURSE PRACTITIONER

## 2018-01-01 PROCEDURE — A6212 FOAM DRG <=16 SQ IN W/BORDER: HCPCS

## 2018-01-01 PROCEDURE — 77300 RADIATION THERAPY DOSE PLAN: CPT | Performed by: RADIOLOGY

## 2018-01-01 PROCEDURE — 83690 ASSAY OF LIPASE: CPT

## 2018-01-01 PROCEDURE — 85027 COMPLETE CBC AUTOMATED: CPT

## 2018-01-01 PROCEDURE — G0151 HHCP-SERV OF PT,EA 15 MIN: HCPCS

## 2018-01-01 PROCEDURE — 700111 HCHG RX REV CODE 636 W/ 250 OVERRIDE (IP): Mod: JG | Performed by: INTERNAL MEDICINE

## 2018-01-01 PROCEDURE — 700111 HCHG RX REV CODE 636 W/ 250 OVERRIDE (IP): Performed by: HOSPITALIST

## 2018-01-01 PROCEDURE — 82150 ASSAY OF AMYLASE: CPT

## 2018-01-01 PROCEDURE — 82570 ASSAY OF URINE CREATININE: CPT

## 2018-01-01 PROCEDURE — 99233 SBSQ HOSP IP/OBS HIGH 50: CPT | Performed by: HOSPITALIST

## 2018-01-01 PROCEDURE — 700117 HCHG RX CONTRAST REV CODE 255: Performed by: RADIOLOGY

## 2018-01-01 PROCEDURE — 700111 HCHG RX REV CODE 636 W/ 250 OVERRIDE (IP)

## 2018-01-01 PROCEDURE — 74170 CT ABD WO CNTRST FLWD CNTRST: CPT

## 2018-01-01 PROCEDURE — 700101 HCHG RX REV CODE 250

## 2018-01-01 PROCEDURE — 02PY33Z REMOVAL OF INFUSION DEVICE FROM GREAT VESSEL, PERCUTANEOUS APPROACH: ICD-10-PCS | Performed by: RADIOLOGY

## 2018-01-01 PROCEDURE — 85652 RBC SED RATE AUTOMATED: CPT

## 2018-01-01 PROCEDURE — 85610 PROTHROMBIN TIME: CPT

## 2018-01-01 PROCEDURE — 04V33DZ RESTRICTION OF HEPATIC ARTERY WITH INTRALUMINAL DEVICE, PERCUTANEOUS APPROACH: ICD-10-PCS | Performed by: RADIOLOGY

## 2018-01-01 PROCEDURE — 99232 SBSQ HOSP IP/OBS MODERATE 35: CPT | Performed by: HOSPITALIST

## 2018-01-01 PROCEDURE — 99214 OFFICE O/P EST MOD 30 MIN: CPT | Performed by: RADIOLOGY

## 2018-01-01 PROCEDURE — 700102 HCHG RX REV CODE 250 W/ 637 OVERRIDE(OP): Performed by: HOSPITALIST

## 2018-01-01 PROCEDURE — 700105 HCHG RX REV CODE 258: Performed by: NURSE PRACTITIONER

## 2018-01-01 PROCEDURE — 700101 HCHG RX REV CODE 250: Performed by: INTERNAL MEDICINE

## 2018-01-01 PROCEDURE — 700105 HCHG RX REV CODE 258

## 2018-01-01 PROCEDURE — 83735 ASSAY OF MAGNESIUM: CPT

## 2018-01-01 PROCEDURE — G0299 HHS/HOSPICE OF RN EA 15 MIN: HCPCS

## 2018-01-01 PROCEDURE — T2045 HOSPICE GENERAL CARE: HCPCS

## 2018-01-01 PROCEDURE — A9270 NON-COVERED ITEM OR SERVICE: HCPCS | Performed by: FAMILY MEDICINE

## 2018-01-01 PROCEDURE — 99214 OFFICE O/P EST MOD 30 MIN: CPT | Performed by: NURSE PRACTITIONER

## 2018-01-01 PROCEDURE — 93010 ELECTROCARDIOGRAM REPORT: CPT | Performed by: INTERNAL MEDICINE

## 2018-01-01 PROCEDURE — 96365 THER/PROPH/DIAG IV INF INIT: CPT

## 2018-01-01 PROCEDURE — 94640 AIRWAY INHALATION TREATMENT: CPT

## 2018-01-01 PROCEDURE — A9270 NON-COVERED ITEM OR SERVICE: HCPCS | Performed by: EMERGENCY MEDICINE

## 2018-01-01 PROCEDURE — 96360 HYDRATION IV INFUSION INIT: CPT | Performed by: NURSE PRACTITIONER

## 2018-01-01 PROCEDURE — 96367 TX/PROPH/DG ADDL SEQ IV INF: CPT

## 2018-01-01 PROCEDURE — 70450 CT HEAD/BRAIN W/O DYE: CPT

## 2018-01-01 PROCEDURE — 87070 CULTURE OTHR SPECIMN AEROBIC: CPT

## 2018-01-01 PROCEDURE — 96375 TX/PRO/DX INJ NEW DRUG ADDON: CPT

## 2018-01-01 PROCEDURE — 36592 COLLECT BLOOD FROM PICC: CPT | Performed by: INTERNAL MEDICINE

## 2018-01-01 PROCEDURE — 99223 1ST HOSP IP/OBS HIGH 75: CPT | Performed by: HOSPITALIST

## 2018-01-01 PROCEDURE — 89051 BODY FLUID CELL COUNT: CPT

## 2018-01-01 PROCEDURE — 82945 GLUCOSE OTHER FLUID: CPT

## 2018-01-01 PROCEDURE — BB4BZZZ ULTRASONOGRAPHY OF PLEURA: ICD-10-PCS | Performed by: RADIOLOGY

## 2018-01-01 PROCEDURE — 770006 HCHG ROOM/CARE - MED/SURG/GYN SEMI*

## 2018-01-01 PROCEDURE — 87102 FUNGUS ISOLATION CULTURE: CPT

## 2018-01-01 PROCEDURE — 93306 TTE W/DOPPLER COMPLETE: CPT | Mod: 26 | Performed by: INTERNAL MEDICINE

## 2018-01-01 PROCEDURE — 36591 DRAW BLOOD OFF VENOUS DEVICE: CPT

## 2018-01-01 PROCEDURE — 4410569 US-THORACENTESIS PUNCTURE

## 2018-01-01 PROCEDURE — 36415 COLL VENOUS BLD VENIPUNCTURE: CPT | Performed by: INTERNAL MEDICINE

## 2018-01-01 PROCEDURE — 86900 BLOOD TYPING SEROLOGIC ABO: CPT

## 2018-01-01 PROCEDURE — 99232 SBSQ HOSP IP/OBS MODERATE 35: CPT | Performed by: INTERNAL MEDICINE

## 2018-01-01 PROCEDURE — 82550 ASSAY OF CK (CPK): CPT

## 2018-01-01 PROCEDURE — 96523 IRRIG DRUG DELIVERY DEVICE: CPT

## 2018-01-01 PROCEDURE — 96415 CHEMO IV INFUSION ADDL HR: CPT

## 2018-01-01 PROCEDURE — 770022 HCHG ROOM/CARE - ICU (200)

## 2018-01-01 PROCEDURE — 84439 ASSAY OF FREE THYROXINE: CPT

## 2018-01-01 PROCEDURE — 88305 TISSUE EXAM BY PATHOLOGIST: CPT

## 2018-01-01 PROCEDURE — 86923 COMPATIBILITY TEST ELECTRIC: CPT

## 2018-01-01 PROCEDURE — 90662 IIV NO PRSV INCREASED AG IM: CPT | Performed by: FAMILY MEDICINE

## 2018-01-01 PROCEDURE — 97166 OT EVAL MOD COMPLEX 45 MIN: CPT

## 2018-01-01 PROCEDURE — 76937 US GUIDE VASCULAR ACCESS: CPT

## 2018-01-01 PROCEDURE — P9016 RBC LEUKOCYTES REDUCED: HCPCS

## 2018-01-01 PROCEDURE — 02HV33Z INSERTION OF INFUSION DEVICE INTO SUPERIOR VENA CAVA, PERCUTANEOUS APPROACH: ICD-10-PCS | Performed by: HOSPITALIST

## 2018-01-01 PROCEDURE — 0FP030Z REMOVAL OF DRAINAGE DEVICE FROM LIVER, PERCUTANEOUS APPROACH: ICD-10-PCS | Performed by: RADIOLOGY

## 2018-01-01 PROCEDURE — 82140 ASSAY OF AMMONIA: CPT

## 2018-01-01 PROCEDURE — 99239 HOSP IP/OBS DSCHRG MGMT >30: CPT | Performed by: INTERNAL MEDICINE

## 2018-01-01 PROCEDURE — 74183 MRI ABD W/O CNTR FLWD CNTR: CPT

## 2018-01-01 PROCEDURE — 77470 SPECIAL RADIATION TREATMENT: CPT | Performed by: RADIOLOGY

## 2018-01-01 PROCEDURE — 83615 LACTATE (LD) (LDH) ENZYME: CPT

## 2018-01-01 PROCEDURE — 770021 HCHG ROOM/CARE - ISO PRIVATE

## 2018-01-01 PROCEDURE — 700117 HCHG RX CONTRAST REV CODE 255: Performed by: HOSPITALIST

## 2018-01-01 PROCEDURE — 87186 SC STD MICRODIL/AGAR DIL: CPT

## 2018-01-01 PROCEDURE — 700105 HCHG RX REV CODE 258: Performed by: EMERGENCY MEDICINE

## 2018-01-01 PROCEDURE — 700111 HCHG RX REV CODE 636 W/ 250 OVERRIDE (IP): Performed by: EMERGENCY MEDICINE

## 2018-01-01 PROCEDURE — 81003 URINALYSIS AUTO W/O SCOPE: CPT

## 2018-01-01 PROCEDURE — 93005 ELECTROCARDIOGRAM TRACING: CPT | Performed by: HOSPITALIST

## 2018-01-01 PROCEDURE — 87205 SMEAR GRAM STAIN: CPT

## 2018-01-01 PROCEDURE — 304561 HCHG STAT O2

## 2018-01-01 PROCEDURE — 700101 HCHG RX REV CODE 250: Performed by: HOSPITALIST

## 2018-01-01 PROCEDURE — 99231 SBSQ HOSP IP/OBS SF/LOW 25: CPT | Performed by: FAMILY MEDICINE

## 2018-01-01 PROCEDURE — 85730 THROMBOPLASTIN TIME PARTIAL: CPT

## 2018-01-01 PROCEDURE — 99214 OFFICE O/P EST MOD 30 MIN: CPT | Performed by: INTERNAL MEDICINE

## 2018-01-01 PROCEDURE — 96361 HYDRATE IV INFUSION ADD-ON: CPT

## 2018-01-01 PROCEDURE — G8988 SELF CARE GOAL STATUS: HCPCS | Mod: CI

## 2018-01-01 PROCEDURE — 87040 BLOOD CULTURE FOR BACTERIA: CPT | Mod: 91

## 2018-01-01 PROCEDURE — 71045 X-RAY EXAM CHEST 1 VIEW: CPT

## 2018-01-01 PROCEDURE — 77301 RADIOTHERAPY DOSE PLAN IMRT: CPT | Performed by: RADIOLOGY

## 2018-01-01 PROCEDURE — A9552 F18 FDG: HCPCS

## 2018-01-01 PROCEDURE — 85018 HEMOGLOBIN: CPT | Mod: 91

## 2018-01-01 PROCEDURE — G8978 MOBILITY CURRENT STATUS: HCPCS | Mod: CK

## 2018-01-01 PROCEDURE — 80061 LIPID PANEL: CPT

## 2018-01-01 PROCEDURE — 36415 COLL VENOUS BLD VENIPUNCTURE: CPT | Performed by: NURSE PRACTITIONER

## 2018-01-01 PROCEDURE — 82962 GLUCOSE BLOOD TEST: CPT

## 2018-01-01 PROCEDURE — 97535 SELF CARE MNGMENT TRAINING: CPT

## 2018-01-01 PROCEDURE — C9113 INJ PANTOPRAZOLE SODIUM, VIA: HCPCS | Performed by: INTERNAL MEDICINE

## 2018-01-01 PROCEDURE — 84300 ASSAY OF URINE SODIUM: CPT

## 2018-01-01 PROCEDURE — 96417 CHEMO IV INFUS EACH ADDL SEQ: CPT

## 2018-01-01 PROCEDURE — 77290 THER RAD SIMULAJ FIELD CPLX: CPT | Performed by: RADIOLOGY

## 2018-01-01 PROCEDURE — 99213 OFFICE O/P EST LOW 20 MIN: CPT | Performed by: INTERNAL MEDICINE

## 2018-01-01 PROCEDURE — 99497 ADVNCD CARE PLAN 30 MIN: CPT | Performed by: INTERNAL MEDICINE

## 2018-01-01 PROCEDURE — A9540 TC99M MAA: HCPCS

## 2018-01-01 PROCEDURE — 93005 ELECTROCARDIOGRAM TRACING: CPT | Performed by: EMERGENCY MEDICINE

## 2018-01-01 PROCEDURE — 87077 CULTURE AEROBIC IDENTIFY: CPT

## 2018-01-01 PROCEDURE — 93005 ELECTROCARDIOGRAM TRACING: CPT | Performed by: INTERNAL MEDICINE

## 2018-01-01 PROCEDURE — 80053 COMPREHEN METABOLIC PANEL: CPT | Mod: 91

## 2018-01-01 PROCEDURE — 81001 URINALYSIS AUTO W/SCOPE: CPT

## 2018-01-01 PROCEDURE — 96372 THER/PROPH/DIAG INJ SC/IM: CPT

## 2018-01-01 PROCEDURE — G8979 MOBILITY GOAL STATUS: HCPCS | Mod: CI

## 2018-01-01 PROCEDURE — 97530 THERAPEUTIC ACTIVITIES: CPT

## 2018-01-01 PROCEDURE — 36415 COLL VENOUS BLD VENIPUNCTURE: CPT

## 2018-01-01 PROCEDURE — 83986 ASSAY PH BODY FLUID NOS: CPT

## 2018-01-01 PROCEDURE — 99215 OFFICE O/P EST HI 40 MIN: CPT | Performed by: NURSE PRACTITIONER

## 2018-01-01 PROCEDURE — 36247 INS CATH ABD/L-EXT ART 3RD: CPT

## 2018-01-01 PROCEDURE — 97161 PT EVAL LOW COMPLEX 20 MIN: CPT

## 2018-01-01 PROCEDURE — 96368 THER/DIAG CONCURRENT INF: CPT

## 2018-01-01 PROCEDURE — 87015 SPECIMEN INFECT AGNT CONCNTJ: CPT

## 2018-01-01 PROCEDURE — 77334 RADIATION TREATMENT AID(S): CPT | Performed by: RADIOLOGY

## 2018-01-01 PROCEDURE — 700111 HCHG RX REV CODE 636 W/ 250 OVERRIDE (IP): Mod: TB

## 2018-01-01 PROCEDURE — 74018 RADEX ABDOMEN 1 VIEW: CPT

## 2018-01-01 PROCEDURE — 96360 HYDRATION IV INFUSION INIT: CPT

## 2018-01-01 PROCEDURE — 77263 THER RADIOLOGY TX PLNG CPLX: CPT | Performed by: RADIOLOGY

## 2018-01-01 PROCEDURE — 0W993ZX DRAINAGE OF RIGHT PLEURAL CAVITY, PERCUTANEOUS APPROACH, DIAGNOSTIC: ICD-10-PCS | Performed by: RADIOLOGY

## 2018-01-01 PROCEDURE — 77334 RADIATION TREATMENT AID(S): CPT | Mod: 26 | Performed by: RADIOLOGY

## 2018-01-01 PROCEDURE — 77338 DESIGN MLC DEVICE FOR IMRT: CPT | Performed by: RADIOLOGY

## 2018-01-01 PROCEDURE — 94760 N-INVAS EAR/PLS OXIMETRY 1: CPT

## 2018-01-01 PROCEDURE — B548ZZA ULTRASONOGRAPHY OF SUPERIOR VENA CAVA, GUIDANCE: ICD-10-PCS | Performed by: INTERNAL MEDICINE

## 2018-01-01 PROCEDURE — 99231 SBSQ HOSP IP/OBS SF/LOW 25: CPT | Performed by: INTERNAL MEDICINE

## 2018-01-01 PROCEDURE — 700111 HCHG RX REV CODE 636 W/ 250 OVERRIDE (IP): Mod: JW,TB | Performed by: NURSE PRACTITIONER

## 2018-01-01 PROCEDURE — 36556 INSERT NON-TUNNEL CV CATH: CPT

## 2018-01-01 PROCEDURE — 700117 HCHG RX CONTRAST REV CODE 255: Performed by: INTERNAL MEDICINE

## 2018-01-01 PROCEDURE — 36248 INS CATH ABD/L-EXT ART ADDL: CPT

## 2018-01-01 PROCEDURE — 770001 HCHG ROOM/CARE - MED/SURG/GYN PRIV*

## 2018-01-01 PROCEDURE — 71046 X-RAY EXAM CHEST 2 VIEWS: CPT

## 2018-01-01 PROCEDURE — 83605 ASSAY OF LACTIC ACID: CPT | Mod: 91

## 2018-01-01 PROCEDURE — 88112 CYTOPATH CELL ENHANCE TECH: CPT

## 2018-01-01 PROCEDURE — 78215 LVR&SPLEEN IMG STATIC ONLY: CPT

## 2018-01-01 PROCEDURE — 99153 MOD SED SAME PHYS/QHP EA: CPT

## 2018-01-01 PROCEDURE — 77301 RADIOTHERAPY DOSE PLAN IMRT: CPT | Mod: 26 | Performed by: RADIOLOGY

## 2018-01-01 PROCEDURE — 70355 PANORAMIC X-RAY OF JAWS: CPT

## 2018-01-01 PROCEDURE — 302135 SEQUENTIAL COMPRESSION MACHINE: Performed by: INTERNAL MEDICINE

## 2018-01-01 PROCEDURE — 99233 SBSQ HOSP IP/OBS HIGH 50: CPT | Performed by: RADIOLOGY

## 2018-01-01 PROCEDURE — 71260 CT THORAX DX C+: CPT

## 2018-01-01 PROCEDURE — 99215 OFFICE O/P EST HI 40 MIN: CPT | Performed by: INTERNAL MEDICINE

## 2018-01-01 PROCEDURE — 87116 MYCOBACTERIA CULTURE: CPT

## 2018-01-01 PROCEDURE — 90471 IMMUNIZATION ADMIN: CPT

## 2018-01-01 PROCEDURE — 160002 HCHG RECOVERY MINUTES (STAT)

## 2018-01-01 PROCEDURE — 99205 OFFICE O/P NEW HI 60 MIN: CPT | Performed by: RADIOLOGY

## 2018-01-01 PROCEDURE — 96361 HYDRATE IV INFUSION ADD-ON: CPT | Performed by: NURSE PRACTITIONER

## 2018-01-01 PROCEDURE — 80048 BASIC METABOLIC PNL TOTAL CA: CPT | Mod: 91

## 2018-01-01 PROCEDURE — 83935 ASSAY OF URINE OSMOLALITY: CPT

## 2018-01-01 PROCEDURE — 87502 INFLUENZA DNA AMP PROBE: CPT

## 2018-01-01 PROCEDURE — 0F9130Z DRAINAGE OF RIGHT LOBE LIVER WITH DRAINAGE DEVICE, PERCUTANEOUS APPROACH: ICD-10-PCS | Performed by: RADIOLOGY

## 2018-01-01 PROCEDURE — 37243 VASC EMBOLIZE/OCCLUDE ORGAN: CPT

## 2018-01-01 PROCEDURE — 99233 SBSQ HOSP IP/OBS HIGH 50: CPT | Mod: 25 | Performed by: INTERNAL MEDICINE

## 2018-01-01 PROCEDURE — 83605 ASSAY OF LACTIC ACID: CPT

## 2018-01-01 PROCEDURE — G8991 OTHER PT/OT GOAL STATUS: HCPCS | Mod: CH

## 2018-01-01 PROCEDURE — 99233 SBSQ HOSP IP/OBS HIGH 50: CPT | Mod: 25 | Performed by: HOSPITALIST

## 2018-01-01 PROCEDURE — 74177 CT ABD & PELVIS W/CONTRAST: CPT

## 2018-01-01 PROCEDURE — 99212 OFFICE O/P EST SF 10 MIN: CPT

## 2018-01-01 PROCEDURE — 87086 URINE CULTURE/COLONY COUNT: CPT | Mod: 91

## 2018-01-01 PROCEDURE — 78598 LUNG PERF&VENTILAT DIFERENTL: CPT

## 2018-01-01 PROCEDURE — 99291 CRITICAL CARE FIRST HOUR: CPT

## 2018-01-01 PROCEDURE — 93010 ELECTROCARDIOGRAM REPORT: CPT | Mod: 77 | Performed by: INTERNAL MEDICINE

## 2018-01-01 PROCEDURE — 74150 CT ABDOMEN W/O CONTRAST: CPT

## 2018-01-01 PROCEDURE — 99239 HOSP IP/OBS DSCHRG MGMT >30: CPT | Performed by: FAMILY MEDICINE

## 2018-01-01 PROCEDURE — C1751 CATH, INF, PER/CENT/MIDLINE: HCPCS

## 2018-01-01 PROCEDURE — 85018 HEMOGLOBIN: CPT

## 2018-01-01 PROCEDURE — 77470 SPECIAL RADIATION TREATMENT: CPT | Mod: 26 | Performed by: RADIOLOGY

## 2018-01-01 PROCEDURE — 86850 RBC ANTIBODY SCREEN: CPT

## 2018-01-01 PROCEDURE — 99221 1ST HOSP IP/OBS SF/LOW 40: CPT | Performed by: INTERNAL MEDICINE

## 2018-01-01 PROCEDURE — 05HB33Z INSERTION OF INFUSION DEVICE INTO RIGHT BASILIC VEIN, PERCUTANEOUS APPROACH: ICD-10-PCS | Performed by: INTERNAL MEDICINE

## 2018-01-01 PROCEDURE — 82728 ASSAY OF FERRITIN: CPT

## 2018-01-01 PROCEDURE — 99215 OFFICE O/P EST HI 40 MIN: CPT | Mod: 25 | Performed by: NURSE PRACTITIONER

## 2018-01-01 PROCEDURE — BW20ZZZ COMPUTERIZED TOMOGRAPHY (CT SCAN) OF ABDOMEN: ICD-10-PCS | Performed by: RADIOLOGY

## 2018-01-01 PROCEDURE — 86140 C-REACTIVE PROTEIN: CPT

## 2018-01-01 PROCEDURE — 87077 CULTURE AEROBIC IDENTIFY: CPT | Mod: 91

## 2018-01-01 PROCEDURE — 84443 ASSAY THYROID STIM HORMONE: CPT

## 2018-01-01 PROCEDURE — 87086 URINE CULTURE/COLONY COUNT: CPT

## 2018-01-01 PROCEDURE — 700111 HCHG RX REV CODE 636 W/ 250 OVERRIDE (IP): Performed by: FAMILY MEDICINE

## 2018-01-01 PROCEDURE — 77338 DESIGN MLC DEVICE FOR IMRT: CPT | Mod: 26 | Performed by: RADIOLOGY

## 2018-01-01 PROCEDURE — 74174 CTA ABD&PLVS W/CONTRAST: CPT

## 2018-01-01 PROCEDURE — 93005 ELECTROCARDIOGRAM TRACING: CPT

## 2018-01-01 PROCEDURE — 97162 PT EVAL MOD COMPLEX 30 MIN: CPT

## 2018-01-01 PROCEDURE — B54MZZA ULTRASONOGRAPHY OF RIGHT UPPER EXTREMITY VEINS, GUIDANCE: ICD-10-PCS | Performed by: INTERNAL MEDICINE

## 2018-01-01 PROCEDURE — G8989 SELF CARE D/C STATUS: HCPCS | Mod: CI

## 2018-01-01 PROCEDURE — G0493 RN CARE EA 15 MIN HH/HOSPICE: HCPCS

## 2018-01-01 PROCEDURE — 93005 ELECTROCARDIOGRAM TRACING: CPT | Performed by: NURSE PRACTITIONER

## 2018-01-01 PROCEDURE — 700102 HCHG RX REV CODE 250 W/ 637 OVERRIDE(OP): Performed by: EMERGENCY MEDICINE

## 2018-01-01 PROCEDURE — 87493 C DIFF AMPLIFIED PROBE: CPT

## 2018-01-01 PROCEDURE — 82962 GLUCOSE BLOOD TEST: CPT | Mod: 91

## 2018-01-01 PROCEDURE — 83550 IRON BINDING TEST: CPT

## 2018-01-01 PROCEDURE — 36430 TRANSFUSION BLD/BLD COMPNT: CPT

## 2018-01-01 PROCEDURE — 700117 HCHG RX CONTRAST REV CODE 255: Performed by: NURSE PRACTITIONER

## 2018-01-01 PROCEDURE — 87206 SMEAR FLUORESCENT/ACID STAI: CPT

## 2018-01-01 PROCEDURE — 30243N1 TRANSFUSION OF NONAUTOLOGOUS RED BLOOD CELLS INTO CENTRAL VEIN, PERCUTANEOUS APPROACH: ICD-10-PCS | Performed by: INTERNAL MEDICINE

## 2018-01-01 PROCEDURE — 96366 THER/PROPH/DIAG IV INF ADDON: CPT

## 2018-01-01 PROCEDURE — 86901 BLOOD TYPING SEROLOGIC RH(D): CPT

## 2018-01-01 PROCEDURE — 665036 HSPC NOTICE OF ELECTION NOE

## 2018-01-01 PROCEDURE — 665001 SOC-HOME HEALTH

## 2018-01-01 PROCEDURE — G8987 SELF CARE CURRENT STATUS: HCPCS | Mod: CK

## 2018-01-01 PROCEDURE — 36556 INSERT NON-TUNNEL CV CATH: CPT | Performed by: HOSPITALIST

## 2018-01-01 PROCEDURE — 93306 TTE W/DOPPLER COMPLETE: CPT

## 2018-01-01 PROCEDURE — G8990 OTHER PT/OT CURRENT STATUS: HCPCS | Mod: CH

## 2018-01-01 PROCEDURE — 96372 THER/PROPH/DIAG INJ SC/IM: CPT | Mod: XU

## 2018-01-01 PROCEDURE — 02HV33Z INSERTION OF INFUSION DEVICE INTO SUPERIOR VENA CAVA, PERCUTANEOUS APPROACH: ICD-10-PCS | Performed by: INTERNAL MEDICINE

## 2018-01-01 PROCEDURE — 99212 OFFICE O/P EST SF 10 MIN: CPT | Performed by: NURSE PRACTITIONER

## 2018-01-01 PROCEDURE — A5120 SKIN BARRIER, WIPE OR SWAB: HCPCS

## 2018-01-01 PROCEDURE — 77012 CT SCAN FOR NEEDLE BIOPSY: CPT

## 2018-01-01 PROCEDURE — 84157 ASSAY OF PROTEIN OTHER: CPT

## 2018-01-01 RX ORDER — METRONIDAZOLE 500 MG/1
500 TABLET ORAL EVERY 8 HOURS
Status: DISCONTINUED | OUTPATIENT
Start: 2018-01-01 | End: 2018-01-01

## 2018-01-01 RX ORDER — ONDANSETRON 8 MG/1
8 TABLET, ORALLY DISINTEGRATING ORAL
Status: CANCELLED | OUTPATIENT
Start: 2018-01-01

## 2018-01-01 RX ORDER — ALBUTEROL SULFATE 90 UG/1
2 AEROSOL, METERED RESPIRATORY (INHALATION) EVERY 4 HOURS PRN
Status: DISCONTINUED | OUTPATIENT
Start: 2018-01-01 | End: 2018-01-01 | Stop reason: HOSPADM

## 2018-01-01 RX ORDER — 0.9 % SODIUM CHLORIDE 0.9 %
10-20 VIAL (ML) INJECTION PRN
Status: CANCELLED | OUTPATIENT
Start: 2018-01-01

## 2018-01-01 RX ORDER — VERAPAMIL HYDROCHLORIDE 2.5 MG/ML
INJECTION, SOLUTION INTRAVENOUS
Status: COMPLETED
Start: 2018-01-01 | End: 2018-01-01

## 2018-01-01 RX ORDER — POLYETHYLENE GLYCOL 3350 17 G/17G
1 POWDER, FOR SOLUTION ORAL
Status: DISCONTINUED | OUTPATIENT
Start: 2018-01-01 | End: 2018-01-01 | Stop reason: HOSPADM

## 2018-01-01 RX ORDER — ACETAMINOPHEN 325 MG/1
650 TABLET ORAL EVERY 6 HOURS PRN
Qty: 30 TAB | Refills: 0 | Status: SHIPPED | OUTPATIENT
Start: 2018-01-01 | End: 2018-01-01

## 2018-01-01 RX ORDER — ONDANSETRON 2 MG/ML
4 INJECTION INTRAMUSCULAR; INTRAVENOUS PRN
Status: ACTIVE | OUTPATIENT
Start: 2018-01-01 | End: 2018-01-01

## 2018-01-01 RX ORDER — FLUCONAZOLE 200 MG/1
400 TABLET ORAL DAILY
Qty: 26 TAB | Refills: 0 | Status: SHIPPED | OUTPATIENT
Start: 2018-01-01 | End: 2018-01-01

## 2018-01-01 RX ORDER — MIDAZOLAM HYDROCHLORIDE 1 MG/ML
INJECTION INTRAMUSCULAR; INTRAVENOUS
Status: COMPLETED
Start: 2018-01-01 | End: 2018-01-01

## 2018-01-01 RX ORDER — FLUCONAZOLE 100 MG/1
400 TABLET ORAL DAILY
Status: DISCONTINUED | OUTPATIENT
Start: 2018-01-01 | End: 2018-01-01

## 2018-01-01 RX ORDER — 0.9 % SODIUM CHLORIDE 0.9 %
20 VIAL (ML) INJECTION PRN
Status: CANCELLED | OUTPATIENT
Start: 2018-01-01

## 2018-01-01 RX ORDER — CEFDINIR 300 MG/1
300 CAPSULE ORAL EVERY 12 HOURS
Qty: 28 CAP | Refills: 0 | Status: SHIPPED | OUTPATIENT
Start: 2018-01-01 | End: 2018-01-01

## 2018-01-01 RX ORDER — SODIUM CHLORIDE AND POTASSIUM CHLORIDE 150; 900 MG/100ML; MG/100ML
INJECTION, SOLUTION INTRAVENOUS ONCE
Status: COMPLETED | OUTPATIENT
Start: 2018-01-01 | End: 2018-01-01

## 2018-01-01 RX ORDER — SCOLOPAMINE TRANSDERMAL SYSTEM 1 MG/1
1 PATCH, EXTENDED RELEASE TRANSDERMAL
Status: DISCONTINUED | OUTPATIENT
Start: 2018-01-01 | End: 2018-01-01 | Stop reason: HOSPADM

## 2018-01-01 RX ORDER — SODIUM CHLORIDE 9 MG/ML
500 INJECTION, SOLUTION INTRAVENOUS
Status: ACTIVE | OUTPATIENT
Start: 2018-01-01 | End: 2018-01-01

## 2018-01-01 RX ORDER — 0.9 % SODIUM CHLORIDE 0.9 %
5 VIAL (ML) INJECTION PRN
Status: CANCELLED | OUTPATIENT
Start: 2018-01-01

## 2018-01-01 RX ORDER — OXYCODONE HYDROCHLORIDE 5 MG/1
5 TABLET ORAL
Status: DISCONTINUED | OUTPATIENT
Start: 2018-01-01 | End: 2018-01-01 | Stop reason: HOSPADM

## 2018-01-01 RX ORDER — 0.9 % SODIUM CHLORIDE 0.9 %
VIAL (ML) INJECTION PRN
Status: CANCELLED | OUTPATIENT
Start: 2018-01-01

## 2018-01-01 RX ORDER — SODIUM CHLORIDE 9 MG/ML
INJECTION, SOLUTION INTRAVENOUS CONTINUOUS
Status: DISCONTINUED | OUTPATIENT
Start: 2018-01-01 | End: 2018-01-01

## 2018-01-01 RX ORDER — POTASSIUM CHLORIDE 20 MEQ/1
40 TABLET, EXTENDED RELEASE ORAL 2 TIMES DAILY
Status: COMPLETED | OUTPATIENT
Start: 2018-01-01 | End: 2018-01-01

## 2018-01-01 RX ORDER — MEGESTROL ACETATE 40 MG/ML
800 SUSPENSION ORAL DAILY
Qty: 600 ML | Refills: 1 | Status: SHIPPED | OUTPATIENT
Start: 2018-01-01 | End: 2018-01-01

## 2018-01-01 RX ORDER — CAPECITABINE 500 MG/1
1250 TABLET, FILM COATED ORAL 2 TIMES DAILY
Qty: 25 TAB | Refills: 1 | Status: SHIPPED | OUTPATIENT
Start: 2018-01-01 | End: 2018-01-01 | Stop reason: CLARIF

## 2018-01-01 RX ORDER — MORPHINE SULFATE 10 MG/ML
10 INJECTION, SOLUTION INTRAMUSCULAR; INTRAVENOUS
Status: DISCONTINUED | OUTPATIENT
Start: 2018-01-01 | End: 2018-01-01 | Stop reason: HOSPADM

## 2018-01-01 RX ORDER — ONDANSETRON 2 MG/ML
4 INJECTION INTRAMUSCULAR; INTRAVENOUS ONCE
Status: COMPLETED | OUTPATIENT
Start: 2018-01-01 | End: 2018-01-01

## 2018-01-01 RX ORDER — BISACODYL 10 MG
10 SUPPOSITORY, RECTAL RECTAL
Status: DISCONTINUED | OUTPATIENT
Start: 2018-01-01 | End: 2018-01-01

## 2018-01-01 RX ORDER — FLUCONAZOLE 100 MG/1
400 TABLET ORAL DAILY
Status: DISCONTINUED | OUTPATIENT
Start: 2018-01-01 | End: 2018-01-01 | Stop reason: HOSPADM

## 2018-01-01 RX ORDER — ONDANSETRON 2 MG/ML
4 INJECTION INTRAMUSCULAR; INTRAVENOUS EVERY 8 HOURS PRN
Status: DISCONTINUED | OUTPATIENT
Start: 2018-01-01 | End: 2018-01-01 | Stop reason: HOSPADM

## 2018-01-01 RX ORDER — 0.9 % SODIUM CHLORIDE 0.9 %
20 VIAL (ML) INJECTION ONCE
Status: CANCELLED | OUTPATIENT
Start: 2018-01-01 | End: 2018-01-01

## 2018-01-01 RX ORDER — DEXTROSE MONOHYDRATE 25 G/50ML
25 INJECTION, SOLUTION INTRAVENOUS
Status: DISCONTINUED | OUTPATIENT
Start: 2018-01-01 | End: 2018-01-01

## 2018-01-01 RX ORDER — FLUCONAZOLE 200 MG/1
400 TABLET ORAL DAILY
Status: DISCONTINUED | OUTPATIENT
Start: 2018-01-01 | End: 2018-01-01

## 2018-01-01 RX ORDER — MORPHINE SULFATE 100 MG/5ML
10 SOLUTION ORAL
Status: DISCONTINUED | OUTPATIENT
Start: 2018-01-01 | End: 2018-01-01

## 2018-01-01 RX ORDER — SODIUM CHLORIDE 9 MG/ML
1000 INJECTION, SOLUTION INTRAVENOUS ONCE
Status: COMPLETED | OUTPATIENT
Start: 2018-01-01 | End: 2018-01-01

## 2018-01-01 RX ORDER — VERAPAMIL HYDROCHLORIDE 2.5 MG/ML
2.5 INJECTION, SOLUTION INTRAVENOUS ONCE
Status: COMPLETED | OUTPATIENT
Start: 2018-01-01 | End: 2018-01-01

## 2018-01-01 RX ORDER — ONDANSETRON 4 MG/1
4 TABLET, ORALLY DISINTEGRATING ORAL EVERY 4 HOURS PRN
Status: DISCONTINUED | OUTPATIENT
Start: 2018-01-01 | End: 2018-01-01 | Stop reason: HOSPADM

## 2018-01-01 RX ORDER — SODIUM CHLORIDE AND POTASSIUM CHLORIDE 150; 900 MG/100ML; MG/100ML
INJECTION, SOLUTION INTRAVENOUS CONTINUOUS
Status: DISCONTINUED | OUTPATIENT
Start: 2018-01-01 | End: 2018-01-01

## 2018-01-01 RX ORDER — DEXAMETHASONE SODIUM PHOSPHATE 4 MG/ML
12 INJECTION, SOLUTION INTRA-ARTICULAR; INTRALESIONAL; INTRAMUSCULAR; INTRAVENOUS; SOFT TISSUE ONCE
Status: COMPLETED | OUTPATIENT
Start: 2018-01-01 | End: 2018-01-01

## 2018-01-01 RX ORDER — OXYCODONE HYDROCHLORIDE 5 MG/1
5 TABLET ORAL
Qty: 30 TAB | Refills: 0 | Status: SHIPPED | OUTPATIENT
Start: 2018-01-01 | End: 2018-01-01 | Stop reason: CLARIF

## 2018-01-01 RX ORDER — OMEPRAZOLE 20 MG/1
20 CAPSULE, DELAYED RELEASE ORAL DAILY
Status: DISCONTINUED | OUTPATIENT
Start: 2018-01-01 | End: 2018-01-01

## 2018-01-01 RX ORDER — MIDAZOLAM HYDROCHLORIDE 1 MG/ML
.5-2 INJECTION INTRAMUSCULAR; INTRAVENOUS PRN
Status: ACTIVE | OUTPATIENT
Start: 2018-01-01 | End: 2018-01-01

## 2018-01-01 RX ORDER — METRONIDAZOLE 500 MG/1
500 TABLET ORAL EVERY 8 HOURS
Qty: 42 TAB | Refills: 0 | Status: SHIPPED | OUTPATIENT
Start: 2018-01-01 | End: 2018-01-01

## 2018-01-01 RX ORDER — ONDANSETRON 2 MG/ML
4 INJECTION INTRAMUSCULAR; INTRAVENOUS PRN
Status: DISCONTINUED | OUTPATIENT
Start: 2018-01-01 | End: 2018-01-01 | Stop reason: HOSPADM

## 2018-01-01 RX ORDER — POTASSIUM CHLORIDE 7.45 MG/ML
10 INJECTION INTRAVENOUS
Status: COMPLETED | OUTPATIENT
Start: 2018-01-01 | End: 2018-01-01

## 2018-01-01 RX ORDER — ONDANSETRON 2 MG/ML
4 INJECTION INTRAMUSCULAR; INTRAVENOUS
Status: CANCELLED | OUTPATIENT
Start: 2018-01-01

## 2018-01-01 RX ORDER — LOPERAMIDE HYDROCHLORIDE 2 MG/1
2 CAPSULE ORAL PRN
Status: CANCELLED | OUTPATIENT
Start: 2018-01-01

## 2018-01-01 RX ORDER — POTASSIUM CHLORIDE 20 MEQ/1
20 TABLET, EXTENDED RELEASE ORAL 2 TIMES DAILY
Status: DISCONTINUED | OUTPATIENT
Start: 2018-01-01 | End: 2018-01-01

## 2018-01-01 RX ORDER — MORPHINE SULFATE 100 MG/5ML
10-20 SOLUTION ORAL
Status: DISCONTINUED | OUTPATIENT
Start: 2018-01-01 | End: 2018-01-01 | Stop reason: HOSPADM

## 2018-01-01 RX ORDER — LORAZEPAM 2 MG/ML
1 INJECTION INTRAMUSCULAR
Status: DISCONTINUED | OUTPATIENT
Start: 2018-01-01 | End: 2018-01-01 | Stop reason: HOSPADM

## 2018-01-01 RX ORDER — POTASSIUM CHLORIDE 7.45 MG/ML
10 INJECTION INTRAVENOUS ONCE
Status: COMPLETED | OUTPATIENT
Start: 2018-01-01 | End: 2018-01-01

## 2018-01-01 RX ORDER — 3% SODIUM CHLORIDE 3 G/100ML
INJECTION, SOLUTION INTRAVENOUS CONTINUOUS
Status: DISCONTINUED | OUTPATIENT
Start: 2018-01-01 | End: 2018-01-01

## 2018-01-01 RX ORDER — GLYCOPYRROLATE 0.2 MG/ML
0.2 INJECTION INTRAMUSCULAR; INTRAVENOUS EVERY 4 HOURS
Status: DISCONTINUED | OUTPATIENT
Start: 2018-01-01 | End: 2018-01-01 | Stop reason: HOSPADM

## 2018-01-01 RX ORDER — LIDOCAINE HYDROCHLORIDE 20 MG/ML
INJECTION, SOLUTION INFILTRATION; PERINEURAL
Status: COMPLETED
Start: 2018-01-01 | End: 2018-01-01

## 2018-01-01 RX ORDER — OXYCODONE HYDROCHLORIDE 10 MG/1
10 TABLET ORAL
Status: DISCONTINUED | OUTPATIENT
Start: 2018-01-01 | End: 2018-01-01 | Stop reason: HOSPADM

## 2018-01-01 RX ORDER — SODIUM CHLORIDE 9 MG/ML
INJECTION, SOLUTION INTRAVENOUS CONTINUOUS
Status: DISCONTINUED | OUTPATIENT
Start: 2018-01-01 | End: 2018-01-01 | Stop reason: HOSPADM

## 2018-01-01 RX ORDER — CEFTRIAXONE SODIUM 10 G/100ML
1 INJECTION, POWDER, FOR SOLUTION INTRAVENOUS
Status: DISCONTINUED | OUTPATIENT
Start: 2018-01-01 | End: 2018-01-01

## 2018-01-01 RX ORDER — MAGNESIUM SULFATE HEPTAHYDRATE 40 MG/ML
2 INJECTION, SOLUTION INTRAVENOUS ONCE
Status: COMPLETED | OUTPATIENT
Start: 2018-01-01 | End: 2018-01-01

## 2018-01-01 RX ORDER — POTASSIUM CHLORIDE 750 MG/1
20 TABLET, EXTENDED RELEASE ORAL 2 TIMES DAILY
Qty: 180 TAB | Refills: 3 | Status: SHIPPED | OUTPATIENT
Start: 2018-01-01

## 2018-01-01 RX ORDER — HEPARIN SODIUM,PORCINE 1000/ML
VIAL (ML) INJECTION
Status: COMPLETED
Start: 2018-01-01 | End: 2018-01-01

## 2018-01-01 RX ORDER — MIDAZOLAM HYDROCHLORIDE 1 MG/ML
.5-2 INJECTION INTRAMUSCULAR; INTRAVENOUS PRN
Status: DISCONTINUED | OUTPATIENT
Start: 2018-01-01 | End: 2018-01-01 | Stop reason: HOSPADM

## 2018-01-01 RX ORDER — AMOXICILLIN 250 MG
2 CAPSULE ORAL 2 TIMES DAILY
Status: DISCONTINUED | OUTPATIENT
Start: 2018-01-01 | End: 2018-01-01 | Stop reason: HOSPADM

## 2018-01-01 RX ORDER — CAPECITABINE 500 MG/1
1250 TABLET, FILM COATED ORAL 2 TIMES DAILY
Qty: 50 TAB | Refills: 1 | Status: SHIPPED | OUTPATIENT
Start: 2018-01-01 | End: 2018-01-01 | Stop reason: SDUPTHER

## 2018-01-01 RX ORDER — MAGNESIUM SULFATE 1 G/100ML
1 INJECTION INTRAVENOUS ONCE
Status: COMPLETED | OUTPATIENT
Start: 2018-01-01 | End: 2018-01-01

## 2018-01-01 RX ORDER — AMOXICILLIN 250 MG
2 CAPSULE ORAL 2 TIMES DAILY
Status: DISCONTINUED | OUTPATIENT
Start: 2018-01-01 | End: 2018-01-01

## 2018-01-01 RX ORDER — PROCHLORPERAZINE MALEATE 10 MG
10 TABLET ORAL EVERY 6 HOURS PRN
Status: CANCELLED | OUTPATIENT
Start: 2018-01-01

## 2018-01-01 RX ORDER — SODIUM CHLORIDE 9 MG/ML
30 INJECTION, SOLUTION INTRAVENOUS ONCE
Status: COMPLETED | OUTPATIENT
Start: 2018-01-01 | End: 2018-01-01

## 2018-01-01 RX ORDER — SODIUM CHLORIDE 9 MG/ML
INJECTION, SOLUTION INTRAVENOUS CONTINUOUS
Status: CANCELLED | OUTPATIENT
Start: 2018-01-01

## 2018-01-01 RX ORDER — SODIUM CHLORIDE 9 MG/ML
500 INJECTION, SOLUTION INTRAVENOUS
Status: DISCONTINUED | OUTPATIENT
Start: 2018-01-01 | End: 2018-01-01 | Stop reason: HOSPADM

## 2018-01-01 RX ORDER — SODIUM CHLORIDE 1 G/1
1 TABLET ORAL
Status: DISCONTINUED | OUTPATIENT
Start: 2018-01-01 | End: 2018-01-01

## 2018-01-01 RX ORDER — AMOXICILLIN AND CLAVULANATE POTASSIUM 875; 125 MG/1; MG/1
1 TABLET, FILM COATED ORAL 2 TIMES DAILY
Qty: 10 TAB | Refills: 0 | Status: SHIPPED | OUTPATIENT
Start: 2018-01-01 | End: 2018-01-01

## 2018-01-01 RX ORDER — LIDOCAINE HYDROCHLORIDE 10 MG/ML
INJECTION, SOLUTION EPIDURAL; INFILTRATION; INTRACAUDAL; PERINEURAL
Status: COMPLETED
Start: 2018-01-01 | End: 2018-01-01

## 2018-01-01 RX ORDER — SODIUM CHLORIDE 9 MG/ML
500 INJECTION, SOLUTION INTRAVENOUS ONCE
Status: CANCELLED | OUTPATIENT
Start: 2018-01-01 | End: 2018-01-01

## 2018-01-01 RX ORDER — MORPHINE SULFATE 10 MG/ML
5 INJECTION, SOLUTION INTRAMUSCULAR; INTRAVENOUS
Status: DISCONTINUED | OUTPATIENT
Start: 2018-01-01 | End: 2018-01-01

## 2018-01-01 RX ORDER — LINEZOLID 600 MG/1
600 TABLET, FILM COATED ORAL EVERY 12 HOURS
Qty: 20 TAB | Refills: 0 | Status: SHIPPED | OUTPATIENT
Start: 2018-01-01 | End: 2018-01-01

## 2018-01-01 RX ORDER — MORPHINE SULFATE 10 MG/ML
10 INJECTION, SOLUTION INTRAMUSCULAR; INTRAVENOUS
Status: DISCONTINUED | OUTPATIENT
Start: 2018-01-01 | End: 2018-01-01

## 2018-01-01 RX ORDER — ONDANSETRON 2 MG/ML
INJECTION INTRAMUSCULAR; INTRAVENOUS
Status: COMPLETED
Start: 2018-01-01 | End: 2018-01-01

## 2018-01-01 RX ORDER — SODIUM CHLORIDE 9 MG/ML
INJECTION, SOLUTION INTRAVENOUS
Status: ACTIVE
Start: 2018-01-01 | End: 2018-01-01

## 2018-01-01 RX ORDER — POLYVINYL ALCOHOL 14 MG/ML
1-2 SOLUTION/ DROPS OPHTHALMIC
Status: DISCONTINUED | OUTPATIENT
Start: 2018-01-01 | End: 2018-01-01 | Stop reason: HOSPADM

## 2018-01-01 RX ORDER — BISACODYL 10 MG
10 SUPPOSITORY, RECTAL RECTAL
Status: DISCONTINUED | OUTPATIENT
Start: 2018-01-01 | End: 2018-01-01 | Stop reason: HOSPADM

## 2018-01-01 RX ORDER — METRONIDAZOLE 500 MG/1
500 TABLET ORAL EVERY 8 HOURS
Status: DISCONTINUED | OUTPATIENT
Start: 2018-01-01 | End: 2018-01-01 | Stop reason: HOSPADM

## 2018-01-01 RX ORDER — LOPERAMIDE HYDROCHLORIDE 2 MG/1
2 CAPSULE ORAL 4 TIMES DAILY PRN
Qty: 30 CAP | Refills: 0 | Status: SHIPPED | OUTPATIENT
Start: 2018-01-01 | End: 2018-01-01

## 2018-01-01 RX ORDER — ONDANSETRON 2 MG/ML
4 INJECTION INTRAMUSCULAR; INTRAVENOUS EVERY 4 HOURS PRN
Status: DISCONTINUED | OUTPATIENT
Start: 2018-01-01 | End: 2018-01-01 | Stop reason: HOSPADM

## 2018-01-01 RX ORDER — SODIUM CHLORIDE 9 MG/ML
1000 INJECTION, SOLUTION INTRAVENOUS ONCE
Status: CANCELLED | OUTPATIENT
Start: 2018-01-01 | End: 2018-01-01

## 2018-01-01 RX ORDER — FUROSEMIDE 20 MG/1
20 TABLET ORAL
Status: DISCONTINUED | OUTPATIENT
Start: 2018-01-01 | End: 2018-01-01

## 2018-01-01 RX ORDER — LIDOCAINE HYDROCHLORIDE AND EPINEPHRINE BITARTRATE 20; .01 MG/ML; MG/ML
INJECTION, SOLUTION SUBCUTANEOUS
Status: COMPLETED
Start: 2018-01-01 | End: 2018-01-01

## 2018-01-01 RX ORDER — CAPECITABINE 500 MG/1
TABLET, FILM COATED ORAL
Qty: 100 TAB | Refills: 1 | Status: SHIPPED | OUTPATIENT
Start: 2018-01-01 | End: 2018-01-01

## 2018-01-01 RX ORDER — MORPHINE SULFATE 4 MG/ML
4 INJECTION, SOLUTION INTRAMUSCULAR; INTRAVENOUS
Status: DISCONTINUED | OUTPATIENT
Start: 2018-01-01 | End: 2018-01-01 | Stop reason: HOSPADM

## 2018-01-01 RX ORDER — METHYLPREDNISOLONE 4 MG/1
TABLET ORAL
Qty: 1 KIT | Refills: 0 | Status: SHIPPED | OUTPATIENT
Start: 2018-01-01 | End: 2018-01-01

## 2018-01-01 RX ORDER — PANTOPRAZOLE SODIUM 40 MG/10ML
40 INJECTION, POWDER, LYOPHILIZED, FOR SOLUTION INTRAVENOUS DAILY
Status: DISCONTINUED | OUTPATIENT
Start: 2018-01-01 | End: 2018-01-01

## 2018-01-01 RX ORDER — FUROSEMIDE 10 MG/ML
10 INJECTION INTRAMUSCULAR; INTRAVENOUS
Status: DISCONTINUED | OUTPATIENT
Start: 2018-01-01 | End: 2018-01-01

## 2018-01-01 RX ORDER — PROCHLORPERAZINE MALEATE 10 MG
10 TABLET ORAL EVERY 6 HOURS PRN
Qty: 30 TAB | Refills: 6 | Status: SHIPPED | OUTPATIENT
Start: 2018-01-01 | End: 2018-01-01

## 2018-01-01 RX ORDER — CEFDINIR 300 MG/1
300 CAPSULE ORAL EVERY 12 HOURS
Status: DISCONTINUED | OUTPATIENT
Start: 2018-01-01 | End: 2018-01-01

## 2018-01-01 RX ORDER — LIDOCAINE HYDROCHLORIDE 20 MG/ML
INJECTION, SOLUTION INFILTRATION; PERINEURAL
Status: DISPENSED
Start: 2018-01-01 | End: 2018-01-01

## 2018-01-01 RX ORDER — LORAZEPAM 2 MG/ML
1-3 INJECTION INTRAMUSCULAR
Status: DISCONTINUED | OUTPATIENT
Start: 2018-01-01 | End: 2018-01-01 | Stop reason: HOSPADM

## 2018-01-01 RX ORDER — ACETAMINOPHEN 325 MG/1
650 TABLET ORAL ONCE
Status: COMPLETED | OUTPATIENT
Start: 2018-01-01 | End: 2018-01-01

## 2018-01-01 RX ORDER — FUROSEMIDE 10 MG/ML
20 INJECTION INTRAMUSCULAR; INTRAVENOUS
Status: DISCONTINUED | OUTPATIENT
Start: 2018-01-01 | End: 2018-01-01

## 2018-01-01 RX ORDER — SODIUM CHLORIDE 9 MG/ML
500 INJECTION, SOLUTION INTRAVENOUS ONCE
Status: COMPLETED | OUTPATIENT
Start: 2018-01-01 | End: 2018-01-01

## 2018-01-01 RX ORDER — LIDOCAINE HYDROCHLORIDE 10 MG/ML
INJECTION, SOLUTION INFILTRATION; PERINEURAL
Status: COMPLETED
Start: 2018-01-01 | End: 2018-01-01

## 2018-01-01 RX ORDER — MORPHINE SULFATE 10 MG/ML
5 INJECTION, SOLUTION INTRAMUSCULAR; INTRAVENOUS
Status: DISCONTINUED | OUTPATIENT
Start: 2018-01-01 | End: 2018-01-01 | Stop reason: HOSPADM

## 2018-01-01 RX ORDER — LINEZOLID 2 MG/ML
600 INJECTION, SOLUTION INTRAVENOUS EVERY 12 HOURS
Status: DISCONTINUED | OUTPATIENT
Start: 2018-01-01 | End: 2018-01-01

## 2018-01-01 RX ORDER — SODIUM CHLORIDE 450 MG/100ML
INJECTION, SOLUTION INTRAVENOUS CONTINUOUS
Status: DISCONTINUED | OUTPATIENT
Start: 2018-01-01 | End: 2018-01-01

## 2018-01-01 RX ORDER — FLUCONAZOLE 200 MG/1
400 TABLET ORAL DAILY
Status: COMPLETED | OUTPATIENT
Start: 2018-01-01 | End: 2018-01-01

## 2018-01-01 RX ORDER — HEPARIN SODIUM 1000 [USP'U]/ML
3000 INJECTION, SOLUTION INTRAVENOUS; SUBCUTANEOUS ONCE
Status: COMPLETED | OUTPATIENT
Start: 2018-01-01 | End: 2018-01-01

## 2018-01-01 RX ORDER — ONDANSETRON 4 MG/1
4 TABLET, FILM COATED ORAL EVERY 4 HOURS PRN
Qty: 20 TAB | Refills: 3 | Status: SHIPPED | OUTPATIENT
Start: 2018-01-01 | End: 2018-01-01

## 2018-01-01 RX ORDER — ACETAMINOPHEN 650 MG/1
650 SUPPOSITORY RECTAL EVERY 4 HOURS PRN
Status: DISCONTINUED | OUTPATIENT
Start: 2018-01-01 | End: 2018-01-01 | Stop reason: HOSPADM

## 2018-01-01 RX ORDER — LINEZOLID 600 MG/1
600 TABLET, FILM COATED ORAL EVERY 12 HOURS
Status: COMPLETED | OUTPATIENT
Start: 2018-01-01 | End: 2018-01-01

## 2018-01-01 RX ORDER — IPRATROPIUM BROMIDE AND ALBUTEROL SULFATE 2.5; .5 MG/3ML; MG/3ML
3 SOLUTION RESPIRATORY (INHALATION)
Status: DISCONTINUED | OUTPATIENT
Start: 2018-01-01 | End: 2018-01-01 | Stop reason: HOSPADM

## 2018-01-01 RX ORDER — LINEZOLID 600 MG/1
600 TABLET, FILM COATED ORAL EVERY 12 HOURS
Status: DISCONTINUED | OUTPATIENT
Start: 2018-01-01 | End: 2018-01-01

## 2018-01-01 RX ORDER — LINEZOLID 600 MG/1
600 TABLET, FILM COATED ORAL EVERY 12 HOURS
Qty: 28 TAB | Refills: 0 | Status: SHIPPED | OUTPATIENT
Start: 2018-01-01 | End: 2018-01-01

## 2018-01-01 RX ORDER — CYCLOSPORINE 0.5 MG/ML
1 EMULSION OPHTHALMIC 2 TIMES DAILY
COMMUNITY
End: 2018-01-01

## 2018-01-01 RX ORDER — POTASSIUM CHLORIDE 750 MG/1
TABLET, EXTENDED RELEASE ORAL
Qty: 256 TAB | Refills: 0 | Status: SHIPPED | OUTPATIENT
Start: 2018-01-01 | End: 2018-01-01

## 2018-01-01 RX ORDER — ACETAMINOPHEN 325 MG/1
650 TABLET ORAL EVERY 6 HOURS PRN
Status: DISCONTINUED | OUTPATIENT
Start: 2018-01-01 | End: 2018-01-01 | Stop reason: HOSPADM

## 2018-01-01 RX ORDER — CYCLOSPORINE 0.5 MG/ML
1 EMULSION OPHTHALMIC 2 TIMES DAILY
Status: DISCONTINUED | OUTPATIENT
Start: 2018-01-01 | End: 2018-01-01

## 2018-01-01 RX ORDER — POLYETHYLENE GLYCOL 3350 17 G/17G
1 POWDER, FOR SOLUTION ORAL
Status: DISCONTINUED | OUTPATIENT
Start: 2018-01-01 | End: 2018-01-01

## 2018-01-01 RX ORDER — GLYCOPYRROLATE 0.2 MG/ML
0.2 INJECTION INTRAMUSCULAR; INTRAVENOUS EVERY 4 HOURS PRN
Status: DISCONTINUED | OUTPATIENT
Start: 2018-01-01 | End: 2018-01-01 | Stop reason: HOSPADM

## 2018-01-01 RX ORDER — OMEPRAZOLE 20 MG/1
20 CAPSULE, DELAYED RELEASE ORAL
Status: DISCONTINUED | OUTPATIENT
Start: 2018-01-01 | End: 2018-01-01 | Stop reason: HOSPADM

## 2018-01-01 RX ORDER — SODIUM CHLORIDE, SODIUM LACTATE, POTASSIUM CHLORIDE, CALCIUM CHLORIDE 600; 310; 30; 20 MG/100ML; MG/100ML; MG/100ML; MG/100ML
INJECTION, SOLUTION INTRAVENOUS CONTINUOUS
Status: DISCONTINUED | OUTPATIENT
Start: 2018-01-01 | End: 2018-01-01

## 2018-01-01 RX ORDER — CAPECITABINE 150 MG/1
TABLET, FILM COATED ORAL
Qty: 60 TAB | Refills: 0 | Status: SHIPPED | OUTPATIENT
Start: 2018-01-01 | End: 2018-01-01

## 2018-01-01 RX ORDER — SODIUM CHLORIDE 9 MG/ML
1000 INJECTION, SOLUTION INTRAVENOUS
Status: DISCONTINUED | OUTPATIENT
Start: 2018-01-01 | End: 2018-01-01 | Stop reason: HOSPADM

## 2018-01-01 RX ORDER — POLYVINYL ALCOHOL 14 MG/ML
1 SOLUTION/ DROPS OPHTHALMIC
Status: DISCONTINUED | OUTPATIENT
Start: 2018-01-01 | End: 2018-01-01 | Stop reason: HOSPADM

## 2018-01-01 RX ORDER — CEFDINIR 300 MG/1
300 CAPSULE ORAL EVERY 12 HOURS
Status: DISCONTINUED | OUTPATIENT
Start: 2018-01-01 | End: 2018-01-01 | Stop reason: HOSPADM

## 2018-01-01 RX ORDER — POTASSIUM CHLORIDE 20 MEQ/1
40 TABLET, EXTENDED RELEASE ORAL 2 TIMES DAILY
Status: DISCONTINUED | OUTPATIENT
Start: 2018-01-01 | End: 2018-01-01

## 2018-01-01 RX ORDER — GADOBUTROL 604.72 MG/ML
7.5 INJECTION INTRAVENOUS ONCE
Status: COMPLETED | OUTPATIENT
Start: 2018-01-01 | End: 2018-01-01

## 2018-01-01 RX ORDER — OXYCODONE HYDROCHLORIDE 5 MG/1
5 TABLET ORAL EVERY 4 HOURS PRN
Qty: 30 TAB | Refills: 0 | Status: SHIPPED | OUTPATIENT
Start: 2018-01-01 | End: 2018-01-01

## 2018-01-01 RX ORDER — DIPHENHYDRAMINE HCL 25 MG
25 TABLET ORAL ONCE
Status: COMPLETED | OUTPATIENT
Start: 2018-01-01 | End: 2018-01-01

## 2018-01-01 RX ORDER — FLUCONAZOLE 2 MG/ML
400 INJECTION, SOLUTION INTRAVENOUS EVERY 24 HOURS
Status: DISCONTINUED | OUTPATIENT
Start: 2018-01-01 | End: 2018-01-01

## 2018-01-01 RX ORDER — CAPECITABINE 500 MG/1
TABLET, FILM COATED ORAL
Qty: 120 TAB | Refills: 0 | Status: SHIPPED | OUTPATIENT
Start: 2018-01-01 | End: 2018-01-01

## 2018-01-01 RX ORDER — ONDANSETRON 4 MG/1
4 TABLET, ORALLY DISINTEGRATING ORAL EVERY 6 HOURS PRN
Qty: 10 TAB | Refills: 0 | Status: SHIPPED | OUTPATIENT
Start: 2018-01-01 | End: 2018-01-01

## 2018-01-01 RX ORDER — POLYVINYL ALCOHOL 14 MG/ML
2 SOLUTION/ DROPS OPHTHALMIC PRN
Status: DISCONTINUED | OUTPATIENT
Start: 2018-01-01 | End: 2018-01-01 | Stop reason: HOSPADM

## 2018-01-01 RX ORDER — SODIUM CHLORIDE 9 MG/ML
30 INJECTION, SOLUTION INTRAVENOUS
Status: DISCONTINUED | OUTPATIENT
Start: 2018-01-01 | End: 2018-01-01

## 2018-01-01 RX ORDER — POTASSIUM CHLORIDE 20 MEQ/1
40 TABLET, EXTENDED RELEASE ORAL ONCE
Status: COMPLETED | OUTPATIENT
Start: 2018-01-01 | End: 2018-01-01

## 2018-01-01 RX ORDER — ACETAMINOPHEN 325 MG/1
325 TABLET ORAL EVERY 6 HOURS PRN
Status: DISCONTINUED | OUTPATIENT
Start: 2018-01-01 | End: 2018-01-01 | Stop reason: HOSPADM

## 2018-01-01 RX ORDER — ATROPINE SULFATE 10 MG/ML
2 SOLUTION/ DROPS OPHTHALMIC EVERY 4 HOURS PRN
Status: DISCONTINUED | OUTPATIENT
Start: 2018-01-01 | End: 2018-01-01 | Stop reason: HOSPADM

## 2018-01-01 RX ORDER — ONDANSETRON 2 MG/ML
4 INJECTION INTRAMUSCULAR; INTRAVENOUS EVERY 6 HOURS PRN
Status: DISCONTINUED | OUTPATIENT
Start: 2018-01-01 | End: 2018-01-01 | Stop reason: HOSPADM

## 2018-01-01 RX ORDER — HYDROMORPHONE HYDROCHLORIDE 1 MG/ML
1 INJECTION, SOLUTION INTRAMUSCULAR; INTRAVENOUS; SUBCUTANEOUS ONCE
Status: COMPLETED | OUTPATIENT
Start: 2018-01-01 | End: 2018-01-01

## 2018-01-01 RX ORDER — LINEZOLID 600 MG/1
600 TABLET, FILM COATED ORAL EVERY 12 HOURS
Status: DISCONTINUED | OUTPATIENT
Start: 2018-01-01 | End: 2018-01-01 | Stop reason: HOSPADM

## 2018-01-01 RX ORDER — MEGESTROL ACETATE 40 MG/ML
800 SUSPENSION ORAL DAILY
Qty: 600 ML | Refills: 1 | Status: SHIPPED | OUTPATIENT
Start: 2018-01-01 | End: 2018-01-01 | Stop reason: SDUPTHER

## 2018-01-01 RX ORDER — TRAMADOL HYDROCHLORIDE 50 MG/1
50 TABLET ORAL EVERY 6 HOURS PRN
Status: DISCONTINUED | OUTPATIENT
Start: 2018-01-01 | End: 2018-01-01

## 2018-01-01 RX ADMIN — PIPERACILLIN SODIUM AND TAZOBACTAM SODIUM 3.38 G: 3; .375 INJECTION, POWDER, FOR SOLUTION INTRAVENOUS at 04:55

## 2018-01-01 RX ADMIN — DAPTOMYCIN 670 MG: 500 INJECTION, POWDER, LYOPHILIZED, FOR SOLUTION INTRAVENOUS at 11:51

## 2018-01-01 RX ADMIN — APIXABAN 5 MG: 5 TABLET, FILM COATED ORAL at 18:28

## 2018-01-01 RX ADMIN — PIPERACILLIN SODIUM AND TAZOBACTAM SODIUM 3.38 G: 3; .375 INJECTION, POWDER, FOR SOLUTION INTRAVENOUS at 20:26

## 2018-01-01 RX ADMIN — POTASSIUM CHLORIDE 20 MEQ: 1500 TABLET, EXTENDED RELEASE ORAL at 17:44

## 2018-01-01 RX ADMIN — ONDANSETRON HYDROCHLORIDE 16 MG: 2 INJECTION, SOLUTION INTRAMUSCULAR; INTRAVENOUS at 08:52

## 2018-01-01 RX ADMIN — AMPICILLIN SODIUM AND SULBACTAM SODIUM 3 G: 2; 1 INJECTION, POWDER, FOR SOLUTION INTRAMUSCULAR; INTRAVENOUS at 05:30

## 2018-01-01 RX ADMIN — LORAZEPAM 2 MG: 2 INJECTION INTRAMUSCULAR at 21:42

## 2018-01-01 RX ADMIN — ATROPINE SULFATE 0.5 MG: 1 INJECTION, SOLUTION INTRAMUSCULAR; INTRAVENOUS; SUBCUTANEOUS at 10:05

## 2018-01-01 RX ADMIN — LINEZOLID 600 MG: 600 TABLET, FILM COATED ORAL at 08:40

## 2018-01-01 RX ADMIN — SODIUM CHLORIDE 1932 ML: 9 INJECTION, SOLUTION INTRAVENOUS at 10:51

## 2018-01-01 RX ADMIN — POTASSIUM CHLORIDE 10 MEQ: 7.46 INJECTION, SOLUTION INTRAVENOUS at 17:45

## 2018-01-01 RX ADMIN — MORPHINE SULFATE 5 MG: 10 INJECTION INTRAVENOUS at 03:24

## 2018-01-01 RX ADMIN — STANDARDIZED SENNA CONCENTRATE AND DOCUSATE SODIUM 2 TABLET: 8.6; 5 TABLET, FILM COATED ORAL at 05:34

## 2018-01-01 RX ADMIN — MIDAZOLAM 1 MG: 1 INJECTION INTRAMUSCULAR; INTRAVENOUS at 09:12

## 2018-01-01 RX ADMIN — IRINOTECAN HYDROCHLORIDE: 4.3 INJECTION, POWDER, FOR SOLUTION INTRAVENOUS at 10:10

## 2018-01-01 RX ADMIN — LORAZEPAM 2 MG: 2 INJECTION INTRAMUSCULAR at 16:55

## 2018-01-01 RX ADMIN — SODIUM CHLORIDE, POTASSIUM CHLORIDE, SODIUM LACTATE AND CALCIUM CHLORIDE: 600; 310; 30; 20 INJECTION, SOLUTION INTRAVENOUS at 10:31

## 2018-01-01 RX ADMIN — POTASSIUM CHLORIDE 20 MEQ: 1500 TABLET, EXTENDED RELEASE ORAL at 06:38

## 2018-01-01 RX ADMIN — MORPHINE SULFATE 1 MG: 10 INJECTION INTRAVENOUS at 15:01

## 2018-01-01 RX ADMIN — ONDANSETRON HYDROCHLORIDE 16 MG: 2 INJECTION, SOLUTION INTRAMUSCULAR; INTRAVENOUS at 09:00

## 2018-01-01 RX ADMIN — HEPARIN 500 UNITS: 100 SYRINGE at 11:31

## 2018-01-01 RX ADMIN — OXYCODONE HYDROCHLORIDE 5 MG: 5 TABLET ORAL at 02:19

## 2018-01-01 RX ADMIN — SODIUM CHLORIDE 1000 ML: 9 INJECTION, SOLUTION INTRAVENOUS at 07:39

## 2018-01-01 RX ADMIN — FUROSEMIDE 20 MG: 10 INJECTION, SOLUTION INTRAMUSCULAR; INTRAVENOUS at 06:01

## 2018-01-01 RX ADMIN — MIDAZOLAM 2 MG: 1 INJECTION INTRAMUSCULAR; INTRAVENOUS at 17:49

## 2018-01-01 RX ADMIN — APIXABAN 5 MG: 5 TABLET, FILM COATED ORAL at 05:04

## 2018-01-01 RX ADMIN — POTASSIUM CHLORIDE 40 MEQ: 1500 TABLET, EXTENDED RELEASE ORAL at 09:48

## 2018-01-01 RX ADMIN — CEFTRIAXONE SODIUM 2 G: 2 INJECTION, POWDER, FOR SOLUTION INTRAMUSCULAR; INTRAVENOUS at 07:53

## 2018-01-01 RX ADMIN — APIXABAN 5 MG: 5 TABLET, FILM COATED ORAL at 06:36

## 2018-01-01 RX ADMIN — LEUCOVORIN CALCIUM 660 MG: 350 INJECTION, POWDER, LYOPHILIZED, FOR SOLUTION INTRAMUSCULAR; INTRAVENOUS at 11:46

## 2018-01-01 RX ADMIN — DAPTOMYCIN 670 MG: 500 INJECTION, POWDER, LYOPHILIZED, FOR SOLUTION INTRAVENOUS at 12:51

## 2018-01-01 RX ADMIN — SODIUM CHLORIDE, PRESERVATIVE FREE 500 UNITS: 5 INJECTION INTRAVENOUS at 11:30

## 2018-01-01 RX ADMIN — IOHEXOL 50 ML: 240 INJECTION, SOLUTION INTRATHECAL; INTRAVASCULAR; INTRAVENOUS; ORAL at 15:06

## 2018-01-01 RX ADMIN — MAGNESIUM SULFATE IN WATER 2 G: 40 INJECTION, SOLUTION INTRAVENOUS at 00:45

## 2018-01-01 RX ADMIN — PROCHLORPERAZINE EDISYLATE 10 MG: 5 INJECTION INTRAMUSCULAR; INTRAVENOUS at 18:03

## 2018-01-01 RX ADMIN — OMEPRAZOLE 20 MG: 20 CAPSULE, DELAYED RELEASE ORAL at 06:37

## 2018-01-01 RX ADMIN — POTASSIUM CHLORIDE 40 MEQ: 1500 TABLET, EXTENDED RELEASE ORAL at 04:56

## 2018-01-01 RX ADMIN — PACLITAXEL 207.5 MG: 100 INJECTION, POWDER, LYOPHILIZED, FOR SUSPENSION INTRAVENOUS at 12:02

## 2018-01-01 RX ADMIN — ONDANSETRON HYDROCHLORIDE 16 MG: 2 INJECTION, SOLUTION INTRAMUSCULAR; INTRAVENOUS at 10:32

## 2018-01-01 RX ADMIN — STANDARDIZED SENNA CONCENTRATE AND DOCUSATE SODIUM 2 TABLET: 8.6; 5 TABLET, FILM COATED ORAL at 05:17

## 2018-01-01 RX ADMIN — MIDAZOLAM 1 MG: 1 INJECTION INTRAMUSCULAR; INTRAVENOUS at 09:18

## 2018-01-01 RX ADMIN — METRONIDAZOLE 500 MG: 500 TABLET ORAL at 22:08

## 2018-01-01 RX ADMIN — PIPERACILLIN SODIUM AND TAZOBACTAM SODIUM 3.38 G: 3; .375 INJECTION, POWDER, FOR SOLUTION INTRAVENOUS at 16:33

## 2018-01-01 RX ADMIN — FUROSEMIDE 20 MG: 10 INJECTION, SOLUTION INTRAMUSCULAR; INTRAVENOUS at 05:30

## 2018-01-01 RX ADMIN — ONDANSETRON 4 MG: 2 INJECTION INTRAMUSCULAR; INTRAVENOUS at 03:43

## 2018-01-01 RX ADMIN — ENOXAPARIN SODIUM 40 MG: 100 INJECTION SUBCUTANEOUS at 06:00

## 2018-01-01 RX ADMIN — FENTANYL CITRATE 50 MCG: 50 INJECTION, SOLUTION INTRAMUSCULAR; INTRAVENOUS at 16:47

## 2018-01-01 RX ADMIN — LINEZOLID 600 MG: 600 TABLET, FILM COATED ORAL at 16:42

## 2018-01-01 RX ADMIN — ONDANSETRON 4 MG: 2 INJECTION INTRAMUSCULAR; INTRAVENOUS at 14:29

## 2018-01-01 RX ADMIN — IRINOTECAN HYDROCHLORIDE: 4.3 INJECTION, POWDER, FOR SOLUTION INTRAVENOUS at 12:50

## 2018-01-01 RX ADMIN — AMPICILLIN SODIUM AND SULBACTAM SODIUM 3 G: 2; 1 INJECTION, POWDER, FOR SOLUTION INTRAMUSCULAR; INTRAVENOUS at 18:06

## 2018-01-01 RX ADMIN — STANDARDIZED SENNA CONCENTRATE AND DOCUSATE SODIUM 2 TABLET: 8.6; 5 TABLET, FILM COATED ORAL at 06:36

## 2018-01-01 RX ADMIN — ATROPINE SULFATE 0.5 MG: 1 INJECTION, SOLUTION INTRAMUSCULAR; INTRAVENOUS; SUBCUTANEOUS at 11:30

## 2018-01-01 RX ADMIN — MORPHINE SULFATE 2 MG: 4 INJECTION INTRAVENOUS at 18:40

## 2018-01-01 RX ADMIN — FLUCONAZOLE 400 MG: 200 TABLET ORAL at 05:34

## 2018-01-01 RX ADMIN — OXYCODONE HYDROCHLORIDE 5 MG: 5 TABLET ORAL at 16:42

## 2018-01-01 RX ADMIN — FLUOROURACIL 2950 MG: 50 INJECTION, SOLUTION INTRAVENOUS at 12:48

## 2018-01-01 RX ADMIN — LINEZOLID 600 MG: 600 TABLET, FILM COATED ORAL at 18:08

## 2018-01-01 RX ADMIN — FUROSEMIDE 20 MG: 10 INJECTION, SOLUTION INTRAMUSCULAR; INTRAVENOUS at 05:46

## 2018-01-01 RX ADMIN — AMPICILLIN SODIUM AND SULBACTAM SODIUM 3 G: 2; 1 INJECTION, POWDER, FOR SOLUTION INTRAMUSCULAR; INTRAVENOUS at 05:51

## 2018-01-01 RX ADMIN — POTASSIUM CHLORIDE 20 MEQ: 1500 TABLET, EXTENDED RELEASE ORAL at 17:52

## 2018-01-01 RX ADMIN — FUROSEMIDE 20 MG: 20 TABLET ORAL at 06:18

## 2018-01-01 RX ADMIN — OMEPRAZOLE 20 MG: 20 CAPSULE, DELAYED RELEASE ORAL at 06:03

## 2018-01-01 RX ADMIN — LORAZEPAM 2 MG: 2 INJECTION INTRAMUSCULAR at 23:17

## 2018-01-01 RX ADMIN — FUROSEMIDE 10 MG: 10 INJECTION, SOLUTION INTRAMUSCULAR; INTRAVENOUS at 05:30

## 2018-01-01 RX ADMIN — MAGNESIUM SULFATE IN WATER 2 G: 40 INJECTION, SOLUTION INTRAVENOUS at 00:26

## 2018-01-01 RX ADMIN — AMPICILLIN SODIUM AND SULBACTAM SODIUM 3 G: 2; 1 INJECTION, POWDER, FOR SOLUTION INTRAMUSCULAR; INTRAVENOUS at 00:36

## 2018-01-01 RX ADMIN — LINEZOLID 600 MG: 600 TABLET, FILM COATED ORAL at 22:46

## 2018-01-01 RX ADMIN — PIPERACILLIN AND TAZOBACTAM 3.38 G: 3; .375 INJECTION, POWDER, LYOPHILIZED, FOR SOLUTION INTRAVENOUS; PARENTERAL at 21:34

## 2018-01-01 RX ADMIN — POTASSIUM CHLORIDE 20 MEQ: 1500 TABLET, EXTENDED RELEASE ORAL at 18:07

## 2018-01-01 RX ADMIN — OXYCODONE HYDROCHLORIDE 5 MG: 5 TABLET ORAL at 17:56

## 2018-01-01 RX ADMIN — DAPTOMYCIN 670 MG: 500 INJECTION, POWDER, LYOPHILIZED, FOR SOLUTION INTRAVENOUS at 13:23

## 2018-01-01 RX ADMIN — SODIUM CHLORIDE: 9 INJECTION, SOLUTION INTRAVENOUS at 00:30

## 2018-01-01 RX ADMIN — ALBUTEROL SULFATE 2 PUFF: 90 AEROSOL, METERED RESPIRATORY (INHALATION) at 17:58

## 2018-01-01 RX ADMIN — PIPERACILLIN SODIUM AND TAZOBACTAM SODIUM 3.38 G: 3; .375 INJECTION, POWDER, FOR SOLUTION INTRAVENOUS at 21:33

## 2018-01-01 RX ADMIN — LORAZEPAM 1 MG: 2 INJECTION INTRAMUSCULAR; INTRAVENOUS at 09:26

## 2018-01-01 RX ADMIN — MORPHINE SULFATE 8 MG: 10 INJECTION INTRAVENOUS at 19:24

## 2018-01-01 RX ADMIN — AMPICILLIN SODIUM AND SULBACTAM SODIUM 3 G: 2; 1 INJECTION, POWDER, FOR SOLUTION INTRAMUSCULAR; INTRAVENOUS at 06:35

## 2018-01-01 RX ADMIN — DAPTOMYCIN 400 MG: 350 INJECTION, POWDER, LYOPHILIZED, FOR SOLUTION INTRAVENOUS at 20:15

## 2018-01-01 RX ADMIN — HEPARIN 500 UNITS: 100 SYRINGE at 14:30

## 2018-01-01 RX ADMIN — METRONIDAZOLE 500 MG: 500 TABLET ORAL at 05:34

## 2018-01-01 RX ADMIN — METRONIDAZOLE 500 MG: 500 TABLET ORAL at 21:55

## 2018-01-01 RX ADMIN — PIPERACILLIN SODIUM AND TAZOBACTAM SODIUM 3.38 G: 3; .375 INJECTION, POWDER, FOR SOLUTION INTRAVENOUS at 08:24

## 2018-01-01 RX ADMIN — ACETAMINOPHEN 650 MG: 325 TABLET, FILM COATED ORAL at 21:42

## 2018-01-01 RX ADMIN — POTASSIUM CHLORIDE AND SODIUM CHLORIDE: 900; 150 INJECTION, SOLUTION INTRAVENOUS at 16:41

## 2018-01-01 RX ADMIN — LINEZOLID 600 MG: 600 TABLET, FILM COATED ORAL at 06:09

## 2018-01-01 RX ADMIN — POTASSIUM CHLORIDE 20 MEQ: 1500 TABLET, EXTENDED RELEASE ORAL at 06:04

## 2018-01-01 RX ADMIN — DAPTOMYCIN 400 MG: 350 INJECTION, POWDER, LYOPHILIZED, FOR SOLUTION INTRAVENOUS at 19:58

## 2018-01-01 RX ADMIN — SODIUM CHLORIDE: 9 INJECTION, SOLUTION INTRAVENOUS at 07:32

## 2018-01-01 RX ADMIN — FENTANYL CITRATE 50 MCG: 50 INJECTION, SOLUTION INTRAMUSCULAR; INTRAVENOUS at 17:49

## 2018-01-01 RX ADMIN — CEFDINIR 300 MG: 300 CAPSULE ORAL at 21:08

## 2018-01-01 RX ADMIN — MICAFUNGIN SODIUM 100 MG: 20 INJECTION, POWDER, LYOPHILIZED, FOR SOLUTION INTRAVENOUS at 06:05

## 2018-01-01 RX ADMIN — HEPARIN 500 UNITS: 100 SYRINGE at 16:35

## 2018-01-01 RX ADMIN — IRINOTECAN HYDROCHLORIDE 97.18 MG: 4.3 INJECTION, POWDER, FOR SOLUTION INTRAVENOUS at 11:33

## 2018-01-01 RX ADMIN — LIDOCAINE HYDROCHLORIDE: 10 INJECTION, SOLUTION INFILTRATION; PERINEURAL at 09:12

## 2018-01-01 RX ADMIN — ACETAMINOPHEN 650 MG: 325 TABLET, FILM COATED ORAL at 15:55

## 2018-01-01 RX ADMIN — ENOXAPARIN SODIUM 40 MG: 100 INJECTION SUBCUTANEOUS at 10:03

## 2018-01-01 RX ADMIN — FLUCONAZOLE 400 MG: 2 INJECTION, SOLUTION INTRAVENOUS at 10:29

## 2018-01-01 RX ADMIN — PIPERACILLIN SODIUM AND TAZOBACTAM SODIUM 3.38 G: 3; .375 INJECTION, POWDER, FOR SOLUTION INTRAVENOUS at 10:43

## 2018-01-01 RX ADMIN — IRON SUCROSE 200 MG: 20 INJECTION, SOLUTION INTRAVENOUS at 05:39

## 2018-01-01 RX ADMIN — POTASSIUM CHLORIDE 10 MEQ: 7.46 INJECTION, SOLUTION INTRAVENOUS at 12:36

## 2018-01-01 RX ADMIN — SODIUM CHLORIDE TAB 1 GM 1 G: 1 TAB at 08:37

## 2018-01-01 RX ADMIN — ATROPINE SULFATE 0.5 MG: 1 INJECTION, SOLUTION INTRAMUSCULAR; INTRAVENOUS; SUBCUTANEOUS at 09:36

## 2018-01-01 RX ADMIN — FUROSEMIDE 20 MG: 20 TABLET ORAL at 05:34

## 2018-01-01 RX ADMIN — SODIUM CHLORIDE: 9 INJECTION, SOLUTION INTRAVENOUS at 10:40

## 2018-01-01 RX ADMIN — FENTANYL CITRATE 25 MCG: 50 INJECTION, SOLUTION INTRAMUSCULAR; INTRAVENOUS at 09:24

## 2018-01-01 RX ADMIN — MICAFUNGIN SODIUM 100 MG: 20 INJECTION, POWDER, LYOPHILIZED, FOR SOLUTION INTRAVENOUS at 05:23

## 2018-01-01 RX ADMIN — PANTOPRAZOLE SODIUM 40 MG: 40 INJECTION, POWDER, FOR SOLUTION INTRAVENOUS at 05:29

## 2018-01-01 RX ADMIN — OMEPRAZOLE 20 MG: 20 CAPSULE, DELAYED RELEASE ORAL at 05:21

## 2018-01-01 RX ADMIN — FLUCONAZOLE 400 MG: 200 TABLET ORAL at 05:20

## 2018-01-01 RX ADMIN — DAPTOMYCIN 670 MG: 500 INJECTION, POWDER, LYOPHILIZED, FOR SOLUTION INTRAVENOUS at 12:40

## 2018-01-01 RX ADMIN — AMPICILLIN SODIUM AND SULBACTAM SODIUM 3 G: 2; 1 INJECTION, POWDER, FOR SOLUTION INTRAMUSCULAR; INTRAVENOUS at 12:12

## 2018-01-01 RX ADMIN — PIPERACILLIN SODIUM AND TAZOBACTAM SODIUM 3.38 G: 3; .375 INJECTION, POWDER, FOR SOLUTION INTRAVENOUS at 05:00

## 2018-01-01 RX ADMIN — LINEZOLID 600 MG: 600 TABLET, FILM COATED ORAL at 17:52

## 2018-01-01 RX ADMIN — IOHEXOL 98 ML: 300 INJECTION, SOLUTION INTRAVENOUS at 18:35

## 2018-01-01 RX ADMIN — POTASSIUM CHLORIDE 20 MEQ: 1500 TABLET, EXTENDED RELEASE ORAL at 18:27

## 2018-01-01 RX ADMIN — APIXABAN 5 MG: 5 TABLET, FILM COATED ORAL at 06:21

## 2018-01-01 RX ADMIN — SODIUM CHLORIDE, POTASSIUM CHLORIDE, SODIUM LACTATE AND CALCIUM CHLORIDE: 600; 310; 30; 20 INJECTION, SOLUTION INTRAVENOUS at 00:10

## 2018-01-01 RX ADMIN — SODIUM CHLORIDE: 9 INJECTION, SOLUTION INTRAVENOUS at 00:45

## 2018-01-01 RX ADMIN — OMEPRAZOLE 20 MG: 20 CAPSULE, DELAYED RELEASE ORAL at 05:11

## 2018-01-01 RX ADMIN — PIPERACILLIN SODIUM AND TAZOBACTAM SODIUM 3.38 G: 3; .375 INJECTION, POWDER, FOR SOLUTION INTRAVENOUS at 21:52

## 2018-01-01 RX ADMIN — PIPERACILLIN SODIUM AND TAZOBACTAM SODIUM 3.38 G: 3; .375 INJECTION, POWDER, FOR SOLUTION INTRAVENOUS at 14:16

## 2018-01-01 RX ADMIN — SODIUM CHLORIDE: 4.5 INJECTION, SOLUTION INTRAVENOUS at 04:30

## 2018-01-01 RX ADMIN — PIPERACILLIN SODIUM AND TAZOBACTAM SODIUM 3.38 G: 3; .375 INJECTION, POWDER, FOR SOLUTION INTRAVENOUS at 12:35

## 2018-01-01 RX ADMIN — OXYCODONE HYDROCHLORIDE 5 MG: 5 TABLET ORAL at 15:20

## 2018-01-01 RX ADMIN — MORPHINE SULFATE 1 MG: 10 INJECTION INTRAVENOUS at 18:13

## 2018-01-01 RX ADMIN — MIDAZOLAM HYDROCHLORIDE 1 MG: 1 INJECTION, SOLUTION INTRAMUSCULAR; INTRAVENOUS at 09:24

## 2018-01-01 RX ADMIN — PIPERACILLIN SODIUM AND TAZOBACTAM SODIUM 3.38 G: 3; .375 INJECTION, POWDER, FOR SOLUTION INTRAVENOUS at 05:11

## 2018-01-01 RX ADMIN — DEXAMETHASONE SODIUM PHOSPHATE 12 MG: 4 INJECTION, SOLUTION INTRAMUSCULAR; INTRAVENOUS at 11:14

## 2018-01-01 RX ADMIN — FLUOROURACIL 2935 MG: 50 INJECTION, SOLUTION INTRAVENOUS at 12:45

## 2018-01-01 RX ADMIN — SODIUM CHLORIDE TAB 1 GM 1 G: 1 TAB at 20:03

## 2018-01-01 RX ADMIN — APIXABAN 5 MG: 5 TABLET, FILM COATED ORAL at 10:55

## 2018-01-01 RX ADMIN — APIXABAN 5 MG: 5 TABLET, FILM COATED ORAL at 06:38

## 2018-01-01 RX ADMIN — DEXAMETHASONE SODIUM PHOSPHATE 12 MG: 4 INJECTION, SOLUTION INTRAMUSCULAR; INTRAVENOUS at 08:52

## 2018-01-01 RX ADMIN — PIPERACILLIN SODIUM AND TAZOBACTAM SODIUM 3.38 G: 3; .375 INJECTION, POWDER, FOR SOLUTION INTRAVENOUS at 13:43

## 2018-01-01 RX ADMIN — VERAPAMIL HYDROCHLORIDE 2.5 MG: 2.5 INJECTION, SOLUTION INTRAVENOUS at 09:29

## 2018-01-01 RX ADMIN — LINEZOLID 600 MG: 600 TABLET, FILM COATED ORAL at 12:48

## 2018-01-01 RX ADMIN — METRONIDAZOLE 500 MG: 500 TABLET ORAL at 15:53

## 2018-01-01 RX ADMIN — FLUCONAZOLE 400 MG: 100 TABLET ORAL at 05:40

## 2018-01-01 RX ADMIN — FENTANYL CITRATE 25 MCG: 50 INJECTION, SOLUTION INTRAMUSCULAR; INTRAVENOUS at 18:02

## 2018-01-01 RX ADMIN — GLYCOPYRROLATE 0.2 MG: 0.2 INJECTION INTRAMUSCULAR; INTRAVENOUS at 21:43

## 2018-01-01 RX ADMIN — APIXABAN 5 MG: 5 TABLET, FILM COATED ORAL at 18:03

## 2018-01-01 RX ADMIN — AMPICILLIN SODIUM AND SULBACTAM SODIUM 3 G: 2; 1 INJECTION, POWDER, FOR SOLUTION INTRAMUSCULAR; INTRAVENOUS at 18:01

## 2018-01-01 RX ADMIN — POTASSIUM CHLORIDE 20 MEQ: 1500 TABLET, EXTENDED RELEASE ORAL at 04:38

## 2018-01-01 RX ADMIN — PIPERACILLIN SODIUM AND TAZOBACTAM SODIUM 3.38 G: 3; .375 INJECTION, POWDER, FOR SOLUTION INTRAVENOUS at 08:37

## 2018-01-01 RX ADMIN — FLUOROURACIL 2915 MG: 50 INJECTION, SOLUTION INTRAVENOUS at 16:05

## 2018-01-01 RX ADMIN — STANDARDIZED SENNA CONCENTRATE AND DOCUSATE SODIUM 2 TABLET: 8.6; 5 TABLET, FILM COATED ORAL at 05:19

## 2018-01-01 RX ADMIN — SODIUM CHLORIDE: 9 INJECTION, SOLUTION INTRAVENOUS at 19:15

## 2018-01-01 RX ADMIN — PIPERACILLIN SODIUM AND TAZOBACTAM SODIUM 3.38 G: 3; .375 INJECTION, POWDER, FOR SOLUTION INTRAVENOUS at 13:22

## 2018-01-01 RX ADMIN — LORAZEPAM 1 MG: 2 INJECTION INTRAMUSCULAR; INTRAVENOUS at 13:11

## 2018-01-01 RX ADMIN — CEFDINIR 300 MG: 300 CAPSULE ORAL at 09:40

## 2018-01-01 RX ADMIN — AMPICILLIN SODIUM AND SULBACTAM SODIUM 3 G: 2; 1 INJECTION, POWDER, FOR SOLUTION INTRAMUSCULAR; INTRAVENOUS at 18:07

## 2018-01-01 RX ADMIN — AMPICILLIN SODIUM AND SULBACTAM SODIUM 3 G: 2; 1 INJECTION, POWDER, FOR SOLUTION INTRAMUSCULAR; INTRAVENOUS at 05:35

## 2018-01-01 RX ADMIN — HEPARIN 500 UNITS: 100 SYRINGE at 08:12

## 2018-01-01 RX ADMIN — FLUCONAZOLE 400 MG: 100 TABLET ORAL at 06:47

## 2018-01-01 RX ADMIN — POTASSIUM CHLORIDE 10 MEQ: 7.46 INJECTION, SOLUTION INTRAVENOUS at 10:21

## 2018-01-01 RX ADMIN — AMPICILLIN SODIUM AND SULBACTAM SODIUM 3 G: 2; 1 INJECTION, POWDER, FOR SOLUTION INTRAMUSCULAR; INTRAVENOUS at 17:43

## 2018-01-01 RX ADMIN — MORPHINE SULFATE 1 MG: 10 INJECTION INTRAVENOUS at 07:12

## 2018-01-01 RX ADMIN — AMPICILLIN SODIUM AND SULBACTAM SODIUM 3 G: 2; 1 INJECTION, POWDER, FOR SOLUTION INTRAMUSCULAR; INTRAVENOUS at 18:02

## 2018-01-01 RX ADMIN — STANDARDIZED SENNA CONCENTRATE AND DOCUSATE SODIUM 2 TABLET: 8.6; 5 TABLET, FILM COATED ORAL at 04:57

## 2018-01-01 RX ADMIN — PIPERACILLIN SODIUM AND TAZOBACTAM SODIUM 3.38 G: 3; .375 INJECTION, POWDER, FOR SOLUTION INTRAVENOUS at 21:24

## 2018-01-01 RX ADMIN — MAGNESIUM SULFATE IN DEXTROSE 1 G: 10 INJECTION, SOLUTION INTRAVENOUS at 10:13

## 2018-01-01 RX ADMIN — FILGRASTIM 300 MCG: 300 INJECTION, SOLUTION INTRAVENOUS; SUBCUTANEOUS at 10:21

## 2018-01-01 RX ADMIN — STANDARDIZED SENNA CONCENTRATE AND DOCUSATE SODIUM 2 TABLET: 8.6; 5 TABLET, FILM COATED ORAL at 05:22

## 2018-01-01 RX ADMIN — MORPHINE SULFATE 5 MG: 10 INJECTION INTRAVENOUS at 16:55

## 2018-01-01 RX ADMIN — LORAZEPAM 2 MG: 2 INJECTION INTRAMUSCULAR at 19:25

## 2018-01-01 RX ADMIN — MAGNESIUM SULFATE IN WATER 2 G: 40 INJECTION, SOLUTION INTRAVENOUS at 10:04

## 2018-01-01 RX ADMIN — POTASSIUM CHLORIDE 20 MEQ: 1500 TABLET, EXTENDED RELEASE ORAL at 17:34

## 2018-01-01 RX ADMIN — POTASSIUM CHLORIDE 40 MEQ: 1500 TABLET, EXTENDED RELEASE ORAL at 10:59

## 2018-01-01 RX ADMIN — PROCHLORPERAZINE EDISYLATE 10 MG: 5 INJECTION INTRAMUSCULAR; INTRAVENOUS at 08:59

## 2018-01-01 RX ADMIN — OXYCODONE HYDROCHLORIDE 5 MG: 5 TABLET ORAL at 16:18

## 2018-01-01 RX ADMIN — OXYCODONE HYDROCHLORIDE 5 MG: 5 TABLET ORAL at 08:44

## 2018-01-01 RX ADMIN — FENTANYL CITRATE 50 MCG: 50 INJECTION, SOLUTION INTRAMUSCULAR; INTRAVENOUS at 17:01

## 2018-01-01 RX ADMIN — FLUCONAZOLE 400 MG: 2 INJECTION, SOLUTION INTRAVENOUS at 05:33

## 2018-01-01 RX ADMIN — DAPTOMYCIN 670 MG: 500 INJECTION, POWDER, LYOPHILIZED, FOR SOLUTION INTRAVENOUS at 12:54

## 2018-01-01 RX ADMIN — FUROSEMIDE 20 MG: 10 INJECTION, SOLUTION INTRAMUSCULAR; INTRAVENOUS at 18:10

## 2018-01-01 RX ADMIN — CEFTRIAXONE SODIUM 2 G: 2 INJECTION, POWDER, FOR SOLUTION INTRAMUSCULAR; INTRAVENOUS at 07:12

## 2018-01-01 RX ADMIN — PIPERACILLIN SODIUM AND TAZOBACTAM SODIUM 3.38 G: 3; .375 INJECTION, POWDER, FOR SOLUTION INTRAVENOUS at 21:15

## 2018-01-01 RX ADMIN — NITROGLYCERIN 100 MCG: 20 INJECTION INTRAVENOUS at 09:29

## 2018-01-01 RX ADMIN — DAPTOMYCIN 400 MG: 350 INJECTION, POWDER, LYOPHILIZED, FOR SOLUTION INTRAVENOUS at 20:51

## 2018-01-01 RX ADMIN — LINEZOLID 600 MG: 600 TABLET, FILM COATED ORAL at 08:37

## 2018-01-01 RX ADMIN — OMEPRAZOLE 20 MG: 20 CAPSULE, DELAYED RELEASE ORAL at 06:09

## 2018-01-01 RX ADMIN — APIXABAN 5 MG: 5 TABLET, FILM COATED ORAL at 17:39

## 2018-01-01 RX ADMIN — SODIUM CHLORIDE AND POTASSIUM CHLORIDE: 150; 900 INJECTION, SOLUTION INTRAVENOUS at 12:23

## 2018-01-01 RX ADMIN — SODIUM CHLORIDE: 9 INJECTION, SOLUTION INTRAVENOUS at 09:57

## 2018-01-01 RX ADMIN — PACLITAXEL 207.5 MG: 100 INJECTION, POWDER, LYOPHILIZED, FOR SUSPENSION INTRAVENOUS at 12:39

## 2018-01-01 RX ADMIN — OMEPRAZOLE 20 MG: 20 CAPSULE, DELAYED RELEASE ORAL at 05:23

## 2018-01-01 RX ADMIN — PIPERACILLIN SODIUM AND TAZOBACTAM SODIUM 3.38 G: 3; .375 INJECTION, POWDER, FOR SOLUTION INTRAVENOUS at 22:16

## 2018-01-01 RX ADMIN — POTASSIUM CHLORIDE 10 MEQ: 7.46 INJECTION, SOLUTION INTRAVENOUS at 10:43

## 2018-01-01 RX ADMIN — OMEPRAZOLE 20 MG: 20 CAPSULE, DELAYED RELEASE ORAL at 06:38

## 2018-01-01 RX ADMIN — SODIUM CHLORIDE, POTASSIUM CHLORIDE, SODIUM LACTATE AND CALCIUM CHLORIDE: 600; 310; 30; 20 INJECTION, SOLUTION INTRAVENOUS at 14:17

## 2018-01-01 RX ADMIN — CEFTRIAXONE SODIUM 2 G: 2 INJECTION, POWDER, FOR SOLUTION INTRAMUSCULAR; INTRAVENOUS at 05:40

## 2018-01-01 RX ADMIN — OXYCODONE HYDROCHLORIDE 5 MG: 5 TABLET ORAL at 08:26

## 2018-01-01 RX ADMIN — SODIUM CHLORIDE 1947 ML: 9 INJECTION, SOLUTION INTRAVENOUS at 12:37

## 2018-01-01 RX ADMIN — APIXABAN 5 MG: 5 TABLET, FILM COATED ORAL at 17:33

## 2018-01-01 RX ADMIN — ACETAMINOPHEN 650 MG: 325 TABLET, FILM COATED ORAL at 21:02

## 2018-01-01 RX ADMIN — FLUOROURACIL 3000 MG: 50 INJECTION, SOLUTION INTRAVENOUS at 15:20

## 2018-01-01 RX ADMIN — POTASSIUM CHLORIDE 20 MEQ: 1500 TABLET, EXTENDED RELEASE ORAL at 05:31

## 2018-01-01 RX ADMIN — IRON SUCROSE 200 MG: 20 INJECTION, SOLUTION INTRAVENOUS at 06:26

## 2018-01-01 RX ADMIN — INSULIN HUMAN 1 UNITS: 100 INJECTION, SOLUTION PARENTERAL at 06:39

## 2018-01-01 RX ADMIN — SODIUM CHLORIDE, PRESERVATIVE FREE 500 UNITS: 5 INJECTION INTRAVENOUS at 16:17

## 2018-01-01 RX ADMIN — CEFTRIAXONE SODIUM 2 G: 2 INJECTION, POWDER, FOR SOLUTION INTRAMUSCULAR; INTRAVENOUS at 07:09

## 2018-01-01 RX ADMIN — HEPARIN SODIUM 3000 UNITS: 1000 INJECTION, SOLUTION INTRAVENOUS; SUBCUTANEOUS at 09:22

## 2018-01-01 RX ADMIN — LIDOCAINE HYDROCHLORIDE: 10 INJECTION, SOLUTION EPIDURAL; INFILTRATION; INTRACAUDAL; PERINEURAL at 11:00

## 2018-01-01 RX ADMIN — IPRATROPIUM BROMIDE AND ALBUTEROL SULFATE 3 ML: .5; 3 SOLUTION RESPIRATORY (INHALATION) at 08:14

## 2018-01-01 RX ADMIN — MORPHINE SULFATE 5 MG: 10 INJECTION INTRAVENOUS at 13:11

## 2018-01-01 RX ADMIN — FUROSEMIDE 10 MG: 10 INJECTION, SOLUTION INTRAMUSCULAR; INTRAVENOUS at 05:19

## 2018-01-01 RX ADMIN — PIPERACILLIN SODIUM AND TAZOBACTAM SODIUM 3.38 G: 3; .375 INJECTION, POWDER, FOR SOLUTION INTRAVENOUS at 13:13

## 2018-01-01 RX ADMIN — POTASSIUM CHLORIDE 20 MEQ: 1500 TABLET, EXTENDED RELEASE ORAL at 18:02

## 2018-01-01 RX ADMIN — FENTANYL CITRATE 50 MCG: 50 INJECTION, SOLUTION INTRAMUSCULAR; INTRAVENOUS at 16:25

## 2018-01-01 RX ADMIN — Medication 125 MG: at 04:57

## 2018-01-01 RX ADMIN — GEMCITABINE 1661 MG: 1 INJECTION, POWDER, LYOPHILIZED, FOR SOLUTION INTRAVENOUS at 12:03

## 2018-01-01 RX ADMIN — PIPERACILLIN SODIUM AND TAZOBACTAM SODIUM 3.38 G: 3; .375 INJECTION, POWDER, FOR SOLUTION INTRAVENOUS at 04:50

## 2018-01-01 RX ADMIN — DEXTROSE MONOHYDRATE: 50 INJECTION, SOLUTION INTRAVENOUS at 09:42

## 2018-01-01 RX ADMIN — DAPTOMYCIN 670 MG: 500 INJECTION, POWDER, LYOPHILIZED, FOR SOLUTION INTRAVENOUS at 11:53

## 2018-01-01 RX ADMIN — LINEZOLID 600 MG: 600 TABLET, FILM COATED ORAL at 05:17

## 2018-01-01 RX ADMIN — PIPERACILLIN SODIUM AND TAZOBACTAM SODIUM 3.38 G: 3; .375 INJECTION, POWDER, FOR SOLUTION INTRAVENOUS at 05:36

## 2018-01-01 RX ADMIN — SODIUM CHLORIDE: 9 INJECTION, SOLUTION INTRAVENOUS at 18:55

## 2018-01-01 RX ADMIN — POTASSIUM CHLORIDE 20 MEQ: 1500 TABLET, EXTENDED RELEASE ORAL at 06:09

## 2018-01-01 RX ADMIN — FLUCONAZOLE 400 MG: 100 TABLET ORAL at 05:20

## 2018-01-01 RX ADMIN — CEFDINIR 300 MG: 300 CAPSULE ORAL at 08:40

## 2018-01-01 RX ADMIN — LORAZEPAM 0.5 MG: 2 INJECTION INTRAMUSCULAR; INTRAVENOUS at 04:31

## 2018-01-01 RX ADMIN — APIXABAN 5 MG: 5 TABLET, FILM COATED ORAL at 17:46

## 2018-01-01 RX ADMIN — MICAFUNGIN SODIUM 100 MG: 20 INJECTION, POWDER, LYOPHILIZED, FOR SOLUTION INTRAVENOUS at 05:17

## 2018-01-01 RX ADMIN — GADOBUTROL 7.5 ML: 604.72 INJECTION INTRAVENOUS at 09:32

## 2018-01-01 RX ADMIN — STANDARDIZED SENNA CONCENTRATE AND DOCUSATE SODIUM 2 TABLET: 8.6; 5 TABLET, FILM COATED ORAL at 04:56

## 2018-01-01 RX ADMIN — OMEPRAZOLE 20 MG: 20 CAPSULE, DELAYED RELEASE ORAL at 05:34

## 2018-01-01 RX ADMIN — STANDARDIZED SENNA CONCENTRATE AND DOCUSATE SODIUM 2 TABLET: 8.6; 5 TABLET, FILM COATED ORAL at 17:23

## 2018-01-01 RX ADMIN — AMPICILLIN SODIUM AND SULBACTAM SODIUM 3 G: 2; 1 INJECTION, POWDER, FOR SOLUTION INTRAMUSCULAR; INTRAVENOUS at 17:52

## 2018-01-01 RX ADMIN — MIDAZOLAM HYDROCHLORIDE 1 MG: 1 INJECTION, SOLUTION INTRAMUSCULAR; INTRAVENOUS at 09:12

## 2018-01-01 RX ADMIN — AMPICILLIN SODIUM AND SULBACTAM SODIUM 3 G: 2; 1 INJECTION, POWDER, FOR SOLUTION INTRAMUSCULAR; INTRAVENOUS at 00:27

## 2018-01-01 RX ADMIN — PIPERACILLIN SODIUM AND TAZOBACTAM SODIUM 3.38 G: 3; .375 INJECTION, POWDER, FOR SOLUTION INTRAVENOUS at 05:17

## 2018-01-01 RX ADMIN — POTASSIUM CHLORIDE 10 MEQ: 7.46 INJECTION, SOLUTION INTRAVENOUS at 16:39

## 2018-01-01 RX ADMIN — PIPERACILLIN SODIUM AND TAZOBACTAM SODIUM 3.38 G: 3; .375 INJECTION, POWDER, FOR SOLUTION INTRAVENOUS at 12:49

## 2018-01-01 RX ADMIN — FLUCONAZOLE 400 MG: 100 TABLET ORAL at 04:56

## 2018-01-01 RX ADMIN — HEPARIN 500 UNITS: 100 SYRINGE at 15:45

## 2018-01-01 RX ADMIN — CEFTRIAXONE SODIUM 2 G: 2 INJECTION, POWDER, FOR SOLUTION INTRAMUSCULAR; INTRAVENOUS at 07:15

## 2018-01-01 RX ADMIN — OXYCODONE HYDROCHLORIDE 5 MG: 5 TABLET ORAL at 15:11

## 2018-01-01 RX ADMIN — PIPERACILLIN AND TAZOBACTAM 3.38 G: 3; .375 INJECTION, POWDER, LYOPHILIZED, FOR SOLUTION INTRAVENOUS; PARENTERAL at 21:57

## 2018-01-01 RX ADMIN — AMPICILLIN SODIUM AND SULBACTAM SODIUM 3 G: 2; 1 INJECTION, POWDER, FOR SOLUTION INTRAMUSCULAR; INTRAVENOUS at 11:58

## 2018-01-01 RX ADMIN — POTASSIUM CHLORIDE 10 MEQ: 7.46 INJECTION, SOLUTION INTRAVENOUS at 14:43

## 2018-01-01 RX ADMIN — PROCHLORPERAZINE EDISYLATE 10 MG: 5 INJECTION INTRAMUSCULAR; INTRAVENOUS at 21:46

## 2018-01-01 RX ADMIN — APIXABAN 5 MG: 5 TABLET, FILM COATED ORAL at 05:20

## 2018-01-01 RX ADMIN — STANDARDIZED SENNA CONCENTRATE AND DOCUSATE SODIUM 2 TABLET: 8.6; 5 TABLET, FILM COATED ORAL at 06:26

## 2018-01-01 RX ADMIN — SODIUM CHLORIDE: 9 INJECTION, SOLUTION INTRAVENOUS at 02:50

## 2018-01-01 RX ADMIN — FUROSEMIDE 20 MG: 10 INJECTION, SOLUTION INTRAMUSCULAR; INTRAVENOUS at 16:11

## 2018-01-01 RX ADMIN — MIDAZOLAM 1 MG: 1 INJECTION INTRAMUSCULAR; INTRAVENOUS at 16:29

## 2018-01-01 RX ADMIN — ALBUTEROL SULFATE 2 PUFF: 90 AEROSOL, METERED RESPIRATORY (INHALATION) at 05:39

## 2018-01-01 RX ADMIN — ENOXAPARIN SODIUM 40 MG: 100 INJECTION SUBCUTANEOUS at 05:19

## 2018-01-01 RX ADMIN — OMEPRAZOLE 20 MG: 20 CAPSULE, DELAYED RELEASE ORAL at 05:40

## 2018-01-01 RX ADMIN — HEPARIN 500 UNITS: 100 SYRINGE at 10:30

## 2018-01-01 RX ADMIN — AMPICILLIN SODIUM AND SULBACTAM SODIUM 3 G: 2; 1 INJECTION, POWDER, FOR SOLUTION INTRAMUSCULAR; INTRAVENOUS at 00:01

## 2018-01-01 RX ADMIN — OXYCODONE HYDROCHLORIDE 5 MG: 5 TABLET ORAL at 15:51

## 2018-01-01 RX ADMIN — FLUCONAZOLE 400 MG: 100 TABLET ORAL at 05:52

## 2018-01-01 RX ADMIN — IOHEXOL 100 ML: 350 INJECTION, SOLUTION INTRAVENOUS at 15:29

## 2018-01-01 RX ADMIN — FENTANYL CITRATE 25 MCG: 50 INJECTION, SOLUTION INTRAMUSCULAR; INTRAVENOUS at 09:13

## 2018-01-01 RX ADMIN — LINEZOLID 600 MG: 600 TABLET, FILM COATED ORAL at 16:33

## 2018-01-01 RX ADMIN — PIPERACILLIN SODIUM AND TAZOBACTAM SODIUM 3.38 G: 3; .375 INJECTION, POWDER, FOR SOLUTION INTRAVENOUS at 05:15

## 2018-01-01 RX ADMIN — OXYCODONE HYDROCHLORIDE 5 MG: 5 TABLET ORAL at 14:19

## 2018-01-01 RX ADMIN — MIDAZOLAM 2 MG: 1 INJECTION INTRAMUSCULAR; INTRAVENOUS at 09:13

## 2018-01-01 RX ADMIN — SODIUM CHLORIDE 1000 ML: 9 INJECTION, SOLUTION INTRAVENOUS at 12:23

## 2018-01-01 RX ADMIN — OMEPRAZOLE 20 MG: 20 CAPSULE, DELAYED RELEASE ORAL at 06:26

## 2018-01-01 RX ADMIN — METRONIDAZOLE 500 MG: 500 TABLET ORAL at 05:20

## 2018-01-01 RX ADMIN — MIDAZOLAM 1 MG: 1 INJECTION INTRAMUSCULAR; INTRAVENOUS at 16:48

## 2018-01-01 RX ADMIN — OMEPRAZOLE 20 MG: 20 CAPSULE, DELAYED RELEASE ORAL at 05:46

## 2018-01-01 RX ADMIN — SODIUM CHLORIDE: 9 INJECTION, SOLUTION INTRAVENOUS at 13:22

## 2018-01-01 RX ADMIN — INFLUENZA A VIRUS A/MICHIGAN/45/2015 X-275 (H1N1) ANTIGEN (FORMALDEHYDE INACTIVATED), INFLUENZA A VIRUS A/SINGAPORE/INFIMH-16-0019/2016 IVR-186 (H3N2) ANTIGEN (FORMALDEHYDE INACTIVATED), AND INFLUENZA B VIRUS B/MARYLAND/15/2016 BX-69A (A B/COLORADO/6/2017-LIKE VIRUS) ANTIGEN (FORMALDEHYDE INACTIVATED) 0.5 ML: 60; 60; 60 INJECTION, SUSPENSION INTRAMUSCULAR at 17:35

## 2018-01-01 RX ADMIN — FLUCONAZOLE 400 MG: 100 TABLET ORAL at 05:34

## 2018-01-01 RX ADMIN — HEPARIN 500 UNITS: 100 SYRINGE at 13:31

## 2018-01-01 RX ADMIN — METRONIDAZOLE 500 MG: 500 TABLET ORAL at 21:46

## 2018-01-01 RX ADMIN — PIPERACILLIN AND TAZOBACTAM 3.38 G: 3; .375 INJECTION, POWDER, LYOPHILIZED, FOR SOLUTION INTRAVENOUS; PARENTERAL at 16:41

## 2018-01-01 RX ADMIN — HEPARIN 500 UNITS: 100 SYRINGE at 18:55

## 2018-01-01 RX ADMIN — PIPERACILLIN AND TAZOBACTAM 3.38 G: 3; .375 INJECTION, POWDER, LYOPHILIZED, FOR SOLUTION INTRAVENOUS; PARENTERAL at 21:10

## 2018-01-01 RX ADMIN — IOHEXOL 100 ML: 350 INJECTION, SOLUTION INTRAVENOUS at 15:06

## 2018-01-01 RX ADMIN — MORPHINE SULFATE 1 MG: 10 INJECTION INTRAVENOUS at 21:46

## 2018-01-01 RX ADMIN — OMEPRAZOLE 20 MG: 20 CAPSULE, DELAYED RELEASE ORAL at 06:18

## 2018-01-01 RX ADMIN — MORPHINE SULFATE 5 MG: 10 INJECTION INTRAVENOUS at 04:17

## 2018-01-01 RX ADMIN — DEXAMETHASONE SODIUM PHOSPHATE 12 MG: 4 INJECTION, SOLUTION INTRAMUSCULAR; INTRAVENOUS at 09:04

## 2018-01-01 RX ADMIN — STANDARDIZED SENNA CONCENTRATE AND DOCUSATE SODIUM 2 TABLET: 8.6; 5 TABLET, FILM COATED ORAL at 05:52

## 2018-01-01 RX ADMIN — STANDARDIZED SENNA CONCENTRATE AND DOCUSATE SODIUM 2 TABLET: 8.6; 5 TABLET, FILM COATED ORAL at 05:23

## 2018-01-01 RX ADMIN — FUROSEMIDE 10 MG: 10 INJECTION, SOLUTION INTRAMUSCULAR; INTRAVENOUS at 05:32

## 2018-01-01 RX ADMIN — LINEZOLID 600 MG: 600 INJECTION, SOLUTION INTRAVENOUS at 20:40

## 2018-01-01 RX ADMIN — STANDARDIZED SENNA CONCENTRATE AND DOCUSATE SODIUM 2 TABLET: 8.6; 5 TABLET, FILM COATED ORAL at 06:18

## 2018-01-01 RX ADMIN — MORPHINE SULFATE 1 MG: 10 INJECTION INTRAVENOUS at 10:50

## 2018-01-01 RX ADMIN — CEFTRIAXONE 2 G: 2 INJECTION, POWDER, FOR SOLUTION INTRAMUSCULAR; INTRAVENOUS at 11:19

## 2018-01-01 RX ADMIN — POTASSIUM CHLORIDE 20 MEQ: 1500 TABLET, EXTENDED RELEASE ORAL at 05:34

## 2018-01-01 RX ADMIN — CEFDINIR 300 MG: 300 CAPSULE ORAL at 21:46

## 2018-01-01 RX ADMIN — POTASSIUM CHLORIDE AND SODIUM CHLORIDE: 900; 150 INJECTION, SOLUTION INTRAVENOUS at 21:36

## 2018-01-01 RX ADMIN — FUROSEMIDE 10 MG: 10 INJECTION, SOLUTION INTRAMUSCULAR; INTRAVENOUS at 11:45

## 2018-01-01 RX ADMIN — PANTOPRAZOLE SODIUM 40 MG: 40 INJECTION, POWDER, FOR SOLUTION INTRAVENOUS at 05:27

## 2018-01-01 RX ADMIN — FUROSEMIDE 20 MG: 20 TABLET ORAL at 06:04

## 2018-01-01 RX ADMIN — LIDOCAINE HYDROCHLORIDE 10 ML: 10 INJECTION, SOLUTION EPIDURAL; INFILTRATION; INTRACAUDAL; PERINEURAL at 09:18

## 2018-01-01 RX ADMIN — PROCHLORPERAZINE EDISYLATE 10 MG: 5 INJECTION INTRAMUSCULAR; INTRAVENOUS at 13:00

## 2018-01-01 RX ADMIN — LINEZOLID 600 MG: 600 TABLET, FILM COATED ORAL at 06:37

## 2018-01-01 RX ADMIN — OMEPRAZOLE 20 MG: 20 CAPSULE, DELAYED RELEASE ORAL at 05:04

## 2018-01-01 RX ADMIN — AMPICILLIN SODIUM AND SULBACTAM SODIUM 3 G: 2; 1 INJECTION, POWDER, FOR SOLUTION INTRAMUSCULAR; INTRAVENOUS at 00:29

## 2018-01-01 RX ADMIN — AMPICILLIN SODIUM AND SULBACTAM SODIUM 3 G: 2; 1 INJECTION, POWDER, FOR SOLUTION INTRAMUSCULAR; INTRAVENOUS at 06:36

## 2018-01-01 RX ADMIN — POTASSIUM CHLORIDE 20 MEQ: 1500 TABLET, EXTENDED RELEASE ORAL at 16:33

## 2018-01-01 RX ADMIN — CEFDINIR 300 MG: 300 CAPSULE ORAL at 17:46

## 2018-01-01 RX ADMIN — MORPHINE SULFATE 10 MG: 10 INJECTION INTRAVENOUS at 21:42

## 2018-01-01 RX ADMIN — APIXABAN 5 MG: 5 TABLET, FILM COATED ORAL at 17:44

## 2018-01-01 RX ADMIN — FLUCONAZOLE 400 MG: 2 INJECTION, SOLUTION INTRAVENOUS at 05:17

## 2018-01-01 RX ADMIN — APIXABAN 5 MG: 5 TABLET, FILM COATED ORAL at 17:27

## 2018-01-01 RX ADMIN — LINEZOLID 600 MG: 600 TABLET, FILM COATED ORAL at 06:38

## 2018-01-01 RX ADMIN — IOHEXOL 100 ML: 350 INJECTION, SOLUTION INTRAVENOUS at 10:29

## 2018-01-01 RX ADMIN — AMPICILLIN SODIUM AND SULBACTAM SODIUM 3 G: 2; 1 INJECTION, POWDER, FOR SOLUTION INTRAMUSCULAR; INTRAVENOUS at 06:00

## 2018-01-01 RX ADMIN — LEUCOVORIN CALCIUM 660 MG: 350 INJECTION, POWDER, LYOPHILIZED, FOR SOLUTION INTRAMUSCULAR; INTRAVENOUS at 11:48

## 2018-01-01 RX ADMIN — CEFTRIAXONE SODIUM 2 G: 2 INJECTION, POWDER, FOR SOLUTION INTRAMUSCULAR; INTRAVENOUS at 07:05

## 2018-01-01 RX ADMIN — SODIUM CHLORIDE: 9 INJECTION, SOLUTION INTRAVENOUS at 00:55

## 2018-01-01 RX ADMIN — ATROPINE SULFATE 0.5 MG: 1 INJECTION, SOLUTION INTRAMUSCULAR; INTRAVENOUS; SUBCUTANEOUS at 09:57

## 2018-01-01 RX ADMIN — FLUCONAZOLE 400 MG: 100 TABLET ORAL at 06:26

## 2018-01-01 RX ADMIN — ONDANSETRON HYDROCHLORIDE 16 MG: 2 INJECTION, SOLUTION INTRAMUSCULAR; INTRAVENOUS at 09:21

## 2018-01-01 RX ADMIN — APIXABAN 5 MG: 5 TABLET, FILM COATED ORAL at 17:31

## 2018-01-01 RX ADMIN — APIXABAN 5 MG: 5 TABLET, FILM COATED ORAL at 17:23

## 2018-01-01 RX ADMIN — SODIUM CHLORIDE: 3 INJECTION, SOLUTION INTRAVENOUS at 20:46

## 2018-01-01 RX ADMIN — APIXABAN 5 MG: 5 TABLET, FILM COATED ORAL at 04:57

## 2018-01-01 RX ADMIN — MIDAZOLAM 1 MG: 1 INJECTION INTRAMUSCULAR; INTRAVENOUS at 09:16

## 2018-01-01 RX ADMIN — APIXABAN 5 MG: 5 TABLET, FILM COATED ORAL at 17:34

## 2018-01-01 RX ADMIN — FENTANYL CITRATE 50 MCG: 50 INJECTION, SOLUTION INTRAMUSCULAR; INTRAVENOUS at 16:55

## 2018-01-01 RX ADMIN — APIXABAN 5 MG: 5 TABLET, FILM COATED ORAL at 06:03

## 2018-01-01 RX ADMIN — MIDAZOLAM HYDROCHLORIDE 1 MG: 1 INJECTION INTRAMUSCULAR; INTRAVENOUS at 09:18

## 2018-01-01 RX ADMIN — STANDARDIZED SENNA CONCENTRATE AND DOCUSATE SODIUM 2 TABLET: 8.6; 5 TABLET, FILM COATED ORAL at 05:21

## 2018-01-01 RX ADMIN — LINEZOLID 600 MG: 600 TABLET, FILM COATED ORAL at 04:38

## 2018-01-01 RX ADMIN — OMEPRAZOLE 20 MG: 20 CAPSULE, DELAYED RELEASE ORAL at 05:20

## 2018-01-01 RX ADMIN — PIPERACILLIN SODIUM AND TAZOBACTAM SODIUM 3.38 G: 3; .375 INJECTION, POWDER, FOR SOLUTION INTRAVENOUS at 06:02

## 2018-01-01 RX ADMIN — PROCHLORPERAZINE EDISYLATE 10 MG: 5 INJECTION INTRAMUSCULAR; INTRAVENOUS at 18:10

## 2018-01-01 RX ADMIN — SODIUM CHLORIDE: 3 INJECTION, SOLUTION INTRAVENOUS at 17:48

## 2018-01-01 RX ADMIN — DAPTOMYCIN 670 MG: 500 INJECTION, POWDER, LYOPHILIZED, FOR SOLUTION INTRAVENOUS at 14:05

## 2018-01-01 RX ADMIN — SODIUM CHLORIDE 500 ML: 9 INJECTION, SOLUTION INTRAVENOUS at 09:00

## 2018-01-01 RX ADMIN — FLUCONAZOLE 400 MG: 100 TABLET ORAL at 05:21

## 2018-01-01 RX ADMIN — LINEZOLID 600 MG: 600 TABLET, FILM COATED ORAL at 17:48

## 2018-01-01 RX ADMIN — CEFTRIAXONE SODIUM 2 G: 2 INJECTION, POWDER, FOR SOLUTION INTRAMUSCULAR; INTRAVENOUS at 17:05

## 2018-01-01 RX ADMIN — APIXABAN 5 MG: 5 TABLET, FILM COATED ORAL at 06:37

## 2018-01-01 RX ADMIN — APIXABAN 5 MG: 5 TABLET, FILM COATED ORAL at 18:09

## 2018-01-01 RX ADMIN — POTASSIUM CHLORIDE 20 MEQ: 1500 TABLET, EXTENDED RELEASE ORAL at 05:21

## 2018-01-01 RX ADMIN — FENTANYL CITRATE 50 MCG: 50 INJECTION, SOLUTION INTRAMUSCULAR; INTRAVENOUS at 09:18

## 2018-01-01 RX ADMIN — PIPERACILLIN SODIUM AND TAZOBACTAM SODIUM 3.38 G: 3; .375 INJECTION, POWDER, FOR SOLUTION INTRAVENOUS at 00:22

## 2018-01-01 RX ADMIN — FUROSEMIDE 20 MG: 10 INJECTION, SOLUTION INTRAMUSCULAR; INTRAVENOUS at 22:51

## 2018-01-01 RX ADMIN — ONDANSETRON HYDROCHLORIDE 16 MG: 2 SOLUTION INTRAMUSCULAR; INTRAVENOUS at 08:06

## 2018-01-01 RX ADMIN — FILGRASTIM 300 MCG: 300 INJECTION, SOLUTION INTRAVENOUS; SUBCUTANEOUS at 14:30

## 2018-01-01 RX ADMIN — APIXABAN 5 MG: 5 TABLET, FILM COATED ORAL at 17:12

## 2018-01-01 RX ADMIN — PIPERACILLIN AND TAZOBACTAM 3.38 G: 3; .375 INJECTION, POWDER, LYOPHILIZED, FOR SOLUTION INTRAVENOUS; PARENTERAL at 13:48

## 2018-01-01 RX ADMIN — SODIUM CHLORIDE: 9 INJECTION, SOLUTION INTRAVENOUS at 21:55

## 2018-01-01 RX ADMIN — LEUCOVORIN CALCIUM 648 MG: 350 INJECTION, POWDER, LYOPHILIZED, FOR SOLUTION INTRAMUSCULAR; INTRAVENOUS at 13:06

## 2018-01-01 RX ADMIN — SODIUM CHLORIDE, POTASSIUM CHLORIDE, SODIUM LACTATE AND CALCIUM CHLORIDE: 600; 310; 30; 20 INJECTION, SOLUTION INTRAVENOUS at 21:15

## 2018-01-01 RX ADMIN — HYDROMORPHONE HYDROCHLORIDE 0.5 MG: 1 INJECTION, SOLUTION INTRAMUSCULAR; INTRAVENOUS; SUBCUTANEOUS at 02:58

## 2018-01-01 RX ADMIN — LORAZEPAM 0.5 MG: 2 INJECTION INTRAMUSCULAR; INTRAVENOUS at 06:01

## 2018-01-01 RX ADMIN — PIPERACILLIN AND TAZOBACTAM 3.38 G: 3; .375 INJECTION, POWDER, LYOPHILIZED, FOR SOLUTION INTRAVENOUS; PARENTERAL at 12:44

## 2018-01-01 RX ADMIN — POTASSIUM CHLORIDE 20 MEQ: 1500 TABLET, EXTENDED RELEASE ORAL at 05:20

## 2018-01-01 RX ADMIN — PROCHLORPERAZINE EDISYLATE 10 MG: 5 INJECTION INTRAMUSCULAR; INTRAVENOUS at 09:40

## 2018-01-01 RX ADMIN — AMPICILLIN SODIUM AND SULBACTAM SODIUM 3 G: 2; 1 INJECTION, POWDER, FOR SOLUTION INTRAMUSCULAR; INTRAVENOUS at 11:48

## 2018-01-01 RX ADMIN — IRON SUCROSE 200 MG: 20 INJECTION, SOLUTION INTRAVENOUS at 06:05

## 2018-01-01 RX ADMIN — CEFDINIR 300 MG: 300 CAPSULE ORAL at 11:51

## 2018-01-01 RX ADMIN — SODIUM CHLORIDE: 9 INJECTION, SOLUTION INTRAVENOUS at 14:06

## 2018-01-01 RX ADMIN — APIXABAN 5 MG: 5 TABLET, FILM COATED ORAL at 04:50

## 2018-01-01 RX ADMIN — VERAPAMIL HYDROCHLORIDE 2.5 MG: 2.5 INJECTION, SOLUTION INTRAVENOUS at 09:30

## 2018-01-01 RX ADMIN — CEFTRIAXONE SODIUM 2 G: 2 INJECTION, POWDER, FOR SOLUTION INTRAMUSCULAR; INTRAVENOUS at 07:30

## 2018-01-01 RX ADMIN — ONDANSETRON 4 MG: 4 TABLET, ORALLY DISINTEGRATING ORAL at 18:39

## 2018-01-01 RX ADMIN — ONDANSETRON 4 MG: 2 INJECTION INTRAMUSCULAR; INTRAVENOUS at 13:36

## 2018-01-01 RX ADMIN — PIPERACILLIN SODIUM AND TAZOBACTAM SODIUM 3.38 G: 3; .375 INJECTION, POWDER, FOR SOLUTION INTRAVENOUS at 21:00

## 2018-01-01 RX ADMIN — OMEPRAZOLE 20 MG: 20 CAPSULE, DELAYED RELEASE ORAL at 06:04

## 2018-01-01 RX ADMIN — IPRATROPIUM BROMIDE AND ALBUTEROL SULFATE 3 ML: .5; 3 SOLUTION RESPIRATORY (INHALATION) at 19:38

## 2018-01-01 RX ADMIN — STANDARDIZED SENNA CONCENTRATE AND DOCUSATE SODIUM 2 TABLET: 8.6; 5 TABLET, FILM COATED ORAL at 17:33

## 2018-01-01 RX ADMIN — AMPICILLIN SODIUM AND SULBACTAM SODIUM 3 G: 2; 1 INJECTION, POWDER, FOR SOLUTION INTRAMUSCULAR; INTRAVENOUS at 01:41

## 2018-01-01 RX ADMIN — STANDARDIZED SENNA CONCENTRATE AND DOCUSATE SODIUM 2 TABLET: 8.6; 5 TABLET, FILM COATED ORAL at 18:08

## 2018-01-01 RX ADMIN — ONDANSETRON HYDROCHLORIDE 16 MG: 2 INJECTION, SOLUTION INTRAMUSCULAR; INTRAVENOUS at 12:05

## 2018-01-01 RX ADMIN — OXYCODONE HYDROCHLORIDE 5 MG: 5 TABLET ORAL at 16:08

## 2018-01-01 RX ADMIN — POTASSIUM CHLORIDE 10 MEQ: 7.46 INJECTION, SOLUTION INTRAVENOUS at 15:00

## 2018-01-01 RX ADMIN — LINEZOLID 600 MG: 600 TABLET, FILM COATED ORAL at 21:08

## 2018-01-01 RX ADMIN — FUROSEMIDE 20 MG: 10 INJECTION, SOLUTION INTRAMUSCULAR; INTRAVENOUS at 17:48

## 2018-01-01 RX ADMIN — SODIUM CHLORIDE: 9 INJECTION, SOLUTION INTRAVENOUS at 17:47

## 2018-01-01 RX ADMIN — AMPICILLIN SODIUM AND SULBACTAM SODIUM 3 G: 2; 1 INJECTION, POWDER, FOR SOLUTION INTRAMUSCULAR; INTRAVENOUS at 18:27

## 2018-01-01 RX ADMIN — OMEPRAZOLE 20 MG: 20 CAPSULE, DELAYED RELEASE ORAL at 05:52

## 2018-01-01 RX ADMIN — OMEPRAZOLE 20 MG: 20 CAPSULE, DELAYED RELEASE ORAL at 04:56

## 2018-01-01 RX ADMIN — GUAIFENESIN 200 MG: 100 SOLUTION ORAL at 14:24

## 2018-01-01 RX ADMIN — IRINOTECAN HYDROCHLORIDE: 4.3 INJECTION, POWDER, FOR SOLUTION INTRAVENOUS at 10:05

## 2018-01-01 RX ADMIN — IOHEXOL 50 ML: 300 INJECTION, SOLUTION INTRAVENOUS at 09:58

## 2018-01-01 RX ADMIN — MORPHINE SULFATE 5 MG: 10 INJECTION INTRAVENOUS at 11:56

## 2018-01-01 RX ADMIN — AMPICILLIN SODIUM AND SULBACTAM SODIUM 3 G: 2; 1 INJECTION, POWDER, FOR SOLUTION INTRAMUSCULAR; INTRAVENOUS at 13:13

## 2018-01-01 RX ADMIN — OMEPRAZOLE 20 MG: 20 CAPSULE, DELAYED RELEASE ORAL at 05:17

## 2018-01-01 RX ADMIN — STANDARDIZED SENNA CONCENTRATE AND DOCUSATE SODIUM 2 TABLET: 8.6; 5 TABLET, FILM COATED ORAL at 17:55

## 2018-01-01 RX ADMIN — AMPICILLIN SODIUM AND SULBACTAM SODIUM 3 G: 2; 1 INJECTION, POWDER, FOR SOLUTION INTRAMUSCULAR; INTRAVENOUS at 13:41

## 2018-01-01 RX ADMIN — POTASSIUM CHLORIDE 20 MEQ: 1500 TABLET, EXTENDED RELEASE ORAL at 06:00

## 2018-01-01 RX ADMIN — PIPERACILLIN AND TAZOBACTAM 3.38 G: 3; .375 INJECTION, POWDER, LYOPHILIZED, FOR SOLUTION INTRAVENOUS; PARENTERAL at 08:22

## 2018-01-01 RX ADMIN — ATROPINE SULFATE 0.5 MG: 1 INJECTION, SOLUTION INTRAMUSCULAR; INTRAVENOUS; SUBCUTANEOUS at 12:48

## 2018-01-01 RX ADMIN — PANTOPRAZOLE SODIUM 40 MG: 40 INJECTION, POWDER, FOR SOLUTION INTRAVENOUS at 05:15

## 2018-01-01 RX ADMIN — METRONIDAZOLE 500 MG: 500 INJECTION, SOLUTION INTRAVENOUS at 17:52

## 2018-01-01 RX ADMIN — DAPTOMYCIN 670 MG: 500 INJECTION, POWDER, LYOPHILIZED, FOR SOLUTION INTRAVENOUS at 12:49

## 2018-01-01 RX ADMIN — PIPERACILLIN SODIUM AND TAZOBACTAM SODIUM 3.38 G: 3; .375 INJECTION, POWDER, FOR SOLUTION INTRAVENOUS at 13:03

## 2018-01-01 RX ADMIN — Medication 125 MG: at 17:56

## 2018-01-01 RX ADMIN — MICAFUNGIN SODIUM 100 MG: 20 INJECTION, POWDER, LYOPHILIZED, FOR SOLUTION INTRAVENOUS at 06:33

## 2018-01-01 RX ADMIN — DEXAMETHASONE SODIUM PHOSPHATE 12 MG: 4 INJECTION, SOLUTION INTRAMUSCULAR; INTRAVENOUS at 09:15

## 2018-01-01 RX ADMIN — SODIUM CHLORIDE: 9 INJECTION, SOLUTION INTRAVENOUS at 08:38

## 2018-01-01 RX ADMIN — AMPICILLIN SODIUM AND SULBACTAM SODIUM 3 G: 2; 1 INJECTION, POWDER, FOR SOLUTION INTRAMUSCULAR; INTRAVENOUS at 11:36

## 2018-01-01 RX ADMIN — APIXABAN 5 MG: 5 TABLET, FILM COATED ORAL at 05:11

## 2018-01-01 RX ADMIN — FLUCONAZOLE 400 MG: 100 TABLET ORAL at 05:45

## 2018-01-01 RX ADMIN — FLUCONAZOLE 400 MG: 100 TABLET ORAL at 05:30

## 2018-01-01 RX ADMIN — POTASSIUM CHLORIDE 10 MEQ: 7.46 INJECTION, SOLUTION INTRAVENOUS at 13:58

## 2018-01-01 RX ADMIN — AMPICILLIN SODIUM AND SULBACTAM SODIUM 3 G: 2; 1 INJECTION, POWDER, FOR SOLUTION INTRAMUSCULAR; INTRAVENOUS at 00:18

## 2018-01-01 RX ADMIN — APIXABAN 5 MG: 5 TABLET, FILM COATED ORAL at 05:15

## 2018-01-01 RX ADMIN — IOHEXOL 100 ML: 350 INJECTION, SOLUTION INTRAVENOUS at 11:27

## 2018-01-01 RX ADMIN — FLUCONAZOLE 400 MG: 100 TABLET ORAL at 06:37

## 2018-01-01 RX ADMIN — CEFDINIR 300 MG: 300 CAPSULE ORAL at 21:55

## 2018-01-01 RX ADMIN — PIPERACILLIN SODIUM AND TAZOBACTAM SODIUM 3.38 G: 3; .375 INJECTION, POWDER, FOR SOLUTION INTRAVENOUS at 13:00

## 2018-01-01 RX ADMIN — OXYCODONE HYDROCHLORIDE 5 MG: 5 TABLET ORAL at 13:25

## 2018-01-01 RX ADMIN — PIPERACILLIN SODIUM AND TAZOBACTAM SODIUM 3.38 G: 3; .375 INJECTION, POWDER, FOR SOLUTION INTRAVENOUS at 14:01

## 2018-01-01 RX ADMIN — CEFTRIAXONE SODIUM 2 G: 2 INJECTION, POWDER, FOR SOLUTION INTRAMUSCULAR; INTRAVENOUS at 07:50

## 2018-01-01 RX ADMIN — ALBUTEROL SULFATE 2 PUFF: 90 AEROSOL, METERED RESPIRATORY (INHALATION) at 04:49

## 2018-01-01 RX ADMIN — AMPICILLIN SODIUM AND SULBACTAM SODIUM 3 G: 2; 1 INJECTION, POWDER, FOR SOLUTION INTRAMUSCULAR; INTRAVENOUS at 00:52

## 2018-01-01 RX ADMIN — ONDANSETRON 4 MG: 2 INJECTION INTRAMUSCULAR; INTRAVENOUS at 16:21

## 2018-01-01 RX ADMIN — PIPERACILLIN SODIUM AND TAZOBACTAM SODIUM 3.38 G: 3; .375 INJECTION, POWDER, FOR SOLUTION INTRAVENOUS at 21:25

## 2018-01-01 RX ADMIN — POTASSIUM CHLORIDE 20 MEQ: 1500 TABLET, EXTENDED RELEASE ORAL at 06:36

## 2018-01-01 RX ADMIN — LORAZEPAM 0.5 MG: 2 INJECTION INTRAMUSCULAR; INTRAVENOUS at 23:32

## 2018-01-01 RX ADMIN — ONDANSETRON HYDROCHLORIDE 16 MG: 2 INJECTION, SOLUTION INTRAMUSCULAR; INTRAVENOUS at 09:10

## 2018-01-01 RX ADMIN — HEPARIN 500 UNITS: 100 SYRINGE at 11:36

## 2018-01-01 RX ADMIN — APIXABAN 5 MG: 5 TABLET, FILM COATED ORAL at 05:17

## 2018-01-01 RX ADMIN — HEPARIN SODIUM 3000 UNITS: 1000 INJECTION, SOLUTION INTRAVENOUS; SUBCUTANEOUS at 09:30

## 2018-01-01 RX ADMIN — APIXABAN 5 MG: 5 TABLET, FILM COATED ORAL at 17:35

## 2018-01-01 RX ADMIN — CEFTRIAXONE SODIUM 1 G: 1 INJECTION, POWDER, FOR SOLUTION INTRAMUSCULAR; INTRAVENOUS at 08:55

## 2018-01-01 RX ADMIN — FLUCONAZOLE 400 MG: 100 TABLET ORAL at 06:02

## 2018-01-01 RX ADMIN — FENTANYL CITRATE 25 MCG: 50 INJECTION, SOLUTION INTRAMUSCULAR; INTRAVENOUS at 18:20

## 2018-01-01 RX ADMIN — APIXABAN 5 MG: 5 TABLET, FILM COATED ORAL at 17:52

## 2018-01-01 RX ADMIN — OMEPRAZOLE 20 MG: 20 CAPSULE, DELAYED RELEASE ORAL at 05:50

## 2018-01-01 RX ADMIN — FUROSEMIDE 20 MG: 20 TABLET ORAL at 04:56

## 2018-01-01 RX ADMIN — LORAZEPAM 1 MG: 2 INJECTION INTRAMUSCULAR; INTRAVENOUS at 11:56

## 2018-01-01 RX ADMIN — PIPERACILLIN SODIUM AND TAZOBACTAM SODIUM 3.38 G: 3; .375 INJECTION, POWDER, FOR SOLUTION INTRAVENOUS at 05:22

## 2018-01-01 RX ADMIN — STANDARDIZED SENNA CONCENTRATE AND DOCUSATE SODIUM 2 TABLET: 8.6; 5 TABLET, FILM COATED ORAL at 05:33

## 2018-01-01 RX ADMIN — FLUOROURACIL 3300 MG: 50 INJECTION, SOLUTION INTRAVENOUS at 12:29

## 2018-01-01 RX ADMIN — ONDANSETRON 4 MG: 4 TABLET, ORALLY DISINTEGRATING ORAL at 21:36

## 2018-01-01 RX ADMIN — IOHEXOL 100 ML: 350 INJECTION, SOLUTION INTRAVENOUS at 13:58

## 2018-01-01 RX ADMIN — MIDAZOLAM HYDROCHLORIDE 2 MG: 1 INJECTION INTRAMUSCULAR; INTRAVENOUS at 09:13

## 2018-01-01 RX ADMIN — POTASSIUM CHLORIDE 10 MEQ: 7.46 INJECTION, SOLUTION INTRAVENOUS at 11:24

## 2018-01-01 RX ADMIN — Medication 125 MG: at 05:12

## 2018-01-01 RX ADMIN — PIPERACILLIN SODIUM AND TAZOBACTAM SODIUM 3.38 G: 3; .375 INJECTION, POWDER, FOR SOLUTION INTRAVENOUS at 05:03

## 2018-01-01 RX ADMIN — CEFDINIR 300 MG: 300 CAPSULE ORAL at 08:37

## 2018-01-01 RX ADMIN — LINEZOLID 600 MG: 600 TABLET, FILM COATED ORAL at 09:40

## 2018-01-01 RX ADMIN — OMEPRAZOLE 20 MG: 20 CAPSULE, DELAYED RELEASE ORAL at 04:37

## 2018-01-01 RX ADMIN — LEUCOVORIN CALCIUM 660 MG: 350 INJECTION, POWDER, LYOPHILIZED, FOR SOLUTION INTRAMUSCULAR; INTRAVENOUS at 14:31

## 2018-01-01 RX ADMIN — IRON SUCROSE 200 MG: 20 INJECTION, SOLUTION INTRAVENOUS at 08:17

## 2018-01-01 RX ADMIN — LINEZOLID 600 MG: 600 INJECTION, SOLUTION INTRAVENOUS at 05:26

## 2018-01-01 RX ADMIN — SODIUM CHLORIDE: 4.5 INJECTION, SOLUTION INTRAVENOUS at 18:04

## 2018-01-01 RX ADMIN — FLUCONAZOLE 400 MG: 100 TABLET ORAL at 06:09

## 2018-01-01 RX ADMIN — CEFTRIAXONE SODIUM 2 G: 2 INJECTION, POWDER, FOR SOLUTION INTRAMUSCULAR; INTRAVENOUS at 07:07

## 2018-01-01 RX ADMIN — PIPERACILLIN AND TAZOBACTAM 3.38 G: 3; .375 INJECTION, POWDER, LYOPHILIZED, FOR SOLUTION INTRAVENOUS; PARENTERAL at 07:25

## 2018-01-01 RX ADMIN — LINEZOLID 600 MG: 600 TABLET, FILM COATED ORAL at 21:55

## 2018-01-01 RX ADMIN — PIPERACILLIN SODIUM AND TAZOBACTAM SODIUM 3.38 G: 3; .375 INJECTION, POWDER, FOR SOLUTION INTRAVENOUS at 05:23

## 2018-01-01 RX ADMIN — IOHEXOL 30 ML: 300 INJECTION, SOLUTION INTRAVENOUS at 09:54

## 2018-01-01 RX ADMIN — APIXABAN 5 MG: 5 TABLET, FILM COATED ORAL at 05:01

## 2018-01-01 RX ADMIN — METRONIDAZOLE 500 MG: 500 INJECTION, SOLUTION INTRAVENOUS at 22:12

## 2018-01-01 RX ADMIN — PIPERACILLIN SODIUM AND TAZOBACTAM SODIUM 3.38 G: 3; .375 INJECTION, POWDER, FOR SOLUTION INTRAVENOUS at 15:50

## 2018-01-01 RX ADMIN — CEFTRIAXONE SODIUM 2 G: 2 INJECTION, POWDER, FOR SOLUTION INTRAMUSCULAR; INTRAVENOUS at 20:39

## 2018-01-01 RX ADMIN — ACETAMINOPHEN 650 MG: 325 TABLET, FILM COATED ORAL at 12:36

## 2018-01-01 RX ADMIN — STANDARDIZED SENNA CONCENTRATE AND DOCUSATE SODIUM 2 TABLET: 8.6; 5 TABLET, FILM COATED ORAL at 17:35

## 2018-01-01 RX ADMIN — DEXAMETHASONE SODIUM PHOSPHATE 12 MG: 4 INJECTION, SOLUTION INTRAMUSCULAR; INTRAVENOUS at 11:50

## 2018-01-01 RX ADMIN — APIXABAN 5 MG: 5 TABLET, FILM COATED ORAL at 05:35

## 2018-01-01 RX ADMIN — IOHEXOL 100 ML: 350 INJECTION, SOLUTION INTRAVENOUS at 16:54

## 2018-01-01 RX ADMIN — SCOPOLAMINE 1 PATCH: 1 PATCH, EXTENDED RELEASE TRANSDERMAL at 09:45

## 2018-01-01 RX ADMIN — PIPERACILLIN AND TAZOBACTAM 3.38 G: 3; .375 INJECTION, POWDER, LYOPHILIZED, FOR SOLUTION INTRAVENOUS; PARENTERAL at 22:03

## 2018-01-01 RX ADMIN — MIDAZOLAM 2 MG: 1 INJECTION INTRAMUSCULAR; INTRAVENOUS at 17:01

## 2018-01-01 RX ADMIN — SODIUM CHLORIDE 1000 ML: 9 INJECTION, SOLUTION INTRAVENOUS at 09:30

## 2018-01-01 RX ADMIN — APIXABAN 5 MG: 5 TABLET, FILM COATED ORAL at 17:14

## 2018-01-01 RX ADMIN — MORPHINE SULFATE 5 MG: 10 INJECTION INTRAVENOUS at 09:26

## 2018-01-01 RX ADMIN — FLUCONAZOLE 400 MG: 100 TABLET ORAL at 04:38

## 2018-01-01 RX ADMIN — PIPERACILLIN AND TAZOBACTAM 3.38 G: 3; .375 INJECTION, POWDER, LYOPHILIZED, FOR SOLUTION INTRAVENOUS; PARENTERAL at 14:28

## 2018-01-01 RX ADMIN — MORPHINE SULFATE 1 MG: 10 INJECTION INTRAVENOUS at 02:29

## 2018-01-01 RX ADMIN — FENTANYL CITRATE 25 MCG: 50 INJECTION, SOLUTION INTRAMUSCULAR; INTRAVENOUS at 09:35

## 2018-01-01 RX ADMIN — DEXAMETHASONE SODIUM PHOSPHATE 12 MG: 4 INJECTION, SOLUTION INTRAMUSCULAR; INTRAVENOUS at 10:46

## 2018-01-01 RX ADMIN — METRONIDAZOLE 500 MG: 500 TABLET ORAL at 15:02

## 2018-01-01 RX ADMIN — CEFTRIAXONE SODIUM 2 G: 2 INJECTION, POWDER, FOR SOLUTION INTRAMUSCULAR; INTRAVENOUS at 07:25

## 2018-01-01 RX ADMIN — DAPTOMYCIN 670 MG: 500 INJECTION, POWDER, LYOPHILIZED, FOR SOLUTION INTRAVENOUS at 11:42

## 2018-01-01 RX ADMIN — METRONIDAZOLE 500 MG: 500 INJECTION, SOLUTION INTRAVENOUS at 14:06

## 2018-01-01 RX ADMIN — SODIUM CHLORIDE TAB 1 GM 1 G: 1 TAB at 13:00

## 2018-01-01 RX ADMIN — FENTANYL CITRATE 25 MCG: 50 INJECTION INTRAMUSCULAR; INTRAVENOUS at 09:12

## 2018-01-01 RX ADMIN — LINEZOLID 600 MG: 600 INJECTION, SOLUTION INTRAVENOUS at 18:03

## 2018-01-01 RX ADMIN — FLUCONAZOLE 400 MG: 100 TABLET ORAL at 06:18

## 2018-01-01 RX ADMIN — PIPERACILLIN AND TAZOBACTAM 3.38 G: 3; .375 INJECTION, POWDER, LYOPHILIZED, FOR SOLUTION INTRAVENOUS; PARENTERAL at 05:20

## 2018-01-01 RX ADMIN — METRONIDAZOLE 500 MG: 500 TABLET ORAL at 11:52

## 2018-01-01 RX ADMIN — FUROSEMIDE 20 MG: 20 TABLET ORAL at 05:20

## 2018-01-01 RX ADMIN — METRONIDAZOLE 500 MG: 500 TABLET ORAL at 14:20

## 2018-01-01 RX ADMIN — CEFTRIAXONE SODIUM 2 G: 2 INJECTION, POWDER, FOR SOLUTION INTRAMUSCULAR; INTRAVENOUS at 04:57

## 2018-01-01 RX ADMIN — AMPICILLIN SODIUM AND SULBACTAM SODIUM 3 G: 2; 1 INJECTION, POWDER, FOR SOLUTION INTRAMUSCULAR; INTRAVENOUS at 00:40

## 2018-01-01 RX ADMIN — ACETAMINOPHEN 650 MG: 325 TABLET, FILM COATED ORAL at 04:50

## 2018-01-01 RX ADMIN — CEFTRIAXONE SODIUM 2 G: 2 INJECTION, POWDER, FOR SOLUTION INTRAMUSCULAR; INTRAVENOUS at 05:50

## 2018-01-01 RX ADMIN — PIPERACILLIN AND TAZOBACTAM 3.38 G: 3; .375 INJECTION, POWDER, LYOPHILIZED, FOR SOLUTION INTRAVENOUS; PARENTERAL at 04:41

## 2018-01-01 RX ADMIN — PIPERACILLIN SODIUM AND TAZOBACTAM SODIUM 3.38 G: 3; .375 INJECTION, POWDER, FOR SOLUTION INTRAVENOUS at 22:54

## 2018-01-01 RX ADMIN — OMEPRAZOLE 20 MG: 20 CAPSULE, DELAYED RELEASE ORAL at 05:30

## 2018-01-01 RX ADMIN — POTASSIUM CHLORIDE 20 MEQ: 1500 TABLET, EXTENDED RELEASE ORAL at 10:13

## 2018-01-01 RX ADMIN — PIPERACILLIN SODIUM AND TAZOBACTAM SODIUM 3.38 G: 3; .375 INJECTION, POWDER, FOR SOLUTION INTRAVENOUS at 00:13

## 2018-01-01 RX ADMIN — LEUCOVORIN CALCIUM 660 MG: 350 INJECTION, POWDER, LYOPHILIZED, FOR SOLUTION INTRAMUSCULAR; INTRAVENOUS at 11:36

## 2018-01-01 RX ADMIN — SODIUM CHLORIDE 1000 ML: 9 INJECTION, SOLUTION INTRAVENOUS at 09:25

## 2018-01-01 RX ADMIN — HEPARIN 500 UNITS: 100 SYRINGE at 13:39

## 2018-01-01 RX ADMIN — MIDAZOLAM 1 MG: 1 INJECTION INTRAMUSCULAR; INTRAVENOUS at 09:24

## 2018-01-01 RX ADMIN — POTASSIUM CHLORIDE 20 MEQ: 1500 TABLET, EXTENDED RELEASE ORAL at 18:03

## 2018-01-01 RX ADMIN — CEFTRIAXONE SODIUM 2 G: 2 INJECTION, POWDER, FOR SOLUTION INTRAMUSCULAR; INTRAVENOUS at 12:34

## 2018-01-01 RX ADMIN — PIPERACILLIN AND TAZOBACTAM 3.38 G: 3; .375 INJECTION, POWDER, LYOPHILIZED, FOR SOLUTION INTRAVENOUS; PARENTERAL at 19:11

## 2018-01-01 RX ADMIN — FLUOROURACIL 3300 MG: 50 INJECTION, SOLUTION INTRAVENOUS at 12:14

## 2018-01-01 RX ADMIN — GLYCOPYRROLATE 0.2 MG: 0.2 INJECTION INTRAMUSCULAR; INTRAVENOUS at 16:55

## 2018-01-01 RX ADMIN — POTASSIUM CHLORIDE 10 MEQ: 7.46 INJECTION, SOLUTION INTRAVENOUS at 14:34

## 2018-01-01 RX ADMIN — FENTANYL CITRATE 25 MCG: 50 INJECTION, SOLUTION INTRAMUSCULAR; INTRAVENOUS at 09:21

## 2018-01-01 RX ADMIN — APIXABAN 5 MG: 5 TABLET, FILM COATED ORAL at 05:23

## 2018-01-01 RX ADMIN — LORAZEPAM 0.5 MG: 2 INJECTION INTRAMUSCULAR; INTRAVENOUS at 23:22

## 2018-01-01 RX ADMIN — FLUCONAZOLE 400 MG: 100 TABLET ORAL at 06:04

## 2018-01-01 RX ADMIN — DEXAMETHASONE SODIUM PHOSPHATE 12 MG: 4 INJECTION, SOLUTION INTRAMUSCULAR; INTRAVENOUS at 10:21

## 2018-01-01 RX ADMIN — MIDAZOLAM 2 MG: 1 INJECTION INTRAMUSCULAR; INTRAVENOUS at 16:55

## 2018-01-01 RX ADMIN — IRON SUCROSE 200 MG: 20 INJECTION, SOLUTION INTRAVENOUS at 10:30

## 2018-01-01 RX ADMIN — POTASSIUM CHLORIDE 10 MEQ: 7.46 INJECTION, SOLUTION INTRAVENOUS at 15:38

## 2018-01-01 RX ADMIN — FUROSEMIDE 20 MG: 10 INJECTION, SOLUTION INTRAMUSCULAR; INTRAVENOUS at 06:37

## 2018-01-01 RX ADMIN — POTASSIUM CHLORIDE 10 MEQ: 7.46 INJECTION, SOLUTION INTRAVENOUS at 10:05

## 2018-01-01 RX ADMIN — MIDAZOLAM 1 MG: 1 INJECTION INTRAMUSCULAR; INTRAVENOUS at 18:20

## 2018-01-01 RX ADMIN — SODIUM CHLORIDE TAB 1 GM 1 G: 1 TAB at 17:46

## 2018-01-01 RX ADMIN — ONDANSETRON 4 MG: 4 TABLET, ORALLY DISINTEGRATING ORAL at 16:18

## 2018-01-01 RX ADMIN — LIDOCAINE HYDROCHLORIDE: 20 INJECTION, SOLUTION INFILTRATION; PERINEURAL at 09:15

## 2018-01-01 RX ADMIN — Medication 125 MG: at 05:01

## 2018-01-01 RX ADMIN — STANDARDIZED SENNA CONCENTRATE AND DOCUSATE SODIUM 2 TABLET: 8.6; 5 TABLET, FILM COATED ORAL at 17:27

## 2018-01-01 RX ADMIN — MICAFUNGIN SODIUM 100 MG: 20 INJECTION, POWDER, LYOPHILIZED, FOR SOLUTION INTRAVENOUS at 05:32

## 2018-01-01 RX ADMIN — PIPERACILLIN SODIUM AND TAZOBACTAM SODIUM 3.38 G: 3; .375 INJECTION, POWDER, FOR SOLUTION INTRAVENOUS at 05:14

## 2018-01-01 RX ADMIN — MIDAZOLAM 1 MG: 1 INJECTION INTRAMUSCULAR; INTRAVENOUS at 09:35

## 2018-01-01 RX ADMIN — OXYCODONE HYDROCHLORIDE 5 MG: 5 TABLET ORAL at 13:02

## 2018-01-01 RX ADMIN — METRONIDAZOLE 500 MG: 500 INJECTION, SOLUTION INTRAVENOUS at 05:33

## 2018-01-01 RX ADMIN — METRONIDAZOLE 500 MG: 500 INJECTION, SOLUTION INTRAVENOUS at 06:29

## 2018-01-01 RX ADMIN — POTASSIUM CHLORIDE 20 MEQ: 1500 TABLET, EXTENDED RELEASE ORAL at 18:00

## 2018-01-01 RX ADMIN — VANCOMYCIN HYDROCHLORIDE 1600 MG: 100 INJECTION, POWDER, LYOPHILIZED, FOR SOLUTION INTRAVENOUS at 18:31

## 2018-01-01 RX ADMIN — APIXABAN 5 MG: 5 TABLET, FILM COATED ORAL at 16:33

## 2018-01-01 RX ADMIN — LINEZOLID 600 MG: 600 TABLET, FILM COATED ORAL at 17:27

## 2018-01-01 RX ADMIN — MORPHINE SULFATE 5 MG: 10 INJECTION INTRAVENOUS at 23:17

## 2018-01-01 RX ADMIN — NITROGLYCERIN 0.5 ML: 20 INJECTION INTRAVENOUS at 09:22

## 2018-01-01 RX ADMIN — DEXAMETHASONE SODIUM PHOSPHATE 12 MG: 4 INJECTION, SOLUTION INTRAMUSCULAR; INTRAVENOUS at 09:37

## 2018-01-01 RX ADMIN — IOHEXOL 100 ML: 350 INJECTION, SOLUTION INTRAVENOUS at 15:31

## 2018-01-01 RX ADMIN — IOHEXOL 50 ML: 240 INJECTION, SOLUTION INTRATHECAL; INTRAVASCULAR; INTRAVENOUS; ORAL at 13:24

## 2018-01-01 RX ADMIN — AMPICILLIN SODIUM AND SULBACTAM SODIUM 3 G: 2; 1 INJECTION, POWDER, FOR SOLUTION INTRAMUSCULAR; INTRAVENOUS at 17:48

## 2018-01-01 RX ADMIN — METRONIDAZOLE 500 MG: 500 INJECTION, SOLUTION INTRAVENOUS at 21:33

## 2018-01-01 RX ADMIN — MORPHINE SULFATE 5 MG: 10 INJECTION INTRAVENOUS at 19:53

## 2018-01-01 RX ADMIN — LIDOCAINE HYDROCHLORIDE 10 ML: 10 INJECTION, SOLUTION EPIDURAL; INFILTRATION; INTRACAUDAL; PERINEURAL at 16:46

## 2018-01-01 RX ADMIN — POLYVINYL ALCOHOL 2 DROP: 14 SOLUTION/ DROPS OPHTHALMIC at 06:43

## 2018-01-01 RX ADMIN — LIDOCAINE HYDROCHLORIDE AND EPINEPHRINE: 20; 10 INJECTION, SOLUTION INFILTRATION; PERINEURAL at 09:15

## 2018-01-01 RX ADMIN — PIPERACILLIN SODIUM AND TAZOBACTAM SODIUM 3.38 G: 3; .375 INJECTION, POWDER, FOR SOLUTION INTRAVENOUS at 21:28

## 2018-01-01 RX ADMIN — METRONIDAZOLE 500 MG: 500 TABLET ORAL at 05:33

## 2018-01-01 RX ADMIN — APIXABAN 5 MG: 5 TABLET, FILM COATED ORAL at 04:37

## 2018-01-01 RX ADMIN — FLUCONAZOLE 400 MG: 2 INJECTION, SOLUTION INTRAVENOUS at 05:12

## 2018-01-01 RX ADMIN — MICAFUNGIN SODIUM 100 MG: 20 INJECTION, POWDER, LYOPHILIZED, FOR SOLUTION INTRAVENOUS at 14:16

## 2018-01-01 RX ADMIN — PIPERACILLIN AND TAZOBACTAM 3.38 G: 3; .375 INJECTION, POWDER, LYOPHILIZED, FOR SOLUTION INTRAVENOUS; PARENTERAL at 13:27

## 2018-01-01 RX ADMIN — PIPERACILLIN SODIUM AND TAZOBACTAM SODIUM 3.38 G: 3; .375 INJECTION, POWDER, FOR SOLUTION INTRAVENOUS at 21:31

## 2018-01-01 RX ADMIN — SODIUM CHLORIDE: 3 INJECTION, SOLUTION INTRAVENOUS at 00:27

## 2018-01-01 RX ADMIN — POTASSIUM CHLORIDE 40 MEQ: 2 INJECTION, SOLUTION, CONCENTRATE INTRAVENOUS at 10:02

## 2018-01-01 RX ADMIN — AMPICILLIN SODIUM AND SULBACTAM SODIUM 3 G: 2; 1 INJECTION, POWDER, FOR SOLUTION INTRAMUSCULAR; INTRAVENOUS at 13:16

## 2018-01-01 RX ADMIN — IOHEXOL 100 ML: 350 INJECTION, SOLUTION INTRAVENOUS at 13:31

## 2018-01-01 RX ADMIN — STANDARDIZED SENNA CONCENTRATE AND DOCUSATE SODIUM 2 TABLET: 8.6; 5 TABLET, FILM COATED ORAL at 18:15

## 2018-01-01 RX ADMIN — AMPICILLIN SODIUM AND SULBACTAM SODIUM 3 G: 2; 1 INJECTION, POWDER, FOR SOLUTION INTRAMUSCULAR; INTRAVENOUS at 06:16

## 2018-01-01 RX ADMIN — LEUCOVORIN CALCIUM 652 MG: 350 INJECTION, POWDER, LYOPHILIZED, FOR SOLUTION INTRAMUSCULAR; INTRAVENOUS at 12:10

## 2018-01-01 RX ADMIN — DAPTOMYCIN 670 MG: 500 INJECTION, POWDER, LYOPHILIZED, FOR SOLUTION INTRAVENOUS at 12:05

## 2018-01-01 RX ADMIN — POTASSIUM CHLORIDE 20 MEQ: 1500 TABLET, EXTENDED RELEASE ORAL at 17:48

## 2018-01-01 RX ADMIN — FUROSEMIDE 20 MG: 20 TABLET ORAL at 05:21

## 2018-01-01 RX ADMIN — METRONIDAZOLE 500 MG: 500 TABLET ORAL at 14:13

## 2018-01-01 RX ADMIN — LINEZOLID 600 MG: 600 TABLET, FILM COATED ORAL at 17:46

## 2018-01-01 RX ADMIN — MAGNESIUM SULFATE IN WATER 2 G: 40 INJECTION, SOLUTION INTRAVENOUS at 11:01

## 2018-01-01 RX ADMIN — CEFTRIAXONE SODIUM 2 G: 2 INJECTION, POWDER, FOR SOLUTION INTRAMUSCULAR; INTRAVENOUS at 07:10

## 2018-01-01 RX ADMIN — OMEPRAZOLE 20 MG: 20 CAPSULE, DELAYED RELEASE ORAL at 05:33

## 2018-01-01 RX ADMIN — HEPARIN 500 UNITS: 100 SYRINGE at 16:59

## 2018-01-01 RX ADMIN — PIPERACILLIN SODIUM AND TAZOBACTAM SODIUM 3.38 G: 3; .375 INJECTION, POWDER, FOR SOLUTION INTRAVENOUS at 08:19

## 2018-01-01 RX ADMIN — CEFTRIAXONE SODIUM 2 G: 2 INJECTION, POWDER, FOR SOLUTION INTRAMUSCULAR; INTRAVENOUS at 12:45

## 2018-01-01 RX ADMIN — MORPHINE SULFATE 1 MG: 10 INJECTION INTRAVENOUS at 06:01

## 2018-01-01 RX ADMIN — IRINOTECAN HYDROCHLORIDE 97.78 MG: 4.3 INJECTION, POWDER, FOR SOLUTION INTRAVENOUS at 10:24

## 2018-01-01 RX ADMIN — APIXABAN 5 MG: 5 TABLET, FILM COATED ORAL at 18:06

## 2018-01-01 RX ADMIN — LINEZOLID 600 MG: 600 TABLET, FILM COATED ORAL at 17:44

## 2018-01-01 RX ADMIN — OMEPRAZOLE 20 MG: 20 CAPSULE, DELAYED RELEASE ORAL at 05:15

## 2018-01-01 RX ADMIN — AMPICILLIN SODIUM AND SULBACTAM SODIUM 3 G: 2; 1 INJECTION, POWDER, FOR SOLUTION INTRAMUSCULAR; INTRAVENOUS at 12:50

## 2018-01-01 RX ADMIN — MIDAZOLAM 1 MG: 1 INJECTION INTRAMUSCULAR; INTRAVENOUS at 18:02

## 2018-01-01 RX ADMIN — PIPERACILLIN AND TAZOBACTAM 3.38 G: 3; .375 INJECTION, POWDER, LYOPHILIZED, FOR SOLUTION INTRAVENOUS; PARENTERAL at 15:37

## 2018-01-01 RX ADMIN — ONDANSETRON 4 MG: 2 INJECTION INTRAMUSCULAR; INTRAVENOUS at 15:16

## 2018-01-01 RX ADMIN — CEFTRIAXONE SODIUM 2 G: 2 INJECTION, POWDER, FOR SOLUTION INTRAMUSCULAR; INTRAVENOUS at 05:02

## 2018-01-01 RX ADMIN — FENTANYL CITRATE 25 MCG: 50 INJECTION, SOLUTION INTRAMUSCULAR; INTRAVENOUS at 09:12

## 2018-01-01 RX ADMIN — CEFTRIAXONE SODIUM 2 G: 2 INJECTION, POWDER, FOR SOLUTION INTRAMUSCULAR; INTRAVENOUS at 05:13

## 2018-01-01 RX ADMIN — PIPERACILLIN SODIUM AND TAZOBACTAM SODIUM 3.38 G: 3; .375 INJECTION, POWDER, FOR SOLUTION INTRAVENOUS at 21:08

## 2018-01-01 RX ADMIN — AMPICILLIN SODIUM AND SULBACTAM SODIUM 3 G: 2; 1 INJECTION, POWDER, FOR SOLUTION INTRAMUSCULAR; INTRAVENOUS at 05:45

## 2018-01-01 RX ADMIN — STANDARDIZED SENNA CONCENTRATE AND DOCUSATE SODIUM 2 TABLET: 8.6; 5 TABLET, FILM COATED ORAL at 06:03

## 2018-01-01 RX ADMIN — POTASSIUM CHLORIDE 20 MEQ: 1500 TABLET, EXTENDED RELEASE ORAL at 05:45

## 2018-01-01 RX ADMIN — DIPHENHYDRAMINE HCL 25 MG: 25 TABLET ORAL at 04:49

## 2018-01-01 RX ADMIN — FENTANYL CITRATE 25 MCG: 50 INJECTION, SOLUTION INTRAMUSCULAR; INTRAVENOUS at 09:41

## 2018-01-01 RX ADMIN — PIPERACILLIN SODIUM AND TAZOBACTAM SODIUM 3.38 G: 3; .375 INJECTION, POWDER, FOR SOLUTION INTRAVENOUS at 15:56

## 2018-01-01 RX ADMIN — POTASSIUM CHLORIDE 40 MEQ: 1500 TABLET, EXTENDED RELEASE ORAL at 17:14

## 2018-01-01 RX ADMIN — LINEZOLID 600 MG: 600 TABLET, FILM COATED ORAL at 05:46

## 2018-01-01 RX ADMIN — STANDARDIZED SENNA CONCENTRATE AND DOCUSATE SODIUM 2 TABLET: 8.6; 5 TABLET, FILM COATED ORAL at 05:15

## 2018-01-01 RX ADMIN — GEMCITABINE 1661 MG: 1 INJECTION, POWDER, LYOPHILIZED, FOR SOLUTION INTRAVENOUS at 13:00

## 2018-01-01 SDOH — ECONOMIC STABILITY: HOUSING INSECURITY: UNSAFE COOKING RANGE AREA: 0

## 2018-01-01 SDOH — ECONOMIC STABILITY: HOUSING INSECURITY: UNSAFE APPLIANCES: 0

## 2018-01-01 SDOH — ECONOMIC STABILITY: HOUSING INSECURITY: HOME SAFETY: THROW RUGS IN LIVING ROOM AND KITCHEN (SN ENCOURAGED CG TO REMOVE)  2 SMALL DOGS.

## 2018-01-01 ASSESSMENT — ENCOUNTER SYMPTOMS
MYALGIAS: 0
WEAKNESS: 1
HEADACHES: 0
MYALGIAS: 0
CONFUSION: 0
SHORTNESS OF BREATH: 0
FLANK PAIN: 0
BLURRED VISION: 0
MYALGIAS: 0
CHILLS: 0
DIZZINESS: 0
SPEECH CHANGE: 0
STRIDOR: 0
INSOMNIA: 0
DIAPHORESIS: 0
WEAKNESS: 1
SORE THROAT: 0
FEVER: 0
PALPITATIONS: 0
HEADACHES: 0
ABDOMINAL PAIN: 0
DEPRESSION: 0
HEADACHES: 0
VOMITING: 0
FOCAL WEAKNESS: 0
MYALGIAS: 0
VOMITING: 0
BLURRED VISION: 0
DIARRHEA: 0
SHORTNESS OF BREATH: 0
NAUSEA: 0
SHORTNESS OF BREATH: 0
WEIGHT LOSS: 0
INSOMNIA: 0
BACK PAIN: 1
DIARRHEA: 0
NAUSEA: 0
BACK PAIN: 0
CHILLS: 0
HEADACHES: 0
NUMBNESS: 0
NAUSEA: 1
NERVOUS/ANXIOUS: 0
FEVER: 0
BACK PAIN: 0
HEADACHES: 0
SHORTNESS OF BREATH: 1
BLURRED VISION: 0
INSOMNIA: 0
WHEEZING: 0
DIZZINESS: 0
COUGH: 1
BLURRED VISION: 0
CHILLS: 0
SORE THROAT: 0
FEVER: 0
CONSTIPATION: 0
WHEEZING: 0
CHILLS: 1
ABDOMINAL PAIN: 1
SHORTNESS OF BREATH: 0
VOMITING: 0
DIAPHORESIS: 0
FEVER: 0
FEVER: 0
BACK PAIN: 0
HEMOPTYSIS: 0
DIARRHEA: 0
PALPITATIONS: 0
NERVOUS/ANXIOUS: 0
ORTHOPNEA: 0
CONSTIPATION: 0
NAUSEA: 1
HEARTBURN: 0
WHEEZING: 0
NAUSEA: 0
CHILLS: 1
SHORTNESS OF BREATH: 0
ABDOMINAL PAIN: 1
FEVER: 0
FEVER: 0
MYALGIAS: 0
DIARRHEA: 1
SHORTNESS OF BREATH: 0
NECK PAIN: 1
DIARRHEA: 0
DIARRHEA: 0
NAUSEA: 0
HEMOPTYSIS: 0
ABDOMINAL PAIN: 1
COUGH: 0
HEADACHES: 0
NAUSEA: 0
SPEECH CHANGE: 0
CHILLS: 0
HEADACHES: 1
ABDOMINAL PAIN: 0
SPEECH CHANGE: 0
COUGH: 0
CHILLS: 0
NECK PAIN: 0
NAUSEA: 0
FEVER: 0
TINGLING: 0
MYALGIAS: 0
PALPITATIONS: 0
HEARTBURN: 0
DIARRHEA: 0
WHEEZING: 0
DIARRHEA: 0
SENSORY CHANGE: 0
CONSTIPATION: 0
PALPITATIONS: 0
CHILLS: 0
WHEEZING: 0
SHORTNESS OF BREATH: 0
NECK PAIN: 0
PALPITATIONS: 0
CLAUDICATION: 0
CONSTIPATION: 0
HEARTBURN: 0
COUGH: 1
ORTHOPNEA: 0
DIARRHEA: 0
BRUISES/BLEEDS EASILY: 0
VOMITING: 0
HEADACHES: 0
MYALGIAS: 0
ABDOMINAL PAIN: 0
DEPRESSION: 0
WEAKNESS: 1
SHORTNESS OF BREATH: T
PALPITATIONS: 0
CHILLS: 0
HEMOPTYSIS: 0
DOUBLE VISION: 0
HEARTBURN: 0
HEARTBURN: 0
WHEEZING: 0
CLAUDICATION: 0
HEADACHES: 0
WEIGHT LOSS: 1
DIZZINESS: 0
ABDOMINAL PAIN: 0
SPEECH CHANGE: 0
SORE THROAT: 0
SENSORY CHANGE: 0
FEVER: 0
SHORTNESS OF BREATH: 0
VOMITING: 0
NAUSEA: 0
SPEECH CHANGE: 0
SEIZURES: 0
HEARTBURN: 1
HEARTBURN: 0
FEVER: 0
PND: 0
INSOMNIA: 0
COUGH: 0
SPEECH CHANGE: 0
DOUBLE VISION: 0
DOUBLE VISION: 0
NECK PAIN: 0
CHILLS: 0
DIZZINESS: 0
SENSORY CHANGE: 0
FOCAL WEAKNESS: 0
ABDOMINAL PAIN: 1
NAUSEA: 0
HALLUCINATIONS: 0
ABDOMINAL PAIN: 1
RESPIRATORY NEGATIVE: 1
ABDOMINAL PAIN: 0
FEVER: 0
SHORTNESS OF BREATH: 0
SHORTNESS OF BREATH: 0
WEIGHT LOSS: 1
SENSORY CHANGE: 1
SORE THROAT: 0
CHILLS: 0
PALPITATIONS: 0
NAUSEA: 0
COUGH: 1
HEARTBURN: 0
SHORTNESS OF BREATH: 0
WEAKNESS: 1
TREMORS: 0
DIARRHEA: 0
VOMITING: 0
DIZZINESS: 0
DIAPHORESIS: 0
NAUSEA: 0
SORE THROAT: 0
PALPITATIONS: 0
COUGH: 0
WEAKNESS: 1
ORTHOPNEA: 0
CONSTIPATION: 0
WEAKNESS: 0
DIAPHORESIS: 0
FEVER: 0
SENSORY CHANGE: 1
DIZZINESS: 0
DOUBLE VISION: 0
PND: 0
DEPRESSION: 0
COUGH: 0
INSOMNIA: 0
SORE THROAT: 1
CHILLS: 1
FEVER: 0
NAUSEA: 1
DIZZINESS: 0
SENSORY CHANGE: 0
SORE THROAT: 0
PALPITATIONS: 0
WHEEZING: 0
DIARRHEA: 0
CONSTIPATION: 0
DEPRESSION: 0
COUGH: 0
INSOMNIA: 0
LIGHT-HEADEDNESS: 1
SPUTUM PRODUCTION: 0
SHORTNESS OF BREATH: 0
SHORTNESS OF BREATH: 0
WEAKNESS: 1
DIARRHEA: 0
SHORTNESS OF BREATH: 0
WHEEZING: 0
SORE THROAT: 0
WEIGHT LOSS: 1
NAUSEA: 0
COUGH: 0
COUGH: 0
PALPITATIONS: 0
CONSTIPATION: 0
SPUTUM PRODUCTION: 0
DIARRHEA: 0
DIARRHEA: 0
HEMOPTYSIS: 0
PALPITATIONS: 0
COUGH: 1
ABDOMINAL PAIN: 1
VOMITING: 0
BLURRED VISION: 0
HEARTBURN: 1
WEIGHT LOSS: 1
NAUSEA: 0
SPEECH CHANGE: 0
SPUTUM PRODUCTION: 0
HEADACHES: 0
NECK PAIN: 0
SPUTUM PRODUCTION: 0
FEVER: 0
SPEECH CHANGE: 0
CLAUDICATION: 0
COUGH: 0
NECK PAIN: 1
BLURRED VISION: 0
DIARRHEA: 0
DIZZINESS: 0
DIAPHORESIS: 0
COUGH: 0
DIARRHEA: 1
SHORTNESS OF BREATH: 0
WEAKNESS: 1
SPEECH DIFFICULTY: 1
CARDIOVASCULAR NEGATIVE: 1
BACK PAIN: 1
ABDOMINAL PAIN: 0
FEVER: 0
FEVER: 0
ABDOMINAL PAIN: 1
BLOOD IN STOOL: 0
DIAPHORESIS: 0
CHILLS: 0
MYALGIAS: 0
NAUSEA: 1
COUGH: 0
DEPRESSION: 0
DIZZINESS: 0
WEAKNESS: 1
MYALGIAS: 0
HEARTBURN: 0
WHEEZING: 0
PALPITATIONS: 0
WEAKNESS: 1
ORTHOPNEA: 0
PHOTOPHOBIA: 0
SENSORY CHANGE: 0
BLURRED VISION: 0
DIARRHEA: 0
FEVER: 0
HEADACHES: 0
ABDOMINAL PAIN: 0
PHOTOPHOBIA: 0
HEARTBURN: 0
PHOTOPHOBIA: 0
FALLS: 0
CHILLS: 0
VOMITING: 0
SENSORY CHANGE: 0
CHILLS: 0
COUGH: 0
CHILLS: 0
HEARTBURN: 0
ROS GI COMMENTS: POOR APPETITE
CHILLS: 0
MYALGIAS: 0
SORE THROAT: 0
INSOMNIA: 0
BLURRED VISION: 0
COUGH: 1
PALPITATIONS: 0
MYALGIAS: 0
HEARTBURN: 0
ABDOMINAL PAIN: 0
WEAKNESS: 0
ABDOMINAL PAIN: 1
HEMOPTYSIS: 0
BACK PAIN: 0
NAUSEA: 1
MYALGIAS: 0
PHOTOPHOBIA: 0
BACK PAIN: 0
ABDOMINAL PAIN: 1
RESPIRATORY NEGATIVE: 1
NAUSEA: 1
VOMITING: PT DENIES ANY EMESIS AT THIS TIME
SORE THROAT: 0
NERVOUS/ANXIOUS: 0
FEVER: 0
DIZZINESS: 0
COUGH: 1
WEAKNESS: 1
HEARTBURN: 0
ROS GI COMMENTS: POOR APPETITE
HEADACHES: 0
DIARRHEA: 0
SENSORY CHANGE: 0
FEVER: 0
WEAKNESS: 1
SORE THROAT: 0
PALPITATIONS: 0
NECK PAIN: 0
FEVER: 1
NAUSEA: 1
INSOMNIA: 0
CONSTIPATION: 0
SPEECH CHANGE: 0
SHORTNESS OF BREATH: 0
TINGLING: 0
ABDOMINAL PAIN: 1
CHILLS: 0
ROS SKIN COMMENTS: NEGATIVE FOR JAUNDICE
WEAKNESS: 1
DOUBLE VISION: 0
NERVOUS/ANXIOUS: 0
DEPRESSION: 0
NERVOUS/ANXIOUS: 0
SPEECH CHANGE: 0
HEADACHES: 1
DEPRESSION: 0
VOMITING: 0
SENSORY CHANGE: 0
CLAUDICATION: 0
SENSORY CHANGE: 0
FEVER: 0
WHEEZING: 0
CLAUDICATION: 0
BLURRED VISION: 0
ABDOMINAL PAIN: 1
FEVER: 0
FEVER: 0
NERVOUS/ANXIOUS: 0
SPUTUM PRODUCTION: 0
NAUSEA: AFTER MEALS
VOMITING: 0
VOMITING: 0
HALLUCINATIONS: 0
SENSORY CHANGE: 0
SENSORY CHANGE: 0
PHOTOPHOBIA: 0
HEARTBURN: 0
CHILLS: 0
DOUBLE VISION: 0
WHEEZING: 0
BLURRED VISION: 0
ABDOMINAL PAIN: 1
TINGLING: 1
ABDOMINAL PAIN: 0
PALPITATIONS: 0
NERVOUS/ANXIOUS: 0
SHORTNESS OF BREATH: 0
DIZZINESS: 0
VOMITING: 1
SPEECH CHANGE: 0
PALPITATIONS: 0
SHORTNESS OF BREATH: 1
CLAUDICATION: 0
BRUISES/BLEEDS EASILY: 0
SHORTNESS OF BREATH: 0
FEVER: 0
MYALGIAS: 0
COUGH: 0
CHILLS: 0
CHANGE IN LEVEL OF CONSCIOUSNESS: 1
WHEEZING: 0
ROS GI COMMENTS: NEGATIVE FOR ASCITES
SORE THROAT: 0
NECK PAIN: 0
STOOL FREQUENCY: LESS THAN DAILY
CHILLS: 0
CHILLS: 0
BLURRED VISION: 0
DIZZINESS: 0
SPUTUM PRODUCTION: 0
CONSTIPATION: 0
CLAUDICATION: 0
DRY SKIN: 1
HEADACHES: 0
TINGLING: 0
VOMITING: 0
CHILLS: 0
BACK PAIN: 0
MYALGIAS: 0
CLAUDICATION: 0
ABDOMINAL PAIN: 1
ABDOMINAL PAIN: 0
COUGH: 0
VOMITING: 0
DEPRESSION: 0
SHORTNESS OF BREATH: 0
VOMITING: PT DENIES ANY EMESIS AT THIS TIME
HEARTBURN: 0
SORE THROAT: 0
SPEECH CHANGE: 0
SORE THROAT: 0
NAUSEA: 1
WHEEZING: 0
INSOMNIA: 0
PHOTOPHOBIA: 0
SENSORY CHANGE: 0
MYALGIAS: 0
DIARRHEA: 1
ABDOMINAL PAIN: 1
SPEECH CHANGE: 0
SORE THROAT: 0
FEVER: 0
WEAKNESS: 0
HEARTBURN: 0
DIZZINESS: 1
SHORTNESS OF BREATH: 0
HEARTBURN: 0
VOMITING: 0
HEADACHES: 0
ABDOMINAL PAIN: 0
DIZZINESS: 0
SENSORY CHANGE: 0
MYALGIAS: 0
PHOTOPHOBIA: 0
HEMOPTYSIS: 0
CHILLS: 0
COUGH: 0
EYES NEGATIVE: 1
SPEECH CHANGE: 0
PALPITATIONS: 0
INSOMNIA: 0
MYALGIAS: 0
COUGH: 0
ABDOMINAL PAIN: 1
SORE THROAT: 0
BACK PAIN: 0
FEVER: 0
WEAKNESS: 1
FEVER: 0
CLAUDICATION: 0
ABDOMINAL DISTENTION: 0
HEADACHES: 0
DEPRESSION: 0
CONSTIPATION: 0
CONSTIPATION: 0
WHEEZING: 0
WEAKNESS: 1
NERVOUS/ANXIOUS: 0
BACK PAIN: 0
COUGH: 0
COUGH: 0
BLURRED VISION: 0
BACK PAIN: 0
ABDOMINAL PAIN: 0
ACTIVITY CHANGE: 0
CONSTIPATION: 0
CLAUDICATION: 0
WEIGHT LOSS: 1
HEADACHES: 0
VOMITING: 1
WEAKNESS: 1
FOCAL WEAKNESS: 0
BLURRED VISION: 0
FATIGUE: 1
DIZZINESS: 0
MYALGIAS: 1
POLYDIPSIA: 0
DEPRESSION: 0
DEBILITATING PAIN: 1
HEMOPTYSIS: 0
FEVER: 0
SENSORY CHANGE: 0
CONSTIPATION: 0
CHILLS: 0
HEARTBURN: 0
NECK PAIN: 0
VOMITING: 0
CHILLS: 0
PALPITATIONS: 0
COUGH: 1
SORE THROAT: 0
HEARTBURN: 0
SORE THROAT: 0
SEIZURES: 0
WHEEZING: 0
SENSORY CHANGE: 0
SENSORY CHANGE: 0
FEVER: 0
SPUTUM PRODUCTION: 0
CONSTIPATION: 1
SHORTNESS OF BREATH: 0
PHOTOPHOBIA: 0
NUMBNESS: 0
CONSTIPATION: 0
CHILLS: 0
DEPRESSION: 0
COUGH: 0
CHILLS: 0
MYALGIAS: 0
SPUTUM PRODUCTION: 0
SENSORY CHANGE: 0
HEADACHES: 0
NAUSEA: 0
PND: 0
NAUSEA: 0
WHEEZING: 0
ABDOMINAL PAIN: 1
DIARRHEA: 0
SHORTNESS OF BREATH: 0
ORTHOPNEA: 0
SHORTNESS OF BREATH: 0
ORTHOPNEA: 0
DIARRHEA: 0
HEMOPTYSIS: 0
SORE THROAT: 0
BLURRED VISION: 0
ABDOMINAL PAIN: 0
CLAUDICATION: 0
WEAKNESS: 1
COUGH: 1
DOUBLE VISION: 0
SORE THROAT: 0
MYALGIAS: 1
DIARRHEA: 1
LAST BOWEL MOVEMENT: 65009
CLAUDICATION: 0
VOMITING: 0
POLYDIPSIA: 0
NAUSEA: 0
NAUSEA: 0
DIAPHORESIS: 0
COUGH: 0
ABDOMINAL PAIN: 0
ABDOMINAL PAIN: 1
DIAPHORESIS: 0
SENSORY CHANGE: 0
MYALGIAS: 0
NECK PAIN: 0
DIAPHORESIS: 0
DIZZINESS: 0
CHILLS: 0
WEAKNESS: 0
NAUSEA: 0
NECK PAIN: 0
DIARRHEA: 0
COUGH: 0
FEVER: 0
ROS GI COMMENTS: POOR APPETITE
VOMITING: 0
CONSTIPATION: 0
DIAPHORESIS: 0
CONSTIPATION: 0
WEAKNESS: 1
WEIGHT LOSS: 1
NERVOUS/ANXIOUS: 0
COUGH: 0
WEIGHT LOSS: 0
FEVER: 0
SORE THROAT: 0
SHORTNESS OF BREATH: 0
ROS GI COMMENTS: NEGATIVE FOR ASCITES
NAUSEA: 1
VOMITING: 0
SPEECH CHANGE: 0
DIARRHEA: 0
BLURRED VISION: 0
CONSTIPATION: 0
WEIGHT LOSS: 0
DIARRHEA: 0
SHORTNESS OF BREATH: 0
FEVER: 0
FEVER: 0
HEADACHES: 0
MYALGIAS: 0
CHILLS: 0
SPEECH CHANGE: 0
ORTHOPNEA: 0
VOMITING: 0
HEADACHES: 0
SHORTNESS OF BREATH: 0
DIAPHORESIS: 0
SHORTNESS OF BREATH: 0
VOMITING: 0
VOMITING: 0
FOCAL WEAKNESS: 0
DOUBLE VISION: 0
COUGH: 0
SENSORY CHANGE: 0
HEADACHES: 0
VOMITING: 0
WEAKNESS: 1
DEPRESSION: 0
FOCAL WEAKNESS: 0
TINGLING: 0
VOMITING: 0
HEARTBURN: 0
CONSTIPATION: 0
COUGH: 1
VOMITING: 0
CONSTIPATION: 0
VOMITING: 0
SHORTNESS OF BREATH: 0
HEARTBURN: 0
ABDOMINAL PAIN: 1
NERVOUS/ANXIOUS: 0
PHOTOPHOBIA: 0
CHILLS: 1
DIZZINESS: 0
PHOTOPHOBIA: 0
WHEEZING: 0
CONSTIPATION: 0
SPUTUM PRODUCTION: 1
CLAUDICATION: 0
PHOTOPHOBIA: 0
ABDOMINAL PAIN: 0
SENSORY CHANGE: 0
TINGLING: 0
SHORTNESS OF BREATH: 0
COUGH: 0
TINGLING: 0
DEPRESSION: 0
DIARRHEA: 0
COUGH: 0
SHORTNESS OF BREATH: 0
WEIGHT LOSS: 0
DIZZINESS: 0
WHEEZING: 0
NERVOUS/ANXIOUS: 0
COUGH: 0
FOCAL WEAKNESS: 0
VOMITING: 0
COUGH: 0
CONSTIPATION: 1
WEAKNESS: 1
ORTHOPNEA: 0
CLAUDICATION: 0
UNEXPECTED WEIGHT CHANGE: 0
ORTHOPNEA: 0
SHORTNESS OF BREATH: 0
DIARRHEA: 0
DEPRESSION: 0
EYE DISCHARGE: 0
NAUSEA: 0
BLOOD IN STOOL: 0
VOMITING: 0
SHORTNESS OF BREATH: 0
INSOMNIA: 0
NERVOUS/ANXIOUS: 0
CONSTIPATION: 0
CONSTIPATION: 0
ABDOMINAL PAIN: 1
VOMITING: 0
SENSORY CHANGE: 0
ABDOMINAL PAIN: 1
WHEEZING: 0
INSOMNIA: 0
SPUTUM PRODUCTION: 0
MYALGIAS: 0
CONSTIPATION: 0
WEIGHT LOSS: 0
DEPRESSION: 0
MYALGIAS: 0
COUGH: 1
SENSORY CHANGE: 0
PND: 0
ABDOMINAL PAIN: 1
DIAPHORESIS: 0
COUGH: 0
SHORTNESS OF BREATH: 0
NERVOUS/ANXIOUS: 0
HEADACHES: 0
APPETITE CHANGE: 1
SPUTUM PRODUCTION: 0
NAUSEA: 0
FEVER: 0
NAUSEA: 0
VOMITING: 0
INSOMNIA: 0
PALPITATIONS: 0
COUGH: 1
MYALGIAS: 0
VOMITING: 0
NAUSEA: 0
NAUSEA: 0
BACK PAIN: 0
SPEECH CHANGE: 0
NERVOUS/ANXIOUS: 0
DIZZINESS: 0
CONSTIPATION: 0
BRUISES/BLEEDS EASILY: 0
WEAKNESS: 1
CONSTIPATION: 0
WHEEZING: 0
CLAUDICATION: 0
DEPRESSED MOOD: 1
DIARRHEA: 0
ABDOMINAL PAIN: 0
ABDOMINAL PAIN: 0
ABDOMINAL PAIN: 1
SPEECH CHANGE: 0
COUGH: 1
LOSS OF CONSCIOUSNESS: 0
GASTROINTESTINAL NEGATIVE: 1
DIAPHORESIS: 0
ORTHOPNEA: 0
CHILLS: 0
DIARRHEA: 0
DIARRHEA: 0
DEPRESSION: 0
PHOTOPHOBIA: 0
CHILLS: 0
WHEEZING: 0
VOMITING: 0
CHILLS: 0
COUGH: 1
FEVER: 0
ORTHOPNEA: 0
FEVER: 0
CHILLS: 1
MYALGIAS: 0
EYE PAIN: 0
HEMOPTYSIS: 0
PND: 0
SHORTNESS OF BREATH: 0
FEVER: 0
INSOMNIA: 0
NERVOUS/ANXIOUS: 0
CHILLS: 0
CHILLS: 0
DIZZINESS: 0
PHOTOPHOBIA: 0
FOCAL WEAKNESS: 0
HEARTBURN: 0
CLAUDICATION: 0
TINGLING: 1
VOMITING: 0
HEARTBURN: 0
WEAKNESS: 0
TINGLING: 0
PALPITATIONS: 0
HEADACHES: 0
DIARRHEA: 0
PALPITATIONS: 0
MYALGIAS: 0
NAUSEA: 0
VOMITING: 0
PHOTOPHOBIA: 0
COUGH: 0
HEADACHES: 0
INSOMNIA: 0
MYALGIAS: 0
MYALGIAS: 0
HEMOPTYSIS: 0
HEADACHES: 0
MYALGIAS: 0
SPEECH CHANGE: 0
TROUBLE SWALLOWING: 0
NERVOUS/ANXIOUS: 0
BRUISES/BLEEDS EASILY: 0
INSOMNIA: 0
ABDOMINAL PAIN: 0
WEAKNESS: 1
HEADACHES: 0
PHOTOPHOBIA: 0
FEVER: 0
VOMITING: 0
NAUSEA: 0
SEIZURES: 0
DIAPHORESIS: 0
CONSTIPATION: 0
WEAKNESS: 1
WEAKNESS: 1
SHORTNESS OF BREATH: 0
COUGH: 1
SENSORY CHANGE: 0
ABDOMINAL PAIN: 1
ORTHOPNEA: 0
INSOMNIA: 0
SENSORY CHANGE: 0
WHEEZING: 0
TINGLING: 1
COUGH: 0
DIZZINESS: 0
CHILLS: 1
WEIGHT LOSS: 1
LOSS OF CONSCIOUSNESS: 0
DIARRHEA: 0
BLOOD IN STOOL: 0
PHOTOPHOBIA: 0
CHILLS: 0
SENSORY CHANGE: 0
INSOMNIA: 0
DIAPHORESIS: 0
WEIGHT LOSS: 0
VOMITING: 0
ABDOMINAL PAIN: 0
SHORTNESS OF BREATH: 0
NERVOUS/ANXIOUS: 0
NAUSEA: 1
COUGH: 0
BLOOD IN STOOL: 0
WEIGHT LOSS: 1
SENSORY CHANGE: 0
HEMOPTYSIS: 0
SHORTNESS OF BREATH: 0
DIARRHEA: 0
CONSTIPATION: 0
SORE THROAT: 0
SENSORY CHANGE: 0
SPUTUM PRODUCTION: 0
PALPITATIONS: 0
INSOMNIA: 0
POLYDIPSIA: 0
PALPITATIONS: 0
ABDOMINAL PAIN: 0
DIZZINESS: 0
DIAPHORESIS: 0
BACK PAIN: 0
PHOTOPHOBIA: 0
VOMITING: 1
HEADACHES: 0
DIZZINESS: 0
HEMOPTYSIS: 0
FEVER: 0
HEADACHES: 0
NAUSEA: 0
BOWEL INCONTINENCE: 1
TREMORS: 0
PHOTOPHOBIA: 0
CHILLS: 0
NERVOUS/ANXIOUS: 0
SPUTUM PRODUCTION: 1
BACK PAIN: 0
HEARTBURN: 0
WEAKNESS: 1
BLURRED VISION: 0
WEAKNESS: 1
BACK PAIN: 0
EYES NEGATIVE: 1
WEAKNESS: 1
DIZZINESS: 0
PHOTOPHOBIA: 0
COUGH: 0
BLOOD IN STOOL: 0
VOMITING: 0
CHILLS: 0
BLURRED VISION: 0
ABDOMINAL PAIN: 0
CONSTIPATION: 0
CLAUDICATION: 0
VOMITING: 0
SPEECH CHANGE: 0
FEVER: 0
CHILLS: 0
FOCAL WEAKNESS: 0
WHEEZING: 0
SORE THROAT: 0
CHILLS: 1
CARDIOVASCULAR NEGATIVE: 1
VOMITING: 0
WEIGHT LOSS: 0
NAUSEA: 0
NERVOUS/ANXIOUS: 0
WHEEZING: 0
NAUSEA: 1
FEVER: 0
VOMITING: 0
DIARRHEA: 0
VOMITING: 0
DIZZINESS: 0
BRUISES/BLEEDS EASILY: 0
MYALGIAS: 1
VOMITING: 0
ABDOMINAL PAIN: 0
DIZZINESS: 0
CHILLS: 1
CHILLS: 0
NAUSEA: 0
ABDOMINAL PAIN: 1
SORE THROAT: 1
FEVER: 0
HEADACHES: 0
HEMOPTYSIS: 0
VOMITING: 0
DIZZINESS: 0
SPUTUM PRODUCTION: 0
WEAKNESS: 1
MYALGIAS: 0
COUGH: 0
PALPITATIONS: 0
WEAKNESS: 0
CONSTIPATION: 0
FEVER: 0
PHOTOPHOBIA: 0
SHORTNESS OF BREATH: 0
SENSORY CHANGE: 0
TINGLING: 0
HEARTBURN: 0
TREMORS: 0
DOUBLE VISION: 0
FEVER: 0
SHORTNESS OF BREATH: 0
BLURRED VISION: 0
ABDOMINAL PAIN: 0
SPUTUM PRODUCTION: 0
DIARRHEA: 0
ABDOMINAL PAIN: 1
CHILLS: 1
COUGH: 1
BLOOD IN STOOL: 0
NAUSEA: 0
SHORTNESS OF BREATH: 1
DIAPHORESIS: 0
SPUTUM PRODUCTION: 0
NAUSEA: 0
DEPRESSION: 0
FEVER: 0
SENSORY CHANGE: 0
MEMORY LOSS: 0
PHOTOPHOBIA: 0
WEAKNESS: 1
INSOMNIA: 0
HEARTBURN: 0
CHILLS: 0
BLURRED VISION: 0
FOCAL WEAKNESS: 0
WEIGHT LOSS: 0
HEMOPTYSIS: 0
ABDOMINAL PAIN: 0
SPUTUM PRODUCTION: 0
DEPRESSION: 0
INSOMNIA: 0
BACK PAIN: 1
MYALGIAS: 0
MYALGIAS: 0
CHILLS: 1
DIAPHORESIS: 0
CONSTIPATION: 0
CHILLS: 0
FEVER: 0
CONSTIPATION: 0
CONSTIPATION: 0
DIZZINESS: 0
HEARTBURN: 0
DIZZINESS: 0
SHORTNESS OF BREATH: 0
VOMITING: 0
HEARTBURN: 1
NERVOUS/ANXIOUS: 0
DIARRHEA: 0
ORTHOPNEA: 0
MYALGIAS: 1
DIARRHEA: 1
NECK PAIN: 0
WEIGHT LOSS: 1
BLURRED VISION: 0
SPEECH CHANGE: 0
NAUSEA: 0
SORE THROAT: 1
HEADACHES: 0
WEIGHT LOSS: 0
VOMITING: 0
VOMITING: 1
SHORTNESS OF BREATH: 0
BLURRED VISION: 0
PALPITATIONS: 0
COUGH: 1
ABDOMINAL PAIN: 0
HEARTBURN: 0
BRUISES/BLEEDS EASILY: 0
DIAPHORESIS: 0
BLURRED VISION: 0
VOMITING: 1
FEVER: 0
HEADACHES: 0
NAUSEA: 0
WEIGHT LOSS: 1
BLURRED VISION: 0
PALPITATIONS: 0
COUGH: 1
FEVER: 0
COUGH: 0
HEARTBURN: 0
HEADACHES: 1
DIARRHEA: 0
DIZZINESS: 0
NERVOUS/ANXIOUS: 0
DEPRESSION: 0
SENSORY CHANGE: 0
FEVER: 0
BACK PAIN: 1
ABDOMINAL PAIN: 0
CONSTIPATION: 0
SPUTUM PRODUCTION: 0
CONSTIPATION: 0
NAUSEA: 0
COUGH: 1
HEADACHES: 0
COUGH: 0
DIZZINESS: 0
CHILLS: 0
SPEECH CHANGE: 0
DIAPHORESIS: 0
NECK PAIN: 0
FEVER: 0
NECK PAIN: 0
NERVOUS/ANXIOUS: 0
PALPITATIONS: 0
HEADACHES: 0
DIARRHEA: 0
DEPRESSION: 0
HEARTBURN: 0
CHILLS: 0
ABDOMINAL PAIN: 1
NAUSEA: 0
MYALGIAS: 1
COUGH: 0
WEAKNESS: 0
HALLUCINATIONS: 0
SPEECH CHANGE: 0
CHILLS: 0
DIARRHEA: 0
BACK PAIN: 0
WHEEZING: 0
FLANK PAIN: 0
PHOTOPHOBIA: 0
SPEECH CHANGE: 0
HEMOPTYSIS: 0
CONSTIPATION: 1
ORTHOPNEA: 0
WEAKNESS: 1
DIAPHORESIS: 0
HEADACHES: 0
SHORTNESS OF BREATH: 0
HEARTBURN: 0
PHOTOPHOBIA: 0
NECK PAIN: 0
TINGLING: 0
MYALGIAS: 0
NAUSEA: 0
MYALGIAS: 0
VOMITING: 0
NAUSEA: 0
VOMITING: 0
BLURRED VISION: 0
SHORTNESS OF BREATH: 0
FEVER: 0
HEADACHES: 0
SHORTNESS OF BREATH: 0
NAUSEA: 1
COUGH: 0
BLURRED VISION: 0
NAUSEA: 0
ABDOMINAL PAIN: 0
VOMITING: 0
SHORTNESS OF BREATH: 0
CONSTIPATION: 0
DIAPHORESIS: 0
BACK PAIN: 0
NAUSEA: 0
VOMITING: DENIES
CONSTIPATION: 1
BLURRED VISION: 0
PALPITATIONS: 0
DEPRESSION: 0
MYALGIAS: 0
WHEEZING: 0
FEVER: 0
HEADACHES: 0
HEADACHES: 0
MYALGIAS: 0
SPUTUM PRODUCTION: 0
NAUSEA: 0
CHILLS: 0
CHILLS: 0
ABDOMINAL PAIN: 0
HEADACHES: 0
NAUSEA: 0
NAUSEA: 0
WHEEZING: 0
BACK PAIN: 1
SHORTNESS OF BREATH: 0
MENTAL STATUS CHANGE: 0
HEADACHES: 0
COUGH: 0
TINGLING: 0
EYES NEGATIVE: 1
DIAPHORESIS: 0
WHEEZING: 0
CLAUDICATION: 0
WEIGHT LOSS: 1
DEPRESSION: 0
NERVOUS/ANXIOUS: 0
ORTHOPNEA: 0
DIAPHORESIS: 0
NAUSEA: 1
SORE THROAT: 0
SPUTUM PRODUCTION: 0
CONSTIPATION: 0

## 2018-01-01 ASSESSMENT — PAIN SCALES - GENERAL
PAINLEVEL_OUTOF10: 0
PAINLEVEL_OUTOF10: 0
PAINLEVEL_OUTOF10: 3
PAINLEVEL_OUTOF10: 5
PAINLEVEL_OUTOF10: 0
PAINLEVEL_OUTOF10: ASSUMED PAIN PRESENT
PAINLEVEL: NO PAIN
PAINLEVEL_OUTOF10: 0
PAINLEVEL_OUTOF10: 0
PAINLEVEL_OUTOF10: 4
PAINLEVEL_OUTOF10: 0
PAINLEVEL: NO PAIN
PAINLEVEL_OUTOF10: 0
PAINLEVEL_OUTOF10: 6
PAINLEVEL_OUTOF10: 8
PAINLEVEL_OUTOF10: 0
PAINLEVEL_OUTOF10: 1
PAINLEVEL_OUTOF10: 3
PAINLEVEL_OUTOF10: 5
PAINLEVEL_OUTOF10: 3
PAINLEVEL_OUTOF10: 3
PAINLEVEL: NO PAIN
PAINLEVEL_OUTOF10: 5
PAINLEVEL_OUTOF10: 0
PAINLEVEL_OUTOF10: 5
PAINLEVEL_OUTOF10: 0
PAINLEVEL_OUTOF10: 6
PAINLEVEL_OUTOF10: 0
PAINLEVEL_OUTOF10: 2
PAINLEVEL_OUTOF10: 0
PAINLEVEL_OUTOF10: 5
PAINLEVEL_OUTOF10: 0
PAINLEVEL_OUTOF10: 0
PAINLEVEL: NO PAIN
PAINLEVEL_OUTOF10: 4
PAINLEVEL_OUTOF10: 4
PAINLEVEL_OUTOF10: 3
PAINLEVEL_OUTOF10: 0
PAINLEVEL: NO PAIN
PAINLEVEL_OUTOF10: 4
PAINLEVEL_OUTOF10: 0
PAINLEVEL_OUTOF10: 4
PAINLEVEL_OUTOF10: 0
PAINLEVEL_OUTOF10: 0
PAINLEVEL: NO PAIN
PAINLEVEL_OUTOF10: 0
PAINLEVEL_OUTOF10: 0
PAINLEVEL_OUTOF10: 3
PAINLEVEL_OUTOF10: 0
PAINLEVEL_OUTOF10: 2
PAINLEVEL_OUTOF10: 0
PAINLEVEL_OUTOF10: 2
PAINLEVEL_OUTOF10: 3
PAINLEVEL_OUTOF10: 0
PAINLEVEL_OUTOF10: 0
PAINLEVEL_OUTOF10: 5
PAINLEVEL_OUTOF10: 7
PAINLEVEL_OUTOF10: 4
PAINLEVEL_OUTOF10: 5
PAINLEVEL_OUTOF10: 0
PAINLEVEL: NO PAIN
PAINLEVEL_OUTOF10: 0
PAINLEVEL_OUTOF10: 0
PAINLEVEL_OUTOF10: 8
PAINLEVEL: NO PAIN
PAINLEVEL_OUTOF10: 5
PAINLEVEL_OUTOF10: 5
PAINLEVEL_OUTOF10: 2
PAINLEVEL_OUTOF10: 3
PAINLEVEL_OUTOF10: 0
PAINLEVEL_OUTOF10: 2
PAINLEVEL: NO PAIN
PAINLEVEL_OUTOF10: 0
PAINLEVEL_OUTOF10: 0
PAINLEVEL_OUTOF10: 5
PAINLEVEL_OUTOF10: 0
PAINLEVEL_OUTOF10: 0
PAINLEVEL_OUTOF10: 5
PAINLEVEL_OUTOF10: 3
PAINLEVEL: 7=MODERATE-SEVERE PAIN
PAINLEVEL_OUTOF10: 0
PAINLEVEL_OUTOF10: 3
PAINLEVEL_OUTOF10: 0
PAINLEVEL_OUTOF10: 0
PAINLEVEL_OUTOF10: 3
PAINLEVEL_OUTOF10: 0
PAINLEVEL: NO PAIN
PAINLEVEL_OUTOF10: 4
PAINLEVEL_OUTOF10: 3
PAINLEVEL_OUTOF10: 0
PAINLEVEL_OUTOF10: 5
PAINLEVEL_OUTOF10: 3
PAINLEVEL: NO PAIN
PAINLEVEL_OUTOF10: 0
PAINLEVEL_OUTOF10: 0
PAINLEVEL_OUTOF10: ASSUMED PAIN PRESENT
PAINLEVEL: 7=MODERATE-SEVERE PAIN
PAINLEVEL_OUTOF10: 5
PAINLEVEL_OUTOF10: 0
PAINLEVEL_OUTOF10: 3
PAINLEVEL_OUTOF10: 5
PAINLEVEL_OUTOF10: 5
PAINLEVEL_OUTOF10: 0
PAINLEVEL: NO PAIN
PAINLEVEL_OUTOF10: 2
PAINLEVEL_OUTOF10: 4
PAINLEVEL_OUTOF10: 5
PAINLEVEL_OUTOF10: 8
PAINLEVEL_OUTOF10: 0
PAINLEVEL: NO PAIN
PAINLEVEL: NO PAIN
PAINLEVEL_OUTOF10: 0
PAINLEVEL_OUTOF10: 4
PAINLEVEL_OUTOF10: 0
PAINLEVEL_OUTOF10: 0
PAINLEVEL: NO PAIN
PAINLEVEL_OUTOF10: 5
PAINLEVEL_OUTOF10: 9
PAINLEVEL_OUTOF10: 0
PAINLEVEL_OUTOF10: 6
PAINLEVEL_OUTOF10: 0
PAINLEVEL_OUTOF10: 3
PAINLEVEL_OUTOF10: 0
PAINLEVEL: NO PAIN
PAINLEVEL_OUTOF10: 2
PAINLEVEL_OUTOF10: 4
PAINLEVEL: 2=MINIMAL-SLIGHT
PAINLEVEL_OUTOF10: 0
PAINLEVEL_OUTOF10: 0
PAINLEVEL_OUTOF10: 2
PAINLEVEL_OUTOF10: 6
PAINLEVEL_OUTOF10: 0
PAINLEVEL_OUTOF10: 3
PAINLEVEL_OUTOF10: 4
PAINLEVEL: NO PAIN
PAINLEVEL_OUTOF10: 0
PAINLEVEL_OUTOF10: 0
PAINLEVEL_OUTOF10: 3
PAINLEVEL_OUTOF10: 5
PAINLEVEL_OUTOF10: 4
PAINLEVEL_OUTOF10: 2
PAINLEVEL: 6=MODERATE PAIN
PAINLEVEL_OUTOF10: 0
PAINLEVEL: 2=MINIMAL-SLIGHT
PAINLEVEL: 5=MODERATE PAIN
PAINLEVEL_OUTOF10: 5
PAINLEVEL_OUTOF10: 0
PAINLEVEL_OUTOF10: 2
PAINLEVEL_OUTOF10: 4
PAINLEVEL_OUTOF10: 0
PAINLEVEL: NO PAIN
PAINLEVEL_OUTOF10: 0
PAINLEVEL_OUTOF10: 0
PAINLEVEL_OUTOF10: 2
PAINLEVEL_OUTOF10: 0
PAINLEVEL_OUTOF10: 0
PAINLEVEL_OUTOF10: 1
PAINLEVEL_OUTOF10: 0
PAINLEVEL: NO PAIN
PAINLEVEL: NO PAIN
PAINLEVEL_OUTOF10: 0
PAINLEVEL_OUTOF10: 3
PAINLEVEL_OUTOF10: 0
PAINLEVEL_OUTOF10: 3
PAINLEVEL_OUTOF10: 0
PAINLEVEL_OUTOF10: 2
PAINLEVEL_OUTOF10: 0
PAINLEVEL_OUTOF10: 0
PAINLEVEL: 6=MODERATE PAIN
PAINLEVEL_OUTOF10: 3
PAINLEVEL_OUTOF10: 5
PAINLEVEL_OUTOF10: 0
PAINLEVEL: NO PAIN
PAINLEVEL_OUTOF10: 8
PAINLEVEL_OUTOF10: 0
PAINLEVEL: NO PAIN
PAINLEVEL_OUTOF10: 0
PAINLEVEL_OUTOF10: 4
PAINLEVEL_OUTOF10: 5
PAINLEVEL: NO PAIN
PAINLEVEL_OUTOF10: 3
PAINLEVEL_OUTOF10: 0
PAINLEVEL_OUTOF10: 5
PAINLEVEL_OUTOF10: 0
PAINLEVEL_OUTOF10: 0
PAINLEVEL_OUTOF10: 5
PAINLEVEL_OUTOF10: 5
PAINLEVEL: NO PAIN
PAINLEVEL_OUTOF10: 0
PAINLEVEL: NO PAIN
PAINLEVEL_OUTOF10: 0
PAINLEVEL_OUTOF10: 0
PAINLEVEL_OUTOF10: 4
PAINLEVEL_OUTOF10: 0
PAINLEVEL_OUTOF10: 4
PAINLEVEL_OUTOF10: 0
PAINLEVEL_OUTOF10: 5
PAINLEVEL_OUTOF10: 6
PAINLEVEL_OUTOF10: 7
PAINLEVEL_OUTOF10: 0
PAINLEVEL_OUTOF10: 2
PAINLEVEL_OUTOF10: 2
PAINLEVEL_OUTOF10: 0
PAINLEVEL_OUTOF10: 1
PAINLEVEL_OUTOF10: 6
PAINLEVEL: NO PAIN
PAINLEVEL_OUTOF10: 0
PAINLEVEL_OUTOF10: 5
PAINLEVEL_OUTOF10: 1
PAINLEVEL_OUTOF10: 0
PAINLEVEL_OUTOF10: 3
PAINLEVEL: NO PAIN
PAINLEVEL_OUTOF10: 3
PAINLEVEL_OUTOF10: 4
PAINLEVEL_OUTOF10: 3
PAINLEVEL_OUTOF10: 0
PAINLEVEL_OUTOF10: 0
PAINLEVEL_OUTOF10: 3
PAINLEVEL_OUTOF10: 0
PAINLEVEL_OUTOF10: 4
PAINLEVEL_OUTOF10: ASSUMED PAIN PRESENT
PAINLEVEL_OUTOF10: 3
PAINLEVEL_OUTOF10: 0
PAINLEVEL_OUTOF10: 0
PAINLEVEL_OUTOF10: 4
PAINLEVEL_OUTOF10: 3
PAINLEVEL_OUTOF10: 0
PAINLEVEL_OUTOF10: 0
PAINLEVEL_OUTOF10: 8
PAINLEVEL: NO PAIN
PAINLEVEL_OUTOF10: 3
PAINLEVEL: NO PAIN
PAINLEVEL_OUTOF10: 4
PAINLEVEL_OUTOF10: 0
PAINLEVEL_OUTOF10: 2
PAINLEVEL_OUTOF10: 3
PAINLEVEL_OUTOF10: 3
PAINLEVEL_OUTOF10: 0
PAINLEVEL: 5=MODERATE PAIN
PAINLEVEL_OUTOF10: 0
PAINLEVEL_OUTOF10: 0
PAINLEVEL: NO PAIN
PAINLEVEL_OUTOF10: 0
PAINLEVEL_OUTOF10: 3
PAINLEVEL_OUTOF10: 0
PAINLEVEL: 5=MODERATE PAIN
PAINLEVEL: NO PAIN
PAINLEVEL: NO PAIN
PAINLEVEL_OUTOF10: 0
PAINLEVEL_OUTOF10: 3
PAINLEVEL_OUTOF10: 0
PAINLEVEL: 2=MINIMAL-SLIGHT
PAINLEVEL: NO PAIN
PAINLEVEL_OUTOF10: 0
PAINLEVEL_OUTOF10: 2
PAINLEVEL_OUTOF10: 0
PAINLEVEL_OUTOF10: 3

## 2018-01-01 ASSESSMENT — ACTIVITIES OF DAILY LIVING (ADL)
MONEY MANAGEMENT (EXPENSES/BILLS): TOTALLY DEPENDENT
HOME_HEALTH_OASIS: 01
EATING_REQUIRES_ASSISTANCE: 1
EATING_ASSISTANCE: 1
TOILETING: INDEPENDENT
TOILETING_EQUIPMENT_USED: TOILET RISER WITH HANDLES
BATHING_ASSISTANCE: 4
CONTINENCE_REQUIRES_ASSISTANCE: 1
PHYSICAL_TRANSFER_REQUIRES_ASSISTANCE: 1
BATHING_REQUIRES_ASSISTANCE: 1
BATHING_ASSISTIVE_EQUIPMENT_USED: SHOWER CHAIR, HANDHELD SHOWER
DRESSING_REQUIRES_ASSISTANCE: 1
TOILETING_ASSISTANCE: 0
GROOMING_ASSISTANCE: 0
DRESSING_LB_ASSISTANCE: 4
ORAL_CARE_ASSISTANCE: 0
AMBULATION_REQUIRES_ASSISTANCE: 1
DRESSING_UB_ASSISTANCE: 1
OASIS_M1830: 03
ADLS_COMMENTS: <!--EPICS-->PEND OT EVAL<!--EPICE-->

## 2018-01-01 ASSESSMENT — COGNITIVE AND FUNCTIONAL STATUS - GENERAL
WALKING IN HOSPITAL ROOM: A LOT
SUGGESTED CMS G CODE MODIFIER MOBILITY: CL
MOVING FROM LYING ON BACK TO SITTING ON SIDE OF FLAT BED: UNABLE
TURNING FROM BACK TO SIDE WHILE IN FLAT BAD: UNABLE
CLIMB 3 TO 5 STEPS WITH RAILING: TOTAL
MOVING FROM LYING ON BACK TO SITTING ON SIDE OF FLAT BED: A LOT
TOILETING: A LOT
STANDING UP FROM CHAIR USING ARMS: A LOT
DRESSING REGULAR UPPER BODY CLOTHING: A LITTLE
TOILETING: A LITTLE
DRESSING REGULAR LOWER BODY CLOTHING: A LOT
SUGGESTED CMS G CODE MODIFIER MOBILITY: CL
PERSONAL GROOMING: A LOT
MOBILITY SCORE: 12
SUGGESTED CMS G CODE MODIFIER DAILY ACTIVITY: CI
EATING MEALS: A LOT
DRESSING REGULAR LOWER BODY CLOTHING: A LOT
TURNING FROM BACK TO SIDE WHILE IN FLAT BAD: A LOT
MOVING TO AND FROM BED TO CHAIR: A LOT
PERSONAL GROOMING: A LITTLE
SUGGESTED CMS G CODE MODIFIER MOBILITY: CH
MOBILITY SCORE: 24
HELP NEEDED FOR BATHING: A LITTLE
MOBILITY SCORE: 24
WALKING IN HOSPITAL ROOM: A LITTLE
SUGGESTED CMS G CODE MODIFIER DAILY ACTIVITY: CK
DRESSING REGULAR LOWER BODY CLOTHING: A LITTLE
DAILY ACTIVITIY SCORE: 23
STANDING UP FROM CHAIR USING ARMS: A LOT
TOILETING: A LOT
TURNING FROM BACK TO SIDE WHILE IN FLAT BAD: UNABLE
MOVING TO AND FROM BED TO CHAIR: UNABLE
SUGGESTED CMS G CODE MODIFIER MOBILITY: CH
WALKING IN HOSPITAL ROOM: A LOT
MOVING TO AND FROM BED TO CHAIR: UNABLE
CLIMB 3 TO 5 STEPS WITH RAILING: TOTAL
DRESSING REGULAR UPPER BODY CLOTHING: A LITTLE
SUGGESTED CMS G CODE MODIFIER MOBILITY: CL
STANDING UP FROM CHAIR USING ARMS: A LOT
TURNING FROM BACK TO SIDE WHILE IN FLAT BAD: A LITTLE
HELP NEEDED FOR BATHING: A LITTLE
SUGGESTED CMS G CODE MODIFIER DAILY ACTIVITY: CK
SUGGESTED CMS G CODE MODIFIER DAILY ACTIVITY: CK
STANDING UP FROM CHAIR USING ARMS: A LITTLE
MOBILITY SCORE: 11
SUGGESTED CMS G CODE MODIFIER DAILY ACTIVITY: CL
DRESSING REGULAR LOWER BODY CLOTHING: A LOT
SUGGESTED CMS G CODE MODIFIER DAILY ACTIVITY: CI
MOVING TO AND FROM BED TO CHAIR: A LOT
TOILETING: A LOT
DAILY ACTIVITIY SCORE: 23
EATING MEALS: A LITTLE
CLIMB 3 TO 5 STEPS WITH RAILING: A LOT
SUGGESTED CMS G CODE MODIFIER MOBILITY: CL
MOVING FROM LYING ON BACK TO SITTING ON SIDE OF FLAT BED: A LITTLE
DAILY ACTIVITIY SCORE: 12
MOVING FROM LYING ON BACK TO SITTING ON SIDE OF FLAT BED: A LOT
HELP NEEDED FOR BATHING: A LITTLE
HELP NEEDED FOR BATHING: A LITTLE
DAILY ACTIVITIY SCORE: 19
WALKING IN HOSPITAL ROOM: A LITTLE
DAILY ACTIVITIY SCORE: 19
DRESSING REGULAR UPPER BODY CLOTHING: A LOT
MOBILITY SCORE: 13
EATING MEALS: A LITTLE
HELP NEEDED FOR BATHING: A LOT
MOBILITY SCORE: 10
DAILY ACTIVITIY SCORE: 17
CLIMB 3 TO 5 STEPS WITH RAILING: A LOT

## 2018-01-01 ASSESSMENT — COPD QUESTIONNAIRES
COPD SCREENING SCORE: 2
COPD SCREENING SCORE: 2
DO YOU EVER COUGH UP ANY MUCUS OR PHLEGM?: NO/ONLY WITH OCCASIONAL COLDS OR INFECTIONS
DURING THE PAST 4 WEEKS HOW MUCH DID YOU FEEL SHORT OF BREATH: NONE/LITTLE OF THE TIME
IN THE PAST 12 MONTHS DO YOU DO LESS THAN YOU USED TO BECAUSE OF YOUR BREATHING PROBLEMS: DISAGREE/UNSURE
HAVE YOU SMOKED AT LEAST 100 CIGARETTES IN YOUR ENTIRE LIFE: NO/DON'T KNOW
HAVE YOU SMOKED AT LEAST 100 CIGARETTES IN YOUR ENTIRE LIFE: NO/DON'T KNOW
DURING THE PAST 4 WEEKS HOW MUCH DID YOU FEEL SHORT OF BREATH: SOME OF THE TIME
COPD SCREENING SCORE: 3
DURING THE PAST 4 WEEKS HOW MUCH DID YOU FEEL SHORT OF BREATH: NONE/LITTLE OF THE TIME
DO YOU EVER COUGH UP ANY MUCUS OR PHLEGM?: NO/ONLY WITH OCCASIONAL COLDS OR INFECTIONS
DO YOU EVER COUGH UP ANY MUCUS OR PHLEGM?: NO/ONLY WITH OCCASIONAL COLDS OR INFECTIONS
HAVE YOU SMOKED AT LEAST 100 CIGARETTES IN YOUR ENTIRE LIFE: NO/DON'T KNOW

## 2018-01-01 ASSESSMENT — PATIENT HEALTH QUESTIONNAIRE - PHQ9
SUM OF ALL RESPONSES TO PHQ9 QUESTIONS 1 AND 2: 0
1. LITTLE INTEREST OR PLEASURE IN DOING THINGS: NOT AT ALL
CLINICAL INTERPRETATION OF PHQ2 SCORE: 0
2. FEELING DOWN, DEPRESSED, IRRITABLE, OR HOPELESS: NOT AT ALL
2. FEELING DOWN, DEPRESSED, IRRITABLE, OR HOPELESS: NOT AT ALL
1. LITTLE INTEREST OR PLEASURE IN DOING THINGS: NOT AT ALL
1. LITTLE INTEREST OR PLEASURE IN DOING THINGS: NOT AT ALL
SUM OF ALL RESPONSES TO PHQ9 QUESTIONS 1 AND 2: 0
2. FEELING DOWN, DEPRESSED, IRRITABLE, OR HOPELESS: NOT AT ALL
2. FEELING DOWN, DEPRESSED, IRRITABLE, OR HOPELESS: NOT AT ALL
SUM OF ALL RESPONSES TO PHQ9 QUESTIONS 1 AND 2: 0
SUM OF ALL RESPONSES TO PHQ9 QUESTIONS 1 AND 2: 0
2. FEELING DOWN, DEPRESSED, IRRITABLE, OR HOPELESS: NOT AT ALL
2. FEELING DOWN, DEPRESSED, IRRITABLE, OR HOPELESS: NOT AT ALL
1. LITTLE INTEREST OR PLEASURE IN DOING THINGS: NOT AT ALL
SUM OF ALL RESPONSES TO PHQ9 QUESTIONS 1 AND 2: 0
1. LITTLE INTEREST OR PLEASURE IN DOING THINGS: NOT AT ALL
2. FEELING DOWN, DEPRESSED, IRRITABLE, OR HOPELESS: NOT AT ALL
2. FEELING DOWN, DEPRESSED, IRRITABLE, OR HOPELESS: NOT AT ALL
SUM OF ALL RESPONSES TO PHQ9 QUESTIONS 1 AND 2: 0
CLINICAL INTERPRETATION OF PHQ2 SCORE: 0
SUM OF ALL RESPONSES TO PHQ9 QUESTIONS 1 AND 2: 0
1. LITTLE INTEREST OR PLEASURE IN DOING THINGS: NOT AT ALL
1. LITTLE INTEREST OR PLEASURE IN DOING THINGS: 00
2. FEELING DOWN, DEPRESSED, IRRITABLE, OR HOPELESS: NOT AT ALL
2. FEELING DOWN, DEPRESSED, IRRITABLE, OR HOPELESS: 00
1. LITTLE INTEREST OR PLEASURE IN DOING THINGS: NOT AT ALL
2. FEELING DOWN, DEPRESSED, IRRITABLE, OR HOPELESS: NOT AT ALL
SUM OF ALL RESPONSES TO PHQ9 QUESTIONS 1 AND 2: 0
SUM OF ALL RESPONSES TO PHQ9 QUESTIONS 1 AND 2: 0
1. LITTLE INTEREST OR PLEASURE IN DOING THINGS: NOT AT ALL
SUM OF ALL RESPONSES TO PHQ9 QUESTIONS 1 AND 2: 0
1. LITTLE INTEREST OR PLEASURE IN DOING THINGS: NOT AT ALL
SUM OF ALL RESPONSES TO PHQ9 QUESTIONS 1 AND 2: 0

## 2018-01-01 ASSESSMENT — GAIT ASSESSMENTS
GAIT LEVEL OF ASSIST: MINIMAL ASSIST
ASSISTIVE DEVICE: FRONT WHEEL WALKER
GAIT LEVEL OF ASSIST: STAND BY ASSIST
DISTANCE (FEET): 5
DISTANCE (FEET): 80
DEVIATION: BRADYKINETIC
ASSISTIVE DEVICE: HAND HELD ASSIST

## 2018-01-01 ASSESSMENT — LIFESTYLE VARIABLES
EVER_SMOKED: NEVER
DO YOU DRINK ALCOHOL: NO
SUBSTANCE_ABUSE: 0
EVER_SMOKED: NEVER
SUBSTANCE_ABUSE: 0
ALCOHOL_USE: NO
SUBSTANCE_ABUSE: 0
SUBSTANCE_ABUSE: 0
EVER_SMOKED: NEVER
SUBSTANCE_ABUSE: 0
DO YOU DRINK ALCOHOL: NO
EVER_SMOKED: NEVER

## 2018-01-11 NOTE — PROGRESS NOTES
Subjective:      Elin Poole is a 72 y.o. female who presents with Follow-Up (prechemo) for ampullary carcinoma.         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 She is accompanied by her son for today's appointment. Patient is scheduled to receive cycle #3, day 1 of Gemzar and Abraxane tomorrow.    Patient has been doing very well and tolerating treatment well. She states she does experience some chills a few days after chemotherapy but then they resolve. She also experiences fatigue for the first 2-3 days after chemotherapy but resolves as well. She stated her fatigue is very controlled and tolerable right now. She denies any fevers. Her appetite has been good and she has stable weight as well. She denies coughing, wheezing or shortness of breath. She denies chest pain, heart palpitations or swelling in her legs. She denies nausea, vomiting, diarrhea or constipation. Her last dominant was yesterday. She is voiding without difficulty. She does have chronic knee pain from arthritis. She noticed a migraine last week which was relieved with Tylenol. She denies any dizziness or peripheral neuropathy. She also denies any mouth sores.    No Known Allergies  Current Outpatient Prescriptions on File Prior to Visit   Medication Sig Dispense Refill   • potassium chloride SA (K-DUR) 10 MEQ Tab CR TAKE 2 TABS BY MOUTH EVERY MORNING. 60 Tab 3   • ELIQUIS 5 MG Tab TAKE 1 TAB BY MOUTH 2 TIMES A DAY. 60 Tab 6   • RESTASIS 0.05 % ophthalmic emulsion      • ondansetron (ZOFRAN) 4 MG Tab tablet Take 1 Tab by mouth every four hours as needed for Nausea/Vomiting (for nausea, vomiting). 30 Tab 6   • prochlorperazine (COMPAZINE) 10 MG Tab Take 1 Tab by mouth every 6 hours as needed (for nausea, vomiting). 30 Tab 6   • prednisoLONE acetate (PRED FORTE) 1 % Suspension 1 DROP IN LEFT EYE FOUR TIMES A DAY START AFTER SURGERY  2   • ofloxacin (OCUFLOX) 0.3 % Solution INSTILLM 1 DROP INTO LEFT EYE 4 TIMES A  "DAY START 2 DAYS PRIOR TO SURGERY  2   • Polyethyl Glycol-Propyl Glycol (SYSTANE OP) by Ophthalmic route.     • losartan-hydrochlorothiazide (HYZAAR) 50-12.5 MG per tablet Take 1 Tab by mouth every day. 30 Tab 0   • megestrol (MEGACE) 400 MG/10ML Suspension Take 20 mL by mouth every day. 600 mL 1   • famotidine (PEPCID) 20 MG Tab Take 1 Tab by mouth 2 times a day. 60 Tab 3   • sennosides-docusate sodium (SENOKOT-S) 8.6-50 MG tablet Take 1 Tab by mouth 2 times a day. 60 Tab 3   • Misc Natural Products (FIBER 7) Powder Take  by mouth.     • prochlorperazine (COMPAZINE) 10 MG Tab Take 10 mg by mouth every 6 hours as needed for Nausea/Vomiting (alternating Q3H with Zofran).       No current facility-administered medications on file prior to visit.        Review of Systems   Constitutional: Positive for chills (a few days after chemo will notice chills) and malaise/fatigue (for a few days after chemo she will have fatigue but okay at this time). Negative for fever and weight loss.   Respiratory: Negative for cough, shortness of breath and wheezing.    Cardiovascular: Negative for chest pain, palpitations and leg swelling.   Gastrointestinal: Negative for constipation, diarrhea, nausea and vomiting.   Genitourinary: Negative for dysuria.   Musculoskeletal: Positive for joint pain (knee pain from arthritis).   Neurological: Positive for headaches (last week she did have a migraine - relieved with Tylenol). Negative for dizziness and tingling.   Psychiatric/Behavioral: The patient does not have insomnia.           Objective:     /72   Pulse 89   Temp 36.4 °C (97.5 °F)   Resp 16   Ht 1.53 m (5' 0.24\")   Wt 64.9 kg (143 lb 1.3 oz)   SpO2 97%   Breastfeeding? No   BMI 27.72 kg/m²      Physical Exam   Constitutional: She is oriented to person, place, and time. She appears well-developed and well-nourished. No distress.   HENT:   Head: Normocephalic and atraumatic.   Mouth/Throat: Oropharynx is clear and moist. No " oropharyngeal exudate.   Alopecia   Cardiovascular: Normal rate, regular rhythm, normal heart sounds and intact distal pulses.  Exam reveals no gallop and no friction rub.    No murmur heard.  Pulmonary/Chest: Effort normal and breath sounds normal. No respiratory distress. She has no wheezes.   Abdominal: Soft. Bowel sounds are normal. She exhibits no distension. There is no tenderness.   Musculoskeletal: Normal range of motion. She exhibits no edema or tenderness.   Neurological: She is alert and oriented to person, place, and time.   Skin: Skin is warm and dry. No rash noted. She is not diaphoretic. No erythema. No pallor.   Psychiatric: She has a normal mood and affect. Her behavior is normal.   Vitals reviewed.      Hospital Outpatient Visit on 01/11/2018   Component Date Value Ref Range Status   • WBC 01/11/2018 3.7* 4.8 - 10.8 K/uL Final   • RBC 01/11/2018 4.01* 4.20 - 5.40 M/uL Final   • Hemoglobin 01/11/2018 12.9  12.0 - 16.0 g/dL Final   • Hematocrit 01/11/2018 38.2  37.0 - 47.0 % Final   • MCV 01/11/2018 95.3  81.4 - 97.8 fL Final   • MCH 01/11/2018 32.2  27.0 - 33.0 pg Final   • MCHC 01/11/2018 33.8  33.6 - 35.0 g/dL Final   • RDW 01/11/2018 61.5* 35.9 - 50.0 fL Final   • Platelet Count 01/11/2018 217  164 - 446 K/uL Final   • MPV 01/11/2018 9.8  9.0 - 12.9 fL Final   • Neutrophils-Polys 01/11/2018 35.40* 44.00 - 72.00 % Final   • Lymphocytes 01/11/2018 36.20  22.00 - 41.00 % Final   • Monocytes 01/11/2018 23.00* 0.00 - 13.40 % Final   • Eosinophils 01/11/2018 1.90  0.00 - 6.90 % Final   • Basophils 01/11/2018 0.50  0.00 - 1.80 % Final   • Immature Granulocytes 01/11/2018 3.00* 0.00 - 0.90 % Final   • Nucleated RBC 01/11/2018 0.00  /100 WBC Final   • Neutrophils (Absolute) 01/11/2018 1.31* 2.00 - 7.15 K/uL Final   • Lymphs (Absolute) 01/11/2018 1.34  1.00 - 4.80 K/uL Final   • Monos (Absolute) 01/11/2018 0.85  0.00 - 0.85 K/uL Final   • Eos (Absolute) 01/11/2018 0.07  0.00 - 0.51 K/uL Final   • Baso  (Absolute) 01/11/2018 0.02  0.00 - 0.12 K/uL Final   • Immature Granulocytes (abs) 01/11/2018 0.11  0.00 - 0.11 K/uL Final   • NRBC (Absolute) 01/11/2018 0.00  K/uL Final   • Sodium 01/11/2018 134* 135 - 145 mmol/L Final   • Potassium 01/11/2018 4.1  3.6 - 5.5 mmol/L Final   • Chloride 01/11/2018 100  96 - 112 mmol/L Final   • Co2 01/11/2018 29  20 - 33 mmol/L Final   • Anion Gap 01/11/2018 5.0  0.0 - 11.9 Final   • Glucose 01/11/2018 110* 65 - 99 mg/dL Final   • Bun 01/11/2018 12  8 - 22 mg/dL Final   • Creatinine 01/11/2018 0.81  0.50 - 1.40 mg/dL Final   • Calcium 01/11/2018 9.1  8.5 - 10.5 mg/dL Final   • AST(SGOT) 01/11/2018 33  12 - 45 U/L Final   • ALT(SGPT) 01/11/2018 30  2 - 50 U/L Final   • Alkaline Phosphatase 01/11/2018 123* 30 - 99 U/L Final   • Total Bilirubin 01/11/2018 0.5  0.1 - 1.5 mg/dL Final   • Albumin 01/11/2018 3.3  3.2 - 4.9 g/dL Final   • Total Protein 01/11/2018 6.8  6.0 - 8.2 g/dL Final   • Globulin 01/11/2018 3.5  1.9 - 3.5 g/dL Final   • A-G Ratio 01/11/2018 0.9  g/dL Final   • GFR If  01/11/2018 >60  >60 mL/min/1.73 m 2 Final   • GFR If Non  01/11/2018 >60  >60 mL/min/1.73 m 2 Final            Assessment/Plan:     1. Ampullary carcinoma (CMS-HCC)       Plan  1. Patient is scheduled to receive cycle #3, day 1 of Gemzar and Abraxane tomorrow. I did review her CBC and CMP and labs are appropriate to proceed with treatment as planned.    2. Plan at this time for patient to repeat CT scan to evaluate response to chemotherapy after the completion of cycle #3. We have patient scheduled for her CT scan on February 5 and she will follow-up with Dr. Cedeno prior to cycle #4 to review results. I did review patient's schedule with her and she will follow-up again in the clinic in 4 weeks or sooner if needed. She will continue with treatment every 2 weeks as scheduled. Patient and son both verbalize understanding and are in agreement with the plan.

## 2018-01-11 NOTE — PROGRESS NOTES
Elin Poole is a 72 y.o. female here for a non-provider visit for: Lab Draws  on 1/11/2018 at 9:33 AM    Procedure Performed: Venipuncture     Anatomical site: Right Antecubital Area (AC)    Equipment used: 21g    Labs drawn: CBC w/diff and CMP    Ordering Provider: Dr. Claude Ortiz By: Mainor Henley, Med Ass't    Dr. Mabry in office.    Successful lab draw, no complications.

## 2018-01-12 NOTE — PROGRESS NOTES
"Pharmacy Chemotherapy Note    Patient Name: JANELLE BALDERAS  Dx: Pancreatic Adenocarcinoma        Protocol: Albumin-bound PACLItaxel + gemcitabine      Albumin bound Paclitaxel (Abraxane) 125 mg/m2 over 30 minutes on Days 1, and 15  Gemcitabine 1000 mg/m2 IV ovr 30 minutes on Days 1 and 15  --MD dosing days 1 and 15  28-day cycle x 2-6 cycles (unresectable or locally advanced) or until DPUT  NCCN Guidelines for pancreatic adenocarcinoma V.2.2016  Jacob Pond DD, et al. N Engl J Med.2013:369(18):1691-703    /58   Pulse 69   Temp 36.7 °C (98.1 °F)   Resp 18   Ht 1.53 m (5' 0.24\")   Wt 64.8 kg (142 lb 13.7 oz)   SpO2 97%   BMI 27.68 kg/m²  Body surface area is 1.66 meters squared.    LABS 1/11/18:  ANC~ 1310 Plt = 217k   Hgb = 12.9   SCr = 0.81mg/dL CrCl ~ 64mL/min   LFT's = 33/30/123 TBili = 0.5      Drug Order   (Drug name, dose, route, IV Fluid & volume, frequency, number of doses) Cycle: 3 Day 1     Previous treatment: C2D15 12/29/17   Medication = Gemcitabine  Base Dose= 1000 mg/m2  Calc Dose: Base Dose x 1.66m2 = 1660 mg  Final Dose = 1661 mg  Route = IV  Fluid & Volume =  mL  Admin Duration = Over 30 min          <5% difference, OK to treat with final dose    Medication = Albumin Bound Paclitaxel  Base Dose= 125 mg/m2  Calc Dose: Base Dose x 1.66m2 = 207.5 mg  Final Dose = 207.5 mg  Route = IV  Conc & Volume = 5 mg/ml = 41.5 mL   Admin Duration = Over 30 min          <5% difference, OK to treat with final dose      By my signature below, I confirm this process was performed independently with the BSA and all final chemotherapy dosing calculations congruent. I have reviewed the above chemotherapy order and that my calculation of the final dose and BSA (when applicable) corroborate those calculations of the  pharmacist. Discrepancies of 5% or greater in the written dose have been addressed and documented within the [facility] Progress notes.    Janell Mena, PharmD  "

## 2018-01-12 NOTE — PROGRESS NOTES
Pt ambulated into department for Cycle 3/ Day 1 of Gemzar/Abraxane for pancreatic cancer. Pt reported having some nausea today, stated that she took Zofran this morning and that her nausea has now resolved. Declined having any other new symptoms since seeing her MD yesterday. Pt declined needing lidocaine prior to accessing port. Port accessed in a sterile manner, had + blood return, flushed briskly. Pre-medications and chemotherapy given as prescribed, Pt tolerated well and without incident. Port had + blood return after, flushed per [facility] policy, port de-accessed, needle intact, insertion site covered with sterile gauze and paper tape. Next appointment on 1/26/18 at 10:30 confirmed with pt prior to leaving. Left department with family member appearing in good spirits and NAD.

## 2018-01-12 NOTE — PROGRESS NOTES
Chemotherapy Verification - SECONDARY RN       Height = 153 cm  Weight = 64.8 kg  BSA = 1.659 m2       Medication: Abraxane  Dose: 125 mg/m2  Calculated Dose: 207.43 mg                             (In mg/m2, AUC, mg/kg)     Medication: Gemzar  Dose: 1000 mg/m2  Calculated Dose: 1659 mg                             (In mg/m2, AUC, mg/kg)      I confirm that this process was performed independently.

## 2018-01-12 NOTE — PROGRESS NOTES
Chemotherapy Verification - PRIMARY RN      Height = 153 cm  Weight = 64.8 kg  BSA = 1.66 m2      Medication: Abraxane  Dose:125 mg/m2  Calculated Dose: 207.5 mg    Medication: Gemzar  Dose: 1,000 mg/m2 Calculated Dose: 1,660 mg                               I confirm this process was performed independently with the BSA and all final chemotherapy dosing calculations congruent.  Any discrepancies of 5% or greater have been addressed with the chemotherapy pharmacist. The resolution of the discrepancy has been documented in the EPIC progress notes.

## 2018-01-12 NOTE — PROGRESS NOTES
"Pharmacy Chemotherapy Verification    Patient Name: Elin Poole    Dx: Pancreatic Cancer (Ampullary carcinoma)    Cycle: 3 Day 1(Previous treatment = C2D15 12/29/17; s/p gemcitabine monotherapy last 3/13/17 and 3 cycles Albumin bound Paclitaxel + Gemcitabine last 12/20/16)  Regimen and Dosing Reference  Protocol: Gemcitabine( Gemzar) + Albumin bound-paclitaxel (Abraxane)    Albumin bound-paclitaxel  125 mg/m2 IV on days 1,8, and 15--MD dosing day 1 and day 15  Gemcitabine 1000 mg/m2 IV over 30 min on days 1,8, and 15--MD dosing day 1 and day 15    28-day cycle until disease progression or unacceptable toxicity  NCCN Guidelines for Pancreatic adenocarcinoma V.2.2016  Von Dejah DD, et al - N Engl J Med. 2013 Oct 31;369(88):1698-708.    Allergies:Patient has no known allergies.  Blood pressure 134/58, pulse 69, temperature 36.7 °C (98.1 °F), resp. rate 18, height 1.53 m (5' 0.24\"), weight 64.8 kg (142 lb 13.7 oz), SpO2 97 %.  Body surface area is 1.66 meters squared.     Labs 1/11/18  ANC ~ 1310 (OK if > 1000)     Plt = 217 k  Hgb = 12.9  SCr = 0.81     CrCl ~64  ml/min     K = 4.1  AST/ALT/AlkPhos =  33/30/123 Tbili = 0.5 No liver adjustment recommended    Albumin bound paclitaxel 125 mg/m2 x 1.66 m2 = 207.5 mg   <5% diff, OK to treat with final dose = 207.5 mg IV    Gemcitabine 1000 mg/m2 x 1.66 m2 = 1660 mg   <5% diff, OK to treat with final dose = 1661 mg IV    Yany Christiansen, PharmD    "

## 2018-01-22 NOTE — TELEPHONE ENCOUNTER
Called patient to verify if she is still taking Omeprazole.Left a message for the patient to call the office.

## 2018-01-23 NOTE — TELEPHONE ENCOUNTER
Confirmed with patient that she is taking omeprazole 20 mg daily. She is not taking famotidine. I have sent the refill to her pharmacy.

## 2018-01-26 NOTE — PROGRESS NOTES
Pt returns to infusion center for chemotherapy.  Port accessed in sterile fashion, CBC drawn as ordered.  Results reviewed; meets parameters for infusion today.  Premed given as ordered.  Pt tolerated both Abraxane and Gemzar without incident.  Port flushed with saline and heparin per policy, de-accessed, gauze dressing placed.  Pt left infusion center ambulatory and in good condition.  Returns in two weeks, appointment scheduled.

## 2018-01-26 NOTE — PROGRESS NOTES
"Pharmacy Chemotherapy Note    Patient Name: JANELLE BALDERAS  Dx: Pancreatic Adenocarcinoma        Protocol: Albumin-bound PACLItaxel + gemcitabine      Albumin bound Paclitaxel (Abraxane) 125 mg/m2 over 30 minutes on Days 1, and 15  Gemcitabine 1000 mg/m2 IV ovr 30 minutes on Days 1 and 15  --MD dosing days 1 and 15  28-day cycle x 2-6 cycles (unresectable or locally advanced) or until DPUT  NCCN Guidelines for pancreatic adenocarcinoma V.2.2016  Jacob Pond DD, et al. N Engl J Med.2013:369(18):1691-703    /62   Pulse 75   Temp 36.6 °C (97.9 °F)   Resp 18   Ht 1.53 m (5' 0.24\")   Wt 65.2 kg (143 lb 11.8 oz)   SpO2 97%   BMI 27.85 kg/m²  Body surface area is 1.66 meters squared.    ANC~ 1050 Plt = 252k   Hgb = 11.6       1/11/18: SCr = 0.81mg/dL CrCl ~ 65mL/min  LFT's = 33/30/123 TBili = 0.5      Drug Order   (Drug name, dose, route, IV Fluid & volume, frequency, number of doses) Cycle: 3 Day 15     Previous treatment: C3D1 1/12/18   Medication = Gemcitabine  Base Dose= 1000 mg/m2  Calc Dose: Base Dose x 1.66m2 = 1660 mg  Final Dose = 1661 mg  Route = IV  Fluid & Volume =  mL  Admin Duration = Over 30 min          <5% difference, OK to treat with final dose    Medication = Albumin Bound Paclitaxel  Base Dose= 125 mg/m2  Calc Dose: Base Dose x 1.66m2 = 207.5 mg  Final Dose = 207.5 mg  Route = IV  Conc & Volume = 5 mg/ml = 41.5 mL   Admin Duration = Over 30 min          <5% difference, OK to treat with final dose      By my signature below, I confirm this process was performed independently with the BSA and all final chemotherapy dosing calculations congruent. I have reviewed the above chemotherapy order and that my calculation of the final dose and BSA (when applicable) corroborate those calculations of the  pharmacist. Discrepancies of 5% or greater in the written dose have been addressed and documented within the [facility] Progress notes.    Janell Mena, PharmD  "

## 2018-01-26 NOTE — PROGRESS NOTES
"Pharmacy Chemotherapy Verification    Patient Name: Elin Poole    Dx: Pancreatic Cancer (Ampullary carcinoma)    Cycle: 3 Day 15 (Previous treatment = C3D1 1/12/18; s/p gemcitabine monotherapy last 3/13/17 and 3 cycles Albumin bound Paclitaxel + Gemcitabine last 12/20/16)  Regimen and Dosing Reference  Protocol: Gemcitabine( Gemzar) + Albumin bound-paclitaxel (Abraxane)    Albumin bound-paclitaxel  125 mg/m2 IV on days 1,8, and 15--MD dosing day 1 and day 15  Gemcitabine 1000 mg/m2 IV over 30 min on days 1,8, and 15--MD dosing day 1 and day 15    28-day cycle until disease progression or unacceptable toxicity  NCCN Guidelines for Pancreatic adenocarcinoma V.2.2016  Von Dejah DD, et al - N Engl J Med. 2013 Oct 31;369(18):1691-707.    Allergies:Patient has no known allergies.  Blood pressure 122/62, pulse 75, temperature 36.6 °C (97.9 °F), resp. rate 18, height 1.53 m (5' 0.24\"), weight 65.2 kg (143 lb 11.8 oz), SpO2 97 %.  Body surface area is 1.66 meters squared.     Labs 1/26/18  ANC ~ 1050 (OK if > 1000)     Plt = 252 k  Hgb = 11.6  1/11/18 SCr = 0.81     CrCl ~64  ml/min     K = 4.1  AST/ALT/AlkPhos =  33/30/123 Tbili = 0.5 No liver adjustment recommended    Albumin bound paclitaxel 125 mg/m2 x 1.66 m2 = 207.5 mg   <5% diff, OK to treat with final dose = 207.5 mg IV    Gemcitabine 1000 mg/m2 x 1.66 m2 = 1660 mg   <5% diff, OK to treat with final dose = 1661 mg IV    Yany Christiansen, PharmD    "

## 2018-01-26 NOTE — PROGRESS NOTES
Chemotherapy Verification - PRIMARY RN      Height = 153cm  Weight = 65.2kg  BSA = 1.66m2       Medication: Abraxane  Dose: 125mg/m2  Calculated Dose: 207.5mg                             (In mg/m2, AUC, mg/kg)     Medication: Gemzar  Dose: 1000mg/m2  Calculated Dose: 1660mg                             (In mg/m2, AUC, mg/kg)    I confirm this process was performed independently with the BSA and all final chemotherapy dosing calculations congruent.  Any discrepancies of 5% or greater have been addressed with the chemotherapy pharmacist. The resolution of the discrepancy has been documented in the EPIC progress notes.

## 2018-01-26 NOTE — PROGRESS NOTES
Chemotherapy Verification - SECONDARY RN       Height =153 cm  Weight = 65..2 kg  BSA = 1.664 m2       Medication: Abraxane  Dose: 125 mg/m2  Calculated Dose: 208 mg                             (In mg/m2, AUC, mg/kg)     Medication: Gemzar  Dose: 1000 mg/m2  Calculated Dose: 1664 mg                             (In mg/m2, AUC, mg/kg)    I confirm that this process was performed independently.

## 2018-02-08 NOTE — PROGRESS NOTES
Elin Poole is a 72 y.o. female here for a non-provider visit for: Lab Draws  on 2/8/2018 at 12:55 PM    Procedure Performed: Venipuncture     Anatomical site: Right Antecubital Area (AC)    Equipment used: 21g    Labs drawn: CBC w/diff and CMP    Ordering Provider: Dr. Claude Ortiz By: Lucy Bruno, Med Ass't   No complications.

## 2018-02-08 NOTE — PROGRESS NOTES
Date of visit: 2/8/2018  1:34 PM      Chief Complaint- Metastatic adenocarcinoma of ampulla of vater     Chief compliant: She is here for a follow up visit and to discuss scan results     History of Presenting Illness:  Elin Poole is a 71 y.o. female with adenocarcinoma of primary ampulla diagnosed in 7/2016.  She was diagnosed due to elevated liver enzymes and acute renal insufficiency.  She is s/p ERCP with stent with good decompression. Biopsy was positive for adenocarcinoma of primary ampulla. She was seen by Dr. Jacobs and underwent an attempted Whipple’s in 8/2016. The surgery was aborted secondary to multiple liver lesions and s/p wedge resection of the liver which was positive for poorly differentiated adenocarcinoma.  PET scan showed uptake in multiple hepatic lesions and uptake in the ampulla. He also had uptake in bilateral hilar area with increased uptake and small pulmonary nodules in the right middle lobe without uptake. She was treated with gemcitabine and Abraxane. She tolerated the treatment fairly well initially. After cycle 3 of chemotherapy she started to have increased fatigue and diarrhea. Repeat imaging showed response to therapy. The diarrhea improve and her regimen was changed to single agent gemcitabine. She received chemotherapy on 1/18/17. Further cycles have been held secondary to increased fatigue.   3/20/17 CT CAP showed thrombus seen in the right external iliac and common femoral vein. Previous foci in the liver are no longer seen and no residual or recurrent mass seen in the surgical bed.  She was started on Eliquis.     -11/1/17-interim CT scan shows disease progression with a new enhancing mass in the uncinate process in. Ampullary area with some mass effect on the distal CBD. There is also new adenopathy surrounding the mass. new tumor thrombus in the posterior SMV with 180 degrees abutment of the adjacent mass    -11 /15/2017- started gemcitabine and Abraxane  alternate week with good tolerance.    -2/5/18-CT abdomen, pelvis-Increased size of enhancing mass in the uncinate process and periampullary region which currently measures 4.0 x 4.4 cm compared to 3.6 x 4.1 cm., slight decrease in the adenopathy anterior to the pancreas, slight increase in aortocaval adenopathy. Increased nonocclusive thrombus in the SMV.    . She is otherwise doing well. No significant chemotherapy-related adverse effects.    Past Medical History:      Past Medical History:   Diagnosis Date   • Ampullary carcinoma (CMS-HCC)    • Arthritis     hands/fingers/right knee   • Asthma    • Cancer (CMS-HCC) 2016    Ampullary CA   • Cataract 09-    bilat.,no surgery   • Dental disorder     upper partial   • Heart burn    • Hypertension    • Indigestion    • Jaundice        Past surgical history:       Past Surgical History:   Procedure Laterality Date   • CATH PLACEMENT Right 9/9/2016    Procedure: CATH PLACEMENT cephalic power port  ;  Surgeon: Kurtis Jacobs M.D.;  Location: SURGERY [facility];  Service:    • WHIPPLE PROCEDURE  8/15/2016    Procedure: Exploratoy Laparotomy, Da-en-y;  Surgeon: Kurtis Jacobs M.D.;  Location: SURGERY [facility];  Service:    • NODE DISSECTION  8/15/2016    Procedure: omental flap, Liver Wedge, cholecystectomy ;  Surgeon: Kurtis Jacobs M.D.;  Location: SURGERY [facility];  Service:    • STENT PLACEMENT  7/2016    gallbladder   • CHOLECYSTECTOMY         Allergies:       Patient has no known allergies.    Medications:         Current Outpatient Prescriptions   Medication Sig Dispense Refill   • omeprazole (PRILOSEC) 20 MG delayed-release capsule TAKE 1 CAP BY MOUTH EVERY DAY. 30 Cap 3   • potassium chloride SA (K-DUR) 10 MEQ Tab CR TAKE 2 TABS BY MOUTH EVERY MORNING. 60 Tab 3   • ELIQUIS 5 MG Tab TAKE 1 TAB BY MOUTH 2 TIMES A DAY. 60 Tab 6   • RESTASIS 0.05 % ophthalmic emulsion      • ondansetron (ZOFRAN) 4 MG Tab tablet  Take 1 Tab by mouth every four hours as needed for Nausea/Vomiting (for nausea, vomiting). 30 Tab 6   • prochlorperazine (COMPAZINE) 10 MG Tab Take 1 Tab by mouth every 6 hours as needed (for nausea, vomiting). 30 Tab 6   • losartan-hydrochlorothiazide (HYZAAR) 50-12.5 MG per tablet Take 1 Tab by mouth every day. 30 Tab 0   • sennosides-docusate sodium (SENOKOT-S) 8.6-50 MG tablet Take 1 Tab by mouth 2 times a day. 60 Tab 3     No current facility-administered medications for this visit.          Social History:     Social History     Social History   • Marital status:      Spouse name: N/A   • Number of children: N/A   • Years of education: N/A     Occupational History   • Not on file.     Social History Main Topics   • Smoking status: Never Smoker   • Smokeless tobacco: Never Used   • Alcohol use No   • Drug use: No   • Sexual activity: Not on file     Other Topics Concern   • Not on file     Social History Narrative   • No narrative on file       Family History:    No family history on file.    Review of Systems:  All other review of systems are negative except what was mentioned above in the HPI.    Constitutional: Negative for fever, chills, weight loss and malaise/fatigue.    HEENT: No new auditory or visual complaints. No sore throat and neck pain.     Respiratory: Negative for cough, sputum production, shortness of breath and wheezing.    Cardiovascular: Negative for chest pain, palpitations, orthopnea and leg swelling.    Gastrointestinal: Negative for heartburn, nausea, vomiting and abdominal pain.    Genitourinary: Negative for dysuria, hematuria    Musculoskeletal: No new arthralgias or myalgias   Skin: Negative for rash and itching.    Neurological: Negative for focal weakness and headaches.    Endo/Heme/Allergies: No abnormal bleed/bruise.    Psychiatric/Behavioral: No new depression/anxiety.    Physical Exam:  Vitals: /56   Pulse 77   Temp 37.2 °C (99 °F)   Resp 14   Ht 1.524 m (5')    Wt 65.3 kg (143 lb 15.4 oz)   SpO2 98%   Breastfeeding? No   BMI 28.12 kg/m²      General: Not in acute distress, alert and oriented x 3  HEENT: No pallor, icterus. Oropharynx clear.   Neck: Supple, no palpable masses.  Lymph nodes: No palpable cervical, supraclavicular, axillary or inguinal lymphadenopathy.    CVS: regular rate and rhythm, no rubs or gallops  RESP: Clear to auscultate bilaterally, no wheezing or crackles.   ABD: Soft, non tender, non distended, positive bowel sounds, no palpable organomegaly  EXT: No edema or cyanosis  CNS: Alert and oriented x3, No focal deficits.  Skin- No rash      Labs:   No visits with results within 1 Week(s) from this visit.   Latest known visit with results is:   Outpatient Infusion Services on 01/26/2018   Component Date Value Ref Range Status   • WBC 01/26/2018 3.5* 4.8 - 10.8 K/uL Final   • RBC 01/26/2018 3.61* 4.20 - 5.40 M/uL Final   • Hemoglobin 01/26/2018 11.6* 12.0 - 16.0 g/dL Final   • Hematocrit 01/26/2018 35.0* 37.0 - 47.0 % Final   • MCV 01/26/2018 97.0  81.4 - 97.8 fL Final   • MCH 01/26/2018 32.1  27.0 - 33.0 pg Final   • MCHC 01/26/2018 33.1* 33.6 - 35.0 g/dL Final   • RDW 01/26/2018 63.7* 35.9 - 50.0 fL Final   • Platelet Count 01/26/2018 252  164 - 446 K/uL Final   • MPV 01/26/2018 9.4  9.0 - 12.9 fL Final   • Neutrophils-Polys 01/26/2018 30.20* 44.00 - 72.00 % Final   • Lymphocytes 01/26/2018 44.70* 22.00 - 41.00 % Final   • Monocytes 01/26/2018 20.20* 0.00 - 13.40 % Final   • Eosinophils 01/26/2018 2.60  0.00 - 6.90 % Final   • Basophils 01/26/2018 0.90  0.00 - 1.80 % Final   • Immature Granulocytes 01/26/2018 1.40* 0.00 - 0.90 % Final   • Nucleated RBC 01/26/2018 0.00  /100 WBC Final   • Neutrophils (Absolute) 01/26/2018 1.05* 2.00 - 7.15 K/uL Final   • Lymphs (Absolute) 01/26/2018 1.55  1.00 - 4.80 K/uL Final   • Monos (Absolute) 01/26/2018 0.70  0.00 - 0.85 K/uL Final   • Eos (Absolute) 01/26/2018 0.09  0.00 - 0.51 K/uL Final   • Baso (Absolute)  01/26/2018 0.03  0.00 - 0.12 K/uL Final   • Immature Granulocytes (abs) 01/26/2018 0.05  0.00 - 0.11 K/uL Final   • NRBC (Absolute) 01/26/2018 0.00  K/uL Final             Assessment and Plan:    Metastatic poorly differentiated adenocarcinoma of likely primary ampulla: She had multiple liver lesions at diagnosis as well as hilar adenopathy. She completed 3 cycles of gemcitabine and Abraxane with good responses. Her regimen was then decreased to single agent gemcitabine. She had significant fatigue has chemotherapy has been held since early 2017. She had recent progression mainly in the pancreatic bed.    . Unfortunately, she does not appear to be having any response to gemcitabine and Abraxane. Given her age, she is not a good candidate for FOLFIRINOX. I look in the option of approving 5-FU with liposomal irinotecan. Discussed about her adverse effects including, but not limited to fatigue, diarrhea, infections, nausea, rare cases of coronary vasospasm. Liposomal irinotecan is nonformulary and we will try to get this approved.    . Interestingly, the liver lesions still appears to be quiescent. If we can control her local disease with no other systemic progression. We will also consider incorporating Xeloda-based radiation down the demarcus.    We will check her tumor sample for rare possibility of BRCA/MSI/ or high TMB.     She will start chemotherapy next week and will return to the clinic one week after that for toxicity evaluation.    Complex patient requiring complex decision making. Reviewed the laboratory data and images with the patient. Antineoplastic therapy requiring close monitoring and decision-making.    Please note that this dictation was created using voice recognition software. I have made every reasonable attempt to correct obvious errors, but I expect that there are errors of grammar and possibly content that I did not discover before finalizing the note.      SIGNATURES:  Claude  Pao    CC:  Wally Knox D.O.  No ref. provider found

## 2018-02-09 NOTE — TELEPHONE ENCOUNTER
I left Elin a message to call the office regarding her future appointments. Please let the patient know her appointment date and times when she returns the call.

## 2018-02-16 NOTE — PROGRESS NOTES
Subjective:      Elin Poole is a 72 y.o. female who presents for Follow-Up (prechemo) evaluation prior to cycle 1 liposomal irinotecan/5-FU/leucovorin for metastatic ampullary carcinoma.        HPI   Ms. Poole presents for evaluation prior to initiating cycle 1 liposomal irinotecan (Ovivyde)/5-FU/leucovorin, every 2 weeks, for local progression of metastatic ampullary carcinoma. She is accompanied by her son.    CT completed 2/5/2018 showed progression of local disease while receiving Abraxane/Gemzar. Tumor was forwarded to [facility] [place] 14 BRCA/MSI/high TBM evaluation, results remain pending.     She reports that she continues feeling well, with mild fatigue, but is overall asymptomatic following Abraxane/Gemzar.      No Known Allergies    Current Outpatient Prescriptions on File Prior to Visit   Medication Sig Dispense Refill   • omeprazole (PRILOSEC) 20 MG delayed-release capsule TAKE 1 CAP BY MOUTH EVERY DAY. 30 Cap 3   • potassium chloride SA (K-DUR) 10 MEQ Tab CR TAKE 2 TABS BY MOUTH EVERY MORNING. 60 Tab 3   • ELIQUIS 5 MG Tab TAKE 1 TAB BY MOUTH 2 TIMES A DAY. 60 Tab 6   • RESTASIS 0.05 % ophthalmic emulsion      • losartan-hydrochlorothiazide (HYZAAR) 50-12.5 MG per tablet Take 1 Tab by mouth every day. 30 Tab 0   • sennosides-docusate sodium (SENOKOT-S) 8.6-50 MG tablet Take 1 Tab by mouth 2 times a day. 60 Tab 3     No current facility-administered medications on file prior to visit.        Review of Systems   Constitutional: Positive for malaise/fatigue (does same ADLs, knits). Negative for chills, fever and weight loss (stable).   HENT: Negative for tinnitus.    Eyes: Negative for blurred vision and double vision.        Bilateral cataract surgery Fall 2017   Respiratory: Negative for cough, shortness of breath and wheezing.    Cardiovascular: Negative for chest pain, palpitations and leg swelling.   Gastrointestinal: Negative for constipation, diarrhea, nausea and vomiting.   Genitourinary:  Negative for dysuria.   Musculoskeletal: Positive for joint pain (bilateral knee pain from arthritis). Negative for myalgias.   Neurological: Negative for dizziness, tingling and headaches.   Endo/Heme/Allergies: Does not bruise/bleed easily (On Eliquis - hx R DVT).        Objective:     /62   Pulse 80   Temp 37.1 °C (98.8 °F)   Resp 14   Ht 1.524 m (5')   Wt 64.4 kg (141 lb 15.6 oz)   SpO2 97%   Breastfeeding? No   BMI 27.73 kg/m²      Physical Exam   Constitutional: She is oriented to person, place, and time. She appears well-developed and well-nourished. No distress.   HENT:   Head: Normocephalic and atraumatic.   Mouth/Throat: Oropharynx is clear and moist. No oropharyngeal exudate.   Eyes: Conjunctivae and EOM are normal. Pupils are equal, round, and reactive to light. Right eye exhibits no discharge. Left eye exhibits no discharge. No scleral icterus.   Neck: Normal range of motion. Neck supple. No thyromegaly present.   Cardiovascular: Normal rate, regular rhythm, normal heart sounds and intact distal pulses.  Exam reveals no gallop and no friction rub.    No murmur heard.  Pulmonary/Chest: Effort normal and breath sounds normal. No respiratory distress. She has no wheezes.   Abdominal: Soft. Bowel sounds are normal. She exhibits no distension. There is no tenderness.   Musculoskeletal: Normal range of motion. She exhibits no edema or tenderness.   Lymphadenopathy:        Head (right side): No submental, no submandibular, no tonsillar, no preauricular, no posterior auricular and no occipital adenopathy present.        Head (left side): No submental, no submandibular, no tonsillar, no preauricular, no posterior auricular and no occipital adenopathy present.     She has no cervical adenopathy.        Right cervical: No superficial cervical and no deep cervical adenopathy present.       Left cervical: No superficial cervical and no deep cervical adenopathy present.        Right: No supraclavicular  adenopathy present.        Left: No supraclavicular adenopathy present.   Neurological: She is alert and oriented to person, place, and time.   Skin: Skin is warm and dry. No rash noted. She is not diaphoretic. No erythema. No pallor.   Psychiatric: She has a normal mood and affect. Her behavior is normal.   Vitals reviewed.      Hospital Outpatient Visit on 02/16/2018   Component Date Value Ref Range Status   • WBC 02/16/2018 6.8  4.8 - 10.8 K/uL Final   • RBC 02/16/2018 3.80* 4.20 - 5.40 M/uL Final   • Hemoglobin 02/16/2018 12.4  12.0 - 16.0 g/dL Final   • Hematocrit 02/16/2018 37.1  37.0 - 47.0 % Final   • MCV 02/16/2018 97.6  81.4 - 97.8 fL Final   • MCH 02/16/2018 32.6  27.0 - 33.0 pg Final   • MCHC 02/16/2018 33.4* 33.6 - 35.0 g/dL Final   • RDW 02/16/2018 58.5* 35.9 - 50.0 fL Final   • Platelet Count 02/16/2018 409  164 - 446 K/uL Final   • MPV 02/16/2018 9.4  9.0 - 12.9 fL Final   • Neutrophils-Polys 02/16/2018 61.10  44.00 - 72.00 % Final   • Lymphocytes 02/16/2018 27.40  22.00 - 41.00 % Final   • Monocytes 02/16/2018 8.50  0.00 - 13.40 % Final   • Eosinophils 02/16/2018 1.30  0.00 - 6.90 % Final   • Basophils 02/16/2018 0.70  0.00 - 1.80 % Final   • Immature Granulocytes 02/16/2018 1.00* 0.00 - 0.90 % Final   • Nucleated RBC 02/16/2018 0.00  /100 WBC Final   • Neutrophils (Absolute) 02/16/2018 4.14  2.00 - 7.15 K/uL Final   • Lymphs (Absolute) 02/16/2018 1.86  1.00 - 4.80 K/uL Final   • Monos (Absolute) 02/16/2018 0.58  0.00 - 0.85 K/uL Final   • Eos (Absolute) 02/16/2018 0.09  0.00 - 0.51 K/uL Final   • Baso (Absolute) 02/16/2018 0.05  0.00 - 0.12 K/uL Final   • Immature Granulocytes (abs) 02/16/2018 0.07  0.00 - 0.11 K/uL Final   • NRBC (Absolute) 02/16/2018 0.00  K/uL Final   • Sodium 02/16/2018 133* 135 - 145 mmol/L Final   • Potassium 02/16/2018 3.5* 3.6 - 5.5 mmol/L Final   • Chloride 02/16/2018 102  96 - 112 mmol/L Final   • Co2 02/16/2018 22  20 - 33 mmol/L Final   • Anion Gap 02/16/2018 9.0  0.0 -  11.9 Final   • Glucose 02/16/2018 130* 65 - 99 mg/dL Final   • Bun 02/16/2018 8  8 - 22 mg/dL Final   • Creatinine 02/16/2018 0.61  0.50 - 1.40 mg/dL Final   • Calcium 02/16/2018 8.8  8.5 - 10.5 mg/dL Final   • AST(SGOT) 02/16/2018 29  12 - 45 U/L Final   • ALT(SGPT) 02/16/2018 10  2 - 50 U/L Final   • Alkaline Phosphatase 02/16/2018 96  30 - 99 U/L Final   • Total Bilirubin 02/16/2018 0.3  0.1 - 1.5 mg/dL Final   • Albumin 02/16/2018 3.2  3.2 - 4.9 g/dL Final   • Total Protein 02/16/2018 6.6  6.0 - 8.2 g/dL Final   • Globulin 02/16/2018 3.4  1.9 - 3.5 g/dL Final   • A-G Ratio 02/16/2018 0.9  g/dL Final   • GFR If  02/16/2018 >60  >60 mL/min/1.73 m 2 Final   • GFR If Non  02/16/2018 >60  >60 mL/min/1.73 m 2 Final          Assessment/Plan:     1. Ampullary carcinoma (CMS-HCC)     2. DVT of deep femoral vein, right (CMS-HCC)     3. Long term (current) use of anticoagulants [Z79.01]     4. Encounter for antineoplastic chemotherapy           1.  DVT/long-term anticoagulant: Stable. She continues on Eliquis.    2.  Ampullary carcinoma: Patient was noted to have progression of disease on Gemzar/Abraxane. Given her age, she was not a good candidate for FOLFIRINOX and will be initiated on liposomal irinotecan (Ovivyde)/5-FU/leucovorin. CBC and CMP have been evaluated and found to be within acceptable limits. She will proceed with cycle 1 of treatment and return next week for toxicity check. If she continues to tolerate treatment well she will return prior to cycle 2 for evaluation, sooner as needed.    Foundation one results for further genetic/DNA evaluation remains pending.        The patient verbalized agreement and understanding of current plan. All questions and concerns were addressed at time of visit.    Please note that this dictation was created using voice recognition software. I have made every reasonable attempt to correct obvious errors, but I expect that there are errors of  grammar and possibly content that I did not discover before finalizing the note.

## 2018-02-16 NOTE — PROGRESS NOTES
Elin Poole is a 72 y.o. female here for a non-provider visit for: Lab Draws  on 2/16/2018 at 3:30 PM    Procedure Performed: Venipuncture     Anatomical site: Left Antecubital Area (AC)    Equipment used: 25 Butterfly    Labs drawn: CBC w/diff and CMP    Ordering Provider: Dr. Claude Ortiz By: Meron Smith R.N.

## 2018-02-18 NOTE — PROGRESS NOTES
"Pharmacy Chemotherapy Verification    Patient Name: Elin Poole   Protocol: Onivyde + Leucovorin+ 5FU     *Dosing Reference*  Liposomal Irinotecan (Onivyde) 70 mg/m2 IV over 90 minutes Day 1, then   Leucovorin 400 mg/m2 IV over 30 minutes Day 1, then   Fluorouracil 2400 mg/m2 CIVI over 46 hours Days 1-2 Pao LLANOS reduced dose to 2000 mg/m2 starting with C1 for anticipated tolerance   14 day cycles until DP or UT   Clare GARZA et al. (AMBROSE-1) Lancet. 2016;387(78686):898-54    Dx: Ampulla of vater CA, mets to liver     Allergies:  Patient has no known allergies.       /60   Pulse 85   Temp 37.2 °C (98.9 °F)   Resp 18   Ht 1.53 m (5' 0.24\")   Wt 63.8 kg (140 lb 10.5 oz)   SpO2 97%   BMI 27.25 kg/m²  Body surface area is 1.65 meters squared.     2/16/18: ANC~ 4140  Plt = 409 k Hgb = 12.4   SCr = 0.61 mg/dL CrCl ~ 73 ml/min (min Cr 0.7)  LFTs/TBili = WNL/0.3     Drug Order   (Drug name, dose, route, IV Fluid & volume, frequency, number of doses) Cycle: 1, Day 1    Paper orders in chart  Previous treatment: Gemzar+Abraxane 1/26/18     Medication = Liposomal Irinotecan (Onivyde)  Base Dose= 70 mg/m2  Calc Dose: Base Dose x 1.65 m2 = 115.5 mg  Final Dose = 116 mg  Route = IV  Fluid & Volume = D5W 500 mL  Admin Duration = Over 90 minutes           <5% difference, OK to treat with final written dose   Medication = Leucovorin  Base Dose= 400 mg/m2  Calc Dose: Base Dose x 1.65 m2 = 660 mg  Final Dose = 660 mg  Route = IV  Fluid & Volume = D5W 100 mL  Admin Duration = Over 30 minutes          <5% difference, OK to treat with final written dose   Medication = Fluorouracil (5FU)  Base Dose= 2000 mg/m2  Calc Dose:Base Dose x 1.65 m2 = 3300 mg  Final Dose = 3300 mg  Route = IV  Fluid & Volume = 50 mg/ml;  66 mL + 3 ml overfill = 69 ml  Admin Duration = Over 46 hours via CADD pump at 1.4 ml/hr          <5% difference, OK to treat with final written dose       By my signature below, I confirm this process " was performed independently with the BSA and all final chemotherapy dosing calculations congruent. I have reviewed the above chemotherapy order and that my calculation of the final dose and BSA (when applicable) corroborate those calculations of the  pharmacist. Discrepancies of 5% or greater in the written dose have been addressed and documented within the EPIC Progress notes.    Signature: Keaton Salazar, PharmD

## 2018-02-18 NOTE — PROGRESS NOTES
"Pharmacy Chemotherapy calculation:    DX: metastatic pancreatic cancer (ampulla of wilfred)    Cycle 1 - PAPER ORDERS IN CHART  Previous treatment = abraxane/ gemcitabine x 3 cycles- Jan 2018    Regimen: liposomal irinotecan + CIVI 5FU/leucovorin  Liposomal irinotecan(Onivyde) 70mg/m2 IV over 90 min followed by  Leucovorin 400mg/m2 IV over 30 min   fluroruracil 2400mg/m2 CIVI over 46 hrs - dose reduced to 2000mg/m2 for age and anticipated tolerance  Clare GARZA, et al - Nanoliposomal irinotecan with fluorouracil and folinic acid in metastatic pancreatic cancer after previous gemcitabine-based therapy (AMBROSE-1): a global, randomised, open-label, phase 3 trial  The Lancet- Volume 387, No. 55534, [id number], 6 February 2016       /60   Pulse 85   Temp 37.2 °C (98.9 °F)   Resp 18   Ht 1.53 m (5' 0.24\")   Wt 63.8 kg (140 lb 10.5 oz)   SpO2 97%   BMI 27.25 kg/m²   Body surface area is 1.65 meters squared.    2/16/18:  ANC~ 4140 Plt = 409k   Hgb = 12.4     SCr = 0.61mg/dL CrCl ~ 73mL/min (min Scr = 0.7)   LFT's = WNL TBili = 0.3     Liposomal irinotecan(Onivyde) 70mg/m2 x  1.65m2 = 115.5mg    <5% difference, ok to treat with final dose = 116mg IV    Leucovorin 400mg/m2  x  1.65m2 = 660mg    <5% difference, ok to treat with final dose = 660mg IV    fluroruracil 2000mg/m2 x 1.65m2 = 3300mg    <5% difference, ok to treat with final dose = 3300mg    CIVI over 46 hours via CADD pump at 1.4mL/hr.      RODOLFO Balderas Pharm.D.      "

## 2018-02-19 NOTE — PROGRESS NOTES
Pt arrived ambulatory, here with daughter for D1C1 Onivyde (Irinotecan liposomal) for pancreatic cancer. Pt with R chest port in place, declining offers of lidocaine to numb site. Port site accessed in sterile field with low-profile needle. Hypoallergenic dressing in use. Port flushed and brisk blood return observed. Pt had labs done prior to visit, results reviewed and WNL for chemo to proceed. Potassium low at 3.5. Call placed to MD and orders received to replace with 40 meq oral potassium. Education provided to pt and daughter on Onivyde regarding what to expect, side effects, neutropenia, and when to contact the MD. Pt and daughter verbalized understanding. Premeds given as ordered and Onivyde infused over 90 min. Pt yogi well with no apparent reactions observed. Leucovorin infused over 30 min. Pt and daughter provided with infusion pump video and pt signed consent. Consent emailed to pt per pt request. Website for infusion pump also provided to daughter so rest of family can watch pump video. Home pump attached to pt and discussed trouble shooting discussed for potential issues at home. Settings double checked by second RN. Spill kit provided. Pt knows to return Wednesday for de-access. Pt discharged home under care of daughter in no apparent distress.

## 2018-02-19 NOTE — PROGRESS NOTES
Chemotherapy Verification - PRIMARY RN      Height = 153 cm  Weight = 63.8 kg  BSA = 1.65 m2       Medication: Onivyde (Irinotecan liposomal)  Dose: 70 mg/m2  Calculated Dose: 115.5 mg                                 Medication: Fluorouracil (5FU)  Dose: 2000 mg/m2 (dose reduction)  Calculated Dose: 3300 mg over 46 hours                                   I confirm this process was performed independently with the BSA and all final chemotherapy dosing calculations congruent.  Any discrepancies of 5% or greater have been addressed with the chemotherapy pharmacist. The resolution of the discrepancy has been documented in the EPIC progress notes.

## 2018-02-19 NOTE — PROGRESS NOTES
Chemotherapy Verification - SECONDARY RN       Height = 153 cm  Weight = 63.8 kg  BSA = 1.646 m2       Medication: Irinotecan liposome  Dose: 70 mg/m2  Calculated Dose: 115.26 mg                             (In mg/m2, AUC, mg/kg)     Medication: Home infusion 5FU  Dose: 2000 mg/m2 over 46 hr dr  Calculated Dose: 3292 mg                             (In mg/m2, AUC, mg/kg)    I confirm that this process was performed independently.

## 2018-02-22 NOTE — PROGRESS NOTES
Patient presents for home pump disconnect. Port flushed per protocol with brisk blood return and de-accessed. Patient scheduled for next appointment and released in no acute distress.

## 2018-02-26 NOTE — PROGRESS NOTES
Subjective:      Elin Poole is a 72 y.o. female who presents for Follow-Up (tox check) for ampullary carcinoma.         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 She is accompanied by her daughter for today's visit. Patient is being seen for a toxicity check as she is status post cycle #1, day #8 of new chemotherapy treatment with liposomal irinotecan/5-FU/leucovorin. Patient was seen to have local progression of metastatic ampullary carcinoma.    Patient tolerated the new treatment very well. She stated she felt like she tolerated it much better than she did with the Gemzar and Abraxane. She denies any significant fatigue, which she is to be a significant side effects she will experience with previous chemotherapy. She denies any fevers or chills. She does state her appetite has been fair and she is eating well throughout the day, however she has lost approximately 3 pounds in the last week. She denies coughing, wheezing or shortness of breath. She denies chest pain or heart palpitations. She denies any nausea, vomiting, diarrhea or constipation. She did have a formed bowel movement yesterday. She stated she experienced some mild abdominal cramping at times but was relieved with passing gas. She does have chronic joint pain in her knees but otherwise denies any myalgias. She denies any cracking or peeling of her hands or feet or denies any erythema or rashes. She also denies dizziness or headaches. Patient denies any mouth sores at this time as well.    No Known Allergies  Current Outpatient Prescriptions on File Prior to Visit   Medication Sig Dispense Refill   • RESTASIS 0.05 % ophthalmic emulsion      • omeprazole (PRILOSEC) 20 MG delayed-release capsule TAKE 1 CAP BY MOUTH EVERY DAY. 30 Cap 3   • potassium chloride SA (K-DUR) 10 MEQ Tab CR TAKE 2 TABS BY MOUTH EVERY MORNING. 60 Tab 3   • ELIQUIS 5 MG Tab TAKE 1 TAB BY MOUTH 2 TIMES A DAY. 60 Tab 6   •  "losartan-hydrochlorothiazide (HYZAAR) 50-12.5 MG per tablet Take 1 Tab by mouth every day. 30 Tab 0   • sennosides-docusate sodium (SENOKOT-S) 8.6-50 MG tablet Take 1 Tab by mouth 2 times a day. 60 Tab 3     No current facility-administered medications on file prior to visit.        Review of Systems   Constitutional: Positive for weight loss (approx 3 pound weight loss in the last week). Negative for chills, fever and malaise/fatigue.   Respiratory: Negative for cough, shortness of breath and wheezing.    Cardiovascular: Negative for chest pain and palpitations.   Gastrointestinal: Positive for abdominal pain (mild abdominal cramping, relieved with passing gas). Negative for constipation, diarrhea, nausea and vomiting.   Genitourinary: Negative for dysuria.   Musculoskeletal: Positive for joint pain (mild knee pain which is chronic). Negative for myalgias.   Skin: Negative for itching and rash.   Neurological: Negative for dizziness and headaches.          Objective:     /70   Pulse (!) 113   Temp 37 °C (98.6 °F)   Resp 18   Ht 1.53 m (5' 0.24\")   Wt 62.9 kg (138 lb 10.7 oz)   SpO2 97%   Breastfeeding? No   BMI 26.87 kg/m²      Physical Exam   Constitutional: She is oriented to person, place, and time. She appears well-developed and well-nourished. No distress.   HENT:   Head: Normocephalic and atraumatic.   Mouth/Throat: Oropharynx is clear and moist. No oropharyngeal exudate.   No mouth sores noted   Cardiovascular: Regular rhythm and normal heart sounds.  Exam reveals no gallop and no friction rub.    No murmur heard.  Tachycardic    Pulmonary/Chest: Effort normal and breath sounds normal. No respiratory distress. She has no wheezes.   Abdominal: Soft. Bowel sounds are normal. She exhibits no distension. There is no tenderness.   Musculoskeletal: Normal range of motion. She exhibits no edema or tenderness.   Neurological: She is alert and oriented to person, place, and time.   Skin: Skin is warm " and dry. She is not diaphoretic.   Psychiatric: She has a normal mood and affect. Her behavior is normal.   Vitals reviewed.      Hospital Outpatient Visit on 02/26/2018   Component Date Value Ref Range Status   • WBC 02/26/2018 7.1  4.8 - 10.8 K/uL Final   • RBC 02/26/2018 4.35  4.20 - 5.40 M/uL Final   • Hemoglobin 02/26/2018 14.3  12.0 - 16.0 g/dL Final   • Hematocrit 02/26/2018 41.5  37.0 - 47.0 % Final   • MCV 02/26/2018 95.4  81.4 - 97.8 fL Final   • MCH 02/26/2018 32.9  27.0 - 33.0 pg Final   • MCHC 02/26/2018 34.5  33.6 - 35.0 g/dL Final   • RDW 02/26/2018 52.1* 35.9 - 50.0 fL Final   • Platelet Count 02/26/2018 239  164 - 446 K/uL Final   • MPV 02/26/2018 10.0  9.0 - 12.9 fL Final   • Neutrophils-Polys 02/26/2018 78.30* 44.00 - 72.00 % Final   • Lymphocytes 02/26/2018 16.90* 22.00 - 41.00 % Final   • Monocytes 02/26/2018 3.00  0.00 - 13.40 % Final   • Eosinophils 02/26/2018 1.40  0.00 - 6.90 % Final   • Basophils 02/26/2018 0.10  0.00 - 1.80 % Final   • Immature Granulocytes 02/26/2018 0.30  0.00 - 0.90 % Final   • Nucleated RBC 02/26/2018 0.30  /100 WBC Final   • Neutrophils (Absolute) 02/26/2018 5.53  2.00 - 7.15 K/uL Final   • Lymphs (Absolute) 02/26/2018 1.19  1.00 - 4.80 K/uL Final   • Monos (Absolute) 02/26/2018 0.21  0.00 - 0.85 K/uL Final   • Eos (Absolute) 02/26/2018 0.10  0.00 - 0.51 K/uL Final   • Baso (Absolute) 02/26/2018 0.01  0.00 - 0.12 K/uL Final   • Immature Granulocytes (abs) 02/26/2018 0.02  0.00 - 0.11 K/uL Final   • NRBC (Absolute) 02/26/2018 0.02  K/uL Final   • Sodium 02/26/2018 135  135 - 145 mmol/L Final   • Potassium 02/26/2018 3.7  3.6 - 5.5 mmol/L Final   • Chloride 02/26/2018 101  96 - 112 mmol/L Final   • Co2 02/26/2018 25  20 - 33 mmol/L Final   • Glucose 02/26/2018 123* 65 - 99 mg/dL Final   • Bun 02/26/2018 16  8 - 22 mg/dL Final   • Creatinine 02/26/2018 0.74  0.50 - 1.40 mg/dL Final   • Calcium 02/26/2018 9.2  8.5 - 10.5 mg/dL Final   • Anion Gap 02/26/2018 9.0  0.0 -  11.9 Final   • GFR If  02/26/2018 >60  >60 mL/min/1.73 m 2 Final   • GFR If Non  02/26/2018 >60  >60 mL/min/1.73 m 2 Final          Assessment/Plan:     1. Ampullary carcinoma (CMS-HCC)       Plan  1. Patient currently with metastatic capillary carcinoma with new local progression of disease. Dr. Cedeno has changed patient to  liposomal irinotecan/5-FU/leucovorin, and she is status post her first cycle of treatment. Today is day #8 of cycle #1. She is tolerating the treatment very well and denies any significant side effects. She is pleased with how well she is tolerating at this time. I did proceed with CBC and BMP today and her labs look good. Her hemoglobin has improved to be within normal limits and her ANC has gone up to 5.53. I did review the labs with the patient and her daughter today.    2. I did discuss in further educate with patient regarding some symptoms and side effects as it relates to her chemotherapy. I did confirm with patient that she is to watch and monitor cracking and peeling of her hands and feet, use salt water and baking soda rinses to prevent mouth sores from developing, and monitor her bowel movements for any diarrhea. Patient and daughter both verbalized understanding.    3. Patient is to follow up again at the end of this week with labs prior to cycle #2 of chemotherapy. I did review with patient and daughter the schedule an cycles of treatment with them today as well. Patient to call sooner if needed.

## 2018-03-02 NOTE — PROGRESS NOTES
Elin Poole is a 72 y.o. female here for a non-provider visit for: Lab Draws  on 3/2/2018 at 1:51 PM    Procedure Performed: Venipuncture     Anatomical site: Left Antecubital Area (AC)    Equipment used: 21g    Labs drawn: CBC w/diff and CMP    Ordering Provider: Dr. Claude Ortiz By: Tamika Reich, Shawn Ass't   NO complicationst

## 2018-03-02 NOTE — PROGRESS NOTES
"Subjective:      Elin Poole is a 72 y.o. female who presents for Follow-Up (Prechemo (Pao)) for evaluation prior to cycle 2 liposomal irinotecan/5-FU/leucovorin for metastatic ampullary carcinoma.      HPI   Ms. Poole presents for evaluation prior to cycle 2 liposomal irinotecan (Ovivyde)/5-FU/leucovorin for local progression of metastatic ampullary carcinoma. She is accompanied by her son.    She is tolerating treatment very well. 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 She notes no nausea, vomiting, diarrhea, constipation, changes in appetite. Overall she is asymptomatic.      No Known Allergies    Current Outpatient Prescriptions on File Prior to Visit   Medication Sig Dispense Refill   • omeprazole (PRILOSEC) 20 MG delayed-release capsule TAKE 1 CAP BY MOUTH EVERY DAY. 30 Cap 3   • potassium chloride SA (K-DUR) 10 MEQ Tab CR TAKE 2 TABS BY MOUTH EVERY MORNING. 60 Tab 3   • ELIQUIS 5 MG Tab TAKE 1 TAB BY MOUTH 2 TIMES A DAY. 60 Tab 6   • RESTASIS 0.05 % ophthalmic emulsion      • losartan-hydrochlorothiazide (HYZAAR) 50-12.5 MG per tablet Take 1 Tab by mouth every day. 30 Tab 0   • sennosides-docusate sodium (SENOKOT-S) 8.6-50 MG tablet Take 1 Tab by mouth 2 times a day. 60 Tab 3     No current facility-administered medications on file prior to visit.        Review of Systems   Constitutional: Positive for malaise/fatigue (less tired than previous regimens). Negative for chills, fever and weight loss (stable).   HENT: Negative for tinnitus.    Eyes: Negative for blurred vision and double vision.   Respiratory: Negative for cough, shortness of breath and wheezing.    Cardiovascular: Negative for chest pain, palpitations and leg swelling.   Gastrointestinal: Negative for constipation (Last BM this am - formed), diarrhea, nausea (\"did not take anything\") and vomiting.   Genitourinary: Negative for dysuria.   Musculoskeletal: " "Positive for joint pain (bilateral knees R>L). Negative for myalgias.   Neurological: Positive for tingling (bottom of feet - carryover from previous chemo - more intense after dosing but improves). Negative for dizziness and headaches.   Psychiatric/Behavioral: The patient does not have insomnia.         Objective:     /62   Pulse 88   Temp 36.4 °C (97.5 °F)   Resp 14   Ht 1.53 m (5' 0.24\")   Wt 63 kg (139 lb)   SpO2 96%   Breastfeeding? No   BMI 26.93 kg/m²      Physical Exam   Constitutional: She is oriented to person, place, and time. She appears well-developed and well-nourished. No distress.   alopecia   HENT:   Head: Normocephalic and atraumatic.   Mouth/Throat: Oropharynx is clear and moist. No oropharyngeal exudate.   Eyes: Conjunctivae and EOM are normal. Pupils are equal, round, and reactive to light. Right eye exhibits no discharge. Left eye exhibits no discharge. No scleral icterus.   Neck: Normal range of motion. Neck supple. No thyromegaly present.   Cardiovascular: Normal rate, regular rhythm, normal heart sounds and intact distal pulses.  Exam reveals no gallop and no friction rub.    No murmur heard.  Pulmonary/Chest: Effort normal and breath sounds normal. No respiratory distress. She has no wheezes.   Abdominal: Soft. Bowel sounds are normal. She exhibits no distension. There is no tenderness.   Mild tenderness across top of RUQ & LUQ   Musculoskeletal: Normal range of motion. She exhibits tenderness (bilateral knees R>L). She exhibits no edema.   Lymphadenopathy:        Head (right side): No submental, no submandibular, no tonsillar, no preauricular, no posterior auricular and no occipital adenopathy present.        Head (left side): No submental, no submandibular, no tonsillar, no preauricular, no posterior auricular and no occipital adenopathy present.     She has no cervical adenopathy.        Right cervical: No superficial cervical and no deep cervical adenopathy present.       " Left cervical: No superficial cervical and no deep cervical adenopathy present.        Right: No supraclavicular adenopathy present.        Left: No supraclavicular adenopathy present.   Neurological: She is alert and oriented to person, place, and time.   Skin: Skin is warm and dry. No rash noted. She is not diaphoretic. No erythema. No pallor.   Psychiatric: She has a normal mood and affect. Her behavior is normal.   Vitals reviewed.    Hospital Outpatient Visit on 03/02/2018   Component Date Value Ref Range Status   • WBC 03/02/2018 5.0  4.8 - 10.8 K/uL Final   • RBC 03/02/2018 3.87* 4.20 - 5.40 M/uL Final   • Hemoglobin 03/02/2018 12.6  12.0 - 16.0 g/dL Final   • Hematocrit 03/02/2018 38.1  37.0 - 47.0 % Final   • MCV 03/02/2018 98.4* 81.4 - 97.8 fL Final   • MCH 03/02/2018 32.6  27.0 - 33.0 pg Final   • MCHC 03/02/2018 33.1* 33.6 - 35.0 g/dL Final   • RDW 03/02/2018 54.4* 35.9 - 50.0 fL Final   • Platelet Count 03/02/2018 238  164 - 446 K/uL Final   • MPV 03/02/2018 9.5  9.0 - 12.9 fL Final   • Neutrophils-Polys 03/02/2018 58.80  44.00 - 72.00 % Final   • Lymphocytes 03/02/2018 31.60  22.00 - 41.00 % Final   • Monocytes 03/02/2018 6.60  0.00 - 13.40 % Final   • Eosinophils 03/02/2018 2.40  0.00 - 6.90 % Final   • Basophils 03/02/2018 0.40  0.00 - 1.80 % Final   • Immature Granulocytes 03/02/2018 0.20  0.00 - 0.90 % Final   • Nucleated RBC 03/02/2018 0.00  /100 WBC Final   • Neutrophils (Absolute) 03/02/2018 2.94  2.00 - 7.15 K/uL Final   • Lymphs (Absolute) 03/02/2018 1.58  1.00 - 4.80 K/uL Final   • Monos (Absolute) 03/02/2018 0.33  0.00 - 0.85 K/uL Final   • Eos (Absolute) 03/02/2018 0.12  0.00 - 0.51 K/uL Final   • Baso (Absolute) 03/02/2018 0.02  0.00 - 0.12 K/uL Final   • Immature Granulocytes (abs) 03/02/2018 0.01  0.00 - 0.11 K/uL Final   • NRBC (Absolute) 03/02/2018 0.00  K/uL Final   • Sodium 03/02/2018 140  135 - 145 mmol/L Final   • Potassium 03/02/2018 4.0  3.6 - 5.5 mmol/L Final   • Chloride  03/02/2018 104  96 - 112 mmol/L Final   • Co2 03/02/2018 29  20 - 33 mmol/L Final   • Anion Gap 03/02/2018 7.0  0.0 - 11.9 Final   • Glucose 03/02/2018 114* 65 - 99 mg/dL Final   • Bun 03/02/2018 12  8 - 22 mg/dL Final   • Creatinine 03/02/2018 0.78  0.50 - 1.40 mg/dL Final   • Calcium 03/02/2018 9.0  8.5 - 10.5 mg/dL Final   • AST(SGOT) 03/02/2018 18  12 - 45 U/L Final   • ALT(SGPT) 03/02/2018 14  2 - 50 U/L Final   • Alkaline Phosphatase 03/02/2018 107* 30 - 99 U/L Final   • Total Bilirubin 03/02/2018 0.4  0.1 - 1.5 mg/dL Final   • Albumin 03/02/2018 3.6  3.2 - 4.9 g/dL Final   • Total Protein 03/02/2018 7.0  6.0 - 8.2 g/dL Final   • Globulin 03/02/2018 3.4  1.9 - 3.5 g/dL Final   • A-G Ratio 03/02/2018 1.1  g/dL Final   • GFR If  03/02/2018 >60  >60 mL/min/1.73 m 2 Final   • GFR If Non  03/02/2018 >60  >60 mL/min/1.73 m 2 Final          Assessment/Plan:     1. Ampullary carcinoma (CMS-HCC)     2. Chronic deep vein thrombosis (DVT) of popliteal vein of right lower extremity (CMS-HCC)     3. Long term (current) use of anticoagulants [Z79.01]     4. Encounter for antineoplastic chemotherapy         1.  DVT/long-term anticoagulant: Remain stable, continues on Eliquis.    2.  Ampullary carcinoma: She is tolerating treatment very well. CBC and CMP have been evaluated and found to be within acceptable limits. She will proceed with cycle 2 of treatment and return in 2 weeks for evaluation prior to cycle 3, sooner as needed.    Per patient request discussed future re-imaging with Dr. Cedeno, will likely occur following at least cycle 4. Patient and son expressed understanding.          The patient verbalized agreement and understanding of current plan. All questions and concerns were addressed at time of visit.    Please note that this dictation was created using voice recognition software. I have made every reasonable attempt to correct obvious errors, but I expect that there are errors of  grammar and possibly content that I did not discover before finalizing the note.

## 2018-03-04 NOTE — PROGRESS NOTES
"Pharmacy Chemotherapy Verification    Patient Name: Elin Poole   Protocol: Onivyde + Leucovorin+ 5FU     *Dosing Reference*  Liposomal Irinotecan (Onivyde) 70 mg/m2 IV over 90 minutes Day 1, then   Leucovorin 400 mg/m2 IV over 30 minutes Day 1, then   Fluorouracil 2400 mg/m2 CIVI over 46 hours Days 1-2 Pao LLANOS reduced dose to 2000 mg/m2 starting with C1 for anticipated tolerance   14 day cycles until DP or UT   Clare GARZA et al. (AMBROSE-1) Lancet. 2016;387(23627):749-31    Dx: Ampulla of vater CA, mets to liver     Allergies:  Patient has no known allergies.       /64   Pulse 87   Temp 37.2 °C (98.9 °F)   Resp 18   Ht 1.53 m (5' 0.24\")   Wt 63.7 kg (140 lb 6.9 oz)   SpO2 96%   BMI 27.21 kg/m²  Body surface area is 1.65 meters squared.     Labs 3/2/18:  ANC~ 2940 Plt = 238k   Hgb = 12.6     SCr = 0.78mg/dL CrCl ~ 66mL/min   AST/ALT/AP = 18/14/107 TBili = 0.4  K+ = 4     Drug Order   (Drug name, dose, route, IV Fluid & volume, frequency, number of doses) Cycle: 2, Day 1    Paper orders in chart  Previous treatment: C1 = 2/19/18  Gemzar+Abraxane 1/26/18     Medication = Liposomal Irinotecan (Onivyde)  Base Dose= 70 mg/m2  Calc Dose: Base Dose x 1.65 m2 = 115.5 mg  Final Dose = 116.014 mg  Route = IV  Fluid & Volume = D5W 500 mL  Admin Duration = Over 90 minutes           <5% difference, OK to treat with final written dose   Medication = Leucovorin  Base Dose= 400 mg/m2  Calc Dose: Base Dose x 1.65 m2 = 660 mg  Final Dose = 660 mg  Route = IV  Fluid & Volume = D5W 250 mL (changed from 100 ml to 250 mL today to avoid waste).  Admin Duration = Over 30 minutes          <5% difference, OK to treat with final written dose   Medication = Fluorouracil (5FU)  Base Dose= 2000 mg/m2  Calc Dose:Base Dose x 1.65 m2 = 3300 mg  Final Dose = 3300 mg  Route = IV  Fluid & Volume = 50 mg/ml;  66 mL + 3 ml overfill = 69 ml  Admin Duration = Over 46 hours via CADD pump at 1.4 ml/hr          <5% difference, OK " to treat with final written dose       By my signature below, I confirm this process was performed independently with the BSA and all final chemotherapy dosing calculations congruent. I have reviewed the above chemotherapy order and that my calculation of the final dose and BSA (when applicable) corroborate those calculations of the  pharmacist. Discrepancies of 5% or greater in the written dose have been addressed and documented within the EPIC Progress notes.    Signature: Sapphire Garvey, PharmD, BCOP

## 2018-03-05 NOTE — PROGRESS NOTES
Chemotherapy Verification - PRIMARY RN      Height = 153 cm  Weight = 63.7 kg  BSA = 1.65 m2       Medication: Onivyde  Dose: 70 mg/m2  Calculated Dose: 115.5 mg                              (In mg/m2, AUC, mg/kg)     Medication: 5FU 46 home infusion  Dose: 2000 mg/m2  Calculated Dose: 3300 mg                             (In mg/m2, AUC, mg/kg)    I confirm this process was performed independently with the BSA and all final chemotherapy dosing calculations congruent.  Any discrepancies of 5% or greater have been addressed with the chemotherapy pharmacist. The resolution of the discrepancy has been documented in the EPIC progress notes.

## 2018-03-05 NOTE — PROGRESS NOTES
"Pharmacy Chemotherapy Calculations Note:    Patient Name: Elin Poole  DX: metastatic pancreatic cancer (ampulla of wilfred)    Cycle 2 - PAPER ORDERS IN CHART  Previous treatment: C1 = 02/19/18; abraxane/ gemcitabine x 3 cycles- Jan 2018    *Dosing Reference*  Liposomal Irinotecan (Onivyde) 70 mg/m2 IV over 90 minutes Day 1, then   Leucovorin 400 mg/m2 IV over 30 minutes Day 1, followed by    Fluorouracil 2400 mg/m2 CIVI over 46 hours Days 1-2 Pao LLANOS reduced dose to 2000 mg/m2 starting with C1 for anticipated tolerance   14 day cycles until DP or UT   Clare GARZA et al. (AMBROSE-1) Lancet. 2016;387(29929):430-53    Allergies: Patient has no known allergies.    /64   Pulse 87   Temp 37.2 °C (98.9 °F)   Resp 18   Ht 1.53 m (5' 0.24\")   Wt 63.7 kg (140 lb 6.9 oz)   SpO2 96%   BMI 27.21 kg/m²  Body surface area is 1.65 meters squared.     Labs 03/02/18:  ANC~ 2900 Plt = 238k   Hgb = 12.6   SCr = 0.78mg/dL CrCl ~ 66mL/min   LFT's = WNL except alk phos 107 TBili = 0.4  K = 4     Liposomal irinotecan (Onivyde) 70mg/m2 x 1.65m2 = 115.5mg    <5% difference, ok to treat with final dose = 116mg IV    Leucovorin 400mg/m2  x 1.65m2 = 660mg    <5% difference, ok to treat with final dose = 660mg IV    Fluorouracil (Adrucil) 2000mg/m2 x 1.65m2 = 3300mg    <5% difference, ok to treat with final dose = 3300mg    CIVI over 46 hours via CADD pump at 1.4mL/hr.      Zhen Talbot, PharmD, BCOP        "

## 2018-03-05 NOTE — PROGRESS NOTES
Pt presents ambulatory to [facility] for cycle two Onivyde/5FU.  Pt reports feeling well today, she tolerated cycle 1 very well.  POC discussed and labs/orders reviewed.  Pt meets parameters for treatment today.  R chest port accessed in sterile fashion with low profile magaña needle, blood return confirmed.  Premedications given per MAR, tolerated well.  Onivyde infused over 90 minutes followed by Leucovorin.  5FU pump connected for 46 hour continuous home infusion.  Pt returns Wednesday for disconnect, appointment confirmed.  Pt discharged from IS in NAD under care of family member.

## 2018-03-05 NOTE — PROGRESS NOTES
Chemotherapy Verification - SECONDARY RN       Height = 153 cm  Weight = 63.7 kg  BSA = 1.645 m2       Medication: Irinotecan liposomal  Dose: 70 mg/m2  Calculated Dose: 115.17 mg                             (In mg/m2, AUC, mg/kg)     Medication: 5FU  Dose: 2000 mg/m2 dr  Calculated Dose: 3290 mg over 46 hrs                            (In mg/m2, AUC, mg/kg)      I confirm that this process was performed independently.

## 2018-03-08 NOTE — PROGRESS NOTES
Pt returns for 5FU pump removal/port de-access. Pump vol verified at 0 ml, total of 67.5 ml administered. Pt states she has been feeling well and has not been experiencing any significant side effects. Pump removed. Port flushed, blood return observed, heparin instilled. Needle removed, tip intact. Gauze dressing applied. Confirmed pt's next appt. Pt discharged home under care of son in no apparent distress.

## 2018-03-13 NOTE — TELEPHONE ENCOUNTER
Patient called and stated that she has diaherra,nauseated, sluggish,bloated, and she has no appetite.

## 2018-03-13 NOTE — TELEPHONE ENCOUNTER
"Called Ms. Poole back regarding her active symptoms.  Dx: Metastatic ampullary carcinoma  Tx: Liposomal irinotecan, 5-FU, leucovoran  Cycle 2 was 3/5/2018-3/7/2018.    CC: Diarrhea X 1 this AM. States that it is more \"loose\" and \"not watery\" and is baseline with color. Patient also states that she has some mild nausea, but is able to keep water and solid foods down. Patient did eat an egg this AM without difficulty, but states that she does not have much of an appetite. Patient did take one dose of compazine this AM, but does not currently have Zofran.  Patient denies emesis.    Recommendations: Drink at least 8 cups of water/ day, attempt BRAT diet and to try and drink protein shakes. This RN ordered Zofran 4 mg Q 4 hours PRN for N/V. Educated patient on alternating the Zofran and Compazine Q 3 hours PRN. Patient read back instructions to this RN. Furthermore, patient educated on immodium dosing. Patient would like to hold off on Immodium due to her only having 1 loose stool. Educated patient to call if symptoms worsen or do not subside with these interventions. Patient and Dr. Cedeno agree with all of the above interventions. Patient does not currently have any further questions at this time.   "

## 2018-03-16 PROBLEM — D70.9 NEUTROPENIA (HCC): Status: ACTIVE | Noted: 2018-01-01

## 2018-03-16 NOTE — PROGRESS NOTES
Elin Poole is a 72 y.o. female here for a non-provider visit for: Lab Draws  on 3/16/2018 at 10:04 AM    Procedure Performed: Venipuncture     Anatomical site: Right Antecubital Area (AC)    Equipment used: 21g    Labs drawn: CBC w/diff and CMP    Ordering Provider: Dr. Claude Ortiz By: Tamika Reich, Med Ass't  No complications

## 2018-03-16 NOTE — PATIENT INSTRUCTIONS
Increase potassium to 20 mEq twice daily  Appointment with [facility] on 3/17/2018 at 0830

## 2018-03-16 NOTE — PROGRESS NOTES
Patient added to schedule for IV hydration secondary to weakness and diarrhea. Vitals reviewed. Potassium currently at 3.0. Orders received to start NS w/ 20K per ELISE Nolen. Patient scheduled with \[facility]\"" tomorrow at 0830 for IV potassium replacement. Stool sample obtained and sent to lab. EKG resulted and showing sinus tach. Port accessed within sterile field. (+) blood return.  Patient educated to not get up without calling. RN sitting outside of room. Patient verbally agrees.    IV hydration started at 1220.  IV hydration stopped at 1412    Plan:   IV hydration w/ 20 K  Increase PO potassium to 20 mEq bid  Electrolyte replacement in \[facility]\"" on 3/17/2018 at 0830 w/ neupogen  Potential Flagyl (pending c-diff)    Port left accessed for potassium infusion tomorrow. Heparin administered per protocol. Educated patient and son on neutropenic precautions and Neupogen injection. Orders place in supportive plan.  Patient left clinic in no acute distress.

## 2018-03-16 NOTE — Clinical Note
Mainor, Can you please follow up with insurance/pharm - she is only being issued 10 tablets of zofran at a time. Please see if a prior auth needs completed for an appropriate script fill.  Please and thank you

## 2018-03-16 NOTE — PROGRESS NOTES
Subjective:      Elin Poole is a 72 y.o. female who presents for Follow-Up (prechemo) prior to cycle 3 liposomal irinotecan/5-FU/leucovorin for metastatic ampullary carcinoma.      HPI   Mrs. Poole presents for evaluation prior to cycle 3 liposomal irinotecan (Ovivyde)/5-FU/leucovorin for local progression of metastatic ampullary carcinoma. She is accompanied by her son, Sea.    She had previously been tolerating treatment well but reports over the past week worsening appetite, abdominal pain, diarrhea, nausea. She states that she is drinking water but food tastes horrible and she can only take a bite or two. Her son states that she is not eating very much at all and has concerns that she is also not drinking water. Both patient and son reports increased abdominal pain, foul smelling flatus, and diarrhea for the past 3 days.    She is also 5 pounds since her last visit. Son reports that this happened during Gemzar and Abraxane and was best helped with Megace.    She reports muscle aches since Tuesday. She denies fever or chills.      No Known Allergies    Current Outpatient Prescriptions on File Prior to Visit   Medication Sig Dispense Refill   • ondansetron (ZOFRAN) 4 MG Tab tablet Take 1 Tab by mouth every four hours as needed for Nausea/Vomiting. 20 Tab 3   • loperamide (IMODIUM) 2 MG Cap Take 1 Cap by mouth 4 times a day as needed for Diarrhea. 30 Cap 0   • omeprazole (PRILOSEC) 20 MG delayed-release capsule TAKE 1 CAP BY MOUTH EVERY DAY. 30 Cap 3   • potassium chloride SA (K-DUR) 10 MEQ Tab CR TAKE 2 TABS BY MOUTH EVERY MORNING. 60 Tab 3   • ELIQUIS 5 MG Tab TAKE 1 TAB BY MOUTH 2 TIMES A DAY. 60 Tab 6   • RESTASIS 0.05 % ophthalmic emulsion      • losartan-hydrochlorothiazide (HYZAAR) 50-12.5 MG per tablet Take 1 Tab by mouth every day. 30 Tab 0   • sennosides-docusate sodium (SENOKOT-S) 8.6-50 MG tablet Take 1 Tab by mouth 2 times a day. 60 Tab 3     No current facility-administered medications  "on file prior to visit.          Review of Systems   Constitutional: Positive for weight loss (5 lbs. down in 2 weeks; poor appetite, food doesn't taste good). Negative for chills (always cold), fever and malaise/fatigue.   Eyes: Negative for blurred vision and double vision.   Respiratory: Positive for cough (before emesis). Negative for shortness of breath and wheezing.    Cardiovascular: Negative for chest pain, palpitations and leg swelling.   Gastrointestinal: Positive for abdominal pain (across the top, RUQ; rumbly belly), diarrhea (x 3 days, 2-3 times a day), nausea (alternating Z & C this week but taking every 6 hours) and vomiting (x 2 - food, last was day before yesterday). Negative for constipation.   Genitourinary: Negative for dysuria.   Musculoskeletal: Positive for neck pain (\"muscle aches\" since Tues). Negative for myalgias.   Neurological: Positive for dizziness (Tuesday, Wed yes - not yesterday or today). Negative for tingling and headaches.   Psychiatric/Behavioral: The patient does not have insomnia (Wakes up easy - goes back to sleep).           Objective:     BP (!) 98/48   Pulse (!) 119   Temp 36.5 °C (97.7 °F)   Resp 18   Ht 1.575 m (5' 2\")   Wt 60.8 kg (134 lb)   SpO2 96%   BMI 24.51 kg/m²      Physical Exam   Constitutional: She is oriented to person, place, and time. She appears well-developed. No distress.   Fatigued   HENT:   Head: Normocephalic and atraumatic.   Mouth/Throat: Oropharynx is clear and moist. No oropharyngeal exudate.   Dry mucosa   Eyes: Conjunctivae and EOM are normal. Pupils are equal, round, and reactive to light. Right eye exhibits no discharge. Left eye exhibits no discharge. No scleral icterus.   Neck: Normal range of motion. Neck supple. No thyromegaly present.   Cardiovascular: Regular rhythm and intact distal pulses.  Exam reveals no gallop and no friction rub.    No murmur heard.  Tachy; difficult to auscultate heart sounds, difficult to obtain manual BP "   Pulmonary/Chest: Effort normal and breath sounds normal. No respiratory distress. She has no wheezes.   Abdominal: Soft. Bowel sounds are normal. She exhibits no distension. There is tenderness (across top of left and right quadrant mainly centered).   Musculoskeletal: Normal range of motion. She exhibits no edema.   Patient reports generalized myalgia   Lymphadenopathy:        Head (right side): No submental, no submandibular, no tonsillar, no preauricular, no posterior auricular and no occipital adenopathy present.        Head (left side): No submental, no submandibular, no tonsillar, no preauricular, no posterior auricular and no occipital adenopathy present.     She has no cervical adenopathy.        Right cervical: No superficial cervical and no deep cervical adenopathy present.       Left cervical: No superficial cervical and no deep cervical adenopathy present.        Right: No supraclavicular adenopathy present.        Left: No supraclavicular adenopathy present.   Neurological: She is alert and oriented to person, place, and time.   Skin: Skin is warm and dry. No rash noted. She is not diaphoretic. No erythema. No pallor.   Psychiatric: She has a normal mood and affect. Her behavior is normal.   Vitals reviewed.      Hospital Outpatient Visit on 2018   Component Date Value Ref Range Status   • C Diff by PCR 2018 Negative  Negative Final    Comment: C. difficile NOT detected by PCR.  Treatment not indicated per guidelines.  Repeat testing not indicated within 7 days.     • [id number] Presumptive 2018 Negative  Negative Final   Hospital Outpatient Visit on 2018   Component Date Value Ref Range Status   • Report 2018    Final                    Value:[facility]    Test Date:  2018  Pt Name:    JANELLE BALDERAS             Department: EKG  MRN:        [id number]                      Room:  Gender:     Female                       Technician: KIRK  :        1945                    Requested By:GIUSEPPE YUN  Order #:    [id number]                    Reading MD: Lilian Chapman MD    Measurements  Intervals                                Axis  Rate:       102                          P:          56  CA:         132                          QRS:        15  QRSD:       70                           T:          54  QT:         384  QTc:        501    Interpretive Statements  SINUS TACHYCARDIA  CONSIDER LEFT ATRIAL ABNORMALITY  BORDERLINE T WAVE ABNORMALITIES  BORDERLINE PROLONGED QT INTERVAL  Compared to ECG 08/10/2016 08:50:40  Sinus rhythm no longer present  T-wave abnormality still present    Electronically Signed On 3- 12:36:51 PDT by Lilian Chapman MD     Hospital Outpatient Visit on 03/16/2018   Component Date Value Ref Range Status   • WBC 03/16/2018 2.8* 4.8 - 10.8 K/uL Final   • RBC 03/16/2018 4.33  4.20 - 5.40 M/uL Final   • Hemoglobin 03/16/2018 14.8  12.0 - 16.0 g/dL Final   • Hematocrit 03/16/2018 41.1  37.0 - 47.0 % Final   • MCV 03/16/2018 94.9  81.4 - 97.8 fL Final   • MCH 03/16/2018 34.2* 27.0 - 33.0 pg Final   • MCHC 03/16/2018 36.0* 33.6 - 35.0 g/dL Final   • RDW 03/16/2018 49.4  35.9 - 50.0 fL Final   • Platelet Count 03/16/2018 339  164 - 446 K/uL Final   • MPV 03/16/2018 9.6  9.0 - 12.9 fL Final   • Nucleated RBC 03/16/2018 0.70  /100 WBC Final   • NRBC (Absolute) 03/16/2018 0.02  K/uL Final   • Neutrophils-Polys 03/16/2018 12.40* 44.00 - 72.00 % Final   • Lymphocytes 03/16/2018 63.70* 22.00 - 41.00 % Final   • Monocytes 03/16/2018 10.60  0.00 - 13.40 % Final   • Eosinophils 03/16/2018 2.60  0.00 - 6.90 % Final   • Basophils 03/16/2018 0.00  0.00 - 1.80 % Final   • Neutrophils (Absolute) 03/16/2018 0.57* 2.00 - 7.15 K/uL Final   • Lymphs (Absolute) 03/16/2018 1.78  1.00 - 4.80 K/uL Final   • Monos (Absolute) 03/16/2018 0.30  0.00 - 0.85 K/uL Final   • Eos (Absolute) 03/16/2018 0.07  0.00 - 0.51 K/uL Final   • Baso (Absolute) 03/16/2018 0.00   0.00 - 0.12 K/uL Final   • Anisocytosis 03/16/2018 1+   Final   • Macrocytosis 03/16/2018 1+   Final   • Sodium 03/16/2018 130* 135 - 145 mmol/L Final   • Potassium 03/16/2018 3.0* 3.6 - 5.5 mmol/L Final   • Chloride 03/16/2018 97  96 - 112 mmol/L Final   • Co2 03/16/2018 22  20 - 33 mmol/L Final   • Anion Gap 03/16/2018 11.0  0.0 - 11.9 Final   • Glucose 03/16/2018 170* 65 - 99 mg/dL Final   • Bun 03/16/2018 17  8 - 22 mg/dL Final   • Creatinine 03/16/2018 0.94  0.50 - 1.40 mg/dL Final   • Calcium 03/16/2018 9.0  8.5 - 10.5 mg/dL Final   • AST(SGOT) 03/16/2018 11* 12 - 45 U/L Final   • ALT(SGPT) 03/16/2018 12  2 - 50 U/L Final   • Alkaline Phosphatase 03/16/2018 94  30 - 99 U/L Final   • Total Bilirubin 03/16/2018 0.6  0.1 - 1.5 mg/dL Final   • Albumin 03/16/2018 3.2  3.2 - 4.9 g/dL Final   • Total Protein 03/16/2018 6.2  6.0 - 8.2 g/dL Final   • Globulin 03/16/2018 3.0  1.9 - 3.5 g/dL Final   • A-G Ratio 03/16/2018 1.1  g/dL Final   • GFR If  03/16/2018 >60  >60 mL/min/1.73 m 2 Final   • GFR If Non  03/16/2018 58* >60 mL/min/1.73 m 2 Final   • Bands-Stabs 03/16/2018 8.00  0.00 - 10.00 % Final   • Metamyelocytes 03/16/2018 2.70  % Final   • Manual Diff Status 03/16/2018 PERFORMED   Final   • Peripheral Smear Review 03/16/2018 see below   Final    Comment: Due to instrument suspect flags, further review of peripheral smear is  indicated on this patient sample. This review may or may not result in  abnormal findings.     • Plt Estimation 03/16/2018 Normal   Final   • RBC Morphology 03/16/2018 Present   Final   • Large Platelets 03/16/2018 1+   Final   • Poikilocytosis 03/16/2018 1+   Final   • Ovalocytes 03/16/2018 1+   Final   • Smudge Cells 03/16/2018 Few   Final         Assessment/Plan:     1. Ampullary carcinoma (CMS-HCC)  prochlorperazine (COMPAZINE) 10 MG Tab    EKG    megestrol (MEGACE) 400 MG/10ML Suspension   2. Tachycardia  EKG   3. Functional diarrhea  C Diff by PCR rflx  "Toxin   4. History of Clostridium difficile infection     5. Hypokalemia     6. Poor appetite  megestrol (MEGACE) 400 MG/10ML Suspension   7. Weight loss     8. Chronic deep vein thrombosis (DVT) of popliteal vein of right lower extremity (CMS-HCC)     9. Long term (current) use of anticoagulants [Z79.01]     10. Chemotherapy-induced neutropenia (CMS-HCC)     11. Chemotherapy-induced nausea     12. Dehydration     13. Encounter for antineoplastic chemotherapy         1.  Diarrhea/history C. Diff/hypokalemia: Patient reports foul-smelling flatus and watery stool x 3 days. She has history of C. Diff. C. Diff was ordered today with results noted to be negative. She is encouraged to continue with hydration, BRAT diet. Should her symptoms not improve we will repeat C. diff toxin next week. Should her symptoms worsen she'll proceed to ER to avert worsening of dehydration.    2.  Poor appetite/weight loss/nausea: She has lost 5 pounds since her visit 2 weeks ago. She reports poor appetite, food tasting awful, \"probably less hydration than usual\". Her son reports that with previous experiences with chemo related weight loss she responded best to Megace, prescription issued.    Patient's son reports that she is only issued 10 tablets of Zofran a time. We'll have him a follow-up with pharmacy/insurance to evaluate if a prior authorization is needed for insurance to authorize an appropriate prescription of antinausea meds for her chemotherapy induced nausea.    3.  RLE DVT/anticoagulant: Stable. Continues on Eliquis.    4.  Tachycardia/hypokalemia: Patient with heart sounds difficult to auscultate, tachycardia. EKG completed, discussed with reading cardiologist with no concerns noted - sinus tachycardia. BP is low for the patient. She is administered 1 L of NS w/ 20 mEq KCl and office with patient feeling better almost immediately following infusion. She will increase 20 mEq daily PO potassium to twice daily and will proceed " to Infusion Center tomorrow for additional repletion of IV potassium and hydration.    5.  Neutropenia: ANC noted to be 570, she is afebrile. She will continue with good hand hygiene, oral hygiene, monitoring for neutropenic symptoms/fever. She will be administered Neupogen tomorrow during electrolyte appointment, Sunday as well - if indicated (ANC<1000).    6. Ampullary carcinoma: She is having a rough week following cycle 2 of treatment. Electrolyte issues and neutropenia will be addressed as above. She'll return on Monday, prior to chemotherapy, for re-evaluation.            The patient verbalized agreement and understanding of current plan. All questions and concerns were addressed at time of visit.    Please note that this dictation was created using voice recognition software. I have made every reasonable attempt to correct obvious errors, but I expect that there are errors of grammar and possibly content that I did not discover before finalizing the note.

## 2018-03-18 NOTE — PROGRESS NOTES
"Pharmacy Chemotherapy Calculations Note:  Chemotherapy cancelled due to patient condition: hydration and KCl 10 mEq given    Patient Name: Elin Poole  DX: metastatic pancreatic cancer (ampulla of wilfred)    - PAPER ORDERS IN CHART  Previous treatment: C2 = 3/5/18     *Dosing Reference*  Liposomal Irinotecan (Onivyde) 70 mg/m2 IV over 90 minutes Day 1, then   Leucovorin 400 mg/m2 IV over 30 minutes Day 1, followed by    Fluorouracil 2400 mg/m2 CIVI over 46 hours Days 1-2 Pao LLANOS reduced dose to 2000 mg/m2 starting with C1 for anticipated tolerance   14 day cycles until DP or UT   Clare GARZA et al. (AMBROSE-1) Lancet. 2016;387(06751):320-50    Allergies: Patient has no known allergies.    /64   Pulse (!) 106   Temp 36.7 °C (98.1 °F)   Resp 18   Ht 1.53 m (5' 0.24\")   Wt 63.1 kg (139 lb 1.8 oz)   SpO2 96%   BMI 26.96 kg/m²  Body surface area is 1.64 meters squared.     Labs 3/18/18:  ANC~ 7950 Plt = 303k   Hgb = 13.4   SCr = 0.70 mg/dL CrCl ~ 72 mL/min   K = 2.7 repleted per MD orders Mag = 1.9    3/16/18: LFT's = WNL  TBili = 0.6      Keaton Salazar, PharmD    "

## 2018-03-18 NOTE — PROGRESS NOTES
Patient arrived ambulatory to the [facility] for IV hydration, possible electrolyte replacement, and possible Neupogen. Reviewed vital signs, labs, and physician order. Patient denies S&S of infection. Telephone call made to Dr Prescott re: need for possible replacement for Mag with no mag level drawn, and Potassium level to be re-checked after taking 20meq PO yesterday and receipt of Potassium with 20meq in the office, orders received for labs. Pt arrives with right chest port accessed in MD office, visualized brisk blood return, labs drawn per MD order. Potassium 2.5, Mag 1.7, Dr Prescott notified, per MD give 40meq of IV potassium, re-check labs when pt returns for possible Neupogen on 3/18/18, telephone order read back. IV hydration administered. , Neupogen administered to right lower abdomen per MD order, band aid dressing placed. IV Potassium administered over 4 hours, and Magnesium administered per protocol. Port flushed per protocol, secured in placed with tape, remained accessed for apt on 3/18/18. Confirmed upcoming appointment date and time with patient. Patient left the [facility] ambulatory in no sign of distress.

## 2018-03-18 NOTE — PROGRESS NOTES
Patient arrived ambulatory with FWW to the [facility] for labs, possible Neuopgen with son. Reviewed vital signs, labs, and physician order. Patient denies S&S of infection, reports less nausea in past 24 hours, reports 3 BM's since yesterday. Pt arrives with right chest port accessed, visualized brisk blood return, dressing CDI. Potassium level 2.7, telephone contact made with Dr Prescott (MD on call for Dr Cedeno), administer Potassium 40meq IV now in infusion center, direct pt to take Potassium 40meq BID at home and follow up with Dr Cedeno on Monday. Instructions provided to patient and pt's son, both parties verbalized understanding of these instructions. Potassium administration initiated. Chairside report given to Lucy AVENDANO , call light in reach, son at chairside. Confirmed upcoming appointment date and time with patient.

## 2018-03-19 NOTE — Clinical Note
Cherrie - please complete at [place] for Zofran  (Mainor - please disregard note from 3/16 for same, I did not realize you were off)

## 2018-03-19 NOTE — TELEPHONE ENCOUNTER
Called patients insurance to obtain a prior authorization for her ondansetron (ZOFRAN) 4 MG Tab tablet . I spoke to Luana reference number [id number]. ELISE Nolen helped me with the clinical questions. Auth was approved and auth will be faxed to [phone].

## 2018-03-19 NOTE — PROGRESS NOTES
Subjective:      Elin Poole is a 72 y.o. female who presents for Follow-Up (Prechemo) reevaluation of nausea, vomiting, diarrhea and prior to proceeding with cycle 3 liposomal irinotecan/5-FU/leucovorin for metastatic ampullary carcinoma.      HPI   Ms. Poole presents for reevaluation of nausea, vomiting, diarrhea - prior to proceeding with liposomal irinotecan/5-FU/leucovorin for metastatic ampullary carcinoma. She is accompanied by her daughter.    Up until last week she was tolerating treatment very well. Last week she experienced worsening appetite, abdominal pain, diarrhea, nausea, weight loss, and neutropenia. She received hydration, IV potassium, and neupogen over the weekend and is feeling better today. Diarrhea continues but has decreased to twice daily. Clostridium difficile toxin evaluation of stool was negative on Friday, 3/16/2018.    She regained 5 pounds she has lost as of last Friday. She reports her appetite is improved after resuming Megace.     Given her symptoms she was provided Neupogen on Saturday and Sunday to address her neutropenia. She reports associated bone pain at thighs hips and back which is subsiding. She continues without fever or chills and ANC has recovered to >7k.      No Known Allergies    Current Outpatient Prescriptions on File Prior to Visit   Medication Sig Dispense Refill   • megestrol (MEGACE) 400 MG/10ML Suspension Take 20 mL by mouth every day. 600 mL 1   • ondansetron (ZOFRAN) 4 MG Tab tablet Take 1 Tab by mouth every four hours as needed for Nausea/Vomiting. 20 Tab 3   • loperamide (IMODIUM) 2 MG Cap Take 1 Cap by mouth 4 times a day as needed for Diarrhea. 30 Cap 0   • RESTASIS 0.05 % ophthalmic emulsion      • losartan-hydrochlorothiazide (HYZAAR) 50-12.5 MG per tablet Take 1 Tab by mouth every day. 30 Tab 0   • prochlorperazine (COMPAZINE) 10 MG Tab Take 1 Tab by mouth every 6 hours as needed (for nausea, vomiting). 30 Tab 6   • omeprazole (PRILOSEC)  "20 MG delayed-release capsule TAKE 1 CAP BY MOUTH EVERY DAY. 30 Cap 3   • ELIQUIS 5 MG Tab TAKE 1 TAB BY MOUTH 2 TIMES A DAY. 60 Tab 6   • sennosides-docusate sodium (SENOKOT-S) 8.6-50 MG tablet Take 1 Tab by mouth 2 times a day. 60 Tab 3     No current facility-administered medications on file prior to visit.          Review of Systems   Constitutional: Positive for malaise/fatigue (less so yesterday - Sat was her worst day). Negative for chills (hasn't been cold - she usually is due to Eliquis), fever and weight loss (up 5 pounds since Friday - several L of fluid since Fri).   Respiratory: Positive for cough (intermittent). Negative for shortness of breath and wheezing.    Cardiovascular: Negative for chest pain, palpitations and leg swelling.   Gastrointestinal: Positive for abdominal pain (LUQ/RUQ tenderness; gas pain), diarrhea (2 times per day over the weekend, still foamy/mucousy, foul smell), nausea (out of Zofran, has back up compazine) and vomiting (last emesis was Sat 3/17). Negative for constipation.   Genitourinary: Negative for dysuria.   Musculoskeletal: Negative for myalgias.        Bone pain from Neulasta   Neurological: Positive for weakness. Negative for dizziness, tingling and headaches.          Objective:     /62   Pulse (!) 117   Temp 36.3 °C (97.4 °F)   Resp 16   Ht 1.525 m (5' 0.02\")   Wt 63.2 kg (139 lb 3.5 oz)   SpO2 95%   BMI 27.17 kg/m²      Physical Exam   Constitutional: She is oriented to person, place, and time. She appears well-developed and well-nourished. No distress.   HENT:   Head: Normocephalic and atraumatic.   Mouth/Throat: Oropharynx is clear and moist. No oropharyngeal exudate.   Eyes: Conjunctivae and EOM are normal. Pupils are equal, round, and reactive to light. Right eye exhibits no discharge. Left eye exhibits no discharge. No scleral icterus.   Neck: Normal range of motion. Neck supple. No thyromegaly present.   Cardiovascular: Normal rate, regular " rhythm, normal heart sounds and intact distal pulses.  Exam reveals no gallop and no friction rub.    No murmur heard.  More easily auscultated today   Pulmonary/Chest: Effort normal and breath sounds normal. No respiratory distress. She has no wheezes.   Abdominal: Soft. Bowel sounds are normal. She exhibits no distension. There is tenderness (across R & L UQ/epigastric area).   Musculoskeletal: Normal range of motion. She exhibits no edema or tenderness.   Lymphadenopathy:        Head (right side): No submental, no submandibular, no tonsillar, no preauricular, no posterior auricular and no occipital adenopathy present.        Head (left side): No submental, no submandibular, no tonsillar, no preauricular, no posterior auricular and no occipital adenopathy present.     She has no cervical adenopathy.        Right cervical: No superficial cervical and no deep cervical adenopathy present.       Left cervical: No superficial cervical and no deep cervical adenopathy present.        Right: No supraclavicular adenopathy present.        Left: No supraclavicular adenopathy present.   Neurological: She is alert and oriented to person, place, and time.   More alert and interactive than her 3/16/2018 visit   Skin: Skin is warm and dry. No rash noted. She is not diaphoretic. No erythema. No pallor.   Psychiatric: She has a normal mood and affect. Her behavior is normal.   Vitals reviewed.      Hospital Outpatient Visit on 03/19/2018   Component Date Value Ref Range Status   • Sodium 03/19/2018 129* 135 - 145 mmol/L Final   • Potassium 03/19/2018 3.5* 3.6 - 5.5 mmol/L Final   • Chloride 03/19/2018 104  96 - 112 mmol/L Final   • Co2 03/19/2018 17* 20 - 33 mmol/L Final   • Glucose 03/19/2018 141* 65 - 99 mg/dL Final   • Bun 03/19/2018 9  8 - 22 mg/dL Final   • Creatinine 03/19/2018 0.67  0.50 - 1.40 mg/dL Final   • Calcium 03/19/2018 8.1* 8.5 - 10.5 mg/dL Final   • Anion Gap 03/19/2018 8.0  0.0 - 11.9 Final   • GFR If   American 03/19/2018 >60  >60 mL/min/1.73 m 2 Final   • GFR If Non  03/19/2018 >60  >60 mL/min/1.73 m 2 Final          Assessment/Plan:     1. Ampullary carcinoma (CMS-HCC)  BASIC METABOLIC PANEL    CT-ABDOMEN WITH    potassium chloride SA (K-DUR) 10 MEQ Tab CR   2. Poor appetite     3. Chemotherapy-induced nausea     4. Long term (current) use of anticoagulants [Z79.01]     5. Chronic deep vein thrombosis (DVT) of popliteal vein of right lower extremity (CMS-HCC)     6. Chemotherapy-induced neutropenia (CMS-HCC)     7. Hypokalemia     8. Functional diarrhea     9. Tachycardia     10. Encounter for antineoplastic chemotherapy         1.  Poor appetite: Improved very slightly with resuming Megace. She had lost 5 pounds as of Friday 3/16/2018. She has been rehydrated and repleted with potassium over the weekend and has regained 5 pounds, no edema noted.    2.  Nausea: Persists, she is out of Zofran. There has been some logistical issues with insurance and we will submit a prior authorization accordingly. She continues with Compazine as needed.    3.  DVT/chronic anticoagulation: Stable. Continues on Eliquis.    4.  Tachycardia/hypokalemia/diarrhea: Improved. EKG completed 3/16/2018 showed sinus tach. Tachycardia continues but is slightly improved given hydration over the weekend. Potassium is improved to 3.5 today. She will proceed with hydration and 10 mEq IV replacement, she will continue with 40 mEq KCl PO twice a day for 3 more days then reduce to 20 mEq KCl PO. She'll return in approximately 1 week for repeat BMP and hydration/electrolyte replacement as indicated.    Diarrhea has improved to twice daily. C. diff toxin on 3/16/2018 was negative. She'll continue with Imodium as needed, PO hydration with electrolyte replacement fluids, scheduled meals (BRAT diet as needed). Should her symptoms worsen she will notify office so we can repeat C. diff evaluation.    5.  Neutropenia: ANC was noted to be  570 on 3/16/2018 and had improved to 960 on 3/17/2018. She received 1 dose of Neupogen for supportive therapy given ongoing symptoms, ANC improved to 7950. she reports associated bone pain but otherwise remains afebrile and without chills.    6.  Ampullary carcinoma: Discussed improvement in appetite, nausea, diarrhea, hypokalemia with Dr. Cedeno. We will hold cycle 2 of liposomal irinotecan (Ovivyde)/5-FU/leucovorin at this time and continue with supportive therapy.    CT is ordered for further evaluation. She will return next week, following CT scan, for reevaluation with Dr. Cedeno, evaluation of dose reduction of current plan versus change in treatment plan.          The patient verbalized agreement and understanding of current plan. All questions and concerns were addressed at time of visit.    Please note that this dictation was created using voice recognition software. I have made every reasonable attempt to correct obvious errors, but I expect that there are errors of grammar and possibly content that I did not discover before finalizing the note.

## 2018-03-19 NOTE — PROGRESS NOTES
Pt ambulated into department for 5FU/Onivyde. Pt was seen by Gardenia OLIVARES prior to arrival. Provider called nurse and told her that Pt would not received chemotherapy today because Pt did not recover well after her last chemotherapy. Plan today is to replace potassium and give patient hydration. Provider stated that patient will have a repeat CT scan, and to keep patients future appointments, since her Oncology team is unsure of how they want to proceed with chemotherapy at this point. Gardenia OLIVARES asked for Pt to come back this weekend to recheck her labs. POC explained to Pt and daughter, both were receptive to education. IV hydration and potassium given as prescribed, Pt tolerated well and without indicant. Port had + blood return after, flushed per [facility] policy, de-accessed, needle intact, insertion site covered with sterile gauze and paper tape. Pt's next appointment on Sunday at 09:00 am confirmed with Pt prior to leaving, left department using walker with daughter appearing in good spirits and NAD.

## 2018-03-19 NOTE — PROGRESS NOTES
Report taken from Lenore, potassium infusion finished without incident. Port flushed, brisk blood return observed, heparinized and left accessed for tomorrow. Returns tomorrow, left in care of son in [facility].

## 2018-03-23 NOTE — TELEPHONE ENCOUNTER
The patients son called and was wondering if the medication Megestrol is for the patients appetite? The pharmacy stated that the patients insurance will not cover this prescription. Is there any other medication that can be prescribed? Sons name is Sea and his phone number is [phone].

## 2018-03-25 NOTE — PROGRESS NOTES
Patient seen today for hydration, labs with possible electrolyte replacement.  Plan of care discussed, agreed with treatment for today.  Port accessed using sterile technique.  Blood obtained for CBC, CMP.  IVF of [id number] infusing. Patient comfortable, legs elevated, warm blankets provided, and call light available.

## 2018-03-29 NOTE — TELEPHONE ENCOUNTER
Patient and son stated they were having difficulties with obtaining prescription refills of megace, zofran and potassium.  RN called followed up with pt's pharmacy.  Verified that zofran is approved and it was picked up on 3/23, she also states potassium was picked up on  3/17.   Prior auth is required for Megace 600ml because pt is over age 60 per Medicare.  Pt's son reports they are completely out of potassium because she needed to take additional potassium.  Informed him RN will submit PA for new rx sent to pharmacy today by Dr. Cedeno and for Megace.

## 2018-03-29 NOTE — PROGRESS NOTES
Date of visit: 3/29/2018  8:36 AM      Chief Complaint- F/u Metastatic adenocarcinoma of ampulla of vater, BRCA2 mutated      Identification/Prior relevant history: adenocarcinoma of primary ampulla diagnosed in 7/2016.  She was diagnosed due to elevated liver enzymes and acute renal insufficiency.  She is s/p ERCP with stent with good decompression. Biopsy was positive for adenocarcinoma of primary ampulla. She was seen by Dr. Jacobs and underwent an attempted Whipple’s in 8/2016. The surgery was aborted secondary to multiple liver lesions and s/p wedge resection of the liver which was positive for poorly differentiated adenocarcinoma.  PET scan showed uptake in multiple hepatic lesions and uptake in the ampulla. He also had uptake in bilateral hilar area with increased uptake and small pulmonary nodules in the right middle lobe without uptake. She was treated with gemcitabine and Abraxane. She tolerated the treatment fairly well initially. After cycle 3 of chemotherapy she started to have increased fatigue and diarrhea. Repeat imaging showed response to therapy. The diarrhea improve and her regimen was changed to single agent gemcitabine. She received chemotherapy on 1/18/17. Further cycles have been held secondary to increased fatigue.   3/20/17 CT CAP showed thrombus seen in the right external iliac and common femoral vein. Previous foci in the liver are no longer seen and no residual or recurrent mass seen in the surgical bed.  She was started on Eliquis.     -11/1/17-interim CT scan shows disease progression with a new enhancing mass in the uncinate process in. Ampullary area with some mass effect on the distal CBD. There is also new adenopathy surrounding the mass. new tumor thrombus in the posterior SMV with 180 degrees abutment of the adjacent mass     -11 /15/2017- started gemcitabine and Abraxane alternate week with good tolerance.     -2/5/18-CT abdomen, pelvis-Increased size of enhancing mass in  the uncinate process and periampullary region which currently measures 4.0 x 4.4 cm compared to 3.6 x 4.1 cm., slight decrease in the adenopathy anterior to the pancreas, slight increase in aortocaval adenopathy. Increased nonocclusive thrombus in the SMV.    -Started 5FU/Onvyde     Interim history- she tolerated the first dose well. With the second dose she had significant diarrhea and fatigue. She is still feeling slightly weak.  3/27/18-CT scan2.5 cm pancreatic head/and cystic process mass previously measured 4.4 cm. Treatment response.  Decrease in size of peripancreatic and upper abdomen retroperitoneal lymph nodes/treatment response.  Dilatation of the common duct and pneumobilia again demonstrated.  The superior mesenteric artery and superior mesenteric veins no longer appear encased. No thrombus within the superior mesenteric vein currently demonstrated.    NGS testing-    Past Medical History:      Past Medical History:   Diagnosis Date   • Ampullary carcinoma (CMS-HCC)    • Arthritis     hands/fingers/right knee   • Asthma    • Cancer (CMS-HCC) 2016    Ampullary CA   • Cataract 09-    bilat.,no surgery   • Dental disorder     upper partial   • Heart burn    • Hypertension    • Indigestion    • Jaundice        Past surgical history:       Past Surgical History:   Procedure Laterality Date   • CATH PLACEMENT Right 9/9/2016    Procedure: CATH PLACEMENT cephalic power port  ;  Surgeon: Kurtis Jacobs M.D.;  Location: [facility];  Service:    • WHIPPLE PROCEDURE  8/15/2016    Procedure: Exploratoy Laparotomy, Da-en-y;  Surgeon: Kurtis Jacobs M.D.;  Location: SURGERY [facility];  Service:    • NODE DISSECTION  8/15/2016    Procedure: omental flap, Liver Wedge, cholecystectomy ;  Surgeon: Kurtis Jacobs M.D.;  Location: SURGERY [facility];  Service:    • STENT PLACEMENT  7/2016    gallbladder   • CHOLECYSTECTOMY         Allergies:       Patient has no  known allergies.    Medications:         Current Outpatient Prescriptions   Medication Sig Dispense Refill   • potassium chloride SA (K-DUR) 10 MEQ Tab CR Take 40 mEq BID x 3 days then change to 20 mEq  Tab 0   • prochlorperazine (COMPAZINE) 10 MG Tab Take 1 Tab by mouth every 6 hours as needed (for nausea, vomiting). 30 Tab 6   • megestrol (MEGACE) 400 MG/10ML Suspension Take 20 mL by mouth every day. 600 mL 1   • ondansetron (ZOFRAN) 4 MG Tab tablet Take 1 Tab by mouth every four hours as needed for Nausea/Vomiting. 20 Tab 3   • loperamide (IMODIUM) 2 MG Cap Take 1 Cap by mouth 4 times a day as needed for Diarrhea. 30 Cap 0   • omeprazole (PRILOSEC) 20 MG delayed-release capsule TAKE 1 CAP BY MOUTH EVERY DAY. 30 Cap 3   • ELIQUIS 5 MG Tab TAKE 1 TAB BY MOUTH 2 TIMES A DAY. 60 Tab 6   • RESTASIS 0.05 % ophthalmic emulsion      • losartan-hydrochlorothiazide (HYZAAR) 50-12.5 MG per tablet Take 1 Tab by mouth every day. 30 Tab 0   • sennosides-docusate sodium (SENOKOT-S) 8.6-50 MG tablet Take 1 Tab by mouth 2 times a day. 60 Tab 3     No current facility-administered medications for this visit.          Social History:     Social History     Social History   • Marital status:      Spouse name: N/A   • Number of children: N/A   • Years of education: N/A     Occupational History   • Not on file.     Social History Main Topics   • Smoking status: Never Smoker   • Smokeless tobacco: Never Used   • Alcohol use No   • Drug use: No   • Sexual activity: Not on file     Other Topics Concern   • Not on file     Social History Narrative   • No narrative on file       Family History:    No family history on file.    Review of Systems:  All other review of systems are negative except what was mentioned above in the HPI.    Constitutional: Negative for fever, chills, weight loss, positive for malaise/fatigue.    HEENT: No new auditory or visual complaints. No sore throat and neck pain.     Respiratory: Negative for  "cough, sputum production, shortness of breath and wheezing.    Cardiovascular: Negative for chest pain, palpitations, orthopnea and leg swelling.    Gastrointestinal: Negative for heartburn, nausea, vomiting and abdominal pain.  , diarrhea, improving  Genitourinary: Negative for dysuria, hematuria    Musculoskeletal: No new arthralgias or myalgias   Skin: Negative for rash and itching.    Neurological: Negative for focal weakness and headaches.    Endo/Heme/Allergies: No abnormal bleed/bruise.    Psychiatric/Behavioral: No new depression/anxiety.    Physical Exam:  Vitals: /72   Pulse 78   Temp 36.7 °C (98 °F)   Resp 18   Ht 1.53 m (5' 0.24\")   Wt 65.9 kg (145 lb 4.5 oz)   SpO2 98%   Breastfeeding? No   BMI 28.15 kg/m²      General: Not in acute distress, alert and oriented x 3  HEENT: No pallor, icterus. Oropharynx clear.   Neck: Supple, no palpable masses.  Lymph nodes: No palpable cervical, supraclavicular, axillary or inguinal lymphadenopathy.    CVS: regular rate and rhythm, no rubs or gallops  RESP: Clear to auscultate bilaterally, no wheezing or crackles.   ABD: Soft, non tender, non distended, positive bowel sounds, no palpable organomegaly  EXT: No edema or cyanosis  CNS: Alert and oriented x3, No focal deficits.  Skin- No rash      Labs:   Outpatient Infusion Services on 03/25/2018   Component Date Value Ref Range Status   • Sodium 03/25/2018 135  135 - 145 mmol/L Final   • Potassium 03/25/2018 3.7  3.6 - 5.5 mmol/L Final   • Chloride 03/25/2018 108  96 - 112 mmol/L Final   • Co2 03/25/2018 21  20 - 33 mmol/L Final   • Anion Gap 03/25/2018 6.0  0.0 - 11.9 Final   • Glucose 03/25/2018 102* 65 - 99 mg/dL Final   • Bun 03/25/2018 10  8 - 22 mg/dL Final   • Creatinine 03/25/2018 0.58  0.50 - 1.40 mg/dL Final   • Calcium 03/25/2018 7.6* 8.5 - 10.5 mg/dL Final   • AST(SGOT) 03/25/2018 12  12 - 45 U/L Final   • ALT(SGPT) 03/25/2018 11  2 - 50 U/L Final   • Alkaline Phosphatase 03/25/2018 87  30 - 99 " U/L Final   • Total Bilirubin 03/25/2018 0.4  0.1 - 1.5 mg/dL Final   • Albumin 03/25/2018 2.2* 3.2 - 4.9 g/dL Final   • Total Protein 03/25/2018 4.5* 6.0 - 8.2 g/dL Final   • Globulin 03/25/2018 2.3  1.9 - 3.5 g/dL Final   • A-G Ratio 03/25/2018 1.0  g/dL Final   • WBC 03/25/2018 8.4  4.8 - 10.8 K/uL Final   • RBC 03/25/2018 3.82* 4.20 - 5.40 M/uL Final   • Hemoglobin 03/25/2018 12.3  12.0 - 16.0 g/dL Final   • Hematocrit 03/25/2018 35.2* 37.0 - 47.0 % Final   • MCV 03/25/2018 92.1  81.4 - 97.8 fL Final   • MCH 03/25/2018 32.2  27.0 - 33.0 pg Final   • MCHC 03/25/2018 34.9  33.6 - 35.0 g/dL Final   • RDW 03/25/2018 53.6* 35.9 - 50.0 fL Final   • Platelet Count 03/25/2018 244  164 - 446 K/uL Final   • MPV 03/25/2018 9.7  9.0 - 12.9 fL Final   • Neutrophils-Polys 03/25/2018 60.70  44.00 - 72.00 % Final   • Lymphocytes 03/25/2018 23.10  22.00 - 41.00 % Final   • Monocytes 03/25/2018 11.50  0.00 - 13.40 % Final   • Eosinophils 03/25/2018 1.00  0.00 - 6.90 % Final   • Basophils 03/25/2018 0.50  0.00 - 1.80 % Final   • Immature Granulocytes 03/25/2018 3.20* 0.00 - 0.90 % Final   • Nucleated RBC 03/25/2018 0.00  /100 WBC Final   • Neutrophils (Absolute) 03/25/2018 5.09  2.00 - 7.15 K/uL Final   • Lymphs (Absolute) 03/25/2018 1.93  1.00 - 4.80 K/uL Final   • Monos (Absolute) 03/25/2018 0.96* 0.00 - 0.85 K/uL Final   • Eos (Absolute) 03/25/2018 0.08  0.00 - 0.51 K/uL Final   • Baso (Absolute) 03/25/2018 0.04  0.00 - 0.12 K/uL Final   • Immature Granulocytes (abs) 03/25/2018 0.27* 0.00 - 0.11 K/uL Final   • NRBC (Absolute) 03/25/2018 0.00  K/uL Final   • GFR If  03/25/2018 >60  >60 mL/min/1.73 m 2 Final   • GFR If Non  03/25/2018 >60  >60 mL/min/1.73 m 2 Final             Assessment and Plan:  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 She had multiple liver lesions at diagnosis as well as hilar adenopathy. She completed 3  cycles of gemcitabine and Abraxane with good responses. Her regimen was then decreased to single agent gemcitabine. She had significant fatigue has chemotherapy has been held since early 2017. She had recent progression mainly in the pancreatic bed.     -11/2070 started on gemcitabine and Abraxane for progression mainly involving the pancreatic head/lymph nodes with no response  Switched to 5-FU/Liposomal Irinotecan.. She appears to be having good response in the imaging studies. However, she had significant fatigue and diarrhea  after the last dose. We will hold the treatment this week for her to recover some.  Reviewed the positive imaging studies with the patient and she was happy to hear this. I will dose reduce her 5-FU to 2000 mg/m² and irinotecan to 60 mg/m² for better tolerance. She knows to try Imodium proactively.     Interestingly, her initial hepatic metastatic disease is still remaining in remission. Most of the current relapse appears to be around the pancreatic bed with lymph node involvement. I will consider referring her to radiation oncology for Xeloda-based radiation after a few more doses of the current chemotherapy.    -BRCA2 mutation seen in the tumor specimen. I will check germline analysis. .We will look into the option of getting off label PARP inhibitors if she progresses.    -Diarrhea secondary to chemotherapy and when necessary Imodium. She knows to take it proactively. She will continue Megace for anorexia.    Highly Complex patient requiring complex decision making. Reviewed the laboratory data and images with the patient. Antineoplastic therapy requiring close monitoring and decision-making.  Please note that this dictation was created using voice recognition software. I have made every reasonable attempt to correct obvious errors, but I expect that there are errors of grammar and possibly content that I did not discover before finalizing the note.      SIGNATURES:  Claude  Pao    CC:  Wally Knox D.O.  No ref. provider found

## 2018-04-06 PROBLEM — Z51.11 ENCOUNTER FOR ANTINEOPLASTIC CHEMOTHERAPY: Status: ACTIVE | Noted: 2018-01-01

## 2018-04-06 NOTE — PROGRESS NOTES
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 Poole was recently started on 5-FU and liposomal irinotecan for her relapsed metastatic ampullary carcinoma. Interim imaging showed good response.   , She has developed significant deconditioning, dehydration and diarrhea. Treatment was held last week. She is here for one-week follow-up. She is still having some fatigue issues. Overall she is feeling better than last time. Please referred to my detailed note dated 3/29/18 for further details    Past Medical History:      Past Medical History:   Diagnosis Date   • Ampullary carcinoma (CMS-HCC)    • Arthritis     hands/fingers/right knee   • Asthma    • Cancer (CMS-HCC) 2016    Ampullary CA   • Cataract 09-    bilat.,no surgery   • Dental disorder     upper partial   • Heart burn    • Hypertension    • Indigestion    • Jaundice        Past surgical history:       Past Surgical History:   Procedure Laterality Date   • CATH PLACEMENT Right 9/9/2016    Procedure: CATH PLACEMENT cephalic power port  ;  Surgeon: Kurtis Jacobs M.D.;  Location: SURGERY [facility];  Service:    • WHIPPLE PROCEDURE  8/15/2016    Procedure: Exploratoy Laparotomy, Da-en-y;  Surgeon: Kurtis Jacobs M.D.;  Location: SURGERY [facility];  Service:    • NODE DISSECTION  8/15/2016    Procedure: omental flap, Liver Wedge, cholecystectomy ;  Surgeon: Kurtis Jacobs M.D.;  Location: SURGERY [facility];  Service:    • STENT PLACEMENT  7/2016    gallbladder   • CHOLECYSTECTOMY         Allergies:       Patient has no known allergies.    Medications:         Current Outpatient Prescriptions   Medication Sig Dispense Refill   • potassium bicarbonate (KLYTE) 25 MEQ tablet Take 1 Tab by mouth 2 times a day. 60 Tab 11   • omeprazole (PRILOSEC) 20 MG delayed-release capsule TAKE 1 CAP BY MOUTH EVERY DAY. 30 Cap 3   • ELIQUIS  5 MG Tab TAKE 1 TAB BY MOUTH 2 TIMES A DAY. 60 Tab 6   • losartan-hydrochlorothiazide (HYZAAR) 50-12.5 MG per tablet Take 1 Tab by mouth every day. 30 Tab 0   • prochlorperazine (COMPAZINE) 10 MG Tab Take 1 Tab by mouth every 6 hours as needed (for nausea, vomiting). 30 Tab 6   • megestrol (MEGACE) 400 MG/10ML Suspension Take 20 mL by mouth every day. 600 mL 1   • ondansetron (ZOFRAN) 4 MG Tab tablet Take 1 Tab by mouth every four hours as needed for Nausea/Vomiting. 20 Tab 3   • loperamide (IMODIUM) 2 MG Cap Take 1 Cap by mouth 4 times a day as needed for Diarrhea. 30 Cap 0   • RESTASIS 0.05 % ophthalmic emulsion      • sennosides-docusate sodium (SENOKOT-S) 8.6-50 MG tablet Take 1 Tab by mouth 2 times a day. 60 Tab 3     No current facility-administered medications for this visit.          Social History:     Social History     Social History   • Marital status:      Spouse name: N/A   • Number of children: N/A   • Years of education: N/A     Occupational History   • Not on file.     Social History Main Topics   • Smoking status: Never Smoker   • Smokeless tobacco: Never Used   • Alcohol use No   • Drug use: No   • Sexual activity: Not on file     Other Topics Concern   • Not on file     Social History Narrative   • No narrative on file       Family History:    No family history on file.    Review of Systems:  All other review of systems are negative except what was mentioned above in the HPI.    Constitutional: Negative for fever, chills, weight loss and improving malaise/fatigue.    HEENT: No new auditory or visual complaints. No sore throat and neck pain.     Respiratory: Negative for cough, sputum production, shortness of breath and wheezing.    Cardiovascular: Negative for chest pain, palpitations, orthopnea and leg swelling.    Gastrointestinal: Negative for heartburn, nausea, vomiting and abdominal pain.    Genitourinary: Negative for dysuria, hematuria    Musculoskeletal: No new arthralgias or  "myalgias   Skin: Negative for rash and itching.    Neurological: Negative for focal weakness and headaches.    Endo/Heme/Allergies: No abnormal bleed/bruise.    Psychiatric/Behavioral: No new depression/anxiety.    Physical Exam:  Vitals: /60   Pulse 92   Temp 37.4 °C (99.4 °F)   Resp 16   Ht 1.549 m (5' 1\")   Wt 64 kg (141 lb 1.5 oz)   SpO2 99%   BMI 26.66 kg/m²     General: Not in acute distress, alert and oriented x 3  HEENT: No pallor, icterus. Oropharynx clear.   Neck: Supple, no palpable masses.  Lymph nodes: No palpable cervical, supraclavicular, axillary or inguinal lymphadenopathy.    CVS: regular rate and rhythm, no rubs or gallops  RESP: Clear to auscultate bilaterally, no wheezing or crackles.   ABD: Soft, non tender, non distended, positive bowel sounds, no palpable organomegaly  EXT: No edema or cyanosis  CNS: Alert and oriented x3, No focal deficits.  Skin- No rash      Labs:   No visits with results within 1 Week(s) from this visit.   Latest known visit with results is:   Outpatient [facility]fusion Services on 03/25/2018   Component Date Value Ref Range Status   • Sodium 03/25/2018 135  135 - 145 mmol/L Final   • Potassium 03/25/2018 3.7  3.6 - 5.5 mmol/L Final   • Chloride 03/25/2018 108  96 - 112 mmol/L Final   • Co2 03/25/2018 21  20 - 33 mmol/L Final   • Anion Gap 03/25/2018 6.0  0.0 - 11.9 Final   • Glucose 03/25/2018 102* 65 - 99 mg/dL Final   • Bun 03/25/2018 10  8 - 22 mg/dL Final   • Creatinine 03/25/2018 0.58  0.50 - 1.40 mg/dL Final   • Calcium 03/25/2018 7.6* 8.5 - 10.5 mg/dL Final   • AST(SGOT) 03/25/2018 12  12 - 45 U/L Final   • ALT(SGPT) 03/25/2018 11  2 - 50 U/L Final   • Alkaline Phosphatase 03/25/2018 87  30 - 99 U/L Final   • Total Bilirubin 03/25/2018 0.4  0.1 - 1.5 mg/dL Final   • Albumin 03/25/2018 2.2* 3.2 - 4.9 g/dL Final   • Total Protein 03/25/2018 4.5* 6.0 - 8.2 g/dL Final   • Globulin 03/25/2018 2.3  1.9 - 3.5 g/dL Final   • A-G Ratio 03/25/2018 1.0  g/dL Final   • " WBC 03/25/2018 8.4  4.8 - 10.8 K/uL Final   • RBC 03/25/2018 3.82* 4.20 - 5.40 M/uL Final   • Hemoglobin 03/25/2018 12.3  12.0 - 16.0 g/dL Final   • Hematocrit 03/25/2018 35.2* 37.0 - 47.0 % Final   • MCV 03/25/2018 92.1  81.4 - 97.8 fL Final   • MCH 03/25/2018 32.2  27.0 - 33.0 pg Final   • MCHC 03/25/2018 34.9  33.6 - 35.0 g/dL Final   • RDW 03/25/2018 53.6* 35.9 - 50.0 fL Final   • Platelet Count 03/25/2018 244  164 - 446 K/uL Final   • MPV 03/25/2018 9.7  9.0 - 12.9 fL Final   • Neutrophils-Polys 03/25/2018 60.70  44.00 - 72.00 % Final   • Lymphocytes 03/25/2018 23.10  22.00 - 41.00 % Final   • Monocytes 03/25/2018 11.50  0.00 - 13.40 % Final   • Eosinophils 03/25/2018 1.00  0.00 - 6.90 % Final   • Basophils 03/25/2018 0.50  0.00 - 1.80 % Final   • Immature Granulocytes 03/25/2018 3.20* 0.00 - 0.90 % Final   • Nucleated RBC 03/25/2018 0.00  /100 WBC Final   • Neutrophils (Absolute) 03/25/2018 5.09  2.00 - 7.15 K/uL Final   • Lymphs (Absolute) 03/25/2018 1.93  1.00 - 4.80 K/uL Final   • Monos (Absolute) 03/25/2018 0.96* 0.00 - 0.85 K/uL Final   • Eos (Absolute) 03/25/2018 0.08  0.00 - 0.51 K/uL Final   • Baso (Absolute) 03/25/2018 0.04  0.00 - 0.12 K/uL Final   • Immature Granulocytes (abs) 03/25/2018 0.27* 0.00 - 0.11 K/uL Final   • NRBC (Absolute) 03/25/2018 0.00  K/uL Final   • GFR If  03/25/2018 >60  >60 mL/min/1.73 m 2 Final   • GFR If Non  03/25/2018 >60  >60 mL/min/1.73 m 2 Final         Assessment and Plan:  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 She had multiple liver lesions at diagnosis as well as hilar adenopathy. She completed 3 cycles of gemcitabine and Abraxane with good responses. Her regimen was then decreased to single agent gemcitabine. She had significant fatigue has chemotherapy has been held since early 2017. She had recent progression mainly in the pancreatic bed.     -11/2070 started on  gemcitabine and Abraxane for progression mainly involving the pancreatic head/lymph nodes with no response  Switched to 5-FU/Liposomal Irinotecan.. She appears to be having good response in the imaging studies. However, she had significant fatigue and diarrhea  after the last dose. We will hold the treatment this week for her to recover some.  Reviewed the positive imaging studies with the patient and she was happy to hear this. I will dose reduce her 5-FU to 1800 mg/m² and irinotecan to 60 mg/m² for better tolerance. She knows to try Imodium proactively.      Interestingly, her initial hepatic metastatic disease is still remaining in remission. Most of the current relapse appears to be around the pancreatic bed with lymph node involvement. I will consider referring her to radiation oncology for Xeloda-based radiation after a few more doses of the current chemotherapy.    -BRCA2 mutation seen in the tumor specimen. I will check germline analysis. .We will look into the option of getting off label PARP inhibitors if she progresses.     -Diarrhea secondary to chemotherapy and when necessary Imodium. She knows to take it proactively. She will continue Megace for anorexia.    . She will receive next dose of chemotherapy next week, we will see her back one week after for a toxicity evaluation and proactive management of potential adverse effects    She agreed and verbalized  agreement and understanding with the current plan.  I answered all questions and concerns at this time         Please note that this dictation was created using voice recognition software. I have made every reasonable attempt to correct obvious errors, but I expect that there are errors of grammar and possibly content that I did not discover before finalizing the note.      SIGNATURES:  Claude Cedeno    CC:  Wally Knox D.O.  No ref. provider found

## 2018-04-15 NOTE — PROGRESS NOTES
"Chemotherapy Verification - SECONDARY RN       Height = 60.24\"  Weight = 64.2 kg  BSA = 1.65 m2       Medication: Liposomal Irinotecan (Onivyde)  Dose: 60 mg/m2  Calculated Dose: 99 mg     Medication: Leucovorin  Dose: 400 mg/m2  Calculated Dose: 660 mg                             Medication: 5FU home infusion pump  Dose: 1800 mg/m2  Calculated Dose: 2970 mg                              I confirm that this process was performed independently.   "

## 2018-04-15 NOTE — PROGRESS NOTES
"Pharmacy Chemotherapy Calculations Note:    Patient Name: Elin Poole  DX: metastatic pancreatic cancer (ampulla of wilfred)    Cycle 3 delayed due to patient condition - PAPER ORDERS IN CHART  Previous treatment: C2 = 3/5/18     *Dosing Reference*  Liposomal Irinotecan (Onivyde) 70 mg/m2 IV over 90 minutes Day 1   4/15/18 Pao LLANOS reduced dose to 60 mg/m2 starting with C3 due to tolerance,   then   Leucovorin 400 mg/m2 IV over 30 minutes Day 1, followed by    Fluorouracil 2400 mg/m2 CIVI over 46 hours Days 1-2    Pao LLANOS reduced dose to 2000 mg/m2 starting with C1 for anticipated tolerance    4/15/18 Pao LLANOS reduced dose to 1800 mg/m2 starting with C3 due to tolerance  14 day cycles until DP or UT   Clare GARZA et al. (AMBROSE-1) Lancet. 2016;387(52373):950-52    Allergies: Patient has no known allergies.    /55   Pulse 85   Temp 37.2 °C (99 °F)   Resp 18   Ht 1.53 m (5' 0.24\")   Wt 64.2 kg (141 lb 8.6 oz)   SpO2 100%   BMI 27.43 kg/m²  Body surface area is 1.65 meters squared.     Labs 4/15/18:  ANC~ 4170 Plt = 225k   Hgb = 12.0     SCr = 0.69 mg/dL CrCl ~ 73 mL/min (min Cr 0.7)  K = 3.8    4/15/18: LFT's = 22/17/104 TBili = 0.4  Liposomal irinotecan (Onivyde) 60mg/m2 x 1.65 m2 = 99 mg    <5% difference, ok to treat with final dose = 99 mg IV    Leucovorin 400mg/m2  x 1.65 m2 = 660 mg    <5% difference, ok to treat with final dose = 660 mg IV    Fluorouracil (Adrucil) 1800mg/m2 x 1.65 m2 = 2970 mg    <5% difference, ok to treat with final dose = 3000 mg    CIVI over 46 hours via CADD pump at 1.3 mL/hr.  Keaton Salazar, PharmD    "

## 2018-04-15 NOTE — PROGRESS NOTES
"Pharmacy Chemotherapy Verification    Patient Name: Elin Poole   Dx: Ampulla of yogesh CA, mets to liver     Protocol: Onivyde + Leucovorin+ 5FU     *Dosing Reference*  Liposomal Irinotecan (Onivyde) 70 mg/m2 IV over 90 minutes Day 1   4/15/18- dose reduced to 60mg/m2 for tolerance  Leucovorin 400 mg/m2 IV over 30 minutes Day 1, then   Fluorouracil 2400 mg/m2 CIVI over 46 hours Days 1-2    - 4/15/18- dose further reduced to 1800mg/m2 for tolerance   14 day cycles until DP or UT   Clare GARZA et al. (AMBROSE-1) Lancet. 2016;387(04102):579-64    Allergies:  Patient has no known allergies.       /55   Pulse 85   Temp 37.2 °C (99 °F)   Resp 18   Ht 1.53 m (5' 0.24\")   Wt 64.2 kg (141 lb 8.6 oz)   SpO2 100%   BMI 27.43 kg/m²  Body surface area is 1.65 meters squared.     ANC~ 4170 Plt = 225k   Hgb = 12     SCr = 0.69mg/dL CrCl ~ 74mL/min   LFT's = 22/17/104 TBili = 0.4        Drug Order   (Drug name, dose, route, IV Fluid & volume, frequency, number of doses) Cycle: 3-  PAPER ORDERS IN CHART  Delayed for low potassium  Previous treatment: C2 = 3/5/18   Medication = Liposomal Irinotecan (Onivyde)  Base Dose= 60mg/m2  Calc Dose: Base Dose x 1.65m2 = 99mg  Final Dose = 98.986mg(rounded per EPIC)  Route = IV  Fluid & Volume = D5W 500 mL  Admin Duration = Over 90 minutes    Protect from light       <5% difference, OK to treat with final written dose   Medication = Leucovorin  Base Dose= 400 mg/m2  Calc Dose: Base Dose x 1.65m2 = 660mg  Final Dose = 660 mg  Route = IV  Fluid & Volume = D5W 250 mL   Admin Duration = Over 30 minutes          <5% difference, OK to treat with final written dose   Medication = Fluorouracil (5FU)  Base Dose= 1800mg/m2  Calc Dose:Base Dose x 1.65m2 = 2970mg  Final Dose = 3000mg  Route = IV  Fluid & Volume = 50 mg/ml;  60mL + 3 ml overfill = 63ml  Admin Duration = Over 46 hours via CADD pump at 1.3ml/hr          <5% difference, OK to treat with final written dose       By my " signature below, I confirm this process was performed independently with the BSA and all final chemotherapy dosing calculations congruent. I have reviewed the above chemotherapy order and that my calculation of the final dose and BSA (when applicable) corroborate those calculations of the  pharmacist. Discrepancies of 5% or greater in the written dose have been addressed and documented within the EPIC Progress notes.    Signature: Dana LernerD

## 2018-04-15 NOTE — PROGRESS NOTES
Chemotherapy Verification - PRIMARY RN      Height = 153 cm  Weight = 64.2 kg  BSA = 1.65 m2    Medication: Liposomal Irinotecan (Onivyde)  Dose: 60 mg/m2  Calculated Dose: 99 mg                             Medication: 5-FU over 24 hrs Dose: 1,800 mg/m2 Calculated Dose: 2,970 mg                           Medication: Leucovorin  Dose: 400 mg/mg2 Calculated Dose: 660 mg                           I confirm this process was performed independently with the BSA and all final chemotherapy dosing calculations congruent.  Any discrepancies of 5% or greater have been addressed with the chemotherapy pharmacist. The resolution of the discrepancy has been documented in the EPIC progress notes.

## 2018-04-15 NOTE — PROGRESS NOTES
Pt ambulated into department for Cycle 3/ Day 1 of Onivyde/5FU for ampullary carcinoma. Pt declined having any acute symptoms at this time. Reported that her fatigue , lack of appetite, and diarrhea have resolved. Port accessed in sterile manner, had + blood return, flushed briskly. Blood drawn and sent to lab for analysis. Pt met parameters to give chemotherapy, Pt and daughter updated of POC. Pt given pre-medications as prescribed. Onivyde given over 90 minutes, followed by Leucovorin over 30 minutes, and Pt was then connected to her home 5FU pump. Primary RN and Rhonda RN confirmed that pump was programed to run at 1.3 ml/hr over 46 hrs, and scheduled to deliver 60 ml's. Pump was confirmed to be running by both RN's, clamps open. Pt's disconnect appointment on 4/17/18 at 17:00 confirmed with Pt and daughter prior to leaving. Pt left department with daughter appearing in good spirits and NAD.

## 2018-04-23 NOTE — PROGRESS NOTES
Subjective:      Elin Poole is a 72 y.o. female who presents with Follow-Up (Tox check (Pao)) for ampullary carcinoma.       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 She is accompanied by her daughter for today's visit. Patient recently underwent chemotherapy last week with 5FU and liposomal irinotecan and is here for toxicity check due to significant side effects with previous treatments.    Patient did very well and tolerated the treatment last week very well. She stated the diarrhea is much better. She has had to take Imodium just a few times in the last week with positive results. She stated that she experienced significant edema in her lower extremities but that has all resolved. She was taking Megace for her appetite but stated that her appetite is doing very well and eating well. She has had a 5 pound weight loss in the last week, however last week's weight may have been from significant fluid and hydration she received due to her profound dehydration and diarrhea. We will continue to monitor her weight loss as patient stated she is eating very well at this time. She did state she is having some fatigue but is very mild and she is able to do things around the house and get outside. She denies any rashes or itching. She denies chest pain, hypotension or swelling in her legs. She denies coughing, wheezing or shortness of breath. She is voiding without difficulty. She denies any dizziness, headaches or generalized pain. According to the patient's daughter she was significantly dehydrated and fatigued previously which required her to use a walker around the house and wheelchair. At this time patient is feeling much better that she is able to handle it on her own without any assistive devices.      No Known Allergies  Current Outpatient Prescriptions on File Prior to Visit   Medication Sig Dispense Refill   • potassium bicarbonate (KLYTE) 25 MEQ tablet Take 1 Tab by mouth 2  "times a day. 60 Tab 11   • prochlorperazine (COMPAZINE) 10 MG Tab Take 1 Tab by mouth every 6 hours as needed (for nausea, vomiting). 30 Tab 6   • megestrol (MEGACE) 400 MG/10ML Suspension Take 20 mL by mouth every day. 600 mL 1   • ondansetron (ZOFRAN) 4 MG Tab tablet Take 1 Tab by mouth every four hours as needed for Nausea/Vomiting. 20 Tab 3   • loperamide (IMODIUM) 2 MG Cap Take 1 Cap by mouth 4 times a day as needed for Diarrhea. 30 Cap 0   • omeprazole (PRILOSEC) 20 MG delayed-release capsule TAKE 1 CAP BY MOUTH EVERY DAY. 30 Cap 3   • ELIQUIS 5 MG Tab TAKE 1 TAB BY MOUTH 2 TIMES A DAY. 60 Tab 6   • RESTASIS 0.05 % ophthalmic emulsion      • losartan-hydrochlorothiazide (HYZAAR) 50-12.5 MG per tablet Take 1 Tab by mouth every day. 30 Tab 0   • sennosides-docusate sodium (SENOKOT-S) 8.6-50 MG tablet Take 1 Tab by mouth 2 times a day. 60 Tab 3     No current facility-administered medications on file prior to visit.        Review of Systems   Constitutional: Positive for weight loss (appetite is better and she is eating. ). Negative for chills, fever and malaise/fatigue.   Respiratory: Negative for cough, shortness of breath and wheezing.    Cardiovascular: Negative for chest pain, palpitations and leg swelling.   Gastrointestinal: Positive for diarrhea (very mild but relieved with Imodium). Negative for constipation, nausea and vomiting.   Genitourinary: Negative for dysuria.   Musculoskeletal: Negative for myalgias.   Skin: Negative for itching and rash.   Neurological: Negative for dizziness and headaches.          Objective:     /70   Pulse 78   Temp 36.7 °C (98 °F)   Resp 18   Ht 1.549 m (5' 1\")   Wt 62.1 kg (137 lb)   SpO2 99%   Breastfeeding? No   BMI 25.89 kg/m²      Physical Exam   Constitutional: She is oriented to person, place, and time. She appears well-developed and well-nourished. No distress.   HENT:   Head: Normocephalic and atraumatic.   Alopecia   Eyes: Conjunctivae and EOM are " normal. Pupils are equal, round, and reactive to light. Right eye exhibits no discharge. Left eye exhibits no discharge. No scleral icterus.   Cardiovascular: Normal rate, regular rhythm, normal heart sounds and intact distal pulses.  Exam reveals no gallop and no friction rub.    No murmur heard.  Pulmonary/Chest: Effort normal and breath sounds normal. No respiratory distress. She has no wheezes.   Abdominal: Soft. Bowel sounds are normal. She exhibits no distension. There is no tenderness.   Musculoskeletal: Normal range of motion. She exhibits no edema or tenderness.   Neurological: She is alert and oriented to person, place, and time.   Skin: Skin is warm and dry. No rash noted. She is not diaphoretic. No erythema. No pallor.   Psychiatric: She has a normal mood and affect. Her behavior is normal.   Vitals reviewed.         Hospital Outpatient Visit on 04/23/2018   Component Date Value Ref Range Status   • WBC 04/23/2018 7.3  4.8 - 10.8 K/uL Final   • RBC 04/23/2018 4.12* 4.20 - 5.40 M/uL Final   • Hemoglobin 04/23/2018 12.8  12.0 - 16.0 g/dL Final   • Hematocrit 04/23/2018 40.1  37.0 - 47.0 % Final   • MCV 04/23/2018 97.3  81.4 - 97.8 fL Final   • MCH 04/23/2018 31.1  27.0 - 33.0 pg Final   • MCHC 04/23/2018 31.9* 33.6 - 35.0 g/dL Final   • RDW 04/23/2018 52.5* 35.9 - 50.0 fL Final   • Platelet Count 04/23/2018 166  164 - 446 K/uL Final   • MPV 04/23/2018 10.2  9.0 - 12.9 fL Final   • Neutrophils-Polys 04/23/2018 65.30  44.00 - 72.00 % Final   • Lymphocytes 04/23/2018 29.80  22.00 - 41.00 % Final   • Monocytes 04/23/2018 2.50  0.00 - 13.40 % Final   • Eosinophils 04/23/2018 1.20  0.00 - 6.90 % Final   • Basophils 04/23/2018 0.60  0.00 - 1.80 % Final   • Immature Granulocytes 04/23/2018 0.60  0.00 - 0.90 % Final   • Nucleated RBC 04/23/2018 0.00  /100 WBC Final   • Neutrophils (Absolute) 04/23/2018 4.74  2.00 - 7.15 K/uL Final   • Lymphs (Absolute) 04/23/2018 2.16  1.00 - 4.80 K/uL Final   • Monos (Absolute)  04/23/2018 0.18  0.00 - 0.85 K/uL Final   • Eos (Absolute) 04/23/2018 0.09  0.00 - 0.51 K/uL Final   • Baso (Absolute) 04/23/2018 0.04  0.00 - 0.12 K/uL Final   • Immature Granulocytes (abs) 04/23/2018 0.04  0.00 - 0.11 K/uL Final   • NRBC (Absolute) 04/23/2018 0.00  K/uL Final   • Sodium 04/23/2018 135  135 - 145 mmol/L Final   • Potassium 04/23/2018 3.8  3.6 - 5.5 mmol/L Final   • Chloride 04/23/2018 108  96 - 112 mmol/L Final   • Co2 04/23/2018 21  20 - 33 mmol/L Final   • Anion Gap 04/23/2018 6.0  0.0 - 11.9 Final   • Glucose 04/23/2018 102* 65 - 99 mg/dL Final   • Bun 04/23/2018 13  8 - 22 mg/dL Final   • Creatinine 04/23/2018 0.80  0.50 - 1.40 mg/dL Final   • Calcium 04/23/2018 8.8  8.5 - 10.5 mg/dL Final   • AST(SGOT) 04/23/2018 19  12 - 45 U/L Final   • ALT(SGPT) 04/23/2018 15  2 - 50 U/L Final   • Alkaline Phosphatase 04/23/2018 94  30 - 99 U/L Final   • Total Bilirubin 04/23/2018 0.6  0.1 - 1.5 mg/dL Final   • Albumin 04/23/2018 3.2  3.2 - 4.9 g/dL Final   • Total Protein 04/23/2018 6.4  6.0 - 8.2 g/dL Final   • Globulin 04/23/2018 3.2  1.9 - 3.5 g/dL Final   • A-G Ratio 04/23/2018 1.0  g/dL Final   • GFR If  04/23/2018 >60  >60 mL/min/1.73 m 2 Final   • GFR If Non  04/23/2018 >60  >60 mL/min/1.73 m 2 Final          Assessment/Plan:     1. Ampullary carcinoma (CMS-HCC)       Plan  1. Patient is doing very well after getting back on treatment with 5FU and Liposomal irintoecan. She did have labs completed today and I did review her CBC and CMP which look very good. Patient denies any significant side effects. She has had some very mild diarrhea but it is resolved and controlled well with Imodium only as needed. She is having normal formed bowel movements. She is gaining strength and able to ambulate without any assistive devices and stated her fatigue is very minimal.    2. Patient did very well after getting back on treatment. She is to continue with treatment as planned. She  will follow-up in the clinic in 3 weeks or sooner if needed.

## 2018-04-26 NOTE — NON-PROVIDER
Elin Poole is a 72 y.o. female here for a non-provider visit for: Lab Draws  on 4/26/2018 at 10:34 AM    Procedure Performed: Peripheral IV    Anatomical site: Right Antecubital Area (AC)    Equipment used: 21g Butterfly     Labs drawn: CBC w/diff and CMP    Ordering Provider: Dr. Claude Ortiz By: Mireya Hunter, Med Ass't

## 2018-04-29 NOTE — PROGRESS NOTES
"Pharmacy Chemotherapy Calculations Note:  CHEMOTHERAPY CANCELLED FOR NEUTROPENIA / URI    Patient Name: Elin Poole  DX: metastatic pancreatic cancer (ampulla of wilfred)      Previous treatment: C3 = 4/15/18    *Dosing Reference*  Liposomal Irinotecan (Onivyde) 70 mg/m2 IV over 90 minutes Day 1   4/15/18 Pao LLANOS reduced dose to 60 mg/m2 starting with C3 due to tolerance,   then   Leucovorin 400 mg/m2 IV over 30 minutes Day 1, followed by    Fluorouracil 2400 mg/m2 CIVI over 46 hours Days 1-2       4/15/18 Pao LLANOS reduced dose to 1800 mg/m2 starting with C3 due to tolerance  14 day cycles until DP or UT   Clare GARZA, et al. (AMBROSE-1) Lancet. 2016;387(23889):071-79    Allergies: Patient has no known allergies.    /69   Pulse (!) 103   Temp 36.6 °C (97.8 °F)   Resp 18   Ht 1.53 m (5' 0.24\")   Wt 62.8 kg (138 lb 7.2 oz)   SpO2 97%   BMI 26.83 kg/m²  Body surface area is 1.63 meters squared.     ANC~ 850 Plt = 214k   Hgb = 12.6     SCr = 0.77mg/dL CrCl ~ 65mL/min   LFT's = wml TBili = 0.4      = 0mg IV     = 0mg IV     = 0mg    CIVI over 46 hours via CADD pump at 0mL/hr.  Cathi Balderas, PharmD    "

## 2018-04-30 NOTE — PROGRESS NOTES
Phone call received from ELISE Galvan.  Pt's treatment will be deferred due to neutropenia and pt not feeling well.  Office to call pt to reschedule chemotherapy appointment.

## 2018-04-30 NOTE — PROGRESS NOTES
Elin Poole is a 72 y.o. female here for a non-provider visit for: Lab Draws  on 4/30/2018 at 9:43 AM    Procedure Performed: Venipuncture     Anatomical site: Left Antecubital Area (AC)    Equipment used: 21g Butterfly     Labs drawn: CBC w/diff and CMP    Ordering Provider: ELISE Mancilla (Dr. Mabry in office)    Angel By: Tamika Reich, Shawn Ass't  No complications

## 2018-04-30 NOTE — PROGRESS NOTES
Subjective:      Elin Poole is a 72 y.o. female who presents for Follow-Up for ampullary carcinoma.         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 She is accompanied by her son for today's visit. Patient is scheduled to receive cycle #4 of liposomal irinotecan with 5FU today.    Patient had significant diarrhea and C. difficile after her first 2 doses of treatment. She was given time off to recover from her side effects and restarted on treatment 2 weeks ago. She tolerated her treatment well. Patient stated on Friday night she developed a scratchy throat which turned into an upper respiratory infection over the weekend. She is having some coughing with clear production of sputum. She is also having clear nasal drainage. She denies any fevers or chills. She also has some mild fatigue but it is tolerable. Patient's appetite has been stable and she'll weight is stable. She denies any shortness of breath or wheezing. She denies any chest pain, palpitations or swelling in her legs. She denies any nausea, vomiting, diarrhea or constipation. She stated she had one episode of diarrhea since seen last week but it was slightly soft and watery. She did have a normal formed bowel movement this morning. Patient is voiding without difficulty and denies any pain, dizziness or headaches.      No Known Allergies  Current Outpatient Prescriptions on File Prior to Visit   Medication Sig Dispense Refill   • potassium bicarbonate (KLYTE) 25 MEQ tablet Take 1 Tab by mouth 2 times a day. 60 Tab 11   • omeprazole (PRILOSEC) 20 MG delayed-release capsule TAKE 1 CAP BY MOUTH EVERY DAY. 30 Cap 3   • ELIQUIS 5 MG Tab TAKE 1 TAB BY MOUTH 2 TIMES A DAY. 60 Tab 6   • losartan-hydrochlorothiazide (HYZAAR) 50-12.5 MG per tablet Take 1 Tab by mouth every day. 30 Tab 0   • prochlorperazine (COMPAZINE) 10 MG Tab Take 1 Tab by mouth every 6 hours as needed (for nausea, vomiting). 30 Tab 6   • megestrol (MEGACE) 400  "MG/10ML Suspension Take 20 mL by mouth every day. 600 mL 1   • ondansetron (ZOFRAN) 4 MG Tab tablet Take 1 Tab by mouth every four hours as needed for Nausea/Vomiting. 20 Tab 3   • loperamide (IMODIUM) 2 MG Cap Take 1 Cap by mouth 4 times a day as needed for Diarrhea. 30 Cap 0   • RESTASIS 0.05 % ophthalmic emulsion      • sennosides-docusate sodium (SENOKOT-S) 8.6-50 MG tablet Take 1 Tab by mouth 2 times a day. 60 Tab 3     No current facility-administered medications on file prior to visit.        Review of Systems   Constitutional: Positive for malaise/fatigue. Negative for chills, fever and weight loss.   HENT: Positive for congestion (nasal congestion with clear drainage). Negative for sore throat.    Respiratory: Positive for cough and sputum production (clear sputum). Negative for hemoptysis, shortness of breath and wheezing.    Gastrointestinal: Negative for constipation, diarrhea, nausea and vomiting.   Genitourinary: Negative for dysuria.   Musculoskeletal: Negative for myalgias.   Neurological: Negative for dizziness and headaches.          Objective:     /70   Pulse (!) 102   Temp 36.6 °C (97.8 °F)   Resp 16   Ht 1.549 m (5' 1\")   Wt 62.5 kg (137 lb 14.4 oz)   SpO2 98%   BMI 26.06 kg/m²      Physical Exam   Constitutional: She is oriented to person, place, and time. She appears well-developed and well-nourished. No distress.   HENT:   Head: Normocephalic and atraumatic.   Mouth/Throat: Oropharynx is clear and moist.   Eyes: Conjunctivae and EOM are normal. Pupils are equal, round, and reactive to light. Right eye exhibits no discharge. Left eye exhibits no discharge. No scleral icterus.   Neck: Normal range of motion. Neck supple. No thyromegaly present.   Cardiovascular: Regular rhythm, normal heart sounds and intact distal pulses.  Exam reveals no gallop and no friction rub.    No murmur heard.  Slightly tachycardic at 102   Pulmonary/Chest: Effort normal and breath sounds normal. No " respiratory distress. She has no wheezes.   Abdominal: Soft. Bowel sounds are normal. She exhibits no distension. There is no tenderness.   Musculoskeletal: Normal range of motion. She exhibits no edema or tenderness.   Lymphadenopathy:        Head (right side): No submental, no submandibular, no tonsillar, no preauricular, no posterior auricular and no occipital adenopathy present.        Head (left side): No submental, no submandibular, no tonsillar, no preauricular, no posterior auricular and no occipital adenopathy present.     She has no cervical adenopathy.        Right cervical: No superficial cervical, no deep cervical and no posterior cervical adenopathy present.       Left cervical: No superficial cervical, no deep cervical and no posterior cervical adenopathy present.        Right: No supraclavicular adenopathy present.        Left: No supraclavicular adenopathy present.   Neurological: She is alert and oriented to person, place, and time.   Skin: Skin is warm and dry. No rash noted. She is not diaphoretic. No erythema. No pallor.   Psychiatric: She has a normal mood and affect. Her behavior is normal.   Vitals reviewed.            Assessment/Plan:     1. Ampullary carcinoma (HCC)  CBC WITH DIFFERENTIAL    COMP METABOLIC PANEL     Plan  1. Patient with papillary carcinoma scheduled to receive cycle #4 of 5-FU and liposomal Irinotecan today. She did develop a mild upper rest for infection over the weekend but states she is feeling good and ready to proceed with treatment today. She denies any fevers and has clear production of sputum and nasal drainage. I did discuss with Dr. Duran, as Dr. Cedeno's the office today and he agreed to go ahead and proceed with treatment as planned. If labs meet parameters then okay to proceed with treatment today. A CBC and CMP has been completed and awaiting results.    2. Patient to follow-up again in 2 weeks or sooner if needed. She is to contact the office if she has  significant worsening of her upper respiratory infection.

## 2018-04-30 NOTE — TELEPHONE ENCOUNTER
Spoke to Ms. Poole regarding her current ANC level. Reviewed neutropenic precautions per the chemotherapy education handout. Further educated to monitor for any fever of 100.4 or higher and to call our clinic immediately. Patient states that she just took her temperature and it was 97.0, but agrees to continue to monitor and call. Furthermore, confirmed peripheral lab draw and chemotherapy appointment on 5/9 and 5/10. In regards to her potassium level, patient states that she is taking two 10 mEq tabs in the AM. Per ELISE Mancilla, patient is to increase this to 2 tabs in the AM and 2 tabs in the PM as her potassium level is currently 3.3. We will reassess her level next week at her next appointment. Patient verbally read back new prescription instructions and denies any questions or concerns.

## 2018-05-03 NOTE — TELEPHONE ENCOUNTER
Called patient on her home line to follow up on her upper respiratory symptoms.  Patient has cough with wheezing.  She denies fever, denies chest pain.  She has had a change in sputum production from clear to green.  Per ELISE Mancilla, orders placed for chest xray.  Provided pt education on indication for chest xray and informed her of [facility].  She states her son gets off work at 4pm today and she will have him take her to get the xray this evening or tomorrow morning.  RN informed her our office will call her with the results.  She agreed with plan of care.

## 2018-05-03 NOTE — TELEPHONE ENCOUNTER
Called patient to follow up on her upper respiratory infection.  Left voicemail for patient requesting call back to RN at [phone].

## 2018-05-04 NOTE — PROGRESS NOTES
Elin Poole is a 72 y.o. female here for a non-provider visit for: Lab Draws  on 5/4/2018 at 4:09 PM    Procedure Performed: Venipuncture     Anatomical site: Right Antecubital Area (AC)    Equipment used: 23g Butterfly    Labs drawn: CBC w/diff    Ordering Provider: ELISE Mancilla    Angel By: Cherrie Turcios, Med Ass't  1 re-stick with a 23g butterfly on left ac- successful and no complications.   was present in the office the entire time I was doing the patients blood draw.

## 2018-05-04 NOTE — PROGRESS NOTES
Reviewed patient's lab results with ELSIE Nolen.  Pt's ANC is 3.37.  Patient denies fever.  Pt notified of results and informed that antibiotics are not needed at this time. Pt will call if she develops fever or worsening symptoms.

## 2018-05-04 NOTE — TELEPHONE ENCOUNTER
Patient was able to complete her chest x-ray today which shows no consolidative pneumonia. Patient did have ANC of 850 on Monday. Her chemotherapy was held due to ANC. She does still have a cough with green sputum production. She stated she is coughing up sputum from her throat. She denies any fevers within the last week. I will have patient come in to the office tomorrow for repeat CBC to evaluate ANC to determine if antibiotics are needed. I did speak with Dr. Cedeno who is in agreement with the plan. Patient has been made aware of her chest x-ray results and is aware of the lab appointment for tomorrow morning.       Dx-chest-2 Views    Result Date: 5/3/2018  5/3/2018 4:32 PM HISTORY/REASON FOR EXAM:  Cough. History of cancer and chemotherapy. Low platelets. TECHNIQUE/EXAM DESCRIPTION AND NUMBER OF VIEWS: Two views of the chest. COMPARISON:  1/11/2017 FINDINGS: The heart is normal in size. No pulmonary infiltrates or consolidations are noted. No pleural effusions are appreciated. Right trans-subclavian line tip overlies the SVC.     No consolidative pneumonia identified.

## 2018-05-08 NOTE — TELEPHONE ENCOUNTER
Added prechemo appointment tomorrow at 1300 with Dr. Cedeno per his request. Changed lab appointment to 1130. Unable to reach patient on all listed numbers. Spoke to patient's son Sea (OK to discuss per chart) regarding appointment time changes. Sea states that he will be seeing his mother later this afternoon and will relay the message.

## 2018-05-08 NOTE — PROGRESS NOTES
Anticoagulation Summary  As of 5/8/2018    INR goal:      TTR:   --   Today's INR:   1.3   Warfarin maintenance plan:   No maintenance plan   Plan last modified:   Barry Leal, PharmD (5/8/2018)   Next INR check:   11/5/2018   Target end date:   Indefinite    Indications    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]  Deep vein thrombosis (CMS-HCC) [I82.409] [I82.409]  Long term (current) use of anticoagulants [Z79.01] [Z79.01]             Anticoagulation Episode Summary     INR check location:   [facility]    Preferred lab:       Send INR reminders to:       Comments:   DOAC      Anticoagulation Care Providers     Provider Role Specialty Phone number    [facility] Responsible  [phone]    Wally Knox D.O. Responsible Family Medicine [phone]    Talya Krause M.D. Responsible Oncology [phone]        Anticoagulation Patient Findings    Health Status Since Last Assessment  Any new relevant medical problems, ED visits/hospitalizations, no  Any embolic events (stroke / TIA / systemic embolism), no    Adherence with DOAC Therapy  1 or more missed doses in an average week , no  • If yes, number of missed doses , no  BLEEDING RISK ASSESSMENT NB:    Bleeding Risk Assessment  Severe epistaxis  , no Hemoptysis  , no  Excessive or unusual bruising / hematomas , no  GIB / melena / BRBPR / hematemesis  , no  Hematuria? Abnormal vaginal bleeding  , no  Concerning daily headache or subdural hematoma symptoms  , no  Decreasing hemoglobin or new anemia  , no  Latest hemoglobin:     Lab Results   Component Value Date/Time    WBC 5.5 05/04/2018 10:45 AM    RBC 3.90 (L) 05/04/2018 10:45 AM    HEMOGLOBIN 11.9 (L) 05/04/2018 10:45 AM    HEMATOCRIT 36.5 (L) 05/04/2018 10:45 AM    MCV 93.6 05/04/2018 10:45 AM    MCH 30.5 05/04/2018 10:45 AM    MCHC 32.6 (L) 05/04/2018 10:45 AM    MPV 9.5 05/04/2018 10:45 AM    NEUTSPOLYS 58.80 05/04/2018 10:45 AM    LYMPHOCYTES 26.10 05/04/2018 10:45 AM     MONOCYTES 8.40 05/04/2018 10:45 AM    EOSINOPHILS 3.40 05/04/2018 10:45 AM    BASOPHILS 0.00 05/04/2018 10:45 AM    ANISOCYTOSIS 1+ 05/04/2018 10:45 AM        EtOH overuse  , no  Falls, presyncope, syncope, or seizures  , no  Uncontrolled hypertension   CREATININE CLEARANCE /    Creatinine Clearance/Renal Function  Latest eGFR / creatinine:  Lab Results   Component Value Date/Time    SODIUM 136 04/30/2018 09:39 AM    POTASSIUM 3.3 (L) 04/30/2018 09:39 AM    CHLORIDE 108 04/30/2018 09:39 AM    CO2 21 04/30/2018 09:39 AM    GLUCOSE 95 04/30/2018 09:39 AM    BUN 16 04/30/2018 09:39 AM    CREATININE 0.77 04/30/2018 09:39 AM      • Is eGFR less than 50ml/min  ,wnl  If YES, calculate CrCl (see back)  Any recent dehydrating illness or medications added/changed? i.e. Diuretics      Drug Interactions  ASA / other antiplatelets., no  NSAID , no  Other drug interactions  (Review med list / OTCs;)  Current Outpatient Prescriptions on File Prior to Visit   Medication Sig Dispense Refill   • potassium bicarbonate (KLYTE) 25 MEQ tablet Take 1 Tab by mouth 2 times a day. 60 Tab 11   • prochlorperazine (COMPAZINE) 10 MG Tab Take 1 Tab by mouth every 6 hours as needed (for nausea, vomiting). 30 Tab 6   • megestrol (MEGACE) 400 MG/10ML Suspension Take 20 mL by mouth every day. 600 mL 1   • ondansetron (ZOFRAN) 4 MG Tab tablet Take 1 Tab by mouth every four hours as needed for Nausea/Vomiting. 20 Tab 3   • loperamide (IMODIUM) 2 MG Cap Take 1 Cap by mouth 4 times a day as needed for Diarrhea. 30 Cap 0   • omeprazole (PRILOSEC) 20 MG delayed-release capsule TAKE 1 CAP BY MOUTH EVERY DAY. 30 Cap 3   • ELIQUIS 5 MG Tab TAKE 1 TAB BY MOUTH 2 TIMES A DAY. 60 Tab 6   • RESTASIS 0.05 % ophthalmic emulsion      • losartan-hydrochlorothiazide (HYZAAR) 50-12.5 MG per tablet Take 1 Tab by mouth every day. 30 Tab 0   • sennosides-docusate sodium (SENOKOT-S) 8.6-50 MG tablet Take 1 Tab by mouth 2 times a day. 60 Tab 3     No current  facility-administered medications on file prior to visit.        Examination  Blood pressure:        Final Assessment and Recommendations:  Patient appears stable from the anticoagulation standpoint  Benefits of continued DOAC therapy outweigh risks for this patient  Recommend continue current DOAC at same dose  K was low, but the pcp has already addressed it    Follow up:  Will follow up with patient in  24  weeks

## 2018-05-09 NOTE — PROGRESS NOTES
Date of visit: 5/9/2018  12:35 PM      Chief Complaint-       Identification/Prior relevant history: Elin Poole  is a 72 y.o. year old female who is here for follow-up of    Interim history    Past Medical History:      Past Medical History:   Diagnosis Date   • Ampullary carcinoma (HCC)    • Arthritis     hands/fingers/right knee   • Asthma    • Cancer (HCC) 2016    Ampullary CA   • Cataract 09-    bilat.,no surgery   • Dental disorder     upper partial   • Encounter for antineoplastic chemotherapy 4/6/2018   • Heart burn    • Hypertension    • Indigestion    • Jaundice        Past surgical history:       Past Surgical History:   Procedure Laterality Date   • CATH PLACEMENT Right 9/9/2016    Procedure: CATH PLACEMENT cephalic power port  ;  Surgeon: Kurtis Jacobs M.D.;  Location: SURGERY [facility];  Service:    • WHIPPLE PROCEDURE  8/15/2016    Procedure: Exploratoy Laparotomy, Da-en-y;  Surgeon: Kurtis Jacobs M.D.;  Location: SURGERY [facility];  Service:    • NODE DISSECTION  8/15/2016    Procedure: omental flap, Liver Wedge, cholecystectomy ;  Surgeon: Kurtis Jacobs M.D.;  Location: SURGERY [facility];  Service:    • STENT PLACEMENT  7/2016    gallbladder   • CHOLECYSTECTOMY         Allergies:       Patient has no known allergies.    Medications:         Current Outpatient Prescriptions   Medication Sig Dispense Refill   • potassium bicarbonate (KLYTE) 25 MEQ tablet Take 1 Tab by mouth 2 times a day. 60 Tab 11   • prochlorperazine (COMPAZINE) 10 MG Tab Take 1 Tab by mouth every 6 hours as needed (for nausea, vomiting). 30 Tab 6   • megestrol (MEGACE) 400 MG/10ML Suspension Take 20 mL by mouth every day. 600 mL 1   • ondansetron (ZOFRAN) 4 MG Tab tablet Take 1 Tab by mouth every four hours as needed for Nausea/Vomiting. 20 Tab 3   • loperamide (IMODIUM) 2 MG Cap Take 1 Cap by mouth 4 times a day as needed for Diarrhea. 30 Cap 0   • omeprazole  (PRILOSEC) 20 MG delayed-release capsule TAKE 1 CAP BY MOUTH EVERY DAY. 30 Cap 3   • ELIQUIS 5 MG Tab TAKE 1 TAB BY MOUTH 2 TIMES A DAY. 60 Tab 6   • RESTASIS 0.05 % ophthalmic emulsion      • losartan-hydrochlorothiazide (HYZAAR) 50-12.5 MG per tablet Take 1 Tab by mouth every day. 30 Tab 0   • sennosides-docusate sodium (SENOKOT-S) 8.6-50 MG tablet Take 1 Tab by mouth 2 times a day. 60 Tab 3     No current facility-administered medications for this visit.          Social History:     Social History     Social History   • Marital status:      Spouse name: N/A   • Number of children: N/A   • Years of education: N/A     Occupational History   • Not on file.     Social History Main Topics   • Smoking status: Never Smoker   • Smokeless tobacco: Never Used   • Alcohol use No   • Drug use: No   • Sexual activity: Not on file     Other Topics Concern   • Not on file     Social History Narrative   • No narrative on file       Family History:    No family history on file.    Review of Systems:  All other review of systems are negative except what was mentioned above in the HPI.    Constitutional: Negative for fever, chills, weight loss and malaise/fatigue.    HEENT: No new auditory or visual complaints. No sore throat and neck pain.     Respiratory: Negative for cough, sputum production, shortness of breath and wheezing.    Cardiovascular: Negative for chest pain, palpitations, orthopnea and leg swelling.    Gastrointestinal: Negative for heartburn, nausea, vomiting and abdominal pain.    Genitourinary: Negative for dysuria, hematuria    Musculoskeletal: No new arthralgias or myalgias   Skin: Negative for rash and itching.    Neurological: Negative for focal weakness and headaches.    Endo/Heme/Allergies: No abnormal bleed/bruise.    Psychiatric/Behavioral: No new depression/anxiety.    Physical Exam:  Vitals: There were no vitals taken for this visit.    General: Not in acute distress, alert and oriented x 3  HEENT:  No pallor, icterus. Oropharynx clear.   Neck: Supple, no palpable masses.  Lymph nodes: No palpable cervical, supraclavicular, axillary or inguinal lymphadenopathy.    CVS: regular rate and rhythm, no rubs or gallops  RESP: Clear to auscultate bilaterally, no wheezing or crackles.   ABD: Soft, non tender, non distended, positive bowel sounds, no palpable organomegaly  EXT: No edema or cyanosis  CNS: Alert and oriented x3, No focal deficits.  Skin- No rash      Labs:   Hospital Outpatient Visit on 05/09/2018   Component Date Value Ref Range Status   • WBC 05/09/2018 7.1  4.8 - 10.8 K/uL Final   • RBC 05/09/2018 3.75* 4.20 - 5.40 M/uL Final   • Hemoglobin 05/09/2018 11.6* 12.0 - 16.0 g/dL Final   • Hematocrit 05/09/2018 35.5* 37.0 - 47.0 % Final   • MCV 05/09/2018 94.7  81.4 - 97.8 fL Final   • MCH 05/09/2018 30.9  27.0 - 33.0 pg Final   • MCHC 05/09/2018 32.7* 33.6 - 35.0 g/dL Final   • RDW 05/09/2018 53.1* 35.9 - 50.0 fL Final   • Platelet Count 05/09/2018 344  164 - 446 K/uL Final   • MPV 05/09/2018 9.5  9.0 - 12.9 fL Final   • Neutrophils-Polys 05/09/2018 52.30  44.00 - 72.00 % Final   • Lymphocytes 05/09/2018 37.30  22.00 - 41.00 % Final   • Monocytes 05/09/2018 7.80  0.00 - 13.40 % Final   • Eosinophils 05/09/2018 1.10  0.00 - 6.90 % Final   • Basophils 05/09/2018 0.40  0.00 - 1.80 % Final   • Immature Granulocytes 05/09/2018 1.10* 0.00 - 0.90 % Final   • Nucleated RBC 05/09/2018 0.00  /100 WBC Final   • Neutrophils (Absolute) 05/09/2018 3.73  2.00 - 7.15 K/uL Final    Includes immature neutrophils, if present.   • Lymphs (Absolute) 05/09/2018 2.66  1.00 - 4.80 K/uL Final   • Monos (Absolute) 05/09/2018 0.56  0.00 - 0.85 K/uL Final   • Eos (Absolute) 05/09/2018 0.08  0.00 - 0.51 K/uL Final   • Baso (Absolute) 05/09/2018 0.03  0.00 - 0.12 K/uL Final   • Immature Granulocytes (abs) 05/09/2018 0.08  0.00 - 0.11 K/uL Final   • NRBC (Absolute) 05/09/2018 0.00  K/uL Final   Anticoagulation Visit on 05/08/2018    Component Date Value Ref Range Status   • INR 05/08/2018 1.3   Final   Hospital Outpatient Visit on 05/04/2018   Component Date Value Ref Range Status   • WBC 05/04/2018 5.5  4.8 - 10.8 K/uL Final   • RBC 05/04/2018 3.90* 4.20 - 5.40 M/uL Final   • Hemoglobin 05/04/2018 11.9* 12.0 - 16.0 g/dL Final   • Hematocrit 05/04/2018 36.5* 37.0 - 47.0 % Final   • MCV 05/04/2018 93.6  81.4 - 97.8 fL Final   • MCH 05/04/2018 30.5  27.0 - 33.0 pg Final   • MCHC 05/04/2018 32.6* 33.6 - 35.0 g/dL Final   • RDW 05/04/2018 52.6* 35.9 - 50.0 fL Final   • Platelet Count 05/04/2018 248  164 - 446 K/uL Final   • MPV 05/04/2018 9.5  9.0 - 12.9 fL Final   • Nucleated RBC 05/04/2018 0.00  /100 WBC Final   • NRBC (Absolute) 05/04/2018 0.00  K/uL Final   • Neutrophils-Polys 05/04/2018 58.80  44.00 - 72.00 % Final   • Lymphocytes 05/04/2018 26.10  22.00 - 41.00 % Final   • Monocytes 05/04/2018 8.40  0.00 - 13.40 % Final   • Eosinophils 05/04/2018 3.40  0.00 - 6.90 % Final   • Basophils 05/04/2018 0.00  0.00 - 1.80 % Final   • Neutrophils (Absolute) 05/04/2018 3.37  2.00 - 7.15 K/uL Final    Includes immature neutrophils, if present.   • Lymphs (Absolute) 05/04/2018 1.44  1.00 - 4.80 K/uL Final   • Monos (Absolute) 05/04/2018 0.46  0.00 - 0.85 K/uL Final   • Eos (Absolute) 05/04/2018 0.19  0.00 - 0.51 K/uL Final   • Baso (Absolute) 05/04/2018 0.00  0.00 - 0.12 K/uL Final   • Anisocytosis 05/04/2018 1+   Final   • Macrocytosis 05/04/2018 1+   Final   • Bands-Stabs 05/04/2018 2.50  0.00 - 10.00 % Final   • Myelocytes 05/04/2018 0.80  % Final   • Manual Diff Status 05/04/2018 PERFORMED   Final   • Peripheral Smear Review 05/04/2018 see below   Final    Comment: Due to instrument suspect flags, further review of peripheral smear is  indicated on this patient sample. This review may or may not result in  abnormal findings.     • Plt Estimation 05/04/2018 Normal   Final   • RBC Morphology 05/04/2018 Present   Final   • Poikilocytosis 05/04/2018 1+    Final   • Ovalocytes 05/04/2018 1+   Final             Assessment and Plan:      She agreed and verbalized  agreement and understanding with the current plan.  I answered all questions and concerns at this time         Please note that this dictation was created using voice recognition software. I have made every reasonable attempt to correct obvious errors, but I expect that there are errors of grammar and possibly content that I did not discover before finalizing the note.      SIGNATURES:  Claude Cedeno    CC:  Wally Knox D.O.  No ref. provider found

## 2018-05-09 NOTE — PROGRESS NOTES
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  She was diagnosed due to elevated liver enzymes and acute renal insufficiency.  She is s/p ERCP with stent with good decompression. Biopsy was positive for adenocarcinoma of primary ampulla. She was seen by Dr. Jacobs and underwent an attempted Whipple’s in 8/2016. The surgery was aborted secondary to multiple liver lesions and s/p wedge resection of the liver which was positive for poorly differentiated adenocarcinoma.  PET scan showed uptake in multiple hepatic lesions and uptake in the ampulla. He also had uptake in bilateral hilar area with increased uptake and small pulmonary nodules in the right middle lobe without uptake. She was treated with gemcitabine and Abraxane. She tolerated the treatment fairly well initially. After cycle 3 of chemotherapy she started to have increased fatigue and diarrhea. Repeat imaging showed response to therapy. The diarrhea improve and her regimen was changed to single agent gemcitabine. She received chemotherapy on 1/18/17. Further cycles have been held secondary to increased fatigue.   3/20/17 CT CAP showed thrombus seen in the right external iliac and common femoral vein. Previous foci in the liver are no longer seen and no residual or recurrent mass seen in the surgical bed.  She was started on Eliquis.     -11/1/17-interim CT scan shows disease progression with a new enhancing mass in the uncinate process in. Ampullary area with some mass effect on the distal CBD. There is also new adenopathy surrounding the mass. new tumor thrombus in the posterior SMV with 180 degrees abutment of the adjacent mass     -11 /15/2017- started gemcitabine and Abraxane alternate week with good tolerance.     -2/5/18-CT abdomen, pelvis-Increased size of enhancing mass in  the uncinate process and periampullary region which currently measures 4.0 x 4.4 cm compared to 3.6 x 4.1 cm., slight decrease in the adenopathy anterior to the pancreas, slight increase in aortocaval adenopathy. Increased nonocclusive thrombus in the SMV.     -Started 5FU/Onvyde       she tolerated the first dose well. With the second dose she had significant diarrhea and fatigue. She is still feeling slightly weak.  3/27/18-CT scan2.5 cm pancreatic head/and cystic process mass previously measured 4.4 cm. Treatment response.  Decrease in size of peripancreatic and upper abdomen retroperitoneal lymph nodes/treatment response.  Dilatation of the common duct and pneumobilia again demonstrated.  The superior mesenteric artery and superior mesenteric veins no longer appear encased. No thrombus within the superior mesenteric vein currently demonstrated.     NGS testing-I    nterim history-she required significant dose modification and have better tolerance to treatment last time.        Past Medical History:      Past Medical History:   Diagnosis Date   • Ampullary carcinoma (HCC)    • Arthritis     hands/fingers/right knee   • Asthma    • Cancer (HCC) 2016    Ampullary CA   • Cataract 09-    bilat.,no surgery   • Dental disorder     upper partial   • Encounter for antineoplastic chemotherapy 4/6/2018   • Heart burn    • Hypertension    • Indigestion    • Jaundice        Past surgical history:       Past Surgical History:   Procedure Laterality Date   • CATH PLACEMENT Right 9/9/2016    Procedure: CATH PLACEMENT cephalic power port  ;  Surgeon: Kurtis Jacobs M.D.;  Location: SURGERY [facility];  Service:    • WHIPPLE PROCEDURE  8/15/2016    Procedure: Exploratoy Laparotomy, Da-en-y;  Surgeon: Kurtis Jacobs M.D.;  Location: SURGERY [facility];  Service:    • NODE DISSECTION  8/15/2016    Procedure: omental flap, Liver Wedge, cholecystectomy ;  Surgeon: Kurtis Jacobs M.D.;   Location: SURGERY [facility];  Service:    • STENT PLACEMENT  7/2016    gallbladder   • CHOLECYSTECTOMY         Allergies:       Patient has no known allergies.    Medications:         Current Outpatient Prescriptions   Medication Sig Dispense Refill   • potassium bicarbonate (KLYTE) 25 MEQ tablet Take 1 Tab by mouth 2 times a day. 60 Tab 11   • omeprazole (PRILOSEC) 20 MG delayed-release capsule TAKE 1 CAP BY MOUTH EVERY DAY. 30 Cap 3   • ELIQUIS 5 MG Tab TAKE 1 TAB BY MOUTH 2 TIMES A DAY. 60 Tab 6   • RESTASIS 0.05 % ophthalmic emulsion      • losartan-hydrochlorothiazide (HYZAAR) 50-12.5 MG per tablet Take 1 Tab by mouth every day. 30 Tab 0   • prochlorperazine (COMPAZINE) 10 MG Tab Take 1 Tab by mouth every 6 hours as needed (for nausea, vomiting). 30 Tab 6   • megestrol (MEGACE) 400 MG/10ML Suspension Take 20 mL by mouth every day. 600 mL 1   • ondansetron (ZOFRAN) 4 MG Tab tablet Take 1 Tab by mouth every four hours as needed for Nausea/Vomiting. 20 Tab 3   • loperamide (IMODIUM) 2 MG Cap Take 1 Cap by mouth 4 times a day as needed for Diarrhea. 30 Cap 0   • sennosides-docusate sodium (SENOKOT-S) 8.6-50 MG tablet Take 1 Tab by mouth 2 times a day. 60 Tab 3     No current facility-administered medications for this visit.          Social History:     Social History     Social History   • Marital status:      Spouse name: N/A   • Number of children: N/A   • Years of education: N/A     Occupational History   • Not on file.     Social History Main Topics   • Smoking status: Never Smoker   • Smokeless tobacco: Never Used   • Alcohol use No   • Drug use: No   • Sexual activity: Not on file     Other Topics Concern   • Not on file     Social History Narrative   • No narrative on file       Family History:    No family history on file.    Review of Systems:  All other review of systems are negative except what was mentioned above in the HPI.    Constitutional: Negative for fever, chills, weight loss and  malaise/fatigue.    HEENT: No new auditory or visual complaints. No sore throat and neck pain.     Respiratory: Negative for cough, sputum production, shortness of breath and wheezing.    Cardiovascular: Negative for chest pain, palpitations, orthopnea and leg swelling.    Gastrointestinal: Negative for heartburn, nausea, vomiting and abdominal pain.    Genitourinary: Negative for dysuria, hematuria    Musculoskeletal: No new arthralgias or myalgias   Skin: Negative for rash and itching.    Neurological: Negative for focal weakness and headaches.    Endo/Heme/Allergies: No abnormal bleed/bruise.    Psychiatric/Behavioral: No new depression/anxiety.    Physical Exam:  Vitals: /62   Pulse 91   Temp 36.2 °C (97.1 °F)   Resp 16   Ht 1.524 m (5')   Wt 64 kg (141 lb 3.3 oz)   SpO2 96%   Breastfeeding? No   BMI 27.58 kg/m²     General: Not in acute distress, alert and oriented x 3  HEENT: No pallor, icterus. Oropharynx clear.   Neck: Supple, no palpable masses.  Lymph nodes: No palpable cervical, supraclavicular, axillary or inguinal lymphadenopathy.    CVS: regular rate and rhythm, no rubs or gallops  RESP: Clear to auscultate bilaterally, no wheezing or crackles.   ABD: Soft, non tender, non distended, positive bowel sounds, no palpable organomegaly  EXT: No edema or cyanosis  CNS: Alert and oriented x3, No focal deficits.  Skin- No rash      Labs:   Hospital Outpatient Visit on 05/09/2018   Component Date Value Ref Range Status   • WBC 05/09/2018 7.1  4.8 - 10.8 K/uL Final   • RBC 05/09/2018 3.75* 4.20 - 5.40 M/uL Final   • Hemoglobin 05/09/2018 11.6* 12.0 - 16.0 g/dL Final   • Hematocrit 05/09/2018 35.5* 37.0 - 47.0 % Final   • MCV 05/09/2018 94.7  81.4 - 97.8 fL Final   • MCH 05/09/2018 30.9  27.0 - 33.0 pg Final   • MCHC 05/09/2018 32.7* 33.6 - 35.0 g/dL Final   • RDW 05/09/2018 53.1* 35.9 - 50.0 fL Final   • Platelet Count 05/09/2018 344  164 - 446 K/uL Final   • MPV 05/09/2018 9.5  9.0 - 12.9 fL Final    • Neutrophils-Polys 05/09/2018 52.30  44.00 - 72.00 % Final   • Lymphocytes 05/09/2018 37.30  22.00 - 41.00 % Final   • Monocytes 05/09/2018 7.80  0.00 - 13.40 % Final   • Eosinophils 05/09/2018 1.10  0.00 - 6.90 % Final   • Basophils 05/09/2018 0.40  0.00 - 1.80 % Final   • Immature Granulocytes 05/09/2018 1.10* 0.00 - 0.90 % Final   • Nucleated RBC 05/09/2018 0.00  /100 WBC Final   • Neutrophils (Absolute) 05/09/2018 3.73  2.00 - 7.15 K/uL Final    Includes immature neutrophils, if present.   • Lymphs (Absolute) 05/09/2018 2.66  1.00 - 4.80 K/uL Final   • Monos (Absolute) 05/09/2018 0.56  0.00 - 0.85 K/uL Final   • Eos (Absolute) 05/09/2018 0.08  0.00 - 0.51 K/uL Final   • Baso (Absolute) 05/09/2018 0.03  0.00 - 0.12 K/uL Final   • Immature Granulocytes (abs) 05/09/2018 0.08  0.00 - 0.11 K/uL Final   • NRBC (Absolute) 05/09/2018 0.00  K/uL Final   • Sodium 05/09/2018 138  135 - 145 mmol/L Final   • Potassium 05/09/2018 3.8  3.6 - 5.5 mmol/L Final   • Chloride 05/09/2018 108  96 - 112 mmol/L Final   • Co2 05/09/2018 22  20 - 33 mmol/L Final   • Anion Gap 05/09/2018 8.0  0.0 - 11.9 Final   • Glucose 05/09/2018 89  65 - 99 mg/dL Final   • Bun 05/09/2018 9  8 - 22 mg/dL Final   • Creatinine 05/09/2018 0.78  0.50 - 1.40 mg/dL Final   • Calcium 05/09/2018 8.6  8.5 - 10.5 mg/dL Final   • AST(SGOT) 05/09/2018 14  12 - 45 U/L Final   • ALT(SGPT) 05/09/2018 9  2 - 50 U/L Final   • Alkaline Phosphatase 05/09/2018 89  30 - 99 U/L Final   • Total Bilirubin 05/09/2018 0.4  0.1 - 1.5 mg/dL Final   • Albumin 05/09/2018 2.9* 3.2 - 4.9 g/dL Final   • Total Protein 05/09/2018 6.2  6.0 - 8.2 g/dL Final   • Globulin 05/09/2018 3.3  1.9 - 3.5 g/dL Final   • A-G Ratio 05/09/2018 0.9  g/dL Final   • GFR If  05/09/2018 >60  >60 mL/min/1.73 m 2 Final   • GFR If Non  05/09/2018 >60  >60 mL/min/1.73 m 2 Final   Anticoagulation Visit on 05/08/2018   Component Date Value Ref Range Status   • INR 05/08/2018 1.3    Final   Hospital Outpatient Visit on 05/04/2018   Component Date Value Ref Range Status   • WBC 05/04/2018 5.5  4.8 - 10.8 K/uL Final   • RBC 05/04/2018 3.90* 4.20 - 5.40 M/uL Final   • Hemoglobin 05/04/2018 11.9* 12.0 - 16.0 g/dL Final   • Hematocrit 05/04/2018 36.5* 37.0 - 47.0 % Final   • MCV 05/04/2018 93.6  81.4 - 97.8 fL Final   • MCH 05/04/2018 30.5  27.0 - 33.0 pg Final   • MCHC 05/04/2018 32.6* 33.6 - 35.0 g/dL Final   • RDW 05/04/2018 52.6* 35.9 - 50.0 fL Final   • Platelet Count 05/04/2018 248  164 - 446 K/uL Final   • MPV 05/04/2018 9.5  9.0 - 12.9 fL Final   • Nucleated RBC 05/04/2018 0.00  /100 WBC Final   • NRBC (Absolute) 05/04/2018 0.00  K/uL Final   • Neutrophils-Polys 05/04/2018 58.80  44.00 - 72.00 % Final   • Lymphocytes 05/04/2018 26.10  22.00 - 41.00 % Final   • Monocytes 05/04/2018 8.40  0.00 - 13.40 % Final   • Eosinophils 05/04/2018 3.40  0.00 - 6.90 % Final   • Basophils 05/04/2018 0.00  0.00 - 1.80 % Final   • Neutrophils (Absolute) 05/04/2018 3.37  2.00 - 7.15 K/uL Final    Includes immature neutrophils, if present.   • Lymphs (Absolute) 05/04/2018 1.44  1.00 - 4.80 K/uL Final   • Monos (Absolute) 05/04/2018 0.46  0.00 - 0.85 K/uL Final   • Eos (Absolute) 05/04/2018 0.19  0.00 - 0.51 K/uL Final   • Baso (Absolute) 05/04/2018 0.00  0.00 - 0.12 K/uL Final   • Anisocytosis 05/04/2018 1+   Final   • Macrocytosis 05/04/2018 1+   Final   • Bands-Stabs 05/04/2018 2.50  0.00 - 10.00 % Final   • Myelocytes 05/04/2018 0.80  % Final   • Manual Diff Status 05/04/2018 PERFORMED   Final   • Peripheral Smear Review 05/04/2018 see below   Final    Comment: Due to instrument suspect flags, further review of peripheral smear is  indicated on this patient sample. This review may or may not result in  abnormal findings.     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 She had multiple liver lesions at diagnosis as well as hilar adenopathy. She completed 3 cycles of gemcitabine and Abraxane with good responses. Her regimen was then decreased to single agent gemcitabine. She had significant fatigue has chemotherapy has been held since early 2017. She had recent progression mainly in the pancreatic bed.     -11/2070 started on gemcitabine and Abraxane for progression mainly involving the pancreatic head/lymph nodes with no response  Switched to 5-FU/Liposomal Irinotecan.. She appears to be having good response in the imaging studies.. She required dose reduction with a better tolerance. Her labs are adequate and she will continue the current regimen every 2 weeks.  Interestingly, her initial hepatic metastatic disease is still remaining in remission. Most of the current relapse appears to be around the pancreatic bed with lymph node involvement. I will consider referring her to radiation oncology for Xeloda-based radiation after a few more doses of the current chemotherapy.     -BRCA2 mutation seen in the tumor specimen. I will check germline analysis. .We will look into the option of getting off label PARP inhibitors if she progresses.     -Diarrhea secondary to chemotherapy and when necessary Imodium. She knows to take it proactively. She will continue Megace for anorexia.    She agreed and verbalized  agreement and understanding with the current plan.  I answered all questions and concerns at this time         Please note that this dictation was created using voice recognition software. I have made every reasonable attempt to correct obvious errors, but I expect that there are errors of grammar and possibly content that I did not discover before finalizing the note.      SIGNATURES:  Claude Cedeno    CC:  Wally Knox D.O.  No ref.  provider found

## 2018-05-10 NOTE — PROGRESS NOTES
Elin Poole is a 72 y.o. female here for a non-provider visit for: Lab Draws  on 5/10/2018 at 11:26 AM    Procedure Performed: Venipuncture     Anatomical site: Right Antecubital Area (AC)    Equipment used: 23g Butterfly    Labs drawn: CBC w/diff and CMP    Ordering Provider: Dr. Claude Cedeno    Angel By: Lucy Bruno, Med Ass't  No complications. Dr. Cedneo in office at time of draw.

## 2018-05-10 NOTE — PROGRESS NOTES
"Pharmacy Chemotherapy Verification    Patient Name: Elin Poole   Dx: Ampulla of vater CA, mets to liver     Protocol: Onivyde + Leucovorin+ 5FU     *Dosing Reference*  Liposomal Irinotecan (Onivyde) 70 mg/m2 IV over 90 minutes Day 1   4/15/18- dose reduced to 60mg/m2 for tolerance  Leucovorin 400 mg/m2 IV over 30 minutes Day 1, then   Fluorouracil 2400 mg/m2 CIVI over 46 hours Days 1-2    4/15/18- dose further reduced to 1800mg/m2 for tolerance   14 day cycles until DP or UT   kvng Leonard al. (AMBROSE-1) Lancet. 2016;387(22146):199-30    Allergies:  Patient has no known allergies.       /51   Pulse 85   Temp 36.4 °C (97.5 °F)   Resp 18   Ht 1.53 m (5' 0.24\")   Wt 63.2 kg (139 lb 5.3 oz)   SpO2 99%   BMI 27.00 kg/m²  Body surface area is 1.64 meters squared.       LABS 5/9/2018:  ANC~ 3730 Plt = 344k   Hgb = 11.6/35.5     SCr = 0.78mg/dL CrCl ~ 64.2mL/min   LFT's = 14/9/89 TBili = 0.4        Drug Order   (Drug name, dose, route, IV Fluid & volume, frequency, number of doses) Cycle: 4 (delayed from 4/30/2018 for counts)-  PAPER ORDERS IN CHART    Previous treatment: C3 = 4/15/2018   Medication = Liposomal Irinotecan (Onivyde)  Base Dose= 60mg/m2  Calc Dose: Base Dose x Body surface area is 1.64 meters squared.m2 = 98.4mg  Final Dose = 98.986mg(rounded per EPIC)  Route = IV  Fluid & Volume = D5W 500 mL  Admin Duration = Over 90 minutes    Protect from light       <5% difference, OK to treat with final written dose   Medication = Leucovorin  Base Dose= 400 mg/m2  Calc Dose: Base Dose x Body surface area is 1.64 meters squared.m2 = 656mg  Final Dose = 660 mg  Route = IV  Fluid & Volume = D5W 250 mL   Admin Duration = Over 30 minutes          <5% difference, OK to treat with final written dose   Medication = Fluorouracil (5FU)  Base Dose= 1800mg/m2  Calc Dose:Base Dose x Body surface area is 1.64 meters squared.m2 = 2952mg  Final Dose = 2950 mg  Route = IV  Fluid & Volume = 50 mg/ml;  59mL + " 3 ml overfill = 62ml  Admin Duration = Over 46 hours via CADD pump at 1.3ml/hr          <5% difference, OK to treat with final written dose       By my signature below, I confirm this process was performed independently with the BSA and all final chemotherapy dosing calculations congruent. I have reviewed the above chemotherapy order and that my calculation of the final dose and BSA (when applicable) corroborate those calculations of the  pharmacist. Discrepancies of 5% or greater in the written dose have been addressed and documented within the EPIC Progress notes.    Signature: JESSIE Pillai, DanaD

## 2018-05-10 NOTE — PROGRESS NOTES
"Pharmacy Chemotherapy Calculations Note:      Patient Name: Elin Poole  DX: metastatic pancreatic cancer (ampulla of wilfred)    Cycle 4  - Delayed from 4/30 for counts  Previous treatment: C3 = 4/15/18    *Dosing Reference*  Liposomal Irinotecan (Onivyde) 70 mg/m2 IV over 90 minutes Day 1   4/15/18 Pao LLANOS reduced dose to 60 mg/m2 starting with C3 due to tolerance,   then   Leucovorin 400 mg/m2 IV over 30 minutes Day 1, followed by    Fluorouracil 2400 mg/m2 CIVI over 46 hours Days 1-2       4/15/18 Pao LLANOS reduced dose to 1800 mg/m2 starting with C3 due to tolerance  14 day cycles until DP or UT   Clare GARZA et al. (AMBROSE-1) Lancet. 2016;387(78723):201-39    Allergies: Patient has no known allergies.    /51   Pulse 85   Temp 36.4 °C (97.5 °F)   Resp 18   Ht 1.53 m (5' 0.24\")   Wt 63.2 kg (139 lb 5.3 oz)   SpO2 99%   BMI 27.00 kg/m²  Body surface area is 1.64 meters squared.     Labs 5/9/18 ANC~ 3730 Plt = 344 k   Hgb = 11.6     SCr = 0.78mg/dL CrCl ~ 65 mL/min   LFT's = wml TBili = 0.4     Liposomal irinotecan (Onivyde) 60mg/m2 x 1.64 m2 = 98.4mg    <5% difference, ok to treat with final dose = 99 mg IV    Leucovorin 400mg/m2  x 1.64 m2 = 656mg    <5% difference, ok to treat with final dose = 660 mg IV    Fluorouracil (Adrucil) 1800mg/m2 x 1.64 m2 = 2952mg    <5% difference, ok to treat with final dose =  2950 mg    CIVI over 46 hours via CADD pump at 0mL/hr.  Yany Christiansen, PharmD      "

## 2018-05-10 NOTE — PROGRESS NOTES
Chemotherapy Verification - PRIMARY RN      Height = 153 cm  Weight = 63.2 kg  BSA = 1.64 m2       Medication: Onivyde  Dose: 60 mg/m2  Calculated Dose: 98.4 mg                             (In mg/m2, AUC, mg/kg)     Medication: Adrucil  Dose: 1,800 mg/m2  Calculated Dose: 2,952 mg over 46 hours                             (In mg/m2, AUC, mg/kg)    I confirm this process was performed independently with the BSA and all final chemotherapy dosing calculations congruent.  Any discrepancies of 5% or greater have been addressed with the chemotherapy pharmacist. The resolution of the discrepancy has been documented in the EPIC progress notes.

## 2018-05-10 NOTE — PROGRESS NOTES
"Chemotherapy Verification - SECONDARY RN       Height = 60.24\"  Weight = 63.2 kg  BSA = 1.63 m2       Medication: Leucovorin  Dose: 400 mg/m2  Calculated Dose: 652 mg                           Medication: Liposomal Irinotecan  Dose: 60 mg/m2  Calculated Dose: 97.8 mg                             Medication: 5 FU home pump  Dose: 1800 mg/m2  Calculated Dose: 2934 mg                               I confirm that this process was performed independently.   "

## 2018-05-10 NOTE — PROGRESS NOTES
Patient presents for day one cycle four Onivyde / Adrucil. Reviewed plan of care, patient verbalizes understanding. Port flushes well with brisk blood return. Infusions completed with no new patient complaints. Patient connected to home infusion pump. Patient returns Saturday 5/12/2018 to be disconnected and released in no acute distress with her family.

## 2018-05-13 NOTE — PROGRESS NOTES
Patient arrived to clinic for 5FU pump disconnect.  Denies any discomfort.  Pump showing 0ml volume and 60.4ml given.  Pump disconnected, port flushed, heparin instilled, and de-accessed per protocol.  Gauze/tape dressing applied.  Pt tolerated well.  Confirmed next appointment and pt ambulated out of clinic in no apparent distress.

## 2018-05-23 NOTE — TELEPHONE ENCOUNTER
Per Dr. Cedeno, the patient is to increase her KLYTE dose to bid from daily. Pt verbally read back new instructions and denies any questions. Will recheck potassium in 2 weeks at her next prechemo appointment.

## 2018-05-23 NOTE — PROGRESS NOTES
Elin Poole is a 72 y.o. female here for a non-provider visit for: Lab Draws  on 5/23/2018 at 8:37 AM    Procedure Performed: Venipuncture     Anatomical site: Right Antecubital Area (AC)    Equipment used: 21g Butterfly     Labs drawn: CBC w/diff and CMP    Ordering Provider: Dr. Claude Ortiz By: Tamika Reich, Med Ass't  No complications

## 2018-05-23 NOTE — PROGRESS NOTES
"Pharmacy Chemotherapy Verification    Patient Name: Elin Poole   Dx: Ampulla of parthaer CA, mets to liver     Protocol: Onivyde + Leucovorin+ 5FU     *Dosing Reference*  Liposomal Irinotecan (Onivyde)  IV over 90 minutes Day 1    -- 4/15/18- dose reduced to 60 mg/m2 for tolerance  Leucovorin 400 mg/m2 IV over 30 minutes Day 1, then   Fluorouracil  CIVI over 46 hours Days 1-2   -- Pao LLANOS reduced dose to  starting with C1 for anticipated tolerance   -- 4/15/18- dose further reduced to 1800 mg/m2 for tolerance   14 day cycles until DP or UT   Clare GARZA et al. (AMBROSE-1) Lancet. 2016;387(33257):920-10    Allergies:  Patient has no known allergies.     /65   Pulse 86   Temp 36.4 °C (97.5 °F)   Resp 18   Ht 1.53 m (5' 0.24\")   Wt 62.7 kg (138 lb 3.7 oz)   SpO2 99%   BMI 26.78 kg/m²  Body surface area is 1.63 meters squared.     Labs 5/23/18:  ANC~ 2650   Plt = 241 k     Hgb = 11.7    SCr = 0.79 mg/dL  CrCl ~ 64 mL/min    AST/ALT/AP = 15/12/89 TBili = 0.4        Drug Order   (Drug name, dose, route, IV Fluid & volume, frequency, number of doses) Cycle: 5 - [place] treatment plan starting with C5 (previously paper orders)  Previous treatment: C4 = 5/10/2018   Medication = Liposomal Irinotecan (Onivyde)  Base Dose = 60 mg/m2  Calc Dose: Base Dose x 1.63 m2 = 97.8 mg  Final Dose = 97.782  Route = IV  Fluid & Volume =  mL (Compatible in NS or D5W; Kathy Hernandez, PharmD to f/u and determine fluid base; previously given in D5W; Protect from Light)  Admin Duration = Over 90 minutes    Protect from light       <5% difference, OK to treat with final dose   Medication = Leucovorin  Base Dose = 400 mg/m2  Calc Dose: Base Dose x 1.63 m2 = 652 mg  Final Dose = 652 mg  Route = IV  Fluid & Volume = D5W 250 mL   Admin Duration = Over 30 minutes          <5% difference, OK to treat with final dose   Medication = Fluorouracil (5FU)  Base Dose = 1800 mg/m2  Calc Dose:Base Dose x 1.63 m2 = 2934 mg  Final Dose " = 2935 mg  Route = CIVI  Fluid & Volume = 50 mg/mL; 58.7 mL + 3 mL overfill = 61.7 mL  Admin Duration = over 46 hours via CADD pump at 1.3 mL/hr    via CADD pump for home CIVI      <5% difference, OK to treat with final dose     By my signature below, I confirm this process was performed independently with the BSA and all final chemotherapy dosing calculations congruent. I have reviewed the above chemotherapy order and that my calculation of the final dose and BSA (when applicable) corroborate those calculations of the  pharmacist. Discrepancies of 5% or greater in the written dose have been addressed and documented within the EPIC Progress notes.      Tayler Blancas, DanaD

## 2018-05-24 PROBLEM — E87.6 HYPOKALEMIA: Status: ACTIVE | Noted: 2018-01-01

## 2018-05-24 NOTE — PROGRESS NOTES
Chemotherapy Verification - SECONDARY RN       Height = 60.24 in  Weight = 138 lb  BSA = 1.63 m2       Medication: Irinotecan  Dose: 60 mg/m2  Calculated Dose: 97.8 mg                             (In mg/m2, AUC, mg/kg)     Medication: 5 FU  Dose: 1800 mg/m2  Calculated Dose: 2934 mg / 46 hrs continuous infusion                            (In mg/m2, AUC, mg/kg)      I confirm that this process was performed independently.

## 2018-05-24 NOTE — PROGRESS NOTES
Pt is here for her scheduled chemotherapy. Port accessed in sterile fashion. ELISE Smith notified of pt;s K 3.1 - pt already has an increased dose of oral potassium she started this morning. Denies nausea. No diarrhea. No additional replacement needed at this time. Onivyde completed followed by Leucovorin and 5-FU pump (total vol with overfill 59.5ml/58.7ml to be delivered) connect without an incident. Discharged home to self care. Returns Saturday for 5-FU pump disconnect.

## 2018-05-24 NOTE — PROGRESS NOTES
Chemotherapy Verification - PRIMARY RN      Height = 153cm  Weight = 62.7kg  BSA = 1.63m2       Medication: Irinotecan Dose: 60mg/m2  Calculated Dose: 97.8mg                           Medication: Leucovorin  Dose: 400mg/m2  Calculated Dose: 652mg                    Medication: fluorouracil  Dose: 1800mg/m2  Calculated Dose: 2934mg                                  I confirm this process was performed independently with the BSA and all final chemotherapy dosing calculations congruent.  Any discrepancies of 5% or greater have been addressed with the chemotherapy pharmacist. The resolution of the discrepancy has been documented in the EPIC progress notes.

## 2018-05-24 NOTE — PROGRESS NOTES
"Pharmacy Chemotherapy Calculations Note:      Patient Name: Elin Poole  DX: metastatic pancreatic cancer (ampulla of wilfred)    Cycle 5  -  Previous treatment: C4 = 5/10/18    *Dosing Reference*  Liposomal Irinotecan (Onivyde) 70 mg/m2 IV over 90 minutes Day 1   4/15/18 Pao LLANOS reduced dose to 60 mg/m2 starting with C3 due to tolerance,   then   Leucovorin 400 mg/m2 IV over 30 minutes Day 1, followed by    Fluorouracil 2400 mg/m2 CIVI over 46 hours Days 1-2       4/15/18 Pao LLANOS reduced dose to 1800 mg/m2 starting with C3 due to tolerance  14 day cycles until DP or UT   Clare GARZA et al. (AMBROSE-1) Lancet. 2016;387(75538):901-68    Allergies: Patient has no known allergies.    /65   Pulse 86   Temp 36.4 °C (97.5 °F)   Resp 18   Ht 1.53 m (5' 0.24\")   Wt 62.7 kg (138 lb 3.7 oz)   SpO2 99%   BMI 26.78 kg/m²  Body surface area is 1.63 meters squared.      Labs 5/23/18 ANC~ 2650 Plt = 241 k   Hgb = 11.7     SCr = 0.79 mg/dL CrCl ~ 64 mL/min   LFT's = wml TBili = 0.4  K = 3.1 oral supplement doubled by MD  Liposomal irinotecan (Onivyde) 60mg/m2 x 1.63 m2 = 97.8 mg    <5% difference, ok to treat with final dose = 97.782 mg IV    Leucovorin 400mg/m2  x 1.63 m2 = 652 mg    <5% difference, ok to treat with final dose = 652 mg IV    Fluorouracil (Adrucil) 1800mg/m2 x 1.63 m2 = 2934 mg    <5% difference, ok to treat with final dose =  2935 mg    CIVI over 46 hours via CADD pump at 1.3 mL/hr.  Yany Christiansen, PharmD      "

## 2018-05-25 NOTE — PROGRESS NOTES
Received returned and signed anticoagulation referral.   Placed to be scanned into media.     Dorie Pillai, PharmD

## 2018-05-26 NOTE — PROGRESS NOTES
Here for 5 FU pump disconnect after 46 hrs of continuous infusion. See chemotherapy flow sheet for volume / dose administration. Port flushed per protocol, site d-accessed, needle intact compression dressing applied. Patient discharge home with daughter in NAD. Next appointment confirmed.

## 2018-05-29 NOTE — PROGRESS NOTES
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 year old female who is here for prechemotherapy visit. Please refer to my detailed note dictated 5/9/18 for details    Interim history-she is much better tolerance to the dose reduced 5-FU/liposomal asthma taken. No major diarrhea or fatigue.     Past Medical History:      Past Medical History:   Diagnosis Date   • Ampullary carcinoma (HCC)    • Arthritis     hands/fingers/right knee   • Asthma    • Cancer (HCC) 2016    Ampullary CA   • Cataract 09-    bilat.,no surgery   • Dental disorder     upper partial   • Encounter for antineoplastic chemotherapy 4/6/2018   • Heart burn    • Hypertension    • Indigestion    • Jaundice        Past surgical history:       Past Surgical History:   Procedure Laterality Date   • CATH PLACEMENT Right 9/9/2016    Procedure: CATH PLACEMENT cephalic power port  ;  Surgeon: Kurtis Jacobs M.D.;  Location: [facility];  Service:    • WHIPPLE PROCEDURE  8/15/2016    Procedure: Exploratoy Laparotomy, [id number];  Surgeon: Kurtis Jcaobs M.D.;  Location: [facility];  Service:    • NODE DISSECTION  8/15/2016    Procedure: omental flap, Liver Wedge, cholecystectomy ;  Surgeon: Kurtis Jacobs M.D.;  Location: [facility];  Service:    • STENT PLACEMENT  7/2016    gallbladder   • CHOLECYSTECTOMY         Allergies:       Patient has no known allergies.    Medications:         Current Outpatient Prescriptions   Medication Sig Dispense Refill   • omeprazole (PRILOSEC) 20 MG delayed-release capsule TAKE ONE CAPSULE BY MOUTH EVERY DAY 30 Cap 3   • potassium bicarbonate (KLYTE) 25 MEQ tablet Take 1 Tab by mouth every day. 30 Tab 6   • ELIQUIS 5 MG Tab TAKE 1 TAB BY MOUTH 2 TIMES A DAY. 60 Tab 6   • RESTASIS 0.05 % ophthalmic emulsion      •  losartan-hydrochlorothiazide (HYZAAR) 50-12.5 MG per tablet Take 1 Tab by mouth every day. 30 Tab 0   • potassium chloride SA (K-DUR) 10 MEQ Tab CR TAKE 2 TABS BY MOUTH EVERY MORNING. 60 Tab 3   • prochlorperazine (COMPAZINE) 10 MG Tab Take 1 Tab by mouth every 6 hours as needed (for nausea, vomiting). 30 Tab 6   • megestrol (MEGACE) 400 MG/10ML Suspension Take 20 mL by mouth every day. 600 mL 1   • ondansetron (ZOFRAN) 4 MG Tab tablet Take 1 Tab by mouth every four hours as needed for Nausea/Vomiting. 20 Tab 3   • loperamide (IMODIUM) 2 MG Cap Take 1 Cap by mouth 4 times a day as needed for Diarrhea. 30 Cap 0   • sennosides-docusate sodium (SENOKOT-S) 8.6-50 MG tablet Take 1 Tab by mouth 2 times a day. 60 Tab 3     No current facility-administered medications for this visit.          Social History:     Social History     Social History   • Marital status:      Spouse name: N/A   • Number of children: N/A   • Years of education: N/A     Occupational History   • Not on file.     Social History Main Topics   • Smoking status: Never Smoker   • Smokeless tobacco: Never Used   • Alcohol use No   • Drug use: No   • Sexual activity: Not on file     Other Topics Concern   • Not on file     Social History Narrative   • No narrative on file       Family History:    History reviewed. No pertinent family history.    Review of Systems:  All other review of systems are negative except what was mentioned above in the HPI.    Constitutional: Negative for fever, chills, weight loss and malaise/fatigue.    HEENT: No new auditory or visual complaints. No sore throat and neck pain.     Respiratory: Negative for cough, sputum production, shortness of breath and wheezing.    Cardiovascular: Negative for chest pain, palpitations, orthopnea and leg swelling.    Gastrointestinal: Negative for heartburn, nausea, vomiting and abdominal pain.    Genitourinary: Negative for dysuria, hematuria    Musculoskeletal: No new arthralgias or  "myalgias   Skin: Negative for rash and itching.    Neurological: Negative for focal weakness and headaches.    Endo/Heme/Allergies: No abnormal bleed/bruise.    Psychiatric/Behavioral: No new depression/anxiety.    Physical Exam:  Vitals: /74   Pulse 90   Temp 36.4 °C (97.5 °F)   Resp 16   Ht 1.549 m (5' 1\")   Wt 63.3 kg (139 lb 7.1 oz)   SpO2 97%   BMI 26.35 kg/m²     General: Not in acute distress, alert and oriented x 3  HEENT: No pallor, icterus. Oropharynx clear.   Neck: Supple, no palpable masses.  Lymph nodes: No palpable cervical, supraclavicular, axillary or inguinal lymphadenopathy.    CVS: regular rate and rhythm, no rubs or gallops  RESP: Clear to auscultate bilaterally, no wheezing or crackles.   ABD: Soft, non tender, non distended, positive bowel sounds, no palpable organomegaly  EXT: No edema or cyanosis  CNS: Alert and oriented x3, No focal deficits.  Skin- No rash      Labs:   Hospital Outpatient Visit on 05/23/2018   Component Date Value Ref Range Status   • WBC 05/23/2018 5.0  4.8 - 10.8 K/uL Final   • RBC 05/23/2018 3.77* 4.20 - 5.40 M/uL Final   • Hemoglobin 05/23/2018 11.7* 12.0 - 16.0 g/dL Final   • Hematocrit 05/23/2018 35.1* 37.0 - 47.0 % Final   • MCV 05/23/2018 93.1  81.4 - 97.8 fL Final   • MCH 05/23/2018 31.0  27.0 - 33.0 pg Final   • MCHC 05/23/2018 33.3* 33.6 - 35.0 g/dL Final   • RDW 05/23/2018 53.2* 35.9 - 50.0 fL Final   • Platelet Count 05/23/2018 241  164 - 446 K/uL Final   • MPV 05/23/2018 9.7  9.0 - 12.9 fL Final   • Neutrophils-Polys 05/23/2018 52.50  44.00 - 72.00 % Final   • Lymphocytes 05/23/2018 37.70  22.00 - 41.00 % Final   • Monocytes 05/23/2018 6.00  0.00 - 13.40 % Final   • Eosinophils 05/23/2018 3.20  0.00 - 6.90 % Final   • Basophils 05/23/2018 0.40  0.00 - 1.80 % Final   • Immature Granulocytes 05/23/2018 0.20  0.00 - 0.90 % Final   • Nucleated RBC 05/23/2018 0.00  /100 WBC Final   • Neutrophils (Absolute) 05/23/2018 2.65  2.00 - 7.15 K/uL Final    " Includes immature neutrophils, if present.   • Lymphs (Absolute) 05/23/2018 1.90  1.00 - 4.80 K/uL Final   • Monos (Absolute) 05/23/2018 0.30  0.00 - 0.85 K/uL Final   • Eos (Absolute) 05/23/2018 0.16  0.00 - 0.51 K/uL Final   • Baso (Absolute) 05/23/2018 0.02  0.00 - 0.12 K/uL Final   • Immature Granulocytes (abs) 05/23/2018 0.01  0.00 - 0.11 K/uL Final   • NRBC (Absolute) 05/23/2018 0.00  K/uL Final   • Sodium 05/23/2018 138  135 - 145 mmol/L Final   • Potassium 05/23/2018 3.1* 3.6 - 5.5 mmol/L Final   • Chloride 05/23/2018 109  96 - 112 mmol/L Final   • Co2 05/23/2018 24  20 - 33 mmol/L Final   • Anion Gap 05/23/2018 5.0  0.0 - 11.9 Final   • Glucose 05/23/2018 114* 65 - 99 mg/dL Final   • Bun 05/23/2018 11  8 - 22 mg/dL Final   • Creatinine 05/23/2018 0.79  0.50 - 1.40 mg/dL Final   • Calcium 05/23/2018 8.8  8.5 - 10.5 mg/dL Final   • AST(SGOT) 05/23/2018 15  12 - 45 U/L Final   • ALT(SGPT) 05/23/2018 12  2 - 50 U/L Final   • Alkaline Phosphatase 05/23/2018 89  30 - 99 U/L Final   • Total Bilirubin 05/23/2018 0.4  0.1 - 1.5 mg/dL Final   • Albumin 05/23/2018 3.2  3.2 - 4.9 g/dL Final   • Total Protein 05/23/2018 5.9* 6.0 - 8.2 g/dL Final   • Globulin 05/23/2018 2.7  1.9 - 3.5 g/dL Final   • A-G Ratio 05/23/2018 1.2  g/dL Final   • GFR If  05/23/2018 >60  >60 mL/min/1.73 m 2 Final   • GFR If Non  05/23/2018 >60  >60 mL/min/1.73 m 2 Final             Assessment and Plan:  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 She had multiple liver lesions at diagnosis as well as hilar adenopathy. She completed 3 cycles of gemcitabine and Abraxane with good responses. Her regimen was then decreased to single agent gemcitabine. She had significant fatigue has chemotherapy has been held since early 2017. She had  progression mainly in the pancreatic bed.     -11/2070 started on gemcitabine and Abraxane for progression mainly  involving the pancreatic head/lymph nodes with no response  Switched to 5-FU/Liposomal Irinotecan She appears to be having good response in the imaging studies.. She required dose reduction with much better tolerance. Her labs are adequate and she will continue the current regimen every 2 weeks.  Interestingly, her initial hepatic metastatic disease is still remaining in remission. Most of the current relapse appears to be around the pancreatic bed with lymph node involvement. I will consider referring her to radiation oncology for Xeloda-based radiation after 3-4 more doses of the current chemotherapy.     -BRCA2 mutation seen in the tumor specimen. Germline analysis did not reveal BRCA2 mutation. There was  BAP1/RAD 51 VUS. We will look into the option of getting off label PARP inhibitors if she progresses.     -Diarrhea secondary to chemotherapy and when necessary Imodium. She knows to take it proactively. She will continue Megace for anorexia  Hypokalemia.-This is rather chronic. We will call in some refills..    She agreed and verbalized  agreement and understanding with the current plan.  I answered all questions and concerns at this time         Please note that this dictation was created using voice recognition software. I have made every reasonable attempt to correct obvious errors, but I expect that there are errors of grammar and possibly content that I did not discover before finalizing the note.      SIGNATURES:  Claude Cedeno    CC:  Wally Knox D.O.  No ref. provider found

## 2018-06-02 NOTE — PROGRESS NOTES
"Pharmacy Chemotherapy Calculations Note:      Patient Name: Elin Poole  DX: metastatic pancreatic cancer (ampulla of wilfred)    Cycle 6  -  Previous treatment: C5 = 5/24/18    *Dosing Reference*  Liposomal Irinotecan (Onivyde) 70 mg/m2 IV over 90 minutes Day 1   4/15/18 Pao LLNAOS reduced dose to 60 mg/m2 starting with C3 due to tolerance,   then   Leucovorin 400 mg/m2 IV over 30 minutes Day 1, followed by    Fluorouracil 2400 mg/m2 CIVI over 46 hours Days 1-2       4/15/18 Pao LLANOS reduced dose to 1800 mg/m2 starting with C3 due to tolerance  14 day cycles until DP or UT   Clare GARZA et al. (AMBROSE-1) Lancet. 2016;387(64734):006-35    Allergies: Patient has no known allergies.    BP (!) 93/56   Pulse 75   Resp 18   Ht 1.53 m (5' 0.24\")   Wt 62 kg (136 lb 11 oz)   SpO2 98%   BMI 26.49 kg/m²  Body surface area is 1.62 meters squared.      Labs 6/6/18  ANC~ 1300 ok to proceed per Dr. Cedeno 6/7/18 Plt = 281 k   Hgb = 12.6    SCr = 0.8 mg/dL CrCl ~ 62 mL/min   LFT = AST/ALT/AlkPhos/Tbili = 14/13/100/0.8  K = 2.7 KCL 10 mEq IV x 4   Liposomal irinotecan (Onivyde) 60mg/m2 x 1.62 m2 = 97.2 mg   <5% difference, ok to treat with final dose = 97.18 mg IV    Leucovorin 400mg/m2  x 1.62 m2 = 648 mg   <5% difference, ok to treat with final dose = 648 mg IV    Fluorouracil (Adrucil) 1800 mg/m2 x 1.62 m2 = 2916 mg    <5% difference, ok to treat with final dose =  2915 mg    CIVI over 46 hours via CADD pump at 1.3 mL/hr.  Yany Christiansen, PharmD      "

## 2018-06-02 NOTE — PROGRESS NOTES
"Pharmacy Chemotherapy Verification    Patient Name: Elin Poole   Dx: Ampulla of yogesh CA, mets to liver     Protocol: Onivyde + Leucovorin+ 5-FU     *Dosing Reference*  Liposomal Irinotecan (Onivyde)  IV over 90 minutes Day 1    -- 4/15/18- dose reduced to 60 mg/m2 for tolerance  Leucovorin 400 mg/m2 IV over 30 minutes Day 1, then   Fluorouracil  CIVI over 46 hours Days 1-2   -- Pao LLANOS reduced dose to  starting with C1 for anticipated tolerance   -- 4/15/18- dose further reduced to 1800 mg/m2 for tolerance   14 day cycles until DP or UT   Clare GARZA et al. (AMBROSE-1) Lancet. 2016;387(13774):031-79    Allergies:  Patient has no known allergies.     BP (!) 93/56   Pulse 75   Resp 18   Ht 1.53 m (5' 0.24\")   Wt 62 kg (136 lb 11 oz)   SpO2 98%   BMI 26.49 kg/m²  Body surface area is 1.62 meters squared.     Labs 6/6/18:  ANC~ 1300   Plt = 281 k     Hgb = 12.6  SCr = 0.8 mg/dL  CrCl ~ 62 mL/min   AST/ALT/AP = 14/13/100 TBili = 0.8         Drug Order   (Drug name, dose, route, IV Fluid & volume, frequency, number of doses) Cycle: 6  Previous treatment: C5 = 5/24/2018   Medication = Liposomal Irinotecan (Onivyde)  Base Dose = 60 mg/m2  Calc Dose: Base Dose x 1.62 m2 = 97.2 mg  Final Dose = 97.18 mg  Route = IV  Fluid & Volume =  mL (Compatible in NS or D5W; Kathy Hernandez, PharmD to f/u and determine fluid base; previously given in D5W; Protect from Light)  Admin Duration = Over 90 minutes    Protect from light       <5% difference, OK to treat with final dose   Medication = Leucovorin  Base Dose = 400 mg/m2  Calc Dose: Base Dose x 1.62 m2 = 648 mg  Final Dose = 648 mg  Route = IV  Fluid & Volume = D5W 250 mL   Admin Duration = Over 30 minutes          <5% difference, OK to treat with final dose   Medication = Fluorouracil (5FU)  Base Dose = 1800 mg/m2  Calc Dose:Base Dose x 1.62 m2 = 2916 mg  Final Dose = 2915 mg  Route = CIVI  Fluid & Volume = 50 mg/mL; 58.3 mL + 3 mL overfill = 61.3 mL  Admin " Duration = over 46 hours via CADD pump at 1.3 mL/hr    via CADD pump for home CIVI      <5% difference, OK to treat with final dose     By my signature below, I confirm this process was performed independently with the BSA and all final chemotherapy dosing calculations congruent. I have reviewed the above chemotherapy order and that my calculation of the final dose and BSA (when applicable) corroborate those calculations of the  pharmacist. Discrepancies of 5% or greater in the written dose have been addressed and documented within the EPIC Progress notes.      Tayler Blancas, PharmD

## 2018-06-04 NOTE — TELEPHONE ENCOUNTER
Dx: relapsed metastatic ampullary pancreatic carcinoma  Treatment: OP Pancreatic liposomal irinotecan + CIVI 5-Fluorouracil + Leucovorin  Completed cycle 5 on 5/26/2018- now on day 12      Patient complaint onset of diarrhea yesterday  Frequency- total 7 episodes of diarrhea in the past 24 hours of foamy, liquid stool with tiny bits of chunks.    No mucus or blood  Denies abdominal pain   Cramping/upset stomach with onset of each diarrhea episode  Denies fever     Medications: Took 1 imodium pill this morning     Food tolerance:    Pt denies nausea/vomiting, reports she is tolerating meals and drinking out of a jar that is at least 16 ounces.  She reports drinking 2 jars of water today and 3 jars yesterday.    Urine output- reports 4-5 x times per day.  Denies changes in frequency and color of urine and denies dizziness.     Per ELISE Mancilla, instructed patient to continue to take imodium 2mg tab after each episode of diarrhea.  Instructed her to take 2 tablets after the first episode of diarrhea and 1 tab after each subsequent episode, not to exceed more than 8 tabs in 24 hours. Provided homecare instructions to replace fluid losses with on cup of water per diarrhea episode, eat foods high in soluble fiber, such as bananas, oatmeal, applesauce, turkey or chicken, rice and toast.  Avoid food products that can aggravate diarrhea including food high in insoluble fiber such as raw fruits and veggies, skins, seeds and legumes.  Avoid milk and dairy products. Avoid caffeine, alcohol, and sucrose. Avoid greasy, fatty, spicy and fried foods.      Plan of care for RN to check on patient tomorrow.  Pt verbalized understanding and agreed with POC.

## 2018-06-04 NOTE — TELEPHONE ENCOUNTER
1. Caller Name:Elin Poole                                          Call Back Number:[phone] (home) [phone] (work)        Patient approves a detailed voicemail message:YES    2. What are the patient's symptoms (location & severity)? YES    3. Is this a new symptom.  Diarrhea     4. When did it start? On Sunday morning     5. Action taken per Active Symptom Guide: SENT MESSAGE TO ROMANA GREGORIO

## 2018-06-05 NOTE — TELEPHONE ENCOUNTER
Called patient to follow up on her diarrhea symptoms.  Patient states that she had two more episodes of diarrhea yesterday after speaking with RN.  She reports taking two 2 tabs of imodium after the first episode and then another one around midnight. She reports she is doing much better today and has not had any episodes of diarrhea.  Confirmed pt's plan of care to come to med onc tomorrow at 9am for labs followed by her prechemo appt at 10am with ELISE Nolen.  Pt had no further questions or needs.

## 2018-06-06 NOTE — PROGRESS NOTES
Elin Poole is a 72 y.o. female here for a non-provider visit for: Lab Draws  on 6/6/2018 at 1:40 PM    Procedure Performed: Venipuncture     Anatomical site: Left Hand    Equipment used: 23g Butterfly    Labs drawn: CBC w/diff, CMP, and Estimated GFR    Ordering Provider: ELISE Nolen    John By: Shawn Cohn Ass't  No complications and  was present in the office the entire time I was doing the patients blood draw.     1 re-stick on right ac with a 23g butterfly john by ELISE Nolen   Draw was successful (John CMP)

## 2018-06-06 NOTE — PROGRESS NOTES
Subjective:      Elin Poole is a 72 y.o. female who presents for Follow-Up (Prechemo) for evaluation prior to cycle 6 liposomal irinotecan/5-FU/leucovorin for metastatic ampullary carcinoma.     HPI   Mrs. Poole presents for evaluation prior to cycle 6 liposomal irinotecan (Ovivyde)/5-FU/leucovorin for local progression of metastatic ampullary carcinoma. She is accompanied by her son.    She has been tolerating treatment well. She continues with a fairly poor appetite and has lost 2 pounds since her last visit. Baseline fatigue is not worse. She is experienced diarrhea and abdominal pain for several days. She only experiences nausea the week of chemo which is well relieved with prescribed antinausea medications. Baseline neuropathy is stable.      No Known Allergies    Current Outpatient Prescriptions on File Prior to Visit   Medication Sig Dispense Refill   • ELIQUIS 5 MG Tab TAKE 1 TAB BY MOUTH 2 TIMES A DAY. 60 Tab 5   • omeprazole (PRILOSEC) 20 MG delayed-release capsule TAKE ONE CAPSULE BY MOUTH EVERY DAY 30 Cap 3   • potassium bicarbonate (KLYTE) 25 MEQ tablet Take 1 Tab by mouth every day. 30 Tab 6   • loperamide (IMODIUM) 2 MG Cap Take 1 Cap by mouth 4 times a day as needed for Diarrhea. 30 Cap 0   • losartan-hydrochlorothiazide (HYZAAR) 50-12.5 MG per tablet Take 1 Tab by mouth every day. 30 Tab 0   • prochlorperazine (COMPAZINE) 10 MG Tab Take 1 Tab by mouth every 6 hours as needed (for nausea, vomiting). 30 Tab 6   • megestrol (MEGACE) 400 MG/10ML Suspension Take 20 mL by mouth every day. 600 mL 1   • ondansetron (ZOFRAN) 4 MG Tab tablet Take 1 Tab by mouth every four hours as needed for Nausea/Vomiting. 20 Tab 3   • RESTASIS 0.05 % ophthalmic emulsion      • sennosides-docusate sodium (SENOKOT-S) 8.6-50 MG tablet Take 1 Tab by mouth 2 times a day. 60 Tab 3     No current facility-administered medications on file prior to visit.        Review of Systems   Constitutional: Positive for chills  (hard time getting warm), malaise/fatigue and weight loss (2 lbs since 5/23). Negative for fever.   HENT: Negative for tinnitus.    Eyes: Negative for blurred vision and double vision.        Due for check last week - rescheduling    Respiratory: Negative for cough, shortness of breath and wheezing.    Cardiovascular: Negative for chest pain, palpitations and leg swelling.   Gastrointestinal: Positive for abdominal pain (one episode of abd cramping), diarrhea (several days) and nausea (week of chemo only). Negative for constipation and vomiting.   Genitourinary: Negative for dysuria.   Musculoskeletal: Negative for myalgias.   Neurological: Positive for tingling (fingers and toes - baseline from prev chemo, not worse). Negative for dizziness and headaches.   Psychiatric/Behavioral: The patient does not have insomnia.         Objective:     /65   Pulse (!) 114   Temp 36.7 °C (98.1 °F)   Resp 16   Ht 1.524 m (5')   Wt 62.3 kg (137 lb 7.3 oz)   SpO2 99%   Breastfeeding? No   BMI 26.85 kg/m²      Physical Exam   Constitutional: She is oriented to person, place, and time. She appears well-developed and well-nourished. No distress.   alopecia   HENT:   Head: Normocephalic and atraumatic.   Mouth/Throat: Oropharynx is clear and moist. No oropharyngeal exudate.   Eyes: Conjunctivae and EOM are normal. Pupils are equal, round, and reactive to light. Right eye exhibits no discharge. Left eye exhibits no discharge. No scleral icterus.   Neck: Normal range of motion. Neck supple. No thyromegaly present.   Cardiovascular: Normal rate, regular rhythm, normal heart sounds and intact distal pulses.  Exam reveals no gallop and no friction rub.    No murmur heard.  Pulmonary/Chest: Effort normal and breath sounds normal. No respiratory distress. She has no wheezes.   Abdominal: Soft. Bowel sounds are normal. She exhibits no distension. There is tenderness.   Musculoskeletal: Normal range of motion. She exhibits no edema  or tenderness.   Lymphadenopathy:        Head (right side): No submental, no submandibular, no tonsillar, no preauricular, no posterior auricular and no occipital adenopathy present.        Head (left side): No submental, no submandibular, no tonsillar, no preauricular, no posterior auricular and no occipital adenopathy present.     She has no cervical adenopathy.        Right cervical: No superficial cervical and no deep cervical adenopathy present.       Left cervical: No superficial cervical and no deep cervical adenopathy present.        Right: No supraclavicular adenopathy present.        Left: No supraclavicular adenopathy present.   Neurological: She is alert and oriented to person, place, and time.   Skin: Skin is warm and dry. No rash noted. She is not diaphoretic. No erythema. No pallor.   Psychiatric: She has a normal mood and affect. Her behavior is normal.   Vitals reviewed.      Hospital Outpatient Visit on 06/06/2018   Component Date Value Ref Range Status   • WBC 06/06/2018 3.0* 4.8 - 10.8 K/uL Final   • RBC 06/06/2018 4.09* 4.20 - 5.40 M/uL Final   • Hemoglobin 06/06/2018 12.6  12.0 - 16.0 g/dL Final   • Hematocrit 06/06/2018 38.2  37.0 - 47.0 % Final   • MCV 06/06/2018 93.4  81.4 - 97.8 fL Final   • MCH 06/06/2018 30.8  27.0 - 33.0 pg Final   • MCHC 06/06/2018 33.0* 33.6 - 35.0 g/dL Final   • RDW 06/06/2018 56.5* 35.9 - 50.0 fL Final   • Platelet Count 06/06/2018 281  164 - 446 K/uL Final   • MPV 06/06/2018 9.6  9.0 - 12.9 fL Final   • Neutrophils-Polys 06/06/2018 43.00* 44.00 - 72.00 % Final   • Lymphocytes 06/06/2018 38.40  22.00 - 41.00 % Final   • Monocytes 06/06/2018 15.60* 0.00 - 13.40 % Final   • Eosinophils 06/06/2018 1.70  0.00 - 6.90 % Final   • Basophils 06/06/2018 1.30  0.00 - 1.80 % Final   • Immature Granulocytes 06/06/2018 0.00  0.00 - 0.90 % Final   • Nucleated RBC 06/06/2018 0.00  /100 WBC Final   • Neutrophils (Absolute) 06/06/2018 1.30* 2.00 - 7.15 K/uL Final    Includes immature  neutrophils, if present.   • Lymphs (Absolute) 06/06/2018 1.16  1.00 - 4.80 K/uL Final   • Monos (Absolute) 06/06/2018 0.47  0.00 - 0.85 K/uL Final   • Eos (Absolute) 06/06/2018 0.05  0.00 - 0.51 K/uL Final   • Baso (Absolute) 06/06/2018 0.04  0.00 - 0.12 K/uL Final   • Immature Granulocytes (abs) 06/06/2018 0.00  0.00 - 0.11 K/uL Final   • NRBC (Absolute) 06/06/2018 0.00  K/uL Final   • Sodium 06/06/2018 137  135 - 145 mmol/L Final   • Potassium 06/06/2018 2.7* 3.6 - 5.5 mmol/L Final   • Chloride 06/06/2018 105  96 - 112 mmol/L Final   • Co2 06/06/2018 22  20 - 33 mmol/L Final   • Anion Gap 06/06/2018 10.0  0.0 - 11.9 Final   • Glucose 06/06/2018 121* 65 - 99 mg/dL Final   • Bun 06/06/2018 9  8 - 22 mg/dL Final   • Creatinine 06/06/2018 0.80  0.50 - 1.40 mg/dL Final   • Calcium 06/06/2018 8.8  8.5 - 10.5 mg/dL Final   • AST(SGOT) 06/06/2018 14  12 - 45 U/L Final   • ALT(SGPT) 06/06/2018 13  2 - 50 U/L Final   • Alkaline Phosphatase 06/06/2018 100* 30 - 99 U/L Final   • Total Bilirubin 06/06/2018 0.8  0.1 - 1.5 mg/dL Final   • Albumin 06/06/2018 3.3  3.2 - 4.9 g/dL Final   • Total Protein 06/06/2018 6.2  6.0 - 8.2 g/dL Final   • Globulin 06/06/2018 2.9  1.9 - 3.5 g/dL Final   • A-G Ratio 06/06/2018 1.1  g/dL Final   • GFR If  06/06/2018 >60  >60 mL/min/1.73 m 2 Final   • GFR If Non  06/06/2018 >60  >60 mL/min/1.73 m 2 Final          Assessment/Plan:     1. Ampullary carcinoma (HCC)  potassium chloride SA (K-DUR) 10 MEQ Tab CR   2. Hypokalemia  potassium chloride SA (K-DUR) 10 MEQ Tab CR   3. Chronic deep vein thrombosis (DVT) of popliteal vein of right lower extremity (HCC)     4. Long term (current) use of anticoagulants [Z79.01]     5. Poor appetite     6. Functional diarrhea     7. Encounter for antineoplastic chemotherapy         1.  DVT/anticoagulation: Stable, continues on Eliquis per anticoagulation clinic.    2.  Diarrhea/Hypokalemia: Potassium is noted to be 2.7, patient is  asymptomatic. She has been having diarrhea for several days. Potassium is increased from 10 mEq twice daily to 20 mEq twice daily and she will receive IV repletion with chemotherapy tomorrow.    She continues with Imodium as needed and will improve appetite, ensuring 2-3 bites of quality foods every 2 hours. She is encouraged to continue hydration/Gatorade throughout the day and measure the amount that she is taking then to ensure adequate fluid intake.    3.  Poor appetite: She and her family disagree regarding how much hydration and food she is taking in. She is encouraged to keep track of how much water she is drinking as well as to eat every 2 hours.    4.  Ambulatory carcinoma: She is tolerating treatment fairly well. CBC and CMP have been evaluated and found to be within acceptable limits, except were addressed above. She will proceed with cycle 6 and return in 2 weeks for evaluation prior to cycle 7, sooner as needed.            The patient verbalized agreement and understanding of current plan. All questions and concerns were addressed at time of visit.    Please note that this dictation was created using voice recognition software. I have made every reasonable attempt to correct obvious errors, but I expect that there are errors of grammar and possibly content that I did not discover before finalizing the note.

## 2018-06-07 NOTE — PROGRESS NOTES
Chemotherapy Verification - SECONDARY RN       Height = 153 cm  Weight = 62 kg  BSA = 1.62 m2       Medication: Irinotecan Liposomal (Onyvide) Dose: 60 mg/m2  Calculated Dose: 97.2 mg                                Medication: Fluorouracil (5FU)  Dose: 1800 mg/m2  Calculated Dose: 2916 mg                              I confirm that this process was performed independently.

## 2018-06-07 NOTE — PROGRESS NOTES
Chemotherapy Verification - PRIMARY RN      Height = 153 cm  Weight = 62 kg  BSA = 1.62 m2       Medication: Irinotecan Liposomal  Dose: 60 mg/m2  Calculated Dose: 97.2 mg                             (In mg/m2, AUC, mg/kg)     Medication: Adrucil  Dose: 1,800 mg/m2  Calculated Dose: 2,916 mg                             (In mg/m2, AUC, mg/kg)      I confirm this process was performed independently with the BSA and all final chemotherapy dosing calculations congruent.  Any discrepancies of 5% or greater have been addressed with the chemotherapy pharmacist. The resolution of the discrepancy has been documented in the EPIC progress notes.

## 2018-06-07 NOTE — PROGRESS NOTES
Patient presents for day one cycle six Irinotecan HCI Liposome and Adrucil chemotherapy. Per valerio Dubose to proceed with ANC 1310. Port accessed flushes well with blood return. Pre-medications administered as ordered. Electrolytes replaced per protocol. Chemotherapy infused with no new patient complaints, line flushed clear. Patient connected to home infusion pump. Patient returns Saturday to be dis-connected and released in no acute distress with her son.

## 2018-06-09 NOTE — PROGRESS NOTES
Patient in for pump disconnect, following 46 hours 5-FU infusion; she reports no difficulties tolerating infusion. Pump removed with 0 mL remaining in reservoir. Port-a-cath flushed multiple times with NSS and patient repositioned, unable to obtain blood return; patient denies any pain at port site. Heparin instilled and port de-accessed. Appointment confirmed for 6/21/18 at 1000; discharged ambulatory with daughter, in no apparent distress.

## 2018-06-18 NOTE — TELEPHONE ENCOUNTER
Returned patient's call to triage complaint of diarrhea.  Cancer Diagnosis: ampullary carcinoma  Active treatment (Chemo, XRT, Immunotherapy):Iranotecan and 5FU day 1 cycle 6 was on 6/7. Patient due for cycle 7 on 6/21  Does patient have an Ostomy? No  Time of onset? Saturday morning   Frequency: Approx 5 episodes throughout the day on Saturday.  Approx. 4-5 episodes on Sunday.  Patient states she has had no diarrhea today.   Number of stools in 24 hours? 5 episodes  Consistency: watery with bits and pieces  Color: brown  Presence of mucus or blood: no blood or mucus  Odor? Odorous, but not more than usual.  Weight loss? Pt states no weight loss  Fluid intake? Approximately 2 L of pedialyte in past 24 hours.    Urine output and color? Urinating approximately 6 times per day. Color is light yellow to clear.  Fever? No fever   Have you taken any medications to help? Imodium- two pills after the first episode and one pill after each subsequent episode starting on Saturday.   Do you have abdominal pain? Denies abdominal pain. She states her stomach will gurgle prior to the onset of diarrhea episode  Nausea and vomiting? Denies nausea and vomiting     Provider notified: ELISE Mancilla  Orders received: no new orders received.   POC: continue to monitor symptoms. If diarrhea persists and/or patient has s/s of dehydration check BMP.  Patient in agreement with POC and will call if she has worsening symptoms.

## 2018-06-18 NOTE — TELEPHONE ENCOUNTER
Patient called stating she has had diarrhea for the past 2 days and has been taking imodium. Pt states the imodium isn't doing much because she has been feel stomach pains and bubbly feelings in her stomach. Patient would like to speak with  A nurse to ensure she isn't dehydrated again. Patient verbally agreed to wait for a call back.

## 2018-06-20 NOTE — PROGRESS NOTES
Elin Poole is a 73 y.o. female here for a non-provider visit for: Lab Draws  on 6/20/2018 at 12:38 PM    Procedure Performed: Venipuncture     Anatomical site: Left Antecubital Area (AC)    Equipment used: 23g butterfly     Labs drawn: CBC w/diff and CMP    Ordering Provider: ELISE Mancilla    Angel By: Shawn Diamond Ass't  Successful blood draw

## 2018-06-20 NOTE — PROGRESS NOTES
Subjective:      Elin Poole is a 73 y.o. female who presents for Follow-Up (PRECHEMO (PRIYANKA)) for ampullary carcinoma.         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 She presents accompanied by her son for today's visit. Patient is due to receive cycle #7 of Irinotecan liposome and 5-FU with leucovorin.    Patient denies any fevers or chills. She has had some further weight loss for approximately 3 pounds. She has been taking Megace as directed. She states her appetite is fair. She just states that food does not taste good but she is trying to force herself to eat. She does have some fatigue and weakness noted. She notes some numbness/weakness in both lower extremities from the knees down, but states it is mostly in her toes. She denies any rashes or itching, cracking or peeling of her hands and feet. She denies chest pain, heart palpitations, coughing, wheezing, shortness of breath. She does have some heartburn noted. She experienced some mild nausea yesterday which resolved with medication, but denies any vomiting. She does still have some diarrhea at times with cramping and noted to have gas. She feels bloated at times which is also causing some cramping and increasing gas. Patient is voiding without difficulty and denies any pain, dizziness or headaches. She has noticed to have one mouth sore.    Patient stated she had family visit her yesterday as it was her birthday. She did spend time with her grandson and is currently being seen by his physician as he was exposed to strep at school.    No Known Allergies  Current Outpatient Prescriptions on File Prior to Visit   Medication Sig Dispense Refill   • ELIQUIS 5 MG Tab TAKE 1 TAB BY MOUTH 2 TIMES A DAY. 60 Tab 5   • omeprazole (PRILOSEC) 20 MG delayed-release capsule TAKE ONE CAPSULE BY MOUTH EVERY DAY 30 Cap 3   • potassium bicarbonate (KLYTE) 25 MEQ tablet Take 1 Tab by mouth every day. 30 Tab 6   • megestrol (MEGACE) 400  "MG/10ML Suspension Take 20 mL by mouth every day. 600 mL 1   • losartan-hydrochlorothiazide (HYZAAR) 50-12.5 MG per tablet Take 1 Tab by mouth every day. 30 Tab 0   • potassium chloride SA (K-DUR) 10 MEQ Tab CR Take 2 Tabs by mouth 2 times a day. 180 Tab 3   • prochlorperazine (COMPAZINE) 10 MG Tab Take 1 Tab by mouth every 6 hours as needed (for nausea, vomiting). 30 Tab 6   • ondansetron (ZOFRAN) 4 MG Tab tablet Take 1 Tab by mouth every four hours as needed for Nausea/Vomiting. 20 Tab 3   • loperamide (IMODIUM) 2 MG Cap Take 1 Cap by mouth 4 times a day as needed for Diarrhea. 30 Cap 0   • RESTASIS 0.05 % ophthalmic emulsion      • sennosides-docusate sodium (SENOKOT-S) 8.6-50 MG tablet Take 1 Tab by mouth 2 times a day. 60 Tab 3     No current facility-administered medications on file prior to visit.        Review of Systems   Constitutional: Positive for malaise/fatigue and weight loss. Negative for chills and fever.   Respiratory: Negative for cough, shortness of breath and wheezing.    Cardiovascular: Negative for chest pain and palpitations.   Gastrointestinal: Positive for diarrhea (intermittent) and heartburn. Negative for constipation, nausea (very mild yesterday, resolved with medication) and vomiting.   Genitourinary: Negative for dysuria.   Musculoskeletal: Negative for myalgias.   Skin: Negative for itching and rash.   Neurological: Positive for weakness. Negative for dizziness and headaches.          Objective:     BP (!) 98/54   Pulse (!) 109   Temp 36.8 °C (98.2 °F)   Resp 16   Ht 1.53 m (5' 0.24\")   Wt 60.3 kg (133 lb)   SpO2 98%   BMI 25.77 kg/m²      Physical Exam   Constitutional: She is oriented to person, place, and time. She appears well-developed and well-nourished. No distress.   HENT:   Head: Normocephalic and atraumatic.   Mouth/Throat: Oropharyngeal exudate (mouth sores noted) present.   Cardiovascular: Regular rhythm, normal heart sounds and intact distal pulses.  Exam reveals " no gallop and no friction rub.    No murmur heard.  Slightly tachycardic   Pulmonary/Chest: Effort normal and breath sounds normal. No respiratory distress. She has no wheezes.   Abdominal: Soft. Bowel sounds are normal. She exhibits no distension. There is no tenderness.   Musculoskeletal: Normal range of motion. She exhibits no edema or tenderness.   Neurological: She is alert and oriented to person, place, and time.   Skin: Skin is warm and dry. No rash noted. She is not diaphoretic. No erythema. No pallor.   Psychiatric: She has a normal mood and affect. Her behavior is normal.   Vitals reviewed.      Hospital Outpatient Visit on 06/20/2018   Component Date Value Ref Range Status   • WBC 06/20/2018 3.3* 4.8 - 10.8 K/uL Final   • RBC 06/20/2018 4.12* 4.20 - 5.40 M/uL Final   • Hemoglobin 06/20/2018 13.0  12.0 - 16.0 g/dL Final   • Hematocrit 06/20/2018 39.2  37.0 - 47.0 % Final   • MCV 06/20/2018 95.1  81.4 - 97.8 fL Final   • MCH 06/20/2018 31.6  27.0 - 33.0 pg Final   • MCHC 06/20/2018 33.2* 33.6 - 35.0 g/dL Final   • RDW 06/20/2018 59.4* 35.9 - 50.0 fL Final   • Platelet Count 06/20/2018 334  164 - 446 K/uL Final   • MPV 06/20/2018 9.7  9.0 - 12.9 fL Final   • Nucleated RBC 06/20/2018 0.00  /100 WBC Final   • NRBC (Absolute) 06/20/2018 0.00  K/uL Final   • Neutrophils-Polys 06/20/2018 10.70* 44.00 - 72.00 % Final   • Lymphocytes 06/20/2018 67.80* 22.00 - 41.00 % Final   • Monocytes 06/20/2018 13.40  0.00 - 13.40 % Final   • Eosinophils 06/20/2018 1.80  0.00 - 6.90 % Final   • Basophils 06/20/2018 1.80  0.00 - 1.80 % Final   • Neutrophils (Absolute) 06/20/2018 0.47* 2.00 - 7.15 K/uL Final    Includes immature neutrophils, if present.   • Lymphs (Absolute) 06/20/2018 2.24  1.00 - 4.80 K/uL Final   • Monos (Absolute) 06/20/2018 0.44  0.00 - 0.85 K/uL Final   • Eos (Absolute) 06/20/2018 0.06  0.00 - 0.51 K/uL Final   • Baso (Absolute) 06/20/2018 0.06  0.00 - 0.12 K/uL Final   • Anisocytosis 06/20/2018 1+   Final    • Microcytosis 06/20/2018 1+   Final   • Sodium 06/20/2018 137  135 - 145 mmol/L Final   • Potassium 06/20/2018 3.2* 3.6 - 5.5 mmol/L Final   • Chloride 06/20/2018 111  96 - 112 mmol/L Final   • Co2 06/20/2018 18* 20 - 33 mmol/L Final   • Anion Gap 06/20/2018 8.0  0.0 - 11.9 Final   • Glucose 06/20/2018 111* 65 - 99 mg/dL Final   • Bun 06/20/2018 12  8 - 22 mg/dL Final   • Creatinine 06/20/2018 0.93  0.50 - 1.40 mg/dL Final   • Calcium 06/20/2018 9.0  8.5 - 10.5 mg/dL Final   • AST(SGOT) 06/20/2018 7* 12 - 45 U/L Final   • ALT(SGPT) 06/20/2018 10  2 - 50 U/L Final   • Alkaline Phosphatase 06/20/2018 73  30 - 99 U/L Final   • Total Bilirubin 06/20/2018 0.4  0.1 - 1.5 mg/dL Final   • Albumin 06/20/2018 3.4  3.2 - 4.9 g/dL Final   • Total Protein 06/20/2018 5.8* 6.0 - 8.2 g/dL Final   • Globulin 06/20/2018 2.4  1.9 - 3.5 g/dL Final   • A-G Ratio 06/20/2018 1.4  g/dL Final   • GFR If  06/20/2018 >60  >60 mL/min/1.73 m 2 Final   • GFR If Non  06/20/2018 59* >60 mL/min/1.73 m 2 Final   • Bands-Stabs 06/20/2018 3.60  0.00 - 10.00 % Final   • Progranulocytes 06/20/2018 0.90  % Final   • Manual Diff Status 06/20/2018 PERFORMED   Final   • Peripheral Smear Review 06/20/2018 see below   Final    Comment: Due to instrument suspect flags, further review of peripheral smear is  indicated on this patient sample. This review may or may not result in  abnormal findings.     • Plt Estimation 06/20/2018 Normal   Final   • RBC Morphology 06/20/2018 Present   Final   • Poikilocytosis 06/20/2018 2+   Final   • Ovalocytes 06/20/2018 1+   Final   • Echinocytes 06/20/2018 1+   Final            Assessment/Plan:     1. Ampullary carcinoma (HCC)  amoxicillin-clavulanate (AUGMENTIN) 875-125 MG Tab    maalox plus-benadryl-visc lidocaine (MAGIC MOUTHWASH)    DISCONTINUED: filgrastim (NEUPOGEN) injection 300 mcg   2. Mouth sores  maalox plus-benadryl-visc lidocaine (MAGIC MOUTHWASH)   3. Chemotherapy-induced  neutropenia (HCC)  amoxicillin-clavulanate (AUGMENTIN) 875-125 MG Tab    DISCONTINUED: filgrastim (NEUPOGEN) injection 300 mcg     Plan  1. Patient is due to receive cycle #7 of chemotherapy for papillary carcinoma. However after reviewing her CBC and CMP patient is neutropenic with an ANC of 470. We will hold treatment this week and proceed with Neupogen daily until her ANC is greater than 1500. We'll postpone treatment ×1 week, however patient is requesting treatment to be on Mondays, which we will postpone her treatment by approximately 10 days. I did discuss with Dr. Cedeno and he is okay to accommodate this request.    2. Patient is severely neutropenic and appears to have been exposed to her grandson who may have strep. He is being currently seen at his doctor's office today as well. I discussed with Dr. Cedeno and due to the severity of her her neutropenia, we will prophylactically start her on Augmentin ×5 days.    3. Patient with a mouth sore noted on examination. I will start her on Magic mouthwash.    4. I will have patient follow-up in the clinic in one and a half weeks for evaluation prior to treatment, which is been postponed to Monday, July 2. Patient to monitor temperatures at home. I did educate her on neutropenic precautions.

## 2018-06-21 NOTE — PROGRESS NOTES
Patient presents ambulatory for neupogen injection.  Patient reports feeling well and denies any s/s of infection at this time.  Neupogen given per MD order with no complications or adverse reactions, band aid applied to site.  Patient to return 6/22/18, confirmed with patient.  Patient left ambulatory in no distress.

## 2018-06-22 NOTE — PROGRESS NOTES
Pt returns to infusion center for CBC and possible neupogen.  Labs drawn via #23 butterfly needle to Lt AC; pt tolerated well.  Results reviewed, pt does not need neupogen today as ANC well above parameters (see labs).  Appointment cancelled for tomorrow.

## 2018-06-29 NOTE — PROGRESS NOTES
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 year old female who is here for prechemotherapy visit. Please refer to my detailed note dictated 5/9/18 for details  Interim history-she developed worsening diarrhea and neutropenia after the last dose. Despite dose reduction. She is currently feeling better    Past Medical History:      Past Medical History:   Diagnosis Date   • Ampullary carcinoma (HCC)    • Arthritis     hands/fingers/right knee   • Asthma    • Cancer (HCC) 2016    Ampullary CA   • Cataract 09-    bilat.,no surgery   • Dental disorder     upper partial   • Encounter for antineoplastic chemotherapy 4/6/2018   • Heart burn    • Hypertension    • Indigestion    • Jaundice        Past surgical history:       Past Surgical History:   Procedure Laterality Date   • CATH PLACEMENT Right 9/9/2016    Procedure: CATH PLACEMENT cephalic power port  ;  Surgeon: Kurtis Jacobs M.D.;  Location: SURGERY [facility];  Service:    • WHIPPLE PROCEDURE  8/15/2016    Procedure: Exploratoy Laparotomy, [id number];  Surgeon: Kurtis Jacobs M.D.;  Location: SURGERY [facility];  Service:    • NODE DISSECTION  8/15/2016    Procedure: omental flap, Liver Wedge, cholecystectomy ;  Surgeon: Kurtis Jacobs M.D.;  Location: SURGERY [facility];  Service:    • STENT PLACEMENT  7/2016    gallbladder   • CHOLECYSTECTOMY         Allergies:       Patient has no known allergies.    Medications:         Current Outpatient Prescriptions   Medication Sig Dispense Refill   • maalox plus-benadryl-visc lidocaine (MAGIC MOUTHWASH) Take 5 mL by mouth every 6 hours as needed. 1:1:1 ratio 250 mL 3   • ELIQUIS 5 MG Tab TAKE 1 TAB BY MOUTH 2 TIMES A DAY. 60 Tab 5   • potassium chloride SA (K-DUR) 10 MEQ Tab CR Take 2 Tabs by mouth 2 times a day. 180 Tab 3   •  omeprazole (PRILOSEC) 20 MG delayed-release capsule TAKE ONE CAPSULE BY MOUTH EVERY DAY 30 Cap 3   • potassium bicarbonate (KLYTE) 25 MEQ tablet Take 1 Tab by mouth every day. 30 Tab 6   • megestrol (MEGACE) 400 MG/10ML Suspension Take 20 mL by mouth every day. 600 mL 1   • losartan-hydrochlorothiazide (HYZAAR) 50-12.5 MG per tablet Take 1 Tab by mouth every day. 30 Tab 0   • prochlorperazine (COMPAZINE) 10 MG Tab Take 1 Tab by mouth every 6 hours as needed (for nausea, vomiting). 30 Tab 6   • ondansetron (ZOFRAN) 4 MG Tab tablet Take 1 Tab by mouth every four hours as needed for Nausea/Vomiting. 20 Tab 3   • loperamide (IMODIUM) 2 MG Cap Take 1 Cap by mouth 4 times a day as needed for Diarrhea. 30 Cap 0   • RESTASIS 0.05 % ophthalmic emulsion      • sennosides-docusate sodium (SENOKOT-S) 8.6-50 MG tablet Take 1 Tab by mouth 2 times a day. 60 Tab 3     No current facility-administered medications for this visit.          Social History:     Social History     Social History   • Marital status:      Spouse name: N/A   • Number of children: N/A   • Years of education: N/A     Occupational History   • Not on file.     Social History Main Topics   • Smoking status: Never Smoker   • Smokeless tobacco: Never Used   • Alcohol use No   • Drug use: No   • Sexual activity: Not on file     Other Topics Concern   • Not on file     Social History Narrative   • No narrative on file       Family History:    No family history on file.    Review of Systems:  All other review of systems are negative except what was mentioned above in the HPI.    Constitutional: Negative for fever, chills, weight loss and malaise/fatigue.    HEENT: No new auditory or visual complaints. No sore throat and neck pain.     Respiratory: Negative for cough, sputum production, shortness of breath and wheezing.    Cardiovascular: Negative for chest pain, palpitations, orthopnea and leg swelling.    Gastrointestinal: Negative for heartburn, nausea,  "vomiting and abdominal pain.    Genitourinary: Negative for dysuria, hematuria    Musculoskeletal: No new arthralgias or myalgias   Skin: Negative for rash and itching.    Neurological: Negative for focal weakness and headaches.    Endo/Heme/Allergies: No abnormal bleed/bruise.    Psychiatric/Behavioral: No new depression/anxiety.    Physical Exam:  Vitals: BP (!) 98/64   Pulse 90   Temp 37.1 °C (98.7 °F)   Resp 16   Ht 1.53 m (5' 0.24\")   Wt 64.3 kg (141 lb 12.1 oz)   SpO2 96%   BMI 27.46 kg/m²     General: Not in acute distress, alert and oriented x 3  HEENT: No pallor, icterus. Oropharynx clear.   Neck: Supple, no palpable masses.  Lymph nodes: No palpable cervical, supraclavicular, axillary or inguinal lymphadenopathy.    CVS: regular rate and rhythm, no rubs or gallops  RESP: Clear to auscultate bilaterally, no wheezing or crackles.   ABD: Soft, non tender, non distended, positive bowel sounds, no palpable organomegaly  EXT: No edema or cyanosis  CNS: Alert and oriented x3, No focal deficits.  Skin- No rash      Labs:   Hospital Outpatient Visit on 06/29/2018   Component Date Value Ref Range Status   • WBC 06/29/2018 10.9* 4.8 - 10.8 K/uL Final   • RBC 06/29/2018 3.58* 4.20 - 5.40 M/uL Final   • Hemoglobin 06/29/2018 11.3* 12.0 - 16.0 g/dL Final   • Hematocrit 06/29/2018 33.4* 37.0 - 47.0 % Final   • MCV 06/29/2018 93.3  81.4 - 97.8 fL Final   • MCH 06/29/2018 31.6  27.0 - 33.0 pg Final   • MCHC 06/29/2018 33.8  33.6 - 35.0 g/dL Final   • RDW 06/29/2018 62.6* 35.9 - 50.0 fL Final   • Platelet Count 06/29/2018 260  164 - 446 K/uL Final   • MPV 06/29/2018 10.3  9.0 - 12.9 fL Final   • Neutrophils-Polys 06/29/2018 57.50  44.00 - 72.00 % Final   • Lymphocytes 06/29/2018 28.20  22.00 - 41.00 % Final   • Monocytes 06/29/2018 8.40  0.00 - 13.40 % Final   • Eosinophils 06/29/2018 0.60  0.00 - 6.90 % Final   • Basophils 06/29/2018 0.50  0.00 - 1.80 % Final   • Immature Granulocytes 06/29/2018 4.80* 0.00 - 0.90 % " Final   • Nucleated RBC 06/29/2018 0.30  /100 WBC Final   • Neutrophils (Absolute) 06/29/2018 6.27  2.00 - 7.15 K/uL Final    Includes immature neutrophils, if present.   • Lymphs (Absolute) 06/29/2018 3.06  1.00 - 4.80 K/uL Final   • Monos (Absolute) 06/29/2018 0.91* 0.00 - 0.85 K/uL Final   • Eos (Absolute) 06/29/2018 0.06  0.00 - 0.51 K/uL Final   • Baso (Absolute) 06/29/2018 0.05  0.00 - 0.12 K/uL Final   • Immature Granulocytes (abs) 06/29/2018 0.52* 0.00 - 0.11 K/uL Final   • NRBC (Absolute) 06/29/2018 0.03  K/uL Final   • Sodium 06/29/2018 138  135 - 145 mmol/L Final   • Potassium 06/29/2018 3.8  3.6 - 5.5 mmol/L Final   • Chloride 06/29/2018 108  96 - 112 mmol/L Final   • Co2 06/29/2018 23  20 - 33 mmol/L Final   • Anion Gap 06/29/2018 7.0  0.0 - 11.9 Final   • Glucose 06/29/2018 110* 65 - 99 mg/dL Final   • Bun 06/29/2018 15  8 - 22 mg/dL Final   • Creatinine 06/29/2018 1.09  0.50 - 1.40 mg/dL Final   • Calcium 06/29/2018 8.1* 8.5 - 10.5 mg/dL Final   • AST(SGOT) 06/29/2018 19  12 - 45 U/L Final   • ALT(SGPT) 06/29/2018 14  2 - 50 U/L Final   • Alkaline Phosphatase 06/29/2018 82  30 - 99 U/L Final   • Total Bilirubin 06/29/2018 0.4  0.1 - 1.5 mg/dL Final   • Albumin 06/29/2018 2.8* 3.2 - 4.9 g/dL Final   • Total Protein 06/29/2018 5.4* 6.0 - 8.2 g/dL Final   • Globulin 06/29/2018 2.6  1.9 - 3.5 g/dL Final   • A-G Ratio 06/29/2018 1.1  g/dL Final   • GFR If  06/29/2018 60  >60 mL/min/1.73 m 2 Final   • GFR If Non  06/29/2018 49* >60 mL/min/1.73 m 2 Final             Assessment and Plan:  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 She had multiple liver lesions at diagnosis as well as hilar adenopathy. She completed 3 cycles of gemcitabine and Abraxane with good responses. Her regimen was then decreased to single agent gemcitabine. She had significant fatigue has chemotherapy has been held since early 2017. She  had  progression mainly in the pancreatic bed.     -11/2070 started on gemcitabine and Abraxane for progression mainly involving the pancreatic head/lymph nodes with no response  Switched to 5-FU/Liposomal Irinotecan She appears to be having good response in the imaging studies.. She required dose reduction with much better tolerance. Her labs are adequate and she will continue the current regimen every 2 weeks.  Interestingly, her initial hepatic metastatic disease is still remaining in remission. Most of the current relapse appears to be around the pancreatic bed with lymph node involvement. . She is developing some tolerance issues and will hold further chemotherapy. I will obtain a restaging PET scan and see her back. Will consider referral to radiation with some Xeloda. Depending on the PET scan findings. PARP inhibitors are currently not approved her for pancreatic carcinoma.     -BRCA2 mutation seen in the tumor specimen. Germline analysis did not reveal BRCA2 mutation. There was  BAP1/RAD 51 VUS. We will look into the option of getting off label PARP inhibitors if she progresses.     -Diarrhea secondary to chemotherapy and when necessary Imodium. She knows to take it proactively. She will continue Megace for anorexia., She again had diarrhea with the last dose and will hold further treatment.  Hypokalemia.-This is rather chronic  She agreed and verbalized  agreement and understanding with the current plan.  I answered all questions and concerns at this time         Please note that this dictation was created using voice recognition software. I have made every reasonable attempt to correct obvious errors, but I expect that there are errors of grammar and possibly content that I did not discover before finalizing the note.      SIGNATURES:  Claude Cedeno    CC:  Wally Knox D.O.  No ref. provider found

## 2018-06-29 NOTE — PROGRESS NOTES
Elin Poole is a 73 y.o. female here for a non-provider visit for: Lab Draws  on 6/29/2018 at 2:35 PM    Procedure Performed: Venipuncture     Anatomical site: Right Antecubital Area (AC)    Equipment used: 23g Butterfly    Labs drawn: CBC w/diff, CMP and Estimated GFR    Ordering Provider: Dr. Claude Cedeno    Angel By: Cherrie Turcios, Med Ass't  No complications and  was present in the office the entire time I was doing the patients blood draw.

## 2018-07-13 NOTE — PROGRESS NOTES
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  She was diagnosed due to elevated liver enzymes and acute renal insufficiency.  She is s/p ERCP with stent with good decompression. Biopsy was positive for adenocarcinoma of primary ampulla. She was seen by Dr. Jacobs and underwent an attempted Whipple’s in 8/2016. The surgery was aborted secondary to multiple liver lesions and s/p wedge resection of the liver which was positive for poorly differentiated adenocarcinoma.  PET scan showed uptake in multiple hepatic lesions and uptake in the ampulla. He also had uptake in bilateral hilar area with increased uptake and small pulmonary nodules in the right middle lobe without uptake. She was treated with gemcitabine and Abraxane. She tolerated the treatment fairly well initially. After cycle 3 of chemotherapy she started to have increased fatigue and diarrhea. Repeat imaging showed response to therapy. The diarrhea improve and her regimen was changed to single agent gemcitabine. She received chemotherapy on 1/18/17. Further cycles have been held secondary to increased fatigue.   3/20/17 CT CAP showed thrombus seen in the right external iliac and common femoral vein. Previous foci in the liver are no longer seen and no residual or recurrent mass seen in the surgical bed.  She was started on Eliquis.     -11/1/17-interim CT scan shows disease progression with a new enhancing mass in the uncinate process in. Ampullary area with some mass effect on the distal CBD. There is also new adenopathy surrounding the mass. new tumor thrombus in the posterior SMV with 180 degrees abutment of the adjacent mass     -11 /15/2017- started gemcitabine and Abraxane alternate week with good tolerance.     -2/5/18-CT abdomen, pelvis-Increased size of enhancing mass in  the uncinate process and periampullary region which currently measures 4.0 x 4.4 cm compared to 3.6 x 4.1 cm., slight decrease in the adenopathy anterior to the pancreas, slight increase in aortocaval adenopathy. Increased nonocclusive thrombus in the SMV.     -Started 5FU/Onvyde       she tolerated the first dose well. With the second dose she had significant diarrhea and fatigue. She is still feeling slightly weak.  3/27/18-CT scan2.5 cm pancreatic head/and cystic process mass previously measured 4.4 cm. Treatment response.  Decrease in size of peripancreatic and upper abdomen retroperitoneal lymph nodes/treatment response.  Dilatation of the common duct and pneumobilia again demonstrated.  The superior mesenteric artery and superior mesenteric veins no longer appear encased. No thrombus within the superior mesenteric vein currently demonstrated.     NGS testing-I   Interim history  -She received 6 cycles of 5-FU and liposomal irinotecan., She again had significant diarrhea with the last dose. Further treatment was held. She is currently feeling better  -PET scan-7/12/18-    focal but slightly ill-defined uptake in the posterior right lobe of the liver which is suspicious for hepatic metastases although no focal mass is seen on the current CT images. There is questionable subtle lobular enhancement in this   region on the previous CT of 3/27/2018. If clinically indicated, this could be further assessed with MRI of the liver.  2.  Focal uptake in the duodenum near the ampulla bladder which is nonspecific and could represent residual tumor or physiologic uptake.  3.  There are normal sized peripancreatic lymph nodes, one demonstrating minimal uptake of doubtful clinical significance.  4.  There is multifocal uptake throughout the colon  Past Medical History:      Past Medical History:   Diagnosis Date   • Ampullary carcinoma (HCC)    • Arthritis     hands/fingers/right knee   • Asthma    • Cancer (HCC) 2016     Ampullary CA   • Cataract 09-    bilat.,no surgery   • Dental disorder     upper partial   • Encounter for antineoplastic chemotherapy 4/6/2018   • Heart burn    • Hypertension    • Indigestion    • Jaundice        Past surgical history:       Past Surgical History:   Procedure Laterality Date   • CATH PLACEMENT Right 9/9/2016    Procedure: CATH PLACEMENT cephalic power port  ;  Surgeon: Kurtis Jacobs M.D.;  Location: SURGERY [facility];  Service:    • WHIPPLE PROCEDURE  8/15/2016    Procedure: Exploratoy Laparotomy, Da-en-y;  Surgeon: Kurtis Jacobs M.D.;  Location: SURGERY [facility];  Service:    • NODE DISSECTION  8/15/2016    Procedure: omental flap, Liver Wedge, cholecystectomy ;  Surgeon: Kurtis Jacobs M.D.;  Location: SURGERY [facility];  Service:    • STENT PLACEMENT  7/2016    gallbladder   • CHOLECYSTECTOMY         Allergies:       Patient has no known allergies.    Medications:         Current Outpatient Prescriptions   Medication Sig Dispense Refill   • maalox plus-benadryl-visc lidocaine (MAGIC MOUTHWASH) Take 5 mL by mouth every 6 hours as needed. 1:1:1 ratio 250 mL 3   • ELIQUIS 5 MG Tab TAKE 1 TAB BY MOUTH 2 TIMES A DAY. 60 Tab 5   • potassium chloride SA (K-DUR) 10 MEQ Tab CR Take 2 Tabs by mouth 2 times a day. 180 Tab 3   • omeprazole (PRILOSEC) 20 MG delayed-release capsule TAKE ONE CAPSULE BY MOUTH EVERY DAY 30 Cap 3   • potassium bicarbonate (KLYTE) 25 MEQ tablet Take 1 Tab by mouth every day. 30 Tab 6   • loperamide (IMODIUM) 2 MG Cap Take 1 Cap by mouth 4 times a day as needed for Diarrhea. 30 Cap 0   • RESTASIS 0.05 % ophthalmic emulsion      • losartan-hydrochlorothiazide (HYZAAR) 50-12.5 MG per tablet Take 1 Tab by mouth every day. 30 Tab 0   • megestrol (MEGACE) 400 MG/10ML Suspension Take 20 mL by mouth every day. 600 mL 1   • prochlorperazine (COMPAZINE) 10 MG Tab Take 1 Tab by mouth every 6 hours as needed (for nausea, vomiting).  "30 Tab 6   • ondansetron (ZOFRAN) 4 MG Tab tablet Take 1 Tab by mouth every four hours as needed for Nausea/Vomiting. 20 Tab 3   • sennosides-docusate sodium (SENOKOT-S) 8.6-50 MG tablet Take 1 Tab by mouth 2 times a day. 60 Tab 3     No current facility-administered medications for this visit.          Social History:     Social History     Social History   • Marital status:      Spouse name: N/A   • Number of children: N/A   • Years of education: N/A     Occupational History   • Not on file.     Social History Main Topics   • Smoking status: Never Smoker   • Smokeless tobacco: Never Used   • Alcohol use No   • Drug use: No   • Sexual activity: Not on file     Other Topics Concern   • Not on file     Social History Narrative   • No narrative on file       Family History:    No family history on file.    Review of Systems:  All other review of systems are negative except what was mentioned above in the HPI.    Constitutional: Negative for fever, chills, weight loss and malaise/fatigue.    HEENT: No new auditory or visual complaints. No sore throat and neck pain.     Respiratory: Negative for cough, sputum production, shortness of breath and wheezing.    Cardiovascular: Negative for chest pain, palpitations, orthopnea and leg swelling.    Gastrointestinal: Negative for heartburn, nausea, vomiting and abdominal pain.    Genitourinary: Negative for dysuria, hematuria    Musculoskeletal: No new arthralgias or myalgias   Skin: Negative for rash and itching.    Neurological: Negative for focal weakness and headaches.    Endo/Heme/Allergies: No abnormal bleed/bruise.    Psychiatric/Behavioral: No new depression/anxiety.    Physical Exam:  Vitals: /60   Pulse 92   Temp 36.9 °C (98.4 °F)   Resp 18   Ht 1.549 m (5' 1\")   Wt 64.3 kg (141 lb 10.3 oz)   SpO2 98%   BMI 26.76 kg/m²      General: Not in acute distress, alert and oriented x 3  HEENT: No pallor, icterus. Oropharynx clear.   Neck: Supple, no " palpable masses.  Lymph nodes: No palpable cervical, supraclavicular, axillary or inguinal lymphadenopathy.    CVS: regular rate and rhythm, no rubs or gallops  RESP: Clear to auscultate bilaterally, no wheezing or crackles.   ABD: Soft, non tender, non distended, positive bowel sounds, no palpable organomegaly  EXT: No edema or cyanosis  CNS: Alert and oriented x3, No focal deficits.  Skin- No rash      Labs:   No visits with results within 1 Week(s) from this visit.   Latest known visit with results is:   Hospital Outpatient Visit on 06/29/2018   Component Date Value Ref Range Status   • WBC 06/29/2018 10.9* 4.8 - 10.8 K/uL Final   • RBC 06/29/2018 3.58* 4.20 - 5.40 M/uL Final   • Hemoglobin 06/29/2018 11.3* 12.0 - 16.0 g/dL Final   • Hematocrit 06/29/2018 33.4* 37.0 - 47.0 % Final   • MCV 06/29/2018 93.3  81.4 - 97.8 fL Final   • MCH 06/29/2018 31.6  27.0 - 33.0 pg Final   • MCHC 06/29/2018 33.8  33.6 - 35.0 g/dL Final   • RDW 06/29/2018 62.6* 35.9 - 50.0 fL Final   • Platelet Count 06/29/2018 260  164 - 446 K/uL Final   • MPV 06/29/2018 10.3  9.0 - 12.9 fL Final   • Neutrophils-Polys 06/29/2018 57.50  44.00 - 72.00 % Final   • Lymphocytes 06/29/2018 28.20  22.00 - 41.00 % Final   • Monocytes 06/29/2018 8.40  0.00 - 13.40 % Final   • Eosinophils 06/29/2018 0.60  0.00 - 6.90 % Final   • Basophils 06/29/2018 0.50  0.00 - 1.80 % Final   • Immature Granulocytes 06/29/2018 4.80* 0.00 - 0.90 % Final   • Nucleated RBC 06/29/2018 0.30  /100 WBC Final   • Neutrophils (Absolute) 06/29/2018 6.27  2.00 - 7.15 K/uL Final    Includes immature neutrophils, if present.   • Lymphs (Absolute) 06/29/2018 3.06  1.00 - 4.80 K/uL Final   • Monos (Absolute) 06/29/2018 0.91* 0.00 - 0.85 K/uL Final   • Eos (Absolute) 06/29/2018 0.06  0.00 - 0.51 K/uL Final   • Baso (Absolute) 06/29/2018 0.05  0.00 - 0.12 K/uL Final   • Immature Granulocytes (abs) 06/29/2018 0.52* 0.00 - 0.11 K/uL Final   • NRBC (Absolute) 06/29/2018 0.03  K/uL Final   •  Sodium 06/29/2018 138  135 - 145 mmol/L Final   • Potassium 06/29/2018 3.8  3.6 - 5.5 mmol/L Final   • Chloride 06/29/2018 108  96 - 112 mmol/L Final   • Co2 06/29/2018 23  20 - 33 mmol/L Final   • Anion Gap 06/29/2018 7.0  0.0 - 11.9 Final   • Glucose 06/29/2018 110* 65 - 99 mg/dL Final   • Bun 06/29/2018 15  8 - 22 mg/dL Final   • Creatinine 06/29/2018 1.09  0.50 - 1.40 mg/dL Final   • Calcium 06/29/2018 8.1* 8.5 - 10.5 mg/dL Final   • AST(SGOT) 06/29/2018 19  12 - 45 U/L Final   • ALT(SGPT) 06/29/2018 14  2 - 50 U/L Final   • Alkaline Phosphatase 06/29/2018 82  30 - 99 U/L Final   • Total Bilirubin 06/29/2018 0.4  0.1 - 1.5 mg/dL Final   • Albumin 06/29/2018 2.8* 3.2 - 4.9 g/dL Final   • Total Protein 06/29/2018 5.4* 6.0 - 8.2 g/dL Final   • Globulin 06/29/2018 2.6  1.9 - 3.5 g/dL Final   • A-G Ratio 06/29/2018 1.1  g/dL Final   • GFR If  06/29/2018 60  >60 mL/min/1.73 m 2 Final   • GFR If Non  06/29/2018 49* >60 mL/min/1.73 m 2 Final             Assessment and Plan:    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 She had multiple liver lesions at diagnosis as well as hilar adenopathy. She completed 3 cycles of gemcitabine and Abraxane with good responses. Her regimen was then decreased to single agent gemcitabine. She had significant fatigue has chemotherapy has been held since early 2017. She then had  progression mainly in the pancreatic bed. Interestingly, her initial hepatic metastatic disease is still remaining in remission. Most of the current relapse appears to be around the pancreatic bed with lymph node involvement  -11/2017 started on gemcitabine and Abraxane for progression mainly involving the pancreatic head/lymph nodes with no response  Switched to 5-FU/Liposomal Irinotecan . She had very good response. In the interim scans. However, she had some tolerance issues including diarrhea. She is status post 6  cycles.    Reviewed the Restaging PET scan images with the patient. There is only some faint uptake in the liver and in the pancreatic bed of unclear significance. We will obtain a liver MRI. , Given her age and intolerance. I would favor her stopping further treatment, risks, benefits standpoint. I will review her PET scan images /MRI in the tumor board and see whether she needs any intervention. Another option will be to keep her on low-dose maintenance Xeloda as she appears to have chemosensitive malignancy. If she progresses, consider off label PARP inhibitors  , which are currently not approved her for pancreatic carcinoma.     -BRCA2 mutation seen in the tumor specimen. Germline analysis did not reveal BRCA2 mutation. There was  BAP1/RAD 51 VUS. We will look into the option of getting off label PARP inhibitors if she progresses.     -Diarrhea secondary to chemotherapy-C. difficile was negative. This has improved. PET scan does show increase colon uptake  -Chronic hypokalemia, continue replacement      Complex patient requiring complex decision making. Reviewed the laboratory data and images with the patient. Antineoplastic therapy requiring close monitoring and decision-making.    She agreed and verbalized  agreement and understanding with the current plan.  I answered all questions and concerns at this time         Please note that this dictation was created using voice recognition software. I have made every reasonable attempt to correct obvious errors, but I expect that there are errors of grammar and possibly content that I did not discover before finalizing the note.      SIGNATURES:  Claude Cedeno    CC:  Wally Knox D.O.  No ref. provider found

## 2018-08-14 NOTE — PROGRESS NOTES
Pt ambulatory to Medical Oncology for port flush. Port accessed using 1 inch needle without success. Second attempt with 1.5 inch needle with success. No labs ordered. Port flushed per protocol. (+) blood return. Heparin administered as ordered. Port de accessed. Sterile gauze and tape placed over port access site. Pt ambulatory out of clinic in [facility].

## 2018-08-23 NOTE — CONSULTS
RADIATION ONCOLOGY CONSULT    DATE OF SERVICE: 8/23/2018    IDENTIFICATION:   73-year-old female with metastatic ampullary pancreatic carcinoma BRCA mutated    HISTORY OF PRESENT ILLNESS: I had the pleasure of seeing Ms. Poole today in consultation at the request of Dr. Cedeno for metastatic ampullary pancreatic carcinoma.  Patient originally presented back in July 2016 with jaundice and itchy skin and underwent ERCP with stent and biopsy which showed adenocarcinoma primary ampulla.  Patient underwent attempted Whipple in August 2016 by Dr. Jacobs but surgery was aborted secondary to multiple liver lesions with wedge resection of liver which showed poorly differentiated adenocarcinoma.  PET/CT at that time showed uptake in multiple hepatic lesions and uptake in the ampulla as well as uptake in the bilateral hilar area and small pulmonary nodules in the right middle lobe without uptake.  She was treated with gemcitabine and Abraxane for 3 cycles with excellent response to therapy she had interval CT scan in March 2017 which showed previous foci in the liver are no longer seen and no residual or recurrent mass in the surgical bed.  She developed progression of disease in November 2017 is restarted on gemcitabine and Abraxane with good tolerance and interval scan in February 2018 showed increased size of mass in the periampullary region measuring 4 x 4.4 cm and slight increase in the aortocaval adenopathy.  She was started on 5-FU and irinotecan and she tolerated the first dose although with the second dose she developed diarrhea and fatigue.  She underwent CT scan March 27, 2018 which showed good response to pancreatic head mass down to 2.5 cm from 4.4 cm for as well as decrease in the retroperitoneal lymph nodes.  She is continued on 6 cycles of 5-FU and liposomal irinotecan with interval PET scan 7/12/18 showing focal but slightly ill-defined uptake in the posterior right lobe of the liver which is  suspicious for hepatic metastasis although no focal mass is seen on the current CT images.  Focal uptake in the duodenum and the ampulla bladder which is nonspecific and could represent residual tumor or physiologic uptake.  There are normal-sized peripancreatic lymph nodes one demonstrating minimal uptake of doubtful clinical significance.  She had MRI abdomen on August 6, 2018 which showed periampullary diffusion restriction likely represents residual ampullary tumor.  Additionally she has persistent arterial enhancement in the right hepatic lobe consistent with no metastatic disease.  She was discussed in our GI tumor board with consensus to proceed with Y 90 treatment to the liver followed by chemoradiation to the ampullary lesion and peripancreatic lymph nodes.  She currently is doing well with minimal side effects.  She is having some weight gain and some neuropathy in the bottom of her feet.    PAST MEDICAL HISTORY:   Past Medical History:   Diagnosis Date   • Ampullary carcinoma (HCC)    • Arthritis     hands/fingers/right knee   • Asthma    • Cancer (HCC) 2016    Ampullary CA   • Cataract 09-    bilat.,no surgery   • Dental disorder     upper partial   • Encounter for antineoplastic chemotherapy 4/6/2018   • Heart burn    • Hypertension    • Indigestion    • Jaundice        PAST SURGICAL HISTORY:  Past Surgical History:   Procedure Laterality Date   • CATH PLACEMENT Right 9/9/2016    Procedure: CATH PLACEMENT cephalic power port  ;  Surgeon: Kurtis Jacobs M.D.;  Location: SURGERY [facility];  Service:    • WHIPPLE PROCEDURE  8/15/2016    Procedure: Exploratoy Laparotomy, Da-en-y;  Surgeon: Kurtis Jacobs M.D.;  Location: SURGERY [facility];  Service:    • NODE DISSECTION  8/15/2016    Procedure: omental flap, Liver Wedge, cholecystectomy ;  Surgeon: Kurtis Jacobs M.D.;  Location: SURGERY [facility];  Service:    • STENT PLACEMENT  7/2016    gallbladder  "  • CATARACT EXTRACTION WITH IOL     • CHOLECYSTECTOMY     • TONSILLECTOMY         CURRENT MEDICATIONS:  Current Outpatient Prescriptions   Medication Sig Dispense Refill   • ELIQUIS 5 MG Tab TAKE 1 TAB BY MOUTH 2 TIMES A DAY. 60 Tab 5   • potassium chloride SA (K-DUR) 10 MEQ Tab CR Take 2 Tabs by mouth 2 times a day. 180 Tab 3   • omeprazole (PRILOSEC) 20 MG delayed-release capsule TAKE ONE CAPSULE BY MOUTH EVERY DAY 30 Cap 3   • losartan-hydrochlorothiazide (HYZAAR) 50-12.5 MG per tablet Take 1 Tab by mouth every day. 30 Tab 0   • prochlorperazine (COMPAZINE) 10 MG Tab Take 1 Tab by mouth every 6 hours as needed (for nausea, vomiting). 30 Tab 6   • ondansetron (ZOFRAN) 4 MG Tab tablet Take 1 Tab by mouth every four hours as needed for Nausea/Vomiting. 20 Tab 3   • loperamide (IMODIUM) 2 MG Cap Take 1 Cap by mouth 4 times a day as needed for Diarrhea. 30 Cap 0   • RESTASIS 0.05 % ophthalmic emulsion      • sennosides-docusate sodium (SENOKOT-S) 8.6-50 MG tablet Take 1 Tab by mouth 2 times a day. 60 Tab 3     No current facility-administered medications for this encounter.        ALLERGIES:    Patient has no known allergies.    FAMILY HISTORY:    Great-Nephew - \"rare-cancer\" unknown type    SOCIAL HISTORY:    Social History   Substance Use Topics   • Smoking status: Never Smoker   • Smokeless tobacco: Never Used   • Alcohol use No     Patient is , has 3 children and lives in [facility]. Patient is a retired .     REVIEW OF SYSTEMS:  A greater than 10 point review of systems was completed in patient's chart on 8/23/2018 and scanned in to [facility].    The rest of the review of systems is negative and has been reviewed by me.  All are negative with relationship to this diagnosis with the exception of: Positive for weight gain since not having chemo, dry mouth, heartburn, muscle pain, joint pain, unsteady gait, neuropathy          PHYSICAL EXAM:    Vitals:    08/23/18 1357   BP: 133/53   Pulse: 72 " "  Resp: 16   Temp: 36.2 °C (97.2 °F)   SpO2: 97%   Weight: 66.1 kg (145 lb 12.8 oz)   Height: 1.549 m (5' 1\")      GYN History:  /A0                         No prior BCP or HRT use.    0= Fully active, able to carry on all pre-disease performance without restriction.    Pain Scale: 0-10  Pain Assessement: 3  Pain Location, Orientation and Scale: rt upper abd (since 18)  What makes the pain better: pt does not take pain med  What makes the pain worse: nothing             Patient said she was pulling weeds and thinks she \"pulled a muscle\"    GENERAL: No apparent distress.  HEENT:  Pupils are equal, round, and reactive to light.  Extraocular muscles   are intact. Sclerae nonicteric.  Conjunctivae pink.  Oral cavity, tongue   protrudes midline.   NECK:  Supple without evidence of thyromegaly.  NODES:  No peripheral adenopathy of the neck, supraclavicular fossa bilaterally.  LUNGS:  Good effort   HEART:  Regular rate  ABDOMEN:  +midline incision Soft. No evidence of hepatosplenomegaly.   EXTREMITIES:  Without Edema.  NEUROLOGIC:  Cranial nerves II through XII were intact. Normal stance and gait motor and sensory grossly within normal limits      IMAGING:  Results for orders placed during the hospital encounter of 18   [id number] BASE TO MID-THIGH    Addendum Addendum: In the impression #2 should read: focal uptake in the duodenum near the  ampulla of Vater which is nonspecific and could represent residual tumor  or physiologic bowel uptake.      Sola Shelley M.D. 2018  1:49 PM          Impression 1.  There is focal but slightly ill-defined uptake in the posterior right lobe of the liver which is suspicious for hepatic metastases although no focal mass is seen on the current CT images. There is questionable subtle lobular enhancement in this   region on the previous CT of 3/27/2018. If clinically indicated, this could be further assessed with MRI of the liver.  2.  Focal uptake in the duodenum " near the ampulla bladder which is nonspecific and could represent residual tumor or physiologic uptake.  3.  There are normal sized peripancreatic lymph nodes, one demonstrating minimal uptake of doubtful clinical significance.  4.  There is multifocal uptake throughout the colon.       MR Abdomen 8/6/18  1.  Periampullary diffusion restriction likely represents residual ampullary tumor. Tumor size is difficult to compare given differences in modality, but may have decreased slightly since the exam in March.    2.  Persistent ductal dilatation with small calculi or debris in the distal common bile duct.    3.  Persistent arterial enhancement in the right hepatic lobe is consistent with known metastatic disease. Appearance is similar to uptake on the recent PET/CT. No new hepatic lesions are identified.    4.  Status post cholecystectomy.    LABS:  Lab Results   Component Value Date/Time    WBC 10.9 (H) 06/29/2018 01:16 PM    RBC 3.58 (L) 06/29/2018 01:16 PM    HEMOGLOBIN 11.3 (L) 06/29/2018 01:16 PM    HEMATOCRIT 33.4 (L) 06/29/2018 01:16 PM    MCV 93.3 06/29/2018 01:16 PM    MCH 31.6 06/29/2018 01:16 PM    MCHC 33.8 06/29/2018 01:16 PM    MPV 10.3 06/29/2018 01:16 PM    NEUTSPOLYS 57.50 06/29/2018 01:16 PM    LYMPHOCYTES 28.20 06/29/2018 01:16 PM    MONOCYTES 8.40 06/29/2018 01:16 PM    EOSINOPHILS 0.60 06/29/2018 01:16 PM    BASOPHILS 0.50 06/29/2018 01:16 PM    ANISOCYTOSIS 1+ 06/22/2018 02:44 PM      Lab Results   Component Value Date/Time    SODIUM 138 06/29/2018 01:16 PM    POTASSIUM 3.8 06/29/2018 01:16 PM    CHLORIDE 108 06/29/2018 01:16 PM    CO2 23 06/29/2018 01:16 PM    GLUCOSE 110 (H) 06/29/2018 01:16 PM    BUN 15 06/29/2018 01:16 PM    CREATININE 1.09 06/29/2018 01:16 PM        PATHOLOGY:  8/15/16    FINAL DIAGNOSIS:    A. Falciform:         Benign adipose tissue.  B. Segment 3 liver wedge:         Positive for metastatic poorly differentiated adenocarcinoma.         The margins are free of  involvement.      C. Gallbladder:         Benign gallbladder with cholelithiasis.    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  She will see Dr. Neri next week to further discuss this plan.  A referral has been placed to interventional radiology to discuss Y90 treatment.  We will proceed with restaging after Y 90 followed by chemoradiation therapy to the ampullary lesion and peripancreatic lymph nodes.    The patient would like to proceed forward with treatment and we will set up CT simulation for treatment planning imaging after she undergoes Y90 treatment.  That will consist of supine immobilization, possibly IV contrast depending on kidney function and targeting requirements, appropriate skin surface marking for radiation treatment.     We will then complete the behind the scenes planning process over several days using appropriate image fusion, target delineation, dosimetry,  checks, and treatment scheduling.      We discussed the risks, benefits and side effects of treatment and the patient is amenable to treatment.  If patient has any questions or concerns, she should feel free to contact me.    Thank you for the opportunity to participate in her care.  If any questions or comments, please do not hesitate in calling.

## 2018-08-23 NOTE — TELEPHONE ENCOUNTER
Attempted to reach Elin to inform her of a BMP lab that we have placed for her to complete. LVM to call Meron RN at [phone].     If patient calls back please ask her to complete the lab work. Thanks

## 2018-08-23 NOTE — NON-PROVIDER
"Patient was seen today in clinic with Dr. Omi Benavides for Ampullary Cancer.  Vitals signs and weight were obtained and pain assessment was completed.  Allergies and medications were reviewed with the patient.  Review of systems completed.     Vitals/Pain:  Vitals:    08/23/18 1357   BP: 133/53   Pulse: 72   Resp: 16   Temp: 36.2 °C (97.2 °F)   SpO2: 97%   Weight: 66.1 kg (145 lb 12.8 oz)   Height: 1.549 m (5' 1\")        Pain Scale: 0-10  Pain Assessement: 3  Pain Location, Orientation and Scale: rt upper abd (since 8/20/18)  What makes the pain better: pt does not take pain med  What makes the pain worse: nothing             Patient said she was pulling weeds and thinks she \"pulled a muscle\"      Allergies:   Patient has no known allergies.    Current Medications:  Current Outpatient Prescriptions   Medication Sig Dispense Refill   • ELIQUIS 5 MG Tab TAKE 1 TAB BY MOUTH 2 TIMES A DAY. 60 Tab 5   • potassium chloride SA (K-DUR) 10 MEQ Tab CR Take 2 Tabs by mouth 2 times a day. 180 Tab 3   • omeprazole (PRILOSEC) 20 MG delayed-release capsule TAKE ONE CAPSULE BY MOUTH EVERY DAY 30 Cap 3   • losartan-hydrochlorothiazide (HYZAAR) 50-12.5 MG per tablet Take 1 Tab by mouth every day. 30 Tab 0   • prochlorperazine (COMPAZINE) 10 MG Tab Take 1 Tab by mouth every 6 hours as needed (for nausea, vomiting). 30 Tab 6   • ondansetron (ZOFRAN) 4 MG Tab tablet Take 1 Tab by mouth every four hours as needed for Nausea/Vomiting. 20 Tab 3   • loperamide (IMODIUM) 2 MG Cap Take 1 Cap by mouth 4 times a day as needed for Diarrhea. 30 Cap 0   • RESTASIS 0.05 % ophthalmic emulsion      • sennosides-docusate sodium (SENOKOT-S) 8.6-50 MG tablet Take 1 Tab by mouth 2 times a day. 60 Tab 3     No current facility-administered medications for this encounter.          PCP:  Abilio Guzman R.N.  "

## 2018-08-27 NOTE — TELEPHONE ENCOUNTER
Spoke with Ms. Poole and informed her of her needed repeat BMP lab work to be completed. She agrees and denies any questions. Pt is currently taking 40 mEq of K daily.

## 2018-08-28 NOTE — TELEPHONE ENCOUNTER
Mr. Sea Poole called regarding upcoming appointment with Dr. Mabry to cancel due to patient needing to have some procedures which Dr. COHEN is going to be scheduling and they will like to have their schedule open just in case. Patient will be giving us a call to reschedule as soon as possible.

## 2018-09-10 NOTE — CONSULTS
Interventional Oncology Consultation      Re: Elin Poole     MRN: [id number]   : 1945    Elin Poole was referred by Kurtis Jacobs MD. She is a 73 y.o. 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 She is also under the care of Omi Benavides MD, Claude Cedeno MD, Wally Knox DO, and Ernesto Abraham DO.    History of Present Illness:   has a history of ampullary carcinoma with metastatic liver disease noted intraoperative during an attempted Whipple in . Since that time, she has been on chemotherapy. Recent imaging revealed a single right lobe lesion that is stable in size and the ampullary tumor is also stable or slightly smaller. Surgery is a consideration for the ampullary tumor but the hepatic lesion is unresectable.  has been referred to interventional radiology for evaluation and management. Today, she complains of bilateral toe/ foot numbness along with right knee osteoarthritis that limit her walking. She has a history of right lower extremity DVT two years ago and is on Eliquis. During her chemo, she lost around 40 pounds and has regained weight back to her baseline. She has not had chemotherapy since July and states she feels pretty good overall. She is planning a family trip to [place] next month. She has a right port in place. She is accompanied by her daughter Cammy today and has asked that her procedure be discussed with Cammy or Sea (son) via phone if they have questions.     She is seen today for review of imaging studies and discussion of possible transarterial therapy with Y90 radioembolization.     Past Medical History:   Diagnosis Date   • Ampullary carcinoma (HCC)    • Arthritis     hands/fingers/right knee   • Asthma    • Cancer (HCC) 2016    Ampullary CA   • Cataract 2016    bilat.,no surgery   • Dental disorder     upper partial    • Encounter for antineoplastic chemotherapy 4/6/2018   • Heart burn    • Hypertension    • Indigestion    • Jaundice      Past Surgical History:   Procedure Laterality Date   • CATH PLACEMENT Right 9/9/2016    Procedure: CATH PLACEMENT cephalic power port  ;  Surgeon: Kurtis Jacobs M.D.;  Location: SURGERY [facility];  Service:    • WHIPPLE PROCEDURE  8/15/2016    Procedure: Exploratoy Laparotomy, Da-en-y;  Surgeon: Kurtis Jacobs M.D.;  Location: SURGERY [facility];  Service:    • NODE DISSECTION  8/15/2016    Procedure: omental flap, Liver Wedge, cholecystectomy ;  Surgeon: Kurtis Jacobs M.D.;  Location: SURGERY [facility];  Service:    • STENT PLACEMENT  7/2016    gallbladder   • CATARACT EXTRACTION WITH IOL     • CHOLECYSTECTOMY     • TONSILLECTOMY       August 15, 2016 at [facility]  1.  Exploratory laparotomy.  2.  Open wedge liver resection.  3.  Open cholecystectomy.  4.  Da-en-Y hepaticojejunostomy to the extrahepatic bile ducts.  5.  Omental flap.    Social History     Social History   • Marital status:      Spouse name: N/A   • Number of children: N/A   • Years of education: N/A     Occupational History   • retired       Social History Main Topics   • Smoking status: Never Smoker   • Smokeless tobacco: Never Used   • Alcohol use No   • Drug use: No   • Sexual activity: Not on file     Other Topics Concern   • Not on file     Social History Narrative   • No narrative on file     Family History   Problem Relation Age of Onset   • Cancer Other         unknown cancer       Review of Systems   Constitutional: Negative for chills, diaphoresis, fever, malaise/fatigue and weight loss.   Eyes:        Negative for icterus   Respiratory: Negative.    Cardiovascular: Negative.    Gastrointestinal: Negative.  Negative for abdominal pain, blood in stool, constipation, diarrhea, heartburn, melena, nausea and vomiting.        Negative for  ascites   Genitourinary: Negative for hematuria.   Musculoskeletal: Positive for joint pain (right knee).   Skin: Negative.         Negative for jaundice   Neurological: Positive for sensory change (bilateral foot numbness). Negative for focal weakness and weakness.   Endo/Heme/Allergies: Does not bruise/bleed easily.   Psychiatric/Behavioral: Negative for memory loss and substance abuse (negative for alcohol and tobacco use).     A comprehensive 14-point review of systems was negative except as described above.     Labs:      Ref. Range 6/29/2018 13:16 8/27/2018 10:27   WBC Latest Ref Range: 4.8 - 10.8 K/uL 10.9 (H)    RBC Latest Ref Range: 4.20 - 5.40 M/uL 3.58 (L)    Hemoglobin Latest Ref Range: 12.0 - 16.0 g/dL 11.3 (L)    Hematocrit Latest Ref Range: 37.0 - 47.0 % 33.4 (L)    MCV Latest Ref Range: 81.4 - 97.8 fL 93.3    MCH Latest Ref Range: 27.0 - 33.0 pg 31.6    MCHC Latest Ref Range: 33.6 - 35.0 g/dL 33.8    RDW Latest Ref Range: 35.9 - 50.0 fL 62.6 (H)    Platelet Count Latest Ref Range: 164 - 446 K/uL 260    MPV Latest Ref Range: 9.0 - 12.9 fL 10.3    Neutrophils-Polys Latest Ref Range: 44.00 - 72.00 % 57.50    Neutrophils (Absolute) Latest Ref Range: 2.00 - 7.15 K/uL 6.27    Lymphocytes Latest Ref Range: 22.00 - 41.00 % 28.20    Lymphs (Absolute) Latest Ref Range: 1.00 - 4.80 K/uL 3.06    Monocytes Latest Ref Range: 0.00 - 13.40 % 8.40    Monos (Absolute) Latest Ref Range: 0.00 - 0.85 K/uL 0.91 (H)    Eosinophils Latest Ref Range: 0.00 - 6.90 % 0.60    Eos (Absolute) Latest Ref Range: 0.00 - 0.51 K/uL 0.06    Basophils Latest Ref Range: 0.00 - 1.80 % 0.50    Baso (Absolute) Latest Ref Range: 0.00 - 0.12 K/uL 0.05    Immature Granulocytes Latest Ref Range: 0.00 - 0.90 % 4.80 (H)    Immature Granulocytes (abs) Latest Ref Range: 0.00 - 0.11 K/uL 0.52 (H)    Nucleated RBC Latest Units: /100 WBC 0.30    NRBC (Absolute) Latest Units: K/uL 0.03    Sodium Latest Ref Range: 135 - 145 mmol/L 138 137   Potassium  Latest Ref Range: 3.6 - 5.5 mmol/L 3.8 4.2   Chloride Latest Ref Range: 96 - 112 mmol/L 108 106   Co2 Latest Ref Range: 20 - 33 mmol/L 23 24   Anion Gap Latest Ref Range: 0.0 - 11.9  7.0 7.0   Glucose Latest Ref Range: 65 - 99 mg/dL 110 (H) 107 (H)   Bun Latest Ref Range: 8 - 22 mg/dL 15 10   Creatinine Latest Ref Range: 0.50 - 1.40 mg/dL 1.09 0.84   GFR If  Latest Ref Range: >60 mL/min/1.73 m 2 60 >60   GFR If Non  Latest Ref Range: >60 mL/min/1.73 m 2 49 (A) >60   Calcium Latest Ref Range: 8.5 - 10.5 mg/dL 8.1 (L) 9.3   AST(SGOT) Latest Ref Range: 12 - 45 U/L 19    ALT(SGPT) Latest Ref Range: 2 - 50 U/L 14    Alkaline Phosphatase Latest Ref Range: 30 - 99 U/L 82    Total Bilirubin Latest Ref Range: 0.1 - 1.5 mg/dL 0.4    Albumin Latest Ref Range: 3.2 - 4.9 g/dL 2.8 (L)    Total Protein Latest Ref Range: 6.0 - 8.2 g/dL 5.4 (L)    Globulin Latest Ref Range: 1.9 - 3.5 g/dL 2.6    A-G Ratio Latest Units: g/dL 1.1      Child Guerrero Class A  GRICELDA Grade A2    [facility]  A. Falciform:         Benign adipose tissue.  B. Segment 3 liver wedge:         Positive for metastatic poorly differentiated adenocarcinoma.         The margins are free of involvement.  C. Gallbladder:         Benign gallbladder with cholelithiasis.  Comment: Per the operative note the patient was diagnosed with an ampullary adenocarcinoma by an outside biopsy.    Radiology:   MRI abdomen August 6, 2018 at [facility]:  1.  Periampullary diffusion restriction likely represents residual ampullary tumor. Tumor size is difficult to compare given differences in modality, but may have decreased slightly since the exam in March.  2.  Persistent ductal dilatation with small calculi or debris in the distal common bile duct.  3.  Persistent arterial enhancement in the right hepatic lobe is consistent with known metastatic disease. Appearance is similar to uptake on the recent PET/CT. No new hepatic  lesions are identified.  4.  Status post cholecystectomy.  5.  Diverticulosis    PET CT on July 12, 2018 at [facility]:  1.  There is focal but slightly ill-defined uptake in the posterior right lobe of the liver which is suspicious for hepatic metastases although no focal mass is seen on the current CT images. There is questionable subtle lobular enhancement in this   region on the previous CT of 3/27/2018. If clinically indicated, this could be further assessed with MRI of the liver.  2.  Focal uptake in the duodenum near the ampulla bladder which is nonspecific and could represent residual tumor or physiologic uptake.  3.  There are normal sized peripancreatic lymph nodes, one demonstrating minimal uptake of doubtful clinical significance.  4.  There is multifocal uptake throughout the colon.    Current Outpatient Prescriptions   Medication Sig Dispense Refill   • ELIQUIS 5 MG Tab TAKE 1 TAB BY MOUTH 2 TIMES A DAY. 60 Tab 5   • potassium chloride SA (K-DUR) 10 MEQ Tab CR Take 2 Tabs by mouth 2 times a day. 180 Tab 3   • omeprazole (PRILOSEC) 20 MG delayed-release capsule TAKE ONE CAPSULE BY MOUTH EVERY DAY 30 Cap 3   • prochlorperazine (COMPAZINE) 10 MG Tab Take 1 Tab by mouth every 6 hours as needed (for nausea, vomiting). 30 Tab 6   • ondansetron (ZOFRAN) 4 MG Tab tablet Take 1 Tab by mouth every four hours as needed for Nausea/Vomiting. 20 Tab 3   • loperamide (IMODIUM) 2 MG Cap Take 1 Cap by mouth 4 times a day as needed for Diarrhea. 30 Cap 0   • RESTASIS 0.05 % ophthalmic emulsion      • losartan-hydrochlorothiazide (HYZAAR) 50-12.5 MG per tablet Take 1 Tab by mouth every day. 30 Tab 0   • sennosides-docusate sodium (SENOKOT-S) 8.6-50 MG tablet Take 1 Tab by mouth 2 times a day. 60 Tab 3     No current facility-administered medications for this encounter.        No Known Allergies    Physical Exam   Constitutional: She is oriented to person, place, and time and well-developed, well-nourished, and in no  distress. No distress.   HENT:   Head: Normocephalic.   Eyes: Pupils are equal, round, and reactive to light. No scleral icterus.   Pulmonary/Chest: Effort normal. No respiratory distress.   Abdominal: Soft. Bowel sounds are normal. She exhibits no distension.   No ascites noted   Neurological: She is alert and oriented to person, place, and time. She has normal sensation and normal strength. She is not agitated and not disoriented. She displays no weakness, no tremor, facial symmetry, normal stance and normal speech. No cranial nerve deficit. Coordination normal.   Transfers self independently, ambulates short distances without assistive device. In wheelchair for clinic appointment.    Skin: Skin is warm and dry. No rash noted. She is not diaphoretic. No erythema. No pallor.   Psychiatric: Mood, memory, affect and judgment normal.     ECOG Performance Status 1    Impression:   1. Unresectable metastatic ampullary adenocarcinoma to the right hepatic lobe.  2. Hypertension.  3. Indigestion.  4. Asthma.  5. Osteoarthritis.    Plan:   [facility] MD has reviewed 's history and imaging studies, examined the patient, and discussed treatment options.  is a candidate for palliative transarterial radioembolization of unresectable metastatic cancer to the right liver. She was referred for radiation segmentectomy, however the lesion involves segments 5, 6, and 7 which is too large for a safe radioembolization. Our recommendation is Y90 SIRT to the right lobe for palliative intervention of the lesion, which was discussed with Dr. Jacobs today after review of records and imaging. We discussed the method of the procedure at length including angiography and embolization as well as the expected clinical course with the possibility of post-embolization syndrome nausea and pain and hospitalization. We explained that this procedure is palliative and not curative. We discussed the possibility of tumor  recurrence and development of future tumors. We discussed the possibility of non response to the intervention. We additionally discussed the risks, including bleeding and infection, damage to the arteries, reaction to any medications given during the procedure, potential side effects of contrast administration including renal damage, non-target embolization, radiation-induced ulcers or liver disease, liver failure, and death. We discussed alternatives to the procedure including surveillance with no intervention with chemotherapy alone. The patient verbalizes understanding of risks and alternatives and elects to proceed. All questions were answered. Written pre- and post- procedure care instructions were provided.     The plan is as follows, pending insurance authorization:  · Resume omeprazole (has Rx at home).  · Mapping October 1.  · Y90 SIRT right lobe October 9.  · Follow up with labs in clinic after ~ 2 weeks. If recovered from Y90, may resume any additional planned chemotherapy or radiation therapy.   · Imaging 3 months post intervention.     ELISE Gomez with [facility] MD  Interventional Radiology   [place] [id number] [facility] ([id number])  [place] [id number]  [phone]

## 2018-09-10 NOTE — TELEPHONE ENCOUNTER
LM for patient regarding periprocedural management of anticoagulation for upcoming procedures on 10-1-18 and 10-9-18.  Operating physician is asking patient to hold Eliquis 48 hours prior to both procedures.  Last clot 4-2017.  Patient instructed to hold Eliquis 48 hours prior to both procedures and restart at operating physician discretion.  Asked that she contact anticoagulation clinic if she has further questions/concerns.  Yoshi Sorenson, PharmD

## 2018-09-11 NOTE — TELEPHONE ENCOUNTER
----- Message from Yulia Kingston sent at 9/11/2018  3:40 PM PDT -----  This patient would like a call back regarding what other alternatives she has in terms of anticoagulants. Can someone please give her a call? Thanks!

## 2018-09-13 NOTE — PROGRESS NOTES
Diagnosis: DVT March 2017, liver carcinoma  Drug: Eliquis 5 mg BID  LOT: Indefinite    Health Status Since Last Assessment  Pt currently taking Eliquis 5 mg BID. Her cost increased to $140 per month which is expensive for her. She is interested in DOACs, declines warfarin. Will send rx for Xarelto to check price.    Having a procedure 10/1/18 and 10/9/18 - will stop Eliquis 48 hours prior to both procedures and restart the night of the procedure or the next day per the MD    Adherence with DOAC Therapy  None  BLEEDING RISK ASSESSMENT NB:    Bleeding Risk Assessment  None of the following:  Severe epistaxis   Hemoptysis    Excessive or unusual bruising / hematomas   GIB / melena / BRBPR / hematemesis    Hematuria? Abnormal vaginal bleeding    Concerning daily headache or subdural hematoma symptoms    Decreasing hemoglobin or new anemia    Latest hemoglobin:     Lab Results   Component Value Date/Time    WBC 10.9 (H) 06/29/2018 01:16 PM    RBC 3.58 (L) 06/29/2018 01:16 PM    HEMOGLOBIN 11.3 (L) 06/29/2018 01:16 PM    HEMATOCRIT 33.4 (L) 06/29/2018 01:16 PM    MCV 93.3 06/29/2018 01:16 PM    MCH 31.6 06/29/2018 01:16 PM    MCHC 33.8 06/29/2018 01:16 PM    MPV 10.3 06/29/2018 01:16 PM    NEUTSPOLYS 57.50 06/29/2018 01:16 PM    LYMPHOCYTES 28.20 06/29/2018 01:16 PM    MONOCYTES 8.40 06/29/2018 01:16 PM    EOSINOPHILS 0.60 06/29/2018 01:16 PM    BASOPHILS 0.50 06/29/2018 01:16 PM    ANISOCYTOSIS 1+ 06/22/2018 02:44 PM        EtOH overuse  - no  Falls, presyncope, syncope, or seizures  - no  Uncontrolled hypertension - mo  CREATININE CLEARANCE /    Creatinine Clearance/Renal Function  Latest eGFR / creatinine:  Lab Results   Component Value Date/Time    SODIUM 137 08/27/2018 10:27 AM    POTASSIUM 4.2 08/27/2018 10:27 AM    CHLORIDE 106 08/27/2018 10:27 AM    CO2 24 08/27/2018 10:27 AM    GLUCOSE 107 (H) 08/27/2018 10:27 AM    BUN 10 08/27/2018 10:27 AM    CREATININE 0.84 08/27/2018 10:27 AM      • Is eGFR less than  50ml/min  - WNL  Cr 0.84 on 8/27/18  If YES, calculate CrCl (see back)  Any recent dehydrating illness or medications added/changed? i.e. diuretics    Drug Interactions  ASA / other antiplatelets - avoids  NSAID -avoids  Other drug interactions   (Review med list / OTCs;)  Current Outpatient Prescriptions on File Prior to Visit   Medication Sig Dispense Refill   • ELIQUIS 5 MG Tab TAKE 1 TAB BY MOUTH 2 TIMES A DAY. 60 Tab 5   • potassium chloride SA (K-DUR) 10 MEQ Tab CR Take 2 Tabs by mouth 2 times a day. 180 Tab 3   • omeprazole (PRILOSEC) 20 MG delayed-release capsule TAKE ONE CAPSULE BY MOUTH EVERY DAY 30 Cap 3   • prochlorperazine (COMPAZINE) 10 MG Tab Take 1 Tab by mouth every 6 hours as needed (for nausea, vomiting). 30 Tab 6   • ondansetron (ZOFRAN) 4 MG Tab tablet Take 1 Tab by mouth every four hours as needed for Nausea/Vomiting. 20 Tab 3   • loperamide (IMODIUM) 2 MG Cap Take 1 Cap by mouth 4 times a day as needed for Diarrhea. 30 Cap 0   • RESTASIS 0.05 % ophthalmic emulsion      • losartan-hydrochlorothiazide (HYZAAR) 50-12.5 MG per tablet Take 1 Tab by mouth every day. 30 Tab 0   • sennosides-docusate sodium (SENOKOT-S) 8.6-50 MG tablet Take 1 Tab by mouth 2 times a day. 60 Tab 3     No current facility-administered medications on file prior to visit.        Examination  Blood pressure:  143/62  • Elevated BP  -no (sBP greater than 160 mmHg)  • Symptomatic hypotension  - no  Significant gait impairment / imbalance / high risk for falls - no obvious risks    Final Assessment and Recommendations:  Patient appears stable from the anticoagulation standpoint  Benefits of continued DOAC therapy outweigh risks for this patient  Recommend continue current DOAC at same dose      Continue Eliquis 5 mg BID - 1 month's worth of samples given to help pt out financially. Rx for Xarelto 20 mg sent to pharm so pt can check price. If she can afford Xarelto, she will call us so we can transition her    - DO NOT TAKE  ELIQUIS ON 9/29/18 OR 9/30/18. RESTART ELIQUIS WHEN OK WITH MD   - DO NOT TAKE ELIQUIS ON 10/7/18 OR 10/8/18. RESTART ELIQUIS WHEN OK WITH MD    CALL [phone] TO LET US KNOW IF YOU CAN OR CANNOT AFFORD XARELTO  DO NOT START XARELTO UNTIL YOU CALL US IF YOU CAN AFFORD IT      Other Actions:   The rationale for continued DOAC therapy  The potential for minor, major or life-threatening bleeding  Dosing instructions, adherence, risks of non-adherence, handling missed doses  Avoiding OTC ASA & NSAIDs & minimizing EtOH to reduce bleeding risks  Dosing around upcoming procedure / surgery if applicable (see back)    Follow up:  Will follow up with patient in 6 months with labs    Next Appointment: Monday, March 11, 2019 at 9:30 am.     Elin OLIVARES

## 2018-09-17 LAB
ALBUMIN SERPL-MCNC: 3.3 G/DL (ref 3.4–5)
ALP SERPL-CCNC: 154 U/L (ref 45–117)
ALT SERPL-CCNC: 27 U/L (ref 12–78)
ANION GAP SERPL CALC-SCNC: 7 MMOL/L (ref 5–15)
BILIRUB SERPL-MCNC: 0.4 MG/DL (ref 0.2–1)
CALCIUM SERPL-MCNC: 9.1 MG/DL (ref 8.5–10.1)
CHLORIDE SERPL-SCNC: 108 MMOL/L (ref 98–107)
CREAT SERPL-MCNC: 1.01 MG/DL (ref 0.55–1.02)
PROT SERPL-MCNC: 7.7 G/DL (ref 6.4–8.2)

## 2018-09-18 ENCOUNTER — HOSPITAL ENCOUNTER (OUTPATIENT)
Dept: HOSPITAL 8 - OUT | Age: 73
Discharge: HOME | End: 2018-09-18
Attending: INTERNAL MEDICINE
Payer: MEDICARE

## 2018-09-18 VITALS — HEIGHT: 61 IN | WEIGHT: 147.49 LBS | BODY MASS INDEX: 27.85 KG/M2

## 2018-09-18 VITALS — SYSTOLIC BLOOD PRESSURE: 131 MMHG | DIASTOLIC BLOOD PRESSURE: 81 MMHG

## 2018-09-18 DIAGNOSIS — C24.1: Primary | ICD-10-CM

## 2018-09-18 DIAGNOSIS — I10: ICD-10-CM

## 2018-09-18 DIAGNOSIS — Z86.718: ICD-10-CM

## 2018-09-18 DIAGNOSIS — E78.5: ICD-10-CM

## 2018-09-18 DIAGNOSIS — Z98.890: ICD-10-CM

## 2018-09-18 DIAGNOSIS — Z90.49: ICD-10-CM

## 2018-09-18 DIAGNOSIS — Z87.39: ICD-10-CM

## 2018-09-18 DIAGNOSIS — K26.4: ICD-10-CM

## 2018-09-18 DIAGNOSIS — Z85.05: ICD-10-CM

## 2018-09-18 PROCEDURE — 80053 COMPREHEN METABOLIC PANEL: CPT

## 2018-09-18 PROCEDURE — 43270 EGD LESION ABLATION: CPT

## 2018-09-18 PROCEDURE — 93005 ELECTROCARDIOGRAM TRACING: CPT

## 2018-09-18 PROCEDURE — 36415 COLL VENOUS BLD VENIPUNCTURE: CPT

## 2018-09-20 NOTE — PROGRESS NOTES
Returned pt's call to discuss cost of Xarelto. Left vm for her to call antico clinic back.    Elin OLIVARES

## 2018-09-20 NOTE — TELEPHONE ENCOUNTER
Pt called back. Has decided to remain on Eliquis at this time, as Xarelto cost was about the same. She confirms that she's using Eliquis 5mg BID, and aware to repeat labs in March 2019 as scheduled.     Dorie Pillai, PharmD

## 2018-09-24 NOTE — PROGRESS NOTES
Pt ambulatory to Medical Oncology for port flush. Port accessed using 22g, 1.5 in needle without incidence. Positive blood return noted. Port flushes easily. Heparin administered per protocol. Port de accessed. Sterile gauze and tape placed over insertion site. Pt tolerated procedure well and is ambulatory out of clinic in [facility]. Next scheduled port flush 10/22.

## 2018-10-01 NOTE — DISCHARGE INSTRUCTIONS
Radial Catherization Discharge Instructions      · Do not subject hand/arm to any forceful movements for 24 hours    i.e. supporting weight when rising from the chair or bed.   · Limit wrist movement for 48 hours  · Do not drive a car for 24 hours  · You may remove the dressing tomorrow  · You may shower on the day following your procedure.  Do not take a tub bath or submerge the puncture site in water for 3 days following the procedure.  · No Lifting more than 3-5 pounds with affected wrist for 5 days  · Follow up with your doctor 2-4 weeks.  · Increase fluids for 2 days post procedure.  · Continue all previous medications unless otherwise instructed.    If bleeding should occur following discharge:  · Sit down and apply firm pressure to site with your fingers for 10 minutes  · If the bleeding stops, continue to sit quietly, keeping your wrist straight for 2 hours.  Notify physician as soon as possible ( [phone])  · If bleeding does not stop after 10 minutes, or if there is a large amount of bleeding or spurting, call 911 immediately.  Do not drive yourself to the hospital.      ACTIVITY: Rest and take it easy for the first 24 hours.  A responsible adult is recommended to remain with you during that time.  It is normal to feel sleepy.  We encourage you to not do anything that requires balance, judgment or coordination.    MILD FLU-LIKE SYMPTOMS ARE NORMAL. YOU MAY EXPERIENCE GENERALIZED MUSCLE ACHES, THROAT IRRITATION, HEADACHE AND/OR SOME NAUSEA.    FOR 24 HOURS DO NOT:  Drive, operate machinery or run household appliances.  Drink beer or alcoholic beverages.   Make important decisions or sign legal documents.    You should call 911 if you develop problems with breathing or chest pain.  If you are unable to contact your doctor or surgical center, you should go to the nearest emergency room or urgent care center.  Physician's telephone #: [phone]    If any questions arise, call your doctor.  If your  doctor is not available, please feel free to call the Surgical Center at [phone].  The Center is open Monday through Friday from 7AM to 7PM.  You can also call the HEALTH HOTLINE open 24 hours/day, 7 days/week and speak to a nurse at [phone], or toll free at [phone].    A registered nurse may call you a few days after your surgery to see how you are doing after your procedure.    MEDICATIONS: Resume taking daily medication.  Take prescribed pain medication with food.  If no medication is prescribed, you may take non-aspirin pain medication if needed.  PAIN MEDICATION CAN BE VERY CONSTIPATING.  Take a stool softener or laxative such as senokot, pericolace, or milk of magnesia if needed.        If your physician has prescribed pain medication that includes Acetaminophen (Tylenol), do not take additional Acetaminophen (Tylenol) while taking the prescribed medication.    Depression / Suicide Risk    As you are discharged from this [facility] facility, it is important to learn how to keep safe from harming yourself.    Recognize the warning signs:  · Abrupt changes in personality, positive or negative- including increase in energy   · Giving away possessions  · Change in eating patterns- significant weight changes-  positive or negative  · Change in sleeping patterns- unable to sleep or sleeping all the time   · Unwillingness or inability to communicate  · Depression  · Unusual sadness, discouragement and loneliness  · Talk of wanting to die  · Neglect of personal appearance   · Rebelliousness- reckless behavior  · Withdrawal from people/activities they love  · Confusion- inability to concentrate     If you or a loved one observes any of these behaviors or has concerns about self-harm, here's what you can do:  · Talk about it- your feelings and reasons for harming yourself  · Remove any means that you might use to hurt yourself (examples: pills, rope, extension cords, firearm)  · Get professional  help from the community (Mental Health, Substance Abuse, psychological counseling)  · Do not be alone:Call your Safe Contact- someone whom you trust who will be there for you.  · Call your local CRISIS HOTLINE 837-6128 or 488-380-6262  · Call your local Children's Mobile Crisis Response Team [place] (716) 752-5583 or www.[employer]  · Call the toll free National Suicide Prevention Hotlines   · National Suicide Prevention Lifeline 079-504-QMXA (1800)  · National Hope Line Network 800-SUICIDE (898-2743)

## 2018-10-01 NOTE — PROGRESS NOTES
IR Nursing Note:    Patient underwent a Y90 mapping by Dr. Recinos. Procedure site was marked by MD and verified using imaging guidance.  Patient was placed in a supine position.  Left radial artery was accessed.   Vitals were taken every 5 minutes and remained stable during procedure (see doc flow sheet for results).  CO2 waveform capnography was monitored and remained 32-36 throughout procedure.  A TR band with 12 cc  was placed over surgical site. Report called to Elis AVENDANO. Pt transported by zachery with RN to [facility].     Page365 Jayleen Coil 14mm x 6cm  Ref # [id number] Lot # [id number] Gastroduodenal artery    [employer] Interlock 6mm x 20cm Ref # [id number] Lot # [id number] Gastroduodenal artery      [employer] Interlock 2mm x 6mm x 8cm Ref # [id number] Lot # [id number] Gastroduodenal artery      [employer] Vortx Amy 18 3mm x 3.3,, Ref # [id number] lOT #[id number] Right Gastric Artery

## 2018-10-01 NOTE — OR SURGEON
Immediate Post- Operative Note        PostOp Diagnosis: Ampullary ca.       Procedure(s): R lobe mapping.       Estimated Blood Loss: Less than 5 ml        Complications: None            10/1/2018     1000 AM     Mt Recinos

## 2018-10-01 NOTE — OR NURSING
1050 Patient arrived from IR on [facility]. Report received from RN. Patient's VS WNL, patient alert, stable, breathing even/non labored. Angio site on L radial, CDI, TR band in place.   1111 3cc taken out from TR band, site is CDI  1126 3 cc taken out from TR band, site CDI  1144 3 cc removed from TR band  1159 3 cc removed from TR band, site CDI  1215 TR band removed, replaced with dressing  1230 Discharge criteria met, no bleeding noted. Instructions reviewed with pt and daughter, copy given. PIV out, discharged via wheelchair.

## 2018-10-09 NOTE — OR SURGEON
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      Procedure(s): R lobe Y 90.      Estimated Blood Loss: Less than 25 ml        Complications: None            10/9/2018     0947 AM     Mt Recinos

## 2018-10-09 NOTE — PROGRESS NOTES
In prescribing controlled substances to this patient, I certify that I have obtained and reviewed the medical history of Elin Poole. I have also made a good shameka effort to obtain applicable records from other providers who have treated the patient and records did not demonstrate any increased risk of substance abuse that would prevent me from prescribing controlled substances.     I have conducted a physical exam and documented it. I have reviewed Ms. Poole’s prescription history as maintained by the [place] Prescription Monitoring Program.     I have assessed the patient’s risk for abuse, dependency, and addiction using the validated Opioid Risk Tool available at https://www.mdcalc.com/ylylpa-bono-xqil-ort-narcotic-abuse.     Given the above, I believe the benefits of controlled substance therapy outweigh the risks. The reasons for prescribing controlled substances include non-narcotic, oral analgesic alternatives have been inadequate for pain control. Accordingly, I have discussed the risk and benefits, treatment plan, and alternative therapies with the patient.

## 2018-10-09 NOTE — OR NURSING
1013   PATIENT RECEIVED FROM IR,  S/P Y90 VIA LEFT WRIST.  PATIENT IS A/A/OX4.  DENIES ANY PAIN.  TR BAND INTACT ON LEFT WRIST.  SITE IS CLEAR.  FAMILY IS @ BEDSIDE.     1100  PATIENT TAKING PO FLUID AND SNACK OK.      1130   C/O OF SLIGHT NAUSEA.  UP AND AMBULATE TO BATHROOM TO VOID.  NUCLEAR MEDICINE TECH IS HERE TO TALK TO PATIENT ABOUT RADIATION PRECAUTION.  1ST 2CC OF AIR RELEASED FROM TR BAND.      1145   NEXT 3CC OF AIR RELEASED FROM TR BAND.  SITE IS CLEAR.    1200   NEXT 3CC OF AIR RELEASED FROM TR BAND.  SITE IS CLEAR.    1215   NEXT 3CC OF AIR RELEASED FROM TR BAND.  SITE IS CLEAR.  DR SHAIKH IS HERE TO SPEAK IN LENGTH WITH PATIENT AND FAMILY.    1230   TR BAND REMOVED AND 2X2 WITH TEGADERM APPLIED.      1245   DC INSTRUCTIONS GIVEN TO PATIENT AND FAMILY.    1255   HL DC.  PATIENT DC TO HOME,  VIA WHEELCHAIR,  ESCORTED OUT BY CNA.

## 2018-10-09 NOTE — PROGRESS NOTES
IR Procedure Note:    Patient and family consented in PPU prior to procedure; all questions answered.  Dr. Recinos completed Y90 selective internal radiation therapy to right hepatic lobe.  Left radial artery accessed and hemoband in place post procedure with an initial 13cc air in place.  The patient tolerated the procedure well; ETCo2 range 26-32, with consistent waveform during the procedure.  Patient alert and oriented and verbally appropriate post procedure, vital signs stable, see flow sheet.  Report given to ROMANA Gillis.  Post procedure patient taken to [facility] for scan (patient monitored throughtout).  RN transported patient to PPU and patient handoff to ROMANA Gómez.

## 2018-10-09 NOTE — DISCHARGE INSTRUCTIONS
ACTIVITY: Rest and take it easy for the first 24 hours.  A responsible adult is recommended to remain with you during that time.  It is normal to feel sleepy.  We encourage you to not do anything that requires balance, judgment or coordination.    MILD FLU-LIKE SYMPTOMS ARE NORMAL. YOU MAY EXPERIENCE GENERALIZED MUSCLE ACHES, THROAT IRRITATION, HEADACHE AND/OR SOME NAUSEA.    FOR 24 HOURS DO NOT:  Drive, operate machinery or run household appliances.  Drink beer or alcoholic beverages.   Make important decisions or sign legal documents.    SPECIAL INSTRUCTIONS: *KEEP INCISION DRY FOR 24 HRS**    DIET: To avoid nausea, slowly advance diet as tolerated, avoiding spicy or greasy foods for the first day.  Add more substantial food to your diet according to your physician's instructions.  Babies can be fed formula or breast milk as soon as they are hungry.  INCREASE FLUIDS AND FIBER TO AVOID CONSTIPATION.    SURGICAL DRESSING/BATHING: *MAY SHOWER TOMORROW AFTERNOON THEN MAY REMOVE DRESSING**    FOLLOW-UP APPOINTMENT:  A follow-up appointment should be arranged with your doctor in *DR SHAIKH   [phone]**; call to schedule.    You should CALL YOUR PHYSICIAN if you develop:  Fever greater than 101 degrees F.  Pain not relieved by medication, or persistent nausea or vomiting.  Excessive bleeding (blood soaking through dressing) or unexpected drainage from the wound.  Extreme redness or swelling around the incision site, drainage of pus or foul smelling drainage.  Inability to urinate or empty your bladder within 8 hours.  Problems with breathing or chest pain.    You should call 911 if you develop problems with breathing or chest pain.  If you are unable to contact your doctor or surgical center, you should go to the nearest emergency room or urgent care center.  Physician's telephone #: *[phone]**    If any questions arise, call your doctor.  If your doctor is not available, please feel free to call the Surgical Center  at [phone]  The Center is open Monday through Friday from 7AM to 7PM.  You can also call the HEALTH HOTLINE open 24 hours/day, 7 days/week and speak to a nurse at [phone], or toll free at [phone].    A registered nurse may call you a few days after your surgery to see how you are doing after your procedure.    MEDICATIONS: Resume taking daily medication.  Take prescribed pain medication with food.  If no medication is prescribed, you may take non-aspirin pain medication if needed.  PAIN MEDICATION CAN BE VERY CONSTIPATING.  Take a stool softener or laxative such as senokot, pericolace, or milk of magnesia if needed.      If your physician has prescribed pain medication that includes Acetaminophen (Tylenol), do not take additional Acetaminophen (Tylenol) while taking the prescribed medication.    Depression / Suicide Risk    As you are discharged from this [facility] facility, it is important to learn how to keep safe from harming yourself.    Recognize the warning signs:  · Abrupt changes in personality, positive or negative- including increase in energy   · Giving away possessions  · Change in eating patterns- significant weight changes-  positive or negative  · Change in sleeping patterns- unable to sleep or sleeping all the time   · Unwillingness or inability to communicate  · Depression  · Unusual sadness, discouragement and loneliness  · Talk of wanting to die  · Neglect of personal appearance   · Rebelliousness- reckless behavior  · Withdrawal from people/activities they love  · Confusion- inability to concentrate     If you or a loved one observes any of these behaviors or has concerns about self-harm, here's what you can do:  · Talk about it- your feelings and reasons for harming yourself  · Remove any means that you might use to hurt yourself (examples: pills, rope, extension cords, firearm)  · Get professional help from the community (Mental Health, Substance Abuse, psychological  counseling)  · Do not be alone:Call your Safe Contact- someone whom you trust who will be there for you.  · Call your local CRISIS HOTLINE 941-4694 or 242-669-5801  · Call your local Children's Mobile Crisis Response Team [place] (397) 550-8687 or www.MC10  · Call the toll free National Suicide Prevention Hotlines   · National Suicide Prevention Lifeline 360-097-IGFC (7981)  [place] [facility] 800-SUICIDE (966-3310)    Radial Catherization Discharge Instructions      · Do not subject hand/arm to any forceful movements for 24 hours    i.e. supporting weight when rising from the chair or bed.   · Do not drive a car for 24 hours  · You may remove the dressing tomorrow  · You may shower on the day following your procedure.  Do not take a tub bath or submerge the puncture site in water for 3 days following the procedure.  · No Lifting more than 3-5 pounds with affected wrist for 5 days  · Follow up with Dr. IBARRA**  2-4 weeks.  · Increase fluids for 2 days post procedure.  · Continue all previous medications unless otherwise instructed.    If bleeding should occur following discharge:  · Sit down and apply firm pressure to site with your fingers for 10 minutes  · If the bleeding stops, continue to sit quietly, keeping your wrist straight for 2 hours.  Notify physician as soon as possible ( [phone])  · If bleeding does not stop after 10 minutes, or if there is a large amount of bleeding or spurting, call 911 immediately.  Do not drive yourself to the hospital.

## 2018-10-22 NOTE — PROGRESS NOTES
Pt ambulatory to Medical Oncology for port flush. Port accessed using 22g, 1.0 in needle without incidence. Positive blood return noted. Port flushes easily. Heparin administered per protocol. Port de accessed. Sterile gauze and tape placed over insertion site. Pt tolerated procedure well and is ambulatory out of clinic in [facility]. Next scheduled port flush 12/3.

## 2018-10-22 NOTE — CONSULTS
Interventional Oncology Consultation   Re: Elin Poole  MRN: [id number] : 1945     Elin Poole was referred by Kurtis Jacobs MD. She is a 73 y.o. 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 She is also under the care of Omi Benavides MD, Claude Cedeno MD, Wally Knox DO, and Ernesto Abraham DO.     History of Present Illness:   has a history of ampullary carcinoma with metastatic liver disease noted intraoperative during an attempted Whipple in . Since that time, she has been on chemotherapy. Recent imaging revealed a single right lobe lesion that is stable in size and the ampullary tumor is also stable or slightly smaller. Surgery is a consideration for the ampullary tumor but the hepatic lesion is unresectable.  was referred to interventional radiology for evaluation and management. Since the liver involved segments 5, 6, and 7, we were unable to plan a segmentectomy and instead recommended right lobe intervention. Dr. Recinos performed mapping on  and Y90 SIRT of the right liver on . The patient’s clinical course was unremarkable. She is seen today in follow up after the Y90 SIRT. Today, she complains of ongoing bilateral toe/ foot numbness along with right knee osteoarthritis that limits her walking. She has a history of right lower extremity DVT two years ago and is on Eliquis. She describes post procedure nausea that is resolved, and fatigue which has improved but not yet resolved. She reports increased acid reflux and burping despite taking omeprazole daily. She has some post procedure mild intermitted RUQ and LUQ pain at times that does not correlate with her complaints of acid reflux, but it is improving.her appetite is good and weight is stable. She denies fevers, jaundice, and chills. She has a right port in place and has an  appointment this afternoon for port flush. She has an appointment with Dr. Benavides next week for discussion of chemoradiation. She is accompanied by her daughter Cammy today.    Past Medical History:   Diagnosis Date   • Ampullary carcinoma (HCC)    • Anemia    • Arthritis     hands/fingers/right knee   • Asthma    • Cancer (HCC) 2016    Ampullary CA   • Cataract 09-    bilat.,no surgery   • Dental disorder     upper partial   • Encounter for antineoplastic chemotherapy 4/6/2018   • Heart burn    • High cholesterol    • Hypertension    • Indigestion    • Jaundice 2016     Past Surgical History:   Procedure Laterality Date   • CATH PLACEMENT Right 9/9/2016    Procedure: CATH PLACEMENT cephalic power port  ;  Surgeon: Kurtis Jacobs M.D.;  Location: SURGERY [facility];  Service:    • WHIPPLE PROCEDURE  8/15/2016    Procedure: Exploratoy Laparotomy, Da-en-y;  Surgeon: Kurtis Jacobs M.D.;  Location: SURGERY [facility];  Service:    • NODE DISSECTION  8/15/2016    Procedure: omental flap, Liver Wedge, cholecystectomy ;  Surgeon: Kurtis Jacobs M.D.;  Location: SURGERY [facility];  Service:    • STENT PLACEMENT  7/2016    gallbladder   • CATARACT EXTRACTION WITH IOL     • CHOLECYSTECTOMY     • TONSILLECTOMY       August 15, 2016 at [facility] ([place]):  1.  Exploratory laparotomy.  2.  Open wedge liver resection.  3.  Open cholecystectomy.  4.  Da-en-Y hepaticojejunostomy to the extrahepatic bile ducts.  5.  Omental flap.    Social History     Social History   • Marital status:      Spouse name: N/A   • Number of children: N/A   • Years of education: N/A     Occupational History   • retired       Social History Main Topics   • Smoking status: Never Smoker   • Smokeless tobacco: Never Used   • Alcohol use No   • Drug use: No   • Sexual activity: Not on file     Other Topics Concern   • Not on file     Social History Narrative   • No narrative on file      Family History   Problem Relation Age of Onset   • Cancer Other         unknown cancer     Review of Systems   Constitutional: Positive for malaise/fatigue. Negative for chills, diaphoresis, fever and weight loss.   Eyes:        Negative for icterus   Respiratory: Negative.    Cardiovascular: Negative.    Gastrointestinal: Positive for heartburn and nausea. Negative for abdominal pain, blood in stool, constipation, diarrhea, melena and vomiting.        Negative for ascites   Genitourinary: Negative for hematuria.   Musculoskeletal: Positive for neck pain.   Skin: Negative.         Negative for jaundice and itching   Neurological: Positive for tingling and sensory change. Negative for focal weakness and weakness.   Endo/Heme/Allergies: Does not bruise/bleed easily.   Psychiatric/Behavioral: Negative for substance abuse (negative for alcohol and tobacco use).     A comprehensive 14-point review of systems was negative except as described above.      Labs:      Ref. Range 10/4/2018 08:39 10/17/2018 08:28 10/17/2018 08:33   WBC Latest Ref Range: 4.8 - 10.8 K/uL 7.7  9.9   RBC Latest Ref Range: 4.20 - 5.40 M/uL 4.72  5.04   Hemoglobin Latest Ref Range: 12.0 - 16.0 g/dL 13.7  15.0   Hematocrit Latest Ref Range: 37.0 - 47.0 % 43.0  44.6   MCV Latest Ref Range: 81.4 - 97.8 fL 91.1  88.5   MCH Latest Ref Range: 27.0 - 33.0 pg 29.0  29.8   MCHC Latest Ref Range: 33.6 - 35.0 g/dL 31.9 (L)  33.6   RDW Latest Ref Range: 35.9 - 50.0 fL 41.8  43.1   Platelet Count Latest Ref Range: 164 - 446 K/uL 200  304   MPV Latest Ref Range: 9.0 - 12.9 fL 10.6  10.7   Neutrophils-Polys Latest Ref Range: 44.00 - 72.00 % 66.60     Neutrophils (Absolute) Latest Ref Range: 2.00 - 7.15 K/uL 5.16     Lymphocytes Latest Ref Range: 22.00 - 41.00 % 25.60     Lymphs (Absolute) Latest Ref Range: 1.00 - 4.80 K/uL 1.98     Monocytes Latest Ref Range: 0.00 - 13.40 % 5.80     Monos (Absolute) Latest Ref Range: 0.00 - 0.85 K/uL 0.45     Eosinophils Latest  Ref Range: 0.00 - 6.90 % 1.30     Eos (Absolute) Latest Ref Range: 0.00 - 0.51 K/uL 0.10     Basophils Latest Ref Range: 0.00 - 1.80 % 0.40     Baso (Absolute) Latest Ref Range: 0.00 - 0.12 K/uL 0.03     Immature Granulocytes Latest Ref Range: 0.00 - 0.90 % 0.30     Immature Granulocytes (abs) Latest Ref Range: 0.00 - 0.11 K/uL 0.02     Nucleated RBC Latest Units: /100 WBC 0.00     NRBC (Absolute) Latest Units: K/uL 0.00     Sodium Latest Ref Range: 135 - 145 mmol/L 137 131 (L) 132 (L)   Potassium Latest Ref Range: 3.6 - 5.5 mmol/L 4.4 4.3 4.4   Chloride Latest Ref Range: 96 - 112 mmol/L 106 98 99   Co2 Latest Ref Range: 20 - 33 mmol/L 23 24 23   Anion Gap Latest Ref Range: 0.0 - 11.9  8.0 9.0 10.0   Glucose Latest Ref Range: 65 - 99 mg/dL 105 (H) 112 (H) 108 (H)   Bun Latest Ref Range: 8 - 22 mg/dL 12 17 16   Creatinine Latest Ref Range: 0.50 - 1.40 mg/dL 0.83 0.84 0.86   GFR If  Latest Ref Range: >60 mL/min/1.73 m 2 >60 >60 >60   GFR If Non  Latest Ref Range: >60 mL/min/1.73 m 2 >60 >60 >60   Calcium Latest Ref Range: 8.5 - 10.5 mg/dL 9.8 8.9 9.0   AST(SGOT) Latest Ref Range: 12 - 45 U/L 17 20 22   ALT(SGPT) Latest Ref Range: 2 - 50 U/L 13 14 14   Alkaline Phosphatase Latest Ref Range: 30 - 99 U/L 147 (H) 127 (H) 132 (H)   Total Bilirubin Latest Ref Range: 0.1 - 1.5 mg/dL 0.5 0.8 0.8   Albumin Latest Ref Range: 3.2 - 4.9 g/dL 3.8 3.4 3.4   Total Protein Latest Ref Range: 6.0 - 8.2 g/dL 7.2 7.2 7.4   Globulin Latest Ref Range: 1.9 - 3.5 g/dL 3.4 3.8 (H) 4.0 (H)   A-G Ratio Latest Units: g/dL 1.1 0.9 0.9   Cholesterol,Tot Latest Ref Range: 100 - 199 mg/dL  173    Triglycerides Latest Ref Range: 0 - 149 mg/dL  105    HDL Latest Ref Range: >=40 mg/dL  65    LDL Latest Ref Range: <100 mg/dL  87    PT Latest Ref Range: 12.0 - 14.6 sec 15.7 (H)  18.1 (H)   INR Latest Ref Range: 0.87 - 1.13  1.24 (H)  1.49 (H)     Child Guerrero Class A  GRICELDA Grade A2     Pathology:  [facility] August 15, 2016 at  [facility]:  A. Falciform:         Benign adipose tissue.  B. Segment 3 liver wedge:         Positive for metastatic poorly differentiated adenocarcinoma.         The margins are free of involvement.  C. Gallbladder:         Benign gallbladder with cholelithiasis.  Comment: Per the operative note the patient was diagnosed with an ampullary adenocarcinoma by an outside biopsy.     Radiology:   Interventional Radiology procedure October 9, 2018 at [facility]:  1.  Technically successful Y90 radioembolization of the right hepatic lobe. The patient will followup in approximately 10 days for laboratory and clinical evaluation and 4-6 weeks for imaging evaluation.  2.  Immediately following the procedure, the patient was transported to nuclear medicine for planar imaging which demonstrates Bremsstrahlung emission from the right of the liver. There is no definite evidence of extrahepatic deposition of radioembolic   material.    Nuclear medicine Bremsstrahlung study October 9, 2018 at [facility]:  The prescribed dose was  1.04 gb ( 28.08 mCi). The drawn dose was 1.15 gb (31mCi). Delivered dose after residual was measured at 1.08 gb ( 29.14 mCi). This represents  104% of the prescribed dose and  94% of the drawn dose. Bremsstrahlung images obtained   after the Y-90 radioembolization, confirm proper delivery to the targeted region. There is no uptake outside the planned treatment area. NOTE: The patient will be followed by interventional radiology and oncology.    Interventional Radiology procedure October 1, 2018 at [facility]:  1.  Superior mesenteric arteriogram no evidence of variant hepatic artery anatomy.  2.  Celiac arteriogram.  3.  Common hepatic arteriogram demonstrating no evidence of variant hepatic arterial anatomy.  4.  Selective right gastric arteriogram demonstrating no evidence of variant hepatic arterial anatomy.  5.  Selective right gastric microcoil embolization.  6.  Selective gastroduodenal arteriogram demonstrating no  evidence of variant hepatic arterial anatomy.  7.  Selective gastroduodenal microcoil embolization.  8.  Proper hepatic arteriogram.  9.  Intra-arterial administration of 4.4 mCi technetium 99m labeled MAA into the right hepatic artery demonstrated a hepatic pulmonary shunt fraction of 5.2%.    Nuclear medicine quantitative lung scan October 1, 2018 at [facility]:  Total hepatopulmonary shunt percentage was 5.2%. No evidence of extrahepatic radiotracer deposition is identified.    MRI abdomen August 6, 2018 at [facility]:  1.  Periampullary diffusion restriction likely represents residual ampullary tumor. Tumor size is difficult to compare given differences in modality, but may have decreased slightly since the exam in March.  2.  Persistent ductal dilatation with small calculi or debris in the distal common bile duct.  3.  Persistent arterial enhancement in the right hepatic lobe is consistent with known metastatic disease. Appearance is similar to uptake on the recent PET/CT. No new hepatic lesions are identified.  4.  Status post cholecystectomy.  5.  Diverticulosis     PET CT on July 12, 2018 at [facility]:  1.  There is focal but slightly ill-defined uptake in the posterior right lobe of the liver which is suspicious for hepatic metastases although no focal mass is seen on the current CT images. There is questionable subtle lobular enhancement in this   region on the previous CT of 3/27/2018. If clinically indicated, this could be further assessed with MRI of the liver.  2.  Focal uptake in the duodenum near the ampulla bladder which is nonspecific and could represent residual tumor or physiologic uptake.  3.  There are normal sized peripancreatic lymph nodes, one demonstrating minimal uptake of doubtful clinical significance.  4.  There is multifocal uptake throughout the colon.    Current Outpatient Prescriptions   Medication Sig Dispense Refill   • MethylPREDNISolone (MEDROL DOSEPAK) 4 MG Tablet Therapy Pack Take per  included instructions. 1 Kit 0   • omeprazole (PRILOSEC) 20 MG delayed-release capsule TAKE 1 CAPSULE BY MOUTH EVERYDAY 30 Cap 3   • ELIQUIS 5 MG Tab TAKE 1 TAB BY MOUTH 2 TIMES A DAY. 60 Tab 5   • potassium chloride SA (K-DUR) 10 MEQ Tab CR Take 2 Tabs by mouth 2 times a day. 180 Tab 3   • RESTASIS 0.05 % ophthalmic emulsion 1 Drop 2 times a day.     • losartan-hydrochlorothiazide (HYZAAR) 50-12.5 MG per tablet Take 1 Tab by mouth every day. 30 Tab 0     No current facility-administered medications for this encounter.        Allergies   Allergen Reactions   • Tape        Physical Exam   Constitutional: She is oriented to person, place, and time and well-developed, well-nourished, and in no distress. No distress.   HENT:   Head: Normocephalic.   Eyes: Pupils are equal, round, and reactive to light. No scleral icterus.   Pulmonary/Chest: Effort normal. No respiratory distress.   Abdominal: Soft. She exhibits no distension.   No ascites noted   Neurological: She is alert and oriented to person, place, and time. She has normal sensation and normal strength. She is not agitated and not disoriented. She displays no weakness, no tremor, facial symmetry, normal stance and normal speech. No cranial nerve deficit. Coordination and gait normal.   Skin: Skin is warm and dry. No rash noted. She is not diaphoretic. No erythema. No pallor.   Psychiatric: Mood, memory, affect and judgment normal.     ECOG Performance Status 1    Impression:   1. Unresectable metastatic ampullary adenocarcinoma to the right hepatic lobe, status post Y90 SIRT.  2. Hypertension.  3. Indigestion.  4. Asthma.  5. Osteoarthritis.     Plan:   Mt Recinos MD has reviewed 's history and imaging studies, examined the patient, and discussed treatment options.  has recovered well from palliative transarterial radioembolization the right liver and the remainder of her post procedure fatigue and abdominal pain should continue to improve.  She should continue omeprazole for at least 3 months from our standpoint and will try Tums or equivalent for the acid reflux. We will plan an abdomen MRI in January 2019, but explained to the patient that if she has restaging and/or a new therapy plan in place we will defer our surveillance to her other medical providers.     ELISE Gomez with Mt Recinos MD  Interventional Radiology   [address] ([id number])  [place] [address]  [phone]

## 2018-10-25 PROBLEM — Z86.718 HISTORY OF DEEP VEIN THROMBOSIS (DVT) OF LOWER EXTREMITY: Status: ACTIVE | Noted: 2018-01-01

## 2018-10-25 PROBLEM — D69.6 THROMBOCYTOPENIA (HCC): Status: ACTIVE | Noted: 2018-01-01

## 2018-10-25 PROBLEM — E87.1 HYPONATREMIA: Status: ACTIVE | Noted: 2018-01-01

## 2018-10-25 NOTE — ED NOTES
Med rec completed per pt and son at bedside.   Antibiotics within last 30 days: No  Patient allergies have been reviewed: JAG

## 2018-10-25 NOTE — ED NOTES
Pt walked to the bathroom for urine sample without incidence, now resting in bed; denies needs at this time.

## 2018-10-25 NOTE — ED PROVIDER NOTES
ED Provider Note    CHIEF COMPLAINT  No chief complaint on file.  Fever, chills    HPI  Elin Poole is a 73 y.o. female who presents to the emergency department complaining of chills.  The patient has had chills and shakiness and achiness for the last 2 days.  He really denies any other specific pain other than achiness.  She has a little bit of runny nose and a slight tickle in her throat but otherwise no sore throat.  She has no cough chest discomfort or shortness of breath.  She just completed a Y 90 therapy for pancreatic ampullary carcinoma.  She has some vague associated discomfort with that but is not acutely worse.  She states she has some increased urinary frequency and foul-smelling urine.  Denies any rashes.  Denies any headache or neck pain.  Denies any other aggravating leaving factors or associated complaints.  She is being treated for pancreatic cancer she has not had recent chemotherapeutic.    REVIEW OF SYSTEMS  See HPI for further details. All other systems are negative.    PAST MEDICAL HISTORY  Past Medical History:   Diagnosis Date   • Ampullary carcinoma (HCC)    • Anemia    • Arthritis     hands/fingers/right knee   • Asthma    • Cancer (HCC) 2016    Ampullary CA   • Cataract 09-    bilat.,no surgery   • Dental disorder     upper partial   • Encounter for antineoplastic chemotherapy 4/6/2018   • Heart burn    • High cholesterol    • Hypertension    • Indigestion    • Jaundice 2016       FAMILY HISTORY  Family History   Problem Relation Age of Onset   • Cancer Other         unknown cancer       SOCIAL HISTORY  Social History     Social History   • Marital status:      Spouse name: N/A   • Number of children: N/A   • Years of education: N/A     Occupational History   • retired       Social History Main Topics   • Smoking status: Never Smoker   • Smokeless tobacco: Never Used   • Alcohol use No   • Drug use: No   • Sexual activity: Not on file     Other Topics  Concern   • Not on file     Social History Narrative   • No narrative on file       SURGICAL HISTORY  Past Surgical History:   Procedure Laterality Date   • CATH PLACEMENT Right 9/9/2016    Procedure: CATH PLACEMENT cephalic power port  ;  Surgeon: Kurtis Jacobs M.D.;  Location: SURGERY [facility];  Service:    • WHIPPLE PROCEDURE  8/15/2016    Procedure: Exploratoy Laparotomy, Da-en-y;  Surgeon: Kurtis Jacobs M.D.;  Location: SURGERY [facility];  Service:    • NODE DISSECTION  8/15/2016    Procedure: omental flap, Liver Wedge, cholecystectomy ;  Surgeon: Kurtis Jacobs M.D.;  Location: SURGERY [facility];  Service:    • STENT PLACEMENT  7/2016    gallbladder   • CATARACT EXTRACTION WITH IOL     • CHOLECYSTECTOMY     • TONSILLECTOMY         CURRENT MEDICATIONS  Home Medications     Reviewed by Collin Lomax (Pharmacy Tech) on 10/25/18 at 1342  Med List Status: Complete   Medication Last Dose Status   cyclosporin (RESTASIS) 0.05 % ophthalmic emulsion 10/24/2018 Active   ELIQUIS 5 MG Tab 10/24/2018 Active   losartan-hydrochlorothiazide (HYZAAR) 50-12.5 MG per tablet 10/24/2018 Active   omeprazole (PRILOSEC) 20 MG delayed-release capsule 10/24/2018 Active   potassium chloride SA (K-DUR) 10 MEQ Tab CR 10/24/2018 Active                ALLERGIES  Allergies   Allergen Reactions   • Tape        PHYSICAL EXAM  VITAL SIGNS: /74   Pulse (!) 109   Temp (!) 39.3 °C (102.8 °F)   Resp (!) 29   Wt 64.9 kg (143 lb)   SpO2 96%   BMI 27.02 kg/m²      Constitutional: Awake alert appears uncomfortable no acute cardiopulmonary distress.  HENT: Normocephalic, Atraumatic, Bilateral external ears normal, Oropharynx moist, No oral exudates, Nose normal.   Eyes: PERRL, EOMI, Conjunctiva normal, No discharge.   Neck: Normal range of motion, No tenderness, Supple, No stridor.   Lymphatic: No lymphadenopathy noted.   Cardiovascular: Tachycardic no murmurs rubs gallops  Thorax &  Lungs: Normal breath sounds, No respiratory distress, No wheezing, No chest tenderness.   Abdomen: Bowel sounds normal, Soft, No tenderness,   Skin: Warm, Dry, No erythema, No rash.   Back: No tenderness, No CVA tenderness.     Musculoskeletal: Good range of motion in all major joints.  Neurologic: Alert & oriented x 3,No focal deficits noted.   Psychiatric: Affect normal,    Results for orders placed or performed during the hospital encounter of 10/25/18   Lactic acid (lactate)   Result Value Ref Range    Lactic Acid 2.3 (H) 0.5 - 2.0 mmol/L   Lactic acid (lactate)   Result Value Ref Range    Lactic Acid 1.6 0.5 - 2.0 mmol/L   CBC WITH DIFFERENTIAL   Result Value Ref Range    WBC 10.3 4.8 - 10.8 K/uL    RBC 4.07 (L) 4.20 - 5.40 M/uL    Hemoglobin 11.9 (L) 12.0 - 16.0 g/dL    Hematocrit 35.5 (L) 37.0 - 47.0 %    MCV 85.7 81.4 - 97.8 fL    MCH 29.0 27.0 - 33.0 pg    MCHC 33.8 33.6 - 35.0 g/dL    RDW 42.9 35.9 - 50.0 fL    Platelet Count 87 (L) 164 - 446 K/uL    MPV 10.7 9.0 - 12.9 fL    Neutrophils-Polys 95.80 (H) 44.00 - 72.00 %    Lymphocytes 1.10 (L) 22.00 - 41.00 %    Monocytes 2.30 0.00 - 13.40 %    Eosinophils 0.00 0.00 - 6.90 %    Basophils 0.40 0.00 - 1.80 %    Immature Granulocytes 0.40 0.00 - 0.90 %    Nucleated RBC 0.00 /100 WBC    Neutrophils (Absolute) 9.83 (H) 2.00 - 7.15 K/uL    Lymphs (Absolute) 0.11 (L) 1.00 - 4.80 K/uL    Monos (Absolute) 0.24 0.00 - 0.85 K/uL    Eos (Absolute) 0.00 0.00 - 0.51 K/uL    Baso (Absolute) 0.04 0.00 - 0.12 K/uL    Immature Granulocytes (abs) 0.04 0.00 - 0.11 K/uL    NRBC (Absolute) 0.00 K/uL   COMP METABOLIC PANEL   Result Value Ref Range    Sodium 128 (L) 135 - 145 mmol/L    Potassium 3.8 3.6 - 5.5 mmol/L    Chloride 101 96 - 112 mmol/L    Co2 17 (L) 20 - 33 mmol/L    Anion Gap 10.0 0.0 - 11.9    Glucose 169 (H) 65 - 99 mg/dL    Bun 15 8 - 22 mg/dL    Creatinine 0.81 0.50 - 1.40 mg/dL    Calcium 8.7 8.5 - 10.5 mg/dL    AST(SGOT) 38 12 - 45 U/L    ALT(SGPT) 33 2 - 50 U/L     Alkaline Phosphatase 107 (H) 30 - 99 U/L    Total Bilirubin 0.7 0.1 - 1.5 mg/dL    Albumin 3.0 (L) 3.2 - 4.9 g/dL    Total Protein 6.3 6.0 - 8.2 g/dL    Globulin 3.3 1.9 - 3.5 g/dL    A-G Ratio 0.9 g/dL   URINALYSIS   Result Value Ref Range    Color DK Yellow     Character Cloudy (A)     Specific Gravity 1.029 <1.035    Ph 5.5 5.0 - 8.0    Glucose Negative Negative mg/dL    Ketones Trace (A) Negative mg/dL    Protein 100 (A) Negative mg/dL    Bilirubin Small (A) Negative    Urobilinogen, Urine 1.0 Negative    Nitrite Negative Negative    Leukocyte Esterase Trace (A) Negative    Occult Blood Moderate (A) Negative    Micro Urine Req Microscopic    Prothrombin Time (INR)   Result Value Ref Range    PT 23.4 (H) 12.0 - 14.6 sec    INR 2.07 (H) 0.87 - 1.13   APTT (PTT)   Result Value Ref Range    APTT 34.8 24.7 - 36.0 sec   ESTIMATED GFR   Result Value Ref Range    GFR If African American >60 >60 mL/min/1.73 m 2    GFR If Non African American >60 >60 mL/min/1.73 m 2   URINE MICROSCOPIC (W/UA)   Result Value Ref Range    WBC 5-10 (A) /hpf    RBC 10-20 (A) /hpf    Bacteria Moderate (A) None /hpf    Epithelial Cells Moderate (A) /hpf    Mucous Threads Few /hpf    Hyaline Cast 0-2 /lpf        RADIOLOGY/PROCEDURES  DX-CHEST-PORTABLE (1 VIEW)   Final Result      Hypoinflation without other evidence for acute cardiopulmonary disease.      CT-ABDOMEN-PELVIS WITH    (Results Pending)         COURSE & MEDICAL DECISION MAKING  Pertinent Labs & Imaging studies reviewed. (See chart for details)  The patient presents emergency department with fever, and chills.  She is a bit of a sore throat she has symptoms of foul-smelling urine and a bit of nasal congestion and some vague abdominal discomfort.    Her abdomen is benign without tenderness or peritonitis and suspect her abdominal discomfort is related to her Y 90 therapy.    The patient is febrile and is worked up for the sepsis protocol including labs, cultures and  antibiotics.    She is given IV fluids per the septic protocol.  She is given antipyretics and she is empirically given Rocephin because of the foul-smelling urine.  She is not on chemotherapy and she is not neutropenic.    The patient is received antibiotics.  Flu swab is pending.  Should be admitted to the hospitalist, Dr. Mauricio for continued workup and treatment.  Care is transferred at that time.    ] She felt better on reassessment repeat abdominal exam remained benign without peritonitis.      HYDRATION: Based on the patient's presentation of Sepsis the patient was given IV fluids. IV Hydration was used becasue oral hydration was not adequate alone. Upon recheck following hydration, the patient was Significantly improved.        FINAL IMPRESSION  1. Sepsis, due to unspecified organism (HCC)    2. Acute UTI        2.   3.         Electronically signed by: Damion Orourke, 10/25/2018 12:30 PM

## 2018-10-25 NOTE — PROGRESS NOTES
Pharmacy Kinetics 73 y.o. female on vancomycin day # 1   10/25/2018    Currently on Vancomycin 1600 mg IV x1 followed by 1300 mg IV q24h    Indication for Treatment: Empiric, possible line infection    Pertinent history per medical record: Admitted on 10/25/2018 for sepsis. Patient with complex medical hx including pancreatic CA which has previously been treated with a whipple in  and more recently chemotherapy this past summer and Y90 radiotherapy on 10/9. The ismael for Y90 is about 60 days for neutropenia so she is well before this. She does have an implanted port. She has had chills and not been feeling well for the last couple of days prior to arrival.    Other antibiotics: Zosyn 3.375 g IV q8h    Allergies: Tape     List concerns for renal function: age    Pertinent cultures to date:   10/25/18: Blood, peripheral x2 = in process  10/25/18: Respiratory, TA = in process  10/25/18: Urine = in process  10/25/18: Influenza, by PCR = in process    Recent Labs      10/25/18   1214   WBC  10.3   NEUTSPOLYS  95.80*     Recent Labs      10/25/18   1214   BUN  15   CREATININE  0.81   ALBUMIN  3.0*     Blood pressure 121/74, pulse 69, temperature 37.4 °C (99.3 °F), resp. rate 16, weight 64.9 kg (143 lb), SpO2 97 %, not currently breastfeeding. Temp (24hrs), Av.4 °C (101.1 °F), Min:37.4 °C (99.3 °F), Max:39.3 °C (102.8 °F)      A/P   1. Vancomycin dose change: new start  2. Next vancomycin level: 2-3 days (not ordered)  3. Goal trough: 16-20 mcgmL  4. Comments: Patient with fever on arrival although son said she was not febrile at home and they check her temperature daily. No leukocytosis, but does have significant chills and malaise. MD wants to rule out line infection and intra-abdominal. Trough in a couple of days if remains on vanco.    Steven Villafana, PharmD, BCPS

## 2018-10-25 NOTE — ED NOTES
Transport at bedside to take patient to [place] [id number].  Pt still in CT.Transport to grab patient from CT. Family member at bedside. Pt has chart and all belongs.

## 2018-10-25 NOTE — ED TRIAGE NOTES
Pt BIB family with c/c of generalized weakness and illness since she received treatment a couple of days ago. Pt with hx of pancreatic cancer. Charge RN aware. Mask on pt.

## 2018-10-26 PROBLEM — Z00.6 RESEARCH STUDY PATIENT: Status: ACTIVE | Noted: 2018-01-01

## 2018-10-26 NOTE — ASSESSMENT & PLAN NOTE
Source probably chemo port  Gram negative haley in blood cultures are noted  D/w the pt's oncologist  I.D is consulted and d/w dr bernardo  Removed the port by I.R  On ROCEPHIN  Facial xray showed;No periapical lucencies. No mandibular fracture.  Has resolved  No end organ damage  Possible discharge tomorrow

## 2018-10-26 NOTE — PROGRESS NOTES
Patient is AOx4. Family at bedside. Plan of care discussed. Verbalized understanding. Denies pain. Call light in use, belongings within reach, treaded socks on, bed locked in low position

## 2018-10-26 NOTE — ASSESSMENT & PLAN NOTE
This patient has been recruited in the study: Efficacy of prophylactic oral Vancomycin in preventing recurrent Clostridium Difficile infection in hospitalized patients requiring antibiotics. Approximately 300 subjects will be randomized to one of three treatment arms in a 1:1:1 ratio (Vancomycin 125 mg PO every 12 hours for duration of antibiotic therapy, Vancomycin 125 mg PO every 24 hours for duration of antibiotic therapy, or no oral Vancomycin). Follow-up for endpoints will be done at 12 weeks following completion of antibiotic therapy.  Please contact study group before discharging this patient. This patient may need [facility] at discharge. [facility] will provide the required antibiotics.   Contact Information:  [email or web address]

## 2018-10-26 NOTE — ASSESSMENT & PLAN NOTE
Continue outpatient Eliquis  Dr Cedeno recommended  To hold eliquis if platelets less then 50,000  D/w the pt's son and daugther at the bed side  Platelets 87 today

## 2018-10-26 NOTE — CARE PLAN
Problem: Safety  Goal: Will remain free from falls    Intervention: Assess risk factors for falls  Observed patient mobilize with no assistance to bathroom. Gait steady, no assistive devices needed. Educated patient to call for assistance ambulating as needed       Problem: Knowledge Deficit  Goal: Knowledge of disease process/condition, treatment plan, diagnostic tests, and medications will improve    Intervention: Explain information regarding disease process/condition, treatment plan, diagnostic tests, and medications and document in education  Educated patient on the results of her blood cultures. Verbalized understanding

## 2018-10-26 NOTE — CONSULTS
INFECTIOUS DISEASES INPATIENT CONSULT NOTE     Date of Service: 10/26/2018    Consult Requested By: Dinora Hurt M.D.    Reason for Consultation: Gram-negative sepsis    History of Present Illness:   Elin Poole is a 73 y.o. 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  Patient has a port in place and follows with Dr. Cedeno of oncology.  She states that she underwent Y 90 on 10/9 and had chemotherapy approximately 6 weeks prior to admission.  In addition she had her port in place for approximately 2 years.  It was flushed 1 week prior to admission as it has not been de-accessed.  Prior to her port being flush, family had noted that she was doing well however after the port was flushed, she had noted increasing fatigue associated with new chills.  Patient had been experience chills for approximately 5 days prior to admission but had had no documented fevers.  The evening prior to admission however she developed significant rigors.  She denies any pain or tenderness around her port site.  She does note a mild sore throat but denies any cough or shortness of breath.  Patient also denies any abdominal pain, nausea or vomiting.  She did have one episode of diarrhea the day of admission however this is since resolved.  She also denies any dysuria, flank pain or hematuria.  No recent antibiotics.  Given significant rigors, she presented to the ED for further evaluation and management.  On presentation, she was febrile with a T-max of 102.8 without any leukocytosis.  Urinalysis reviewed some pyuria but was negative for nitrite.  CT of her abdomen and pelvis revealed an ill-defined cystic mass in the pancreatic head with surrounding inflammatory changes which was new from prior exam in August.  There were also some new hypodense lesions scattered throughout the liver but no rim enhancement to suggest any abscess.  In  addition there was some duodenal wall thickening.  Blood cultures from admission are now positive for non-lactose fermenting gram-negative rods.  She is currently on Zosyn.  Infectious disease service consulted for further antibiotic recommendations and management.    Review Of Systems:  All other ROS were reviewed and are negative except as noted above in the HPI.    PMH:   Past Medical History:   Diagnosis Date   • Ampullary carcinoma (HCC)    • Anemia    • Arthritis     hands/fingers/right knee   • Asthma    • Blood clotting disorder (HCC)     blood clot 2016   • Cancer (HCC) 2016    Ampullary CA   • Cataract 09-    bilat.,no surgery   • Dental disorder     upper partial   • Encounter for antineoplastic chemotherapy 4/6/2018   • Heart burn    • High cholesterol    • Hypertension    • Indigestion    • Jaundice 2016       PSH:  Past Surgical History:   Procedure Laterality Date   • CATH PLACEMENT Right 9/9/2016    Procedure: CATH PLACEMENT cephalic power port  ;  Surgeon: Kurtis Jacobs M.D.;  Location: SURGERY [facility];  Service:    • WHIPPLE PROCEDURE  8/15/2016    Procedure: Exploratoy Laparotomy, Da-en-y;  Surgeon: Kurtis Jacobs M.D.;  Location: SURGERY [facility];  Service:    • NODE DISSECTION  8/15/2016    Procedure: omental flap, Liver Wedge, cholecystectomy ;  Surgeon: Kurtis Jacobs M.D.;  Location: SURGERY [facility];  Service:    • STENT PLACEMENT  7/2016    gallbladder   • CATARACT EXTRACTION WITH IOL     • CHOLECYSTECTOMY     • TONSILLECTOMY         FAMILY HX:  Family History   Problem Relation Age of Onset   • Cancer Other         unknown cancer       SOCIAL HX:  Social History     Social History   • Marital status:      Spouse name: N/A   • Number of children: N/A   • Years of education: N/A     Occupational History   • retired       Social History Main Topics   • Smoking status: Never Smoker   • Smokeless tobacco: Never Used  "  • Alcohol use No   • Drug use: No   • Sexual activity: Not on file     Other Topics Concern   • Not on file     Social History Narrative   • No narrative on file     History   Smoking Status   • Never Smoker   Smokeless Tobacco   • Never Used     History   Alcohol Use No       Allergies/Intolerances:  Allergies   Allergen Reactions   • Tape        History reviewed with the patient and patient's son at bedside    Other Current Medications:    Current Facility-Administered Medications:   •  artificial tears 1.4 % ophthalmic solution 1-2 Drop, 1-2 Drop, Both Eyes, Q2HRS PRN, George Mauricio M.D.  •  apixaban (ELIQUIS) tablet 5 mg, 5 mg, Oral, BID, George Mauricio M.D., 5 mg at 10/26/18 0450  •  senna-docusate (PERICOLACE or SENOKOT S) 8.6-50 MG per tablet 2 Tab, 2 Tab, Oral, BID **AND** polyethylene glycol/lytes (MIRALAX) PACKET 1 Packet, 1 Packet, Oral, QDAY PRN **AND** magnesium hydroxide (MILK OF MAGNESIA) suspension 30 mL, 30 mL, Oral, QDAY PRN **AND** bisacodyl (DULCOLAX) suppository 10 mg, 10 mg, Rectal, QDAY PRN, George Mauricio M.D.  •  0.9 % NaCl with KCl 20 mEq infusion, , Intravenous, Continuous, George Mauricio M.D., Last Rate: 100 mL/hr at 10/25/18 1641  •  ondansetron (ZOFRAN) syringe/vial injection 4 mg, 4 mg, Intravenous, Q4HRS PRN, George Mauricio M.D.  •  ondansetron (ZOFRAN ODT) dispertab 4 mg, 4 mg, Oral, Q4HRS PRN, George Mauricio M.D.  •  acetaminophen (TYLENOL) tablet 650 mg, 650 mg, Oral, Q6HRS PRN, George Mauricio M.D.  •  [COMPLETED] piperacillin-tazobactam (ZOSYN) 3.375 g in  mL IVPB, 3.375 g, Intravenous, Once, Stopped at 10/25/18 1711 **AND** piperacillin-tazobactam (ZOSYN) 3.375 g in  mL IVPB, 3.375 g, Intravenous, Q8HRS, George Mauricio M.D., Stopped at 10/26/18 0850  [unfilled]    Most Recent Vital Signs:  BP (!) 90/47   Pulse 65   Temp 36.3 °C (97.3 °F)   Resp 16   Ht 1.549 m (5' 1\")   Wt 67 kg (147 lb 11.3 oz)   SpO2 98%   Breastfeeding? No   BMI 27.91 kg/m² " "  Temp  Av °C (98.6 °F)  Min: 36.1 °C (96.9 °F)  Max: 39.3 °C (102.8 °F)    Physical Exam:  General: well-appearing, no acute distress, nontoxic  HEENT: sclera anicteric, PERRL, EOMI, MMM, no oral lesions  Neck: supple, no lymphadenopathy  Chest: CTAB, no r/r/w, normal work of breathing.  Right upper chest port nontender and without any surrounding erythema  Cardiac: RRR, normal S1 S2, no m/r/g   Abdomen: + bowel sounds, soft, non-tender, non-distended, no HSM, surgical scar clean  Extremities: WWP, no edema, 2+ pulses  Skin: no rashes or worrisome lesions  Neuro: Alert and oriented times 3, non-focal exam, speech fluent, moves all extremities    Pertinent Lab Results:  Recent Labs      10/25/18   1214  10/26/18   0500   WBC  10.3  9.3      Recent Labs      10/25/18   1214  10/26/18   0500   HEMOGLOBIN  11.9*  10.3*   HEMATOCRIT  35.5*  31.3*   MCV  85.7  87.4   MCH  29.0  28.8   PLATELETCT  87*  69*         Recent Labs      10/25/18   1214  10/26/18   0500   SODIUM  128*  136   POTASSIUM  3.8  3.8   CHLORIDE  101  109   CO2  17*  19*   CREATININE  0.81  0.69        Recent Labs      10/25/18   1214   ALBUMIN  3.0*        Pertinent Micro:  Results     Procedure Component Value Units Date/Time    BLOOD CULTURE [[id number]]  (Abnormal) Collected:  10/25/18 1240    Order Status:  Completed Specimen:  Blood from Peripheral Updated:  10/26/18 0902     Significant Indicator POS (POS)     Source BLD     Site PERIPHERAL     Blood Culture Growth detected by Bactec instrument. 10/25/2018  22:38 (A)      Non-lactose fermenting Gram negative haley (A)    Narrative:       CALL  Flores  61 tel. [phone],  CALLED  61 tel. [phone] 10/25/2018, 22:37, RB PERF. RESULTS CALLED TO: RN  [phone]  Per Hospital Policy: Only change Specimen Src: to \"Line\" if  specified by physician order.    BLOOD CULTURE [[id number]]  (Abnormal) Collected:  10/25/18 1214    Order Status:  Completed Specimen:  Blood from Peripheral Updated:  10/26/18 0859 " "    Significant Indicator POS (POS)     Source BLD     Site PERIPHERAL     Blood Culture Growth detected by Bactec instrument. 10/25/2018  22:52 (A)      Non-lactose fermenting Gram negative haley (A)    Narrative:       CALL  Flores  61 tel. [phone],  CALLED  61 tel. [phone] 10/25/2018, 22:55, RB PERF. RESULTS CALLED TO:  RN  [phone]  Per Hospital Policy: Only change Specimen Src: to \"Line\" if  specified by physician order.    INFLUENZA A/B BY PCR [[id number]] Collected:  10/25/18 1250    Order Status:  Completed Specimen:  Urine from Nasopharyngeal Updated:  10/25/18 2030     Influenza virus A RNA Negative     Influenza virus B, PCR Negative    Narrative:       Indication for culture:->Emergency Room Patient    URINALYSIS [[id number]]  (Abnormal) Collected:  10/25/18 1250    Order Status:  Completed Specimen:  Urine Updated:  10/25/18 1523     Color DK Yellow     Character Cloudy (A)     Specific Gravity 1.029     Ph 5.5     Glucose Negative mg/dL      Ketones Trace (A) mg/dL      Protein 100 (A) mg/dL      Bilirubin Small (A)     Urobilinogen, Urine 1.0     Nitrite Negative     Leukocyte Esterase Trace (A)     Occult Blood Moderate (A)     Micro Urine Req Microscopic    Narrative:       Indication for culture:->Emergency Room Patient    Urine culture [[id number]] Collected:  10/25/18 1411    Order Status:  Completed Specimen:  Urine from Urine, Clean Catch Updated:  10/25/18 1417    Narrative:       Indication for culture:->Emergency Room Patient    URINE CULTURE(NEW) [[id number]] Collected:  10/25/18 1250    Order Status:  Completed Specimen:  Urine Updated:  10/25/18 1308    Narrative:       Indication for culture:->Emergency Room Patient    Culture Respiratory W/ GRM STN [[id number]] Collected:  10/25/18 1250    Order Status:  Completed Specimen:  Respirate from Sputum Updated:  10/25/18 1305    Urinalysis [[id number]]     Order Status:  Canceled Specimen:  Urine from Urine, Clean Catch     BLOOD CULTURE [[id number]] "     Order Status:  Sent Specimen:  Blood from Peripheral     BLOOD CULTURE [[id number]]     Order Status:  Sent Specimen:  Blood from Line         Blood Culture   Date Value Ref Range Status   10/25/2018 (A)  Preliminary    Growth detected by Bactec instrument. 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 Patient is status post embolization.. TECHNIQUE/EXAM DESCRIPTION:   CT scan of the abdomen and pelvis with contrast. Contrast-enhanced helical scanning was obtained from the diaphragmatic domes through the pubic symphysis following the bolus administration of nonionic contrast without complication. 80 mL of Omnipaque 350 nonionic contrast was administered without complication. Low dose optimization technique was utilized for this CT exam including automated exposure control and adjustment of the mA and/or kV according to patient size. COMPARISON: PET/CT 7/12/2018, MRI 8/6/2018 and CT abdomen 3/27/2018 FINDINGS: The visualized lung bases are unremarkable. CT Abdomen: There are several hypodense lesions in the hepatic dome with a couple of foci of air. A hypodense mass within air-fluid level in the right hepatic lobe, segment 8, measures approximately 2.1 cm. No significant rim enhancement is identified. There are new  hypodense lesions in the anterior right hepatic lobe measuring up to approximately 7 mm. There is pneumobilia, consistent with prior hepaticojejunostomy. The gallbladder has been removed. The common bile duct remains dilated with thickening of the duodenal wall and enhancement. Maximum diameter of the common bile duct is approximately 14 mm. There are multiple coils in  the region of the pancreatic head. The spleen and adrenal glands are unremarkable. The kidneys enhance symmetrically. Hypodense renal masses are consistent with cysts. There is heterogeneous enhancement of the pancreatic head with an ill-defined cystic mass. The mass is difficult to measure, but is approximately 4.6 x 4 cm. The duct does not appear dilated more distally. There are multiple diverticula in the descending and sigmoid colon. There are scattered arterial calcifications. Enlarged aortocaval lymph node on image 37 measures approximately 15 mm short axis, previously 8 mm. CT Pelvis: The bladder is unremarkable. No significant free fluid is identified. A calcified mass in the uterus is consistent with a small leiomyoma.     1.  Ill-defined cystic mass in the pancreatic head with surrounding inflammatory changes. Findings may be related to pancreatitis in the appropriate clinical setting. Findings are new from the prior exam in August. 2.  New hypodense lesions scattered throughout the liver was foci of air. Findings are highly likely to be related to recent embolization. No rim enhancement to suggest abscess. 3.  Duodenal wall thickening with enhancement, concerning for residual disease. Persistent common bile duct dilatation. 4.  Interval enlargement in an aortocaval lymph node, highly suspicious for metastatic disease. 5.  Diverticulosis. 6.  Status post hepaticojejunostomy.    Dx-chest-portable (1 View)    Result Date: 10/25/2018  10/25/2018 11:35 AM HISTORY/REASON FOR EXAM:  LEFT shoulder pain, malaise, nausea. TECHNIQUE/EXAM DESCRIPTION AND NUMBER OF VIEWS: Single portable view of the chest. COMPARISON: 5/3/2018 FINDINGS: Cardiomediastinal contour is within normal limits. Lungs show hypoinflation. No focal pulmonary consolidation. No pleural fluid collection or pneumothorax. RIGHT chest infusion port again present. No major bony abnormality is seen. Atherosclerotic calcification of thoracic aorta.      Hypoinflation without other evidence for acute cardiopulmonary disease.    Nm-liver/spleen Scan    Result Date: 10/10/2018  AU BASIC DOSIMETRY Y-90 RADIOEMBOLIZATION THERAPY AND BREMSSTRAHLUNG IMAGING: After the radioembolization procedure the patient was transported to Nuclear Medicine. Bremsstrahlung images were obtained encompassing the thorax and abdomen, in anterior and posterior views, as well as SPECT/CT of the upper abdomen. TREATMENT PLANNING: The prior history was reviewed, and the local and systemic treatment options considered. Radiation treatment with microspheres was deemed the most appropriate therapy, to meet the goals of liver tolerance and delivery of sufficient radiation to the tumor  in the right liver. The treatment goal is palliative. BASIC DOSIMETRY CALCULATION: The Y-90 microsphere dose was calculated using the patient's tumor burden as demonstrated by CT/MRI scan. The available imaging studies were reviewed. The right lobe represents 67% and the left lobe 33% with 10% tumor involvement of the liver. A shunt study from 10/1/2018 showed a hepatopulmonary shunt calculation of 5.2%. Based on patient parameters and the value of the pulmonary shunt calculation, the dose was not decreased. SPECIAL RADIATION TREATMENT: The patient has had previous systemic chemotherapy or has concurrent chemotherapy. A desired dose of 28.08 mCi and 1.04 GBq was calculated and documented on the medical directive. YTTRIUM-90 MICROSPHERES HANDLING AND PREPARATION: The microspheres were received and prepared in accordance with the 's specifications, and transported to the catheterization laboratory. The appropriate radiation safety, monitoring and disposal standards were observed. YTTRIUM-90 MICROSPHERES DOSE DISPENSED: 31 mCi Y-90 SIR-Spheres microspheres. Please see alternate report for a description of angiography, Sir-Sphere delivery and AU findings. FINDINGS: The delivered activity appears to be  confined to the right lobe of the liver. No extrahepatic uptake is seen. No significant tracer uptake is identified within the bowels or spleen.     The prescribed dose was  1.04 gb ( 28.08 mCi). The drawn dose was 1.15 gb (31mCi). Delivered dose after residual was measured at 1.08 gb ( 29.14 mCi). This represents  104% of the prescribed dose and  94% of the drawn dose. Bremsstrahlung images obtained  after the Y-90 radioembolization, confirm proper delivery to the targeted region. There is no uptake outside the planned treatment area. NOTE: The patient will be followed by interventional radiology and oncology.    Nm-quantitative Lung Scan    Result Date: 10/1/2018  10/1/2018 11:12 AM HISTORY/REASON FOR EXAM:  Ampullary carcinoma. TECHNIQUE/EXAM DESCRIPTION AND NUMBER OF VIEWS: Quantitative lung scan. COMPARISON: None. PROCEDURE:  4.4 mCi technetium 99m labeled MAA administered via the right and left hepatic arterial branches followed by multiple projection imaging. Quantitative analysis was then performed. FINDINGS: Total hepatopulmonary shunt percentage was 5.2%. No evidence of extrahepatic radiotracer deposition is identified.     Quantitative lung scan as described.    Ir-embolization    Result Date: 10/10/2018  EXAM: YTTRIUM-90 INTRAHEPATIC EMBOLIZATION PROCEDURE DATE: 10/9/2018 CLINICAL HISTORY: 73-year-old female with metastatic ampullary adenocarcinoma to the right lobe of the liver. OPERATING INTERVENTIONALISTS: Mt Recinos M.D. CONSENT: After the risks, benefits and alternatives were discussed with the patient, including the likelihood of technical success, and all of the patient's questions were answered, written informed consent was obtained for both the procedure and for conscious sedation. SEDATION: Conscious sedation with 50 mcg Fentanyl and 2 mg Versed was administered during the procedure with appropriate continuous patient monitoring by the radiology nurse under the direct supervision of the  attending physician. Sedation duration: 45 minutes CONTRAST: 30 cc Omnipaque 300 intra-arterial. FLUOROSCOPY IMAGES: 8 fluoroscopic images obtained. FLUOROSCOPY TIME: 3.4 minutes. PROCEDURES PERFORMED: 1. Access of the left radial artery under sonographic guidance.  CPT 64354 2. 3. Celiac arteriogram - CPT 3. Selection of the proper hepatic artery with arteriogram CPT code 66285/89202. 4. Selection of the right hepatic artery with arteriogram CPT code 99161/67900 5. Yttrium-90 SIR-Spheres radioembolization delivered via the right hepatic artery CPT codes 30614 6. Post embolization arteriogram of the right hepatic artery. CPT code 83848. 7. Conscious sedation. 8. Fluoroscopic guidance for the above. TECHNIQUE: The patient was brought to the angiography suite and placed in the supine position. A barbeau test of the left extremity was performed demonstrating dual circulation to the hand. The left wrist was prepped and draped in a sterile manner.  A patent left radial artery was identified with real-time ultrasound and an image recorded and entered into the patient's medical record. Following administration of 2 mL 1% lidocaine the left radial artery was accessed under direct ultrasound visualization and a double wall technique. A 4 [place]adorean slender Terumo glidesheath was advanced into the radial artery. A mixture containing 100 mcg nitroglycerin, 2.5 mg verapamil and 3000 units of heparin was injected through the sheath into the artery. An angle tip Glidewire preloaded on a 155 cm 5 [place] [employer] radial catheter was advanced into the abdominal aorta under fluoroscopic imaging allowing for selection of the celiac trunk. An angiogram was performed demonstrating no evidence of variant hepatic arterial anatomy. No flow is observed in the gastroduodenal artery or right gastric artery which were previously embolized utilizing microcoils. Through the guide catheter, a Renegade hi kj microcatheter with a fathom 0.016 wire was  used to select the right hepatic artery arising from the proper hepatic artery. The Renegade hi kj microcatheter was advanced into the right hepatic artery. Angiography confirmed position of the catheter in the same location as on the planning procedure previously performed on 10/1/2018. The catheters were secured in position. At this point, the yttrium-90 calculated dose was brought to the angiography suite by the nuclear medicine department. The yttrium-90 SIR-Sphere delivery apparatus was then set up according to the manufactures instructions. The yttrium-90 dose was deposited into an acrylic shielding container and a closed delivery system was prepared. Specifically, the dose vial was connected to two inlet tubings of sterile water and contrast sources as well as single outlet tubing for the yttrium-90 delivery. The  outlet tubing was connected to the Renegade hi kj microcatheter. Then multiple small aliquots of the yttrium-90 microspheres were injected into the artery followed by D5W and contrast flushes. Angiography was performed intermittently to confirm antegrade flow assess the degree of embolization. The endpoint was reached when the total volume of yttrium-90 had been administered. Catheters were carefully removed from the patient and secured with full radiation safety protection protocols. The catheter and sheath were removed, and closure device was deployed in the right groin. The patient tolerated the procedure well. There were no immediate complications. The prescribed dose was  1.04 gb ( 28.08 mCi). The drawn dose was 1.15 gb (31mCi). Delivered dose after residual was measured at 1.08 gb ( 29.14 mCi). This represents  104% of the prescribed dose and  94% of the drawn dose. FINDINGS: As above. 1.  Technically successful Y90 radioembolization of the right hepatic lobe. The patient will followup in approximately 10 days for laboratory and clinical evaluation and 4-6 weeks for imaging evaluation. 2.   Immediately following the procedure, the patient was transported to nuclear medicine for planar imaging which demonstrates Bremsstrahlung emission from the right of the liver. There is no definite evidence of extrahepatic deposition of radioembolic material.    Ir-embolization    Result Date: 10/1/2018   Pre-Y90 mapping and MAA shunt evaluation HISTORY/REASON FOR EXAM:  metastatic ampullary adenocarcinoma to right liver. ATTENDING: Mt Recinos M.D. ASSISTANTS:  None MEDICATIONS: Moderate sedation was achieved with administration of: Versed 1 mg mg IV Fentanyl 50 mcg mcg IV Conscious sedation duration: 60 minutes. Local anesthesia was achieved with the following: Lidocaine 5 ml 1% buffered with sodium bicarbonate was administered for local anesthesia. Other medicines: None. CONTRAST: 50 mL Omnipaque 300 intra-arterial FLUOROSCOPY: 5 minutes. TECHNIQUES/FINDINGS: After review of the procedure, risks, benefits, alternatives and sedation, consent was obtained from the patient. A barbeau test of the left extremity was performed demonstrating dual circulation to the hand. The left wrist was prepped and draped in a sterile manner.  A patent left radial artery was identified with real-time ultrasound and an image recorded and entered into the patient's medical record. Following administration of 2 mL 1% lidocaine the left radial artery was accessed under direct ultrasound visualization and a double wall technique. A 4 [place] slender Terumo glidesheath was advanced into the radial artery. A mixture containing 100 mcg nitroglycerin, 2.5 mg verapamil and 3000 units of heparin was injected through the sheath into the artery. An angle tip Glidewire preloaded on a 155 cm 5 [place] [employer] radial catheter was advanced into the abdominal aorta under fluoroscopic imaging allowing for selection of the celiac trunk. The guide catheter was advanced over the wire, through the sheath, and into the abdominal aorta. The superior mesenteric  artery was selected and an angiogram was performed. The diagnostic angiogram showed no evidence of variant hepatic artery anatomy. Utilizing the guide catheter in the abdominal aorta, the celiac artery was selected and an angiogram was performed demonstrating no evidence of variant hepatic arterial anatomy. A  renegade  microcatheter with a microwire was advanced into the common hepatic artery and a selective injection was performed demonstrating no evidence of variant hepatic arterial anatomy. The right gastric artery was cannulated and a selective angiogram was performed. The right gastric artery was embolized utilizing  a 3 x 3.3 mm Jayleen microcoil. Post intervention angiogram demonstrated near cessation of flow in the right gastric artery. The gastroduodenal artery was cannulated and a selective angiogram was performed. The gastroduodenal artery was embolized with: MYR Jayleen Coil 14mm x 6cm ?Ref # [id number] Lot # [id number] ? [employer] Interlock 6mm x 20cm Ref # [id number] # [id number] ? ? [employer] Interlock 2mm x 6mm x 8cm Ref # [id number] # [id number] Post intervention images confirmed cessation of flow in the. The microcatheter was positioned in the right hepatic artery and 4.4 mCi technetium 99m labeled MAA was injected slowly. The microcatheter was removed from the guide catheter and a final angiogram was performed through the guide catheter demonstrating minimal residual flow through the gastroduodenal artery and right gastric artery. The guide catheter was removed from the patient through the vascular sheath. Upon completion of the procedure, the arterial sheath was, removed and hemostasis was achieved with the aid of a TR band device. The patient transferred to the post procedure unit in good condition. The procedure was performed by Dr. Recinos. COMPLICATIONS: No immediate complications. ESTIMATED BLOOD LOSS: 10 mL. SPECIMEN/DISPOSTION: None.     1.  Superior mesenteric  arteriogram no evidence of variant hepatic artery anatomy. 2.  Celiac arteriogram. 3.  Common hepatic arteriogram demonstrating no evidence of variant hepatic arterial anatomy. 4.  Selective right gastric arteriogram demonstrating no evidence of variant hepatic arterial anatomy. 5.  Selective right gastric microcoil embolization. 6.  Selective gastroduodenal arteriogram demonstrating no evidence of variant hepatic arterial anatomy. 7.  Selective gastroduodenal microcoil embolization. 8.  Proper hepatic arteriogram. 9.  Intra-arterial administration of 4.4 mCi technetium 99m labeled MAA into the right hepatic artery demonstrated a hepatic pulmonary shunt fraction of 5.2%.    Ir-low Level Consult-outpt    Result Date: 10/22/2018  This exam has been resulted under the NOTES tab, and the signed report has been auto faxed to the ordering physician on the date/time of that signature.      IMPRESSION:   1.  Gram-negative sepsis   2.  Metastatic ampullary carcinoma      PLAN:   Elin Poole is a 73 y.o. woman with a history of metastatic ampullary carcinoma status post Y 90 on 10/9/18 and last round of chemotherapy approximately 6 weeks prior to admission admitted for rigors.  Patient found to have a fever with gram-negative sepsis.  The source of her sepsis may be secondary to her port as it was recently flushed 1 week prior to admission and thereafter patient started to developing malaise and chills.  CT of abdomen pelvis does reveal a new cystic mass in the pancreatic head as well as new liver lesions but no evidence of any abscess or colitis.  She is also not having any urinary or gastrointestinal symptoms.  Continue Zosyn for now pending further cultures.  Repeat blood cultures have been ordered.  Port will likely need to be removed as it is the most likely source of infection.  Further recommendations per hospital course and culture results.    Plan of care discussed with SYMONE Hurt M.D.. Will  continue to follow    Zakia Ansari M.D.

## 2018-10-26 NOTE — CONSULTS
Consult Note: Oncology    Date of consultation: 10/26/2018 2:41 PM    Referring provider: Dr Hurt    Reason for consultation: Metastatic pancreatic carcinoma, BRCA mutated                         Gram-negative sepsis    History of presenting illness:   adenocarcinoma of primary ampulla diagnosed in 7/2016.    -seen by Dr. Jacobs and underwent an attempted Whipple’s in 8/2016. The surgery was aborted secondary to multiple liver lesions and s/p wedge resection of the liver which was positive for poorly differentiated adenocarcinoma.  PET scan showed uptake in multiple hepatic lesions and uptake in the ampulla. He also had uptake in bilateral hilar area   -gemcitabine and Abraxane - > single agent gemcitabine. She received chemotherapy on 1/18/17. Further cycles have been held secondary to increased fatigue.     3/20/17 CT CAP showed thrombus seen in the right external iliac and common femoral vein. Previous foci in the liver are no longer seen and no residual or recurrent mass seen in the surgical bed.  She was started on Eliquis.     -11/1/17-interim CT scan shows disease progression with a new enhancing mass in the uncinate process in. Ampullary area with some mass effect on the distal CBD. There is also new adenopathy surrounding the mass. new tumor thrombus in the posterior SMV with 180 degrees abutment of the adjacent mass     -11 /15/2017- started gemcitabine and Abraxane alternate week with good tolerance.     -2/5/18-CT abdomen, pelvis-Increased size of enhancing mass in the uncinate process and periampullary region which currently measures 4.0 x 4.4 cm compared to 3.6 x 4.1 cm., slight decrease in the adenopathy anterior to the pancreas, slight increase in aortocaval adenopathy. Increased nonocclusive thrombus in the SMV.         -Started 5FU/Onvyde with some diarrhea and intolerance problem with subsequent cycles.  -Status post 6 cycles        -PET scan-7/12/18-     focal but slightly ill-defined  uptake in the posterior right lobe of the liver which is suspicious for hepatic metastases although no focal mass is seen on the current CT images. There is questionable subtle lobular enhancement in this region on the previous CT of 3/27/2018. If clinically indicated, this could be further assessed with MRI of the liver.  2.  Focal uptake in the duodenum near the ampulla bladder which is nonspecific and could represent residual tumor or physiologic uptake.    -discussed in our GI tumor board with consensus to proceed with Y 90 treatment to the liver followed by chemoradiation to the ampullary lesion and peripancreatic lymph nodes. Since the liver involved segments 5, 6, and 7, we were unable to plan a segmentectomy and instead recommended right lobe intervention  -10/9/18- s/p Y 90 SIRT  -She had some nonspecific abdominal complaints which has improved.  She was on steroids and PPIs.  -Currently admitted with gram-negative sepsis thought to be related to her Port-A-Cath.  This was flushed 2 weeks ago.   -CT abdomen pelvisIll-defined cystic mass in the pancreatic head with surrounding inflammatory changes. Findings may be related to pancreatitis in the appropriate clinical setting. Findings are new from the prior exam in August.  2.  New hypodense lesions scattered throughout the liver was foci of air. Findings are highly likely to be related to recent embolization. No rim enhancement to suggest abscess.  3.  Duodenal wall thickening with enhancement, concerning for residual disease. Persistent common bile duct dilatation.  4.  Interval enlargement in an aortocaval lymph node 8mm-> 15 mm.Currently on Zosyn.  ID has recommended removal of the Port-A-Cath    Lipase levels were within normal limits.  Rest of the labs are unremarkable.  She is not neutropenic. Afebrile    Past Medical History:    Past Medical History:   Diagnosis Date   • Ampullary carcinoma (HCC)    • Anemia    • Arthritis     hands/fingers/right knee    • Asthma    • Blood clotting disorder (HCC)     blood clot 2016   • Cancer (HCC) 2016    Ampullary CA   • Cataract 09-    bilat.,no surgery   • Dental disorder     upper partial   • Encounter for antineoplastic chemotherapy 4/6/2018   • Heart burn    • High cholesterol    • Hypertension    • Indigestion    • Jaundice 2016       Past surgical history:    Past Surgical History:   Procedure Laterality Date   • CATH PLACEMENT Right 9/9/2016    Procedure: CATH PLACEMENT cephalic power port  ;  Surgeon: Kurtis Jacobs M.D.;  Location: SURGERY [facility];  Service:    • WHIPPLE PROCEDURE  8/15/2016    Procedure: Exploratoy Laparotomy, Da-en-y;  Surgeon: Kurtis Jacobs M.D.;  Location: SURGERY [facility];  Service:    • NODE DISSECTION  8/15/2016    Procedure: omental flap, Liver Wedge, cholecystectomy ;  Surgeon: Kurtis Jacobs M.D.;  Location: SURGERY [facility];  Service:    • STENT PLACEMENT  7/2016    gallbladder   • CATARACT EXTRACTION WITH IOL     • CHOLECYSTECTOMY     • TONSILLECTOMY         Allergies:  Tape    Medications:    Current Facility-Administered Medications   Medication Dose Route Frequency Provider Last Rate Last Dose   • artificial tears 1.4 % ophthalmic solution 1-2 Drop  1-2 Drop Both Eyes Q2HRS PRN George Mauricio M.D.       • apixaban (ELIQUIS) tablet 5 mg  5 mg Oral BID George Mauricio M.D.   5 mg at 10/26/18 0450   • senna-docusate (PERICOLACE or SENOKOT S) 8.6-50 MG per tablet 2 Tab  2 Tab Oral BID George Mauricio M.D.        And   • polyethylene glycol/lytes (MIRALAX) PACKET 1 Packet  1 Packet Oral QDAY PRN George Mauricio M.D.        And   • magnesium hydroxide (MILK OF MAGNESIA) suspension 30 mL  30 mL Oral QDAY PRN George Mauricio M.D.        And   • bisacodyl (DULCOLAX) suppository 10 mg  10 mg Rectal QDAY PRN George Mauricio M.D.       • 0.9 % NaCl with KCl 20 mEq infusion   Intravenous Continuous George Mauricio M.D. 100 mL/hr at 10/25/18  1641     • ondansetron (ZOFRAN) syringe/vial injection 4 mg  4 mg Intravenous Q4HRS PRN George Mauricio M.D.       • ondansetron (ZOFRAN ODT) dispertab 4 mg  4 mg Oral Q4HRS PRN George Mauricio M.D.       • acetaminophen (TYLENOL) tablet 650 mg  650 mg Oral Q6HRS PRN George Mauricio M.D.       • piperacillin-tazobactam (ZOSYN) 3.375 g in  mL IVPB  3.375 g Intravenous Q8HRS George Mauricio M.D. 25 mL/hr at 10/26/18 1401 3.375 g at 10/26/18 1401       Social History:     Social History     Social History   • Marital status:      Spouse name: N/A   • Number of children: N/A   • Years of education: N/A     Occupational History   • retired       Social History Main Topics   • Smoking status: Never Smoker   • Smokeless tobacco: Never Used   • Alcohol use No   • Drug use: No   • Sexual activity: Not on file     Other Topics Concern   • Not on file     Social History Narrative   • No narrative on file       Family History:     Family History   Problem Relation Age of Onset   • Cancer Other         unknown cancer       Review of Systems:  All other review of systems are negative except what was mentioned above in the HPI.    Constitutional: No fever, chills, weight loss ,malaise/fatigue.    HEENT: No new auditory or visual complaints. No sore throat and neck pain.     Respiratory:No new cough, sputum production, shortness of breath and wheezing.    Cardiovascular: No new chest pain, palpitations, orthopnea and leg swelling.    Gastrointestinal: No heartburn, nausea, vomiting ,abdominal pain, hematochezia or melena     Genitourinary: Negative for dysuria, hematuria    Musculoskeletal: No new arthralgias or myalgias   Skin: Negative for rash and itching.    Neurological: Negative for focal weakness or headaches.    Endo/Heme/Allergies: No abnormal bleed/bruise.    Psychiatric/Behavioral: No new depression/anxiety.    Physical Exam:  Vitals:   BP (!) 90/47   Pulse 65   Temp 36.3 °C (97.3 °F)   Resp 16    "Ht 1.549 m (5' 1\")   Wt 67 kg (147 lb 11.3 oz)   SpO2 98%   Breastfeeding? No   BMI 27.91 kg/m²     General: Not in acute distress, alert and oriented x 3  HEENT: No pallor, icterus. Oropharynx clear.   Neck: Supple, no palpable masses.  Lymph nodes: No palpable cervical, supraclavicular, axillary or inguinal lymphadenopathy.    CVS: regular rate and rhythm, no rubs or gallops  RESP: Clear to auscultate bilaterally, no wheezing or crackles.   ABD: Soft, non tender, non distended, positive bowel sounds, no palpable organomegaly  EXT: No edema or cyanosis  CNS: Alert and oriented x3, No focal deficits.  Skin- No rash      Labs:   Recent Labs      10/25/18   1214  10/26/18   0500   RBC  4.07*  3.58*   HEMOGLOBIN  11.9*  10.3*   HEMATOCRIT  35.5*  31.3*   PLATELETCT  87*  69*   PROTHROMBTM  23.4*   --    APTT  34.8   --    INR  2.07*   --      Lab Results   Component Value Date/Time    SODIUM 136 10/26/2018 05:00 AM    POTASSIUM 3.8 10/26/2018 05:00 AM    CHLORIDE 109 10/26/2018 05:00 AM    CO2 19 (L) 10/26/2018 05:00 AM    GLUCOSE 132 (H) 10/26/2018 05:00 AM    BUN 13 10/26/2018 05:00 AM    CREATININE 0.69 10/26/2018 05:00 AM        Assessment and Plan:    Metastatic pancreatic carcinoma, BRCA mutated -she had good response to initial gemcitabine and Abraxane followed by gemcitabine chemotherapy in 2016 and was  off of chemotherapy for more than an year   -11/2017 started on gemcitabine and Abraxane for progression mainly involving the pancreatic head/lymph nodes with no response  Switched to 5-FU/Liposomal Irinotecan x6 doses requiring dose modification. She had very good response.    S/p Y90 SIRT to the unresectable liver lesions, tentative plan was to do Xeloda based radiation to the pancreatic mass  Currently admitted with gram-negative sepsis.  Agree with Port-A-Cath placement on antibiotics  CT shows some enlargement of the aortocaval lymph node, possibly indicating progression.  There is evidence of treated " liver metastases.  Unclear whether the cystic mass in the pancreas is indicating slight progression or not.    We will hold off on plans to do chemoradiation at this point due to her age frailty and possible roseline progression.  We will explore the option of retreating her with dose modified  5-FU/Liposomal Irinotecan or off label PARP inhibitor.  -We will discuss all this as an outpatient after discharge.    -BRCA2 mutation seen in the tumor specimen. Germline analysis did not reveal BRCA2 mutation. There was  BAP1/RAD 51 VUS. May consider off label PAP I if she progresses    -History of DVT in 3/2017-hold Eliquis if her platelet count drops below 50,000.    -Sepsis- port removal planned ,ID following .She is not neutropenic.No post Y 90 liver abscess seen in the CT scan    - Will sign off . Pls call with questions      She agreed and verbalized her agreement and understanding with the current plan.  I answered all questions and concerns she has at this time.              Thank you for allowing me to participate in her care.    Please note that this dictation was created using voice recognition software. I have made every reasonable attempt to correct obvious errors, but I expect that there are errors of grammar and possibly content that I did not discover before finalizing the note.      SIGNATURES:  Claude Cedeno    CC:  Wally Knox D.O.  No ref. provider found

## 2018-10-26 NOTE — H&P
"Hospital Medicine History & Physical Note    Date of Service  10/25/2018    Primary Care Physician  Wally Knox D.O.      Code Status  full    Chief Complaint  chills    History of Presenting Illness  73 y.o. female who presented 10/25/2018 with 2 days of rigors. Ms. Poole has a history of DVT on anticoagulation and Ampullary carcinoma with metastasis s/p chemotherapy one month ago and Y90 on 10/9 that developed \"real bad chills\" 2 days ago been off and on.  This morning there was quite severe therefore her son brought her to the emergency room where her temperature was 102.8.  She has had blood cultures drawn, given broad-spectrum IV antibiotics, and will be admitted for further work up.  Patient denies diarrhea, no dysuria, no cough.  She denies abdominal pain and has not had any weight loss.  Her son is at bedside and states that seems attack his mother is been in reasonably good health since the Y 90 treatment.  Except for the chills, her real only complaint has been some tiredness after the Y 90 but this is worn off.  Has not had any pain at her right chest port.    Review of Systems  Review of Systems   Constitutional: Positive for chills. Negative for fever.   Respiratory: Negative for cough.    Cardiovascular: Negative for chest pain.   Gastrointestinal: Positive for heartburn. Negative for diarrhea.   Genitourinary: Negative for dysuria and frequency.   Musculoskeletal: Positive for myalgias.   All other systems reviewed and are negative.      Past Medical History   has a past medical history of Ampullary carcinoma (HCC); Anemia; Arthritis; Asthma; Blood clotting disorder (HCC); Cancer (HCC) (2016); Cataract (09-); Dental disorder; Encounter for antineoplastic chemotherapy (4/6/2018); Heart burn; High cholesterol; Hypertension; Indigestion; and Jaundice (2016).    Surgical History   has a past surgical history that includes stent placement (7/2016); whipple procedure (8/15/2016); node " dissection (8/15/2016); cholecystectomy; cath placement (Right, 9/9/2016); tonsillectomy; and cataract extraction with iol.     Family History  family history includes Cancer in her other.     Social History   reports that she has never smoked. She has never used smokeless tobacco. She reports that she does not drink alcohol or use drugs.    Allergies  Allergies   Allergen Reactions   • Tape        Medications  Prior to Admission Medications   Prescriptions Last Dose Informant Patient Reported? Taking?   ELIQUIS 5 MG Tab 10/24/2018 at Unknown time Patient No No   Sig: TAKE 1 TAB BY MOUTH 2 TIMES A DAY.   cyclosporin (RESTASIS) 0.05 % ophthalmic emulsion 10/24/2018 at Unknown time  Yes Yes   Sig: Place 1 Drop in both eyes 2 times a day.   losartan-hydrochlorothiazide (HYZAAR) 50-12.5 MG per tablet 10/24/2018 at Unknown time Patient No No   Sig: Take 1 Tab by mouth every day.   omeprazole (PRILOSEC) 20 MG delayed-release capsule 10/24/2018 at Unknown time  No No   Sig: TAKE 1 CAPSULE BY MOUTH EVERYDAY   potassium chloride SA (K-DUR) 10 MEQ Tab CR 10/24/2018 at Unknown time Patient No No   Sig: Take 2 Tabs by mouth 2 times a day.      Facility-Administered Medications: None       Physical Exam  Temp:  [36.3 °C (97.3 °F)-39.3 °C (102.8 °F)] 36.3 °C (97.3 °F)  Pulse:  [] 73  Resp:  [15-43] 18  BP: (103-121)/(50-74) 103/50    Physical Exam   Constitutional: She is oriented to person, place, and time. No distress.   HENT:   Head: Normocephalic and atraumatic.   Neck: Normal range of motion. Neck supple.   Cardiovascular:   Sinus tachycardia without a murmur   Pulmonary/Chest: No respiratory distress. She has no wheezes.   Right chest port without any surrounding erythema   Abdominal: Soft. She exhibits no distension. There is no tenderness.   Musculoskeletal: She exhibits no edema or tenderness.   Lymphadenopathy:     She has no cervical adenopathy.   Neurological: She is oriented to person, place, and time.   Skin:  Skin is warm and dry. No rash noted. She is not diaphoretic.   Psychiatric: She has a normal mood and affect. Her behavior is normal.   Nursing note and vitals reviewed.      Laboratory:  Recent Labs      10/25/18   1214   WBC  10.3   RBC  4.07*   HEMOGLOBIN  11.9*   HEMATOCRIT  35.5*   MCV  85.7   MCH  29.0   MCHC  33.8   RDW  42.9   PLATELETCT  87*   MPV  10.7     Recent Labs      10/25/18   1214   SODIUM  128*   POTASSIUM  3.8   CHLORIDE  101   CO2  17*   GLUCOSE  169*   BUN  15   CREATININE  0.81   CALCIUM  8.7     Recent Labs      10/25/18   1214   ALTSGPT  33   ASTSGOT  38   ALKPHOSPHAT  107*   TBILIRUBIN  0.7   GLUCOSE  169*     Recent Labs      10/25/18   1214   APTT  34.8   INR  2.07*             No results for input(s): TROPONINI in the last 72 hours.    Urinalysis:    Recent Labs      10/25/18   1250   SPECGRAVITY  1.029   GLUCOSEUR  Negative   KETONES  Trace*   NITRITE  Negative   LEUKESTERAS  Trace*   WBCURINE  5-10*   RBCURINE  10-20*   BACTERIA  Moderate*   EPITHELCELL  Moderate*        Imaging:  CT-ABDOMEN-PELVIS WITH   Final Result      1.  Ill-defined cystic mass in the pancreatic head with surrounding inflammatory changes. Findings may be related to pancreatitis in the appropriate clinical setting. Findings are new from the prior exam in August.      2.  New hypodense lesions scattered throughout the liver was foci of air. Findings are highly likely to be related to recent embolization. No rim enhancement to suggest abscess.      3.  Duodenal wall thickening with enhancement, concerning for residual disease. Persistent common bile duct dilatation.      4.  Interval enlargement in an aortocaval lymph node, highly suspicious for metastatic disease.      5.  Diverticulosis.      6.  Status post hepaticojejunostomy.      DX-CHEST-PORTABLE (1 VIEW)   Final Result      Hypoinflation without other evidence for acute cardiopulmonary disease.            Assessment/Plan:  I anticipate this patient will  require at least two midnights for appropriate medical management, necessitating inpatient admission.    * Sepsis (HCC)- (present on admission)   Assessment & Plan    She has associated Rigors for the past 2 days and now fever of 102.8.  Medically, this is suspicious for gram-negative bacteremia in her symptoms.  Influenza was negative.   Chest x-ray and urinalysis are unremarkable therefore a CAT scan of the abdomen and pelvis is been ordered to evaluate a source.  She does have a right chest port which may be a nidus for infection.  Cultures were drawn and IV vancomycin and Zosyn have been ordered.  IV fluids will be given  She is immunocompromised given her extensive cancer and history of chemotherapy though her white blood cell count is intact at 10.3        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 Cedeno  She had Y90 on 10/9  She was not a candidate for a Whipple due to metastatic disease.         History of deep vein thrombosis (DVT) of lower extremity- (present on admission)   Assessment & Plan    Continue outpatient Eliquis        Hyponatremia- (present on admission)   Assessment & Plan    Na is 128.  IV fluids will be given.  She is likely dehydrated due to insensible fluid losses.         Thrombocytopenia (HCC)- (present on admission)   Assessment & Plan    Platelet count is 87        Long term (current) use of anticoagulants [Z79.01]- (present on admission)   Assessment & Plan    Eliquis due to DVT history            VTE prophylaxis: Eliquis

## 2018-10-26 NOTE — PROGRESS NOTES
Research Enrollment Information    This patient has been recruited in the study: Efficacy of prophylactic oral Vancomycin in preventing recurrent Clostridium Difficile infection in hospitalized patients requiring antibiotics.    Informed consent was obtained by Tara Andrea on 10/26/2018 at 3:46 PM before enrollment.   Subject Number:  54  Treatment assignment group: A       Key:       Treatment group A: vancomycin 125 mg PO QD concurrent with antibiotics       Treatment group B: vancomycin 125 mg PO BID concurrent with antibiotics       Treatment group C: no oral vancomycin with antibiotics   Study Group: [facility]  Kathy Hernandez, Dena Mcmanus, Nancy Borjas, Magda Ramírez, Dominique Hua, Marta Perez, Carmen Medina, Tara Andrea, Soila Martin, Estuardo Man, Viktoriya Gann, Castro Heard    Contact Information:   [email or web address]    Study Description:   This study will be conducted according to [place] and international standards of Good Clinical Practice in accordance with applicable Federal regulations, International Conference on Harmonization guidelines, and institutional research policies and procedures. This study is a prospective, randomized, open label evaluation of the efficacy of concurrent use of PO Vancomycin with C. difficile inducing antibiotics in reducing the recurrence rate of C. Difficile diarrheal illness in patients hospitalized with any bacterial infection. The study will assess the efficacy of oral Vancomycin prophylaxis in preventing recurrent CDI in hospitalized patients with suspected or confirmed bacterial infections requiring oral or intravenous antibiotics. Approximately 300 subjects will be randomized to one of three treatment arms in a 1:1:1 ratio (Vancomycin 125 mg PO every 12 hours for duration of antibiotic therapy,  Vancomycin 125 mg PO every 24 hours for duration of antibiotic therapy, or no oral Vancomycin). Follow-up for endpoints will be done at 12 weeks following completion of antibiotic therapy.Oral Vancomycin will be supplied as solution (50 mg/mL) drawn up into oral syringes as unit doses for further dilution with 30 mL water. Inpatient and outpatient doses will be prepared according to institutional policies and procedures for non-sterile oral compounding. Inpatient study medication will be labeled and dispensed as study drug using a specific medication record built in the EHR. This identifies the medication as part of the study and prevents billing to patient and/or third party insurance. Upon discharge, the patient will be provided with unit dosed oral Vancomycin syringes in a quantity sufficient to complete the study treatment. A licensed pharmacist of the research pharmacy will dispense research medication according to the protocol, policies and procedures of the [facility], and in compliance with [place] Board of Pharmacy regulations for outpatient dispensing. Ordering will be restricted to research staff. The duration of study treatment will depend upon the prescribed antibiotic course (oral vancomycin administration will be concurrent with antibiotic therapy only). Patients and/or insurance will not be billed for oral Vancomycin. Patients may withdraw their consent to participate in the study at any time and for any reason.

## 2018-10-26 NOTE — PROGRESS NOTES
[facility] Hospitalist Progress Note    Date of Service: 10/26/2018    Chief Complaint  73 y.o. female admitted 10/25/2018 with bacterimia and sepsis  With thrombocytopenia    Interval Problem Update  none    Consultants/Specialty  I.D  Text consult oncology    Disposition  pending        Review of Systems   Constitutional: Negative for chills, diaphoresis and fever.   HENT: Negative for ear discharge, ear pain and tinnitus.    Eyes: Negative for blurred vision, double vision and photophobia.   Respiratory: Negative for cough, hemoptysis and sputum production.    Cardiovascular: Negative for chest pain, palpitations and orthopnea.   Gastrointestinal: Negative for heartburn, nausea and vomiting.   Genitourinary: Negative for dysuria, frequency and urgency.   Musculoskeletal: Negative for back pain, myalgias and neck pain.   Skin: Negative for itching and rash.   Neurological: Negative for dizziness, tingling and headaches.      Physical Exam  Laboratory/Imaging   Hemodynamics  Temp (24hrs), Av °C (98.6 °F), Min:36.1 °C (96.9 °F), Max:39.3 °C (102.8 °F)   Temperature: 36.3 °C (97.3 °F)  Pulse  Av.2  Min: 65  Max: 128 Heart Rate (Monitored): 80  Blood Pressure : (!) 90/47, NIBP: (!) 95/48      Respiratory      Respiration: 16, Pulse Oximetry: 98 %             Fluids    Intake/Output Summary (Last 24 hours) at 10/26/18 1026  Last data filed at 10/26/18 0600   Gross per 24 hour   Intake             1280 ml   Output                0 ml   Net             1280 ml       Nutrition  Orders Placed This Encounter   Procedures   • Diet Order Regular     Standing Status:   Standing     Number of Occurrences:   1     Order Specific Question:   Diet:     Answer:   Regular [1]     Physical Exam   Constitutional: She is oriented to person, place, and time. No distress.   HENT:   Head: Normocephalic and atraumatic.   Eyes: Pupils are equal, round, and reactive to light. Conjunctivae are normal.   Neck: Normal range of motion. Neck  supple.   Cardiovascular: Normal rate and regular rhythm.    Pulmonary/Chest: Effort normal and breath sounds normal.   Port on the right side of the chest is noted   Abdominal: Soft. Bowel sounds are normal.   Musculoskeletal: She exhibits no edema or tenderness.   Neurological: She is alert and oriented to person, place, and time.   Skin: Skin is warm and dry. She is not diaphoretic.       Recent Labs      10/25/18   1214  10/26/18   0500   WBC  10.3  9.3   RBC  4.07*  3.58*   HEMOGLOBIN  11.9*  10.3*   HEMATOCRIT  35.5*  31.3*   MCV  85.7  87.4   MCH  29.0  28.8   MCHC  33.8  32.9*   RDW  42.9  44.0   PLATELETCT  87*  69*   MPV  10.7  11.1     Recent Labs      10/25/18   1214  10/26/18   0500   SODIUM  128*  136   POTASSIUM  3.8  3.8   CHLORIDE  101  109   CO2  17*  19*   GLUCOSE  169*  132*   BUN  15  13   CREATININE  0.81  0.69   CALCIUM  8.7  8.0*     Recent Labs      10/25/18   1214   APTT  34.8   INR  2.07*                  Assessment/Plan     * Sepsis (HCC)- (present on admission)   Assessment & Plan    Source probably chemo port  Gram negative haley in blood cultures are noted  D/w the pt's oncologist and recommended I.D  I.D is consulted  ?of need to remove the port  On zosyn  dced vancomycin  No end organ damage        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 Cedeno  She had Y90 on 10/9  She was not a candidate for a Whipple due to metastatic disease.   D/w dr Cedeno/oncology        History of deep vein thrombosis (DVT) of lower extremity- (present on admission)   Assessment & Plan    Continue outpatient Eliquis  Dr Cedeno recommended  To hold eliquis if platelets less then 50,000  Will check cbc at noon time  D/w the pt's son at the bed side        Hyponatremia- (present on admission)   Assessment & Plan    Has resolved  Iv fluid was given         Thrombocytopenia (HCC)- (present on admission)   Assessment & Plan    Will  stop eliquis if platelets less then 50,000  Probably 2nd to chemo        Long term (current) use of anticoagulants [Z79.01]- (present on admission)   Assessment & Plan    Eliquis due to DVT history  As per above          Quality-Core Measures   Lopez catheter::  No Lopez  DVT prophylaxis pharmacological::  Warfarin (Coumadin)

## 2018-10-26 NOTE — ASSESSMENT & PLAN NOTE
Her last chemotherapy was over 1 month ago by Dr. Cedeno  She had Y90 on 10/9  She was not a candidate for a Whipple due to metastatic disease.   D/w dr Cedeno/oncology

## 2018-10-26 NOTE — PROGRESS NOTES
PT arrived from ED at 1715, orientated to room, demonstrated call light   A&Ox4, denies pain, ambulates stand by assist   Admission profile complete, PT comfortably resting deferred skin check as PT was sleeping  All needs met at this time

## 2018-10-27 NOTE — PROGRESS NOTES
Patient alert and oriented. Patient assessed and all needs met. Family at bedside. Port to right upper chest not flushing appropriately. Physician aware-order for new IV placement and new orders for port removal. Patient educated on being NPO after midnight. Eliquis held due to radiology request. INR elevated-will need recheck in the AM. Patient updated on plan of care. All questions answered. ID called in regards to isolation status-stated contact is not necessary and respiratory sputum results needs to be processed before isolation status to be removed-droplet intact. All belongings and call light within reach. Monitoring to contiue.

## 2018-10-27 NOTE — PROGRESS NOTES
Call from radiology received-stated to hold Eliquis for potential procedure and to have patient be NPO after midnight.

## 2018-10-27 NOTE — PROGRESS NOTES
Patient returned from IR procedure. Patient assessed. Patient alert and responsive. Dressing dry and intact. Patient educated on being in bed for 4 hours after the procedure. Patient drinking fluids with no signs or symptoms of issues. VS taken per protocol. Patient denies pain. All belongings and call light within reach. Monitoring to continue.

## 2018-10-27 NOTE — OR SURGEON
Immediate Post- Operative Note        PostOp Diagnosis: Bacteremia, chest port      Procedure(s): chest port removal --> lab      Estimated Blood Loss: Less than 5 ml        Complications: None            10/27/2018     9:52 AM     Ronald Saleem

## 2018-10-27 NOTE — CARE PLAN
Problem: Safety  Goal: Will remain free from injury  Outcome: PROGRESSING AS EXPECTED  Declines the bed alarm.  Safety teaching reinforced

## 2018-10-27 NOTE — CARE PLAN
Problem: Knowledge Deficit  Goal: Knowledge of disease process/condition, treatment plan, diagnostic tests, and medications will improve  Outcome: PROGRESSING AS EXPECTED  All procedure explained. Plan of care explained. All questions answered. Patient verbalized understanding.

## 2018-10-27 NOTE — PROGRESS NOTES
Patient alert and oriented. Patient assessed and all needs met. Patient to be taken down to IR for procedure (port removal). Family at bedside.

## 2018-10-27 NOTE — PROGRESS NOTES
Patient taken down to xray for mandible xray due to possible tooth abscess. Patient alert and oriented. No issues.

## 2018-10-27 NOTE — PROGRESS NOTES
[facility] Hospitalist Progress Note    Date of Service: 10/27/2018    Chief Complaint  73 y.o. female admitted 10/25/2018 with bacterimia and sepsis  With thrombocytopenia    Interval Problem Update  none    Consultants/Specialty  I.D  Text consult oncology    Disposition  pending        Review of Systems   Constitutional: Negative for diaphoresis and malaise/fatigue.   HENT: Negative for congestion, ear discharge and nosebleeds.    Eyes: Negative for photophobia, pain and discharge.   Respiratory: Negative for hemoptysis, sputum production and shortness of breath.    Cardiovascular: Negative for orthopnea, claudication and leg swelling.   Gastrointestinal: Negative for abdominal pain, diarrhea and vomiting.   Genitourinary: Negative for flank pain, frequency and hematuria.   Musculoskeletal: Negative for back pain, falls and joint pain.   Skin: Negative for itching and rash.   Neurological: Negative for tingling, tremors, sensory change and weakness.      Physical Exam  Laboratory/Imaging   Hemodynamics  Temp (24hrs), Av.4 °C (97.5 °F), Min:36.1 °C (97 °F), Max:36.9 °C (98.4 °F)   Temperature: 36.2 °C (97.2 °F)  Pulse  Av.7  Min: 60  Max: 128 Heart Rate (Monitored): 72  Blood Pressure : 116/56, NIBP: 111/64      Respiratory      Respiration: 18, Pulse Oximetry: 97 %        RUL Breath Sounds: Clear, RML Breath Sounds: Clear, RLL Breath Sounds: Clear, SHAN Breath Sounds: Clear, LLL Breath Sounds: Clear    Fluids    Intake/Output Summary (Last 24 hours) at 10/27/18 1120  Last data filed at 10/26/18 2200   Gross per 24 hour   Intake              660 ml   Output                0 ml   Net              660 ml       Nutrition  Orders Placed This Encounter   Procedures   • Diet Order Regular     Standing Status:   Standing     Number of Occurrences:   1     Order Specific Question:   Diet:     Answer:   Regular [1]     Physical Exam   Constitutional: She is oriented to person, place, and time. She appears  well-developed and well-nourished.   HENT:   Right Ear: External ear normal.   Left Ear: External ear normal.   Eyes: Right eye exhibits no discharge. Left eye exhibits no discharge.   Neck: No JVD present.   Cardiovascular: Normal heart sounds.    Pulmonary/Chest: No stridor. No respiratory distress. She has no wheezes. She has no rales.   Port on the right side of the chest is noted   Abdominal: She exhibits no distension. There is no tenderness. There is no rebound.   Musculoskeletal: She exhibits no edema or tenderness.   Neurological: She is alert and oriented to person, place, and time.   Skin: Skin is warm and dry.       Recent Labs      10/26/18   0500  10/26/18   1619  10/27/18   0717   WBC  9.3  10.2  10.5   RBC  3.58*  3.57*  3.55*   HEMOGLOBIN  10.3*  10.6*  10.2*   HEMATOCRIT  31.3*  31.0*  30.6*   MCV  87.4  86.8  86.2   MCH  28.8  29.7  28.7   MCHC  32.9*  34.2  33.3*   RDW  44.0  43.8  43.7   PLATELETCT  69*  68*  79*   MPV  11.1  11.1  11.1     Recent Labs      10/25/18   1214  10/26/18   0500  10/27/18   0717   SODIUM  128*  136  135   POTASSIUM  3.8  3.8  3.8   CHLORIDE  101  109  112   CO2  17*  19*  17*   GLUCOSE  169*  132*  114*   BUN  15  13  9   CREATININE  0.81  0.69  0.63   CALCIUM  8.7  8.0*  8.0*     Recent Labs      10/25/18   1214  10/27/18   0813   APTT  34.8   --    INR  2.07*  1.36*                  Assessment/Plan     * Sepsis (HCC)- (present on admission)   Assessment & Plan    Source probably chemo port  Gram negative haley in blood cultures are noted  D/w the pt's oncologist  I.D is consulted and d/w dr bernardo  Removed the port by I.R  On ROCEPHIN  ZOSYN IS DCED  Has resolved  No end organ damage        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 Cedeno  She had Y90 on 10/9  She was not a candidate for a Whipple due to metastatic disease.   D/w dr Cedeno/oncology        Research study patient    Assessment & Plan    This patient has been recruited in the study: Efficacy of prophylactic oral Vancomycin in preventing recurrent Clostridium Difficile infection in hospitalized patients requiring antibiotics. Approximately 300 subjects will be randomized to one of three treatment arms in a 1:1:1 ratio (Vancomycin 125 mg PO every 12 hours for duration of antibiotic therapy, Vancomycin 125 mg PO every 24 hours for duration of antibiotic therapy, or no oral Vancomycin). Follow-up for endpoints will be done at 12 weeks following completion of antibiotic therapy.  Please contact study group before discharging this patient. This patient may need Research Antibiotics at discharge. [facility] will provide the required antibiotics.   Contact Information:  [id number]        History of deep vein thrombosis (DVT) of lower extremity- (present on admission)   Assessment & Plan    Continue outpatient Eliquis  Dr Cedeno recommended  To hold eliquis if platelets less then 50,000  D/w the pt's son and daugther at the bed side  Platelets 79        Hyponatremia- (present on admission)   Assessment & Plan    Has resolved  Iv fluid was given         Thrombocytopenia (HCC)- (present on admission)   Assessment & Plan    Will stop eliquis if platelets less then 50,000  Probably 2nd to chemo  Has improved 79 today        Long term (current) use of anticoagulants [Z79.01]- (present on admission)   Assessment & Plan    Eliquis due to DVT history  As per above          Quality-Core Measures   Lopez catheter::  No Lopez  DVT prophylaxis pharmacological::  Warfarin (Coumadin)

## 2018-10-27 NOTE — PROGRESS NOTES
Infectious Disease Progress Note    Author: Zakia Ansari M.D. Date & Time of service: 10/27/2018  10:47 AM    Chief Complaint:  Follow-up for E. coli sepsis    Interval History:  10/27 afebrile WBC 10.5 no new issues to report, tolerating IV antibiotics without any issues, plan for port removal to the  Labs Reviewed, Medications Reviewed, Radiology Reviewed and Wound Reviewed.    Review of Systems:  Review of Systems   Constitutional: Negative for chills and fever.   Respiratory: Negative for cough and shortness of breath.    Gastrointestinal: Negative for abdominal pain, diarrhea, nausea and vomiting.   Genitourinary: Negative for dysuria.       Hemodynamics:  Temp (24hrs), Av.4 °C (97.6 °F), Min:36.1 °C (97 °F), Max:36.9 °C (98.4 °F)  Temperature: 36.1 °C (97 °F)  Pulse  Av.3  Min: 60  Max: 128Heart Rate (Monitored): 72  Blood Pressure : 100/58, NIBP: 111/64       Physical Exam:  Physical Exam   Constitutional: She is oriented to person, place, and time. She appears well-developed.   HENT:   Head: Normocephalic and atraumatic.   Eyes: Pupils are equal, round, and reactive to light. EOM are normal.   Neck: Neck supple.   Cardiovascular: Normal rate, regular rhythm and normal heart sounds.    Pulmonary/Chest: Effort normal. She has no wheezes.   Right upper chest port nontender, no surrounding erythema   Abdominal: Soft. She exhibits no distension. There is no tenderness.   Surgical scar clean   Musculoskeletal: She exhibits no edema.   Neurological: She is alert and oriented to person, place, and time.       Meds:    Current Facility-Administered Medications:   •  Influenza Vaccine High-Dose pf  •  NS  •  fentaNYL  •  midazolam  •  ondansetron  •  Pharmacy Consult Request  •  Study Drug: vancomycin  •  artificial tears  •  senna-docusate **AND** polyethylene glycol/lytes **AND** magnesium hydroxide **AND** bisacodyl  •  0.9 % NaCl with KCl 20 mEq 1,000 mL  •  ondansetron  •  ondansetron  •   acetaminophen  •  [COMPLETED] piperacillin-tazobactam **AND** piperacillin-tazobactam    Labs:  Recent Labs      10/26/18   0500  10/26/18   1619  10/27/18   0717   WBC  9.3  10.2  10.5   RBC  3.58*  3.57*  3.55*   HEMOGLOBIN  10.3*  10.6*  10.2*   HEMATOCRIT  31.3*  31.0*  30.6*   MCV  87.4  86.8  86.2   MCH  28.8  29.7  28.7   RDW  44.0  43.8  43.7   PLATELETCT  69*  68*  79*   MPV  11.1  11.1  11.1   NEUTSPOLYS  86.80*  87.20*  88.70*   LYMPHOCYTES  3.90*  3.70*  4.40*   MONOCYTES  7.50  7.60  5.20   EOSINOPHILS  0.90  0.90  1.70   BASOPHILS  0.40  0.10  0.00   RBCMORPHOLO   --    --   Present     Recent Labs      10/25/18   1214  10/26/18   0500  10/27/18   0717   SODIUM  128*  136  135   POTASSIUM  3.8  3.8  3.8   CHLORIDE  101  109  112   CO2  17*  19*  17*   GLUCOSE  169*  132*  114*   BUN  15  13  9     Recent Labs      10/25/18   1214  10/26/18   0500  10/27/18   0717   ALBUMIN  3.0*   --   2.3*   TBILIRUBIN  0.7   --   0.5   ALKPHOSPHAT  107*   --   74   TOTPROTEIN  6.3   --   5.1*   ALTSGPT  33   --   31   ASTSGOT  38   --   34   CREATININE  0.81  0.69  0.63       Imaging:  Ct-abdomen-pelvis With    Result Date: 10/25/2018  10/25/2018 4:28 PM HISTORY/REASON FOR EXAM:  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 Patient is status post embolization.. TECHNIQUE/EXAM DESCRIPTION:   CT scan of the abdomen and pelvis with contrast. Contrast-enhanced helical scanning was obtained from the diaphragmatic domes through the pubic symphysis following the bolus administration of nonionic contrast without complication. 80 mL of Omnipaque 350 nonionic contrast was administered without complication. Low dose optimization technique was utilized for this CT exam including automated exposure control and adjustment of the mA and/or kV according to patient size. COMPARISON: PET/CT 7/12/2018, MRI 8/6/2018 and CT abdomen 3/27/2018 FINDINGS: The visualized lung bases are unremarkable. CT Abdomen: There  are several hypodense lesions in the hepatic dome with a couple of foci of air. A hypodense mass within air-fluid level in the right hepatic lobe, segment 8, measures approximately 2.1 cm. No significant rim enhancement is identified. There are new  hypodense lesions in the anterior right hepatic lobe measuring up to approximately 7 mm. There is pneumobilia, consistent with prior hepaticojejunostomy. The gallbladder has been removed. The common bile duct remains dilated with thickening of the duodenal wall and enhancement. Maximum diameter of the common bile duct is approximately 14 mm. There are multiple coils in the region of the pancreatic head. The spleen and adrenal glands are unremarkable. The kidneys enhance symmetrically. Hypodense renal masses are consistent with cysts. There is heterogeneous enhancement of the pancreatic head with an ill-defined cystic mass. The mass is difficult to measure, but is approximately 4.6 x 4 cm. The duct does not appear dilated more distally. There are multiple diverticula in the descending and sigmoid colon. There are scattered arterial calcifications. Enlarged aortocaval lymph node on image 37 measures approximately 15 mm short axis, previously 8 mm. CT Pelvis: The bladder is unremarkable. No significant free fluid is identified. A calcified mass in the uterus is consistent with a small leiomyoma.     1.  Ill-defined cystic mass in the pancreatic head with surrounding inflammatory changes. Findings may be related to pancreatitis in the appropriate clinical setting. Findings are new from the prior exam in August. 2.  New hypodense lesions scattered throughout the liver was foci of air. Findings are highly likely to be related to recent embolization. No rim enhancement to suggest abscess. 3.  Duodenal wall thickening with enhancement, concerning for residual disease. Persistent common bile duct dilatation. 4.  Interval enlargement in an aortocaval lymph node, highly suspicious  for metastatic disease. 5.  Diverticulosis. 6.  Status post hepaticojejunostomy.    Dx-chest-portable (1 View)    Result Date: 10/25/2018  10/25/2018 11:35 AM HISTORY/REASON FOR EXAM:  LEFT shoulder pain, malaise, nausea. TECHNIQUE/EXAM DESCRIPTION AND NUMBER OF VIEWS: Single portable view of the chest. COMPARISON: 5/3/2018 FINDINGS: Cardiomediastinal contour is within normal limits. Lungs show hypoinflation. No focal pulmonary consolidation. No pleural fluid collection or pneumothorax. RIGHT chest infusion port again present. No major bony abnormality is seen. Atherosclerotic calcification of thoracic aorta.     Hypoinflation without other evidence for acute cardiopulmonary disease.    Nm-liver/spleen Scan    Result Date: 10/10/2018  AU BASIC DOSIMETRY Y-90 RADIOEMBOLIZATION THERAPY AND BREMSSTRAHLUNG IMAGING: After the radioembolization procedure the patient was transported to Nuclear Medicine. Bremsstrahlung images were obtained encompassing the thorax and abdomen, in anterior and posterior views, as well as SPECT/CT of the upper abdomen. TREATMENT PLANNING: The prior history was reviewed, and the local and systemic treatment options considered. Radiation treatment with microspheres was deemed the most appropriate therapy, to meet the goals of liver tolerance and delivery of sufficient radiation to the tumor  in the right liver. The treatment goal is palliative. BASIC DOSIMETRY CALCULATION: The Y-90 microsphere dose was calculated using the patient's tumor burden as demonstrated by CT/MRI scan. The available imaging studies were reviewed. The right lobe represents 67% and the left lobe 33% with 10% tumor involvement of the liver. A shunt study from 10/1/2018 showed a hepatopulmonary shunt calculation of 5.2%. Based on patient parameters and the value of the pulmonary shunt calculation, the dose was not decreased. SPECIAL RADIATION TREATMENT: The patient has had previous systemic chemotherapy or has concurrent  chemotherapy. A desired dose of 28.08 mCi and 1.04 GBq was calculated and documented on the medical directive. YTTRIUM-90 MICROSPHERES HANDLING AND PREPARATION: The microspheres were received and prepared in accordance with the 's specifications, and transported to the catheterization laboratory. The appropriate radiation safety, monitoring and disposal standards were observed. YTTRIUM-90 MICROSPHERES DOSE DISPENSED: 31 mCi Y-90 SIR-Spheres microspheres. Please see alternate report for a description of angiography, Sir-Sphere delivery and AU findings. FINDINGS: The delivered activity appears to be confined to the right lobe of the liver. No extrahepatic uptake is seen. No significant tracer uptake is identified within the bowels or spleen.     The prescribed dose was  1.04 gb ( 28.08 mCi). The drawn dose was 1.15 gb (31mCi). Delivered dose after residual was measured at 1.08 gb ( 29.14 mCi). This represents  104% of the prescribed dose and  94% of the drawn dose. Bremsstrahlung images obtained  after the Y-90 radioembolization, confirm proper delivery to the targeted region. There is no uptake outside the planned treatment area. NOTE: The patient will be followed by interventional radiology and oncology.    Nm-quantitative Lung Scan    Result Date: 10/1/2018  10/1/2018 11:12 AM HISTORY/REASON FOR EXAM:  Ampullary carcinoma. TECHNIQUE/EXAM DESCRIPTION AND NUMBER OF VIEWS: Quantitative lung scan. COMPARISON: None. PROCEDURE:  4.4 mCi technetium 99m labeled MAA administered via the right and left hepatic arterial branches followed by multiple projection imaging. Quantitative analysis was then performed. FINDINGS: Total hepatopulmonary shunt percentage was 5.2%. No evidence of extrahepatic radiotracer deposition is identified.     Quantitative lung scan as described.    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 OPERATING INTERVENTIONALISTS: Mt Recinos M.D. CONSENT: After the risks, benefits and alternatives were discussed with the patient, including the likelihood of technical success, and all of the patient's questions were answered, written informed consent was obtained for both the procedure and for conscious sedation. SEDATION: Conscious sedation with 50 mcg Fentanyl and 2 mg Versed was administered during the procedure with appropriate continuous patient monitoring by the radiology nurse under the direct supervision of the attending physician. Sedation duration: 45 minutes CONTRAST: 30 cc Omnipaque 300 intra-arterial. FLUOROSCOPY IMAGES: 8 fluoroscopic images obtained. FLUOROSCOPY TIME: 3.4 minutes. PROCEDURES PERFORMED: 1. Access of the left radial artery under sonographic guidance.  CPT 57507 2. 3. Celiac arteriogram - CPT 3. Selection of the proper hepatic artery with arteriogram CPT code 08189/19445. 4. Selection of the right hepatic artery with arteriogram CPT code 34571/90177 5. Yttrium-90 SIR-Spheres radioembolization delivered via the right hepatic artery CPT codes 45410 6. Post embolization arteriogram of the right hepatic artery. CPT code 72522. 7. Conscious sedation. 8. Fluoroscopic guidance for the above. TECHNIQUE: The patient was brought to the angiography suite and placed in the supine position. A barbeau test of the left extremity was performed demonstrating dual circulation to the hand. The left wrist was prepped and draped in a sterile manner.  A patent left radial artery was identified with real-time ultrasound and an image recorded and entered into the patient's medical record. Following administration of 2 mL 1% lidocaine the left radial artery was accessed under direct ultrasound visualization and a double wall technique. A 4 Guinean slender Terumo glidesheath was advanced into the radial artery. A  mixture containing 100 mcg nitroglycerin, 2.5 mg verapamil and 3000 units of heparin was injected through the sheath into the artery. An angle tip Glidewire preloaded on a 155 cm 5 Nepali Karyna radial catheter was advanced into the abdominal aorta under fluoroscopic imaging allowing for selection of the celiac trunk. An angiogram was performed demonstrating no evidence of variant hepatic arterial anatomy. No flow is observed in the gastroduodenal artery or right gastric artery which were previously embolized utilizing microcoils. Through the guide catheter, a Renegade hi kj microcatheter with a fathom 0.016 wire was used to select the right hepatic artery arising from the proper hepatic artery. The Renegade hi kj microcatheter was advanced into the right hepatic artery. Angiography confirmed position of the catheter in the same location as on the planning procedure previously performed on 10/1/2018. The catheters were secured in position. At this point, the yttrium-90 calculated dose was brought to the angiography suite by the nuclear medicine department. The yttrium-90 SIR-Sphere delivery apparatus was then set up according to the manufactures instructions. The yttrium-90 dose was deposited into an acrylic shielding container and a closed delivery system was prepared. Specifically, the dose vial was connected to two inlet tubings of sterile water and contrast sources as well as single outlet tubing for the yttrium-90 delivery. The  outlet tubing was connected to the Renegade hi kj microcatheter. Then multiple small aliquots of the yttrium-90 microspheres were injected into the artery followed by D5W and contrast flushes. Angiography was performed intermittently to confirm antegrade flow assess the degree of embolization. The endpoint was reached when the total volume of yttrium-90 had been administered. Catheters were carefully removed from the patient and secured with full radiation safety protection protocols.  The catheter and sheath were removed, and closure device was deployed in the right groin. The patient tolerated the procedure well. There were no immediate complications. The prescribed dose was  1.04 gb ( 28.08 mCi). The drawn dose was 1.15 gb (31mCi). Delivered dose after residual was measured at 1.08 gb ( 29.14 mCi). This represents  104% of the prescribed dose and  94% of the drawn dose. FINDINGS: As above. 1.  Technically successful Y90 radioembolization of the right hepatic lobe. The patient will followup in approximately 10 days for laboratory and clinical evaluation and 4-6 weeks for imaging evaluation. 2.  Immediately following the procedure, the patient was transported to nuclear medicine for planar imaging which demonstrates Bremsstrahlung emission from the right of the liver. There is no definite evidence of extrahepatic deposition of radioembolic material.    Ir-embolization    Result Date: 10/1/2018   Pre-Y90 mapping and MAA shunt evaluation HISTORY/REASON FOR EXAM:  metastatic ampullary adenocarcinoma to right liver. ATTENDING: Mt Recinos M.D. ASSISTANTS:  None MEDICATIONS: Moderate sedation was achieved with administration of: Versed 1 mg mg IV Fentanyl 50 mcg mcg IV Conscious sedation duration: 60 minutes. Local anesthesia was achieved with the following: Lidocaine 5 ml 1% buffered with sodium bicarbonate was administered for local anesthesia. Other medicines: None. CONTRAST: 50 mL Omnipaque 300 intra-arterial FLUOROSCOPY: 5 minutes. TECHNIQUES/FINDINGS: After review of the procedure, risks, benefits, alternatives and sedation, consent was obtained from the patient. A barbeau test of the left extremity was performed demonstrating dual circulation to the hand. The left wrist was prepped and draped in a sterile manner.  A patent left radial artery was identified with real-time ultrasound and an image recorded and entered into the patient's medical record. Following administration of 2 mL 1%  lidocaine the left radial artery was accessed under direct ultrasound visualization and a double wall technique. A 4 Tunisian slender Terumo glidesheath was advanced into the radial artery. A mixture containing 100 mcg nitroglycerin, 2.5 mg verapamil and 3000 units of heparin was injected through the sheath into the artery. An angle tip Glidewire preloaded on a 155 cm 5 Tunisian [employer] radial catheter was advanced into the abdominal aorta under fluoroscopic imaging allowing for selection of the celiac trunk. The guide catheter was advanced over the wire, through the sheath, and into the abdominal aorta. The superior mesenteric artery was selected and an angiogram was performed. The diagnostic angiogram showed no evidence of variant hepatic artery anatomy. Utilizing the guide catheter in the abdominal aorta, the celiac artery was selected and an angiogram was performed demonstrating no evidence of variant hepatic arterial anatomy. A  renegade  microcatheter with a microwire was advanced into the common hepatic artery and a selective injection was performed demonstrating no evidence of variant hepatic arterial anatomy. The right gastric artery was cannulated and a selective angiogram was performed. The right gastric artery was embolized utilizing  a 3 x 3.3 mm Jayleen microcoil. Post intervention angiogram demonstrated near cessation of flow in the right gastric artery. The gastroduodenal artery was cannulated and a selective angiogram was performed. The gastroduodenal artery was embolized with: Cook Jayleen Coil 14mm x 6cm ?Ref # [id number] Lot # [id number] ? [employer] Interlock 6mm x 20cm Ref # [id number] # [id number] ? ? [employer] Interlock 2mm x 6mm x 8cm Ref # [id number] # [id number] Post intervention images confirmed cessation of flow in the. The microcatheter was positioned in the right hepatic artery and 4.4 mCi technetium 99m labeled MAA was injected slowly. The microcatheter was removed from the  guide catheter and a final angiogram was performed through the guide catheter demonstrating minimal residual flow through the gastroduodenal artery and right gastric artery. The guide catheter was removed from the patient through the vascular sheath. Upon completion of the procedure, the arterial sheath was, removed and hemostasis was achieved with the aid of a TR band device. The patient transferred to the post procedure unit in good condition. The procedure was performed by Dr. Recinos. COMPLICATIONS: No immediate complications. ESTIMATED BLOOD LOSS: 10 mL. SPECIMEN/DISPOSTION: None.     1.  Superior mesenteric arteriogram no evidence of variant hepatic artery anatomy. 2.  Celiac arteriogram. 3.  Common hepatic arteriogram demonstrating no evidence of variant hepatic arterial anatomy. 4.  Selective right gastric arteriogram demonstrating no evidence of variant hepatic arterial anatomy. 5.  Selective right gastric microcoil embolization. 6.  Selective gastroduodenal arteriogram demonstrating no evidence of variant hepatic arterial anatomy. 7.  Selective gastroduodenal microcoil embolization. 8.  Proper hepatic arteriogram. 9.  Intra-arterial administration of 4.4 mCi technetium 99m labeled MAA into the right hepatic artery demonstrated a hepatic pulmonary shunt fraction of 5.2%.    Ir-low Level Consult-outpt    Result Date: 10/22/2018  This exam has been resulted under the NOTES tab, and the signed report has been auto faxed to the ordering physician on the date/time of that signature.      Micro:  Results     Procedure Component Value Units Date/Time    Urine culture [[id number]] Collected:  10/25/18 1411    Order Status:  Completed Specimen:  Urine from Urine, Clean Catch Updated:  10/27/18 0949     Significant Indicator NEG     Source UR     Site URINE, CLEAN CATCH     Urine Culture Mixed skin dunia ,000 cfu/mL    Narrative:       Indication for culture:->Emergency Room Patient    URINE CULTURE(NEW)  "[[id number]] Collected:  10/25/18 1250    Order Status:  Completed Specimen:  Urine Updated:  10/27/18 0948     Significant Indicator NEG     Source UR     Site --     Urine Culture Mixed skin dunia 10-50,000 cfu/mL    Narrative:       Indication for culture:->Emergency Room Patient    BLOOD CULTURE [[id number]]  (Abnormal) Collected:  10/25/18 1240    Order Status:  Completed Specimen:  Blood from Peripheral Updated:  10/27/18 0842     Significant Indicator POS (POS)     Source BLD     Site PERIPHERAL     Blood Culture Growth detected by Bactec instrument. 10/25/2018  22:38 (A)      Escherichia coli  See previous culture for sensitivity report.   (A)    Narrative:       CALL  Flores  61 tel. [phone],  CALLED  61 tel. [phone] 10/25/2018, 22:37, RB PERF. RESULTS CALLED TO: RN  [phone]  Per Hospital Policy: Only change Specimen Src: to \"Line\" if  specified by physician order.    BLOOD CULTURE [[id number]]  (Abnormal)  (Susceptibility) Collected:  10/25/18 1214    Order Status:  Completed Specimen:  Blood from Peripheral Updated:  10/27/18 0841     Significant Indicator POS (POS)     Source BLD     Site PERIPHERAL     Blood Culture Growth detected by Bactec instrument. 10/25/2018  22:52 (A)      Escherichia coli (A)    Narrative:       CALL  Flores  61 tel. [phone],  CALLED  61 tel. [phone] 10/25/2018, 22:55, RB PERF. RESULTS CALLED TO:  RN  [phone]  Per Hospital Policy: Only change Specimen Src: to \"Line\" if  specified by physician order.    Culture & Susceptibility     ESCHERICHIA COLI     Antibiotic Sensitivity Microscan Unit Status    Ampicillin Resistant >16 mcg/mL Final    Method: SENSITIVITY, SERGE    Cefepime Sensitive <=8 mcg/mL Final    Method: SENSITIVITY, SERGE    Cefotaxime Sensitive <=2 mcg/mL Final    Method: SENSITIVITY, SERGE    Cefotetan Sensitive <=16 mcg/mL Final    Method: SENSITIVITY, SERGE    Ceftazidime Sensitive <=1 mcg/mL Final    Method: SENSITIVITY, SERGE    Ceftriaxone Sensitive <=8 mcg/mL Final    " "Method: SENSITIVITY, SERGE    Cefuroxime Sensitive <=4 mcg/mL Final    Method: SENSITIVITY, SERGE    Ciprofloxacin Resistant >2 mcg/mL Final    Method: SENSITIVITY, SERGE    Ertapenem Sensitive <=1 mcg/mL Final    Method: SENSITIVITY, SERGE    Gentamicin Sensitive <=4 mcg/mL Final    Method: SENSITIVITY, SERGE    Pip/Tazobactam Sensitive <=16 mcg/mL Final    Method: SENSITIVITY, SERGE    Tigecycline Sensitive <=2 mcg/mL Final    Method: SENSITIVITY, SERGE    Tobramycin Sensitive <=4 mcg/mL Final    Method: SENSITIVITY, SERGE    Trimeth/Sulfa Resistant >2/38 mcg/mL Final    Method: SENSITIVITY, SERGE                       BLOOD CULTURE [[id number]] Collected:  10/27/18 0242    Order Status:  Completed Specimen:  Blood from Peripheral Updated:  10/27/18 0254    Narrative:       Droplet,Contact  Per Hospital Policy: Only change Specimen Src: to \"Line\" if  specified by physician order.    BLOOD CULTURE [[id number]] Collected:  10/27/18 0242    Order Status:  Completed Specimen:  Blood from Peripheral Updated:  10/27/18 0253    Narrative:       Droplet,Contact  Per Hospital Policy: Only change Specimen Src: to \"Line\" if  specified by physician order.    INFLUENZA A/B BY PCR [[id number]] Collected:  10/25/18 1250    Order Status:  Completed Specimen:  Urine from Nasopharyngeal Updated:  10/25/18 2030     Influenza virus A RNA Negative     Influenza virus B, PCR Negative    Narrative:       Indication for culture:->Emergency Room Patient    URINALYSIS [[id number]]  (Abnormal) Collected:  10/25/18 1250    Order Status:  Completed Specimen:  Urine Updated:  10/25/18 1523     Color DK Yellow     Character Cloudy (A)     Specific Gravity 1.029     Ph 5.5     Glucose Negative mg/dL      Ketones Trace (A) mg/dL      Protein 100 (A) mg/dL      Bilirubin Small (A)     Urobilinogen, Urine 1.0     Nitrite Negative     Leukocyte Esterase Trace (A)     Occult Blood Moderate (A)     Micro Urine Req Microscopic    Narrative:       Indication for " culture:->Emergency Room Patient    Culture Respiratory W/ GRM STN [[id number]] Collected:  10/25/18 1250    Order Status:  Completed Specimen:  Respirate from Sputum Updated:  10/25/18 1305    Urinalysis [[id number]]     Order Status:  Canceled Specimen:  Urine from Urine, Clean Catch     BLOOD CULTURE [[id number]]     Order Status:  Sent Specimen:  Blood from Peripheral     BLOOD CULTURE [[id number]]     Order Status:  Sent Specimen:  Blood from Line           Assessment:  Active Hospital Problems    Diagnosis   • *Sepsis (HCC) [A41.9]   • 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 Difficile  On vancomycin per study protocol    Discussed with internal medicine/Dr. Hurt

## 2018-10-28 NOTE — PROGRESS NOTES
Infectious Disease Progress Note    Author: Zakia Ansari M.D. Date & Time of service: 10/28/2018  10:03 AM    Chief Complaint:  Follow-up for E. coli sepsis    Interval History:  10/27 afebrile WBC 10.5 no new issues to report, tolerating IV antibiotics without any issues, plan for port removal to the  10/28 afebrile WBC 8.9 patient has a mild headache which he attributes to her sleeping position, status post port removal yesterday without any complications  Labs Reviewed, Medications Reviewed, Radiology Reviewed and Wound Reviewed.    Review of Systems:  Review of Systems   Constitutional: Negative for chills and fever.   Respiratory: Negative for cough and shortness of breath.    Gastrointestinal: Negative for abdominal pain, diarrhea, nausea and vomiting.   Genitourinary: Negative for dysuria.   Neurological: Positive for headaches.        Mild per patient       Hemodynamics:  Temp (24hrs), Av.4 °C (97.5 °F), Min:36.1 °C (97 °F), Max:37 °C (98.6 °F)  Temperature: 36.2 °C (97.2 °F)  Pulse  Av.9  Min: 60  Max: 128   Blood Pressure : 115/67       Physical Exam:  Physical Exam   Constitutional: She is oriented to person, place, and time. She appears well-developed.   HENT:   Head: Normocephalic and atraumatic.   Eyes: Pupils are equal, round, and reactive to light. EOM are normal.   Neck: Neck supple.   Cardiovascular: Normal rate, regular rhythm and normal heart sounds.    Pulmonary/Chest: Effort normal. She has no wheezes.   Right upper chest port removed.  Site dressed   Abdominal: Soft. She exhibits no distension. There is no tenderness.   Surgical scar clean   Musculoskeletal: She exhibits no edema.   Neurological: She is alert and oriented to person, place, and time.       Meds:    Current Facility-Administered Medications:   •  Pharmacy Consult Request  •  cefTRIAXone (ROCEPHIN) IV  •  apixaban  •  Study Drug: vancomycin  •  artificial tears  •  senna-docusate **AND** polyethylene glycol/lytes  **AND** magnesium hydroxide **AND** bisacodyl  •  ondansetron  •  ondansetron  •  acetaminophen    Labs:  Recent Labs      10/26/18   1619  10/27/18   0717  10/28/18   0534   WBC  10.2  10.5  8.9   RBC  3.57*  3.55*  3.54*   HEMOGLOBIN  10.6*  10.2*  10.5*   HEMATOCRIT  31.0*  30.6*  30.2*   MCV  86.8  86.2  85.3   MCH  29.7  28.7  29.7   RDW  43.8  43.7  43.6   PLATELETCT  68*  79*  87*   MPV  11.1  11.1  11.3   NEUTSPOLYS  87.20*  88.70*  90.30*   LYMPHOCYTES  3.70*  4.40*  6.20*   MONOCYTES  7.60  5.20  3.50   EOSINOPHILS  0.90  1.70  0.00   BASOPHILS  0.10  0.00  0.00   RBCMORPHOLO   --   Present  Present     Recent Labs      10/26/18   0500  10/27/18   0717  10/28/18   0534   SODIUM  136  135  135   POTASSIUM  3.8  3.8  3.5*   CHLORIDE  109  112  109   CO2  19*  17*  18*   GLUCOSE  132*  114*  91   BUN  13  9  8     Recent Labs      10/25/18   1214  10/26/18   0500  10/27/18   0717  10/28/18   0534   ALBUMIN  3.0*   --   2.3*  2.1*   TBILIRUBIN  0.7   --   0.5  0.4   ALKPHOSPHAT  107*   --   74  69   TOTPROTEIN  6.3   --   5.1*  5.2*   ALTSGPT  33   --   31  27   ASTSGOT  38   --   34  25   CREATININE  0.81  0.69  0.63  0.56       Imaging:  Ct-abdomen-pelvis With    Result Date: 10/25/2018  10/25/2018 4:28 PM HISTORY/REASON FOR EXAM:  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 Patient is status post embolization.. TECHNIQUE/EXAM DESCRIPTION:   CT scan of the abdomen and pelvis with contrast. Contrast-enhanced helical scanning was obtained from the diaphragmatic domes through the pubic symphysis following the bolus administration of nonionic contrast without complication. 80 mL of Omnipaque 350 nonionic contrast was administered without complication. Low dose optimization technique was utilized for this CT exam including automated exposure control and adjustment of the mA and/or kV according to patient size. COMPARISON: PET/CT 7/12/2018, MRI 8/6/2018 and CT abdomen 3/27/2018 FINDINGS:  The visualized lung bases are unremarkable. CT Abdomen: There are several hypodense lesions in the hepatic dome with a couple of foci of air. A hypodense mass within air-fluid level in the right hepatic lobe, segment 8, measures approximately 2.1 cm. No significant rim enhancement is identified. There are new  hypodense lesions in the anterior right hepatic lobe measuring up to approximately 7 mm. There is pneumobilia, consistent with prior hepaticojejunostomy. The gallbladder has been removed. The common bile duct remains dilated with thickening of the duodenal wall and enhancement. Maximum diameter of the common bile duct is approximately 14 mm. There are multiple coils in the region of the pancreatic head. The spleen and adrenal glands are unremarkable. The kidneys enhance symmetrically. Hypodense renal masses are consistent with cysts. There is heterogeneous enhancement of the pancreatic head with an ill-defined cystic mass. The mass is difficult to measure, but is approximately 4.6 x 4 cm. The duct does not appear dilated more distally. There are multiple diverticula in the descending and sigmoid colon. There are scattered arterial calcifications. Enlarged aortocaval lymph node on image 37 measures approximately 15 mm short axis, previously 8 mm. CT Pelvis: The bladder is unremarkable. No significant free fluid is identified. A calcified mass in the uterus is consistent with a small leiomyoma.     1.  Ill-defined cystic mass in the pancreatic head with surrounding inflammatory changes. Findings may be related to pancreatitis in the appropriate clinical setting. Findings are new from the prior exam in August. 2.  New hypodense lesions scattered throughout the liver was foci of air. Findings are highly likely to be related to recent embolization. No rim enhancement to suggest abscess. 3.  Duodenal wall thickening with enhancement, concerning for residual disease. Persistent common bile duct dilatation. 4.   Interval enlargement in an aortocaval lymph node, highly suspicious for metastatic disease. 5.  Diverticulosis. 6.  Status post hepaticojejunostomy.    Dx-chest-portable (1 View)    Result Date: 10/25/2018  10/25/2018 11:35 AM HISTORY/REASON FOR EXAM:  LEFT shoulder pain, malaise, nausea. TECHNIQUE/EXAM DESCRIPTION AND NUMBER OF VIEWS: Single portable view of the chest. COMPARISON: 5/3/2018 FINDINGS: Cardiomediastinal contour is within normal limits. Lungs show hypoinflation. No focal pulmonary consolidation. No pleural fluid collection or pneumothorax. RIGHT chest infusion port again present. No major bony abnormality is seen. Atherosclerotic calcification of thoracic aorta.     Hypoinflation without other evidence for acute cardiopulmonary disease.    Nm-liver/spleen Scan    Result Date: 10/10/2018  AU BASIC DOSIMETRY Y-90 RADIOEMBOLIZATION THERAPY AND BREMSSTRAHLUNG IMAGING: After the radioembolization procedure the patient was transported to Nuclear Medicine. Bremsstrahlung images were obtained encompassing the thorax and abdomen, in anterior and posterior views, as well as SPECT/CT of the upper abdomen. TREATMENT PLANNING: The prior history was reviewed, and the local and systemic treatment options considered. Radiation treatment with microspheres was deemed the most appropriate therapy, to meet the goals of liver tolerance and delivery of sufficient radiation to the tumor  in the right liver. The treatment goal is palliative. BASIC DOSIMETRY CALCULATION: The Y-90 microsphere dose was calculated using the patient's tumor burden as demonstrated by CT/MRI scan. The available imaging studies were reviewed. The right lobe represents 67% and the left lobe 33% with 10% tumor involvement of the liver. A shunt study from 10/1/2018 showed a hepatopulmonary shunt calculation of 5.2%. Based on patient parameters and the value of the pulmonary shunt calculation, the dose was not decreased. SPECIAL RADIATION TREATMENT: The  patient has had previous systemic chemotherapy or has concurrent chemotherapy. A desired dose of 28.08 mCi and 1.04 GBq was calculated and documented on the medical directive. YTTRIUM-90 MICROSPHERES HANDLING AND PREPARATION: The microspheres were received and prepared in accordance with the 's specifications, and transported to the catheterization laboratory. The appropriate radiation safety, monitoring and disposal standards were observed. YTTRIUM-90 MICROSPHERES DOSE DISPENSED: 31 mCi Y-90 SIR-Spheres microspheres. Please see alternate report for a description of angiography, Sir-Sphere delivery and AU findings. FINDINGS: The delivered activity appears to be confined to the right lobe of the liver. No extrahepatic uptake is seen. No significant tracer uptake is identified within the bowels or spleen.     The prescribed dose was  1.04 gb ( 28.08 mCi). The drawn dose was 1.15 gb (31mCi). Delivered dose after residual was measured at 1.08 gb ( 29.14 mCi). This represents  104% of the prescribed dose and  94% of the drawn dose. Bremsstrahlung images obtained  after the Y-90 radioembolization, confirm proper delivery to the targeted region. There is no uptake outside the planned treatment area. NOTE: The patient will be followed by interventional radiology and oncology.    Nm-quantitative Lung Scan    Result Date: 10/1/2018  10/1/2018 11:12 AM HISTORY/REASON FOR EXAM:  Ampullary carcinoma. TECHNIQUE/EXAM DESCRIPTION AND NUMBER OF VIEWS: Quantitative lung scan. COMPARISON: None. PROCEDURE:  4.4 mCi technetium 99m labeled MAA administered via the right and left hepatic arterial branches followed by multiple projection imaging. Quantitative analysis was then performed. FINDINGS: Total hepatopulmonary shunt percentage was 5.2%. No evidence of extrahepatic radiotracer deposition is identified.     Quantitative lung scan as described.    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 OPERATING INTERVENTIONALISTS: Mt Recinos M.D. CONSENT: After the risks, benefits and alternatives were discussed with the patient, including the likelihood of technical success, and all of the patient's questions were answered, written informed consent was obtained for both the procedure and for conscious sedation. SEDATION: Conscious sedation with 50 mcg Fentanyl and 2 mg Versed was administered during the procedure with appropriate continuous patient monitoring by the radiology nurse under the direct supervision of the attending physician. Sedation duration: 45 minutes CONTRAST: 30 cc Omnipaque 300 intra-arterial. FLUOROSCOPY IMAGES: 8 fluoroscopic images obtained. FLUOROSCOPY TIME: 3.4 minutes. PROCEDURES PERFORMED: 1. Access of the left radial artery under sonographic guidance.  CPT 43450 2. 3. Celiac arteriogram - CPT 3. Selection of the proper hepatic artery with arteriogram CPT code 66956/57467. 4. Selection of the right hepatic artery with arteriogram CPT code 69787/76339 5. Yttrium-90 SIR-Spheres radioembolization delivered via the right hepatic artery CPT codes 54159 6. Post embolization arteriogram of the right hepatic artery. CPT code 22510. 7. Conscious sedation. 8. Fluoroscopic guidance for the above. TECHNIQUE: The patient was brought to the angiography suite and placed in the supine position. A barbeau test of the left extremity was performed demonstrating dual circulation to the hand. The left wrist was prepped and draped in a sterile manner.  A patent left radial artery was identified with real-time ultrasound and an image recorded and entered into the patient's medical record. Following administration of 2 mL 1% lidocaine the left radial artery was accessed under direct ultrasound visualization and a double wall technique. A 4 Kazakh slender Terumo  glidesheath was advanced into the radial artery. A mixture containing 100 mcg nitroglycerin, 2.5 mg verapamil and 3000 units of heparin was injected through the sheath into the artery. An angle tip Glidewire preloaded on a 155 cm 5 [place] radial catheter was advanced into the abdominal aorta under fluoroscopic imaging allowing for selection of the celiac trunk. An angiogram was performed demonstrating no evidence of variant hepatic arterial anatomy. No flow is observed in the gastroduodenal artery or right gastric artery which were previously embolized utilizing microcoils. Through the guide catheter, a Renegade hi kj microcatheter with a fathom 0.016 wire was used to select the right hepatic artery arising from the proper hepatic artery. The Renegade hi kj microcatheter was advanced into the right hepatic artery. Angiography confirmed position of the catheter in the same location as on the planning procedure previously performed on 10/1/2018. The catheters were secured in position. At this point, the yttrium-90 calculated dose was brought to the angiography suite by the nuclear medicine department. The yttrium-90 SIR-Sphere delivery apparatus was then set up according to the manufactures instructions. The yttrium-90 dose was deposited into an acrylic shielding container and a closed delivery system was prepared. Specifically, the dose vial was connected to two inlet tubings of sterile water and contrast sources as well as single outlet tubing for the yttrium-90 delivery. The  outlet tubing was connected to the Renegade hi kj microcatheter. Then multiple small aliquots of the yttrium-90 microspheres were injected into the artery followed by D5W and contrast flushes. Angiography was performed intermittently to confirm antegrade flow assess the degree of embolization. The endpoint was reached when the total volume of yttrium-90 had been administered. Catheters were carefully removed from the patient and  secured with full radiation safety protection protocols. The catheter and sheath were removed, and closure device was deployed in the right groin. The patient tolerated the procedure well. There were no immediate complications. The prescribed dose was  1.04 gb ( 28.08 mCi). The drawn dose was 1.15 gb (31mCi). Delivered dose after residual was measured at 1.08 gb ( 29.14 mCi). This represents  104% of the prescribed dose and  94% of the drawn dose. FINDINGS: As above. 1.  Technically successful Y90 radioembolization of the right hepatic lobe. The patient will followup in approximately 10 days for laboratory and clinical evaluation and 4-6 weeks for imaging evaluation. 2.  Immediately following the procedure, the patient was transported to nuclear medicine for planar imaging which demonstrates Bremsstrahlung emission from the right of the liver. There is no definite evidence of extrahepatic deposition of radioembolic material.    Ir-embolization    Result Date: 10/1/2018   Pre-Y90 mapping and MAA shunt evaluation HISTORY/REASON FOR EXAM:  metastatic ampullary adenocarcinoma to right liver. ATTENDING: Mt Recinos M.D. ASSISTANTS:  None MEDICATIONS: Moderate sedation was achieved with administration of: Versed 1 mg mg IV Fentanyl 50 mcg mcg IV Conscious sedation duration: 60 minutes. Local anesthesia was achieved with the following: Lidocaine 5 ml 1% buffered with sodium bicarbonate was administered for local anesthesia. Other medicines: None. CONTRAST: 50 mL Omnipaque 300 intra-arterial FLUOROSCOPY: 5 minutes. TECHNIQUES/FINDINGS: After review of the procedure, risks, benefits, alternatives and sedation, consent was obtained from the patient. A barbeau test of the left extremity was performed demonstrating dual circulation to the hand. The left wrist was prepped and draped in a sterile manner.  A patent left radial artery was identified with real-time ultrasound and an image recorded and entered into the patient's  medical record. Following administration of 2 mL 1% lidocaine the left radial artery was accessed under direct ultrasound visualization and a double wall technique. A 4 Libyan slender Terumo glidesheath was advanced into the radial artery. A mixture containing 100 mcg nitroglycerin, 2.5 mg verapamil and 3000 units of heparin was injected through the sheath into the artery. An angle tip Glidewire preloaded on a 155 cm 5 Libyan Karyna radial catheter was advanced into the abdominal aorta under fluoroscopic imaging allowing for selection of the celiac trunk. The guide catheter was advanced over the wire, through the sheath, and into the abdominal aorta. The superior mesenteric artery was selected and an angiogram was performed. The diagnostic angiogram showed no evidence of variant hepatic artery anatomy. Utilizing the guide catheter in the abdominal aorta, the celiac artery was selected and an angiogram was performed demonstrating no evidence of variant hepatic arterial anatomy. A  renegade  microcatheter with a microwire was advanced into the common hepatic artery and a selective injection was performed demonstrating no evidence of variant hepatic arterial anatomy. The right gastric artery was cannulated and a selective angiogram was performed. The right gastric artery was embolized utilizing  a 3 x 3.3 mm Jayleen microcoil. Post intervention angiogram demonstrated near cessation of flow in the right gastric artery. The gastroduodenal artery was cannulated and a selective angiogram was performed. The gastroduodenal artery was embolized with: Cook Jayleen Coil 14mm x 6cm ?Ref # [id number] Lot # [id number] ? [employer] Interlock 6mm x 20cm Ref # [id number] Lot # [id number] ? ? [employer] Interlock 2mm x 6mm x 8cm Ref # [id number] # [id number] Post intervention images confirmed cessation of flow in the. The microcatheter was positioned in the right hepatic artery and 4.4 mCi technetium 99m labeled MAA was  injected slowly. The microcatheter was removed from the guide catheter and a final angiogram was performed through the guide catheter demonstrating minimal residual flow through the gastroduodenal artery and right gastric artery. The guide catheter was removed from the patient through the vascular sheath. Upon completion of the procedure, the arterial sheath was, removed and hemostasis was achieved with the aid of a TR band device. The patient transferred to the post procedure unit in good condition. The procedure was performed by Dr. Recinos. COMPLICATIONS: No immediate complications. ESTIMATED BLOOD LOSS: 10 mL. SPECIMEN/DISPOSTION: None.     1.  Superior mesenteric arteriogram no evidence of variant hepatic artery anatomy. 2.  Celiac arteriogram. 3.  Common hepatic arteriogram demonstrating no evidence of variant hepatic arterial anatomy. 4.  Selective right gastric arteriogram demonstrating no evidence of variant hepatic arterial anatomy. 5.  Selective right gastric microcoil embolization. 6.  Selective gastroduodenal arteriogram demonstrating no evidence of variant hepatic arterial anatomy. 7.  Selective gastroduodenal microcoil embolization. 8.  Proper hepatic arteriogram. 9.  Intra-arterial administration of 4.4 mCi technetium 99m labeled MAA into the right hepatic artery demonstrated a hepatic pulmonary shunt fraction of 5.2%.    Ir-low Level Consult-outpt    Result Date: 10/22/2018  This exam has been resulted under the NOTES tab, and the signed report has been auto faxed to the ordering physician on the date/time of that signature.      Micro:  Results     Procedure Component Value Units Date/Time    BLOOD CULTURE [[id number]] Collected:  10/27/18 0242    Order Status:  Completed Specimen:  Blood from Peripheral Updated:  10/28/18 0926     Significant Indicator NEG     Source BLD     Site PERIPHERAL     Blood Culture No Growth    Note: Blood cultures are incubated for 5 days and  are monitored  "continuously.Positive blood cultures  are called to the RN and reported as soon as  they are identified.      Narrative:       Droplet,Contact  Per Hospital Policy: Only change Specimen Src: to \"Line\" if  specified by physician order.    BLOOD CULTURE [[id number]] Collected:  10/27/18 0242    Order Status:  Completed Specimen:  Blood from Peripheral Updated:  10/28/18 0926     Significant Indicator NEG     Source BLD     Site PERIPHERAL     Blood Culture No Growth    Note: Blood cultures are incubated for 5 days and  are monitored continuously.Positive blood cultures  are called to the RN and reported as soon as  they are identified.      Narrative:       Droplet,Contact  Per Hospital Policy: Only change Specimen Src: to \"Line\" if  specified by physician order.    GRAM STAIN [[id number]] Collected:  10/27/18 0935    Order Status:  Completed Specimen:  Wound Updated:  10/27/18 1624     Significant Indicator .     Source WND     Site Implanted Port Removal     Gram Stain Result Rare WBCs.  No organisms seen.      CULTURE WOUND W/ GRAM STAIN [[id number]] Collected:  10/27/18 0935    Order Status:  Completed Specimen:  Other Updated:  10/27/18 1127    Urine culture [[id number]] Collected:  10/25/18 1411    Order Status:  Completed Specimen:  Urine from Urine, Clean Catch Updated:  10/27/18 0949     Significant Indicator NEG     Source UR     Site URINE, CLEAN CATCH     Urine Culture Mixed skin dunia ,000 cfu/mL    Narrative:       Indication for culture:->Emergency Room Patient    URINE CULTURE(NEW) [[id number]] Collected:  10/25/18 1250    Order Status:  Completed Specimen:  Urine Updated:  10/27/18 0948     Significant Indicator NEG     Source UR     Site --     Urine Culture Mixed skin dunia 10-50,000 cfu/mL    Narrative:       Indication for culture:->Emergency Room Patient    BLOOD CULTURE [[id number]]  (Abnormal) Collected:  10/25/18 1240    Order Status:  Completed Specimen:  Blood from Peripheral Updated:  " "10/27/18 0842     Significant Indicator POS (POS)     Source BLD     Site PERIPHERAL     Blood Culture Growth detected by Bactec instrument. 10/25/2018  22:38 (A)      Escherichia coli  See previous culture for sensitivity report.   (A)    Narrative:       CALL  Flores  61 tel. [phone],  CALLED  61 tel. [phone] 10/25/2018, 22:37, RB PERF. RESULTS CALLED TO: RN  [id number]  Per Hospital Policy: Only change Specimen Src: to \"Line\" if  specified by physician order.    BLOOD CULTURE [[id number]]  (Abnormal)  (Susceptibility) Collected:  10/25/18 1214    Order Status:  Completed Specimen:  Blood from Peripheral Updated:  10/27/18 0841     Significant Indicator POS (POS)     Source BLD     Site PERIPHERAL     Blood Culture Growth detected by Bactec instrument. 10/25/2018  22:52 (A)      Escherichia coli (A)    Narrative:       CALL  Flores  61 tel. [phone],  CALLED  61 tel. [phone] 10/25/2018, 22:55, RB PERF. RESULTS CALLED TO:  RN  [id number]  Per Hospital Policy: Only change Specimen Src: to \"Line\" if  specified by physician order.    Culture & Susceptibility     ESCHERICHIA COLI     Antibiotic Sensitivity Microscan Unit Status    Ampicillin Resistant >16 mcg/mL Final    Method: SENSITIVITY, SERGE    Cefepime Sensitive <=8 mcg/mL Final    Method: SENSITIVITY, SERGE    Cefotaxime Sensitive <=2 mcg/mL Final    Method: SENSITIVITY, SERGE    Cefotetan Sensitive <=16 mcg/mL Final    Method: SENSITIVITY, SERGE    Ceftazidime Sensitive <=1 mcg/mL Final    Method: SENSITIVITY, SERGE    Ceftriaxone Sensitive <=8 mcg/mL Final    Method: SENSITIVITY, SERGE    Cefuroxime Sensitive <=4 mcg/mL Final    Method: SENSITIVITY, SERGE    Ciprofloxacin Resistant >2 mcg/mL Final    Method: SENSITIVITY, SERGE    Ertapenem Sensitive <=1 mcg/mL Final    Method: SENSITIVITY, SERGE    Gentamicin Sensitive <=4 mcg/mL Final    Method: SENSITIVITY, SERGE    Pip/Tazobactam Sensitive <=16 mcg/mL Final    Method: SENSITIVITY, SERGE    Tigecycline Sensitive <=2 mcg/mL Final    " Method: SENSITIVITY, SERGE    Tobramycin Sensitive <=4 mcg/mL Final    Method: SENSITIVITY, SERGE    Trimeth/Sulfa Resistant >2/38 mcg/mL Final    Method: SENSITIVITY, SERGE                       INFLUENZA A/B BY PCR [[id number]] Collected:  10/25/18 1250    Order Status:  Completed Specimen:  Urine from Nasopharyngeal Updated:  10/25/18 2030     Influenza virus A RNA Negative     Influenza virus B, PCR Negative    Narrative:       Indication for culture:->Emergency Room Patient    URINALYSIS [[id number]]  (Abnormal) Collected:  10/25/18 1250    Order Status:  Completed Specimen:  Urine Updated:  10/25/18 1523     Color DK Yellow     Character Cloudy (A)     Specific Gravity 1.029     Ph 5.5     Glucose Negative mg/dL      Ketones Trace (A) mg/dL      Protein 100 (A) mg/dL      Bilirubin Small (A)     Urobilinogen, Urine 1.0     Nitrite Negative     Leukocyte Esterase Trace (A)     Occult Blood Moderate (A)     Micro Urine Req Microscopic    Narrative:       Indication for culture:->Emergency Room Patient    Culture Respiratory W/ GRM STN [[id number]] Collected:  10/25/18 1250    Order Status:  Completed Specimen:  Respirate from Sputum Updated:  10/25/18 1305    Urinalysis [[id number]]     Order Status:  Canceled Specimen:  Urine from Urine, Clean Catch     BLOOD CULTURE [[id number]]     Order Status:  Sent Specimen:  Blood from Peripheral     BLOOD CULTURE [[id number]]     Order Status:  Sent Specimen:  Blood from Line           Assessment:  Active Hospital Problems    Diagnosis   • *Sepsis (HCC) [A41.9]   • 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 Difficile  On vancomycin per study protocol    Discussed with internal medicine/Dr. Hurt

## 2018-10-28 NOTE — PROGRESS NOTES
Report received by elo RN. Assumed care of pt. Assessment complete. Family at bedside. Pt A&Ox4, VSS. Pt in no apparent signs of distress. Plan of care discussed. Call light within reach, bed in lowest position, and pt has no further questions at this time.

## 2018-10-28 NOTE — CARE PLAN
Problem: Knowledge Deficit  Goal: Knowledge of disease process/condition, treatment plan, diagnostic tests, and medications will improve  Outcome: PROGRESSING AS EXPECTED  Patient and family updated on plan of care. All questions answered.

## 2018-10-28 NOTE — PROGRESS NOTES
[facility]    Date of Service: 10/28/2018    Chief Complaint  73 y.o. female admitted 10/25/2018 with bacterimia and sepsis  With thrombocytopenia    Interval Problem Update  none    Consultants/Specialty  I.D  Text consult oncology    Disposition  pending        Review of Systems   Constitutional: Negative for chills, diaphoresis, fever and malaise/fatigue.   HENT: Negative for ear discharge, ear pain and tinnitus.    Eyes: Negative for blurred vision, double vision and photophobia.   Respiratory: Negative for cough, hemoptysis and sputum production.    Cardiovascular: Negative for chest pain, palpitations and orthopnea.   Gastrointestinal: Negative for heartburn, nausea and vomiting.   Genitourinary: Negative for dysuria, frequency and urgency.   Musculoskeletal: Negative for back pain, myalgias and neck pain.   Skin: Negative for itching and rash.   Neurological: Negative for dizziness, tingling and headaches.      Physical Exam  Laboratory/Imaging   Hemodynamics  Temp (24hrs), Av.4 °C (97.5 °F), Min:36.1 °C (97 °F), Max:37 °C (98.6 °F)   Temperature: 36.2 °C (97.2 °F)  Pulse  Av.9  Min: 60  Max: 128    Blood Pressure : 115/67      Respiratory      Respiration: 16, Pulse Oximetry: 93 %        RUL Breath Sounds: Clear, RML Breath Sounds: Clear, RLL Breath Sounds: Clear, SHAN Breath Sounds: Clear, LLL Breath Sounds: Clear    Fluids    Intake/Output Summary (Last 24 hours) at 10/28/18 1030  Last data filed at 10/28/18 0900   Gross per 24 hour   Intake              438 ml   Output                0 ml   Net              438 ml       Nutrition  Orders Placed This Encounter   Procedures   • Diet Order Regular     Standing Status:   Standing     Number of Occurrences:   1     Order Specific Question:   Diet:     Answer:   Regular [1]     Physical Exam   Constitutional: She is oriented to person, place, and time. No distress.   HENT:   Head: Normocephalic and atraumatic.   Eyes: Pupils are  equal, round, and reactive to light. Conjunctivae are normal.   Neck: Normal range of motion. Neck supple.   Cardiovascular: Normal rate and regular rhythm.    Pulmonary/Chest: Effort normal and breath sounds normal.   Port on the right side of the chest is noted   Abdominal: Soft. Bowel sounds are normal.   Musculoskeletal: She exhibits no edema or tenderness.   Neurological: She is alert and oriented to person, place, and time.   Skin: Skin is warm and dry. She is not diaphoretic.       Recent Labs      10/26/18   1619  10/27/18   0717  10/28/18   0534   WBC  10.2  10.5  8.9   RBC  3.57*  3.55*  3.54*   HEMOGLOBIN  10.6*  10.2*  10.5*   HEMATOCRIT  31.0*  30.6*  30.2*   MCV  86.8  86.2  85.3   MCH  29.7  28.7  29.7   MCHC  34.2  33.3*  34.8   RDW  43.8  43.7  43.6   PLATELETCT  68*  79*  87*   MPV  11.1  11.1  11.3     Recent Labs      10/26/18   0500  10/27/18   0717  10/28/18   0534   SODIUM  136  135  135   POTASSIUM  3.8  3.8  3.5*   CHLORIDE  109  112  109   CO2  19*  17*  18*   GLUCOSE  132*  114*  91   BUN  13  9  8   CREATININE  0.69  0.63  0.56   CALCIUM  8.0*  8.0*  7.9*     Recent Labs      10/25/18   1214  10/27/18   0813   APTT  34.8   --    INR  2.07*  1.36*                  Assessment/Plan     * Sepsis (HCC)- (present on admission)   Assessment & Plan    Source probably chemo port  Gram negative haley in blood cultures are noted  D/w the pt's oncologist  I.D is consulted and d/w dr bernardo  Removed the port by I.R  On ROCEPHIN  Facial xray showed;No periapical lucencies. No mandibular fracture.  Has resolved  No end organ damage  Possible discharge tomorrow        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 Cedeno  She had Y90 on 10/9  She was not a candidate for a Whipple due to metastatic disease.   D/w dr Cedeno/oncology        Research study patient   Assessment & Plan    This patient has been recruited in the study:  Efficacy of prophylactic oral Vancomycin in preventing recurrent Clostridium Difficile infection in hospitalized patients requiring antibiotics. Approximately 300 subjects will be randomized to one of three treatment arms in a 1:1:1 ratio (Vancomycin 125 mg PO every 12 hours for duration of antibiotic therapy, Vancomycin 125 mg PO every 24 hours for duration of antibiotic therapy, or no oral Vancomycin). Follow-up for endpoints will be done at 12 weeks following completion of antibiotic therapy.  Please contact study group before discharging this patient. This patient may need [facility] at discharge. [facility] will provide the required antibiotics.   Contact Information:  [email or web address]        History of deep vein thrombosis (DVT) of lower extremity- (present on admission)   Assessment & Plan    Continue outpatient Eliquis  Dr Cedeno recommended  To hold eliquis if platelets less then 50,000  D/w the pt's son and daugther at the bed side  Platelets 87 today        Hyponatremia- (present on admission)   Assessment & Plan    Has resolved  Iv fluid was given         Thrombocytopenia (HCC)- (present on admission)   Assessment & Plan    Will stop eliquis if platelets less then 50,000  Probably 2nd to chemo  Has improved 87 today        Long term (current) use of anticoagulants [Z79.01]- (present on admission)   Assessment & Plan    Eliquis due to DVT history  As per above          Quality-Core Measures   Lopez catheter::  No Lopez  DVT prophylaxis pharmacological::  Warfarin (Coumadin)

## 2018-10-28 NOTE — PROGRESS NOTES
Patient alert and oriented. Son at bedside. Patient assessed and all needs met. Incision site to right chest-dry and intact. Patient denies pain. Patient seen by physician. Isolation discontinued. All belongings and call light within reach. Possible discharge on 10/29. Patient and son updated on discharge planning.

## 2018-10-28 NOTE — CARE PLAN
Problem: Safety  Goal: Will remain free from injury  Outcome: PROGRESSING AS EXPECTED  Fall precautions in place. Treaded socks on pt. Appropriate signs on doorway. IV pole on same side as bathroom. Bedrails up. Bed in lowest position and locked.  Call light and phone within reach. Patient educated on importance of calling nurses before getting out of bed, verbalizes understanding. Bed alarm on.    Problem: Knowledge Deficit  Goal: Knowledge of disease process/condition, treatment plan, diagnostic tests, and medications will improve    Intervention: Explain information regarding disease process/condition, treatment plan, diagnostic tests, and medications and document in education  Patient educated about POC.  All questions answered in regards to disease process, treatment, and medications.  Patient verbalized understanding and voiced no further questions at this time.

## 2018-10-29 PROBLEM — A41.51 E. COLI SEPSIS (HCC): Status: ACTIVE | Noted: 2018-01-01

## 2018-10-29 NOTE — PROCEDURES
Vascular Access Team    Date of Insertion: 10/29/18  Arm Circumference: 29.5  Internal length: 12  External Length: 0  Vein Occupancy %: 45  Reason for Midline: antibiotic therapy  Labs: WBC 8.9, PLT 87, INR 1.35, BUN 8, Cr 0.56, GFR >60    Orders confirmed, vessel patency confirmed with ultrasound. Risks and benefits of procedure explained to patient and education regarding line associated bloodstream infections provided. Questions answered.     Power Midline placed in RUE per MD order with ultrasound guidance, initial arm circumference 29.5cm. 4 Fr, single lumen Power Midline placed in cephalic vein after 2 attempt(s). 2 cc's of 1% lidocaine injected intradermally, 21 gauge microintroducer needle and modified Seldinger technique used. 12 cm catheter inserted with good blood return. Secured at 0 cm marker. Each lumen flushed without resistance with 10 mL 0.9% normal saline. Midline secured with Biopatch and Tegaderm.     Midline placement is confirmed by nurse using ultrasound and ability to flush and draw blood. Midline is appropriate for use at this time.  No X-ray is needed for placement confirmation. Pt tolerated procedure well.  Patient condition relayed to unit RN or ordering physician via this post procedure note in the EMR.     Ultrasound images uploaded to PACS and viewable in the EMR - yes  Ultrasound imaged printed and placed in paper chart - no     BARD Power Midline ref # [id number], Lot # [id number]

## 2018-10-29 NOTE — CARE PLAN
Problem: Infection  Goal: Will remain free from infection  No s/s of infection at this time. VSS. Education done with pt on s/s of infection, all questions and concerns were addressrf     Problem: Knowledge Deficit  Goal: Knowledge of disease process/condition, treatment plan, diagnostic tests, and medications will improve  Education done on POC with pt and family, including discharge plan and need to continue IV abx, all questions and concerns were addressed.

## 2018-10-29 NOTE — CARE PLAN
Problem: Safety  Goal: Will remain free from injury  Outcome: PROGRESSING AS EXPECTED  Safety precautions in place. Non skid socks on. Patient able to ambulate self with steady gait. Call light within reach. Bi hourly rounding in place.    Problem: Infection  Goal: Will remain free from infection  Outcome: PROGRESSING AS EXPECTED  Patient on IV antibiotic therapy as ordered.

## 2018-10-29 NOTE — DISCHARGE PLANNING
This intern spoke to infusion ext. [phone] on this date at this time and was informed that pt could be seen Wednesday at 5:00pm for an appointment. The bedside RN was also informed of this.

## 2018-10-29 NOTE — DISCHARGE SUMMARY
"Discharge Summary    CHIEF COMPLAINT ON ADMISSION  No chief complaint on file.      Reason for Admission  Sent by MD     Admission Date  10/25/2018    CODE STATUS  Full Code    HPI & HOSPITAL COURSE  This is a 73 y.o. female here with   days of rigors. Ms. Poole has a history of DVT on anticoagulation and Ampullary carcinoma with metastasis s/p chemotherapy one month ago and Y90 on 10/9 that developed \"real bad chills\" 2 days ago been off and on.  This morning there was quite severe therefore her son brought her to the emergency room where her temperature was 102.8.  She has had blood cultures drawn, given broad-spectrum IV antibiotics, and will be admitted for further work up.  Patient denies diarrhea, no dysuria, no cough.  She denies abdominal pain and has not had any weight loss.  Her son is at bedside and states that seems attack his mother is been in reasonably good health since the Y 90 treatment.  Except for the chills, her real only complaint has been some tiredness after the Y 90 but this is worn off.  Has not had any pain at her right chest port.  She was diagnosed with sepsis and bacterimia 2nd to the chemo port [facility].she was started on iv antibiotics and  2 blood cultures were positive for E.Coli.vancomcyin was dced and zosyn was changed to rocephin.she has improved a lot and feels much better.i  Consulted her oncologist/dr dc and he recommended I.D consult.i d/w dr bernardo/I.D and she recommended to have the port removed.her platelets dropped and d/w her oncologist and he recommende to stop eliquis if platelets less then 50,000.however her plates are 87,000 today.she also had jaw  Xray that did not reveal any abcess.her hyponatremia has resolved.   she will be dicahrged once iv infusion is arranged as out pt and she needs to be on antibiotic for a total of 14 days.once I.D clears her and she gets her line placed.    Therefore, she is discharged in fair and stable condition to home with " close outpatient follow-up.    The patient met 2-midnight criteria for an inpatient stay at the time of discharge.    Discharge Date  10/29/18    FOLLOW UP ITEMS POST DISCHARGE  pcp next week  Infusion center  F/u with oncology/dr dc next week    DISCHARGE DIAGNOSES  Principal Problem:    Sepsis (HCC) POA: Yes  Active Problems:    Ampullary carcinoma (HCC) POA: Yes    Long term (current) use of anticoagulants [Z79.01] POA: Yes    Thrombocytopenia (HCC) POA: Yes    Hyponatremia POA: Yes    History of deep vein thrombosis (DVT) of lower extremity POA: Yes    Research study patient POA: Unknown      Overview: This patient has been recruited in the study: Efficacy of prophylactic       oral Vancomycin in preventing recurrent Clostridium Difficile infection in       hospitalized patients requiring antibiotics. Approximately 300 subjects       will be randomized to one of three treatment arms in a 1:1:1 ratio       (Vancomycin 125 mg PO every 12 hours for duration of antibiotic therapy,       Vancomycin 125 mg PO every 24 hours for duration of antibiotic therapy, or       no oral Vancomycin). Follow-up for endpoints will be done at 12 weeks       following completion of antibiotic therapy.      Please contact study group before discharging this patient. This patient       may need [facility] Antib[facility] at discharge. [facility] will provide       the required antibiotics.       Contact Information:      [email or web address]        Resolved Problems:    * No resolved hospital problems. *      FOLLOW UP  Future Appointments  Date Time Provider Department Center   12/3/2018 1:00 PM ONC RN 1 [facility] None   3/11/2019 9:30 AM [facility] EXAM 4 [facility] None     No follow-up provider specified.    MEDICATIONS ON DISCHARGE     Medication List      START taking these medications      Instructions    acetaminophen 325 MG Tabs  Commonly known as:  TYLENOL   Take 2 Tabs by mouth every 6 hours as needed (Mild Pain; (Pain scale 1-3); Temp greater than 100.5 F).  Dose:  650 mg     NS SOLN 100 mL with cefTRIAXone 2 GM SOLR 2 g   2 g by Intravenous route every 24 hours.  Dose:  2 g        CONTINUE taking these medications      Instructions   ELIQUIS 5mg Tabs  Generic drug:  apixaban   TAKE 1 TAB BY MOUTH 2 TIMES A DAY.  Dose:  5 mg     losartan-hydrochlorothiazide 50-12.5 MG per tablet  Commonly known as:  HYZAAR   Take 1 Tab by mouth every day.  Dose:  1 Tab     omeprazole 20 MG delayed-release capsule  Commonly known as:  PRILOSEC   TAKE 1 CAPSULE BY MOUTH EVERYDAY     potassium chloride SA 10 MEQ Tbcr  Commonly known as:  K-DUR   Take 2 Tabs by mouth 2 times a day.  Dose:  20 mEq     RESTASIS 0.05 % ophthalmic emulsion  Generic drug:  cyclosporin   Place 1 Drop in both eyes 2 times a day.  Dose:  1 Drop            Allergies  Allergies   Allergen Reactions   • Tape        DIET  Orders Placed This Encounter   Procedures   • Diet Order Regular     Standing Status:   Standing     Number of Occurrences:   1     Order Specific Question:   Diet:     Answer:   Regular [1]       ACTIVITY  As tolerated.  Weight bearing as tolerated    CONSULTATIONS  I.D   Phone consult with /oncology  I.R    PROCEDURES  Chemo-port removal    LABORATORY  Lab Results   Component Value Date    SODIUM 135 10/28/2018    POTASSIUM 3.5 (L) 10/28/2018    CHLORIDE 109 10/28/2018    CO2 18 (L) 10/28/2018    GLUCOSE 91 10/28/2018    BUN 8 10/28/2018    CREATININE 0.56 10/28/2018        Lab Results   Component Value Date    WBC 8.9 10/28/2018    HEMOGLOBIN 10.5 (L) 10/28/2018    HEMATOCRIT 30.2 (L) 10/28/2018    PLATELETCT 87 (L) 10/28/2018        Total time of the discharge process exceeds 40 minutes.

## 2018-10-29 NOTE — PROGRESS NOTES
Report received from day shift nurse. Assumed care @ 1930.     Patient is alert and oriented x 4. Able to make needs known. Assessment completed. On room air, tolerating well. Dressing on right upper chest, CDI. Denies any pain and discomfort at this time. Patient educated regarding plan of care. Able to ambulate self with steady gait.    Bed locked, in lowest position, treaded socks on. Needs attended. No further needs at this time. Communication board updated. Call light and personal belongings within reach.

## 2018-10-29 NOTE — PROGRESS NOTES
Infectious Disease Progress Note    Author: Zakia Ansari M.D. Date & Time of service: 10/29/2018  10:32 AM    Chief Complaint:  Follow-up for E. coli sepsis    Interval History:  10/27 afebrile WBC 10.5 no new issues to report, tolerating IV antibiotics without any issues, plan for port removal to the  10/28 afebrile WBC 8.9 patient has a mild headache which he attributes to her sleeping position, status post port removal yesterday without any complications  10/29 afebrile CBC not done today, patient is feeling well without any new issues, she and her daughter at the bedside will think about whether she wants to do home IV antibiotics or go to the infusion center  Labs Reviewed, Medications Reviewed, Radiology Reviewed and Wound Reviewed.    Review of Systems:  Review of Systems   Constitutional: Negative for chills and fever.   Respiratory: Negative for cough and shortness of breath.    Gastrointestinal: Negative for abdominal pain, diarrhea, nausea and vomiting.   Genitourinary: Negative for dysuria.       Hemodynamics:  Temp (24hrs), Av.4 °C (97.6 °F), Min:36.1 °C (97 °F), Max:36.8 °C (98.3 °F)  Temperature: 36.5 °C (97.7 °F)  Pulse  Av.3  Min: 60  Max: 128   Blood Pressure : 128/65       Physical Exam:  Physical Exam   Constitutional: She is oriented to person, place, and time. She appears well-developed.   HENT:   Head: Normocephalic and atraumatic.   Eyes: Pupils are equal, round, and reactive to light. EOM are normal.   Neck: Neck supple.   Cardiovascular: Normal rate, regular rhythm and normal heart sounds.    Pulmonary/Chest: Effort normal. She has no wheezes.   Right upper chest port removed.  Site dressed   Abdominal: Soft. She exhibits no distension. There is no tenderness.   Surgical scar clean   Musculoskeletal: She exhibits no edema.   Neurological: She is alert and oriented to person, place, and time.       Meds:    Current Facility-Administered Medications:   •  Pharmacy Consult  Request  •  cefTRIAXone (ROCEPHIN) IV  •  apixaban  •  Study Drug: vancomycin  •  artificial tears  •  senna-docusate **AND** polyethylene glycol/lytes **AND** magnesium hydroxide **AND** bisacodyl  •  ondansetron  •  ondansetron  •  acetaminophen    Labs:  Recent Labs      10/26/18   1619  10/27/18   0717  10/28/18   0534   WBC  10.2  10.5  8.9   RBC  3.57*  3.55*  3.54*   HEMOGLOBIN  10.6*  10.2*  10.5*   HEMATOCRIT  31.0*  30.6*  30.2*   MCV  86.8  86.2  85.3   MCH  29.7  28.7  29.7   RDW  43.8  43.7  43.6   PLATELETCT  68*  79*  87*   MPV  11.1  11.1  11.3   NEUTSPOLYS  87.20*  88.70*  90.30*   LYMPHOCYTES  3.70*  4.40*  6.20*   MONOCYTES  7.60  5.20  3.50   EOSINOPHILS  0.90  1.70  0.00   BASOPHILS  0.10  0.00  0.00   RBCMORPHOLO   --   Present  Present     Recent Labs      10/27/18   0717  10/28/18   0534   SODIUM  135  135   POTASSIUM  3.8  3.5*   CHLORIDE  112  109   CO2  17*  18*   GLUCOSE  114*  91   BUN  9  8     Recent Labs      10/27/18   0717  10/28/18   0534   ALBUMIN  2.3*  2.1*   TBILIRUBIN  0.5  0.4   ALKPHOSPHAT  74  69   TOTPROTEIN  5.1*  5.2*   ALTSGPT  31  27   ASTSGOT  34  25   CREATININE  0.63  0.56       Imaging:  Ct-abdomen-pelvis With    Result Date: 10/25/2018  10/25/2018 4:28 PM HISTORY/REASON FOR EXAM:  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 Patient is status post embolization.. TECHNIQUE/EXAM DESCRIPTION:   CT scan of the abdomen and pelvis with contrast. Contrast-enhanced helical scanning was obtained from the diaphragmatic domes through the pubic symphysis following the bolus administration of nonionic contrast without complication. 80 mL of Omnipaque 350 nonionic contrast was administered without complication. Low dose optimization technique was utilized for this CT exam including automated exposure control and adjustment of the mA and/or kV according to patient size. COMPARISON: PET/CT 7/12/2018, MRI 8/6/2018 and CT abdomen 3/27/2018 FINDINGS: The  visualized lung bases are unremarkable. CT Abdomen: There are several hypodense lesions in the hepatic dome with a couple of foci of air. A hypodense mass within air-fluid level in the right hepatic lobe, segment 8, measures approximately 2.1 cm. No significant rim enhancement is identified. There are new  hypodense lesions in the anterior right hepatic lobe measuring up to approximately 7 mm. There is pneumobilia, consistent with prior hepaticojejunostomy. The gallbladder has been removed. The common bile duct remains dilated with thickening of the duodenal wall and enhancement. Maximum diameter of the common bile duct is approximately 14 mm. There are multiple coils in the region of the pancreatic head. The spleen and adrenal glands are unremarkable. The kidneys enhance symmetrically. Hypodense renal masses are consistent with cysts. There is heterogeneous enhancement of the pancreatic head with an ill-defined cystic mass. The mass is difficult to measure, but is approximately 4.6 x 4 cm. The duct does not appear dilated more distally. There are multiple diverticula in the descending and sigmoid colon. There are scattered arterial calcifications. Enlarged aortocaval lymph node on image 37 measures approximately 15 mm short axis, previously 8 mm. CT Pelvis: The bladder is unremarkable. No significant free fluid is identified. A calcified mass in the uterus is consistent with a small leiomyoma.     1.  Ill-defined cystic mass in the pancreatic head with surrounding inflammatory changes. Findings may be related to pancreatitis in the appropriate clinical setting. Findings are new from the prior exam in August. 2.  New hypodense lesions scattered throughout the liver was foci of air. Findings are highly likely to be related to recent embolization. No rim enhancement to suggest abscess. 3.  Duodenal wall thickening with enhancement, concerning for residual disease. Persistent common bile duct dilatation. 4.  Interval  enlargement in an aortocaval lymph node, highly suspicious for metastatic disease. 5.  Diverticulosis. 6.  Status post hepaticojejunostomy.    Dx-chest-portable (1 View)    Result Date: 10/25/2018  10/25/2018 11:35 AM HISTORY/REASON FOR EXAM:  LEFT shoulder pain, malaise, nausea. TECHNIQUE/EXAM DESCRIPTION AND NUMBER OF VIEWS: Single portable view of the chest. COMPARISON: 5/3/2018 FINDINGS: Cardiomediastinal contour is within normal limits. Lungs show hypoinflation. No focal pulmonary consolidation. No pleural fluid collection or pneumothorax. RIGHT chest infusion port again present. No major bony abnormality is seen. Atherosclerotic calcification of thoracic aorta.     Hypoinflation without other evidence for acute cardiopulmonary disease.    Nm-liver/spleen Scan    Result Date: 10/10/2018  AU BASIC DOSIMETRY Y-90 RADIOEMBOLIZATION THERAPY AND BREMSSTRAHLUNG IMAGING: After the radioembolization procedure the patient was transported to Nuclear Medicine. Bremsstrahlung images were obtained encompassing the thorax and abdomen, in anterior and posterior views, as well as SPECT/CT of the upper abdomen. TREATMENT PLANNING: The prior history was reviewed, and the local and systemic treatment options considered. Radiation treatment with microspheres was deemed the most appropriate therapy, to meet the goals of liver tolerance and delivery of sufficient radiation to the tumor  in the right liver. The treatment goal is palliative. BASIC DOSIMETRY CALCULATION: The Y-90 microsphere dose was calculated using the patient's tumor burden as demonstrated by CT/MRI scan. The available imaging studies were reviewed. The right lobe represents 67% and the left lobe 33% with 10% tumor involvement of the liver. A shunt study from 10/1/2018 showed a hepatopulmonary shunt calculation of 5.2%. Based on patient parameters and the value of the pulmonary shunt calculation, the dose was not decreased. SPECIAL RADIATION TREATMENT: The patient  has had previous systemic chemotherapy or has concurrent chemotherapy. A desired dose of 28.08 mCi and 1.04 GBq was calculated and documented on the medical directive. YTTRIUM-90 MICROSPHERES HANDLING AND PREPARATION: The microspheres were received and prepared in accordance with the 's specifications, and transported to the catheterization laboratory. The appropriate radiation safety, monitoring and disposal standards were observed. YTTRIUM-90 MICROSPHERES DOSE DISPENSED: 31 mCi Y-90 SIR-Spheres microspheres. Please see alternate report for a description of angiography, Sir-Sphere delivery and AU findings. FINDINGS: The delivered activity appears to be confined to the right lobe of the liver. No extrahepatic uptake is seen. No significant tracer uptake is identified within the bowels or spleen.     The prescribed dose was  1.04 gb ( 28.08 mCi). The drawn dose was 1.15 gb (31mCi). Delivered dose after residual was measured at 1.08 gb ( 29.14 mCi). This represents  104% of the prescribed dose and  94% of the drawn dose. Bremsstrahlung images obtained  after the Y-90 radioembolization, confirm proper delivery to the targeted region. There is no uptake outside the planned treatment area. NOTE: The patient will be followed by interventional radiology and oncology.    Nm-quantitative Lung Scan    Result Date: 10/1/2018  10/1/2018 11:12 AM HISTORY/REASON FOR EXAM:  Ampullary carcinoma. TECHNIQUE/EXAM DESCRIPTION AND NUMBER OF VIEWS: Quantitative lung scan. COMPARISON: None. PROCEDURE:  4.4 mCi technetium 99m labeled MAA administered via the right and left hepatic arterial branches followed by multiple projection imaging. Quantitative analysis was then performed. FINDINGS: Total hepatopulmonary shunt percentage was 5.2%. No evidence of extrahepatic radiotracer deposition is identified.     Quantitative lung scan as described.    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 OPERATING INTERVENTIONALISTS: Mt Recinos M.D. CONSENT: After the risks, benefits and alternatives were discussed with the patient, including the likelihood of technical success, and all of the patient's questions were answered, written informed consent was obtained for both the procedure and for conscious sedation. SEDATION: Conscious sedation with 50 mcg Fentanyl and 2 mg Versed was administered during the procedure with appropriate continuous patient monitoring by the radiology nurse under the direct supervision of the attending physician. Sedation duration: 45 minutes CONTRAST: 30 cc Omnipaque 300 intra-arterial. FLUOROSCOPY IMAGES: 8 fluoroscopic images obtained. FLUOROSCOPY TIME: 3.4 minutes. PROCEDURES PERFORMED: 1. Access of the left radial artery under sonographic guidance.  CPT 52815 2. 3. Celiac arteriogram - CPT 3. Selection of the proper hepatic artery with arteriogram CPT code 56228/86492. 4. Selection of the right hepatic artery with arteriogram CPT code 46652/73195 5. Yttrium-90 SIR-Spheres radioembolization delivered via the right hepatic artery CPT codes 32299 6. Post embolization arteriogram of the right hepatic artery. CPT code 48972. 7. Conscious sedation. 8. Fluoroscopic guidance for the above. TECHNIQUE: The patient was brought to the angiography suite and placed in the supine position. A barbeau test of the left extremity was performed demonstrating dual circulation to the hand. The left wrist was prepped and draped in a sterile manner.  A patent left radial artery was identified with real-time ultrasound and an image recorded and entered into the patient's medical record. Following administration of 2 mL 1% lidocaine the left radial artery was accessed under direct ultrasound visualization and a double wall technique. A 4 [place] slender Terumo  glidesheath was advanced into the radial artery. A mixture containing 100 mcg nitroglycerin, 2.5 mg verapamil and 3000 units of heparin was injected through the sheath into the artery. An angle tip Glidewire preloaded on a 155 cm 5 [place] [employer] radial catheter was advanced into the abdominal aorta under fluoroscopic imaging allowing for selection of the celiac trunk. An angiogram was performed demonstrating no evidence of variant hepatic arterial anatomy. No flow is observed in the gastroduodenal artery or right gastric artery which were previously embolized utilizing microcoils. Through the guide catheter, a Renegade hi kj microcatheter with a fathom 0.016 wire was used to select the right hepatic artery arising from the proper hepatic artery. The Renegade hi kj microcatheter was advanced into the right hepatic artery. Angiography confirmed position of the catheter in the same location as on the planning procedure previously performed on 10/1/2018. The catheters were secured in position. At this point, the yttrium-90 calculated dose was brought to the angiography suite by the nuclear medicine department. The yttrium-90 SIR-Sphere delivery apparatus was then set up according to the manufactures instructions. The yttrium-90 dose was deposited into an acrylic shielding container and a closed delivery system was prepared. Specifically, the dose vial was connected to two inlet tubings of sterile water and contrast sources as well as single outlet tubing for the yttrium-90 delivery. The  outlet tubing was connected to the Renegade hi kj microcatheter. Then multiple small aliquots of the yttrium-90 microspheres were injected into the artery followed by D5W and contrast flushes. Angiography was performed intermittently to confirm antegrade flow assess the degree of embolization. The endpoint was reached when the total volume of yttrium-90 had been administered. Catheters were carefully removed from the patient and  secured with full radiation safety protection protocols. The catheter and sheath were removed, and closure device was deployed in the right groin. The patient tolerated the procedure well. There were no immediate complications. The prescribed dose was  1.04 gb ( 28.08 mCi). The drawn dose was 1.15 gb (31mCi). Delivered dose after residual was measured at 1.08 gb ( 29.14 mCi). This represents  104% of the prescribed dose and  94% of the drawn dose. FINDINGS: As above. 1.  Technically successful Y90 radioembolization of the right hepatic lobe. The patient will followup in approximately 10 days for laboratory and clinical evaluation and 4-6 weeks for imaging evaluation. 2.  Immediately following the procedure, the patient was transported to nuclear medicine for planar imaging which demonstrates Bremsstrahlung emission from the right of the liver. There is no definite evidence of extrahepatic deposition of radioembolic material.    Ir-embolization    Result Date: 10/1/2018   Pre-Y90 mapping and MAA shunt evaluation HISTORY/REASON FOR EXAM:  metastatic ampullary adenocarcinoma to right liver. ATTENDING: Mt Recinos M.D. ASSISTANTS:  None MEDICATIONS: Moderate sedation was achieved with administration of: Versed 1 mg mg IV Fentanyl 50 mcg mcg IV Conscious sedation duration: 60 minutes. Local anesthesia was achieved with the following: Lidocaine 5 ml 1% buffered with sodium bicarbonate was administered for local anesthesia. Other medicines: None. CONTRAST: 50 mL Omnipaque 300 intra-arterial FLUOROSCOPY: 5 minutes. TECHNIQUES/FINDINGS: After review of the procedure, risks, benefits, alternatives and sedation, consent was obtained from the patient. A barbeau test of the left extremity was performed demonstrating dual circulation to the hand. The left wrist was prepped and draped in a sterile manner.  A patent left radial artery was identified with real-time ultrasound and an image recorded and entered into the patient's  medical record. Following administration of 2 mL 1% lidocaine the left radial artery was accessed under direct ultrasound visualization and a double wall technique. A 4 Citizen of [place] slender Terumo glidesheath was advanced into the radial artery. A mixture containing 100 mcg nitroglycerin, 2.5 mg verapamil and 3000 units of heparin was injected through the sheath into the artery. An angle tip Glidewire preloaded on a 155 cm 5 Citizen of [place] Karyna radial catheter was advanced into the abdominal aorta under fluoroscopic imaging allowing for selection of the celiac trunk. The guide catheter was advanced over the wire, through the sheath, and into the abdominal aorta. The superior mesenteric artery was selected and an angiogram was performed. The diagnostic angiogram showed no evidence of variant hepatic artery anatomy. Utilizing the guide catheter in the abdominal aorta, the celiac artery was selected and an angiogram was performed demonstrating no evidence of variant hepatic arterial anatomy. A  renegade  microcatheter with a microwire was advanced into the common hepatic artery and a selective injection was performed demonstrating no evidence of variant hepatic arterial anatomy. The right gastric artery was cannulated and a selective angiogram was performed. The right gastric artery was embolized utilizing  a 3 x 3.3 mm Jayleen microcoil. Post intervention angiogram demonstrated near cessation of flow in the right gastric artery. The gastroduodenal artery was cannulated and a selective angiogram was performed. The gastroduodenal artery was embolized with: Cook Jayleen Coil 14mm x 6cm ?Ref # [id number] Lot # [id number] ? [place] [employer] Interlock 6mm x 20cm Ref # [id number] # [id number] ? ? [place] [employer] Interlock 2mm x 6mm x 8cm Ref # [id number] # [id number] Post intervention images confirmed cessation of flow in the. The microcatheter was positioned in the right hepatic artery and 4.4 mCi technetium 99m labeled MAA was  injected slowly. The microcatheter was removed from the guide catheter and a final angiogram was performed through the guide catheter demonstrating minimal residual flow through the gastroduodenal artery and right gastric artery. The guide catheter was removed from the patient through the vascular sheath. Upon completion of the procedure, the arterial sheath was, removed and hemostasis was achieved with the aid of a TR band device. The patient transferred to the post procedure unit in good condition. The procedure was performed by Dr. Recinos. COMPLICATIONS: No immediate complications. ESTIMATED BLOOD LOSS: 10 mL. SPECIMEN/DISPOSTION: None.     1.  Superior mesenteric arteriogram no evidence of variant hepatic artery anatomy. 2.  Celiac arteriogram. 3.  Common hepatic arteriogram demonstrating no evidence of variant hepatic arterial anatomy. 4.  Selective right gastric arteriogram demonstrating no evidence of variant hepatic arterial anatomy. 5.  Selective right gastric microcoil embolization. 6.  Selective gastroduodenal arteriogram demonstrating no evidence of variant hepatic arterial anatomy. 7.  Selective gastroduodenal microcoil embolization. 8.  Proper hepatic arteriogram. 9.  Intra-arterial administration of 4.4 mCi technetium 99m labeled MAA into the right hepatic artery demonstrated a hepatic pulmonary shunt fraction of 5.2%.    Ir-low Level Consult-outpt    Result Date: 10/22/2018  This exam has been resulted under the NOTES tab, and the signed report has been auto faxed to the ordering physician on the date/time of that signature.      Micro:  Results     Procedure Component Value Units Date/Time    CULTURE WOUND W/ GRAM STAIN [[id number]] Collected:  10/27/18 0935    Order Status:  Completed Specimen:  Wound Updated:  10/29/18 0736     Significant Indicator NEG     Source WND     Site Implanted Port Removal     Culture Result Wound No growth at 48 hours.     Gram Stain Result Rare WBCs.  No organisms  "seen.      BLOOD CULTURE [[id number]] Collected:  10/27/18 0242    Order Status:  Completed Specimen:  Blood from Peripheral Updated:  10/28/18 0926     Significant Indicator NEG     Source BLD     Site PERIPHERAL     Blood Culture No Growth    Note: Blood cultures are incubated for 5 days and  are monitored continuously.Positive blood cultures  are called to the RN and reported as soon as  they are identified.      Narrative:       Droplet,Contact  Per Hospital Policy: Only change Specimen Src: to \"Line\" if  specified by physician order.    BLOOD CULTURE [[id number]] Collected:  10/27/18 0242    Order Status:  Completed Specimen:  Blood from Peripheral Updated:  10/28/18 0926     Significant Indicator NEG     Source BLD     Site PERIPHERAL     Blood Culture No Growth    Note: Blood cultures are incubated for 5 days and  are monitored continuously.Positive blood cultures  are called to the RN and reported as soon as  they are identified.      Narrative:       Droplet,Contact  Per Hospital Policy: Only change Specimen Src: to \"Line\" if  specified by physician order.    GRAM STAIN [[id number]] Collected:  10/27/18 0935    Order Status:  Completed Specimen:  Wound Updated:  10/27/18 1624     Significant Indicator .     Source WND     Site Implanted Port Removal     Gram Stain Result Rare WBCs.  No organisms seen.      Urine culture [[id number]] Collected:  10/25/18 1411    Order Status:  Completed Specimen:  Urine from Urine, Clean Catch Updated:  10/27/18 0949     Significant Indicator NEG     Source UR     Site URINE, CLEAN CATCH     Urine Culture Mixed skin dunia ,000 cfu/mL    Narrative:       Indication for culture:->Emergency Room Patient    URINE CULTURE(NEW) [[id number]] Collected:  10/25/18 1250    Order Status:  Completed Specimen:  Urine Updated:  10/27/18 0948     Significant Indicator NEG     Source UR     Site --     Urine Culture Mixed skin dunia 10-50,000 cfu/mL    Narrative:       Indication for " "culture:->Emergency Room Patient    BLOOD CULTURE [[id number]]  (Abnormal) Collected:  10/25/18 1240    Order Status:  Completed Specimen:  Blood from Peripheral Updated:  10/27/18 0842     Significant Indicator POS (POS)     Source BLD     Site PERIPHERAL     Blood Culture Growth detected by Bactec instrument. 10/25/2018  22:38 (A)      Escherichia coli  See previous culture for sensitivity report.   (A)    Narrative:       CALL  Flores  61 tel. [phone],  CALLED  61 tel. [phone] 10/25/2018, 22:37, RB PERF. RESULTS CALLED TO: RN  [id number]  Per Hospital Policy: Only change Specimen Src: to \"Line\" if  specified by physician order.    BLOOD CULTURE [[id number]]  (Abnormal)  (Susceptibility) Collected:  10/25/18 1214    Order Status:  Completed Specimen:  Blood from Peripheral Updated:  10/27/18 0841     Significant Indicator POS (POS)     Source BLD     Site PERIPHERAL     Blood Culture Growth detected by Bactec instrument. 10/25/2018  22:52 (A)      Escherichia coli (A)    Narrative:       CALL  Flores  61 tel. [phone],  CALLED  61 tel. [phone] 10/25/2018, 22:55, RB PERF. RESULTS CALLED TO:  RN  [id number]  Per Hospital Policy: Only change Specimen Src: to \"Line\" if  specified by physician order.    Culture & Susceptibility     ESCHERICHIA COLI     Antibiotic Sensitivity Microscan Unit Status    Ampicillin Resistant >16 mcg/mL Final    Method: SENSITIVITY, SERGE    Cefepime Sensitive <=8 mcg/mL Final    Method: SENSITIVITY, SERGE    Cefotaxime Sensitive <=2 mcg/mL Final    Method: SENSITIVITY, SERGE    Cefotetan Sensitive <=16 mcg/mL Final    Method: SENSITIVITY, SERGE    Ceftazidime Sensitive <=1 mcg/mL Final    Method: SENSITIVITY, SERGE    Ceftriaxone Sensitive <=8 mcg/mL Final    Method: SENSITIVITY, SERGE    Cefuroxime Sensitive <=4 mcg/mL Final    Method: SENSITIVITY, SERGE    Ciprofloxacin Resistant >2 mcg/mL Final    Method: SENSITIVITY, SERGE    Ertapenem Sensitive <=1 mcg/mL Final    Method: SENSITIVITY, SERGE    Gentamicin " Sensitive <=4 mcg/mL Final    Method: SENSITIVITY, SERGE    Pip/Tazobactam Sensitive <=16 mcg/mL Final    Method: SENSITIVITY, SERGE    Tigecycline Sensitive <=2 mcg/mL Final    Method: SENSITIVITY, SERGE    Tobramycin Sensitive <=4 mcg/mL Final    Method: SENSITIVITY, SERGE    Trimeth/Sulfa Resistant >2/38 mcg/mL Final    Method: SENSITIVITY, SERGE                       INFLUENZA A/B BY PCR [[id number]] Collected:  10/25/18 1250    Order Status:  Completed Specimen:  Urine from Nasopharyngeal Updated:  10/25/18 2030     Influenza virus A RNA Negative     Influenza virus B, PCR Negative    Narrative:       Indication for culture:->Emergency Room Patient    URINALYSIS [[id number]]  (Abnormal) Collected:  10/25/18 1250    Order Status:  Completed Specimen:  Urine Updated:  10/25/18 1523     Color DK Yellow     Character Cloudy (A)     Specific Gravity 1.029     Ph 5.5     Glucose Negative mg/dL      Ketones Trace (A) mg/dL      Protein 100 (A) mg/dL      Bilirubin Small (A)     Urobilinogen, Urine 1.0     Nitrite Negative     Leukocyte Esterase Trace (A)     Occult Blood Moderate (A)     Micro Urine Req Microscopic    Narrative:       Indication for culture:->Emergency Room Patient    Culture Respiratory W/ GRM STN [[id number]] Collected:  10/25/18 1250    Order Status:  Canceled Specimen:  Sputum from Sputum Updated:  10/25/18 1305    Urinalysis [[id number]]     Order Status:  Canceled Specimen:  Urine from Urine, Clean Catch     BLOOD CULTURE [[id number]] Collected:  10/25/18 0000    Order Status:  Canceled Specimen:  Other from Peripheral     BLOOD CULTURE [[id number]] Collected:  10/25/18 0000    Order Status:  Canceled Specimen:  Other from Line           Assessment:  Active Hospital Problems    Diagnosis   • *Sepsis (HCC) [A41.9]   • 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 Difficile  On vancomycin per study protocol    Dispo: [facility]    OK to dc patient once abx arranged    Discussed with internal medicine/Dr. Hurt and Aidan, social work

## 2018-10-29 NOTE — PROGRESS NOTES
"A&Ox4, pleasant. Denies pain. R PIV assessed, patent, no s/s of infection/infiltration. Saline locked. POC discussed with pt including need to set up outpatient infusion center and Midline to be placed, all questions and concerns were addressed. Up self, tolerates well. VSS  Blood pressure 128/65, pulse 70, temperature 36.5 °C (97.7 °F), resp. rate 18, height 1.549 m (5' 1\"), weight 67 kg (147 lb 11.3 oz), SpO2 97 %, not currently breastfeeding.      "

## 2018-10-30 NOTE — PROGRESS NOTES
Patient on bed resting.  Assessment completed. On room air, tolerating well. Denies any pain and discomfort at this time. Mid line in place on GRADY as endorsed, dressing CDI. Patient educated regarding plan of care. Able to ambulate self with steady gait.    Bed locked, in lowest position, treaded socks on. Needs attended. No further needs at this time. Communication board updated. Call light and personal belongings within reach.

## 2018-10-30 NOTE — DISCHARGE INSTRUCTIONS
"Discharge Instructions    Discharged to home by car with relative. Discharged via wheelchair, hospital escort: Yes.  Special equipment needed: Not Applicable    Be sure to schedule a follow-up appointment with your primary care doctor or any specialists as instructed.     Discharge Plan:   Influenza Vaccine Indication: Indicated: 65 years and older  Influenza Vaccine Given - only chart on this line when given: Influenza Vaccine Given (See MAR)    I understand that a diet low in cholesterol, fat, and sodium is recommended for good health. Unless I have been given specific instructions below for another diet, I accept this instruction as my diet prescription.   Other diet: regular    Special Instructions: None    · Is patient discharged on Warfarin / Coumadin?   No     How to Care for your Power Midline   Do not take a bath, swim, or use hot tubs when you have a Power Midline. Cover Power Midline line with clear plastic wrap and tape to keep it dry while showering.   Check the Power Midline insertion site daily for leakage, redness, swelling, or pain.   Flush the Power Midline as directed by your health care provider. Let your health care provider know right away if the Power Midline is difficult to flush or does not flush. Do not use force to flush the Power Midline.   Do not use a syringe that is less than 10 mL to flush the Power Midline.   Avoid blood pressure checks on the arm with the Power Midline.   Do not take the Power Midline out yourself. Only a trained clinical professional should remove the Power Midline.   Make sure you or anyone who access your Power Midline Line washes their hands before using the line.   Make sure the hub of the line is \"scrubbed\" prior to using the line.     Dressing Changes   Change the Power Midline dressing as instructed by your health care provider.   Change your Power Midline dressing if it becomes loose or wet.     When to seek medical attention   Power Midline is accidentally " "pulled all the way out.   There is any type of drainage, redness, or swelling where the Power Midline enters the skin.   Noticeable increase in arm circumference due to swelling of arm.   You cannot flush the Power Midline, it is difficult to flush, or the Power Midline leaks around the insertion site when it is flushed.   You hear a \"flushing\" sound when the Power Midline is flushed.   You notice a hole or tear in the Power Midline.   You develop chills or a fever.      Bacteremia  Introduction  Bacteremia is the presence of bacteria in the blood. A small amount of bacteria may not cause any symptoms.  Sometimes, the bacteria spread and cause infection in other parts of the body, such as the heart, joints, bones, or brain. Having a great amount of bacteria can cause a serious, sometimes life-threatening infection called sepsis.  What are the causes?  This condition is caused by bacteria that get into the blood. Bacteria can enter the blood:  · During a dental or medical procedure.  · After you brush your teeth so hard that the gums bleed.  · Through a scrape or cut on your skin.  More severe types of bacteremia can be caused by:  · A bacterial infection, such as pneumonia, that spreads to the blood.  · Using a dirty needle.  What increases the risk?  This condition is more likely to develop in:  · Children and elderly adults.  · People who have a long-lasting (chronic) disease or medical condition.  · People who have an artificial joint or heart valve.  · People who have heart valve disease.  · People who have a tube, such as a catheter or IV tube, that has been inserted for a medical treatment.  · People who have a weak body defense system (immune system).  · People who use IV drugs.  What are the signs or symptoms?  Usually, this condition does not cause symptoms when it is mild. When it is more serious, it may cause:  · Fever.  · Chills.  · Racing heart.  · Shortness of " breath.  · Dizziness.  · Weakness.  · Confusion.  · Nausea or vomiting.  · Diarrhea.  Bacteremia that has spread to other parts of the body may cause symptoms in those areas.  How is this diagnosed?  This condition may be diagnosed with a physical exam and tests, such as:  · A complete blood count (CBC). This test looks for signs of infection.  · Blood cultures. These look for bacteria in your blood.  · Tests of any IV tubes. These look for a source of infection.  · Urine tests.  · Imaging tests, such as an X-ray, CT scan, MRI, or heart ultrasound.  How is this treated?  If the condition is mild, treatment is usually not needed. Usually, the body’s immune system will remove the bacteria. If the condition is more serious, it may be treated with:  · Antibiotic medicines through an IV tube. These may be given for about 2 weeks. At first, the antibiotic that is given may kill most types of blood bacteria. If your test results show that a certain kind of bacteria is causing problems, the antibiotic may be changed to kill only the bacteria that are causing problems.  · Antibiotics taken by mouth.  · Removing any catheter or IV tube that is a source of infection.  · Blood pressure and breathing support, if needed.  · Surgery to control the source or spread of infection, if needed.  Follow these instructions at home:  · Take over-the-counter and prescription medicines only as told by your health care provider.  · If you were prescribed an antibiotic, take it as told by your health care provider. Do not stop taking the antibiotic even if you start to feel better.  · Rest at home until your condition is under control.  · Drink enough fluid to keep your urine clear or pale yellow.  · Keep all follow-up visits as told by your health care provider. This is important.  How is this prevented?  Take these actions to help prevent future episodes of bacteremia:  · Get all vaccinations as recommended by your health care  provider.  · Clean and cover scrapes or cuts.  · Bathe regularly.  · Wash your hands often.  · Before any dental or surgical procedure, ask your health care provider if you should take an antibiotic.  Contact a health care provider if:  · Your symptoms get worse.  · You continue to have symptoms after treatment.  · You develop new symptoms after treatment.  Get help right away if:  · You have chest pain or trouble breathing.  · You develop confusion, dizziness, or weakness.  · You develop pale skin.  This information is not intended to replace advice given to you by your health care provider. Make sure you discuss any questions you have with your health care provider.  Document Released: 10/01/2007 Document Revised: 07/07/2017 Document Reviewed: 02/20/2016 © 2017 Elsevier        Midline Catheter  A midline catheter is a thin, flexible tube that is inserted into a vein in the upper arm or at the bend in the elbow. Its tip ends at or near the armpit (axillary) area. A midline catheter is a type of intravenous (IV) access.   WHY ARE MIDLINE CATHETERS USED?  A midline catheter is used to give IV fluids, blood products, and medicines. One type of midline catheter may also be used to inject a contrast medium for a CT scan (power injection). Advantages of a midline catheter are:  · It can be used if a patient needs IV access for more than 5-7 days.  · It can stay in place for up to 1-4 weeks.  · You will not need to be stuck multiple times for IV restarts.  · The risk of vein inflammation (phlebitis) is lower than with a short peripheral catheter.  MIDLINE CATHETER COMPLICATIONS  · A clot can form in the midline catheter or at its tip.  · Phlebitis. The vein becomes warm, swollen, and tender. A red streak along the vein may develop where the midline catheter is.  · Leakage (infiltration) of IV fluids or medicine into the surrounding tissue of the vein. This can cause swelling, pain, and tissue injury in the arm with the  midline catheter.  · Infection.  · Nerve or tendon injury or irritation during midline catheter insertion.  CARING FOR YOUR MIDLINE CATHETER  · Wash your hands before and after caring for and using your midline catheter.  · Scrub the cap of your midline catheter with a new alcohol prep for 14 seconds. Allow it to completely dry each time you connect the syringe or tubing to your midline.  · Do not get the midline catheter dressing wet. Your caregiver can wrap the midline catheter if you want to take a shower.  · Do not pull on the midline catheter or tubing. This can dislodge the midline catheter from the vein. If the midline catheter is dislodged, the IV fluids or medication you are getting can leak into the surrounding tissue.  · Do not allow your blood pressure to be taken in the arm with the midline catheter.  · Do not allow your arm with the midline catheter to be used for other IV sticks.  · Blood draws from the midline catheter should be limited or avoided.  TELL YOUR HEALTH CARE PROVIDER RIGHT AWAY IF:  · The dressing is loose and the midline catheter insertion site is exposed.  · There is drainage, redness, swelling, discomfort, or warmth in the arm with the midline catheter.  · The midline catheter is partially or completely pulled out.  · You are unable to flush your midline catheter.  · Your catheter is broken or leaking.  · You have chills or fever.  This information is not intended to replace advice given to you by your health care provider. Make sure you discuss any questions you have with your health care provider.  Document Released: 05/21/2012 Document Revised: 10/08/2014 Document Reviewed: 05/27/2014  [employer] Interactive Patient Education © 2017 [employer]      Depression / Suicide Risk    As you are discharged from this [facility] facility, it is important to learn how to keep safe from harming yourself.    Recognize the warning signs:  · Abrupt changes in personality, positive or  negative- including increase in energy   · Giving away possessions  · Change in eating patterns- significant weight changes-  positive or negative  · Change in sleeping patterns- unable to sleep or sleeping all the time   · Unwillingness or inability to communicate  · Depression  · Unusual sadness, discouragement and loneliness  · Talk of wanting to die  · Neglect of personal appearance   · Rebelliousness- reckless behavior  · Withdrawal from people/activities they love  · Confusion- inability to concentrate     If you or a loved one observes any of these behaviors or has concerns about self-harm, here's what you can do:  · Talk about it- your feelings and reasons for harming yourself  · Remove any means that you might use to hurt yourself (examples: pills, rope, extension cords, firearm)  · Get professional help from the community (Mental Health, Substance Abuse, psychological counseling)  · Do not be alone:Call your Safe Contact- someone whom you trust who will be there for you.  · Call your local CRISIS HOTLINE 906-0771 or 281-859-6497  · Call your local Children's Mobile Crisis Response Team [place] (679) 648-4001 or www.[place]  · Call the toll free National Suicide Prevention Hotlines   · National Suicide Prevention Lifeline 031-732-THPG (9232)  · National Hope Line Network 800-SUICIDE (024-9302)

## 2018-10-30 NOTE — CARE PLAN
Problem: Safety  Goal: Will remain free from injury  Outcome: PROGRESSING AS EXPECTED  Safety precautions in place. Non skid socks on. Able to ambulate self with steady gait. Call light within reach. Bi hourly rounding in place.    Problem: Discharge Barriers/Planning  Goal: Patient's continuum of care needs will be met  Outcome: PROGRESSING AS EXPECTED  Patient to discharge tomorrow as endorsed. Patient aware.

## 2018-11-01 NOTE — PROGRESS NOTES
Mrs Poole returns for IVABX. Rocephin infused as ordered. Patient very unsteady, strongly recommended to use walker / cane. Elin tolerated well and without incident. PICC line in good condition and has positive blood return. PICC line flushed with saline per protocol. Elin DC'd home with son, via wheelchair in good condition. Returns daily.

## 2018-11-01 NOTE — PROGRESS NOTES
RADIATION ONCOLOGY FOLLOW-UP    DATE OF SERVICE: 10/22/18    IDENTIFICATION:   73-year-old female with metastatic ampullary pancreatic carcinoma BRCA mutated    HISTORY OF PRESENT ILLNESS:   Patient originally presented back in July 2016 with jaundice and itchy skin and underwent ERCP with stent and biopsy which showed adenocarcinoma primary ampulla.  Patient underwent attempted Whipple in August 2016 by Dr. Jacobs but surgery was aborted secondary to multiple liver lesions with wedge resection of liver which showed poorly differentiated adenocarcinoma.  PET/CT at that time showed uptake in multiple hepatic lesions and uptake in the ampulla as well as uptake in the bilateral hilar area and small pulmonary nodules in the right middle lobe without uptake.  She was treated with gemcitabine and Abraxane for 3 cycles with excellent response to therapy she had interval CT scan in March 2017 which showed previous foci in the liver are no longer seen and no residual or recurrent mass in the surgical bed.  She developed progression of disease in November 2017 is restarted on gemcitabine and Abraxane with good tolerance and interval scan in February 2018 showed increased size of mass in the periampullary region measuring 4 x 4.4 cm and slight increase in the aortocaval adenopathy.  She was started on 5-FU and irinotecan and she tolerated the first dose although with the second dose she developed diarrhea and fatigue.  She underwent CT scan March 27, 2018 which showed good response to pancreatic head mass down to 2.5 cm from 4.4 cm for as well as decrease in the retroperitoneal lymph nodes.  She is continued on 6 cycles of 5-FU and liposomal irinotecan with interval PET scan 7/12/18 showing focal but slightly ill-defined uptake in the posterior right lobe of the liver which is suspicious for hepatic metastasis although no focal mass is seen on the current CT images.  Focal uptake in the duodenum and the ampulla bladder  which is nonspecific and could represent residual tumor or physiologic uptake.  There are normal-sized peripancreatic lymph nodes one demonstrating minimal uptake of doubtful clinical significance.  She had MRI abdomen on August 6, 2018 which showed periampullary diffusion restriction likely represents residual ampullary tumor.  Additionally she has persistent arterial enhancement in the right hepatic lobe consistent with no metastatic disease.  She was discussed in our GI tumor board with consensus to proceed with Y 90 treatment to the liver followed by chemoradiation to the ampullary lesion and peripancreatic lymph nodes. Patient underwent Y90 10/9/18. Patient was admitted for abdominal pain and was found to have gram negative sepsis due to port infection. Patient had interval CT abdomen inpatient which showed enlargement of peripancreatic lymph node.         CURRENT MEDICATIONS:  No current facility-administered medications for this encounter.      Current Outpatient Prescriptions   Medication Sig Dispense Refill   • acetaminophen (TYLENOL) 325 MG Tab Take 2 Tabs by mouth every 6 hours as needed (Mild Pain; (Pain scale 1-3); Temp greater than 100.5 F). 30 Tab 0   • NS SOLN 100 mL with cefTRIAXone 2 GM SOLR 2 g 2 g by Intravenous route every 24 hours. 1 Ampule 0   • cyclosporin (RESTASIS) 0.05 % ophthalmic emulsion Place 1 Drop in both eyes 2 times a day.     • Vancomycin HCl (VANCOMYCIN 50 MG/ML) 50 mg/mL Solution Take 125 mg by mouth every 6 hours.     • omeprazole (PRILOSEC) 20 MG delayed-release capsule TAKE 1 CAPSULE BY MOUTH EVERYDAY 30 Cap 3   • ELIQUIS 5 MG Tab TAKE 1 TAB BY MOUTH 2 TIMES A DAY. 60 Tab 5   • potassium chloride SA (K-DUR) 10 MEQ Tab CR Take 2 Tabs by mouth 2 times a day. 180 Tab 3   • losartan-hydrochlorothiazide (HYZAAR) 50-12.5 MG per tablet Take 1 Tab by mouth every day. 30 Tab 0       ALLERGIES:  Tape    PHYSICAL EXAM:   /70   Pulse 75   Temp 36.2 °C (97.2 °F)   Resp 20   Ht  "1.549 m (5' 1\")   Wt 67 kg (147 lb 11.3 oz)   SpO2 96%   Breastfeeding? No   BMI 27.91 kg/m²     GENERAL: No apparent distress.  HEENT:  Pupils are equal, round, and reactive to light.  Extraocular muscles   are intact. Sclerae nonicteric.  Conjunctivae pink.  Oral cavity, tongue   protrudes midline.   NECK:  Supple without evidence of thyromegaly.  NODES:  No peripheral adenopathy of the neck, supraclavicular fossabilaterally.  LUNGS:  Good effort  HEART:  Regular rate  ABDOMEN:  NT/ND  EXTREMITIES:  Without Edema.  NEUROLOGIC:  Cranial nerves II through XII were intact. Normal stance and gait motor and sensory grossly within normal limits        LABORATORY DATA:   Lab Results   Component Value Date/Time    SODIUM 135 10/28/2018 05:34 AM    POTASSIUM 3.5 (L) 10/28/2018 05:34 AM    CHLORIDE 109 10/28/2018 05:34 AM    CO2 18 (L) 10/28/2018 05:34 AM    GLUCOSE 91 10/28/2018 05:34 AM    BUN 8 10/28/2018 05:34 AM    CREATININE 0.56 10/28/2018 05:34 AM     Lab Results   Component Value Date/Time    ALKPHOSPHAT 69 10/28/2018 05:34 AM    ASTSGOT 25 10/28/2018 05:34 AM    ALTSGPT 27 10/28/2018 05:34 AM    TBILIRUBIN 0.4 10/28/2018 05:34 AM      Lab Results   Component Value Date/Time    WBC 8.9 10/28/2018 05:34 AM    RBC 3.54 (L) 10/28/2018 05:34 AM    HEMOGLOBIN 10.5 (L) 10/28/2018 05:34 AM    HEMATOCRIT 30.2 (L) 10/28/2018 05:34 AM    MCV 85.3 10/28/2018 05:34 AM    MCH 29.7 10/28/2018 05:34 AM    MCHC 34.8 10/28/2018 05:34 AM    MPV 11.3 10/28/2018 05:34 AM    NEUTSPOLYS 90.30 (H) 10/28/2018 05:34 AM    LYMPHOCYTES 6.20 (L) 10/28/2018 05:34 AM    MONOCYTES 3.50 10/28/2018 05:34 AM    EOSINOPHILS 0.00 10/28/2018 05:34 AM    BASOPHILS 0.00 10/28/2018 05:34 AM    ANISOCYTOSIS 1+ 10/28/2018 05:34 AM        RADIOLOGY DATA:  Ct-abdomen-pelvis With    Result Date: 10/25/2018  10/25/2018 4:28 PM HISTORY/REASON FOR EXAM:  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 Patient is status post " embolization.. TECHNIQUE/EXAM DESCRIPTION:   CT scan of the abdomen and pelvis with contrast. Contrast-enhanced helical scanning was obtained from the diaphragmatic domes through the pubic symphysis following the bolus administration of nonionic contrast without complication. 80 mL of Omnipaque 350 nonionic contrast was administered without complication. Low dose optimization technique was utilized for this CT exam including automated exposure control and adjustment of the mA and/or kV according to patient size. COMPARISON: PET/CT 7/12/2018, MRI 8/6/2018 and CT abdomen 3/27/2018 FINDINGS: The visualized lung bases are unremarkable. CT Abdomen: There are several hypodense lesions in the hepatic dome with a couple of foci of air. A hypodense mass within air-fluid level in the right hepatic lobe, segment 8, measures approximately 2.1 cm. No significant rim enhancement is identified. There are new  hypodense lesions in the anterior right hepatic lobe measuring up to approximately 7 mm. There is pneumobilia, consistent with prior hepaticojejunostomy. The gallbladder has been removed. The common bile duct remains dilated with thickening of the duodenal wall and enhancement. Maximum diameter of the common bile duct is approximately 14 mm. There are multiple coils in the region of the pancreatic head. The spleen and adrenal glands are unremarkable. The kidneys enhance symmetrically. Hypodense renal masses are consistent with cysts. There is heterogeneous enhancement of the pancreatic head with an ill-defined cystic mass. The mass is difficult to measure, but is approximately 4.6 x 4 cm. The duct does not appear dilated more distally. There are multiple diverticula in the descending and sigmoid colon. There are scattered arterial calcifications. Enlarged aortocaval lymph node on image 37 measures approximately 15 mm short axis, previously 8 mm. CT Pelvis: The bladder is unremarkable. No significant free fluid is identified.  A calcified mass in the uterus is consistent with a small leiomyoma.     1.  Ill-defined cystic mass in the pancreatic head with surrounding inflammatory changes. Findings may be related to pancreatitis in the appropriate clinical setting. Findings are new from the prior exam in August. 2.  New hypodense lesions scattered throughout the liver was foci of air. Findings are highly likely to be related to recent embolization. No rim enhancement to suggest abscess. 3.  Duodenal wall thickening with enhancement, concerning for residual disease. Persistent common bile duct dilatation. 4.  Interval enlargement in an aortocaval lymph node, highly suspicious for metastatic disease. 5.  Diverticulosis. 6.  Status post hepaticojejunostomy.    Dx-chest-portable (1 View)    Result Date: 10/25/2018  10/25/2018 11:35 AM HISTORY/REASON FOR EXAM:  LEFT shoulder pain, malaise, nausea. TECHNIQUE/EXAM DESCRIPTION AND NUMBER OF VIEWS: Single portable view of the chest. COMPARISON: 5/3/2018 FINDINGS: Cardiomediastinal contour is within normal limits. Lungs show hypoinflation. No focal pulmonary consolidation. No pleural fluid collection or pneumothorax. RIGHT chest infusion port again present. No major bony abnormality is seen. Atherosclerotic calcification of thoracic aorta.     Hypoinflation without other evidence for acute cardiopulmonary disease.    [id number]    Result Date: 10/27/2018  10/27/2018 5:00 PM HISTORY/REASON FOR EXAM:  Jaw Pain. TECHNIQUE/EXAM DESCRIPTION AND NUMBER OF VIEWS:  1 view(s) of the mandible. COMPARISON:  None. FINDINGS: Multiple absent teeth. Many of the remaining teeth have filling/crowns. No periapical lucencies seen. No mandibular fracture.     No periapical lucencies. No mandibular fracture.    Ir-cvc Tunnel With Port Removal    Result Date: 10/28/2018   Chest port removal HISTORY/REASON FOR EXAM: Bacteremia, concern for central line infection MEDICATIONS: Conscious sedation with 2 mcg Fentanyl  and 50 mg Versed was administered during the procedure with appropriate continuous patient monitoring of cardiorespiratory function by the radiology nurse. Sedation duration: 30 minutes. TECHNIQUE/EXAM DESCRIPTION: Following informed consent patient was prepped and draped in the usual fashion. The procedure was performed using MAXIMAL STERILE BARRIER technique including sterile gown, mask, cap, and donning of sterile gloves following appropriate hand hygiene and/or  sterile scrub. Patient skin site was prepped with 2% Chlorhexidine solution. A timeout was performed. Local anesthetic result was achieved with administration of copious 1% lidocaine with epinephrine. A skin incision was made with an 15 blade scalpel. Blunt dissection was used to retrieve the port and catheter which were observed to be intact. Hemostasis  was obtained with manual compression. The port pocket showed no signs of gross infection. The port pocket was flushed with normal saline. The port pocket was closed with deep 2-0 Vicryl sutures and a subcutaneous 4-0 Vicryl suture layer, the skin was closed with Dermabond. The patient tolerated procedure well. The skin was cleaned and a dressing was applied. COMPARISON:  None     Successful chest port removal.    Nm-liver/spleen Scan    Result Date: 10/10/2018  AU BASIC DOSIMETRY Y-90 RADIOEMBOLIZATION THERAPY AND BREMSSTRAHLUNG IMAGING: After the radioembolization procedure the patient was transported to Nuclear Medicine. Bremsstrahlung images were obtained encompassing the thorax and abdomen, in anterior and posterior views, as well as SPECT/CT of the upper abdomen. TREATMENT PLANNING: The prior history was reviewed, and the local and systemic treatment options considered. Radiation treatment with microspheres was deemed the most appropriate therapy, to meet the goals of liver tolerance and delivery of sufficient radiation to the tumor  in the right liver. The treatment goal is palliative. BASIC  DOSIMETRY CALCULATION: The Y-90 microsphere dose was calculated using the patient's tumor burden as demonstrated by CT/MRI scan. The available imaging studies were reviewed. The right lobe represents 67% and the left lobe 33% with 10% tumor involvement of the liver. A shunt study from 10/1/2018 showed a hepatopulmonary shunt calculation of 5.2%. Based on patient parameters and the value of the pulmonary shunt calculation, the dose was not decreased. SPECIAL RADIATION TREATMENT: The patient has had previous systemic chemotherapy or has concurrent chemotherapy. A desired dose of 28.08 mCi and 1.04 GBq was calculated and documented on the medical directive. YTTRIUM-90 MICROSPHERES HANDLING AND PREPARATION: The microspheres were received and prepared in accordance with the 's specifications, and transported to the catheterization laboratory. The appropriate radiation safety, monitoring and disposal standards were observed. YTTRIUM-90 MICROSPHERES DOSE DISPENSED: 31 mCi Y-90 SIR-Spheres microspheres. Please see alternate report for a description of angiography, Sir-Sphere delivery and AU findings. FINDINGS: The delivered activity appears to be confined to the right lobe of the liver. No extrahepatic uptake is seen. No significant tracer uptake is identified within the bowels or spleen.     The prescribed dose was  1.04 gb ( 28.08 mCi). The drawn dose was 1.15 gb (31mCi). Delivered dose after residual was measured at 1.08 gb ( 29.14 mCi). This represents  104% of the prescribed dose and  94% of the drawn dose. Bremsstrahlung images obtained  after the Y-90 radioembolization, confirm proper delivery to the targeted region. There is no uptake outside the planned treatment area. NOTE: The patient will be followed by interventional radiology and oncology.    Ir-picc Line Placement    Result Date: 10/29/2018  HISTORY/REASON FOR EXAM:   PICC placement. TECHNIQUE/EXAM DESCRIPTION AND NUMBER OF VIEWS:   PICC line  insertion with ultrasound guidance.  The procedure was performed using maximal sterile barrier technique including sterile gown, mask, cap, and donning of sterile gloves following appropriate hand hygiene and/or sterile scrub. Patient skin site was prepped with 2% Chlorhexidine solution. FINDINGS:  PICC line insertion with Ultrasound Guidance was performed by qualified nursing staff without the assistance of a Radiologist. PICC positioning appropriateness confirmed by 3CG technology; chest xray only needed in the instance 3CG unable to confirm placement.              Ultrasound-guided PICC placement performed by qualified nursing staff as above.     Ir-embolization    Result Date: 10/10/2018  EXAM: YTTRIUM-90 INTRAHEPATIC EMBOLIZATION PROCEDURE DATE: 10/9/2018 CLINICAL HISTORY: 73-year-old female with metastatic ampullary adenocarcinoma to the right lobe of the liver. OPERATING INTERVENTIONALISTS: Mt Recinos M.D. CONSENT: After the risks, benefits and alternatives were discussed with the patient, including the likelihood of technical success, and all of the patient's questions were answered, written informed consent was obtained for both the procedure and for conscious sedation. SEDATION: Conscious sedation with 50 mcg Fentanyl and 2 mg Versed was administered during the procedure with appropriate continuous patient monitoring by the radiology nurse under the direct supervision of the attending physician. Sedation duration: 45 minutes CONTRAST: 30 cc Omnipaque 300 intra-arterial. FLUOROSCOPY IMAGES: 8 fluoroscopic images obtained. FLUOROSCOPY TIME: 3.4 minutes. PROCEDURES PERFORMED: 1. Access of the left radial artery under sonographic guidance.  CPT 71599 2. 3. Celiac arteriogram - CPT 3. Selection of the proper hepatic artery with arteriogram CPT code 97427/89643. 4. Selection of the right hepatic artery with arteriogram CPT code 62531/38870 5. Yttrium-90 SIR-Spheres radioembolization delivered via the right  hepatic artery CPT codes 97701 6. Post embolization arteriogram of the right hepatic artery. CPT code 73835. 7. Conscious sedation. 8. Fluoroscopic guidance for the above. TECHNIQUE: The patient was brought to the angiography suite and placed in the supine position. A barbeau test of the left extremity was performed demonstrating dual circulation to the hand. The left wrist was prepped and draped in a sterile manner.  A patent left radial artery was identified with real-time ultrasound and an image recorded and entered into the patient's medical record. Following administration of 2 mL 1% lidocaine the left radial artery was accessed under direct ultrasound visualization and a double wall technique. A 4 Upper sorbian slender Terumo glidesheath was advanced into the radial artery. A mixture containing 100 mcg nitroglycerin, 2.5 mg verapamil and 3000 units of heparin was injected through the sheath into the artery. An angle tip Glidewire preloaded on a 155 cm 5 Upper sorbian [employer] radial catheter was advanced into the abdominal aorta under fluoroscopic imaging allowing for selection of the celiac trunk. An angiogram was performed demonstrating no evidence of variant hepatic arterial anatomy. No flow is observed in the gastroduodenal artery or right gastric artery which were previously embolized utilizing microcoils. Through the guide catheter, a Renegade hi kj microcatheter with a fathom 0.016 wire was used to select the right hepatic artery arising from the proper hepatic artery. The Renegade hi kj microcatheter was advanced into the right hepatic artery. Angiography confirmed position of the catheter in the same location as on the planning procedure previously performed on 10/1/2018. The catheters were secured in position. At this point, the yttrium-90 calculated dose was brought to the angiography suite by the nuclear medicine department. The yttrium-90 SIR-Sphere delivery apparatus was then set up according to the  manufactures instructions. The yttrium-90 dose was deposited into an acrylic shielding container and a closed delivery system was prepared. Specifically, the dose vial was connected to two inlet tubings of sterile water and contrast sources as well as single outlet tubing for the yttrium-90 delivery. The  outlet tubing was connected to the RenReadbugde hi kj microcatheter. Then multiple small aliquots of the yttrium-90 microspheres were injected into the artery followed by D5W and contrast flushes. Angiography was performed intermittently to confirm antegrade flow assess the degree of embolization. The endpoint was reached when the total volume of yttrium-90 had been administered. Catheters were carefully removed from the patient and secured with full radiation safety protection protocols. The catheter and sheath were removed, and closure device was deployed in the right groin. The patient tolerated the procedure well. There were no immediate complications. The prescribed dose was  1.04 gb ( 28.08 mCi). The drawn dose was 1.15 gb (31mCi). Delivered dose after residual was measured at 1.08 gb ( 29.14 mCi). This represents  104% of the prescribed dose and  94% of the drawn dose. FINDINGS: As above. 1.  Technically successful Y90 radioembolization of the right hepatic lobe. The patient will followup in approximately 10 days for laboratory and clinical evaluation and 4-6 weeks for imaging evaluation. 2.  Immediately following the procedure, the patient was transported to nuclear medicine for planar imaging which demonstrates Bremsstrahlung emission from the right of the liver. There is no definite evidence of extrahepatic deposition of radioembolic material.    Ir-low Level Consult-outpt    Result Date: 10/22/2018  This exam has been resulted under the NOTES tab, and the signed report has been auto faxed to the ordering physician on the date/time of that signature.      IMPRESSION:    73-year-old female with metastatic  (liver only disease s/p Y90) ampullary pancreatic carcinoma BRCA mutated    RECOMMENDATIONS:   I reviewed her images which shows some progression in peripancreatic lymph node which is amenable to chemoradiation. I discussed with Dr. Cedeno and we will plan for chemoradiation to pancreas and lymph nodes. I have previously discussed this with her and will plan for her to return for CT simulation the first week of November with plan to start after she has completed her antibiotics for her port infection.    Thank you for the opportunity to participate in her care.  If any questions or comments, please do not hesitate in calling.

## 2018-11-01 NOTE — PROGRESS NOTES
Pt presented to infusion center for daily Rocephin. Pt reports no significant changes since yesterday although she did have some mild, intermittent nausea last night. Right arm midline in place, flushed and brisk blood return observed. Rocephin infused with no s/s of adverse reaction. Midline flushed, brisk blood return again observed, wrapped in gauze and protective sleeve. Has next appt, left via wheelchair with daughter in good spirits.

## 2018-11-02 NOTE — PROGRESS NOTES
Pt presented to infusion center for daily Rocephin. Pt reports no significant changes since yesterday. Right arm midline in place, flushed and brisk blood return observed. Clave changed per protocol. Rocephin infused with no s/s of adverse reaction. Midline flushed, brisk blood return again observed, wrapped in gauze and protective sleeve. Has next appt, left via wheelchair with son in good spirits.

## 2018-11-02 NOTE — TELEPHONE ENCOUNTER
Called and LM for pt to return my call to schedule a hospital follow up appointment.  Spoke with [facility] who will also notify pt to schedule appointment with Infectious Disease.  -AMP

## 2018-11-03 NOTE — PROGRESS NOTES
Pt in wc w/ daughter to [facility] for daily Rocephin infusion.  Pt w/ no s/s of infection, pt only complaint is nausea after her infusion, pt has tried eating and not eating prior to infusions and jhon remedies w/ no relief.  Pt presents w/ RUE midline catheter in place, flushes easily but has no blood return, no edema or erythema.  Rocephin infused w/ no adverse reaction.  RUE midline flushed per protocol, no blood return noted, wrapped in gauze and protective sleeve.  Pt left in wc in care of her daughter in NAD.  Confirmed pt's next appt.

## 2018-11-04 NOTE — PROGRESS NOTES
Pt is here for her scheduled IVABX. Midline in place (no blood return). States having nausea after infusions - Zofran, jhon, and peppermint tea in use with some relief. Nausea management discussed. Pt's blood pressure is low today - denies dizziness. Pt advised to monitor her blood pressure prior taking her BP medication. Infusion completed without an incident. Discharged home to self care. Returns daily.

## 2018-11-05 NOTE — PROGRESS NOTES
Pt presented to infusion center for daily Rocephin. Pt reports no significant changes since yesterday. Right arm midline in place, flushed and no blood return observed. Labs drawn from left AC as ordered.  Midline dressing changed using sterile technique per protocol. Rocephin infused with no s/s of adverse reaction. Midline flushed, wrapped in gauze and protective sleeve. Has next appt, left on foot in good spirits.

## 2018-11-06 NOTE — PROGRESS NOTES
Pt presented to infusion center for daily Rocephin. Pt reports no significant changes since yesterday. Right arm mid line in place, flushed and no blood return observed. Clave changed per protocol. Rocpehin infused with no s/s of adverse reaction. Mid line flushed, brisk blood return again observed, wrapped in gauze and protective sleeve. Has next appt, left via wheelchair in good spirits.

## 2018-11-07 NOTE — PROGRESS NOTES
Patient presents for IV antibiotics. Midline flushes well with no blood return. Rocephin infused as ordered, line flushed clear. Midline flushed per protocol and site secured. Patient returns tomorrow and released in no acute distress.

## 2018-11-07 NOTE — PROGRESS NOTES
[facility]    Subjective:     Chief Complaint   Patient presents with   • Hospital Follow-up     E. coli sepsis     This is my first time meeting Ms. Poole.  Accompanied by her son.    Interval History: 73 y.o. 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 Cedeno of oncology and DVT on chronic anticoagulation.  Hospitalized from 10/25-10/30/18, admitted for rigors and chills.  Stated that she underwent Y 90 on 10/9 and had chemotherapy approximately 6 weeks prior to admission.  In addition, she had her port in place for approximately 2 years.  It was flushed 1 week PTA as it had not been de-accessed.  Prior to her port being flushed, family had noted that she was doing well; however, after the port was flushed, she had noted increasing fatigue associated with new chills.  Had been experiencing chills for approximately 5 days PTA but had had no documented fevers.  The evening PTA however she developed significant rigors with a mild sore throat.  On presentation to the ER, she was febrile with a T-max of 102.8 without any leukocytosis.  Urinalysis revealed some pyuria but was negative for nitrite.  CT of her abdomen and pelvis revealed an ill-defined cystic mass in the pancreatic head with surrounding inflammatory changes which was new from prior exam in August 2018.  There were also some new hypodense lesions scattered throughout the liver but no rim enhancement to suggest any abscess.  In addition there was some duodenal wall thickening.  Blood cultures on 10/25 +E coli (Cipro and bactrim resistant), repeat cx on 10/27 negative.  She underwent port removal on 10/27, cx was negative.  Discharged home on IV Ceftriaxone 2g daily, end date 11/10/18.       Hospital records reviewed    Today, 11/7/2018: Patient reports feeling better since leaving the hospital.  Has been tolerating the IV ceftriaxone with minimal adverse effect.  Notes that she is  "nauseated and queasy, but denies any vomiting.  States that she is burping more than normal.  Has chills, but no fevers.  Denies feeling generally ill, general malaise, headache, diarrhea, chest pain or shortness of breath.  She states that she does have some abdominal pain, but this has been since she had a Y 90 treatment.  Denies any issues with the midline.  Is still on the p.o. vancomycin for the research study, denies any issues with this.  Old port site to right upper chest is healing nicely without any drainage, redness or pain.    ROS  As documented above in my HPI.    Past Medical History:   Diagnosis Date   • Ampullary carcinoma (HCC)    • Anemia    • Arthritis     hands/fingers/right knee   • Asthma    • Blood clotting disorder (HCC)     blood clot 2016   • Cancer (HCC) 2016    Ampullary CA   • Cataract 09-    bilat.,no surgery   • Dental disorder     upper partial   • Encounter for antineoplastic chemotherapy 4/6/2018   • Heart burn    • High cholesterol    • Hypertension    • Indigestion    • Jaundice 2016       Social History   Substance Use Topics   • Smoking status: Never Smoker   • Smokeless tobacco: Never Used   • Alcohol use No       Allergies: Tape    Pt's medication and problem list reviewed.     Objective:     PE:  /60   Pulse 95   Temp 36.7 °C (98 °F) (Temporal)   Ht 1.53 m (5' 0.24\")   Wt 62.1 kg (137 lb)   SpO2 97%   Breastfeeding? No   BMI 26.54 kg/m²     Vital signs reviewed    Constitutional: Appears well-developed and well-nourished. No acute distress.  Speech fluent.    Eyes: Conjunctivae normal and EOM are normal. Pupils are equal, round, and reactive to light.   Neck: Trachea midline. Normal range of motion. Neck supple.  No JVD.  Cardiovascular: Normal rate, regular rhythm, normal heart sounds. No murmur, gallop, or friction rub. No edema.  Respiratory/chest: No respiratory distress, unlabored respiratory effort.  Lungs clear to auscultation bilaterally. No " wheezes or rales.   Right upper chest, old port site-well approximated, no drainage, no redness, nontender.  Abdomen: Soft, non tender, non-distended. BS + x 4. No masses or hepatosplenomegaly.   Mid abdomen- well-healed, no breakdown, no redness.  Musculoskeletal: Wheelchair.  Normal range of motion.  No joint or bone tenderness, swelling, erythema or deformity.    RUE midline- CDI, non tender, no erythema.  Skin: Warm and dry. No visible rashes or lesions.  Neurological: No cranial nerve deficit. Coordination normal.    Psychiatric: Alert and oriented to person, place, and time. Normal mood, calm affect.  Normal behavior and judgment.     Labs:  WBC   Date/Time Value Ref Range Status   11/05/2018 07:15 AM 12.1 (H) 4.8 - 10.8 K/uL Final     RBC   Date/Time Value Ref Range Status   11/05/2018 07:15 AM 4.16 (L) 4.20 - 5.40 M/uL Final     Hemoglobin   Date/Time Value Ref Range Status   11/05/2018 07:15 AM 12.3 12.0 - 16.0 g/dL Final     Hematocrit   Date/Time Value Ref Range Status   11/05/2018 07:15 AM 36.8 (L) 37.0 - 47.0 % Final     MCV   Date/Time Value Ref Range Status   11/05/2018 07:15 AM 88.5 81.4 - 97.8 fL Final     MCH   Date/Time Value Ref Range Status   11/05/2018 07:15 AM 29.6 27.0 - 33.0 pg Final     MCHC   Date/Time Value Ref Range Status   11/05/2018 07:15 AM 33.4 (L) 33.6 - 35.0 g/dL Final     MPV   Date/Time Value Ref Range Status   11/05/2018 07:15 AM 9.8 9.0 - 12.9 fL Final        Sodium   Date/Time Value Ref Range Status   11/05/2018 07:15  (L) 135 - 145 mmol/L Final     Potassium   Date/Time Value Ref Range Status   11/05/2018 07:15 AM 4.3 3.6 - 5.5 mmol/L Final     Chloride   Date/Time Value Ref Range Status   11/05/2018 07:15  96 - 112 mmol/L Final     Co2   Date/Time Value Ref Range Status   11/05/2018 07:15 AM 19 (L) 20 - 33 mmol/L Final     Glucose   Date/Time Value Ref Range Status   11/05/2018 07:15  (H) 65 - 99 mg/dL Final     Bun   Date/Time Value Ref Range Status    11/05/2018 07:15 AM 8 8 - 22 mg/dL Final     Creatinine   Date/Time Value Ref Range Status   11/05/2018 07:15 AM 0.72 0.50 - 1.40 mg/dL Final       Alkaline Phosphatase   Date/Time Value Ref Range Status   11/05/2018 07:15  (H) 30 - 99 U/L Final     AST(SGOT)   Date/Time Value Ref Range Status   11/05/2018 07:15 AM 23 12 - 45 U/L Final     ALT(SGPT)   Date/Time Value Ref Range Status   11/05/2018 07:15 AM 16 2 - 50 U/L Final     Total Bilirubin   Date/Time Value Ref Range Status   11/05/2018 07:15 AM 0.7 0.1 - 1.5 mg/dL Final      Assessment and Plan:   The following treatment plan was discussed with patient at length:    1. E. coli sepsis (HCC)      Finish IV ceftriaxone on 11/10/18 if repeat lab work on 11/8 does not cause reason for extension.  DC midline after last infusion dose.  Monitor for s/sx of worsening off abx: increased redness, pain, swelling, drainage, breakdown of surgical/old port sites, fevers, chills, general malaise, etc.  Notify ID or go to ER should these s/sx occur.   2. Leukocytosis, unspecified type      Repeat CBC ordered for 11/8/18.   3. Hyponatremia      Repeat CMP ordered for 11/8/18.  If persistent, then recommend patient be evaluated by PCP.   4. Ampullary carcinoma (HCC)      Follow-up with oncologist as directed.   5. Research study patient      History of C diff, on PO Vanco per study protocol.     Follow up: PRN, RTC sooner if needed. FU with PCP for ongoing chronic medical conditions.     Beth Javier A.P.R.N.        Please note that this dictation was created using voice recognition software. I have  worked with technical experts from [employer] to optimize the interface.  I have made every reasonable attempt to correct obvious errors, but there may be errors of grammar and possibly content that I did not discover before finalizing the note.

## 2018-11-08 NOTE — PROGRESS NOTES
Pt arrived to [facility] via WC, son escorting, here for daily IV avx. Pt with R midline in place. Line flushed and patent, no blood return observed. Pt saw ID yesterday and per ID notes, repeat CBC and CMP today. 23 g butterfly used to draw labs, L hand, pressure dressing applied. Rocephin infused over 30 min per pt request. Pt yogi well. Line flushed clear. Midline flushed and site wrapped. Pt discharged home under care of daughter in no apparent distress. Pt knows to cont to return daily.

## 2018-11-09 NOTE — TELEPHONE ENCOUNTER
Left voicemail for patient to call our office back regarding follow up appointment scheduled with ELISE Nolen on 11/14/2018 at 10:00am. When patient calls back please review upcoming appointment.

## 2018-11-09 NOTE — PROGRESS NOTES
Pt presented to infusion center for daily Rocephin. Pt reports no significant changes since yesterday. Right arm mid line in place, flushed and no blood return observed. Clave changed per protocol.  Rocephin infused with no s/s of adverse reaction. Mid line flushed, brisk blood return again observed, wrapped in gauze and protective sleeve. Has next appt, left via wheelchair in good spirits.

## 2018-11-09 NOTE — NON-PROVIDER
"11/08/18 3337pm  pt arrived via WC accompanied by her son for CT sim for radiation oncology planning . pt has PICC - midline cath that will be utilized for her IV contrast. Line flushes without difficulty. Consent for IV contrast signed.  Pt son said he had alot of questions and asked if he and his mother could speak with Dr. Benavides. Dr. Benavides met with pt and son and after meeting pt and son reported  all questions were answered. In addition pt's son asked when they would have f/u with Dr. Cedeno to discuss \"chemo pills\" as they have been told that the plan was for concurrent chemo radiation. I called Meron Smith (Dr. Cedeno's nurse) and asked if she could talk with Dr. Cedeno regarding f/u appt and ordering meds. I did give Meron pts current ht and weight. KB  After CT sim pt and her son watched short IFC video on what to expect during tx and then was given verbal and written info regarding potential side effects of tx. Pt's PICC line was also flushed with 10cc NS. all questions answered. Pt left via WC accompanied by her son w/o c/o.Meghann AVENDANO  "

## 2018-11-10 NOTE — PROGRESS NOTES
Pt presented to infusion center for final Rocephin infusion with no new complaints.  Her nausea has improved.  Right arm midline in place, flushed with no blood return present.  Rocephin infused with no s/s of adverse reaction.  Midline flushed and removed; gauze and coban to site.  Pt stable at discharge; left via wheelchair with daughter.

## 2018-11-12 PROBLEM — R93.5 ABNORMAL CT OF THE ABDOMEN: Status: ACTIVE | Noted: 2018-01-01

## 2018-11-12 PROBLEM — R78.81 E COLI BACTEREMIA: Status: ACTIVE | Noted: 2018-01-01

## 2018-11-12 PROBLEM — B96.20 E COLI BACTEREMIA: Status: ACTIVE | Noted: 2018-01-01

## 2018-11-12 PROBLEM — E87.20 LACTIC ACIDOSIS: Status: ACTIVE | Noted: 2018-01-01

## 2018-11-12 NOTE — ED NOTES
Med rec completed per pt's family at bedside and [facility] pharmacy.   Antibiotics within last 30 days: Yes  Patient allergies have been reviewed: NKDA  Comments: Pt was on unknown daily dose of Vancomycin for C. Diff study.  Pt completed approximately 14 days on 11/10/18.

## 2018-11-12 NOTE — CARE PLAN
Problem: Infection  Goal: Will remain free from infection  Outcome: NOT MET      Problem: Fluid Volume:  Goal: Will maintain balanced intake and output  Outcome: PROGRESSING AS EXPECTED

## 2018-11-12 NOTE — ED NOTES
from Lab called with critical result of lactic acid 7.4  at 11:42. Critical lab result read back to .   Dr. Farnsworth notified of critical lab result at 1142.  Critical lab result read back by Dr. Farnsworth.

## 2018-11-12 NOTE — ED PROVIDER NOTES
ED Provider Note    Scribed for Jamin Farnsworth M.D. by Karyna Carroll. 11/12/2018  10:40 AM    Primary Care Provider: Wally Knox D.O.  Means of arrival: walk-in  History limited by: none    CHIEF COMPLAINT  Chief Complaint   Patient presents with   • Shaking     started 1 hr ago, recent hx of same and her port was removed       HPI  Elin Poole is a 73 y.o. Female with a history of metastatic ampullary carcinoma who presents to the ED complaining of generalized weakness and shaking sudden onset one hour prior to arrival in the ED with associated mild shortness of breath and intermittent cyanosis of the fingers. Patient is currently undergoing chemotherapy and radiation for metastatic ampullary cancer. She was recently discharged from the hospital 2 weeks ago after undergoing treatment for Bacteremia believed to be secondary to an infected chemotherapy port in her chest. This port was removed 2 weeks ago, replaced with a PICC line(removed 2 days ago), and she was started on a Rocephin and Vancomycin regimen inpatient that she continued outpatient. Last dose of Rocephin was 2 days ago, last oral liquid Vancomycin dose was yesterday. Patient was feeling improved and relatively normal up until this morning, when her symptoms suddenly began. Patient has been experiencing some discomfort in her right arm for the last few weeks, however, this is unchanged.  No complaints of fever, vision changes, sore throat, chest pain, abdominal pain, nausea, vomiting, diarrhea, rash, headache, unilateral numbness/weakness, urinary symptoms.    REVIEW OF SYSTEMS    CONSTITUTIONAL:  Positive generalized weakness, Denies fever  EYES:  Denies vision changes.   ENT:  Denies sore throat  CARDIOVASCULAR:  Denies chest pain  RESPIRATORY: positive shortness of breath with finger cyanosis  GI:  Denies abdominal pain, nausea, vomiting, or diarrhea.  : denies urinary symptoms   MUSCULOSKELETAL:  Positive generalized weakness  and shaking  SKIN:  No rash  NEUROLOGIC:  Denies headache, focal weakness, or numbness.    PAST MEDICAL HISTORY  Past Medical History:   Diagnosis Date   • Ampullary carcinoma (HCC)    • Anemia    • Arthritis     hands/fingers/right knee   • Asthma    • Blood clotting disorder (HCC)     blood clot 2016   • Cancer (HCC) 2016    Ampullary CA   • Cataract 09-    bilat.,no surgery   • Dental disorder     upper partial   • Encounter for antineoplastic chemotherapy 4/6/2018   • Heart burn    • High cholesterol    • Hypertension    • Indigestion    • Jaundice 2016       FAMILY HISTORY  Family History   Problem Relation Age of Onset   • Cancer Other         unknown cancer       SOCIAL HISTORY   reports that she has never smoked. She has never used smokeless tobacco. She reports that she does not drink alcohol or use drugs.    SURGICAL HISTORY  Past Surgical History:   Procedure Laterality Date   • CATH PLACEMENT Right 9/9/2016    Procedure: CATH PLACEMENT cephalic power port  ;  Surgeon: Kurtis Jacobs M.D.;  Location: SURGERY [facility];  Service:    • WHIPPLE PROCEDURE  8/15/2016    Procedure: Exploratoy Laparotomy, Da-en-y;  Surgeon: Kurtis Jacobs M.D.;  Location: SURGERY [facility];  Service:    • NODE DISSECTION  8/15/2016    Procedure: omental flap, Liver Wedge, cholecystectomy ;  Surgeon: Kurtis Jacobs M.D.;  Location: SURGERY [facility];  Service:    • STENT PLACEMENT  7/2016    gallbladder   • CATARACT EXTRACTION WITH IOL     • CHOLECYSTECTOMY     • TONSILLECTOMY         CURRENT MEDICATIONS  No current facility-administered medications for this encounter.     Current Outpatient Prescriptions:   •  capecitabine (XELODA) 500 MG tablet, Take 2 tabs by mouth twice daily M-F with Radiation therapy, Disp: 120 Tab, Rfl: 0  •  capecitabine (XELODA) 150 MG tablet, Take 1 tab by mouth twice daily M-F with Radiation therapy, Disp: 60 Tab, Rfl: 0  •  Vancomycin HCl  (VANCOMYCIN 50 MG/ML) 50 mg/mL Solution, Take 125 mg by mouth every 6 hours., Disp: , Rfl:   •  acetaminophen (TYLENOL) 325 MG Tab, Take 2 Tabs by mouth every 6 hours as needed (Mild Pain; (Pain scale 1-3); Temp greater than 100.5 F)., Disp: 30 Tab, Rfl: 0  •  NS SOLN 100 mL with cefTRIAXone 2 GM SOLR 2 g, 2 g by Intravenous route every 24 hours., Disp: 1 Ampule, Rfl: 0  •  cyclosporin (RESTASIS) 0.05 % ophthalmic emulsion, Place 1 Drop in both eyes 2 times a day., Disp: , Rfl:   •  omeprazole (PRILOSEC) 20 MG delayed-release capsule, TAKE 1 CAPSULE BY MOUTH EVERYDAY, Disp: 30 Cap, Rfl: 3  •  ELIQUIS 5 MG Tab, TAKE 1 TAB BY MOUTH 2 TIMES A DAY., Disp: 60 Tab, Rfl: 5  •  potassium chloride SA (K-DUR) 10 MEQ Tab CR, Take 2 Tabs by mouth 2 times a day., Disp: 180 Tab, Rfl: 3  •  losartan-hydrochlorothiazide (HYZAAR) 50-12.5 MG per tablet, Take 1 Tab by mouth every day., Disp: 30 Tab, Rfl: 0    ALLERGIES  Allergies   Allergen Reactions   • Tape        PHYSICAL EXAM  VITAL SIGNS: /64   Pulse 100   Temp 37.1 °C (98.8 °F)   Resp 20   Wt 64.4 kg (142 lb)   SpO2 94%   BMI 27.51 kg/m²      Constitutional: Patient is awake and alert. No acute respiratory distress. appears tired and generally weak.  HENT: Normocephalic, Atraumatic, Bilateral external ears normal, Oropharynx pink very dry with no exudates, Nose patent.  Eyes: PERRLA, EOMI, Sclera and conjunctiva clear, No discharge.   Neck:  Supple no nuchal rigidity, no thyromegaly or mass. Non-tender  Lymphatic: No supraclavicular  lymph nodes.   Cardiovascular: Heart is tachycardic with regular rhythm no murmur, rub or thrill.   Thorax & Lungs: Chest is symmetrical, with good breath sounds. No wheezing or crackles. No respiratory distress, No chest tenderness.   Abdomen: Soft, epigastric tenderness no hepatosplenomegaly there is no guarding or rebound, No masses, No pulsatile masses.  Skin: Warm, Dry, no petechia, purpura, or rash.   Extremities: No edema. Non  tender.   Musculoskeletal: Good passive range of motion to wrists, elbows, shoulders, hips, knees, and ankles. Pulses 2+ radially and femorally.   Neurologic: Alert & oriented to person, time, and place.  Strength is 4 over 5 and symmetric in bilateral upper and lower extremities.  Sensory is intact to light touch to face, arms, and legs.    Psychiatric: Flat affect affect    EKG  1058- 13 Lead EKG interpreted by me shows sinus tachycardia at 125.  . Axis QRS 22, No ST elevations. Similar to EKG on 3/16/18, however, rate is faster    LABS  Results for orders placed or performed during the hospital encounter of 11/12/18   LACTIC ACID   Result Value Ref Range    Lactic Acid 7.4 (HH) 0.5 - 2.0 mmol/L   CBC WITH DIFFERENTIAL   Result Value Ref Range    WBC 12.9 (H) 4.8 - 10.8 K/uL    RBC 4.31 4.20 - 5.40 M/uL    Hemoglobin 12.5 12.0 - 16.0 g/dL    Hematocrit 38.6 37.0 - 47.0 %    MCV 89.6 81.4 - 97.8 fL    MCH 29.0 27.0 - 33.0 pg    MCHC 32.4 (L) 33.6 - 35.0 g/dL    RDW 48.9 35.9 - 50.0 fL    Platelet Count 391 164 - 446 K/uL    MPV 9.8 9.0 - 12.9 fL    Nucleated RBC 0.00 /100 WBC    NRBC (Absolute) 0.00 K/uL    Neutrophils-Polys 92.20 (H) 44.00 - 72.00 %    Lymphocytes 5.20 (L) 22.00 - 41.00 %    Monocytes 0.00 0.00 - 13.40 %    Eosinophils 0.00 0.00 - 6.90 %    Basophils 0.00 0.00 - 1.80 %    Neutrophils (Absolute) 12.23 (H) 2.00 - 7.15 K/uL    Lymphs (Absolute) 0.67 (L) 1.00 - 4.80 K/uL    Monos (Absolute) 0.00 0.00 - 0.85 K/uL    Eos (Absolute) 0.00 0.00 - 0.51 K/uL    Baso (Absolute) 0.00 0.00 - 0.12 K/uL    Anisocytosis 1+     Macrocytosis 1+    COMP METABOLIC PANEL   Result Value Ref Range    Sodium 129 (L) 135 - 145 mmol/L    Potassium 4.7 3.6 - 5.5 mmol/L    Chloride 99 96 - 112 mmol/L    Co2 13 (L) 20 - 33 mmol/L    Anion Gap 17.0 (H) 0.0 - 11.9    Glucose 171 (H) 65 - 99 mg/dL    Bun 12 8 - 22 mg/dL    Creatinine 1.05 0.50 - 1.40 mg/dL    Calcium 9.2 8.5 - 10.5 mg/dL    AST(SGOT) 28 12 - 45 U/L     ALT(SGPT) 16 2 - 50 U/L    Alkaline Phosphatase 218 (H) 30 - 99 U/L    Total Bilirubin 0.7 0.1 - 1.5 mg/dL    Albumin 2.8 (L) 3.2 - 4.9 g/dL    Total Protein 8.6 (H) 6.0 - 8.2 g/dL    Globulin 5.8 (H) 1.9 - 3.5 g/dL    A-G Ratio 0.5 g/dL   URINALYSIS   Result Value Ref Range    Color Yellow     Character Clear     Specific Gravity 1.013 <1.035    Ph 7.0 5.0 - 8.0    Glucose Negative Negative mg/dL    Ketones Negative Negative mg/dL    Protein Negative Negative mg/dL    Bilirubin Negative Negative    Urobilinogen, Urine 0.2 Negative    Nitrite Negative Negative    Leukocyte Esterase Negative Negative    Occult Blood Negative Negative    Micro Urine Req see below    ESTIMATED GFR   Result Value Ref Range    GFR If African American >60 >60 mL/min/1.73 m 2    GFR If Non  51 (A) >60 mL/min/1.73 m 2   DIFFERENTIAL MANUAL   Result Value Ref Range    Bands-Stabs 2.60 0.00 - 10.00 %    Manual Diff Status PERFORMED    PERIPHERAL SMEAR REVIEW   Result Value Ref Range    Peripheral Smear Review see below    MORPHOLOGY   Result Value Ref Range    RBC Morphology Present     Polychromia 1+     Poikilocytosis 1+     Schistocytes 1+      All labs reviewed by me.    RADIOLOGY/PROCEDURES  DX-CHEST-PORTABLE (1 VIEW)   Final Result      No pulmonary consolidation.        The radiologist's interpretations of all radiological studies have been reviewed by me.     COURSE & MEDICAL DECISION MAKING  Pertinent Labs & Imaging studies reviewed. (See chart for details)    Review of patient's medical records shows history of metastatic ampullary cancer, undergoing chemotherapy and radiation. Recently admitted for treatment for bacteremia E coli origin with Rocephin.    Differential diagnoses include but are not limited to infection, dehydration.    10:40 AM - Patient seen and examined at bedside. She presents hypotensive and tachycardic, recently completing antibiotic regimen to treat bacteremia secondary to an infected  chemotherapy port. Ordered chest xray, lactic acid, CBC, CMP, UA, urine culture, blood culture x2, EKG.  Patient will be medicated with 2g IV Rocephin for her symptoms. The patient will be resuscitated with NS IV secondary to hypotension and tachycardic indicative of sepsis.    11:45 AM Patient's lactic acid is 7.4    12:10 PM I re-evaluated patient at bedside. Patient's heart rate and blood pressure are both improved following fluids. I informed her of her work up results, explaining she would need to be started back on antibiotics and admitted for further evaluation and treatment. She understands and agrees with treatment plan and admission.    12:17 PM Spoke with Dr. King, Hospitalist, about the patient's condition. Agrees to admit the patient.    HYDRATION: Based on the patient's presentation of Dehydration, Hypotension, Sepsis and Tachycardia the patient was given IV fluids. IV Hydration was used because oral hydration was not as rapid as required. Upon recheck following hydration, the patient was imrpoved.     Decision Making  Patient who presented the emergency department with shaking chills.  This is just 2 days after stopping Rocephin for E. coli bacteremia.  She was hypotensive and tachycardic here.  I felt she was in sepsis.  IVs were given.  She is given 30 cc/kg of saline.  IV Rocephin was given again after discussion with pharmacy.  Blood cultures are pending.  We have initial lactic acid of over 7 but on recheck was down a little over 4 so I believe we are in the right direction with IV fluids and hydration.  Blood pressure came up from 88 systolically to about 116 systolically after IV fluids also her pulse rate from about 140 down to just above 100 as well.  She is critically ill I am concerned that she is in sepsis from a bacteremia we have obtained urine did not show urinary tract infection a chest x-ray did not show an obvious pneumonia.  She will be admitted in care of the [facility]  Hospitalist in critical condition.  They will be discussing the case with infectious disease as per our conversation.  In this patient is critically ill in the hospitalization ICU with continue workup care and evaluation.  She may need a cardiac echo as well to rule out any kind of vegetations or other sources.  She did have a catheter placed previously that have been removed 2 weeks ago since I thought that might of been the source of the infections previously.    Critical care time 35 minutes including initial evaluation reevaluation checking blood work several times talking to the physicians about her as well.  She has a real risk of death or decompensation during this hospitalization    DISPOSITION:  Patient will be admitted to Dr. King, Hospitalist in guarded condition.    FINAL IMPRESSION  1. Shaking    2. Sepsis, due to unspecified organism (HCC)    3. Dehydration    4.  Critical care time 35 minutes    PLAN  1.  Admission ICU  2.  Continue aggressive sepsis therapy IV antibiotics  3.  ED workup and evaluation of her presumed sepsis.       Karyna LOWERY (Scribe), am scribing for, and in the presence of, Jamin Farnsworth M.D..    Electronically signed by: Karyna Carroll (Scribe), 11/12/2018    Jamin LOWERY M.D. personally performed the services described in this documentation, as scribed by Karyna Carroll in my presence, and it is both accurate and complete.    The note accurately reflects work and decisions made by me.  Jamin Farnsworth  11/12/2018  5:40 PM

## 2018-11-12 NOTE — ED TRIAGE NOTES
Pt BIB daughter   Chief Complaint   Patient presents with   • Shaking     started 1 hr ago, recent hx of same and her port was removed   pt recently had Y90, and stopped chemo in Sept, had PICC line removed on Saturday  Pt asked to wait in senior lobby, pt updated on triage process and pt asked to inform RN of any changes.

## 2018-11-13 NOTE — ASSESSMENT & PLAN NOTE
11/29  dc'd eliquis due to bleeding into JACQUELINE drain from pseudoaneurysm.  Last DVT 4/4/17.  No need to continue eliquis.

## 2018-11-13 NOTE — PROGRESS NOTES
Admission skin check performed by myself and ROMNAA Mesa. No new findings noted. Old midline abdominal surgical scar and radiation seed tattoo noted on abdomen. Coccyx pink and blanchable.

## 2018-11-13 NOTE — CARE PLAN
Problem: Infection  Goal: Will remain free from infection  Pt being covered with abx therapy. Pending cultures. Lactic acidosis rt sepsis improved.     Problem: Fluid Volume:  Goal: Will maintain balanced intake and output  Maintained strict IO with hanks. Recorded daily weights. Monitored all IV and PO intake.

## 2018-11-13 NOTE — ASSESSMENT & PLAN NOTE
Drain dc 12/3/18  Cultures enterococcus [VRE]  ID following   Continue daptomycin IV, unasyn iv, fluconozole per ID stop date 12/20 with repeat CT abdomen/pelvis at that time.

## 2018-11-13 NOTE — DISCHARGE PLANNING
Care Transition Team Assessment  Spoke to pt at bedside. She is a pleasant A/O woman. She has history of cancer dx and treatments. She was readmitted after sepsis returned. Bedside Rn indicates she was not positive for infection upon recent d/c. Pt reports she f/u w/ all d/c plans.      Information Source  Orientation : Oriented x 4  Information Given By: Patient  Informant's Name: Elin  Who is responsible for making decisions for patient? : Patient    Readmission Evaluation  Is this a readmission?: Yes - unplanned readmission  Why do you think you were readmitted?: the sepsis returned  Was an appointment arranged for you prior to discharge?: Yes, attended appointment  Did you understand your discharge instructions?: Yes  Did you have enough support after your last discharge?:  (adult children and grandchildren support)    Elopement Risk  Legal Hold: No  Ambulatory or Self Mobile in Wheelchair: Yes  Disoriented: No  Psychiatric Symptoms: None  History of Wandering: No  Elopement this Admit: No  Vocalizing Wanting to Leave: No  Displays Behaviors, Body Language Wanting to Leave: No-Not at Risk for Elopement  Elopement Risk: Not at Risk for Elopement    Interdisciplinary Discharge Planning  Primary Care Physician: Wally Knox  Lives with - Patient's Self Care Capacity:  (adult dtr and gkids live w/ pt)  Patient or legal guardian wants to designate a caregiver (see row info): No  Support Systems: Children  Mobility Issues:  (around house no, in community uses FWW)  Prior Services:  (infusion center, cancer dx hx)  Patient Expects to be Discharged to:: home w/ family  Durable Medical Equipment:  (at home: cane, seated FWW, w/c, ramp into house )    Discharge Preparedness  Prior Functional Level: Ambulatory, Independent with Activities of Daily Living    Functional Assesment  Prior Functional Level: Ambulatory, Independent with Activities of Daily Living    Finances  Prescription Coverage: Yes ([facility], [place]  )    Vision / Hearing Impairment  Vision Impairment : No  Hearing Impairment : No    Values / Beliefs / Concerns  Values / Beliefs Concerns : Yes  Spiritual Requests During Hospitalization: No         Domestic Abuse  Have you ever been the victim of abuse or violence?: No  Physical Abuse or Sexual Abuse: No  Verbal Abuse or Emotional Abuse: No  Possible Abuse Reported to:: Not Applicable    Psychological Assessment  History of Substance Abuse: None  History of Psychiatric Problems: No    Discharge Risks or Barriers  Discharge risks or barriers?: Complex medical needs  Patient risk factors:  (sepsis and cancer dx tx)    Anticipated Discharge Information  Discharge Address: home: [address] [place] [address] [place] [address]   Discharge Contact Phone Number: [phone]

## 2018-11-13 NOTE — CONSULTS
ADULT INFECTIOUS DISEASE CONSULT     Date of Service: 11/12/2018    Consult Requested By: Ronald King M.D.    Reason for Consultation: Sepsis    History of Present Illness:   Elin Poole is a 73 y.o. 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  During that admission her blood cultures were positive for E. coli.  Port was removed on 10/27/2018 and her repeat blood cultures were negative on 10/27/2018.  She was discharged home on IV Rocephin through 11/10/2018.  She was followed up in the office on 11/7/2018.  At that time she had complained of being nauseous and having some chills.  A CBC done on 11/8/2018 showed her WBC count at 10.7.  This morning again patient woke up with [place] and chills.  No fevers were noted.  She was brought into the emergency room where she was noted to be hypotensive.  She was noted to have WBC count of 22,000.  The CT scan of the abdomen showed area in the right lobe of the liver which had some air in it.  In view of all these issues infectious disease consult has been called.    Review Of Systems:  Gen.-had chills and rigors.  Denies any fevers  HEENT- denies any sore throat, headache or vision changes  Pulmonary- denies any cough, shortness of breath  Cardiovascular- denies any chest pain, leg swelling.    GI- denies any nausea vomiting diarrhea or abdominal pain  Musculoskeletal-complains of pain in the right upper shoulder.  Neuro- denies any weakness or sensory change  Psych- denies any depression or suicidal ideation  Genitourinary- denies any frequency or dysuria.  Does have some retention of urine        PMH:   Past Medical History:   Diagnosis Date   • Ampullary carcinoma (HCC)    • Anemia    • Arthritis     hands/fingers/right knee   • Asthma    • Blood clotting disorder (HCC)     blood clot 2016   • Cancer (HCC) 2016    Ampullary CA   • Cataract 09-     bilat.,no surgery   • Dental disorder     upper partial   • Encounter for antineoplastic chemotherapy 4/6/2018   • Heart burn    • High cholesterol    • Hypertension    • Indigestion    • Jaundice 2016         PSH:  Past Surgical History:   Procedure Laterality Date   • CATH PLACEMENT Right 9/9/2016    Procedure: CATH PLACEMENT cephalic power port  ;  Surgeon: Kurtis Jacobs M.D.;  Location: SURGERY [facility];  Service:    • WHIPPLE PROCEDURE  8/15/2016    Procedure: Exploratoy Laparotomy, Da-en-y;  Surgeon: Kurtis Jacobs M.D.;  Location: SURGERY [facility];  Service:    • NODE DISSECTION  8/15/2016    Procedure: omental flap, Liver Wedge, cholecystectomy ;  Surgeon: Kurtis Jacobs M.D.;  Location: SURGERY [facility];  Service:    • STENT PLACEMENT  7/2016    gallbladder   • CATARACT EXTRACTION WITH IOL     • CHOLECYSTECTOMY     • TONSILLECTOMY         FAMILY HX:  Family History   Problem Relation Age of Onset   • Cancer Other         unknown cancer       SOCIAL HX:  Social History     Social History   • Marital status:      Spouse name: N/A   • Number of children: N/A   • Years of education: N/A     Occupational History   • retired       Social History Main Topics   • Smoking status: Never Smoker   • Smokeless tobacco: Never Used   • Alcohol use No   • Drug use: No   • Sexual activity: Not on file     Other Topics Concern   • Not on file     Social History Narrative   • No narrative on file     History   Smoking Status   • Never Smoker   Smokeless Tobacco   • Never Used     History   Alcohol Use No       Allergies/Intolerances:  Allergies   Allergen Reactions   • Tape Unspecified     Sensitive skin       History reviewed with the patient    Other Current Medications:    Current Facility-Administered Medications:   •  senna-docusate (PERICOLACE or SENOKOT S) 8.6-50 MG per tablet 2 Tab, 2 Tab, Oral, BID **AND** polyethylene glycol/lytes (MIRALAX)  PACKET 1 Packet, 1 Packet, Oral, QDAY PRN **AND** magnesium hydroxide (MILK OF MAGNESIA) suspension 30 mL, 30 mL, Oral, QDAY PRN **AND** bisacodyl (DULCOLAX) suppository 10 mg, 10 mg, Rectal, QDAY PRN, Ronald King M.D.  •  Respiratory Care per Protocol, , Nebulization, Continuous RT, Ronald King M.D.  •  NS infusion, , Intravenous, Continuous, Ronald King M.D., Last Rate: 100 mL/hr at 11/12/18 1542  •  ondansetron (ZOFRAN) syringe/vial injection 4 mg, 4 mg, Intravenous, Q4HRS PRN, Ronald King M.D.  •  ondansetron (ZOFRAN ODT) dispertab 4 mg, 4 mg, Oral, Q4HRS PRN, Ronald King M.D.  •  acetaminophen (TYLENOL) tablet 650 mg, 650 mg, Oral, Q6HRS PRN, Ronald King M.D., 650 mg at 11/12/18 1555  •  Notify provider if pain remains uncontrolled, , , CONTINUOUS **AND** Use the numeric rating scale (NRS-11) on regular floors and Critical-Care Pain Observation Tool (CPOT) on ICUs/Trauma to assess pain, , , CONTINUOUS **AND** Pulse Ox (Oximetry), , , CONTINUOUS **AND** Pharmacy Consult Request ...Pain Management Review, , Other, PRN **AND** If patient difficult to arouse and/or has respiratory depression, stop any opiates that are currently infusing and call a Rapid Response., , , CONTINUOUS **AND** oxyCODONE immediate-release (ROXICODONE) tablet 5 mg, 5 mg, Oral, Q3HRS PRN **AND** oxyCODONE immediate-release (ROXICODONE) tablet 10 mg, 10 mg, Oral, Q3HRS PRN **AND** morphine (pf) 4 mg/ml injection 4 mg, 4 mg, Intravenous, Q3HRS PRN, Ronald Kuharevic, M.D.  •  NS (BOLUS) infusion 500 mL, 500 mL, Intravenous, Once PRN, Ronald King M.D.  •  [START ON 11/13/2018] cefTRIAXone (ROCEPHIN) 2 g in  mL IVPB, 2 g, Intravenous, Q24HRS, Ronald King M.D.  •  metroNIDAZOLE (FLAGYL) IVPB 500 mg, 500 mg, Intravenous, Q8HRS, Ronald King M.D., Last Rate: 100 mL/hr at 11/12/18 1406, 500 mg at 11/12/18 1406  •  apixaban (ELIQUIS) tablet 5 mg, 5 mg, Oral, BID, Ronald King M.D.  •   "[START ON 2018] omeprazole (PRILOSEC) capsule 20 mg, 20 mg, Oral, QDAY, Ronald King M.D.  •  artificial tears 1.4 % ophthalmic solution 1-2 Drop, 1-2 Drop, Both Eyes, Q2HRS PRN, Ronald King M.D.  [unfilled]    Most Recent Vital Signs:  /48   Pulse 96   Temp (!) 35.4 °C (95.8 °F)   Resp (!) 21   Ht 1.549 m (5' 1\")   Wt 64.4 kg (141 lb 15.6 oz)   SpO2 98%   Breastfeeding? No   BMI 26.83 kg/m²   Temp  Av.3 °C (97.3 °F)  Min: 35.4 °C (95.8 °F)  Max: 37.1 °C (98.8 °F)    Physical Exam:  General: Looks chronically ill  HEENT: sclera anicteric, PERRL, EOMI, MMM, no oral lesions  Neck: supple, no lymphadenopathy  Chest: CTAB, no r/r/w, normal work of breathing.  Cardiac: Regular, tachycardic  Abdomen: + bowel sounds, soft, non-tender, non-distended, no HSM  Extremities: No edema. No joint swelling.  Has some tenderness on the right upper shoulder but no swelling or erythema  Skin: no rashes or erythema  Neuro: Alert and oriented times 3, non-focal exam    Pertinent Lab Results:  Recent Labs      18   1039   WBC  12.9*      Recent Labs      18   1039   HEMOGLOBIN  12.5   HEMATOCRIT  38.6   MCV  89.6   MCH  29.0   MACROCYTOSIS  1+   ANISOCYTOSIS  1+   PLATELETCT  391         Recent Labs      18   1039   SODIUM  129*   POTASSIUM  4.7   CHLORIDE  99   CO2  13*   CREATININE  1.05        Recent Labs      18   1039   ALBUMIN  2.8*        Pertinent Micro:  Results     Procedure Component Value Units Date/Time    Blood Culture [[id number]]     Order Status:  Sent Specimen:  Blood from Peripheral     Blood Culture [[id number]]     Order Status:  Sent Specimen:  Blood from Peripheral     Culture Respiratory W/ GRM STN [[id number]]     Order Status:  Sent Specimen:  Respirate from Sputum     Urine Culture [[id number]]     Order Status:  Sent Specimen:  Urine     BLOOD CULTURE [[id number]] Collected:  18 1039    Order Status:  Completed Specimen:  Blood from " "Peripheral Updated:  11/12/18 1247    Narrative:       Per Hospital Policy: Only change Specimen Src: to \"Line\" if  specified by physician order.    BLOOD CULTURE [[id number]] Collected:  11/12/18 1120    Order Status:  Completed Specimen:  Blood from Peripheral Updated:  11/12/18 1244    Narrative:       Per Hospital Policy: Only change Specimen Src: to \"Line\" if  specified by physician order.    URINALYSIS [[id number]] Collected:  11/12/18 1057    Order Status:  Completed Specimen:  Urine Updated:  11/12/18 1135     Color Yellow     Character Clear     Specific Gravity 1.013     Ph 7.0     Glucose Negative mg/dL      Ketones Negative mg/dL      Protein Negative mg/dL      Bilirubin Negative     Urobilinogen, Urine 0.2     Nitrite Negative     Leukocyte Esterase Negative     Occult Blood Negative     Micro Urine Req see below     Comment: Microscopic examination not performed when specimen is clear  and chemically negative for protein, blood, leukocyte esterase  and nitrite.         Narrative:       Mini cath  Indication for culture:->Emergency Room Patient    URINE CULTURE(NEW) [[id number]] Collected:  11/12/18 1057    Order Status:  Completed Specimen:  Blood Updated:  11/12/18 1131    Narrative:       Mini cath  Indication for culture:->Emergency Room Patient        Blood Culture   Date Value Ref Range Status   10/27/2018 No growth after 5 days of incubation.  Final   10/27/2018 No growth after 5 days of incubation.  Final     Blood Culture Hold   Date Value Ref Range Status   11/21/2016 Collected  Final   11/21/2016 Collected  Final        Studies:  Ct-abdomen-pelvis With    Result Date: 11/12/2018 11/12/2018 1:38 PM HISTORY/REASON FOR EXAM:  Abdominal pain. History of metastatic ampullary adenocarcinoma. TECHNIQUE/EXAM DESCRIPTION: CT scan of the abdomen and pelvis with contrast. Contrast-enhanced helical scanning was obtained from the diaphragmatic domes through the pubic symphysis following the bolus " administration of 80 mL of Omnipaque 350 without complication. Low dose optimization technique was utilized for this CT exam including automated exposure control and adjustment of the mA and/or kV according to patient size. COMPARISON: 10/25/2018 FINDINGS: The visualized lung bases are clear. CT Abdomen: A small hiatal hernia is present. There is interval increase in size of an area of low attenuation in the dome of the right lobe of the liver which contains air. This may represent necrosis or post therapy hemorrhage following embolization. This area measures 8.8 x 7.8 x 6.4 cm. Previous measurement was 2.2 cm in maximum dimension. Multiple smaller areas of low-attenuation within the right lobe of liver are present which are suspicious for hepatic metastases. These other smaller low-attenuation areas are without significant change. The gallbladder absent. A low-attenuation area in the region of the pancreatic head is again noted and is poorly defined measuring approximately 3.1 x 2.6 cm in diameter. This is consistent with the area of ampullary carcinoma. Embolization coils are present in the GDA. There is low attenuation in the pancreatic body proximally which may represent low-attenuation  secondary to pancreatitis or spread of neoplasm. The common duct is dilated proximal to the level of the ampulla measuring a maximum diameter of 1.6 cm. No choledocholithiasis is identified. An enlarged retroperitoneal lymph node between the upper aorta and IVC is unchanged measuring 1.5 cm in diameter. No new area of retroperitoneal adenopathy is identified. No peripancreatic adenopathy is present. The spleen appears normal. The splenic vein and portal vein are patent. The adrenal glands are not enlarged and the kidneys enhance symmetrically. Small simple cysts in the right kidney are stable. There is no lymphadenopathy. The aorta and IVC are normal in caliber. The bowel is without obstruction. The appendix appears normal.  There is a small umbilical hernia containing fat. CT Pelvis: There is no lymphadenopathy. No free fluid is present. The bladder appears normal. Uterus as endometrial complex thickening. There is a myomatous type calcification in the right aspect of the uterus which is unchanged. There is no acute inflammatory process.     1.  Interval significant increase in size of low-attenuation area in the dome of the right lobe of liver which contains air. This may represent necrosis or hemorrhage following therapy. Stable appearance of other smaller low-attenuation areas in the right lobe of the liver without evidence of progression of disease in these areas of metastasis. Differential diagnosis would include post therapy infection. 2.  Stable masslike density in the region of the ampulla of Vater consistent with primary carcinoma. Current dimensions are 3.1 x 2.6 cm. 3.  Persistent low-attenuation areas in the pancreatic head and proximal body which may represent secondary pancreatitis. 4.  Stable upper retroperitoneal adenopathy. 5.  Small umbilical hernia containing fat.    Ct-abdomen-pelvis With    Result Date: 10/25/2018  10/25/2018 4:28 PM HISTORY/REASON FOR EXAM:  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 Patient is status post embolization.. TECHNIQUE/EXAM DESCRIPTION:   CT scan of the abdomen and pelvis with contrast. Contrast-enhanced helical scanning was obtained from the diaphragmatic domes through the pubic symphysis following the bolus administration of nonionic contrast without complication. 80 mL of Omnipaque 350 nonionic contrast was administered without complication. Low dose optimization technique was utilized for this CT exam including automated exposure control and adjustment of the mA and/or kV according to patient size. COMPARISON: PET/CT 7/12/2018, MRI 8/6/2018 and CT abdomen 3/27/2018 FINDINGS: The visualized lung bases are unremarkable. CT Abdomen: There are several  hypodense lesions in the hepatic dome with a couple of foci of air. A hypodense mass within air-fluid level in the right hepatic lobe, segment 8, measures approximately 2.1 cm. No significant rim enhancement is identified. There are new  hypodense lesions in the anterior right hepatic lobe measuring up to approximately 7 mm. There is pneumobilia, consistent with prior hepaticojejunostomy. The gallbladder has been removed. The common bile duct remains dilated with thickening of the duodenal wall and enhancement. Maximum diameter of the common bile duct is approximately 14 mm. There are multiple coils in the region of the pancreatic head. The spleen and adrenal glands are unremarkable. The kidneys enhance symmetrically. Hypodense renal masses are consistent with cysts. There is heterogeneous enhancement of the pancreatic head with an ill-defined cystic mass. The mass is difficult to measure, but is approximately 4.6 x 4 cm. The duct does not appear dilated more distally. There are multiple diverticula in the descending and sigmoid colon. There are scattered arterial calcifications. Enlarged aortocaval lymph node on image 37 measures approximately 15 mm short axis, previously 8 mm. CT Pelvis: The bladder is unremarkable. No significant free fluid is identified. A calcified mass in the uterus is consistent with a small leiomyoma.     1.  Ill-defined cystic mass in the pancreatic head with surrounding inflammatory changes. Findings may be related to pancreatitis in the appropriate clinical setting. Findings are new from the prior exam in August. 2.  New hypodense lesions scattered throughout the liver was foci of air. Findings are highly likely to be related to recent embolization. No rim enhancement to suggest abscess. 3.  Duodenal wall thickening with enhancement, concerning for residual disease. Persistent common bile duct dilatation. 4.  Interval enlargement in an aortocaval lymph node, highly suspicious for  metastatic disease. 5.  Diverticulosis. 6.  Status post hepaticojejunostomy.    Dx-chest-portable (1 View)    Result Date: 11/12/2018 11/12/2018 11:06 AM HISTORY/REASON FOR EXAM:  Sepsis. TECHNIQUE/EXAM DESCRIPTION AND NUMBER OF VIEWS: Single portable view of the chest. COMPARISON: 10/25/2018 FINDINGS: There is no evidence of focal consolidation or evidence of pulmonary edema. There is no evidence of pleural effusion. The heart is normal in size.     No pulmonary consolidation.    Dx-chest-portable (1 View)    Result Date: 10/25/2018  10/25/2018 11:35 AM HISTORY/REASON FOR EXAM:  LEFT shoulder pain, malaise, nausea. TECHNIQUE/EXAM DESCRIPTION AND NUMBER OF VIEWS: Single portable view of the chest. COMPARISON: 5/3/2018 FINDINGS: Cardiomediastinal contour is within normal limits. Lungs show hypoinflation. No focal pulmonary consolidation. No pleural fluid collection or pneumothorax. RIGHT chest infusion port again present. No major bony abnormality is seen. Atherosclerotic calcification of thoracic aorta.     Hypoinflation without other evidence for acute cardiopulmonary disease.    [id number]    Result Date: 10/27/2018  10/27/2018 5:00 PM HISTORY/REASON FOR EXAM:  Jaw Pain. TECHNIQUE/EXAM DESCRIPTION AND NUMBER OF VIEWS:  1 view(s) of the mandible. COMPARISON:  None. FINDINGS: Multiple absent teeth. Many of the remaining teeth have filling/crowns. No periapical lucencies seen. No mandibular fracture.     No periapical lucencies. No mandibular fracture.    Ir-cvc Tunnel With Port Removal    Result Date: 10/28/2018   Chest port removal HISTORY/REASON FOR EXAM: Bacteremia, concern for central line infection MEDICATIONS: Conscious sedation with 2 mcg Fentanyl and 50 mg Versed was administered during the procedure with appropriate continuous patient monitoring of cardiorespiratory function by the radiology nurse. Sedation duration: 30 minutes. TECHNIQUE/EXAM DESCRIPTION: Following informed consent patient was  prepped and draped in the usual fashion. The procedure was performed using MAXIMAL STERILE BARRIER technique including sterile gown, mask, cap, and donning of sterile gloves following appropriate hand hygiene and/or  sterile scrub. Patient skin site was prepped with 2% Chlorhexidine solution. A timeout was performed. Local anesthetic result was achieved with administration of copious 1% lidocaine with epinephrine. A skin incision was made with an 15 blade scalpel. Blunt dissection was used to retrieve the port and catheter which were observed to be intact. Hemostasis  was obtained with manual compression. The port pocket showed no signs of gross infection. The port pocket was flushed with normal saline. The port pocket was closed with deep 2-0 Vicryl sutures and a subcutaneous 4-0 Vicryl suture layer, the skin was closed with Dermabond. The patient tolerated procedure well. The skin was cleaned and a dressing was applied. COMPARISON:  None     Successful chest port removal.    Ir-picc Line Placement    Result Date: 10/29/2018  HISTORY/REASON FOR EXAM:   PICC placement. TECHNIQUE/EXAM DESCRIPTION AND NUMBER OF VIEWS:   PICC line insertion with ultrasound guidance.  The procedure was performed using maximal sterile barrier technique including sterile gown, mask, cap, and donning of sterile gloves following appropriate hand hygiene and/or sterile scrub. Patient skin site was prepped with 2% Chlorhexidine solution. FINDINGS:  PICC line insertion with Ultrasound Guidance was performed by qualified nursing staff without the assistance of a Radiologist. PICC positioning appropriateness confirmed by 3CG technology; chest xray only needed in the instance 3CG unable to confirm placement.              Ultrasound-guided PICC placement performed by qualified nursing staff as above.     Ir-low Level Consult-outpt    Result Date: 10/22/2018  This exam has been resulted under the NOTES tab, and the signed report has been auto faxed  to the ordering physician on the date/time of that signature.  IMPRESSION:   Liver abscess  Sepsis  Ampullary carcinoma of the pancreas  Shoulder pain  Leukocytosis      PLAN:   Elin Poole is a 73 y.o. female with ampullary carcinoma of the pancreas and recent Klebsiella sepsis admitted with sepsis-like picture again.  The Q sofa score was 1 though.  The source is likely that the necrotic tumor versus developing abscess in the liver.  Discontinue the Rocephin and Flagyl.  Start the patient on Zosyn.  Consider aspiration.  The shoulder pain may be related to the fluid collection in the right lobe of the liver.  I have reviewed all the records.  Overall prognosis is guarded.      Discussed with IM. Will continue to follow    Yuli Olmedo M.D.

## 2018-11-13 NOTE — ASSESSMENT & PLAN NOTE
This is sepsis (without associated acute organ dysfunction).   2/2 bacteremia 2/2 liver abscess   Sepsis is resolved  Continue antibiotics for bacteremia/liver abscess

## 2018-11-13 NOTE — PROGRESS NOTES
[facility] Medicine Daily Progress Note    Date of Service  11/13/2018    Chief Complaint  73 y.o. 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  She had been treated recently for e.coli bacteremia.  She presented to the emergency room on 11/12/18 with chills and malaise.  She was found to be tachycardic and have a leukocytosis with elevated lactic acid.  She was admitted to the ICU and treated for sepsis      Interval Problem Update  Fatigued, still with chills and malaise  Mild generalized abdominal pain, feels like deep pressure, 3/10, no radiation  No N/V  UOP 1.8L over last 24 hours    Consultants/Specialty  Dr. Olmedo, Infectious disease    Code Status  Full Code    Disposition  Keep in ICU, re-evaluate for transfer after IR procedure    Review of Systems  Review of Systems   Constitutional: Positive for chills, malaise/fatigue and weight loss. Negative for diaphoresis.   Cardiovascular: Negative for chest pain, palpitations and orthopnea.   Gastrointestinal: Positive for abdominal pain. Negative for heartburn, nausea and vomiting.   Musculoskeletal: Negative for back pain and neck pain.   Skin: Negative for itching and rash.   Neurological: Negative for weakness.        Physical Exam  Temp:  [35.4 °C (95.8 °F)-37.1 °C (98.8 °F)] 36.8 °C (98.2 °F)  Pulse:  [] 78  Resp:  [10-56] 19  BP: (126-142)/(48-64) 126/48    Physical Exam   Constitutional: She is oriented to person, place, and time. She appears well-developed and well-nourished.   HENT:   Head: Normocephalic and atraumatic.   Eyes: Conjunctivae and EOM are normal. Right eye exhibits no discharge. Left eye exhibits no discharge.   Cardiovascular: Normal rate, regular rhythm and intact distal pulses.    No murmur heard.  2+ Radial Pulses  Brisk Capillary Refill   Pulmonary/Chest: Effort normal and breath sounds normal. No respiratory distress. She has no wheezes.   Abdominal:  Soft. Bowel sounds are normal. She exhibits no distension. There is tenderness. There is no rebound.   Mild tenderness to palpation in bilateral upper quadrants   Musculoskeletal: Normal range of motion. She exhibits no edema.   Neurological: She is alert and oriented to person, place, and time. No cranial nerve deficit.   Skin: Skin is warm and dry. She is not diaphoretic. No erythema.   Skin is warm and well perfused   Psychiatric: She has a normal mood and affect.       Fluids    Intake/Output Summary (Last 24 hours) at 11/13/18 0825  Last data filed at 11/13/18 0600   Gross per 24 hour   Intake          4335.33 ml   Output             1775 ml   Net          2560.33 ml       Laboratory  Recent Labs      11/12/18   1039  11/13/18   0440   WBC  12.9*  22.0*   RBC  4.31  3.15*   HEMOGLOBIN  12.5  9.2*   HEMATOCRIT  38.6  27.7*   MCV  89.6  87.9   MCH  29.0  29.2   MCHC  32.4*  33.2*   RDW  48.9  47.8   PLATELETCT  391  209   MPV  9.8  9.8     Recent Labs      11/12/18   1039  11/13/18   0440   SODIUM  129*  135   POTASSIUM  4.7  3.6   CHLORIDE  99  111   CO2  13*  17*   GLUCOSE  171*  119*   BUN  12  9   CREATININE  1.05  0.59   CALCIUM  9.2  7.5*                   Imaging  CT Abdomen and Pelvis:  1.  Interval significant increase in size of low-attenuation area in the dome of the right lobe of liver which contains air. This may represent necrosis or hemorrhage following therapy. Stable appearance of other smaller low-attenuation areas in the   right lobe of the liver without evidence of progression of disease in these areas of metastasis. Differential diagnosis would include post therapy infection.    2.  Stable masslike density in the region of the ampulla of Vater consistent with primary carcinoma. Current dimensions are 3.1 x 2.6 cm.    3.  Persistent low-attenuation areas in the pancreatic head and proximal body which may represent secondary pancreatitis.    4.  Stable upper retroperitoneal adenopathy.    5.   Small umbilical hernia containing fat.    Assessment/Plan  Abnormal CT of the abdomen- (present on admission)   Assessment & Plan    Increase in size  low attenuation lesion in the right lobe of liver 8.8 x 7.8 x 6.4 cm, differential diagnosis include infection, necrosis, hemorrhage  Dr Yang recommended IR drainage  Procedure planned today       Sepsis (HCC)- (present on admission)   Assessment & Plan    This is sepsis (without associated acute organ dysfunction).   Source of infection is likely intra-abdominal.  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  Port catheter removed out of concern for catheter associated infection  Seen by ID as outpatient.  Admitted with sepsis again, repeat blood culture pending     Deep vein thrombosis (CMS-[facility]) [I82.409]- (present on admission)   Assessment & Plan    History of  Holding apixaban, as patient will need liver abscess drain          VTE prophylaxis: restart anticoagulation after procedure

## 2018-11-13 NOTE — PROGRESS NOTES
Infectious Disease Progress Note    Author: Yuli Olmedo M.D. Date & Time of service: 2018  3:18 PM    Chief Complaint:  Generalized weakness and chills    Interval History:  73-year-old female with metastatic ampullary carcinoma of the pancreas presented with generalized weakness and shaking chills.  2018-no fevers.  Complains of generalized malaise and fatigue.  Still has some pain in her right shoulder.  WBC count is 22,000.  Cultures remain negative.  Labs Reviewed, Medications Reviewed and Radiology Reviewed.    Review of Systems:  Review of Systems   Constitutional: Positive for malaise/fatigue. Negative for fever.   HENT: Negative for hearing loss.    Respiratory: Negative for cough and shortness of breath.    Cardiovascular: Negative for chest pain and leg swelling.   Gastrointestinal: Negative for abdominal pain, nausea and vomiting.   Genitourinary: Negative for dysuria.   Musculoskeletal: Positive for myalgias.        Ongoing slight right shoulder pain   Neurological: Negative for sensory change and speech change.       Hemodynamics:  Temp (24hrs), Av.6 °C (97.9 °F), Min:36.1 °C (97 °F), Max:37.1 °C (98.8 °F)  Temperature: 36.4 °C (97.5 °F)  Pulse  Av.8  Min: 67  Max: 109Heart Rate (Monitored): 85  NIBP: 108/58       Physical Exam:  Physical Exam   Constitutional: She is oriented to person, place, and time. No distress.   HENT:   Mouth/Throat: No oropharyngeal exudate.   Eyes: No scleral icterus.   Neck: Neck supple.   Cardiovascular: Regular rhythm.    No murmur heard.  Pulmonary/Chest: She has no wheezes. She has no rales.   Abdominal: Soft. There is no tenderness. There is no rebound.   Musculoskeletal: She exhibits no edema.   Neurological: She is alert and oriented to person, place, and time.   Skin: No rash noted. No erythema.   Vitals reviewed.      Meds:    Current Facility-Administered Medications:   •  [COMPLETED] piperacillin-tazobactam **AND**  piperacillin-tazobactam  •  PEDS potassium chloride (KCL-PERIPHERAL) IV  •  senna-docusate **AND** polyethylene glycol/lytes **AND** magnesium hydroxide **AND** bisacodyl  •  Respiratory Care per Protocol  •  NS  •  ondansetron  •  ondansetron  •  acetaminophen  •  Notify provider if pain remains uncontrolled **AND** Use the numeric rating scale (NRS-11) on regular floors and Critical-Care Pain Observation Tool (CPOT) on ICUs/Trauma to assess pain **AND** Pulse Ox (Oximetry) **AND** Pharmacy Consult Request **AND** If patient difficult to arouse and/or has respiratory depression, stop any opiates that are currently infusing and call a Rapid Response. **AND** oxyCODONE immediate-release **AND** oxyCODONE immediate-release **AND** morphine injection  •  NS  •  omeprazole  •  artificial tears  •  albuterol    Labs:  Recent Labs      11/12/18   1039  11/13/18   0440   WBC  12.9*  22.0*   RBC  4.31  3.15*   HEMOGLOBIN  12.5  9.2*   HEMATOCRIT  38.6  27.7*   MCV  89.6  87.9   MCH  29.0  29.2   RDW  48.9  47.8   PLATELETCT  391  209   MPV  9.8  9.8   NEUTSPOLYS  92.20*  90.90*   LYMPHOCYTES  5.20*  2.20*   MONOCYTES  0.00  5.90   EOSINOPHILS  0.00  0.00   BASOPHILS  0.00  0.30   RBCMORPHOLO  Present   --      Recent Labs      11/12/18   1039  11/13/18   0440   SODIUM  129*  135   POTASSIUM  4.7  3.6   CHLORIDE  99  111   CO2  13*  17*   GLUCOSE  171*  119*   BUN  12  9     Recent Labs      11/12/18   1039  11/13/18   0440   ALBUMIN  2.8*  1.9*   TBILIRUBIN  0.7  0.3   ALKPHOSPHAT  218*  251*   TOTPROTEIN  8.6*  5.8*   ALTSGPT  16  28   ASTSGOT  28  74*   CREATININE  1.05  0.59       Imaging:  Ct-abdomen-pelvis With    Result Date: 11/12/2018 11/12/2018 1:38 PM HISTORY/REASON FOR EXAM:  Abdominal pain. History of metastatic ampullary adenocarcinoma. TECHNIQUE/EXAM DESCRIPTION: CT scan of the abdomen and pelvis with contrast. Contrast-enhanced helical scanning was obtained from the diaphragmatic domes through the pubic  symphysis following the bolus administration of 80 mL of Omnipaque 350 without complication. Low dose optimization technique was utilized for this CT exam including automated exposure control and adjustment of the mA and/or kV according to patient size. COMPARISON: 10/25/2018 FINDINGS: The visualized lung bases are clear. CT Abdomen: A small hiatal hernia is present. There is interval increase in size of an area of low attenuation in the dome of the right lobe of the liver which contains air. This may represent necrosis or post therapy hemorrhage following embolization. This area measures 8.8 x 7.8 x 6.4 cm. Previous measurement was 2.2 cm in maximum dimension. Multiple smaller areas of low-attenuation within the right lobe of liver are present which are suspicious for hepatic metastases. These other smaller low-attenuation areas are without significant change. The gallbladder absent. A low-attenuation area in the region of the pancreatic head is again noted and is poorly defined measuring approximately 3.1 x 2.6 cm in diameter. This is consistent with the area of ampullary carcinoma. Embolization coils are present in the GDA. There is low attenuation in the pancreatic body proximally which may represent low-attenuation  secondary to pancreatitis or spread of neoplasm. The common duct is dilated proximal to the level of the ampulla measuring a maximum diameter of 1.6 cm. No choledocholithiasis is identified. An enlarged retroperitoneal lymph node between the upper aorta and IVC is unchanged measuring 1.5 cm in diameter. No new area of retroperitoneal adenopathy is identified. No peripancreatic adenopathy is present. The spleen appears normal. The splenic vein and portal vein are patent. The adrenal glands are not enlarged and the kidneys enhance symmetrically. Small simple cysts in the right kidney are stable. There is no lymphadenopathy. The aorta and IVC are normal in caliber. The bowel is without obstruction. The  appendix appears normal. There is a small umbilical hernia containing fat. CT Pelvis: There is no lymphadenopathy. No free fluid is present. The bladder appears normal. Uterus as endometrial complex thickening. There is a myomatous type calcification in the right aspect of the uterus which is unchanged. There is no acute inflammatory process.     1.  Interval significant increase in size of low-attenuation area in the dome of the right lobe of liver which contains air. This may represent necrosis or hemorrhage following therapy. Stable appearance of other smaller low-attenuation areas in the right lobe of the liver without evidence of progression of disease in these areas of metastasis. Differential diagnosis would include post therapy infection. 2.  Stable masslike density in the region of the ampulla of Vater consistent with primary carcinoma. Current dimensions are 3.1 x 2.6 cm. 3.  Persistent low-attenuation areas in the pancreatic head and proximal body which may represent secondary pancreatitis. 4.  Stable upper retroperitoneal adenopathy. 5.  Small umbilical hernia containing fat.    Ct-abdomen-pelvis With    Result Date: 10/25/2018  10/25/2018 4:28 PM HISTORY/REASON FOR EXAM:  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 Patient is status post embolization.. TECHNIQUE/EXAM DESCRIPTION:   CT scan of the abdomen and pelvis with contrast. Contrast-enhanced helical scanning was obtained from the diaphragmatic domes through the pubic symphysis following the bolus administration of nonionic contrast without complication. 80 mL of Omnipaque 350 nonionic contrast was administered without complication. Low dose optimization technique was utilized for this CT exam including automated exposure control and adjustment of the mA and/or kV according to patient size. COMPARISON: PET/CT 7/12/2018, MRI 8/6/2018 and CT abdomen 3/27/2018 FINDINGS: The visualized lung bases are unremarkable. CT Abdomen:  There are several hypodense lesions in the hepatic dome with a couple of foci of air. A hypodense mass within air-fluid level in the right hepatic lobe, segment 8, measures approximately 2.1 cm. No significant rim enhancement is identified. There are new  hypodense lesions in the anterior right hepatic lobe measuring up to approximately 7 mm. There is pneumobilia, consistent with prior hepaticojejunostomy. The gallbladder has been removed. The common bile duct remains dilated with thickening of the duodenal wall and enhancement. Maximum diameter of the common bile duct is approximately 14 mm. There are multiple coils in the region of the pancreatic head. The spleen and adrenal glands are unremarkable. The kidneys enhance symmetrically. Hypodense renal masses are consistent with cysts. There is heterogeneous enhancement of the pancreatic head with an ill-defined cystic mass. The mass is difficult to measure, but is approximately 4.6 x 4 cm. The duct does not appear dilated more distally. There are multiple diverticula in the descending and sigmoid colon. There are scattered arterial calcifications. Enlarged aortocaval lymph node on image 37 measures approximately 15 mm short axis, previously 8 mm. CT Pelvis: The bladder is unremarkable. No significant free fluid is identified. A calcified mass in the uterus is consistent with a small leiomyoma.     1.  Ill-defined cystic mass in the pancreatic head with surrounding inflammatory changes. Findings may be related to pancreatitis in the appropriate clinical setting. Findings are new from the prior exam in August. 2.  New hypodense lesions scattered throughout the liver was foci of air. Findings are highly likely to be related to recent embolization. No rim enhancement to suggest abscess. 3.  Duodenal wall thickening with enhancement, concerning for residual disease. Persistent common bile duct dilatation. 4.  Interval enlargement in an aortocaval lymph node, highly  suspicious for metastatic disease. 5.  Diverticulosis. 6.  Status post hepaticojejunostomy.    Dx-chest-portable (1 View)    Result Date: 11/12/2018 11/12/2018 11:06 AM HISTORY/REASON FOR EXAM:  Sepsis. TECHNIQUE/EXAM DESCRIPTION AND NUMBER OF VIEWS: Single portable view of the chest. COMPARISON: 10/25/2018 FINDINGS: There is no evidence of focal consolidation or evidence of pulmonary edema. There is no evidence of pleural effusion. The heart is normal in size.     No pulmonary consolidation.    Dx-chest-portable (1 View)    Result Date: 10/25/2018  10/25/2018 11:35 AM HISTORY/REASON FOR EXAM:  LEFT shoulder pain, malaise, nausea. TECHNIQUE/EXAM DESCRIPTION AND NUMBER OF VIEWS: Single portable view of the chest. COMPARISON: 5/3/2018 FINDINGS: Cardiomediastinal contour is within normal limits. Lungs show hypoinflation. No focal pulmonary consolidation. No pleural fluid collection or pneumothorax. RIGHT chest infusion port again present. No major bony abnormality is seen. Atherosclerotic calcification of thoracic aorta.     Hypoinflation without other evidence for acute cardiopulmonary disease.    Ne-mexuliwt-[id number]    Result Date: 10/27/2018  10/27/2018 5:00 PM HISTORY/REASON FOR EXAM:  Jaw Pain. TECHNIQUE/EXAM DESCRIPTION AND NUMBER OF VIEWS:  1 view(s) of the mandible. COMPARISON:  None. FINDINGS: Multiple absent teeth. Many of the remaining teeth have filling/crowns. No periapical lucencies seen. No mandibular fracture.     No periapical lucencies. No mandibular fracture.    Ir-cvc Tunnel With Port Removal    Result Date: 10/28/2018   Chest port removal HISTORY/REASON FOR EXAM: Bacteremia, concern for central line infection MEDICATIONS: Conscious sedation with 2 mcg Fentanyl and 50 mg Versed was administered during the procedure with appropriate continuous patient monitoring of cardiorespiratory function by the radiology nurse. Sedation duration: 30 minutes. TECHNIQUE/EXAM DESCRIPTION: Following informed  consent patient was prepped and draped in the usual fashion. The procedure was performed using MAXIMAL STERILE BARRIER technique including sterile gown, mask, cap, and donning of sterile gloves following appropriate hand hygiene and/or  sterile scrub. Patient skin site was prepped with 2% Chlorhexidine solution. A timeout was performed. Local anesthetic result was achieved with administration of copious 1% lidocaine with epinephrine. A skin incision was made with an 15 blade scalpel. Blunt dissection was used to retrieve the port and catheter which were observed to be intact. Hemostasis  was obtained with manual compression. The port pocket showed no signs of gross infection. The port pocket was flushed with normal saline. The port pocket was closed with deep 2-0 Vicryl sutures and a subcutaneous 4-0 Vicryl suture layer, the skin was closed with Dermabond. The patient tolerated procedure well. The skin was cleaned and a dressing was applied. COMPARISON:  None     Successful chest port removal.    Ir-picc Line Placement    Result Date: 10/29/2018  HISTORY/REASON FOR EXAM:   PICC placement. TECHNIQUE/EXAM DESCRIPTION AND NUMBER OF VIEWS:   PICC line insertion with ultrasound guidance.  The procedure was performed using maximal sterile barrier technique including sterile gown, mask, cap, and donning of sterile gloves following appropriate hand hygiene and/or sterile scrub. Patient skin site was prepped with 2% Chlorhexidine solution. FINDINGS:  PICC line insertion with Ultrasound Guidance was performed by qualified nursing staff without the assistance of a Radiologist. PICC positioning appropriateness confirmed by 3CG technology; chest xray only needed in the instance 3CG unable to confirm placement.              Ultrasound-guided PICC placement performed by qualified nursing staff as above.     Ir-low Level Consult-outpt    Result Date: 10/22/2018  This exam has been resulted under the NOTES tab, and the signed report  "has been auto faxed to the ordering physician on the date/time of that signature.      Micro:  Results     Procedure Component Value Units Date/Time    URINE CULTURE(NEW) [[id number]] Collected:  11/12/18 1057    Order Status:  Completed Specimen:  Urine Updated:  11/13/18 1052     Significant Indicator NEG     Source UR     Site --     Urine Culture No growth at 24 hours    Narrative:       Mini cath  Indication for culture:->Emergency Room Patient    BLOOD CULTURE [[id number]] Collected:  11/12/18 1039    Order Status:  Completed Specimen:  Blood from Peripheral Updated:  11/13/18 0739     Significant Indicator NEG     Source BLD     Site PERIPHERAL     Blood Culture No Growth    Note: Blood cultures are incubated for 5 days and  are monitored continuously.Positive blood cultures  are called to the RN and reported as soon as  they are identified.      Narrative:       Per Hospital Policy: Only change Specimen Src: to \"Line\" if  specified by physician order.    BLOOD CULTURE [[id number]] Collected:  11/12/18 1120    Order Status:  Completed Specimen:  Blood from Peripheral Updated:  11/13/18 0739     Significant Indicator NEG     Source BLD     Site PERIPHERAL     Blood Culture No Growth    Note: Blood cultures are incubated for 5 days and  are monitored continuously.Positive blood cultures  are called to the RN and reported as soon as  they are identified.      Narrative:       Per Hospital Policy: Only change Specimen Src: to \"Line\" if  specified by physician order.    Blood Culture [[id number]]     Order Status:  Sent Specimen:  Blood from Peripheral     Blood Culture [[id number]]     Order Status:  Sent Specimen:  Blood from Peripheral     Culture Respiratory W/ GRM STN [[id number]]     Order Status:  Completed Specimen:  Respirate from Sputum     URINALYSIS [[id number]] Collected:  11/12/18 1057    Order Status:  Completed Specimen:  Urine Updated:  11/12/18 1135     Color Yellow     Character Clear     Specific " Gravity 1.013     Ph 7.0     Glucose Negative mg/dL      Ketones Negative mg/dL      Protein Negative mg/dL      Bilirubin Negative     Urobilinogen, Urine 0.2     Nitrite Negative     Leukocyte Esterase Negative     Occult Blood Negative     Micro Urine Req see below     Comment: Microscopic examination not performed when specimen is clear  and chemically negative for protein, blood, leukocyte esterase  and nitrite.         Narrative:       Mini cath  Indication for culture:->Emergency Room Patient    Urine Culture [[id number]] Collected:  11/12/18 0000    Order Status:  Canceled Specimen:  Urine           Assessment:  Active Hospital Problems    Diagnosis   • Abnormal CT of the abdomen [R93.5]   • Sepsis (HCC) [A41.9]   • 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]   • E coli bacteremia [R78.81]   • Lactic acidosis [E87.2]   • Deep vein thrombosis (CMS-HCC) [I82.409] [I82.409]       Plan:  Liver abscess  The CT scan shows area of necrosis consistent with post therapy infection  Patient is currently on Zosyn  Endpoint is clinical  Cultures are negative so far    Ampullary carcinoma of the pancreas    History of DVT  Discussed with internal medicine.

## 2018-11-13 NOTE — ASSESSMENT & PLAN NOTE
Received treatment for E. coli bacteremia in early November, her port was removed  Admitted with sepsis  Blood cultures from 11/12/2018 are growing lactobacillus, Candida albicans and strep viridans. Liver abscess cultures from 11/13/2018 are positive for VRE.  Currently on unasyn/PO diflucan ID following.     PICC placed 11/21/18  IV daptomycin, IV unasyn, fluconazole tentative stop date 12/20 per ID, then repeat CT to see if further IV abx needed.  Repeat 11/24 CT guided liver abscess pigtail catheter placed after initial was found to be displaced.  TTE negative for endocarditis.  EF 65%.  ID making recommendation for home infusion

## 2018-11-13 NOTE — ASSESSMENT & PLAN NOTE
Stage IV  XRT and chemotherapy planned to start after antibiotics completed (12/20/18)  Dr Cedeno has met with patient and family for update of plan of treatment.

## 2018-11-13 NOTE — H&P
Hospital Medicine History & Physical Note    Date of Service  11/12/2018    Primary Care Physician  Wally Knox D.O.    Consultants  ID    Code Status  Full code    Chief Complaint  Chills    History of Presenting Illness  73 y.o. 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  Patient just finished outpatient course of IV ceftriaxone on Saturday for E. coli bacteremia.  Review of system positive for right upper quadrant discomfort, chills, nausea.  Denies vomiting or diarrhea.  Patient noted to be tachycardic, but afebrile.  White blood cells 12.9.  Lactic acid 7.4 improved after fluids down to 4.2.  Started on sepsis protocol.  Ordered CT of the abdomen.  I requested infectious disease consult, discussed patient with Dr. Olmedo    Review of Systems  Review of Systems   Constitutional: Positive for chills, fever and malaise/fatigue. Negative for diaphoresis and weight loss.   HENT: Negative for ear pain, hearing loss and tinnitus.    Eyes: Negative for blurred vision, double vision and photophobia.   Respiratory: Negative for cough, hemoptysis and sputum production.    Cardiovascular: Negative for chest pain, palpitations and orthopnea.   Gastrointestinal: Positive for abdominal pain and nausea. Negative for heartburn and vomiting.   Genitourinary: Negative for dysuria, frequency and urgency.   Musculoskeletal: Negative for back pain, myalgias and neck pain.   Skin: Negative for itching and rash.   Neurological: Positive for weakness. Negative for dizziness, tingling and headaches.   Endo/Heme/Allergies: Negative for polydipsia. Does not bruise/bleed easily.   Psychiatric/Behavioral: Negative for depression, substance abuse and suicidal ideas.          Past Medical History   has a past medical history of Ampullary carcinoma (HCC); Anemia; Arthritis;  Asthma; Blood clotting disorder (HCC); Cancer (HCC) (2016); Cataract (2016); Dental disorder; Encounter for antineoplastic chemotherapy (2018); Heart burn; High cholesterol; Hypertension; Indigestion; and Jaundice (2016).    Surgical History   has a past surgical history that includes stent placement (2016); whipple procedure (8/15/2016); node dissection (8/15/2016); cholecystectomy; cath placement (Right, 2016); tonsillectomy; and cataract extraction with iol.     Family History  family history includes Cancer in her other.     Social History   reports that she has never smoked. She has never used smokeless tobacco. She reports that she does not drink alcohol or use drugs.    Allergies  Allergies   Allergen Reactions   • Tape Unspecified     Sensitive skin       Medications  Prior to Admission Medications   Prescriptions Last Dose Informant Patient Reported? Taking?   ELIQUIS 5 MG Tab 2018 at 0800 Patient No No   Sig: TAKE 1 TAB BY MOUTH 2 TIMES A DAY.   NS SOLN 100 mL with cefTRIAXone 2 GM SOLR 2 g 11/10/2018 at Unknown time  No No   Si g by Intravenous route every 24 hours.   Vancomycin HCl (VANCOMYCIN 50 MG/ML) 50 mg/mL Solution 11/10/2018 at Unknown time  Yes No   Sig: Take  by mouth every day. Completed approximately 14 day on 11/10/18.  **taking for C. Diff STUDY**   cyclosporin (RESTASIS) 0.05 % ophthalmic emulsion 2018 at Unknown time  Yes No   Sig: Place 1 Drop in both eyes 2 times a day.   losartan-hydrochlorothiazide (HYZAAR) 50-12.5 MG per tablet 2018 at 0800 Patient No No   Sig: Take 1 Tab by mouth every day.   omeprazole (PRILOSEC) 20 MG delayed-release capsule 2018 at Unknown time  No No   Sig: TAKE 1 CAPSULE BY MOUTH EVERYDAY   potassium chloride SA (K-DUR) 10 MEQ Tab CR 2018 at 0800 Patient No No   Sig: Take 2 Tabs by mouth 2 times a day.      Facility-Administered Medications: None       Physical Exam  Temp:  [35.4 °C (95.8 °F)-37.1 °C (98.8 °F)]  37 °C (98.6 °F)  Pulse:  [] 70  Resp:  [10-56] 25  BP: (126-142)/(48-64) 126/48    Physical Exam   Constitutional: She is oriented to person, place, and time. She appears well-developed.   HENT:   Head: Normocephalic and atraumatic.   Eyes: Pupils are equal, round, and reactive to light.   Neck: Normal range of motion. Neck supple.   Cardiovascular: Normal rate.    Pulmonary/Chest: Effort normal and breath sounds normal. No respiratory distress.   Abdominal: Soft. Bowel sounds are normal. She exhibits no distension. There is tenderness in the right upper quadrant. There is no rigidity, no rebound and no guarding.   Musculoskeletal: Normal range of motion.   Neurological: She is alert and oriented to person, place, and time.   Skin: Skin is warm and dry.   Psychiatric: She has a normal mood and affect.   Nursing note and vitals reviewed.      Laboratory:  Recent Labs      11/12/18   1039   WBC  12.9*   RBC  4.31   HEMOGLOBIN  12.5   HEMATOCRIT  38.6   MCV  89.6   MCH  29.0   MCHC  32.4*   RDW  48.9   PLATELETCT  391   MPV  9.8     Recent Labs      11/12/18   1039   SODIUM  129*   POTASSIUM  4.7   CHLORIDE  99   CO2  13*   GLUCOSE  171*   BUN  12   CREATININE  1.05   CALCIUM  9.2     Recent Labs      11/12/18   1039   ALTSGPT  16   ASTSGOT  28   ALKPHOSPHAT  218*   TBILIRUBIN  0.7   GLUCOSE  171*                 No results for input(s): TROPONINI in the last 72 hours.    Urinalysis:    Recent Labs      11/12/18   1057   SPECGRAVITY  1.013   GLUCOSEUR  Negative   KETONES  Negative   NITRITE  Negative   LEUKESTERAS  Negative        Imaging:  CT-ABDOMEN-PELVIS WITH   Final Result      1.  Interval significant increase in size of low-attenuation area in the dome of the right lobe of liver which contains air. This may represent necrosis or hemorrhage following therapy. Stable appearance of other smaller low-attenuation areas in the    right lobe of the liver without evidence of progression of disease in these areas  of metastasis. Differential diagnosis would include post therapy infection.      2.  Stable masslike density in the region of the ampulla of Vater consistent with primary carcinoma. Current dimensions are 3.1 x 2.6 cm.      3.  Persistent low-attenuation areas in the pancreatic head and proximal body which may represent secondary pancreatitis.      4.  Stable upper retroperitoneal adenopathy.      5.  Small umbilical hernia containing fat.      DX-CHEST-PORTABLE (1 VIEW)   Final Result      No pulmonary consolidation.            Assessment/Plan:  I anticipate this patient will require at least two midnights for appropriate medical management, necessitating inpatient admission.    Abnormal CT of the abdomen   Assessment & Plan    Increase in size  low attenuation lesion in the right lobe of liver 8.8 x 7.8 x 6.4 cm, differential diagnosis include infection, necrosis, hemorrhage  Dr Yang recommended IR drainage.  I discussed pt's case with Dr Saleem  I ordered IR guided drainage, but pt probably will need reversal of apixiban before procedure  Continue IV antibiotics, fluid.         Sepsis (HCC)- (present on admission)   Assessment & Plan    This is sepsis (without associated acute organ dysfunction).   Source of infection is likely intra-abdominal.  Started on sepsis protocol with fluids and antibiotics  Follow blood culture     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)   Assessment & Plan    Stage IV  Consider discussion with oncology results of CT of the abdomen     Lactic acidosis   Assessment & Plan    This is  related to cancer and liver necrosis more than infection  Continue IV fluids  Repeat lactic acid     E coli bacteremia   Assessment & Plan    Received course of IV ceftriaxone on 11/10.  Port catheter removed out of concern for catheter associated infection  Seen by ID as outpatient.  Admitted with sepsis again, repeat blood culture pending     Deep vein thrombosis (CMS-HCC) [I82.409]- (present  on admission)   Assessment & Plan    History of  Holding apixaban, as patient will need liver abscess drain         VTE prophylaxis: heparine

## 2018-11-14 PROBLEM — K75.0 LIVER ABSCESS: Status: ACTIVE | Noted: 2018-01-01

## 2018-11-14 PROBLEM — B49 FUNGEMIA: Status: ACTIVE | Noted: 2018-01-01

## 2018-11-14 NOTE — OR SURGEON
Immediate Post- Operative Note        PostOp Diagnosis: Liver abscess    Procedure(s): CT guided drain placment      Estimated Blood Loss: Less than 5 ml        Complications: None            11/13/2018     5:14 PM     Wil Murray

## 2018-11-14 NOTE — DIETARY
"Nutrition services: Day 2 of admit.  Elin Poole is a 73 y.o. female with admitting DX of Sepsis (HCC), Tachycardia    RD alerted to pt with poor PO intake per nutritional admit screen.    Per chart review:  Recent admit 2 weeks ago related to bacteremia. Port and PICC line have been removed.  +Metastatic ampullary carcinoma of the pancreas, completed chemo 9/2018, on RT.  S/p CT guided drain placement of liver abscess 11/13.     Assessment:  Height: 154.9 cm (5' 1\")  Weight: 64.4 kg (141 lb 15.6 oz)  Body mass index is 26.83 kg/m².     Diet/Intake: Diet advanced from clear liquids to regular.    Evaluation:   1. Spoke with pt about food preferences. Pt prefers liquids and soft solids @ this time. Reviewed nutrient-dense options.   2. Pt says that @ home she eats Mexican food, like fresh boiled beans and enchiladas. Encouraged pt to have food brought from home if that would improve her PO intake.   3. RD will modify pt's meals according to our discussion.  4. Nutrition Representative will see pt daily for menu selections.    Recommendations/Plan:  1. Encourage PO intake and document all in ADLs to provide interdisciplinary communication across all shifts.   2. Monitor weight.  3. Nutrition Rep will continue to see pt for ongoing meal and snack preferences.    RD following.              "

## 2018-11-14 NOTE — PROGRESS NOTES
Infectious Disease Progress Note    Author: Yuli Olmedo M.D. Date & Time of service: 2018  2:14 PM    Chief Complaint:  Generalized weakness and chills    Interval History:  73-year-old female with metastatic ampullary carcinoma of the pancreas presented with generalized weakness and shaking chills.  2018-no fevers.  Complains of generalized malaise and fatigue.  Still has some pain in her right shoulder.  WBC count is 22,000.  Cultures remain negative.  2018 T-max is 98.2.  No new issues overnight.  Underwent IR drainage on 2018.  Her shoulder pain has eased since then.  Labs Reviewed, Medications Reviewed and Radiology Reviewed.    Review of Systems:  Review of Systems   Constitutional: Positive for malaise/fatigue. Negative for fever.   HENT: Negative for hearing loss.    Respiratory: Negative for cough and shortness of breath.    Cardiovascular: Negative for chest pain and leg swelling.   Gastrointestinal: Negative for abdominal pain, nausea and vomiting.   Genitourinary: Negative for dysuria.   Musculoskeletal: Negative for myalgias.        The shoulder pain has eased   Neurological: Negative for sensory change and speech change.       Hemodynamics:  Temp (24hrs), Av.4 °C (97.5 °F), Min:36.1 °C (97 °F), Max:36.8 °C (98.2 °F)  Temperature: 36.8 °C (98.2 °F)  Pulse  Av.7  Min: 67  Max: 109Heart Rate (Monitored): 77  Blood Pressure : 133/68, NIBP: 113/55       Physical Exam:  Physical Exam   Constitutional: She is oriented to person, place, and time. No distress.   HENT:   Mouth/Throat: No oropharyngeal exudate.   Eyes: No scleral icterus.   Neck: Neck supple.   Cardiovascular: Regular rhythm.    No murmur heard.  Pulmonary/Chest: She has no wheezes. She has no rales.   Abdominal: Soft. There is no tenderness. There is no rebound.   Right upper quadrant drain with serosanguineous discharge   Musculoskeletal: She exhibits no edema.   Neurological: She is alert and oriented to  person, place, and time.   Skin: No rash noted. No erythema.   Vitals reviewed.      Meds:    Current Facility-Administered Medications:   •  micafungin  •  apixaban  •  LR  •  [COMPLETED] piperacillin-tazobactam **AND** piperacillin-tazobactam  •  senna-docusate **AND** polyethylene glycol/lytes **AND** magnesium hydroxide **AND** bisacodyl  •  Respiratory Care per Protocol  •  ondansetron  •  ondansetron  •  acetaminophen  •  Notify provider if pain remains uncontrolled **AND** Use the numeric rating scale (NRS-11) on regular floors and Critical-Care Pain Observation Tool (CPOT) on ICUs/Trauma to assess pain **AND** Pulse Ox (Oximetry) **AND** Pharmacy Consult Request **AND** If patient difficult to arouse and/or has respiratory depression, stop any opiates that are currently infusing and call a Rapid Response. **AND** oxyCODONE immediate-release **AND** oxyCODONE immediate-release **AND** morphine injection  •  NS  •  omeprazole  •  artificial tears  •  albuterol    Labs:  Recent Labs      11/12/18   1039  11/13/18 0440 11/14/18   0405   WBC  12.9*  22.0*  16.1*   RBC  4.31  3.15*  3.20*   HEMOGLOBIN  12.5  9.2*  8.9*   HEMATOCRIT  38.6  27.7*  28.7*   MCV  89.6  87.9  89.7   MCH  29.0  29.2  27.8   RDW  48.9  47.8  49.8   PLATELETCT  391  209  207   MPV  9.8  9.8  9.7   NEUTSPOLYS  92.20*  90.90*   --    LYMPHOCYTES  5.20*  2.20*   --    MONOCYTES  0.00  5.90   --    EOSINOPHILS  0.00  0.00   --    BASOPHILS  0.00  0.30   --    RBCMORPHOLO  Present   --    --      Recent Labs      11/12/18   1039  11/13/18   0440  11/14/18   0405   SODIUM  129*  135  134*   POTASSIUM  4.7  3.6  3.9   CHLORIDE  99  111  111   CO2  13*  17*  19*   GLUCOSE  171*  119*  121*   BUN  12  9  9     Recent Labs      11/12/18   1039  11/13/18   0440  11/14/18   0405   ALBUMIN  2.8*  1.9*   --    TBILIRUBIN  0.7  0.3   --    ALKPHOSPHAT  218*  251*   --    TOTPROTEIN  8.6*  5.8*   --    ALTSGPT  16  28   --    ASTSGOT  28  74*   --     CREATININE  1.05  0.59  0.59       Imaging:  Ct-abdomen-pelvis With    Result Date: 11/12/2018 11/12/2018 1:38 PM HISTORY/REASON FOR EXAM:  Abdominal pain. History of metastatic ampullary adenocarcinoma. TECHNIQUE/EXAM DESCRIPTION: CT scan of the abdomen and pelvis with contrast. Contrast-enhanced helical scanning was obtained from the diaphragmatic domes through the pubic symphysis following the bolus administration of 80 mL of Omnipaque 350 without complication. Low dose optimization technique was utilized for this CT exam including automated exposure control and adjustment of the mA and/or kV according to patient size. COMPARISON: 10/25/2018 FINDINGS: The visualized lung bases are clear. CT Abdomen: A small hiatal hernia is present. There is interval increase in size of an area of low attenuation in the dome of the right lobe of the liver which contains air. This may represent necrosis or post therapy hemorrhage following embolization. This area measures 8.8 x 7.8 x 6.4 cm. Previous measurement was 2.2 cm in maximum dimension. Multiple smaller areas of low-attenuation within the right lobe of liver are present which are suspicious for hepatic metastases. These other smaller low-attenuation areas are without significant change. The gallbladder absent. A low-attenuation area in the region of the pancreatic head is again noted and is poorly defined measuring approximately 3.1 x 2.6 cm in diameter. This is consistent with the area of ampullary carcinoma. Embolization coils are present in the GDA. There is low attenuation in the pancreatic body proximally which may represent low-attenuation  secondary to pancreatitis or spread of neoplasm. The common duct is dilated proximal to the level of the ampulla measuring a maximum diameter of 1.6 cm. No choledocholithiasis is identified. An enlarged retroperitoneal lymph node between the upper aorta and IVC is unchanged measuring 1.5 cm in diameter. No new area of  retroperitoneal adenopathy is identified. No peripancreatic adenopathy is present. The spleen appears normal. The splenic vein and portal vein are patent. The adrenal glands are not enlarged and the kidneys enhance symmetrically. Small simple cysts in the right kidney are stable. There is no lymphadenopathy. The aorta and IVC are normal in caliber. The bowel is without obstruction. The appendix appears normal. There is a small umbilical hernia containing fat. CT Pelvis: There is no lymphadenopathy. No free fluid is present. The bladder appears normal. Uterus as endometrial complex thickening. There is a myomatous type calcification in the right aspect of the uterus which is unchanged. There is no acute inflammatory process.     1.  Interval significant increase in size of low-attenuation area in the dome of the right lobe of liver which contains air. This may represent necrosis or hemorrhage following therapy. Stable appearance of other smaller low-attenuation areas in the right lobe of the liver without evidence of progression of disease in these areas of metastasis. Differential diagnosis would include post therapy infection. 2.  Stable masslike density in the region of the ampulla of Vater consistent with primary carcinoma. Current dimensions are 3.1 x 2.6 cm. 3.  Persistent low-attenuation areas in the pancreatic head and proximal body which may represent secondary pancreatitis. 4.  Stable upper retroperitoneal adenopathy. 5.  Small umbilical hernia containing fat.    Ct-abdomen-pelvis With    Result Date: 10/25/2018  10/25/2018 4:28 PM HISTORY/REASON FOR EXAM:  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 Patient is status post embolization.. TECHNIQUE/EXAM DESCRIPTION:   CT scan of the abdomen and pelvis with contrast. Contrast-enhanced helical scanning was obtained from the diaphragmatic domes through the pubic symphysis following the bolus administration of nonionic contrast without  complication. 80 mL of Omnipaque 350 nonionic contrast was administered without complication. Low dose optimization technique was utilized for this CT exam including automated exposure control and adjustment of the mA and/or kV according to patient size. COMPARISON: PET/CT 7/12/2018, MRI 8/6/2018 and CT abdomen 3/27/2018 FINDINGS: The visualized lung bases are unremarkable. CT Abdomen: There are several hypodense lesions in the hepatic dome with a couple of foci of air. A hypodense mass within air-fluid level in the right hepatic lobe, segment 8, measures approximately 2.1 cm. No significant rim enhancement is identified. There are new  hypodense lesions in the anterior right hepatic lobe measuring up to approximately 7 mm. There is pneumobilia, consistent with prior hepaticojejunostomy. The gallbladder has been removed. The common bile duct remains dilated with thickening of the duodenal wall and enhancement. Maximum diameter of the common bile duct is approximately 14 mm. There are multiple coils in the region of the pancreatic head. The spleen and adrenal glands are unremarkable. The kidneys enhance symmetrically. Hypodense renal masses are consistent with cysts. There is heterogeneous enhancement of the pancreatic head with an ill-defined cystic mass. The mass is difficult to measure, but is approximately 4.6 x 4 cm. The duct does not appear dilated more distally. There are multiple diverticula in the descending and sigmoid colon. There are scattered arterial calcifications. Enlarged aortocaval lymph node on image 37 measures approximately 15 mm short axis, previously 8 mm. CT Pelvis: The bladder is unremarkable. No significant free fluid is identified. A calcified mass in the uterus is consistent with a small leiomyoma.     1.  Ill-defined cystic mass in the pancreatic head with surrounding inflammatory changes. Findings may be related to pancreatitis in the appropriate clinical setting. Findings are new from  the prior exam in August. 2.  New hypodense lesions scattered throughout the liver was foci of air. Findings are highly likely to be related to recent embolization. No rim enhancement to suggest abscess. 3.  Duodenal wall thickening with enhancement, concerning for residual disease. Persistent common bile duct dilatation. 4.  Interval enlargement in an aortocaval lymph node, highly suspicious for metastatic disease. 5.  Diverticulosis. 6.  Status post hepaticojejunostomy.    Dx-chest-portable (1 View)    Result Date: 11/12/2018 11/12/2018 11:06 AM HISTORY/REASON FOR EXAM:  Sepsis. TECHNIQUE/EXAM DESCRIPTION AND NUMBER OF VIEWS: Single portable view of the chest. COMPARISON: 10/25/2018 FINDINGS: There is no evidence of focal consolidation or evidence of pulmonary edema. There is no evidence of pleural effusion. The heart is normal in size.     No pulmonary consolidation.    Dx-chest-portable (1 View)    Result Date: 10/25/2018  10/25/2018 11:35 AM HISTORY/REASON FOR EXAM:  LEFT shoulder pain, malaise, nausea. TECHNIQUE/EXAM DESCRIPTION AND NUMBER OF VIEWS: Single portable view of the chest. COMPARISON: 5/3/2018 FINDINGS: Cardiomediastinal contour is within normal limits. Lungs show hypoinflation. No focal pulmonary consolidation. No pleural fluid collection or pneumothorax. RIGHT chest infusion port again present. No major bony abnormality is seen. Atherosclerotic calcification of thoracic aorta.     Hypoinflation without other evidence for acute cardiopulmonary disease.    Xd-[id number]    Result Date: 10/27/2018  10/27/2018 5:00 PM HISTORY/REASON FOR EXAM:  Jaw Pain. TECHNIQUE/EXAM DESCRIPTION AND NUMBER OF VIEWS:  1 view(s) of the mandible. COMPARISON:  None. FINDINGS: Multiple absent teeth. Many of the remaining teeth have filling/crowns. No periapical lucencies seen. No mandibular fracture.     No periapical lucencies. No mandibular fracture.    Ir-cvc Tunnel With Port Removal    Result Date:  10/28/2018   Chest port removal HISTORY/REASON FOR EXAM: Bacteremia, concern for central line infection MEDICATIONS: Conscious sedation with 2 mcg Fentanyl and 50 mg Versed was administered during the procedure with appropriate continuous patient monitoring of cardiorespiratory function by the radiology nurse. Sedation duration: 30 minutes. TECHNIQUE/EXAM DESCRIPTION: Following informed consent patient was prepped and draped in the usual fashion. The procedure was performed using MAXIMAL STERILE BARRIER technique including sterile gown, mask, cap, and donning of sterile gloves following appropriate hand hygiene and/or  sterile scrub. Patient skin site was prepped with 2% Chlorhexidine solution. A timeout was performed. Local anesthetic result was achieved with administration of copious 1% lidocaine with epinephrine. A skin incision was made with an 15 blade scalpel. Blunt dissection was used to retrieve the port and catheter which were observed to be intact. Hemostasis  was obtained with manual compression. The port pocket showed no signs of gross infection. The port pocket was flushed with normal saline. The port pocket was closed with deep 2-0 Vicryl sutures and a subcutaneous 4-0 Vicryl suture layer, the skin was closed with Dermabond. The patient tolerated procedure well. The skin was cleaned and a dressing was applied. COMPARISON:  None     Successful chest port removal.    Ir-picc Line Placement    Result Date: 10/29/2018  HISTORY/REASON FOR EXAM:   PICC placement. TECHNIQUE/EXAM DESCRIPTION AND NUMBER OF VIEWS:   PICC line insertion with ultrasound guidance.  The procedure was performed using maximal sterile barrier technique including sterile gown, mask, cap, and donning of sterile gloves following appropriate hand hygiene and/or sterile scrub. Patient skin site was prepped with 2% Chlorhexidine solution. FINDINGS:  PICC line insertion with Ultrasound Guidance was performed by qualified nursing staff without  "the assistance of a Radiologist. PICC positioning appropriateness confirmed by 3CG technology; chest xray only needed in the instance 3CG unable to confirm placement.              Ultrasound-guided PICC placement performed by qualified nursing staff as above.     Ir-low Level Consult-outpt    Result Date: 10/22/2018  This exam has been resulted under the NOTES tab, and the signed report has been auto faxed to the ordering physician on the date/time of that signature.      Micro:  Results     Procedure Component Value Units Date/Time    BLOOD CULTURE [[id number]]  (Abnormal) Collected:  11/12/18 1039    Order Status:  Completed Specimen:  Blood from Peripheral Updated:  11/14/18 1004     Significant Indicator POS (POS)     Source BLD     Site PERIPHERAL     Blood Culture Growth detected by Bactec instrument. 11/14/2018  10:03  Gram Stain: Yeast.   (A)    Narrative:       Per Hospital Policy: Only change Specimen Src: to \"Line\" if  specified by physician order.    URINE CULTURE(NEW) [[id number]] Collected:  11/12/18 1057    Order Status:  Completed Specimen:  Urine Updated:  11/14/18 0917     Significant Indicator NEG     Source UR     Site --     Urine Culture No growth at 48 hours    Narrative:       Mini cath  Indication for culture:->Emergency Room Patient    GRAM STAIN [[id number]] Collected:  11/13/18 1710    Order Status:  Completed Specimen:  Wound Updated:  11/14/18 0907     Significant Indicator .     Source WND     Site Liver Abscess     Gram Stain Result Few WBCs.  Many Gram negative rods.  Moderate Gram positive rods.      CULTURE WOUND W/ GRAM STAIN [[id number]] Collected:  11/13/18 1710    Order Status:  Completed Specimen:  Other Updated:  11/13/18 1812    BLOOD CULTURE [[id number]] Collected:  11/12/18 1120    Order Status:  Completed Specimen:  Blood from Peripheral Updated:  11/13/18 0739     Significant Indicator NEG     Source BLD     Site PERIPHERAL     Blood Culture No Growth    Note: Blood cultures " "are incubated for 5 days and  are monitored continuously.Positive blood cultures  are called to the RN and reported as soon as  they are identified.      Narrative:       Per Hospital Policy: Only change Specimen Src: to \"Line\" if  specified by physician order.    Blood Culture [[id number]]     Order Status:  Canceled Specimen:  Blood from Peripheral     Blood Culture [[id number]]     Order Status:  Canceled Specimen:  Blood from Peripheral     Culture Respiratory W/ GRM STN [[id number]]     Order Status:  Completed Specimen:  Respirate from Sputum     URINALYSIS [[id number]] Collected:  11/12/18 1057    Order Status:  Completed Specimen:  Urine Updated:  11/12/18 1135     Color Yellow     Character Clear     Specific Gravity 1.013     Ph 7.0     Glucose Negative mg/dL      Ketones Negative mg/dL      Protein Negative mg/dL      Bilirubin Negative     Urobilinogen, Urine 0.2     Nitrite Negative     Leukocyte Esterase Negative     Occult Blood Negative     Micro Urine Req see below     Comment: Microscopic examination not performed when specimen is clear  and chemically negative for protein, blood, leukocyte esterase  and nitrite.         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    Ampullary carcinoma of the " pancreas    History of DVT  Discussed with internal medicine.

## 2018-11-14 NOTE — ASSESSMENT & PLAN NOTE
Status post IR drainage 11/13   Continue broad-spectrum antimicrobials follow-up on cultures  IR catheter drainage decreased from yesterday 25 mL's this shift    Initial culture showing yeast and gram-positive rods continue to monitor and follow-up on speciation

## 2018-11-14 NOTE — ASSESSMENT & PLAN NOTE
This is severe sepsis with the following associated acute organ dysfunction(s): acute kidney failure, metabolic/septic encephalopathy.   Patient with severe sepsis and elevated lactate currently has resolved with fluid therapy off all pressors.  Source is controled with IR drainage  On piptazo and micafungin

## 2018-11-14 NOTE — CONSULTS
Surgical Consult    Date: 11/13/2018    Requesting Physician: Dr. Farnsworth    Consulting Physician: Jose Yang M.D. [facility]    Reason for consultation: abscess      HPI: This is a 73 y.o. female who is presenting with abdominal pain. She has a history of metastatic ampullary cancer, and has had ablation. She feels fairly comfortable now. Denies fevers but has had chills. No diarrhea or constipation.     Past Medical History:   Diagnosis Date   • Ampullary carcinoma (HCC)    • Anemia    • Arthritis     hands/fingers/right knee   • Asthma    • Blood clotting disorder (HCC)     blood clot 2016   • Cancer (HCC) 2016    Ampullary CA   • Cataract 09-    bilat.,no surgery   • Dental disorder     upper partial   • Encounter for antineoplastic chemotherapy 4/6/2018   • Heart burn    • High cholesterol    • Hypertension    • Indigestion    • Jaundice 2016       Past Surgical History:   Procedure Laterality Date   • CATH PLACEMENT Right 9/9/2016    Procedure: CATH PLACEMENT cephalic power port  ;  Surgeon: Kurtis Jacobs M.D.;  Location: SURGERY [facility];  Service:    • WHIPPLE PROCEDURE  8/15/2016    Procedure: Exploratoy Laparotomy, Da-en-y;  Surgeon: Kurtis Jacobs M.D.;  Location: SURGERY [facility];  Service:    • NODE DISSECTION  8/15/2016    Procedure: omental flap, Liver Wedge, cholecystectomy ;  Surgeon: Kurtis Jacobs M.D.;  Location: SURGERY [facility];  Service:    • STENT PLACEMENT  7/2016    gallbladder   • CATARACT EXTRACTION WITH IOL     • CHOLECYSTECTOMY     • TONSILLECTOMY         Current Facility-Administered Medications   Medication Dose Route Frequency Provider Last Rate Last Dose   • piperacillin-tazobactam (ZOSYN) 3.375 g in  mL IVPB  3.375 g Intravenous Q8HRS Yuli Olmedo M.D. 25 mL/hr at 11/13/18 2100 3.375 g at 11/13/18 2100   • senna-docusate (PERICOLACE or SENOKOT S) 8.6-50 MG per tablet 2 Tab  2 Tab Oral BID Ronald  NELIA King   Stopped at 11/12/18 1800    And   • polyethylene glycol/lytes (MIRALAX) PACKET 1 Packet  1 Packet Oral QDAY PRN Ronald King M.D.        And   • magnesium hydroxide (MILK OF MAGNESIA) suspension 30 mL  30 mL Oral QDAY PRN Ronald King M.D.        And   • bisacodyl (DULCOLAX) suppository 10 mg  10 mg Rectal QDAY PRN Ronald King M.D.       • Respiratory Care per Protocol   Nebulization Continuous RT Ronald King M.D.       • NS infusion   Intravenous Continuous Ronald King M.D. 100 mL/hr at 11/13/18 1855     • ondansetron (ZOFRAN) syringe/vial injection 4 mg  4 mg Intravenous Q4HRS PRN Ronald King M.D.       • ondansetron (ZOFRAN ODT) dispertab 4 mg  4 mg Oral Q4HRS PRN Ronald King M.D.       • acetaminophen (TYLENOL) tablet 650 mg  650 mg Oral Q6HRS PRN Ronald King M.D.   650 mg at 11/12/18 1555   • Pharmacy Consult Request ...Pain Management Review   Other PRN Ronald King M.D.        And   • oxyCODONE immediate-release (ROXICODONE) tablet 5 mg  5 mg Oral Q3HRS PRN Ronald King M.D.   5 mg at 11/13/18 1756    And   • oxyCODONE immediate-release (ROXICODONE) tablet 10 mg  10 mg Oral Q3HRS PRN Ronald King M.D.        And   • morphine (pf) 4 mg/ml injection 4 mg  4 mg Intravenous Q3HRS PRN Ronald King M.D.   2 mg at 11/13/18 1840   • NS (BOLUS) infusion 500 mL  500 mL Intravenous Once PRN Ronald King M.D.       • omeprazole (PRILOSEC) capsule 20 mg  20 mg Oral QDAY Ronald King M.D.   20 mg at 11/13/18 0550   • artificial tears 1.4 % ophthalmic solution 1-2 Drop  1-2 Drop Both Eyes Q2HRS PRN Ronald King M.D.       • albuterol inhaler 2 Puff  2 Puff Inhalation Q4HRS PRN Ronald King M.D.           Social History     Social History   • Marital status:      Spouse name: N/A   • Number of children: N/A   • Years of education: N/A     Occupational History   • retired       Social History Main Topics  "  • Smoking status: Never Smoker   • Smokeless tobacco: Never Used   • Alcohol use No   • Drug use: No   • Sexual activity: Not on file     Other Topics Concern   • Not on file     Social History Narrative   • No narrative on file       Family History   Problem Relation Age of Onset   • Cancer Other         unknown cancer       Allergies:  Tape    Review of Systems:  Constitutional: Negative for fever, weight loss, malaise/fatigue and diaphoresis. Positive for chills  HENT: Negative for hearing loss, ear pain, nosebleeds, congestion, sore throat, neck pain, tinnitus and ear discharge.    Eyes: Negative for blurred vision, double vision, photophobia, pain, discharge and redness.   Respiratory: Negative for cough, hemoptysis, sputum production, shortness of breath, wheezing and stridor.    Cardiovascular: Negative for chest pain, palpitations, orthopnea, claudication, leg swelling and PND.   Gastrointestinal: Negative for heartburn, nausea, vomiting,  diarrhea, constipation, blood in stool and melena.   Genitourinary: Negative for dysuria, urgency, frequency, hematuria and flank pain.   Musculoskeletal: Negative for myalgias, back pain, joint pain and falls.   Skin: Negative for itching and rash.  Neurological: Negative for dizziness, tingling, tremors, sensory change, speech change, focal weakness, seizures, loss of consciousness, weakness and headaches.   Endo/Heme/Allergies: Negative for environmental allergies and polydipsia. Does not bruise/bleed easily.   Psychiatric/Behavioral: Negative for depression, suicidal ideas, hallucinations, memory loss and substance abuse. The patient is not nervous/anxious and does not have insomnia.    Physical Exam:  Blood pressure 133/68, pulse 98, temperature 36.1 °C (97 °F), resp. rate (!) 22, height 1.549 m (5' 1\"), weight 64.4 kg (141 lb 15.6 oz), SpO2 96 %, not currently breastfeeding.    Constitutional: she is oriented to person, place, and time.  she appears well-developed " and well-nourished. No distress.   Head: Normocephalic and atraumatic.   Neck: Normal range of motion. Neck supple. No JVD present. No tracheal deviation present. No thyromegaly present.   Cardiovascular: Normal rate, regular rhythm, normal heart sounds and intact distal pulses.  Exam reveals no gallop and no friction rub.  No murmur heard.  Pulmonary/Chest: Effort normal and breath sounds normal. No stridor. No respiratory distress. she has no wheezes. She has no rales.   Abdominal: Soft. Bowel sounds are normal. she exhibits no distension and no mass. There is no tenderness. There is no rebound and no guarding.   Musculoskeletal: Normal range of motion. she exhibits no edema and no tenderness.   Neurological: she is alert and oriented to person, place, and time. she has normal reflexes. No cranial nerve deficit. Coordination normal.   Skin: Skin is warm and dry. No rash noted. she is not diaphoretic. No erythema. No pallor.   Psychiatric: she has a normal mood and affect.  Behavior is normal.       Labs:  Recent Labs      11/12/18   1039  11/13/18   0440   WBC  12.9*  22.0*   RBC  4.31  3.15*   HEMOGLOBIN  12.5  9.2*   HEMATOCRIT  38.6  27.7*   MCV  89.6  87.9   MCH  29.0  29.2   MCHC  32.4*  33.2*   RDW  48.9  47.8   PLATELETCT  391  209   MPV  9.8  9.8     Recent Labs      11/12/18   1039  11/13/18   0440   SODIUM  129*  135   POTASSIUM  4.7  3.6   CHLORIDE  99  111   CO2  13*  17*   GLUCOSE  171*  119*   BUN  12  9   CREATININE  1.05  0.59   CALCIUM  9.2  7.5*         Recent Labs      11/12/18   1039  11/13/18   0440   ASTSGOT  28  74*   ALTSGPT  16  28   TBILIRUBIN  0.7  0.3   ALKPHOSPHAT  218*  251*   GLOBULIN  5.8*  3.9*       Radiology:  CT:  1.  Interval significant increase in size of low-attenuation area in the dome of the right lobe of liver which contains air. This may represent necrosis or hemorrhage following therapy. Stable appearance of other smaller low-attenuation areas in the   right lobe of the  liver without evidence of progression of disease in these areas of metastasis. Differential diagnosis would include post therapy infection.    2.  Stable masslike density in the region of the ampulla of Vater consistent with primary carcinoma. Current dimensions are 3.1 x 2.6 cm.    3.  Persistent low-attenuation areas in the pancreatic head and proximal body which may represent secondary pancreatitis.    4.  Stable upper retroperitoneal adenopathy.    5.  Small umbilical hernia containing fat.    Assessment: This is a 73 y.o. With probable liver abscess likely related to treatment for her ampullary cancer. Appears clinically well now, hemodynamically stable.     Plan: I recommend IR consult for drainage of the abscess. Surgical intervention would likely be difficult, however if drain is not possible and/or patient clinically deteriorates, she may need exploration.     Thank you very much for this consultation.    Jose Yang M.D.  [facility]  [phone]

## 2018-11-14 NOTE — PROGRESS NOTES
CT guided drainage of hepatic fluid collection performed by Dr Murray via anterior RUQ access. Procedure BARs explained to patient and consent completed. Patient was continuously assessed and monitored throughout procedure; baseline ETCO2 32-35 with consistent waveform. #10F looped drain inserted and 70 ml thick bloody fluid aspirated, catheter secured to patient's skin with stayfix and attached to compressed bulb device for suction. Site CDI. Patient tolerated procedure quite well and was returned to her room in stable condition, drowsy but responding appropriately. Bedside handoff to Royer AVENDANO. Fluid sample to lab for culture.    [employer] Flexima APDL #10F x 25cm ref [id number] lot [id number]

## 2018-11-14 NOTE — PROGRESS NOTES
[facility] Medicine Daily Progress Note    Date of Service  11/14/2018    Chief Complaint  73 y.o. 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  She had been treated recently for e.coli bacteremia.  She presented to the emergency room on 11/12/18 with chills and malaise.  She was found to be tachycardic and have a leukocytosis with elevated lactic acid.  She was admitted to the ICU and treated for sepsis.  On 11/13/2018, she had a drain placed by interventional radiology to drain a fluid collection in her liver.      Interval Problem Update  Fatigued, still with chills and malaise  Mild generalized abdominal pain, feels like deep pressure, 3/10, no radiation  No N/V  UOP 1.8L over last 24 hours    Consultants/Specialty  Dr. Olmedo, Infectious disease    Code Status  Full Code    Disposition  Okay for medical bed, transfer orders written on 11/14/2018    Review of Systems  Review of Systems   Constitutional: Positive for malaise/fatigue and weight loss. Negative for chills and diaphoresis.   Cardiovascular: Negative for chest pain, palpitations and orthopnea.   Gastrointestinal: Positive for abdominal pain. Negative for heartburn, nausea and vomiting.   Musculoskeletal: Negative for back pain and neck pain.   Skin: Negative for itching and rash.   Neurological: Negative for weakness.        Physical Exam  Temp:  [36.1 °C (97 °F)-36.8 °C (98.2 °F)] 36.8 °C (98.2 °F)  Pulse:  [] 77  Resp:  [12-29] 13  BP: (106-134)/(61-75) 133/68    Physical Exam   Constitutional: She is oriented to person, place, and time. She appears well-developed and well-nourished.   HENT:   Head: Normocephalic and atraumatic.   Eyes: Conjunctivae and EOM are normal. Right eye exhibits no discharge. Left eye exhibits no discharge.   Cardiovascular: Normal rate, regular rhythm and intact distal pulses.    No murmur heard.  Pulmonary/Chest: Effort normal and breath  sounds normal. No respiratory distress. She has no wheezes.   Abdominal: Soft. Bowel sounds are normal. She exhibits no distension. There is tenderness. There is no rebound.   Mild tenderness to palpation in bilateral upper quadrants  Biliary drain in place, draining dark red fluid   Musculoskeletal: Normal range of motion. She exhibits no edema.   Neurological: She is alert and oriented to person, place, and time. No cranial nerve deficit.   Skin: Skin is warm and dry. She is not diaphoretic. No erythema.   Skin is warm and well perfused   Psychiatric: She has a normal mood and affect. Her behavior is normal. Judgment and thought content normal.       Fluids    Intake/Output Summary (Last 24 hours) at 11/14/18 1225  Last data filed at 11/14/18 0600   Gross per 24 hour   Intake             1200 ml   Output              805 ml   Net              395 ml       Laboratory  Recent Labs      11/12/18   1039  11/13/18   0440  11/14/18   0405   WBC  12.9*  22.0*  16.1*   RBC  4.31  3.15*  3.20*   HEMOGLOBIN  12.5  9.2*  8.9*   HEMATOCRIT  38.6  27.7*  28.7*   MCV  89.6  87.9  89.7   MCH  29.0  29.2  27.8   MCHC  32.4*  33.2*  31.0*   RDW  48.9  47.8  49.8   PLATELETCT  391  209  207   MPV  9.8  9.8  9.7     Recent Labs      11/12/18   1039  11/13/18   0440  11/14/18   0405   SODIUM  129*  135  134*   POTASSIUM  4.7  3.6  3.9   CHLORIDE  99  111  111   CO2  13*  17*  19*   GLUCOSE  171*  119*  121*   BUN  12  9  9   CREATININE  1.05  0.59  0.59   CALCIUM  9.2  7.5*  7.8*                   Imaging    CT Abdomen and Pelvis:  1.  Interval significant increase in size of low-attenuation area in the dome of the right lobe of liver which contains air. This may represent necrosis or hemorrhage following therapy. Stable appearance of other smaller low-attenuation areas in the   right lobe of the liver without evidence of progression of disease in these areas of metastasis. Differential diagnosis would include post therapy  infection.    2.  Stable masslike density in the region of the ampulla of Vater consistent with primary carcinoma. Current dimensions are 3.1 x 2.6 cm.    3.  Persistent low-attenuation areas in the pancreatic head and proximal body which may represent secondary pancreatitis.    4.  Stable upper retroperitoneal adenopathy.    5.  Small umbilical hernia containing fat.    Assessment/Plan  Liver abscess- (present on admission)   Assessment & Plan    Fluid collection in the liver drained by interventional radiology       Sepsis (HCC)- (present on admission)   Assessment & Plan    This is sepsis (without associated acute organ dysfunction).   Antibiotics     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)   Assessment & Plan    Stage IV  Radiation planned     Lactic acidosis- (present on admission)   Assessment & Plan    Improved with fluid resuscitation and treatment of infection     E coli bacteremia- (present on admission)   Assessment & Plan    Received treatment for E. coli bacteremia in early November, her port was removed  Returns with septic picture, blood culture from 11/12/2018 is positive  Query liver abscesses previously uncontrolled source, it is now been drained  Infectious disease following, continue Zosyn     Deep vein thrombosis ([facility]) [I82.409]- (present on admission)   Assessment & Plan    Restart anticoagulation with apixaban          VTE prophylaxis: Apixaban restarted, no need for additional DVT prophylaxis

## 2018-11-14 NOTE — CARE PLAN
Problem: Infection  Goal: Will remain free from infection  Assessed for SS of infection. Utilized hand hygiene and universal precautions. Educated family on infection prevention. Will continue to monitor.     Problem: Pain Management  Goal: Pain level will decrease to patient's comfort goal  Pain levels assessed. Both pharmacologic and non pharmacologic pain interventions utilized. Discussed pain goals with patient. Goal is comfort at rest

## 2018-11-14 NOTE — CARE PLAN
Problem: Nutritional:  Goal: Achieve adequate nutritional intake  Patient will consume 50% of meals.  Outcome: PROGRESSING AS EXPECTED

## 2018-11-14 NOTE — ASSESSMENT & PLAN NOTE
Blood cultures 11/12 with fungemia, pending identification, likely candida spp  On micafungin  Source likely liver/GI  ID following  Need ophtho eval at some point     Continue to monitor for possible seeding of liver and renal

## 2018-11-14 NOTE — CONSULTS
Critical Care Consultation    Date of consult: 11/14/2018    Referring Physician  Eber Chavez M.D.    Reason for Consultation  Lactic acidosis     History of Presenting Illness  73 y.o. female who presented 11/12/2018 with fever chills and rigors. She had a history of ampullary carcinoma recent E Coli bactermia that was just discharged on 11/10 on ceftriaxone. She as found to have a lactic acidosis and liver abscess so was admitted to ICU for monitoring. Yesterday underwent IR liver drainage for gas seen in the right liver lobe currently pending cultures. She feels improved today with poor appetite. Freida chest pain, shortness of breath, abdominal pain, nausea or vomiting.     Code Status  Full Code    Review of Systems  Review of Systems   Constitutional: Negative for chills and fever.   Respiratory: Negative for cough, shortness of breath and wheezing.    Gastrointestinal: Negative for abdominal pain, nausea and vomiting.   Neurological: Negative for weakness, numbness and headaches.   All other systems reviewed and are negative.      Past Medical History   has a past medical history of Ampullary carcinoma (HCC); Anemia; Arthritis; Asthma; Blood clotting disorder (HCC); Cancer (HCC) (2016); Cataract (09-); Dental disorder; Encounter for antineoplastic chemotherapy (4/6/2018); Heart burn; High cholesterol; Hypertension; Indigestion; and Jaundice (2016).    Surgical History   has a past surgical history that includes stent placement (7/2016); whipple procedure (8/15/2016); node dissection (8/15/2016); cholecystectomy; cath placement (Right, 9/9/2016); tonsillectomy; and cataract extraction with iol.    Family History  family history includes Cancer in her other.    Social History   reports that she has never smoked. She has never used smokeless tobacco. She reports that she does not drink alcohol or use drugs.    Medications  Home Medications     Reviewed by Zita Rodriges R.N. (Registered Nurse) on  11/12/18 at 1506  Med List Status: Complete   Medication Last Dose Status   cyclosporin (RESTASIS) 0.05 % ophthalmic emulsion 11/11/2018 Active   ELIQUIS 5 MG Tab 11/12/2018 Active   losartan-hydrochlorothiazide (HYZAAR) 50-12.5 MG per tablet 11/12/2018 Active   NS SOLN 100 mL with cefTRIAXone 2 GM SOLR 2 g 11/10/2018 Active   omeprazole (PRILOSEC) 20 MG delayed-release capsule 11/12/2018 Active   potassium chloride SA (K-DUR) 10 MEQ Tab CR 11/12/2018 Active   Vancomycin HCl (VANCOMYCIN 50 MG/ML) 50 mg/mL Solution 11/10/2018 Active              Current Facility-Administered Medications   Medication Dose Route Frequency Provider Last Rate Last Dose   • micafungin (MYCAMINE) 100 mg in D5W 100 mL ivpb  100 mg Intravenous Q24HRS Yuli Olmedo M.D. 100 mL/hr at 11/14/18 1416 100 mg at 11/14/18 1416   • apixaban (ELIQUIS) tablet 5 mg  5 mg Oral BID Victor Hugo Zaidi M.D.       • lactated ringers infusion   Intravenous Continuous Victor Hugo Zaidi M.D. 100 mL/hr at 11/14/18 1417     • piperacillin-tazobactam (ZOSYN) 3.375 g in  mL IVPB  3.375 g Intravenous Q8HRS Yuli Olmedo M.D.   Stopped at 11/14/18 0911   • senna-docusate (PERICOLACE or SENOKOT S) 8.6-50 MG per tablet 2 Tab  2 Tab Oral BID Ronald King M.D.   Stopped at 11/12/18 1800    And   • polyethylene glycol/lytes (MIRALAX) PACKET 1 Packet  1 Packet Oral QDAY PRN Ronald King M.D.        And   • magnesium hydroxide (MILK OF MAGNESIA) suspension 30 mL  30 mL Oral QDAY PRN Ronald King M.D.        And   • bisacodyl (DULCOLAX) suppository 10 mg  10 mg Rectal QDAY PRN Ronald King M.D.       • Respiratory Care per Protocol   Nebulization Continuous RT Ronald King M.D.       • ondansetron (ZOFRAN) syringe/vial injection 4 mg  4 mg Intravenous Q4HRS PRN Ronald King M.D.       • ondansetron (ZOFRAN ODT) dispertab 4 mg  4 mg Oral Q4HRS PRN Ronald King M.D.       • acetaminophen (TYLENOL) tablet 650 mg  650 mg Oral Q6HRS  PRN Ronald King M.D.   650 mg at 11/12/18 1555   • Pharmacy Consult Request ...Pain Management Review   Other PRN Ronald King M.D.        And   • oxyCODONE immediate-release (ROXICODONE) tablet 5 mg  5 mg Oral Q3HRS PRN Ronald King M.D.   5 mg at 11/13/18 1756    And   • oxyCODONE immediate-release (ROXICODONE) tablet 10 mg  10 mg Oral Q3HRS PRN Ronald King M.D.        And   • morphine (pf) 4 mg/ml injection 4 mg  4 mg Intravenous Q3HRS PRN Ronald King M.D.   2 mg at 11/13/18 1840   • NS (BOLUS) infusion 500 mL  500 mL Intravenous Once PRN Ronald King M.D.       • omeprazole (PRILOSEC) capsule 20 mg  20 mg Oral QDAY Ronald King M.D.   20 mg at 11/14/18 0511   • artificial tears 1.4 % ophthalmic solution 1-2 Drop  1-2 Drop Both Eyes Q2HRS PRN Ronald King M.D.       • albuterol inhaler 2 Puff  2 Puff Inhalation Q4HRS PRJOSIAH King M.D.           Allergies  Allergies   Allergen Reactions   • Tape Unspecified     Sensitive skin       Vital Signs last 24 hours  Temp:  [36.1 °C (97 °F)-36.8 °C (98.2 °F)] 36.8 °C (98.2 °F)  Pulse:  [] 77  Resp:  [12-29] 13  BP: (106-134)/(61-75) 133/68    Physical Exam  Physical Exam   Constitutional: She is oriented to person, place, and time. She appears well-developed.   No acute distress sitting upright   HENT:   Head: Normocephalic.   Eyes: Pupils are equal, round, and reactive to light. Conjunctivae are normal. Right eye exhibits no discharge. Left eye exhibits no discharge.   Neck: Normal range of motion.   Cardiovascular: Normal rate and regular rhythm.  Exam reveals no friction rub.    No murmur heard.  Pulmonary/Chest: Effort normal and breath sounds normal. No respiratory distress. She has no wheezes. She has no rales.   Abdominal: She exhibits no distension. There is tenderness. There is no rebound and no guarding.   IR catheter in the upper abdomen with some serosanguineous bloody drainage mild epigastric tenderness    Musculoskeletal: She exhibits no edema.   Neurological: She is alert and oriented to person, place, and time. No cranial nerve deficit. Coordination normal.   5/5 upper and lower extremity strengths normal sensation throughout   Skin: Skin is warm and dry.   Psychiatric: She has a normal mood and affect. Her behavior is normal. Thought content normal.       Fluids    Intake/Output Summary (Last 24 hours) at 11/14/18 1441  Last data filed at 11/14/18 0600   Gross per 24 hour   Intake              600 ml   Output              685 ml   Net              -85 ml       Laboratory  Recent Results (from the past 48 hour(s))   Lactic Acid -STAT Once    Collection Time: 11/12/18  5:50 PM   Result Value Ref Range    Lactic Acid 2.9 (H) 0.5 - 2.0 mmol/L   Lactic Acid Every four hours after STAT order    Collection Time: 11/12/18  9:30 PM   Result Value Ref Range    Lactic Acid 2.8 (H) 0.5 - 2.0 mmol/L   Lactic Acid Every four hours after STAT order    Collection Time: 11/13/18  4:40 AM   Result Value Ref Range    Lactic Acid 1.2 0.5 - 2.0 mmol/L   CBC with Differential    Collection Time: 11/13/18  4:40 AM   Result Value Ref Range    WBC 22.0 (H) 4.8 - 10.8 K/uL    RBC 3.15 (L) 4.20 - 5.40 M/uL    Hemoglobin 9.2 (L) 12.0 - 16.0 g/dL    Hematocrit 27.7 (L) 37.0 - 47.0 %    MCV 87.9 81.4 - 97.8 fL    MCH 29.2 27.0 - 33.0 pg    MCHC 33.2 (L) 33.6 - 35.0 g/dL    RDW 47.8 35.9 - 50.0 fL    Platelet Count 209 164 - 446 K/uL    MPV 9.8 9.0 - 12.9 fL    Neutrophils-Polys 90.90 (H) 44.00 - 72.00 %    Lymphocytes 2.20 (L) 22.00 - 41.00 %    Monocytes 5.90 0.00 - 13.40 %    Eosinophils 0.00 0.00 - 6.90 %    Basophils 0.30 0.00 - 1.80 %    Immature Granulocytes 0.70 0.00 - 0.90 %    Nucleated RBC 0.00 /100 WBC    Neutrophils (Absolute) 20.03 (H) 2.00 - 7.15 K/uL    Lymphs (Absolute) 0.48 (L) 1.00 - 4.80 K/uL    Monos (Absolute) 1.29 (H) 0.00 - 0.85 K/uL    Eos (Absolute) 0.00 0.00 - 0.51 K/uL    Baso (Absolute) 0.06 0.00 - 0.12 K/uL     Immature Granulocytes (abs) 0.15 (H) 0.00 - 0.11 K/uL    NRBC (Absolute) 0.00 K/uL   Comp Metabolic Panel (CMP)    Collection Time: 11/13/18  4:40 AM   Result Value Ref Range    Sodium 135 135 - 145 mmol/L    Potassium 3.6 3.6 - 5.5 mmol/L    Chloride 111 96 - 112 mmol/L    Co2 17 (L) 20 - 33 mmol/L    Anion Gap 7.0 0.0 - 11.9    Glucose 119 (H) 65 - 99 mg/dL    Bun 9 8 - 22 mg/dL    Creatinine 0.59 0.50 - 1.40 mg/dL    Calcium 7.5 (L) 8.5 - 10.5 mg/dL    AST(SGOT) 74 (H) 12 - 45 U/L    ALT(SGPT) 28 2 - 50 U/L    Alkaline Phosphatase 251 (H) 30 - 99 U/L    Total Bilirubin 0.3 0.1 - 1.5 mg/dL    Albumin 1.9 (L) 3.2 - 4.9 g/dL    Total Protein 5.8 (L) 6.0 - 8.2 g/dL    Globulin 3.9 (H) 1.9 - 3.5 g/dL    A-G Ratio 0.5 g/dL   ESTIMATED GFR    Collection Time: 11/13/18  4:40 AM   Result Value Ref Range    GFR If African American >60 >60 mL/min/1.73 m 2    GFR If Non African American >60 >60 mL/min/1.73 m 2   GRAM STAIN    Collection Time: 11/13/18  5:10 PM   Result Value Ref Range    Significant Indicator .     Source WND     Site Liver Abscess     Gram Stain Result       Few WBCs.  Many Gram negative rods.  Moderate Gram positive rods.     CBC WITHOUT DIFFERENTIAL    Collection Time: 11/14/18  4:05 AM   Result Value Ref Range    WBC 16.1 (H) 4.8 - 10.8 K/uL    RBC 3.20 (L) 4.20 - 5.40 M/uL    Hemoglobin 8.9 (L) 12.0 - 16.0 g/dL    Hematocrit 28.7 (L) 37.0 - 47.0 %    MCV 89.7 81.4 - 97.8 fL    MCH 27.8 27.0 - 33.0 pg    MCHC 31.0 (L) 33.6 - 35.0 g/dL    RDW 49.8 35.9 - 50.0 fL    Platelet Count 207 164 - 446 K/uL    MPV 9.7 9.0 - 12.9 fL   BASIC METABOLIC PANEL    Collection Time: 11/14/18  4:05 AM   Result Value Ref Range    Sodium 134 (L) 135 - 145 mmol/L    Potassium 3.9 3.6 - 5.5 mmol/L    Chloride 111 96 - 112 mmol/L    Co2 19 (L) 20 - 33 mmol/L    Glucose 121 (H) 65 - 99 mg/dL    Bun 9 8 - 22 mg/dL    Creatinine 0.59 0.50 - 1.40 mg/dL    Calcium 7.8 (L) 8.5 - 10.5 mg/dL    Anion Gap 4.0 0.0 - 11.9   ESTIMATED GFR     Collection Time: 11/14/18  4:05 AM   Result Value Ref Range    GFR If African American >60 >60 mL/min/1.73 m 2    GFR If Non African American >60 >60 mL/min/1.73 m 2       Imaging  CT-DRAIN-LIVER ABSCESS-CYST   Final Result      Successful CT-guided drain placement in the right lobe liver necrosis/abscess.      CT-ABDOMEN-PELVIS WITH   Final Result      1.  Interval significant increase in size of low-attenuation area in the dome of the right lobe of liver which contains air. This may represent necrosis or hemorrhage following therapy. Stable appearance of other smaller low-attenuation areas in the    right lobe of the liver without evidence of progression of disease in these areas of metastasis. Differential diagnosis would include post therapy infection.      2.  Stable masslike density in the region of the ampulla of Vater consistent with primary carcinoma. Current dimensions are 3.1 x 2.6 cm.      3.  Persistent low-attenuation areas in the pancreatic head and proximal body which may represent secondary pancreatitis.      4.  Stable upper retroperitoneal adenopathy.      5.  Small umbilical hernia containing fat.      DX-CHEST-PORTABLE (1 VIEW)   Final Result      No pulmonary consolidation.          Assessment/Plan  Liver abscess- (present on admission)   Assessment & Plan    Status post IR drainage 11/13   Continue broad-spectrum antimicrobials follow-up on cultures  Currently with 40 mL's every 2 hours from catheter continue to monitor output     Sepsis (HCC)- (present on admission)   Assessment & Plan    This is severe sepsis with the following associated acute organ dysfunction(s): acute kidney failure, metabolic/septic encephalopathy.   Patient with severe sepsis and elevated lactate currently has resolved with fluid therapy off all pressors.  Continue with broad-spectrum antibiotic source control with IR drainage continue to follow up on cultures    Patient has no chronic indwelling catheters prior to  IR.      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)   Assessment & Plan    Continue management as outpatient     Fungemia   Assessment & Plan    Blood cultures 11/12 with fungal anemia  Started on Micafungin continue every 48 blood cultures to time of clearing  Possible ophthalmology evaluation if any clinical symptoms of ocular in nature or 7-10 days out    Continue to monitor for possible seeding of liver and renal     Lactic acidosis- (present on admission)   Assessment & Plan    Currently resolved continue to monitor and resuscitate with fluid therapy as necessary     E coli bacteremia- (present on admission)   Assessment & Plan    Continue Zosyn   Follow up on cultures of liver abscess     Deep vein thrombosis (CMS-HCC) [I82.409]- (present on admission)   Assessment & Plan    Continue home dose Eliquis         Discussed patient condition and risk of morbidity and/or mortality with Hospitalist, RN, RT, Pharmacy, Charge nurse / hot rounds and Patient.

## 2018-11-15 NOTE — PROGRESS NOTES
Izabela from Lab called with critical result of Blood culture, anaerobic gram + haley  at 1202. Critical lab result read back to Damion. Dr. Chavez notified of critical lab result at 1205.  Critical lab result read back by Dr. Chavez.

## 2018-11-15 NOTE — PROGRESS NOTES
Assumed care, received report from Primitivo AVENDANO,pt on monitor, resting in bed, call light within reach, bed alarm in place, POC discussed, no additional questions at this time, will continue to monitor.

## 2018-11-15 NOTE — PROGRESS NOTES
Critical Care Progress Note    Date of admission  11/12/2018    Chief Complaint  73 y.o. female admitted 11/12/2018 with chills    Hospital Course  73 y.o. female who presented 11/12/2018 with fever chills and rigors. She had a history of ampullary carcinoma recent E Coli bactermia that was just discharged on 11/10 on ceftriaxone. She as found to have a lactic acidosis and liver abscess so was admitted to ICU for monitoring. Yesterday underwent IR liver drainage for gas seen in the right liver lobe currently pending cultures. She feels improved today with poor appetite. Freida chest pain, shortness of breath, abdominal pain, nausea or vomiting.    Interval Problem Update  Reviewed last 24 hour events:  No overnight events  snr 70-90's  's  tmax 97  Last BM yesterday   Regular diet  Urine output good this am  Peripheral IV   LR 75ml/hr  eliquis   Lopez d/c  Mobilize  JACQUELINE 45ml 25ml this am  ppi home med  Zosyn micomine    Review of Systems  Review of Systems   Constitutional: Negative for chills and fever.   Respiratory: Negative for shortness of breath.    Cardiovascular: Negative for chest pain and leg swelling.   Gastrointestinal: Negative for abdominal pain, constipation, diarrhea, nausea and vomiting.   Neurological: Negative for weakness.   All other systems reviewed and are negative.       Vital Signs for last 24 hours   Temp:  [36.1 °C (97 °F)-36.9 °C (98.5 °F)] 36.1 °C (97 °F)  Pulse:  [69-83] 72  Resp:  [14-26] 17    Hemodynamic parameters for last 24 hours       Respiratory       Physical Exam   Physical Exam   Constitutional: She is oriented to person, place, and time. She appears well-developed and well-nourished.   Elderly female in no acute distress   HENT:   Head: Normocephalic.   Eyes: Pupils are equal, round, and reactive to light.   Neck: No JVD present.   Cardiovascular: Normal rate and regular rhythm.    No murmur heard.  Pulmonary/Chest: Effort normal and breath sounds normal. No  respiratory distress. She has no wheezes. She has no rales.   Abdominal: Bowel sounds are normal. She exhibits no distension. There is no tenderness. There is no rebound.   Upper abdomen IR placed drain with minimal output   Musculoskeletal: She exhibits no edema.   Moves all extremities   Neurological: She is alert and oriented to person, place, and time. No cranial nerve deficit.   Neurologically intact 5 out of 5 upper and lower extreme strength   Skin: Skin is warm and dry.   Psychiatric: She has a normal mood and affect. Her behavior is normal.       Medications  Current Facility-Administered Medications   Medication Dose Route Frequency Provider Last Rate Last Dose   • micafungin (MYCAMINE) 100 mg in D5W 100 mL ivpb  100 mg Intravenous Q24HRS Yuli Olmedo M.D.   Stopped at 11/15/18 0705   • apixaban (ELIQUIS) tablet 5 mg  5 mg Oral BID Victor Hugo Zaidi M.D.   5 mg at 11/15/18 1714   • lactated ringers infusion   Intravenous Continuous Victor Hugo Zaidi M.D. 100 mL/hr at 11/15/18 1031     • piperacillin-tazobactam (ZOSYN) 3.375 g in  mL IVPB  3.375 g Intravenous Q8HRS Yuli Olmedo M.D. 25 mL/hr at 11/15/18 1235 3.375 g at 11/15/18 1235   • senna-docusate (PERICOLACE or SENOKOT S) 8.6-50 MG per tablet 2 Tab  2 Tab Oral BID Ronald King M.D.   Stopped at 11/12/18 1800    And   • polyethylene glycol/lytes (MIRALAX) PACKET 1 Packet  1 Packet Oral QDAY PRN Ronald King M.D.        And   • magnesium hydroxide (MILK OF MAGNESIA) suspension 30 mL  30 mL Oral QDAY PRN Ronald King M.D.        And   • bisacodyl (DULCOLAX) suppository 10 mg  10 mg Rectal QDAY PRN Ronald King M.D.       • Respiratory Care per Protocol   Nebulization Continuous RT Ronald King M.D.       • ondansetron (ZOFRAN) syringe/vial injection 4 mg  4 mg Intravenous Q4HRS PRN Ronald King M.D.       • ondansetron (ZOFRAN ODT) dispertab 4 mg  4 mg Oral Q4HRS PRN Ronald King M.D.       • acetaminophen  (TYLENOL) tablet 650 mg  650 mg Oral Q6HRS PRN Ronald King M.D.   650 mg at 11/12/18 1555   • Pharmacy Consult Request ...Pain Management Review   Other PRN Ronald King M.D.        And   • oxyCODONE immediate-release (ROXICODONE) tablet 5 mg  5 mg Oral Q3HRS PRN Ronald King M.D.   5 mg at 11/15/18 1551    And   • oxyCODONE immediate-release (ROXICODONE) tablet 10 mg  10 mg Oral Q3HRS PRN Ronald King M.D.        And   • morphine (pf) 4 mg/ml injection 4 mg  4 mg Intravenous Q3HRS PRN Ronald King M.D.   2 mg at 11/13/18 1840   • NS (BOLUS) infusion 500 mL  500 mL Intravenous Once PRN Ronald King M.D.       • omeprazole (PRILOSEC) capsule 20 mg  20 mg Oral QDAY Ronald King M.D.   20 mg at 11/15/18 0504   • artificial tears 1.4 % ophthalmic solution 1-2 Drop  1-2 Drop Both Eyes Q2HRS PRN Ronald King M.D.       • albuterol inhaler 2 Puff  2 Puff Inhalation Q4HRS PRN Ronald King M.D.           Fluids    Intake/Output Summary (Last 24 hours) at 11/15/18 1753  Last data filed at 11/15/18 1600   Gross per 24 hour   Intake          4678.34 ml   Output             1845 ml   Net          2833.34 ml       Laboratory          Recent Labs      11/13/18   0440  11/14/18   0405  11/15/18   0500   SODIUM  135  134*  134*   POTASSIUM  3.6  3.9  3.3*   CHLORIDE  111  111  109   CO2  17*  19*  18*   BUN  9  9  6*   CREATININE  0.59  0.59  0.47*   CALCIUM  7.5*  7.8*  7.5*     Recent Labs      11/13/18   0440  11/14/18   0405  11/15/18   0500   ALTSGPT  28   --    --    ASTSGOT  74*   --    --    ALKPHOSPHAT  251*   --    --    TBILIRUBIN  0.3   --    --    GLUCOSE  119*  121*  93     Recent Labs      11/13/18   0440  11/14/18   0405  11/15/18   0500   WBC  22.0*  16.1*  13.3*   NEUTSPOLYS  90.90*   --    --    LYMPHOCYTES  2.20*   --    --    MONOCYTES  5.90   --    --    EOSINOPHILS  0.00   --    --    BASOPHILS  0.30   --    --    ASTSGOT  74*   --    --    ALTSGPT  28   --    --     ALKPHOSPHAT  251*   --    --    TBILIRUBIN  0.3   --    --      Recent Labs      11/13/18   0440  11/14/18   0405  11/15/18   0500   RBC  3.15*  3.20*  3.35*   HEMOGLOBIN  9.2*  8.9*  9.5*   HEMATOCRIT  27.7*  28.7*  29.9*   PLATELETCT  209  207  220       Imaging  CT:    Reviewed    Assessment/Plan  Liver abscess- (present on admission)   Assessment & Plan    Status post IR drainage 11/13   Continue broad-spectrum antimicrobials follow-up on cultures  IR catheter drainage decreased from yesterday 25 mL's this shift    Initial culture showing yeast and gram-positive rods continue to monitor and follow-up on speciation       Sepsis (HCC)- (present on admission)   Assessment & Plan    This is severe sepsis with the following associated acute organ dysfunction(s): acute kidney failure, metabolic/septic encephalopathy.   Patient with severe sepsis and elevated lactate currently has resolved with fluid therapy off all pressors.  Continue with broad-spectrum antibiotic source control with IR drainage continue to follow up on cultures    Patient has no chronic indwelling catheters prior to IR.      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)   Assessment & Plan    Continue management as outpatient     Fungemia   Assessment & Plan    Blood cultures 11/12 with fungal anemia  Started on Micafungin continue every 48 blood cultures to time of clearing  Possible ophthalmology evaluation if any clinical symptoms of ocular in nature or 7-10 days out    Continue to monitor for possible seeding of liver and renal     Lactic acidosis- (present on admission)   Assessment & Plan    Currently resolved continue to monitor and resuscitate with fluid therapy as necessary     E coli bacteremia- (present on admission)   Assessment & Plan    Continue Zosyn   Follow up on cultures of liver abscess     Deep vein thrombosis (CMS-HCC) [I82.409]- (present on admission)   Assessment & Plan    Continue home dose Eliquis        Stable for  floor transfer    VTE:  NOAC  Ulcer: Not Indicated  Lines: None    I have performed a physical exam and reviewed and updated ROS and Plan today (11/15/2018). In review of yesterday's note (11/14/2018), there are no changes except as documented above.     Discussed patient condition and risk of morbidity and/or mortality with Hospitalist, RN, RT, Pharmacy, Charge nurse / hot rounds and Patient     Victor Hugo Zaidi MD  Critical Care Medicine

## 2018-11-15 NOTE — CARE PLAN
Problem: Safety  Goal: Will remain free from injury  Pt re educated on necessity of calling before getting out of bed pt verbalized understanding calls appropriately     Problem: Pain Management  Goal: Pain level will decrease to patient's comfort goal  Pain assessed both pharmacologic and non pharmacologic pain management techniques utilized. Pt reports pain is tolerable with current interventions in place

## 2018-11-15 NOTE — CARE PLAN
Problem: Safety  Goal: Will remain free from injury  Fall precautions remain in place: treaded socks on pt, appropriate signs on doorway, IV pole on side of bathroom, lowest bed rails down, bed in lowest position and locked, call light and phone within reach, bed alarm active.      Problem: Knowledge Deficit  Goal: Knowledge of disease process/condition, treatment plan, diagnostic tests, and medications will improve  Education provided to pt regaurding plan of care and medications.  Questions answered, pt agrees with plan of care.

## 2018-11-16 NOTE — CARE PLAN
Problem: Safety  Goal: Will remain free from injury  Treaded socks on, bed alarm on, call light within reach, room signage on outside of room updated to correct mobility needs    Problem: Respiratory:  Goal: Respiratory status will improve  TCDB, lung sounds clear, no oxygen required

## 2018-11-16 NOTE — PROGRESS NOTES
[facility] Medicine Daily Progress Note    Date of Service  11/15/2018    Chief Complaint  73 y.o. 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  She had been treated recently for e.coli bacteremia.  She presented to the emergency room on 11/12/18 with chills and malaise.  She was found to be tachycardic and have a leukocytosis with elevated lactic acid.  She was admitted to the ICU and treated for sepsis.  On 11/13/2018, she had a drain placed by interventional radiology to drain a fluid collection in her liver.      Interval Problem Update  Sleepy, still with some malaise and intermittent chills  Minimal abdominal pain  No N/V    Consultants/Specialty  Dr. Olmedo, Infectious disease    Code Status  Full Code    Disposition  Okay for medical bed, transfer orders written on 11/14/2018    Review of Systems  Review of Systems   Constitutional: Positive for chills, malaise/fatigue and weight loss. Negative for diaphoresis.   Cardiovascular: Negative for chest pain, palpitations and orthopnea.   Gastrointestinal: Positive for abdominal pain. Negative for heartburn, nausea and vomiting.   Musculoskeletal: Negative for back pain and neck pain.   Skin: Negative for itching and rash.        Physical Exam  Temp:  [36.1 °C (97 °F)-36.7 °C (98.1 °F)] 36.1 °C (97 °F)  Pulse:  [69-83] 72  Resp:  [14-26] 17    Physical Exam   Constitutional: She is oriented to person, place, and time. She appears well-developed and well-nourished.   HENT:   Head: Normocephalic and atraumatic.   Eyes: Conjunctivae and EOM are normal. Right eye exhibits no discharge. Left eye exhibits no discharge.   Cardiovascular: Normal rate, regular rhythm and intact distal pulses.    No murmur heard.  Pulmonary/Chest: Effort normal and breath sounds normal. No respiratory distress. She has no wheezes.   Abdominal: Soft. Bowel sounds are normal. She exhibits no distension. There is  tenderness. There is no rebound.   Mild tenderness to palpation in bilateral upper quadrants  Biliary drain in place, draining dark red fluid   Musculoskeletal: Normal range of motion. She exhibits no edema.   Neurological: She is alert and oriented to person, place, and time. No cranial nerve deficit.   Skin: Skin is warm and dry. She is not diaphoretic. No erythema.   Skin is warm and well perfused   Psychiatric: She has a normal mood and affect. Her behavior is normal. Judgment and thought content normal.       Fluids    Intake/Output Summary (Last 24 hours) at 11/15/18 1828  Last data filed at 11/15/18 1600   Gross per 24 hour   Intake             2500 ml   Output             1745 ml   Net              755 ml       Laboratory  Recent Labs      11/13/18   0440  11/14/18   0405  11/15/18   0500   WBC  22.0*  16.1*  13.3*   RBC  3.15*  3.20*  3.35*   HEMOGLOBIN  9.2*  8.9*  9.5*   HEMATOCRIT  27.7*  28.7*  29.9*   MCV  87.9  89.7  89.3   MCH  29.2  27.8  28.4   MCHC  33.2*  31.0*  31.8*   RDW  47.8  49.8  49.7   PLATELETCT  209  207  220   MPV  9.8  9.7  10.2     Recent Labs      11/13/18   0440  11/14/18   0405  11/15/18   0500   SODIUM  135  134*  134*   POTASSIUM  3.6  3.9  3.3*   CHLORIDE  111  111  109   CO2  17*  19*  18*   GLUCOSE  119*  121*  93   BUN  9  9  6*   CREATININE  0.59  0.59  0.47*   CALCIUM  7.5*  7.8*  7.5*                   Imaging    CT Abdomen and Pelvis:  1.  Interval significant increase in size of low-attenuation area in the dome of the right lobe of liver which contains air. This may represent necrosis or hemorrhage following therapy. Stable appearance of other smaller low-attenuation areas in the   right lobe of the liver without evidence of progression of disease in these areas of metastasis. Differential diagnosis would include post therapy infection.    2.  Stable masslike density in the region of the ampulla of Vater consistent with primary carcinoma. Current dimensions are 3.1 x  2.6 cm.    3.  Persistent low-attenuation areas in the pancreatic head and proximal body which may represent secondary pancreatitis.    4.  Stable upper retroperitoneal adenopathy.    5.  Small umbilical hernia containing fat.    Assessment/Plan  Liver abscess- (present on admission)   Assessment & Plan    Fluid collection in the liver drained by interventional radiology  ID following  Continue zosyn     Sepsis (HCC)- (present on admission)   Assessment & Plan    This is sepsis (without associated acute organ dysfunction).   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 coli bacteremia in early November, her port was removed  Returns with septic picture, blood culture from 11/12/2018 is positive  ID following, continue zosyn     Deep vein thrombosis (CMS-HCC) [I82.409]- (present on admission)   Assessment & Plan    Apixaban          VTE prophylaxis: Apixaban restarted, no need for additional DVT prophylaxis

## 2018-11-16 NOTE — PROGRESS NOTES
Infectious Disease Progress Note    Author: Yuli Olmedo M.D. Date & Time of service: 11/15/2018  5:08 PM    Chief Complaint:  Generalized weakness and chills    Interval History:  73-year-old female with metastatic ampullary carcinoma of the pancreas presented with generalized weakness and shaking chills.  2018-no fevers.  Complains of generalized malaise and fatigue.  Still has some pain in her right shoulder.  WBC count is 22,000.  Cultures remain negative.  2018 T-max is 98.2.  No new issues overnight.  Underwent IR drainage on 2018.  Her shoulder pain has eased since then.  11/15/2018 no fevers.  WBC 13.3 creatinine 0.47  Labs Reviewed, Medications Reviewed and Radiology Reviewed.    Review of Systems:  Review of Systems   Constitutional: Positive for malaise/fatigue. Negative for fever.   HENT: Negative for hearing loss.    Respiratory: Negative for cough and shortness of breath.    Cardiovascular: Negative for chest pain and leg swelling.   Gastrointestinal: Negative for abdominal pain, nausea and vomiting.   Genitourinary: Negative for dysuria.   Musculoskeletal: Positive for myalgias.        Still has rt shoulder pain   Neurological: Negative for sensory change and speech change.       Hemodynamics:  Temp (24hrs), Av.5 °C (97.7 °F), Min:36.1 °C (97 °F), Max:36.9 °C (98.5 °F)  Temperature: 36.1 °C (97 °F)  Pulse  Av.8  Min: 67  Max: 109Heart Rate (Monitored): 72  NIBP: 137/66       Physical Exam:  Physical Exam   Constitutional: She is oriented to person, place, and time. No distress.   HENT:   Mouth/Throat: No oropharyngeal exudate.   Eyes: No scleral icterus.   Neck: Neck supple.   Cardiovascular: Regular rhythm.    No murmur heard.  Pulmonary/Chest: She has no wheezes. She has no rales.   Abdominal: Soft. There is no tenderness. There is no rebound.   Right upper quadrant drain with serosanguineous discharge   Musculoskeletal: She exhibits no edema.   Neurological: She is  alert and oriented to person, place, and time.   Skin: No rash noted. No erythema.   Vitals reviewed.      Meds:    Current Facility-Administered Medications:   •  potassium chloride SA  •  micafungin  •  apixaban  •  LR  •  [COMPLETED] piperacillin-tazobactam **AND** piperacillin-tazobactam  •  senna-docusate **AND** polyethylene glycol/lytes **AND** magnesium hydroxide **AND** bisacodyl  •  Respiratory Care per Protocol  •  ondansetron  •  ondansetron  •  acetaminophen  •  Notify provider if pain remains uncontrolled **AND** Use the numeric rating scale (NRS-11) on regular floors and Critical-Care Pain Observation Tool (CPOT) on ICUs/Trauma to assess pain **AND** Pulse Ox (Oximetry) **AND** Pharmacy Consult Request **AND** If patient difficult to arouse and/or has respiratory depression, stop any opiates that are currently infusing and call a Rapid Response. **AND** oxyCODONE immediate-release **AND** oxyCODONE immediate-release **AND** morphine injection  •  NS  •  omeprazole  •  artificial tears  •  albuterol    Labs:  Recent Labs      11/13/18   0440  11/14/18   0405  11/15/18   0500   WBC  22.0*  16.1*  13.3*   RBC  3.15*  3.20*  3.35*   HEMOGLOBIN  9.2*  8.9*  9.5*   HEMATOCRIT  27.7*  28.7*  29.9*   MCV  87.9  89.7  89.3   MCH  29.2  27.8  28.4   RDW  47.8  49.8  49.7   PLATELETCT  209  207  220   MPV  9.8  9.7  10.2   NEUTSPOLYS  90.90*   --    --    LYMPHOCYTES  2.20*   --    --    MONOCYTES  5.90   --    --    EOSINOPHILS  0.00   --    --    BASOPHILS  0.30   --    --      Recent Labs      11/13/18   0440  11/14/18   0405  11/15/18   0500   SODIUM  135  134*  134*   POTASSIUM  3.6  3.9  3.3*   CHLORIDE  111  111  109   CO2  17*  19*  18*   GLUCOSE  119*  121*  93   BUN  9  9  6*     Recent Labs      11/13/18 0440 11/14/18 0405  11/15/18   0500   ALBUMIN  1.9*   --    --    TBILIRUBIN  0.3   --    --    ALKPHOSPHAT  251*   --    --    TOTPROTEIN  5.8*   --    --    ALTSGPT  28   --    --    ASTSGOT   74*   --    --    CREATININE  0.59  0.59  0.47*       Imaging:  Ct-abdomen-pelvis With    Result Date: 11/12/2018 11/12/2018 1:38 PM HISTORY/REASON FOR EXAM:  Abdominal pain. History of metastatic ampullary adenocarcinoma. TECHNIQUE/EXAM DESCRIPTION: CT scan of the abdomen and pelvis with contrast. Contrast-enhanced helical scanning was obtained from the diaphragmatic domes through the pubic symphysis following the bolus administration of 80 mL of Omnipaque 350 without complication. Low dose optimization technique was utilized for this CT exam including automated exposure control and adjustment of the mA and/or kV according to patient size. COMPARISON: 10/25/2018 FINDINGS: The visualized lung bases are clear. CT Abdomen: A small hiatal hernia is present. There is interval increase in size of an area of low attenuation in the dome of the right lobe of the liver which contains air. This may represent necrosis or post therapy hemorrhage following embolization. This area measures 8.8 x 7.8 x 6.4 cm. Previous measurement was 2.2 cm in maximum dimension. Multiple smaller areas of low-attenuation within the right lobe of liver are present which are suspicious for hepatic metastases. These other smaller low-attenuation areas are without significant change. The gallbladder absent. A low-attenuation area in the region of the pancreatic head is again noted and is poorly defined measuring approximately 3.1 x 2.6 cm in diameter. This is consistent with the area of ampullary carcinoma. Embolization coils are present in the GDA. There is low attenuation in the pancreatic body proximally which may represent low-attenuation  secondary to pancreatitis or spread of neoplasm. The common duct is dilated proximal to the level of the ampulla measuring a maximum diameter of 1.6 cm. No choledocholithiasis is identified. An enlarged retroperitoneal lymph node between the upper aorta and IVC is unchanged measuring 1.5 cm in diameter. No new  area of retroperitoneal adenopathy is identified. No peripancreatic adenopathy is present. The spleen appears normal. The splenic vein and portal vein are patent. The adrenal glands are not enlarged and the kidneys enhance symmetrically. Small simple cysts in the right kidney are stable. There is no lymphadenopathy. The aorta and IVC are normal in caliber. The bowel is without obstruction. The appendix appears normal. There is a small umbilical hernia containing fat. CT Pelvis: There is no lymphadenopathy. No free fluid is present. The bladder appears normal. Uterus as endometrial complex thickening. There is a myomatous type calcification in the right aspect of the uterus which is unchanged. There is no acute inflammatory process.     1.  Interval significant increase in size of low-attenuation area in the dome of the right lobe of liver which contains air. This may represent necrosis or hemorrhage following therapy. Stable appearance of other smaller low-attenuation areas in the right lobe of the liver without evidence of progression of disease in these areas of metastasis. Differential diagnosis would include post therapy infection. 2.  Stable masslike density in the region of the ampulla of Vater consistent with primary carcinoma. Current dimensions are 3.1 x 2.6 cm. 3.  Persistent low-attenuation areas in the pancreatic head and proximal body which may represent secondary pancreatitis. 4.  Stable upper retroperitoneal adenopathy. 5.  Small umbilical hernia containing fat.    Ct-abdomen-pelvis With    Result Date: 10/25/2018  10/25/2018 4:28 PM HISTORY/REASON FOR EXAM:  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 Patient is status post embolization.. TECHNIQUE/EXAM DESCRIPTION:   CT scan of the abdomen and pelvis with contrast. Contrast-enhanced helical scanning was obtained from the diaphragmatic domes through the pubic symphysis following the bolus administration of nonionic contrast  without complication. 80 mL of Omnipaque 350 nonionic contrast was administered without complication. Low dose optimization technique was utilized for this CT exam including automated exposure control and adjustment of the mA and/or kV according to patient size. COMPARISON: PET/CT 7/12/2018, MRI 8/6/2018 and CT abdomen 3/27/2018 FINDINGS: The visualized lung bases are unremarkable. CT Abdomen: There are several hypodense lesions in the hepatic dome with a couple of foci of air. A hypodense mass within air-fluid level in the right hepatic lobe, segment 8, measures approximately 2.1 cm. No significant rim enhancement is identified. There are new  hypodense lesions in the anterior right hepatic lobe measuring up to approximately 7 mm. There is pneumobilia, consistent with prior hepaticojejunostomy. The gallbladder has been removed. The common bile duct remains dilated with thickening of the duodenal wall and enhancement. Maximum diameter of the common bile duct is approximately 14 mm. There are multiple coils in the region of the pancreatic head. The spleen and adrenal glands are unremarkable. The kidneys enhance symmetrically. Hypodense renal masses are consistent with cysts. There is heterogeneous enhancement of the pancreatic head with an ill-defined cystic mass. The mass is difficult to measure, but is approximately 4.6 x 4 cm. The duct does not appear dilated more distally. There are multiple diverticula in the descending and sigmoid colon. There are scattered arterial calcifications. Enlarged aortocaval lymph node on image 37 measures approximately 15 mm short axis, previously 8 mm. CT Pelvis: The bladder is unremarkable. No significant free fluid is identified. A calcified mass in the uterus is consistent with a small leiomyoma.     1.  Ill-defined cystic mass in the pancreatic head with surrounding inflammatory changes. Findings may be related to pancreatitis in the appropriate clinical setting. Findings are  new from the prior exam in August. 2.  New hypodense lesions scattered throughout the liver was foci of air. Findings are highly likely to be related to recent embolization. No rim enhancement to suggest abscess. 3.  Duodenal wall thickening with enhancement, concerning for residual disease. Persistent common bile duct dilatation. 4.  Interval enlargement in an aortocaval lymph node, highly suspicious for metastatic disease. 5.  Diverticulosis. 6.  Status post hepaticojejunostomy.    Dx-chest-portable (1 View)    Result Date: 11/12/2018 11/12/2018 11:06 AM HISTORY/REASON FOR EXAM:  Sepsis. TECHNIQUE/EXAM DESCRIPTION AND NUMBER OF VIEWS: Single portable view of the chest. COMPARISON: 10/25/2018 FINDINGS: There is no evidence of focal consolidation or evidence of pulmonary edema. There is no evidence of pleural effusion. The heart is normal in size.     No pulmonary consolidation.    Dx-chest-portable (1 View)    Result Date: 10/25/2018  10/25/2018 11:35 AM HISTORY/REASON FOR EXAM:  LEFT shoulder pain, malaise, nausea. TECHNIQUE/EXAM DESCRIPTION AND NUMBER OF VIEWS: Single portable view of the chest. COMPARISON: 5/3/2018 FINDINGS: Cardiomediastinal contour is within normal limits. Lungs show hypoinflation. No focal pulmonary consolidation. No pleural fluid collection or pneumothorax. RIGHT chest infusion port again present. No major bony abnormality is seen. Atherosclerotic calcification of thoracic aorta.     Hypoinflation without other evidence for acute cardiopulmonary disease.    [id number]    Result Date: 10/27/2018  10/27/2018 5:00 PM HISTORY/REASON FOR EXAM:  Jaw Pain. TECHNIQUE/EXAM DESCRIPTION AND NUMBER OF VIEWS:  1 view(s) of the mandible. COMPARISON:  None. FINDINGS: Multiple absent teeth. Many of the remaining teeth have filling/crowns. No periapical lucencies seen. No mandibular fracture.     No periapical lucencies. No mandibular fracture.    Ir-cvc Tunnel With Port Removal    Result Date:  10/28/2018   Chest port removal HISTORY/REASON FOR EXAM: Bacteremia, concern for central line infection MEDICATIONS: Conscious sedation with 2 mcg Fentanyl and 50 mg Versed was administered during the procedure with appropriate continuous patient monitoring of cardiorespiratory function by the radiology nurse. Sedation duration: 30 minutes. TECHNIQUE/EXAM DESCRIPTION: Following informed consent patient was prepped and draped in the usual fashion. The procedure was performed using MAXIMAL STERILE BARRIER technique including sterile gown, mask, cap, and donning of sterile gloves following appropriate hand hygiene and/or  sterile scrub. Patient skin site was prepped with 2% Chlorhexidine solution. A timeout was performed. Local anesthetic result was achieved with administration of copious 1% lidocaine with epinephrine. A skin incision was made with an 15 blade scalpel. Blunt dissection was used to retrieve the port and catheter which were observed to be intact. Hemostasis  was obtained with manual compression. The port pocket showed no signs of gross infection. The port pocket was flushed with normal saline. The port pocket was closed with deep 2-0 Vicryl sutures and a subcutaneous 4-0 Vicryl suture layer, the skin was closed with Dermabond. The patient tolerated procedure well. The skin was cleaned and a dressing was applied. COMPARISON:  None     Successful chest port removal.    Ir-picc Line Placement    Result Date: 10/29/2018  HISTORY/REASON FOR EXAM:   PICC placement. TECHNIQUE/EXAM DESCRIPTION AND NUMBER OF VIEWS:   PICC line insertion with ultrasound guidance.  The procedure was performed using maximal sterile barrier technique including sterile gown, mask, cap, and donning of sterile gloves following appropriate hand hygiene and/or sterile scrub. Patient skin site was prepped with 2% Chlorhexidine solution. FINDINGS:  PICC line insertion with Ultrasound Guidance was performed by qualified nursing staff without  "the assistance of a Radiologist. PICC positioning appropriateness confirmed by 3CG technology; chest xray only needed in the instance 3CG unable to confirm placement.              Ultrasound-guided PICC placement performed by qualified nursing staff as above.     Ir-low Level Consult-outpt    Result Date: 10/22/2018  This exam has been resulted under the NOTES tab, and the signed report has been auto faxed to the ordering physician on the date/time of that signature.      Micro:  Results     Procedure Component Value Units Date/Time    BLOOD CULTURE [[id number]]  (Abnormal) Collected:  11/12/18 1039    Order Status:  Completed Specimen:  Blood from Peripheral Updated:  11/15/18 1316     Significant Indicator POS (POS)     Source BLD     Site PERIPHERAL     Blood Culture Growth detected by Bactec instrument. 11/14/2018  10:03  Gram Stain: Yeast.   (A)      Yeast (A)    Narrative:       CALL  Flores  161 tel. [phone],  CALLED  161 tel. [phone] 11/14/2018, 10:06, RB PERF. RESULTS CALLED  TO:[id number]  Per Hospital Policy: Only change Specimen Src: to \"Line\" if  specified by physician order.    BLOOD CULTURE [[id number]]  (Abnormal) Collected:  11/12/18 1120    Order Status:  Completed Specimen:  Blood from Peripheral Updated:  11/15/18 1203     Significant Indicator POS (POS)     Source BLD     Site PERIPHERAL     Blood Culture Growth detected by Bactec instrument. 11/15/2018  11:59  Gram Stain: Gram positive rods.   (A)    Narrative:       CALL  Flores  161 tel. [phone],  CALLED  161 tel. [phone] 11/15/2018, 12:03, RB PERF. RESULTS CALLED  TO:[id number] RN  Per Hospital Policy: Only change Specimen Src: to \"Line\" if  specified by physician order.    CULTURE WOUND W/ GRAM STAIN [[id number]]  (Abnormal) Collected:  11/13/18 1710    Order Status:  Completed Specimen:  Wound Updated:  11/15/18 1103     Significant Indicator POS (POS)     Source WND     Site Liver Abscess     Culture Result Wound Growth noted after further " incubation, see below for  organism identification.   (A)     Gram Stain Result Few WBCs.  Many Gram negative rods.  Moderate Gram positive rods.       Culture Result Wound Yeast  Light growth   (A)      Gram-positive haley  Moderate growth  Identification to follow   (A)    URINE CULTURE(NEW) [[id number]] Collected:  11/12/18 1057    Order Status:  Completed Specimen:  Urine Updated:  11/14/18 0917     Significant Indicator NEG     Source UR     Site --     Urine Culture No growth at 48 hours    Narrative:       Mini cath  Indication for culture:->Emergency Room Patient    GRAM STAIN [[id number]] Collected:  11/13/18 1710    Order Status:  Completed Specimen:  Wound Updated:  11/14/18 0907     Significant Indicator .     Source WND     Site Liver Abscess     Gram Stain Result Few WBCs.  Many Gram negative rods.  Moderate Gram positive rods.      Blood Culture [[id number]]     Order Status:  Canceled Specimen:  Blood from Peripheral     Blood Culture [[id number]]     Order Status:  Canceled Specimen:  Blood from Peripheral     Culture Respiratory W/ GRM STN [[id number]]     Order Status:  Completed Specimen:  Respirate from Sputum     URINALYSIS [[id number]] Collected:  11/12/18 1057    Order Status:  Completed Specimen:  Urine Updated:  11/12/18 1135     Color Yellow     Character Clear     Specific Gravity 1.013     Ph 7.0     Glucose Negative mg/dL      Ketones Negative mg/dL      Protein Negative mg/dL      Bilirubin Negative     Urobilinogen, Urine 0.2     Nitrite Negative     Leukocyte Esterase Negative     Occult Blood Negative     Micro Urine Req see below     Comment: Microscopic examination not performed when specimen is clear  and chemically negative for protein, blood, leukocyte esterase  and nitrite.         Narrative:       Mini cath  Indication for culture:->Emergency Room Patient    Urine Culture [[id number]] Collected:  11/12/18 0000    Order Status:  Canceled Specimen:  Urine           Assessment:  Active  Hospital Problems    Diagnosis   • Abnormal CT of the abdomen [R93.5]   • Sepsis (HCC) [A41.9]   • 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]   • E coli bacteremia [R78.81]   • Lactic acidosis [E87.2]   • Deep vein thrombosis (CMS-HCC) [I82.409] [I82.409]       Plan:  Liver abscess  The CT scan shows area of necrosis consistent with post therapy infection  Patient is currently on Zosyn  Underwent IR drainage on 11/13/2018  The cultures are showing yeast and gram-positive rods    Bacteremia  His blood cultures are also showing gram-positive rods  Follow the ID.  It is likely real because his liver abscess is also showing gram-positive rods  Repeat blood cultures x2   Fungemia  Start on IV Mycamine  Repeat blood cultures x2     Ampullary carcinoma of the pancreas    History of DVT  Discussed with internal medicine.

## 2018-11-16 NOTE — CARE PLAN
Problem: Safety  Goal: Will remain free from injury  Outcome: PROGRESSING AS EXPECTED  Pt in bed resting comfortably with family at the bedside. Pt and family educated on the use of call light and not to get out of bed without nursing assistance. Bed alarm activated. Will continue to monitor.     Problem: Infection  Goal: Will remain free from infection  Outcome: PROGRESSING AS EXPECTED  Pt receiving iv abx. No acute signs of infection. Will continue to monitor.

## 2018-11-16 NOTE — PROGRESS NOTES
[facility] Medicine Daily Progress Note    Date of Service  11/16/2018    Chief Complaint  73 y.o. 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  She had been treated recently for e.coli bacteremia.  She presented to the emergency room on 11/12/18 with chills and malaise.  She was found to be tachycardic and have a leukocytosis with elevated lactic acid.  She was admitted to the ICU and treated for sepsis.  On 11/13/2018, she had a drain placed by interventional radiology to drain a fluid collection in her liver.      Interval Problem Update  1 person assist to stand and walk, able to sit in chair for hours at a time  Tolerating antibiotics, no N/V, no rash  Occasional sharp twinges of pain in the right upper quadrant, the last only for seconds, better when she holds still    Consultants/Specialty  Dr. Olmedo, Infectious disease    Code Status  Full Code    Disposition  Okay for medical bed, transfer orders written on 11/14/2018    Review of Systems  Review of Systems   Constitutional: Positive for chills and malaise/fatigue. Negative for diaphoresis.   Cardiovascular: Negative for chest pain, palpitations and orthopnea.   Gastrointestinal: Positive for abdominal pain. Negative for heartburn, nausea and vomiting.   Musculoskeletal: Negative for back pain and neck pain.        Physical Exam  Temp:  [36 °C (96.8 °F)-36.4 °C (97.5 °F)] 36.2 °C (97.2 °F)  Pulse:  [68-82] 70  Resp:  [11-18] 17  BP: (134)/(63) 134/63    Physical Exam   Constitutional: She is oriented to person, place, and time. She appears well-developed and well-nourished.   HENT:   Head: Normocephalic and atraumatic.   Eyes: Pupils are equal, round, and reactive to light. EOM are normal. Right eye exhibits no discharge. Left eye exhibits no discharge.   Neck: Normal range of motion. Neck supple.   Cardiovascular: Normal rate, regular rhythm and intact distal pulses.    No murmur  heard.  Pulmonary/Chest: Effort normal and breath sounds normal. No respiratory distress. She has no wheezes.   Abdominal: Soft. Bowel sounds are normal. She exhibits no distension. There is tenderness. There is no rebound.   Mild tenderness to palpation in bilateral upper quadrants  Biliary drain in place, draining dark red fluid   Musculoskeletal: Normal range of motion. She exhibits no edema.   Neurological: She is alert and oriented to person, place, and time. No cranial nerve deficit.   Skin: Skin is warm and dry. She is not diaphoretic. No erythema.   Skin is warm and well perfused   Psychiatric: She has a normal mood and affect. Judgment and thought content normal.       Fluids    Intake/Output Summary (Last 24 hours) at 11/16/18 1024  Last data filed at 11/16/18 1000   Gross per 24 hour   Intake             2300 ml   Output             2315 ml   Net              -15 ml       Laboratory  Recent Labs      11/14/18   0405  11/15/18   0500  11/16/18   0527   WBC  16.1*  13.3*  12.8*   RBC  3.20*  3.35*  3.54*   HEMOGLOBIN  8.9*  9.5*  10.0*   HEMATOCRIT  28.7*  29.9*  31.0*   MCV  89.7  89.3  87.6   MCH  27.8  28.4  28.2   MCHC  31.0*  31.8*  32.3*   RDW  49.8  49.7  48.3   PLATELETCT  207  220  240   MPV  9.7  10.2  9.7     Recent Labs      11/14/18   0405  11/15/18   0500  11/16/18   0527   SODIUM  134*  134*  133*   POTASSIUM  3.9  3.3*  4.1   CHLORIDE  111  109  108   CO2  19*  18*  21   GLUCOSE  121*  93  110*   BUN  9  6*  5*   CREATININE  0.59  0.47*  0.56   CALCIUM  7.8*  7.5*  8.0*                   Imaging    CT Abdomen and Pelvis:  1.  Interval significant increase in size of low-attenuation area in the dome of the right lobe of liver which contains air. This may represent necrosis or hemorrhage following therapy. Stable appearance of other smaller low-attenuation areas in the   right lobe of the liver without evidence of progression of disease in these areas of metastasis. Differential diagnosis  would include post therapy infection.    2.  Stable masslike density in the region of the ampulla of Vater consistent with primary carcinoma. Current dimensions are 3.1 x 2.6 cm.    3.  Persistent low-attenuation areas in the pancreatic head and proximal body which may represent secondary pancreatitis.    4.  Stable upper retroperitoneal adenopathy.    5.  Small umbilical hernia containing fat.    Assessment/Plan  Liver abscess- (present on admission)   Assessment & Plan    Fluid collection in the liver drained by interventional radiology  Gram positive rods in culture results  ID following  Continue zosyn     Sepsis (HCC)- (present on admission)   Assessment & Plan    This is sepsis (without associated acute organ dysfunction).   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 coli bacteremia in early November, her port was removed  Returns with septic picture, blood culture from 11/12/2018 is positive for gram positive rods  ID following, continue zosyn     Deep vein thrombosis (CMS-HCC) [I82.409]- (present on admission)   Assessment & Plan    Apixaban          VTE prophylaxis: Apixaban, no need for additional DVT prophylaxis

## 2018-11-16 NOTE — PROGRESS NOTES
Bedside report received from night RN.  Pt assessed A&Ox4, no c/o pain, no sob noted.  POC discussed with pt, all questions answered.  Pt states all needs are met.  Bed alarm on, bed in lowest position, call light within reach.  Will continue to monitor

## 2018-11-16 NOTE — PROGRESS NOTES
"Surgical Progress Note:      Drain placed      PE:  /63   Pulse 70   Temp 36.2 °C (97.2 °F) (Temporal)   Resp 17   Ht 1.549 m (5' 1\")   Wt 70.6 kg (155 lb 10.3 oz)   SpO2 98%   Breastfeeding? No   BMI 29.41 kg/m²     I/O:   Intake/Output Summary (Last 24 hours) at 11/16/18 1107  Last data filed at 11/16/18 1000   Gross per 24 hour   Intake             2300 ml   Output             2315 ml   Net              -15 ml     UOP:  PO:  IV:    GEN: NAD  COR: RRR  PULM: CTA  ABD: soft, NTND, drain serosanguinous      Labs:  Recent Labs      11/14/18   0405  11/15/18   0500  11/16/18   0527   WBC  16.1*  13.3*  12.8*   RBC  3.20*  3.35*  3.54*   HEMOGLOBIN  8.9*  9.5*  10.0*   HEMATOCRIT  28.7*  29.9*  31.0*   MCV  89.7  89.3  87.6   MCH  27.8  28.4  28.2   RDW  49.8  49.7  48.3   PLATELETCT  207  220  240   MPV  9.7  10.2  9.7     Recent Labs      11/14/18   0405  11/15/18   0500  11/16/18   0527   SODIUM  134*  134*  133*   POTASSIUM  3.9  3.3*  4.1   CHLORIDE  111  109  108   CO2  19*  18*  21   GLUCOSE  121*  93  110*   BUN  9  6*  5*         A/P:   Post IR drain liver abscess  Appear more serous/sanguinous currently  Monitor output, continue antibiotics     Jose Yang M.D.  [facility]  [phone]    "

## 2018-11-16 NOTE — PROGRESS NOTES
Infectious Disease Progress Note    Author: Yuli Olmedo M.D. Date & Time of service: 2018  2:00 PM    Chief Complaint:  Generalized weakness and chills    Interval History:  73-year-old female with metastatic ampullary carcinoma of the pancreas presented with generalized weakness and shaking chills.  2018-no fevers.  Complains of generalized malaise and fatigue.  Still has some pain in her right shoulder.  WBC count is 22,000.  Cultures remain negative.  2018 T-max is 98.2.  No new issues overnight.  Underwent IR drainage on 2018.  Her shoulder pain has eased since then.  11/15/2018 no fevers.  WBC 13.3 creatinine 0.47  2018 no fevers.  Denies any increased abdominal pain.  The shoulder pain is improved as well.  WBC is 12.8.  Labs Reviewed, Medications Reviewed and Radiology Reviewed.    Review of Systems:  Review of Systems   Constitutional: Positive for malaise/fatigue. Negative for fever.   HENT: Negative for hearing loss.    Respiratory: Negative for cough and shortness of breath.    Cardiovascular: Negative for chest pain and leg swelling.   Gastrointestinal: Negative for abdominal pain, nausea and vomiting.   Genitourinary: Negative for dysuria.   Musculoskeletal: Positive for myalgias.        Shoulder pain has improved   Neurological: Negative for sensory change and speech change.       Hemodynamics:  Temp (24hrs), Av.2 °C (97.2 °F), Min:36 °C (96.8 °F), Max:36.4 °C (97.5 °F)  Temperature: 36.2 °C (97.2 °F)  Pulse  Av.9  Min: 67  Max: 109Heart Rate (Monitored): 70  Blood Pressure : 134/63, NIBP: 134/63       Physical Exam:  Physical Exam   Constitutional: She is oriented to person, place, and time. No distress.   HENT:   Mouth/Throat: No oropharyngeal exudate.   Eyes: No scleral icterus.   Neck: Neck supple.   Cardiovascular: Regular rhythm.    No murmur heard.  Pulmonary/Chest: She has no wheezes. She has no rales.   Abdominal: Soft. There is no tenderness. There  is no rebound.   Right upper quadrant drain with serosanguineous discharge   Musculoskeletal: She exhibits no edema.   Neurological: She is alert and oriented to person, place, and time.   Skin: No rash noted. No erythema.   Vitals reviewed.      Meds:    Current Facility-Administered Medications:   •  micafungin  •  apixaban  •  LR  •  [COMPLETED] piperacillin-tazobactam **AND** piperacillin-tazobactam  •  senna-docusate **AND** polyethylene glycol/lytes **AND** magnesium hydroxide **AND** bisacodyl  •  Respiratory Care per Protocol  •  ondansetron  •  ondansetron  •  acetaminophen  •  Notify provider if pain remains uncontrolled **AND** Use the numeric rating scale (NRS-11) on regular floors and Critical-Care Pain Observation Tool (CPOT) on ICUs/Trauma to assess pain **AND** Pulse Ox (Oximetry) **AND** Pharmacy Consult Request **AND** If patient difficult to arouse and/or has respiratory depression, stop any opiates that are currently infusing and call a Rapid Response. **AND** oxyCODONE immediate-release **AND** oxyCODONE immediate-release **AND** morphine injection  •  NS  •  omeprazole  •  artificial tears  •  albuterol    Labs:  Recent Labs      11/14/18   0405  11/15/18   0500  11/16/18   0527   WBC  16.1*  13.3*  12.8*   RBC  3.20*  3.35*  3.54*   HEMOGLOBIN  8.9*  9.5*  10.0*   HEMATOCRIT  28.7*  29.9*  31.0*   MCV  89.7  89.3  87.6   MCH  27.8  28.4  28.2   RDW  49.8  49.7  48.3   PLATELETCT  207  220  240   MPV  9.7  10.2  9.7     Recent Labs      11/14/18   0405  11/15/18   0500  11/16/18   0527   SODIUM  134*  134*  133*   POTASSIUM  3.9  3.3*  4.1   CHLORIDE  111  109  108   CO2  19*  18*  21   GLUCOSE  121*  93  110*   BUN  9  6*  5*     Recent Labs      11/14/18   0405  11/15/18   0500  11/16/18   0527   CREATININE  0.59  0.47*  0.56       Imaging:  Ct-abdomen-pelvis With    Result Date: 11/12/2018 11/12/2018 1:38 PM HISTORY/REASON FOR EXAM:  Abdominal pain. History of metastatic ampullary  adenocarcinoma. TECHNIQUE/EXAM DESCRIPTION: CT scan of the abdomen and pelvis with contrast. Contrast-enhanced helical scanning was obtained from the diaphragmatic domes through the pubic symphysis following the bolus administration of 80 mL of Omnipaque 350 without complication. Low dose optimization technique was utilized for this CT exam including automated exposure control and adjustment of the mA and/or kV according to patient size. COMPARISON: 10/25/2018 FINDINGS: The visualized lung bases are clear. CT Abdomen: A small hiatal hernia is present. There is interval increase in size of an area of low attenuation in the dome of the right lobe of the liver which contains air. This may represent necrosis or post therapy hemorrhage following embolization. This area measures 8.8 x 7.8 x 6.4 cm. Previous measurement was 2.2 cm in maximum dimension. Multiple smaller areas of low-attenuation within the right lobe of liver are present which are suspicious for hepatic metastases. These other smaller low-attenuation areas are without significant change. The gallbladder absent. A low-attenuation area in the region of the pancreatic head is again noted and is poorly defined measuring approximately 3.1 x 2.6 cm in diameter. This is consistent with the area of ampullary carcinoma. Embolization coils are present in the GDA. There is low attenuation in the pancreatic body proximally which may represent low-attenuation  secondary to pancreatitis or spread of neoplasm. The common duct is dilated proximal to the level of the ampulla measuring a maximum diameter of 1.6 cm. No choledocholithiasis is identified. An enlarged retroperitoneal lymph node between the upper aorta and IVC is unchanged measuring 1.5 cm in diameter. No new area of retroperitoneal adenopathy is identified. No peripancreatic adenopathy is present. The spleen appears normal. The splenic vein and portal vein are patent. The adrenal glands are not enlarged and the  kidneys enhance symmetrically. Small simple cysts in the right kidney are stable. There is no lymphadenopathy. The aorta and IVC are normal in caliber. The bowel is without obstruction. The appendix appears normal. There is a small umbilical hernia containing fat. CT Pelvis: There is no lymphadenopathy. No free fluid is present. The bladder appears normal. Uterus as endometrial complex thickening. There is a myomatous type calcification in the right aspect of the uterus which is unchanged. There is no acute inflammatory process.     1.  Interval significant increase in size of low-attenuation area in the dome of the right lobe of liver which contains air. This may represent necrosis or hemorrhage following therapy. Stable appearance of other smaller low-attenuation areas in the right lobe of the liver without evidence of progression of disease in these areas of metastasis. Differential diagnosis would include post therapy infection. 2.  Stable masslike density in the region of the ampulla of Vater consistent with primary carcinoma. Current dimensions are 3.1 x 2.6 cm. 3.  Persistent low-attenuation areas in the pancreatic head and proximal body which may represent secondary pancreatitis. 4.  Stable upper retroperitoneal adenopathy. 5.  Small umbilical hernia containing fat.    Ct-abdomen-pelvis With    Result Date: 10/25/2018  10/25/2018 4:28 PM HISTORY/REASON FOR EXAM:  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 Patient is status post embolization.. TECHNIQUE/EXAM DESCRIPTION:   CT scan of the abdomen and pelvis with contrast. Contrast-enhanced helical scanning was obtained from the diaphragmatic domes through the pubic symphysis following the bolus administration of nonionic contrast without complication. 80 mL of Omnipaque 350 nonionic contrast was administered without complication. Low dose optimization technique was utilized for this CT exam including automated exposure control and  adjustment of the mA and/or kV according to patient size. COMPARISON: PET/CT 7/12/2018, MRI 8/6/2018 and CT abdomen 3/27/2018 FINDINGS: The visualized lung bases are unremarkable. CT Abdomen: There are several hypodense lesions in the hepatic dome with a couple of foci of air. A hypodense mass within air-fluid level in the right hepatic lobe, segment 8, measures approximately 2.1 cm. No significant rim enhancement is identified. There are new  hypodense lesions in the anterior right hepatic lobe measuring up to approximately 7 mm. There is pneumobilia, consistent with prior hepaticojejunostomy. The gallbladder has been removed. The common bile duct remains dilated with thickening of the duodenal wall and enhancement. Maximum diameter of the common bile duct is approximately 14 mm. There are multiple coils in the region of the pancreatic head. The spleen and adrenal glands are unremarkable. The kidneys enhance symmetrically. Hypodense renal masses are consistent with cysts. There is heterogeneous enhancement of the pancreatic head with an ill-defined cystic mass. The mass is difficult to measure, but is approximately 4.6 x 4 cm. The duct does not appear dilated more distally. There are multiple diverticula in the descending and sigmoid colon. There are scattered arterial calcifications. Enlarged aortocaval lymph node on image 37 measures approximately 15 mm short axis, previously 8 mm. CT Pelvis: The bladder is unremarkable. No significant free fluid is identified. A calcified mass in the uterus is consistent with a small leiomyoma.     1.  Ill-defined cystic mass in the pancreatic head with surrounding inflammatory changes. Findings may be related to pancreatitis in the appropriate clinical setting. Findings are new from the prior exam in August. 2.  New hypodense lesions scattered throughout the liver was foci of air. Findings are highly likely to be related to recent embolization. No rim enhancement to suggest  abscess. 3.  Duodenal wall thickening with enhancement, concerning for residual disease. Persistent common bile duct dilatation. 4.  Interval enlargement in an aortocaval lymph node, highly suspicious for metastatic disease. 5.  Diverticulosis. 6.  Status post hepaticojejunostomy.    Dx-chest-portable (1 View)    Result Date: 11/12/2018 11/12/2018 11:06 AM HISTORY/REASON FOR EXAM:  Sepsis. TECHNIQUE/EXAM DESCRIPTION AND NUMBER OF VIEWS: Single portable view of the chest. COMPARISON: 10/25/2018 FINDINGS: There is no evidence of focal consolidation or evidence of pulmonary edema. There is no evidence of pleural effusion. The heart is normal in size.     No pulmonary consolidation.    Dx-chest-portable (1 View)    Result Date: 10/25/2018  10/25/2018 11:35 AM HISTORY/REASON FOR EXAM:  LEFT shoulder pain, malaise, nausea. TECHNIQUE/EXAM DESCRIPTION AND NUMBER OF VIEWS: Single portable view of the chest. COMPARISON: 5/3/2018 FINDINGS: Cardiomediastinal contour is within normal limits. Lungs show hypoinflation. No focal pulmonary consolidation. No pleural fluid collection or pneumothorax. RIGHT chest infusion port again present. No major bony abnormality is seen. Atherosclerotic calcification of thoracic aorta.     Hypoinflation without other evidence for acute cardiopulmonary disease.    Ua-ajszabpl-cgmhmamve    Result Date: 10/27/2018  10/27/2018 5:00 PM HISTORY/REASON FOR EXAM:  Jaw Pain. TECHNIQUE/EXAM DESCRIPTION AND NUMBER OF VIEWS:  1 view(s) of the mandible. COMPARISON:  None. FINDINGS: Multiple absent teeth. Many of the remaining teeth have filling/crowns. No periapical lucencies seen. No mandibular fracture.     No periapical lucencies. No mandibular fracture.    Ir-cvc Tunnel With Port Removal    Result Date: 10/28/2018   Chest port removal HISTORY/REASON FOR EXAM: Bacteremia, concern for central line infection MEDICATIONS: Conscious sedation with 2 mcg Fentanyl and 50 mg Versed was administered during the  procedure with appropriate continuous patient monitoring of cardiorespiratory function by the radiology nurse. Sedation duration: 30 minutes. TECHNIQUE/EXAM DESCRIPTION: Following informed consent patient was prepped and draped in the usual fashion. The procedure was performed using MAXIMAL STERILE BARRIER technique including sterile gown, mask, cap, and donning of sterile gloves following appropriate hand hygiene and/or  sterile scrub. Patient skin site was prepped with 2% Chlorhexidine solution. A timeout was performed. Local anesthetic result was achieved with administration of copious 1% lidocaine with epinephrine. A skin incision was made with an 15 blade scalpel. Blunt dissection was used to retrieve the port and catheter which were observed to be intact. Hemostasis  was obtained with manual compression. The port pocket showed no signs of gross infection. The port pocket was flushed with normal saline. The port pocket was closed with deep 2-0 Vicryl sutures and a subcutaneous 4-0 Vicryl suture layer, the skin was closed with Dermabond. The patient tolerated procedure well. The skin was cleaned and a dressing was applied. COMPARISON:  None     Successful chest port removal.    Ir-picc Line Placement    Result Date: 10/29/2018  HISTORY/REASON FOR EXAM:   PICC placement. TECHNIQUE/EXAM DESCRIPTION AND NUMBER OF VIEWS:   PICC line insertion with ultrasound guidance.  The procedure was performed using maximal sterile barrier technique including sterile gown, mask, cap, and donning of sterile gloves following appropriate hand hygiene and/or sterile scrub. Patient skin site was prepped with 2% Chlorhexidine solution. FINDINGS:  PICC line insertion with Ultrasound Guidance was performed by qualified nursing staff without the assistance of a Radiologist. PICC positioning appropriateness confirmed by 3CG technology; chest xray only needed in the instance 3CG unable to confirm placement.              Ultrasound-guided  "PICC placement performed by qualified nursing staff as above.     Ir-low Level Consult-outpt    Result Date: 10/22/2018  This exam has been resulted under the NOTES tab, and the signed report has been auto faxed to the ordering physician on the date/time of that signature.      Micro:  Results     Procedure Component Value Units Date/Time    CULTURE WOUND W/ GRAM STAIN [[id number]]  (Abnormal) Collected:  11/13/18 1710    Order Status:  Completed Specimen:  Wound Updated:  11/16/18 1307     Significant Indicator POS (POS)     Source WND     Site Liver Abscess     Culture Result Wound Growth noted after further incubation, see below for  organism identification.   (A)     Gram Stain Result Few WBCs.  Many Gram negative rods.  Moderate Gram positive rods.       Culture Result Wound Candida albicans  Light growth   (A)      Gram-positive haley  Moderate growth  Identification to follow   (A)      Group D Enterococcus species  Moderate growth  Combination therapy with ampicillin, penicillin, or  vancomycin (for susceptible strains) plus an aminoglycoside  is usually indicated for serious enterococcal infections,  such as endocarditis unless high-level resistance to both  gentamicin and streptomycin is documented; such combinations  are predicted to result in synergistic killing of the  Enterococcus.   (A)    BLOOD CULTURE [[id number]] Collected:  11/15/18 1807    Order Status:  Completed Specimen:  Blood from Peripheral Updated:  11/16/18 0744     Significant Indicator NEG     Source BLD     Site PERIPHERAL     Blood Culture No Growth    Note: Blood cultures are incubated for 5 days and  are monitored continuously.Positive blood cultures  are called to the RN and reported as soon as  they are identified.      Narrative:       Collected By:[id number] CONCHA VELAZQUEZ  Per Hospital Policy: Only change Specimen Src: to \"Line\" if  specified by physician order.    BLOOD CULTURE [[id number]] Collected:  11/15/18 1807    Order Status: " " Completed Specimen:  Blood from Peripheral Updated:  11/16/18 0744     Significant Indicator NEG     Source BLD     Site PERIPHERAL     Blood Culture No Growth    Note: Blood cultures are incubated for 5 days and  are monitored continuously.Positive blood cultures  are called to the RN and reported as soon as  they are identified.      Narrative:       Collected By:[id number] CONCHA VELAZQUEZ  Per Hospital Policy: Only change Specimen Src: to \"Line\" if  specified by physician order.    BLOOD CULTURE [[id number]]  (Abnormal) Collected:  11/12/18 1039    Order Status:  Completed Specimen:  Blood from Peripheral Updated:  11/15/18 1316     Significant Indicator POS (POS)     Source BLD     Site PERIPHERAL     Blood Culture Growth detected by Bactec instrument. 11/14/2018  10:03  Gram Stain: Yeast.   (A)      Yeast (A)    Narrative:       CALL  Flores  161 tel. [phone],  CALLED  161 tel. [phone] 11/14/2018, 10:06, RB PERF. RESULTS CALLED  TO:[phone]  Per Hospital Policy: Only change Specimen Src: to \"Line\" if  specified by physician order.    BLOOD CULTURE [[id number]]  (Abnormal) Collected:  11/12/18 1120    Order Status:  Completed Specimen:  Blood from Peripheral Updated:  11/15/18 1203     Significant Indicator POS (POS)     Source BLD     Site PERIPHERAL     Blood Culture Growth detected by Bactec instrument. 11/15/2018  11:59  Gram Stain: Gram positive rods.   (A)    Narrative:       CALL  Flores  161 tel. [phone],  CALLED  161 tel. [phone] 11/15/2018, 12:03, RB PERF. RESULTS CALLED  TO:[phone] RN  Per Hospital Policy: Only change Specimen Src: to \"Line\" if  specified by physician order.    URINE CULTURE(NEW) [[id number]] Collected:  11/12/18 1057    Order Status:  Completed Specimen:  Urine Updated:  11/14/18 0917     Significant Indicator NEG     Source UR     Site --     Urine Culture No growth at 48 hours    Narrative:       Mini cath  Indication for culture:->Emergency Room Patient    GRAM STAIN [[id number]] " Collected:  11/13/18 1710    Order Status:  Completed Specimen:  Wound Updated:  11/14/18 0907     Significant Indicator .     Source WND     Site Liver Abscess     Gram Stain Result Few WBCs.  Many Gram negative rods.  Moderate Gram positive rods.      Blood Culture [[id number]]     Order Status:  Canceled Specimen:  Blood from Peripheral     Blood Culture [[id number]]     Order Status:  Canceled Specimen:  Blood from Peripheral     URINALYSIS [[id number]] Collected:  11/12/18 1057    Order Status:  Completed Specimen:  Urine Updated:  11/12/18 1135     Color Yellow     Character Clear     Specific Gravity 1.013     Ph 7.0     Glucose Negative mg/dL      Ketones Negative mg/dL      Protein Negative mg/dL      Bilirubin Negative     Urobilinogen, Urine 0.2     Nitrite Negative     Leukocyte Esterase Negative     Occult Blood Negative     Micro Urine Req see below     Comment: Microscopic examination not performed when specimen is clear  and chemically negative for protein, blood, leukocyte esterase  and nitrite.         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  It is likely real because his liver abscess is also showing gram-positive rods  Repeat blood cultures x2 are negative from 11/15/2018  Fungemia  Start on IV Mycamine  Repeat blood cultures x2 are negative from 11/15/2018    Ampullary carcinoma of the pancreas    History of DVT  Discussed with internal medicine.

## 2018-11-17 NOTE — PROGRESS NOTES
Critical Care Progress Note    Date of admission  11/12/2018    Chief Complaint  73 y.o. female admitted 11/12/2018 with chills    Hospital Course  73 y.o. female who presented 11/12/2018 with fever chills and rigors. She had a history of ampullary carcinoma recent E Coli bactermia that was just discharged on 11/10 on ceftriaxone. She as found to have a lactic acidosis and liver abscess so was admitted to ICU for monitoring. Yesterday underwent IR liver drainage for gas seen in the right liver lobe currently pending cultures. She feels improved today with poor appetite. Freida chest pain, shortness of breath, abdominal pain, nausea or vomiting.    Interval Problem Update  Reviewed last 24 hour events:  No acute changes ovenright  Afebrile  Doing well, sitting in chair  On micafungin and piptazo  Hemodynamically stable    Review of Systems  Review of Systems   Constitutional: Negative for chills and fever.   Respiratory: Negative for shortness of breath.    Cardiovascular: Negative for chest pain and leg swelling.   Gastrointestinal: Negative for abdominal pain, constipation, diarrhea, nausea and vomiting.   Neurological: Negative for weakness.   All other systems reviewed and are negative.       Vital Signs for last 24 hours   Temp:  [36 °C (96.8 °F)-36.2 °C (97.2 °F)] 36.2 °C (97.2 °F)  Pulse:  [68-82] 70  Resp:  [11-17] 17  BP: (134)/(63) 134/63    Hemodynamic parameters for last 24 hours       Respiratory       Physical Exam   Physical Exam   Constitutional: She is oriented to person, place, and time. She appears well-developed and well-nourished.   Elderly female in no acute distress  Comfortable sitting in chair   HENT:   Head: Normocephalic.   Eyes: Conjunctivae are normal.   Neck: No JVD present.   Cardiovascular: Normal rate and regular rhythm.    No murmur heard.  Pulmonary/Chest: Effort normal. No respiratory distress. She has no wheezes. She has no rales.   Abdominal: Bowel sounds are normal. She exhibits  no distension. There is no tenderness. There is no rebound.   Upper abdomen IR placed drain with minimal output   Musculoskeletal: She exhibits no edema.   Moves all extremities, no focal neuro deficit.    Neurological: She is alert and oriented to person, place, and time. No cranial nerve deficit.   Skin: Skin is warm and dry.   Psychiatric: She has a normal mood and affect. Her behavior is normal.       Medications  Current Facility-Administered Medications   Medication Dose Route Frequency Provider Last Rate Last Dose   • micafungin (MYCAMINE) 100 mg in D5W 100 mL ivpb  100 mg Intravenous Q24HRS Yuli Olmedo M.D.   Stopped at 11/16/18 0632   • apixaban (ELIQUIS) tablet 5 mg  5 mg Oral BID Victor Hugo Zaidi M.D.   5 mg at 11/16/18 1723   • lactated ringers infusion   Intravenous Continuous Eber Chavez M.D. 5 mL/hr at 11/16/18 1533     • piperacillin-tazobactam (ZOSYN) 3.375 g in  mL IVPB  3.375 g Intravenous Q8HRS Yuli Olmdeo M.D.   Stopped at 11/16/18 1722   • senna-docusate (PERICOLACE or SENOKOT S) 8.6-50 MG per tablet 2 Tab  2 Tab Oral BID Ronald King M.D.   2 Tab at 11/16/18 1723    And   • polyethylene glycol/lytes (MIRALAX) PACKET 1 Packet  1 Packet Oral QDAY PRN Ronald King M.D.        And   • magnesium hydroxide (MILK OF MAGNESIA) suspension 30 mL  30 mL Oral QDAY PRN Ronald King M.D.        And   • bisacodyl (DULCOLAX) suppository 10 mg  10 mg Rectal QDAY PRN Ronlad King M.D.       • Respiratory Care per Protocol   Nebulization Continuous RT Ronald King M.D.       • ondansetron (ZOFRAN) syringe/vial injection 4 mg  4 mg Intravenous Q4HRS PRN Ronald King M.D.       • ondansetron (ZOFRAN ODT) dispertab 4 mg  4 mg Oral Q4HRS PRN Ronald King M.D.       • acetaminophen (TYLENOL) tablet 650 mg  650 mg Oral Q6HRS PRN Ronald King M.D.   650 mg at 11/12/18 8365   • Pharmacy Consult Request ...Pain Management Review   Other PRN Ronald King M.D.         And   • oxyCODONE immediate-release (ROXICODONE) tablet 5 mg  5 mg Oral Q3HRS PRN Ronald King M.D.   5 mg at 11/16/18 1325    And   • oxyCODONE immediate-release (ROXICODONE) tablet 10 mg  10 mg Oral Q3HRS PRN Ronald King M.D.        And   • morphine (pf) 4 mg/ml injection 4 mg  4 mg Intravenous Q3HRS PRN Ronald King M.D.   2 mg at 11/13/18 1840   • NS (BOLUS) infusion 500 mL  500 mL Intravenous Once PRN Ronald King M.D.       • omeprazole (PRILOSEC) capsule 20 mg  20 mg Oral QDAY Ronald King M.D.   20 mg at 11/16/18 0515   • artificial tears 1.4 % ophthalmic solution 1-2 Drop  1-2 Drop Both Eyes Q2HRS PRN Ronald King M.D.       • albuterol inhaler 2 Puff  2 Puff Inhalation Q4HRS PRN Ronald King M.D.           Fluids    Intake/Output Summary (Last 24 hours) at 11/16/18 2001  Last data filed at 11/16/18 1800   Gross per 24 hour   Intake          2567.25 ml   Output             1810 ml   Net           757.25 ml       Laboratory          Recent Labs      11/14/18   0405  11/15/18   0500  11/16/18   0527   SODIUM  134*  134*  133*   POTASSIUM  3.9  3.3*  4.1   CHLORIDE  111  109  108   CO2  19*  18*  21   BUN  9  6*  5*   CREATININE  0.59  0.47*  0.56   CALCIUM  7.8*  7.5*  8.0*     Recent Labs      11/14/18   0405  11/15/18   0500  11/16/18   0527   GLUCOSE  121*  93  110*     Recent Labs      11/14/18   0405  11/15/18   0500  11/16/18   0527   WBC  16.1*  13.3*  12.8*     Recent Labs      11/14/18   0405  11/15/18   0500  11/16/18   0527   RBC  3.20*  3.35*  3.54*   HEMOGLOBIN  8.9*  9.5*  10.0*   HEMATOCRIT  28.7*  29.9*  31.0*   PLATELETCT  207  220  240       Imaging  CT:    Reviewed    Assessment/Plan  Liver abscess- (present on admission)   Assessment & Plan    Status post IR drainage 11/13   Continue broad-spectrum antimicrobials follow-up on cultures  IR catheter drainage decreased from yesterday 25 mL's this shift    Initial culture showing yeast and  gram-positive rods continue to monitor and follow-up on speciation       Sepsis (HCC)- (present on admission)   Assessment & Plan    This is severe sepsis with the following associated acute organ dysfunction(s): acute kidney failure, metabolic/septic encephalopathy.   Patient with severe sepsis and elevated lactate currently has resolved with fluid therapy off all pressors.  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)   Assessment & Plan    Continue management as outpatient     Fungemia   Assessment & Plan    Blood cultures 11/12 with fungemia, pending identification, likely candida spp  On micafungin  Source likely liver/GI  ID following  Need ophtho eval at some point     Continue to monitor for possible seeding of liver and renal     Lactic acidosis- (present on admission)   Assessment & Plan    Currently resolved continue to monitor and resuscitate with fluid therapy as necessary     Bacteremia- (present on admission)   Assessment & Plan    Continue Zosyn   Follow up on cultures of liver abscess     Deep vein thrombosis (CMS-HCC) [I82.409]- (present on admission)   Assessment & Plan    Continue home dose Eliquis        Stable for floor transfer    VTE:  NOAC  Ulcer: Not Indicated  Lines: None    I have performed a physical exam and reviewed and updated ROS and Plan today (11/16/2018). In review of yesterday's note (11/15/2018), there are no changes except as documented above.     Discussed patient condition and risk of morbidity and/or mortality with Hospitalist, RN, RT, Pharmacy, Charge nurse / hot rounds and Patient     Mayank Diamond, DO  Critical Care Medicine

## 2018-11-17 NOTE — PROGRESS NOTES
[facility] Medicine Daily Progress Note    Date of Service  11/17/2018    Chief Complaint  73 y.o. 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  She had been treated recently for e.coli bacteremia.  She presented to the emergency room on 11/12/18 with chills and malaise.  She was found to be tachycardic and have a leukocytosis with elevated lactic acid.  She was admitted to the ICU and treated for sepsis.  On 11/13/2018, she had a drain placed by interventional radiology to drain a fluid collection in her liver.      Interval Problem Update  Afebrile, no chills or night sweats  Occasional pain at right upper quadrant, sharp pain at drain site, same as yesterday  Tolerating diet, not eating much but not nauseated    Consultants/Specialty  Dr. Olmedo, Infectious disease  Critical Care, I discussed the patient's condition with Dr. Diamond today on ICU Rounds    Code Status  Full Code    Disposition  Okay for medical bed, transfer orders written on 11/14/2018    Review of Systems  Review of Systems   Constitutional: Positive for chills and malaise/fatigue. Negative for diaphoresis.   Cardiovascular: Negative for chest pain, palpitations and orthopnea.   Gastrointestinal: Positive for abdominal pain. Negative for heartburn, nausea and vomiting.   Musculoskeletal: Negative for back pain and neck pain.        Physical Exam  Temp:  [36.3 °C (97.4 °F)-36.8 °C (98.2 °F)] 36.8 °C (98.2 °F)  Pulse:  [80] 80  Resp:  [16] 16    Physical Exam   Constitutional: She is oriented to person, place, and time. She appears well-developed and well-nourished.   HENT:   Head: Normocephalic and atraumatic.   Eyes: Pupils are equal, round, and reactive to light. EOM are normal. Right eye exhibits no discharge. Left eye exhibits no discharge.   Neck: Normal range of motion. Neck supple. No tracheal deviation present. No thyromegaly present.   Cardiovascular: Normal rate,  regular rhythm and intact distal pulses.    No murmur heard.  Pulmonary/Chest: Effort normal and breath sounds normal. No respiratory distress. She has no wheezes.   Abdominal: Soft. Bowel sounds are normal. She exhibits no distension. There is tenderness. There is no rebound.   Mild tenderness to palpation in bilateral upper quadrants  Biliary drain in place, draining dark red fluid   Musculoskeletal: Normal range of motion. She exhibits no edema.   Neurological: She is alert and oriented to person, place, and time. No cranial nerve deficit. Coordination normal.   Skin: Skin is warm and dry. No rash noted. She is not diaphoretic. No erythema. No pallor.   Skin is warm and well perfused   Psychiatric: She has a normal mood and affect. Her behavior is normal.       Fluids    Intake/Output Summary (Last 24 hours) at 11/17/18 1101  Last data filed at 11/17/18 1000   Gross per 24 hour   Intake          1167.25 ml   Output              640 ml   Net           527.25 ml       Laboratory  Recent Labs      11/15/18   0500  11/16/18   0527  11/17/18   0701   WBC  13.3*  12.8*  15.0*   RBC  3.35*  3.54*  4.03*   HEMOGLOBIN  9.5*  10.0*  11.5*   HEMATOCRIT  29.9*  31.0*  34.9*   MCV  89.3  87.6  86.6   MCH  28.4  28.2  28.5   MCHC  31.8*  32.3*  33.0*   RDW  49.7  48.3  48.2   PLATELETCT  220  240  227   MPV  10.2  9.7  10.1     Recent Labs      11/15/18   0500  11/16/18   0527  11/17/18   0701   SODIUM  134*  133*  134*   POTASSIUM  3.3*  4.1  3.8   CHLORIDE  109  108  106   CO2  18*  21  21   GLUCOSE  93  110*  116*   BUN  6*  5*  7*   CREATININE  0.47*  0.56  0.51   CALCIUM  7.5*  8.0*  8.2*                   Imaging    CT Abdomen and Pelvis:  1.  Interval significant increase in size of low-attenuation area in the dome of the right lobe of liver which contains air. This may represent necrosis or hemorrhage following therapy. Stable appearance of other smaller low-attenuation areas in the   right lobe of the liver without  evidence of progression of disease in these areas of metastasis. Differential diagnosis would include post therapy infection.    2.  Stable masslike density in the region of the ampulla of Vater consistent with primary carcinoma. Current dimensions are 3.1 x 2.6 cm.    3.  Persistent low-attenuation areas in the pancreatic head and proximal body which may represent secondary pancreatitis.    4.  Stable upper retroperitoneal adenopathy.    5.  Small umbilical hernia containing fat.    Assessment/Plan  Liver abscess- (present on admission)   Assessment & Plan    Fluid collection in the liver drained by interventional radiology  Gram positive rods in culture results, prelim result today of enterococcus   ID following  Continue zosyn     Sepsis (HCC)- (present on admission)   Assessment & Plan    This is sepsis (without associated acute organ dysfunction).   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 coli bacteremia in early November, her port was removed  Returns with septic picture, blood culture from 11/12/2018 is positive for gram positive rods  Likely liver abscess source  ID following, continue zosyn     Deep vein thrombosis (CMS-HCC) [I82.409]- (present on admission)   Assessment & Plan    Apixaban          VTE prophylaxis: Apixaban, no need for additional DVT prophylaxis

## 2018-11-17 NOTE — PROGRESS NOTES
Late entry due to pt care:  Microbiology called with possible VRE in liver abscess. Will call when confirmed. Dr. Chavez updated in rounds.

## 2018-11-17 NOTE — CARE PLAN
Problem: Pain Management  Goal: Pain level will decrease to patient's comfort goal  Outcome: PROGRESSING AS EXPECTED  Denies pain    Problem: Skin Integrity  Goal: Risk for impaired skin integrity will decrease  Outcome: PROGRESSING AS EXPECTED  Pt turns self side to side

## 2018-11-17 NOTE — THERAPY
"Physical Therapy Evaluation completed.   Bed Mobility:  Supine to Sit:  (up in chair)  Transfers: Sit to Stand: Moderate Assist  Gait: Level Of Assist: Minimal Assist with hand held assist       Plan of Care: Will benefit from Physical Therapy 4 times per week  Discharge Recommendations: Equipment: No Equipment Needed.     Ms. Poole is a 72 y/o female who presents to acute secondary to liver abscess, sepsis, and ampullary carcinoma. She presents with significant lower extremity weakness, gross motor coordination deficits, and dynamic balance impairments that negatively impact her ability to perform gait, transfers, and bed mobility at her prior level of function and prevent her safe discharge home. Pt very unsteady with initial stand, however after weight shift, marching, and then steps this improved to only require CGA. Discussed importance of using FWW initially, pt in agreement. Discharge recommendations depend on level of assist at home. If family can provide 24/7 assist likely able to DC home with home health services. If this is not available then recommend post acute placement. She would benefit from continued acute physical therapy services to improve  her mobility and decrease risk for falls.     See \"Rehab Therapy-Acute\" Patient Summary Report for complete documentation.     "

## 2018-11-18 NOTE — CARE PLAN
Problem: Safety  Goal: Will remain free from injury  Outcome: PROGRESSING AS EXPECTED  Lower bed rails up x 2, clutter free room, bed/chair alarm in use, treaded socks in place.     Problem: Bowel/Gastric:  Goal: Normal bowel function is maintained or improved  Outcome: PROGRESSING SLOWER THAN EXPECTED  Pt bowel sounds hypoactive, still present in all four quadrants, last BM the 14th. Pt agreed to take stool softeners this evening and prune juice. Will advance bowel protocol if no bowel movement

## 2018-11-18 NOTE — CARE PLAN
Problem: Safety  Goal: Will remain free from injury  Hourly rounding in effect, pt instructed to call for assistance, bed locked and in lowest position. Bed alarm on. Pt calls appropriately for assistance, non-skid socks in use.       Problem: Knowledge Deficit  Goal: Knowledge of disease process/condition, treatment plan, diagnostic tests, and medications will improve  Pt updated and educated on nursing interventions, medications and POC

## 2018-11-18 NOTE — PROGRESS NOTES
"Pt A&Ox4. VS: /67   Pulse 80   Temp 36.5 °C (97.7 °F) (Temporal)   Resp 18   Ht 1.549 m (5' 0.98\")   Wt 71 kg (156 lb 8.4 oz)   SpO2 98%   Breastfeeding? No   BMI 29.59 kg/m² . Pt denies n/v, numbness, tingling, SOB and chest pain. Pt states pain in her RUQ of her abdomen, but declined pain medications will order heat packs to help with pain. Biliary drain to RUQ CDI, and flushed with 10 cc of NS. Pt tolerated well. Pt needs met at this time, call light within reach, hourly rounding in effect, and will continue to monitor.       "

## 2018-11-18 NOTE — CARE PLAN
Problem: Infection  Goal: Will remain free from infection    Intervention: Assess signs and symptoms of infection  Patient continues on IV ABX. VS and labs monitored as appropriate. Will continue with current POC.       Problem: Skin Integrity  Goal: Risk for impaired skin integrity will decrease    Intervention: Assess risk factors for impaired skin integrity and/or pressure ulcers  Patient encouraged to reposition Q 2 hours and PRN. Skin assessed Q shift for breakdown. PO fluids and food encouraged during waking hours. Will continue with current POC.

## 2018-11-18 NOTE — PROGRESS NOTES
Infectious Disease Progress Note    Author: Yuli Olmedo M.D. Date & Time of service: 2018  4:01 PM    Chief Complaint:  Generalized weakness and chills    Interval History:  73-year-old female with metastatic ampullary carcinoma of the pancreas presented with generalized weakness and shaking chills.  2018-no fevers.  Complains of generalized malaise and fatigue.  Still has some pain in her right shoulder.  WBC count is 22,000.  Cultures remain negative.  2018 T-max is 98.2.  No new issues overnight.  Underwent IR drainage on 2018.  Her shoulder pain has eased since then.  11/15/2018 no fevers.  WBC 13.3 creatinine 0.47  2018 no fevers.  Denies any increased abdominal pain.  The shoulder pain is improved as well.  WBC is 12.8.  2018 no fevers.  Shoulder pain is much improved.  Denies any new issues.  Labs Reviewed, Medications Reviewed and Radiology Reviewed.    Review of Systems:  Review of Systems   Constitutional: Positive for malaise/fatigue. Negative for fever.   HENT: Negative for hearing loss.    Respiratory: Negative for cough and shortness of breath.    Cardiovascular: Negative for chest pain and leg swelling.   Gastrointestinal: Negative for abdominal pain, nausea and vomiting.   Genitourinary: Negative for dysuria.   Musculoskeletal: Positive for myalgias.        Shoulder pain has improved   Neurological: Negative for sensory change and speech change.       Hemodynamics:  Temp (24hrs), Av.6 °C (97.9 °F), Min:36.3 °C (97.4 °F), Max:36.8 °C (98.2 °F)  Temperature: 36.8 °C (98.2 °F)  Pulse  Av.8  Min: 67  Max: 109Heart Rate (Monitored): 74  NIBP: 148/70       Physical Exam:  Physical Exam   Constitutional: She is oriented to person, place, and time. No distress.   HENT:   Mouth/Throat: No oropharyngeal exudate.   Eyes: No scleral icterus.   Neck: Neck supple.   Cardiovascular: Regular rhythm.    No murmur heard.  Pulmonary/Chest: She has no wheezes. She has no  rales.   Abdominal: Soft. There is no tenderness. There is no rebound.   Right upper quadrant drain with serosanguineous discharge   Musculoskeletal: She exhibits no edema.   Neurological: She is alert and oriented to person, place, and time.   Skin: No rash noted. No erythema.   Vitals reviewed.      Meds:    Current Facility-Administered Medications:   •  linezolid  •  micafungin  •  apixaban  •  LR  •  [COMPLETED] piperacillin-tazobactam **AND** piperacillin-tazobactam  •  senna-docusate **AND** polyethylene glycol/lytes **AND** magnesium hydroxide **AND** bisacodyl  •  Respiratory Care per Protocol  •  ondansetron  •  ondansetron  •  acetaminophen  •  Notify provider if pain remains uncontrolled **AND** Use the numeric rating scale (NRS-11) on regular floors and Critical-Care Pain Observation Tool (CPOT) on ICUs/Trauma to assess pain **AND** Pulse Ox (Oximetry) **AND** Pharmacy Consult Request **AND** If patient difficult to arouse and/or has respiratory depression, stop any opiates that are currently infusing and call a Rapid Response. **AND** oxyCODONE immediate-release **AND** oxyCODONE immediate-release **AND** morphine injection  •  NS  •  omeprazole  •  artificial tears  •  albuterol    Labs:  Recent Labs      11/15/18   0500  11/16/18   0527 11/17/18   0701   WBC  13.3*  12.8*  15.0*   RBC  3.35*  3.54*  4.03*   HEMOGLOBIN  9.5*  10.0*  11.5*   HEMATOCRIT  29.9*  31.0*  34.9*   MCV  89.3  87.6  86.6   MCH  28.4  28.2  28.5   RDW  49.7  48.3  48.2   PLATELETCT  220  240  227   MPV  10.2  9.7  10.1     Recent Labs      11/15/18   0500  11/16/18   0527 11/17/18   0701   SODIUM  134*  133*  134*   POTASSIUM  3.3*  4.1  3.8   CHLORIDE  109  108  106   CO2  18*  21  21   GLUCOSE  93  110*  116*   BUN  6*  5*  7*     Recent Labs      11/15/18   0500  11/16/18   0527  11/17/18   0701   CREATININE  0.47*  0.56  0.51       Imaging:  Ct-abdomen-pelvis With    Result Date: 11/12/2018 11/12/2018 1:38 PM  HISTORY/REASON FOR EXAM:  Abdominal pain. History of metastatic ampullary adenocarcinoma. TECHNIQUE/EXAM DESCRIPTION: CT scan of the abdomen and pelvis with contrast. Contrast-enhanced helical scanning was obtained from the diaphragmatic domes through the pubic symphysis following the bolus administration of 80 mL of Omnipaque 350 without complication. Low dose optimization technique was utilized for this CT exam including automated exposure control and adjustment of the mA and/or kV according to patient size. COMPARISON: 10/25/2018 FINDINGS: The visualized lung bases are clear. CT Abdomen: A small hiatal hernia is present. There is interval increase in size of an area of low attenuation in the dome of the right lobe of the liver which contains air. This may represent necrosis or post therapy hemorrhage following embolization. This area measures 8.8 x 7.8 x 6.4 cm. Previous measurement was 2.2 cm in maximum dimension. Multiple smaller areas of low-attenuation within the right lobe of liver are present which are suspicious for hepatic metastases. These other smaller low-attenuation areas are without significant change. The gallbladder absent. A low-attenuation area in the region of the pancreatic head is again noted and is poorly defined measuring approximately 3.1 x 2.6 cm in diameter. This is consistent with the area of ampullary carcinoma. Embolization coils are present in the GDA. There is low attenuation in the pancreatic body proximally which may represent low-attenuation  secondary to pancreatitis or spread of neoplasm. The common duct is dilated proximal to the level of the ampulla measuring a maximum diameter of 1.6 cm. No choledocholithiasis is identified. An enlarged retroperitoneal lymph node between the upper aorta and IVC is unchanged measuring 1.5 cm in diameter. No new area of retroperitoneal adenopathy is identified. No peripancreatic adenopathy is present. The spleen appears normal. The splenic vein  and portal vein are patent. The adrenal glands are not enlarged and the kidneys enhance symmetrically. Small simple cysts in the right kidney are stable. There is no lymphadenopathy. The aorta and IVC are normal in caliber. The bowel is without obstruction. The appendix appears normal. There is a small umbilical hernia containing fat. CT Pelvis: There is no lymphadenopathy. No free fluid is present. The bladder appears normal. Uterus as endometrial complex thickening. There is a myomatous type calcification in the right aspect of the uterus which is unchanged. There is no acute inflammatory process.     1.  Interval significant increase in size of low-attenuation area in the dome of the right lobe of liver which contains air. This may represent necrosis or hemorrhage following therapy. Stable appearance of other smaller low-attenuation areas in the right lobe of the liver without evidence of progression of disease in these areas of metastasis. Differential diagnosis would include post therapy infection. 2.  Stable masslike density in the region of the ampulla of Vater consistent with primary carcinoma. Current dimensions are 3.1 x 2.6 cm. 3.  Persistent low-attenuation areas in the pancreatic head and proximal body which may represent secondary pancreatitis. 4.  Stable upper retroperitoneal adenopathy. 5.  Small umbilical hernia containing fat.    Ct-abdomen-pelvis With    Result Date: 10/25/2018  10/25/2018 4:28 PM HISTORY/REASON FOR EXAM:  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 Patient is status post embolization.. TECHNIQUE/EXAM DESCRIPTION:   CT scan of the abdomen and pelvis with contrast. Contrast-enhanced helical scanning was obtained from the diaphragmatic domes through the pubic symphysis following the bolus administration of nonionic contrast without complication. 80 mL of Omnipaque 350 nonionic contrast was administered without complication. Low dose optimization technique  was utilized for this CT exam including automated exposure control and adjustment of the mA and/or kV according to patient size. COMPARISON: PET/CT 7/12/2018, MRI 8/6/2018 and CT abdomen 3/27/2018 FINDINGS: The visualized lung bases are unremarkable. CT Abdomen: There are several hypodense lesions in the hepatic dome with a couple of foci of air. A hypodense mass within air-fluid level in the right hepatic lobe, segment 8, measures approximately 2.1 cm. No significant rim enhancement is identified. There are new  hypodense lesions in the anterior right hepatic lobe measuring up to approximately 7 mm. There is pneumobilia, consistent with prior hepaticojejunostomy. The gallbladder has been removed. The common bile duct remains dilated with thickening of the duodenal wall and enhancement. Maximum diameter of the common bile duct is approximately 14 mm. There are multiple coils in the region of the pancreatic head. The spleen and adrenal glands are unremarkable. The kidneys enhance symmetrically. Hypodense renal masses are consistent with cysts. There is heterogeneous enhancement of the pancreatic head with an ill-defined cystic mass. The mass is difficult to measure, but is approximately 4.6 x 4 cm. The duct does not appear dilated more distally. There are multiple diverticula in the descending and sigmoid colon. There are scattered arterial calcifications. Enlarged aortocaval lymph node on image 37 measures approximately 15 mm short axis, previously 8 mm. CT Pelvis: The bladder is unremarkable. No significant free fluid is identified. A calcified mass in the uterus is consistent with a small leiomyoma.     1.  Ill-defined cystic mass in the pancreatic head with surrounding inflammatory changes. Findings may be related to pancreatitis in the appropriate clinical setting. Findings are new from the prior exam in August. 2.  New hypodense lesions scattered throughout the liver was foci of air. Findings are highly likely  to be related to recent embolization. No rim enhancement to suggest abscess. 3.  Duodenal wall thickening with enhancement, concerning for residual disease. Persistent common bile duct dilatation. 4.  Interval enlargement in an aortocaval lymph node, highly suspicious for metastatic disease. 5.  Diverticulosis. 6.  Status post hepaticojejunostomy.    Dx-chest-portable (1 View)    Result Date: 11/12/2018 11/12/2018 11:06 AM HISTORY/REASON FOR EXAM:  Sepsis. TECHNIQUE/EXAM DESCRIPTION AND NUMBER OF VIEWS: Single portable view of the chest. COMPARISON: 10/25/2018 FINDINGS: There is no evidence of focal consolidation or evidence of pulmonary edema. There is no evidence of pleural effusion. The heart is normal in size.     No pulmonary consolidation.    Dx-chest-portable (1 View)    Result Date: 10/25/2018  10/25/2018 11:35 AM HISTORY/REASON FOR EXAM:  LEFT shoulder pain, malaise, nausea. TECHNIQUE/EXAM DESCRIPTION AND NUMBER OF VIEWS: Single portable view of the chest. COMPARISON: 5/3/2018 FINDINGS: Cardiomediastinal contour is within normal limits. Lungs show hypoinflation. No focal pulmonary consolidation. No pleural fluid collection or pneumothorax. RIGHT chest infusion port again present. No major bony abnormality is seen. Atherosclerotic calcification of thoracic aorta.     Hypoinflation without other evidence for acute cardiopulmonary disease.    [id number]    Result Date: 10/27/2018  10/27/2018 5:00 PM HISTORY/REASON FOR EXAM:  Jaw Pain. TECHNIQUE/EXAM DESCRIPTION AND NUMBER OF VIEWS:  1 view(s) of the mandible. COMPARISON:  None. FINDINGS: Multiple absent teeth. Many of the remaining teeth have filling/crowns. No periapical lucencies seen. No mandibular fracture.     No periapical lucencies. No mandibular fracture.    Ir-cvc Tunnel With Port Removal    Result Date: 10/28/2018   Chest port removal HISTORY/REASON FOR EXAM: Bacteremia, concern for central line infection MEDICATIONS: Conscious sedation  with 2 mcg Fentanyl and 50 mg Versed was administered during the procedure with appropriate continuous patient monitoring of cardiorespiratory function by the radiology nurse. Sedation duration: 30 minutes. TECHNIQUE/EXAM DESCRIPTION: Following informed consent patient was prepped and draped in the usual fashion. The procedure was performed using MAXIMAL STERILE BARRIER technique including sterile gown, mask, cap, and donning of sterile gloves following appropriate hand hygiene and/or  sterile scrub. Patient skin site was prepped with 2% Chlorhexidine solution. A timeout was performed. Local anesthetic result was achieved with administration of copious 1% lidocaine with epinephrine. A skin incision was made with an 15 blade scalpel. Blunt dissection was used to retrieve the port and catheter which were observed to be intact. Hemostasis  was obtained with manual compression. The port pocket showed no signs of gross infection. The port pocket was flushed with normal saline. The port pocket was closed with deep 2-0 Vicryl sutures and a subcutaneous 4-0 Vicryl suture layer, the skin was closed with Dermabond. The patient tolerated procedure well. The skin was cleaned and a dressing was applied. COMPARISON:  None     Successful chest port removal.    Ir-picc Line Placement    Result Date: 10/29/2018  HISTORY/REASON FOR EXAM:   PICC placement. TECHNIQUE/EXAM DESCRIPTION AND NUMBER OF VIEWS:   PICC line insertion with ultrasound guidance.  The procedure was performed using maximal sterile barrier technique including sterile gown, mask, cap, and donning of sterile gloves following appropriate hand hygiene and/or sterile scrub. Patient skin site was prepped with 2% Chlorhexidine solution. FINDINGS:  PICC line insertion with Ultrasound Guidance was performed by qualified nursing staff without the assistance of a Radiologist. PICC positioning appropriateness confirmed by 3CG technology; chest xray only needed in the instance  3CG unable to confirm placement.              Ultrasound-guided PICC placement performed by qualified nursing staff as above.     Ir-low Level Consult-outpt    Result Date: 10/22/2018  This exam has been resulted under the NOTES tab, and the signed report has been auto faxed to the ordering physician on the date/time of that signature.      Micro:  Results     Procedure Component Value Units Date/Time    CULTURE WOUND W/ GRAM STAIN [[id number]]  (Abnormal) Collected:  11/13/18 1710    Order Status:  Completed Specimen:  Wound Updated:  11/17/18 0844     Significant Indicator POS (POS)     Source WND     Site Liver Abscess     Culture Result Wound Growth noted after further incubation, see below for  organism identification.   (A)     Gram Stain Result Few WBCs.  Many Gram negative rods.  Moderate Gram positive rods.       Culture Result Wound Candida albicans  Light growth   (A)      Lactobacillus acidophilus  Moderate growth   (A)      Possible Vancomycin Resistant Enterococci  Moderate growth  Combination therapy with ampicillin, penicillin, or  vancomycin (for susceptible strains) plus an aminoglycoside  is usually indicated for serious enterococcal infections,  such as endocarditis unless high-level resistance to both  gentamicin and streptomycin is documented; such combinations  are predicted to result in synergistic killing of the  Enterococcus.   (A)    Narrative:       CALL  [place]  161 tel. [phone],    BLOOD CULTURE [[id number]] Collected:  11/15/18 1807    Order Status:  Completed Specimen:  Blood from Peripheral Updated:  11/16/18 0744     Significant Indicator NEG     Source BLD     Site PERIPHERAL     Blood Culture No Growth    Note: Blood cultures are incubated for 5 days and  are monitored continuously.Positive blood cultures  are called to the RN and reported as soon as  they are identified.      Narrative:       Collected By:[id number] CONCHA VELAZQUEZ  Per Hospital Policy: Only change Specimen Src: to  "\"Line\" if  specified by physician order.    BLOOD CULTURE [[id number]] Collected:  11/15/18 1807    Order Status:  Completed Specimen:  Blood from Peripheral Updated:  11/16/18 0744     Significant Indicator NEG     Source BLD     Site PERIPHERAL     Blood Culture No Growth    Note: Blood cultures are incubated for 5 days and  are monitored continuously.Positive blood cultures  are called to the RN and reported as soon as  they are identified.      Narrative:       Collected By:[id number] CONCHA VELAZQUEZ  Per Hospital Policy: Only change Specimen Src: to \"Line\" if  specified by physician order.    BLOOD CULTURE [[id number]]  (Abnormal) Collected:  11/12/18 1039    Order Status:  Completed Specimen:  Blood from Peripheral Updated:  11/15/18 1316     Significant Indicator POS (POS)     Source BLD     Site PERIPHERAL     Blood Culture Growth detected by Bactec instrument. 11/14/2018  10:03  Gram Stain: Yeast.   (A)      Yeast (A)    Narrative:       CALL  Flores  161 tel. [phone],  CALLED  161 tel. [phone] 11/14/2018, 10:06, RB PERF. RESULTS CALLED  TO:[id number]  Per Hospital Policy: Only change Specimen Src: to \"Line\" if  specified by physician order.    BLOOD CULTURE [[id number]]  (Abnormal) Collected:  11/12/18 1120    Order Status:  Completed Specimen:  Blood from Peripheral Updated:  11/15/18 1203     Significant Indicator POS (POS)     Source BLD     Site PERIPHERAL     Blood Culture Growth detected by Bactec instrument. 11/15/2018  11:59  Gram Stain: Gram positive rods.   (A)    Narrative:       CALL  Flores  161 tel. [phone],  CALLED  161 tel. [phone] 11/15/2018, 12:03, RB PERF. RESULTS CALLED  TO:[id number] RN  Per Hospital Policy: Only change Specimen Src: to \"Line\" if  specified by physician order.    URINE CULTURE(NEW) [[id number]] Collected:  11/12/18 1057    Order Status:  Completed Specimen:  Urine Updated:  11/14/18 0917     Significant Indicator NEG     Source UR     Site --     Urine Culture No growth at " 48 hours    Narrative:       Mini cath  Indication for culture:->Emergency Room Patient    GRAM STAIN [[id number]] Collected:  11/13/18 1710    Order Status:  Completed Specimen:  Wound Updated:  11/14/18 0907     Significant Indicator .     Source WND     Site Liver Abscess     Gram Stain Result Few WBCs.  Many Gram negative rods.  Moderate Gram positive rods.      Blood Culture [[id number]]     Order Status:  Canceled Specimen:  Blood from Peripheral     Blood Culture [[id number]]     Order Status:  Canceled Specimen:  Blood from Peripheral     URINALYSIS [[id number]] Collected:  11/12/18 1057    Order Status:  Completed Specimen:  Urine Updated:  11/12/18 1135     Color Yellow     Character Clear     Specific Gravity 1.013     Ph 7.0     Glucose Negative mg/dL      Ketones Negative mg/dL      Protein Negative mg/dL      Bilirubin Negative     Urobilinogen, Urine 0.2     Nitrite Negative     Leukocyte Esterase Negative     Occult Blood Negative     Micro Urine Req see below     Comment: Microscopic examination not performed when specimen is clear  and chemically negative for protein, blood, leukocyte esterase  and nitrite.         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 Lactobacillus  Follow the ID.  It is likely real because his liver abscess is also showing gram-positive rods  Repeat blood cultures x2 are negative from 11/15/2018  Fungemia  Start on IV Mycamine  Repeat blood cultures x2 are negative from 11/15/2018    Ampullary carcinoma of the pancreas    History of DVT  Discussed with internal medicine.

## 2018-11-18 NOTE — PROGRESS NOTES
Nevaeh from Lab called with critical result of positive cultures of VRE at 0930. Critical lab result read back to Laura.   Dr. Murillo notified of critical lab result at 0945.  Critical lab result read back by Dr. Murillo.

## 2018-11-18 NOTE — PROGRESS NOTES
Patient is alert and oriented, intermittent confusion noted at times. Continues on IV ABX regimen. Able to stand and pivot with one assist on arrival to unit, cooperative with repositioning through the night thus far. Patient has episodes of incontinence noted. No current c/o pain noted. PO and IV medications in place for pain management. Fluids and call light are within reach, will continue to monitor and treat as appropriate throughout shift.

## 2018-11-18 NOTE — PROGRESS NOTES
[facility] Medicine Daily Progress Note    Date of Service  11/18/2018    Chief Complaint  Chills and weakness     Hospital Course      73 y.o. female history of metastatic ampullary carcinoma recent Hx of E.coli bacteremia admitted 11/12/18 with chills and malaise.  She was found to be tachycardic and had leukocytosis with elevated lactic acid.  Admitted to the ICU and treated for sepsis.  Found to have liver abscess with bacteremia and fungemia. On 11/13/2018, she had a drain placed by interventional radiology to drain a fluid collection in her liver. Blood cultures from 11/12/2018 are growing lactobacillus, Candida albicans and strep viridans. Liver abscess cultures from 11/13/2018 are positive for VRE, currently on linazolid and micofungin and zosyn. ID following.            Interval Problem Update  Hemodynamically stable, blood pressure 130s-40s over 60s-70s, saturating well on room air  WBC 16.7, Hb 10.7, , sodium 133   Tolerating diet, has pain in RUQ, medicated for pain     Consultants/Specialty  Critical Care   General surgery, Trey   Interventional radiology, Wil Murray   ID, Dr. Olmedo    Code Status  Full Code    Disposition  Inpatient, will need To determine the duration of antibiotic therapy.   The need for PICC line or port after negative blood cultures  Per therapy may go home with home health provided there is 24-hour supervision, otherwise a skilled nursing facility    Review of Systems  Review of Systems   Constitutional: Positive for chills and malaise/fatigue. Negative for diaphoresis.   HENT: Negative.    Eyes: Negative.    Respiratory: Negative for cough, hemoptysis, sputum production and shortness of breath.    Cardiovascular: Negative for chest pain, palpitations and orthopnea.   Gastrointestinal: Positive for abdominal pain. Negative for heartburn, nausea and vomiting.   Genitourinary: Negative for dysuria, frequency and urgency.   Musculoskeletal: Negative for back pain  and neck pain.   Skin: Negative for rash.   Neurological: Negative for focal weakness, seizures, loss of consciousness and headaches.   Endo/Heme/Allergies: Negative for polydipsia.   Psychiatric/Behavioral: Negative for depression and hallucinations. The patient is not nervous/anxious.         Physical Exam  Temp:  [36.2 °C (97.2 °F)-36.7 °C (98.1 °F)] 36.3 °C (97.4 °F)  Pulse:  [80-84] 83  Resp:  [18-20] 18  BP: (125-145)/(66-77) 131/68    Physical Exam   Constitutional: She is oriented to person, place, and time. She appears well-developed and well-nourished.   HENT:   Head: Normocephalic and atraumatic.   Eyes: Pupils are equal, round, and reactive to light. EOM are normal. Right eye exhibits no discharge. Left eye exhibits no discharge.   Neck: Normal range of motion. Neck supple. No tracheal deviation present. No thyromegaly present.   Cardiovascular: Normal rate, regular rhythm and intact distal pulses.    No murmur heard.  Pulmonary/Chest: Effort normal and breath sounds normal. No respiratory distress. She has no wheezes.   Abdominal: Soft. Bowel sounds are normal. She exhibits no distension. There is tenderness. There is no rebound.   Biliary drain in place, draining dark green fluid   Musculoskeletal: Normal range of motion. She exhibits no edema.   Neurological: She is alert and oriented to person, place, and time. No cranial nerve deficit. Coordination normal.   Skin: Skin is warm and dry. No rash noted. She is not diaphoretic. No erythema. No pallor.   Psychiatric: She has a normal mood and affect. Her behavior is normal.   Nursing note and vitals reviewed.      Fluids    Intake/Output Summary (Last 24 hours) at 11/18/18 1638  Last data filed at 11/18/18 1000   Gross per 24 hour   Intake                0 ml   Output             1070 ml   Net            -1070 ml       Laboratory  Recent Labs      11/16/18   0527  11/17/18   0701  11/18/18   0026   WBC  12.8*  15.0*  16.7*   RBC  3.54*  4.03*  3.86*    HEMOGLOBIN  10.0*  11.5*  10.7*   HEMATOCRIT  31.0*  34.9*  34.0*   MCV  87.6  86.6  88.1   MCH  28.2  28.5  27.7   MCHC  32.3*  33.0*  31.5*   RDW  48.3  48.2  49.3   PLATELETCT  240  227  244   MPV  9.7  10.1  10.0     Recent Labs      11/16/18   0527  11/17/18   0701  11/18/18   0026   SODIUM  133*  134*  133*   POTASSIUM  4.1  3.8  3.7   CHLORIDE  108  106  105   CO2  21  21  21   GLUCOSE  110*  116*  114*   BUN  5*  7*  5*   CREATININE  0.56  0.51  0.47*   CALCIUM  8.0*  8.2*  8.1*                   Imaging  CT-DRAIN-LIVER ABSCESS-CYST   Final Result      Successful CT-guided drain placement in the right lobe liver necrosis/abscess.      CT-ABDOMEN-PELVIS WITH   Final Result      1.  Interval significant increase in size of low-attenuation area in the dome of the right lobe of liver which contains air. This may represent necrosis or hemorrhage following therapy. Stable appearance of other smaller low-attenuation areas in the    right lobe of the liver without evidence of progression of disease in these areas of metastasis. Differential diagnosis would include post therapy infection.      2.  Stable masslike density in the region of the ampulla of Vater consistent with primary carcinoma. Current dimensions are 3.1 x 2.6 cm.      3.  Persistent low-attenuation areas in the pancreatic head and proximal body which may represent secondary pancreatitis.      4.  Stable upper retroperitoneal adenopathy.      5.  Small umbilical hernia containing fat.      DX-CHEST-PORTABLE (1 VIEW)   Final Result      No pulmonary consolidation.          Assessment/Plan  Liver abscess- (present on admission)   Assessment & Plan    Fluid collection in the liver drained by interventional radiology  Cultures enterococcus [VRE]  ID following   Continue zosyn, Linazolid and micofungin         Bacteremia- (present on admission)   Assessment & Plan    Received treatment for E. coli bacteremia in early November, her port was  removed  Admitted with sepsis  Blood cultures from 11/12/2018 are growing lactobacillus, Candida albicans and strep viridans. Liver abscess cultures from 11/13/2018 are positive for VRE.  Currently on linazolid and micofungin and zosyn. ID following.           Sepsis (HCC)- (present on admission)   Assessment & Plan    This is sepsis (without associated acute organ dysfunction).   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

## 2018-11-19 NOTE — PROGRESS NOTES
"Pt A&Ox4. VS: /65   Pulse 88   Temp 36.6 °C (97.9 °F) (Temporal)   Resp 18   Ht 1.549 m (5' 0.98\")   Wt 71 kg (156 lb 8.4 oz)   SpO2 95%   Breastfeeding? No   BMI 29.59 kg/m² . Pt denies n/v, numbness, tingling, SOB and chest pain. Pt states pain in her RUQ abdomen, but declined pain interventions at this time. JACQUELINE drain irrigated with 10 cc of saline, pt tolerated well. Pt up to chair. PIV patent with no pain when flushing. Pt needs met at this time, call light within reach, hourly rounding in effect, and will continue to monitor.       "

## 2018-11-19 NOTE — CARE PLAN
Problem: Safety  Goal: Will remain free from falls  Outcome: PROGRESSING AS EXPECTED  Bed in lowest position. Call light within reach. Pt verbalized/demonstrated how to reach staff when needed. Pt belongings within reach.     Problem: Skin Integrity  Goal: Risk for impaired skin integrity will decrease  Outcome: PROGRESSING AS EXPECTED  Assessed skin integrity. Provided bedpan. Pillows in use for support and repositioning.

## 2018-11-19 NOTE — CARE PLAN
Problem: Safety  Goal: Will remain free from injury  Hourly rounding in effect, pt instructed to call for assistance, bed locked and in lowest position. Bed alarm on. Non-skid socks in use.        Problem: Knowledge Deficit  Goal: Knowledge of disease process/condition, treatment plan, diagnostic tests, and medications will improve  Pt and son updated and educated on nursing interventions, medications and POC

## 2018-11-19 NOTE — PROGRESS NOTES
Pt A/O x 4, lying in bed resting.  R PIV in place - NS running TKO. Discussed POC with pt at bedside. No complaints of pain or nausea. Pt up 1 with FWW. Biliary drain in place. Site is CDI, drainage is mild, sanguenous. Bed in lowest, locked position. Call light and personal belongings within reach. Pt has no further questions or concerns at this time. Hourly rounding in place.

## 2018-11-19 NOTE — PROGRESS NOTES
Hospital Medicine Daily Progress Note    Date of Service  11/19/2018    Chief Complaint  Chills and weakness     Hospital Course      73 y.o. female history of metastatic ampullary carcinoma recent Hx of E.coli bacteremia admitted 11/12/18 with chills and malaise.  She was found to be tachycardic and had leukocytosis with elevated lactic acid.  Admitted to the ICU and treated for sepsis.  Found to have liver abscess with bacteremia and fungemia. On 11/13/2018, she had a drain placed by interventional radiology to drain a fluid collection in her liver. Blood cultures from 11/12/2018 are growing lactobacillus, Candida albicans and strep viridans. Liver abscess cultures from 11/13/2018 are positive for VRE, currently on linazolid and micofungin and zosyn. ID following.            Interval Problem Update  Hemodynamically stable, heart rate mostly 80s, blood pressure 120s-140s over 60s-70s  Saturating well on room  K 3.5, replacing.  White BC 15.6, trending down, hemoglobin 9.7, platelets 260.   Will likely need prolonged intravenous antibiotics discussed with ID, planning 6 weeks.    Next step to place a PICC line or a port when okay with ID.   Outpatient infusions versus skilled nursing facility.  Tolerating diet, has pain in RUQ, controlled with pain medication    Consultants/Specialty  Critical Care   General surgery, Trey   Interventional radiology, Wil Murray   ID, Dr. Olmedo    Code Status  Full Code    Disposition  Inpatient, will need To determine the duration of antibiotic therapy.   The need for PICC line or port after negative blood cultures  Per therapy may go home with home health provided there is 24-hour supervision, otherwise a skilled nursing facility    Review of Systems  Review of Systems   Constitutional: Positive for chills and malaise/fatigue. Negative for diaphoresis.   HENT: Negative.    Eyes: Negative.    Respiratory: Negative for cough, hemoptysis, sputum production and shortness of  breath.    Cardiovascular: Negative for chest pain, palpitations and orthopnea.   Gastrointestinal: Positive for abdominal pain. Negative for heartburn, nausea and vomiting.   Genitourinary: Negative for dysuria, frequency and urgency.   Musculoskeletal: Negative for back pain and neck pain.   Skin: Negative for rash.   Neurological: Negative for focal weakness, seizures, loss of consciousness and headaches.   Endo/Heme/Allergies: Negative for polydipsia.   Psychiatric/Behavioral: Negative for depression and hallucinations. The patient is not nervous/anxious.         Physical Exam  Temp:  [36.1 °C (97 °F)-36.7 °C (98.1 °F)] 36.1 °C (97 °F)  Pulse:  [] 88  Resp:  [18] 18  BP: (120-138)/(58-81) 120/58    Physical Exam   Constitutional: She is oriented to person, place, and time. She appears well-developed and well-nourished.   HENT:   Head: Normocephalic and atraumatic.   Eyes: Pupils are equal, round, and reactive to light. EOM are normal. Right eye exhibits no discharge. Left eye exhibits no discharge.   Neck: Normal range of motion. Neck supple. No tracheal deviation present. No thyromegaly present.   Cardiovascular: Normal rate, regular rhythm and intact distal pulses.    No murmur heard.  Pulmonary/Chest: Effort normal and breath sounds normal. No respiratory distress. She has no wheezes.   Abdominal: Soft. Bowel sounds are normal. She exhibits no distension. There is tenderness. There is no rebound.   Biliary drain in place, draining dark green fluid   Musculoskeletal: Normal range of motion. She exhibits no edema.   Neurological: She is alert and oriented to person, place, and time. No cranial nerve deficit. Coordination normal.   Skin: Skin is warm and dry. No rash noted. She is not diaphoretic. No erythema. No pallor.   Psychiatric: She has a normal mood and affect. Her behavior is normal.   Nursing note and vitals reviewed.      Fluids    Intake/Output Summary (Last 24 hours) at 11/19/18 1130  Last  data filed at 11/18/18 1705   Gross per 24 hour   Intake              111 ml   Output               10 ml   Net              101 ml       Laboratory  Recent Labs      11/17/18   0701  11/18/18   0026  11/19/18   0006   WBC  15.0*  16.7*  15.6*   RBC  4.03*  3.86*  3.50*   HEMOGLOBIN  11.5*  10.7*  9.7*   HEMATOCRIT  34.9*  34.0*  30.5*   MCV  86.6  88.1  87.1   MCH  28.5  27.7  27.7   MCHC  33.0*  31.5*  31.8*   RDW  48.2  49.3  49.2   PLATELETCT  227  244  260   MPV  10.1  10.0  10.3     Recent Labs      11/17/18   0701  11/18/18   0026  11/19/18   0006   SODIUM  134*  133*  134*   POTASSIUM  3.8  3.7  3.5*   CHLORIDE  106  105  105   CO2  21  21  22   GLUCOSE  116*  114*  107*   BUN  7*  5*  5*   CREATININE  0.51  0.47*  0.39*   CALCIUM  8.2*  8.1*  7.8*                   Imaging  CT-DRAIN-LIVER ABSCESS-CYST   Final Result      Successful CT-guided drain placement in the right lobe liver necrosis/abscess.      CT-ABDOMEN-PELVIS WITH   Final Result      1.  Interval significant increase in size of low-attenuation area in the dome of the right lobe of liver which contains air. This may represent necrosis or hemorrhage following therapy. Stable appearance of other smaller low-attenuation areas in the    right lobe of the liver without evidence of progression of disease in these areas of metastasis. Differential diagnosis would include post therapy infection.      2.  Stable masslike density in the region of the ampulla of Vater consistent with primary carcinoma. Current dimensions are 3.1 x 2.6 cm.      3.  Persistent low-attenuation areas in the pancreatic head and proximal body which may represent secondary pancreatitis.      4.  Stable upper retroperitoneal adenopathy.      5.  Small umbilical hernia containing fat.      DX-CHEST-PORTABLE (1 VIEW)   Final Result      No pulmonary consolidation.          Assessment/Plan  Liver abscess- (present on admission)   Assessment & Plan    Fluid collection in the liver  drained by interventional radiology  Cultures enterococcus [VRE]  ID following   Continue zosyn, Linazolid and micofungin         Bacteremia- (present on admission)   Assessment & Plan    Received treatment for E. coli bacteremia in early November, her port was removed  Admitted with sepsis  Blood cultures from 11/12/2018 are growing lactobacillus, Candida albicans and strep viridans. Liver abscess cultures from 11/13/2018 are positive for VRE.  Currently on linazolid and micofungin and zosyn. ID following.           Sepsis (HCC)- (present on admission)   Assessment & Plan    This is sepsis (without associated acute organ dysfunction).   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

## 2018-11-19 NOTE — THERAPY
"Occupational Therapy Evaluation completed.   Functional Status:    Pleasant 72 y/o female admitted for generalized weakness, chills, and shaking. Currently undergoing chemo for metastatic ampullary carcinoma. Pt c/o right sided stomach pain. She completed sup><sit with supv and extra time, using bed rails and elevated HOB. Attempted to don socks, but required mod A. Completed sit><stand with CGA and VCs for technique. Ambulated to bathroom using FWW, appears a little unsteady but has no losses of balance. Completed toilet xfer with SBA, pericare with Supv. She then stood and returned to EOB with SBA, her transfers became noticeably better as eval progressed. Pt is limited at this time by pain, diminished balance and decreased activity tolerance. She has very strong family support and a home that is fully equipped with DME.    Plan of Care: Will benefit from Occupational Therapy 3 times per week  Discharge Recommendations:  Equipment: Will Continue to Assess for Equipment Needs. Post-acute therapy Discharge to home with outpatient or home health for additional skilled therapy services.    See \"Rehab Therapy-Acute\" Patient Summary Report for complete documentation.    "

## 2018-11-19 NOTE — CARE PLAN
Problem: Nutritional:  Goal: Achieve adequate nutritional intake  Patient will consume 50% of meals.   Outcome: PROGRESSING AS EXPECTED    Intervention: Advance diet as tolerated  Pt is on regular diet.   Intervention: Monitor PO intake, weights, and laboratory values  Per ADL documentation, pt has been having variable intake ranging from < 25 - 100%.   % consumed of Breakfast tray this morning.   Attempted to see pt at bedside, however was sleeping soundly.     RD will continue to monitor

## 2018-11-19 NOTE — PROGRESS NOTES
Infectious Disease Progress Note    Author: Yuli Olmedo M.D. Date & Time of service: 2018  5:59 PM    Chief Complaint:  Generalized weakness and chills    Interval History:  73-year-old female with metastatic ampullary carcinoma of the pancreas presented with generalized weakness and shaking chills.  2018-no fevers.  Complains of generalized malaise and fatigue.  Still has some pain in her right shoulder.  WBC count is 22,000.  Cultures remain negative.  2018 T-max is 98.2.  No new issues overnight.  Underwent IR drainage on 2018.  Her shoulder pain has eased since then.  11/15/2018 no fevers.  WBC 13.3 creatinine 0.47  2018 no fevers.  Denies any increased abdominal pain.  The shoulder pain is improved as well.  WBC is 12.8.  2018 no fevers.  Shoulder pain is much improved.  Denies any new issues.  - no fevers. No new issues> WBC 16.7  Labs Reviewed, Medications Reviewed and Radiology Reviewed.    Review of Systems:  Review of Systems   Constitutional: Positive for malaise/fatigue. Negative for fever.   HENT: Negative for hearing loss.    Respiratory: Negative for cough and shortness of breath.    Cardiovascular: Negative for chest pain and leg swelling.   Gastrointestinal: Positive for abdominal pain and constipation. Negative for nausea and vomiting.        Slight abdominal pain   Genitourinary: Negative for dysuria.   Musculoskeletal: Negative for myalgias.        Shoulder pain has improved   Neurological: Negative for sensory change and speech change.       Hemodynamics:  Temp (24hrs), Av.5 °C (97.7 °F), Min:36.2 °C (97.2 °F), Max:36.7 °C (98.1 °F)  Temperature: 36.7 °C (98.1 °F)  Pulse  Av.1  Min: 67  Max: 109   Blood Pressure : 138/81       Physical Exam:  Physical Exam   Constitutional: She is oriented to person, place, and time. No distress.   HENT:   Mouth/Throat: No oropharyngeal exudate.   Eyes: No scleral icterus.   Neck: Neck supple.    Cardiovascular: Regular rhythm.    No murmur heard.  Pulmonary/Chest: She has no wheezes. She has no rales.   Abdominal: Soft. There is no tenderness. There is no rebound.   Right upper quadrant drain with serosanguineous discharge   Musculoskeletal: She exhibits no edema.   Neurological: She is alert and oriented to person, place, and time.   Skin: No rash noted. No erythema.   Vitals reviewed.      Meds:    Current Facility-Administered Medications:   •  linezolid  •  micafungin  •  apixaban  •  LR  •  [COMPLETED] piperacillin-tazobactam **AND** piperacillin-tazobactam  •  senna-docusate **AND** polyethylene glycol/lytes **AND** magnesium hydroxide **AND** bisacodyl  •  Respiratory Care per Protocol  •  ondansetron  •  ondansetron  •  acetaminophen  •  Notify provider if pain remains uncontrolled **AND** Use the numeric rating scale (NRS-11) on regular floors and Critical-Care Pain Observation Tool (CPOT) on ICUs/Trauma to assess pain **AND** Pulse Ox (Oximetry) **AND** Pharmacy Consult Request **AND** If patient difficult to arouse and/or has respiratory depression, stop any opiates that are currently infusing and call a Rapid Response. **AND** oxyCODONE immediate-release **AND** oxyCODONE immediate-release **AND** morphine injection  •  NS  •  omeprazole  •  artificial tears  •  albuterol    Labs:  Recent Labs      11/16/18 0527 11/17/18   0701  11/18/18   0026   WBC  12.8*  15.0*  16.7*   RBC  3.54*  4.03*  3.86*   HEMOGLOBIN  10.0*  11.5*  10.7*   HEMATOCRIT  31.0*  34.9*  34.0*   MCV  87.6  86.6  88.1   MCH  28.2  28.5  27.7   RDW  48.3  48.2  49.3   PLATELETCT  240  227  244   MPV  9.7  10.1  10.0     Recent Labs      11/16/18   0527 11/17/18   0701  11/18/18   0026   SODIUM  133*  134*  133*   POTASSIUM  4.1  3.8  3.7   CHLORIDE  108  106  105   CO2  21  21  21   GLUCOSE  110*  116*  114*   BUN  5*  7*  5*     Recent Labs      11/16/18   0527  11/17/18   0701  11/18/18   0026   CREATININE  0.56  0.51   0.47*       Imaging:  Ct-abdomen-pelvis With    Result Date: 11/12/2018 11/12/2018 1:38 PM HISTORY/REASON FOR EXAM:  Abdominal pain. History of metastatic ampullary adenocarcinoma. TECHNIQUE/EXAM DESCRIPTION: CT scan of the abdomen and pelvis with contrast. Contrast-enhanced helical scanning was obtained from the diaphragmatic domes through the pubic symphysis following the bolus administration of 80 mL of Omnipaque 350 without complication. Low dose optimization technique was utilized for this CT exam including automated exposure control and adjustment of the mA and/or kV according to patient size. COMPARISON: 10/25/2018 FINDINGS: The visualized lung bases are clear. CT Abdomen: A small hiatal hernia is present. There is interval increase in size of an area of low attenuation in the dome of the right lobe of the liver which contains air. This may represent necrosis or post therapy hemorrhage following embolization. This area measures 8.8 x 7.8 x 6.4 cm. Previous measurement was 2.2 cm in maximum dimension. Multiple smaller areas of low-attenuation within the right lobe of liver are present which are suspicious for hepatic metastases. These other smaller low-attenuation areas are without significant change. The gallbladder absent. A low-attenuation area in the region of the pancreatic head is again noted and is poorly defined measuring approximately 3.1 x 2.6 cm in diameter. This is consistent with the area of ampullary carcinoma. Embolization coils are present in the GDA. There is low attenuation in the pancreatic body proximally which may represent low-attenuation  secondary to pancreatitis or spread of neoplasm. The common duct is dilated proximal to the level of the ampulla measuring a maximum diameter of 1.6 cm. No choledocholithiasis is identified. An enlarged retroperitoneal lymph node between the upper aorta and IVC is unchanged measuring 1.5 cm in diameter. No new area of retroperitoneal adenopathy is  identified. No peripancreatic adenopathy is present. The spleen appears normal. The splenic vein and portal vein are patent. The adrenal glands are not enlarged and the kidneys enhance symmetrically. Small simple cysts in the right kidney are stable. There is no lymphadenopathy. The aorta and IVC are normal in caliber. The bowel is without obstruction. The appendix appears normal. There is a small umbilical hernia containing fat. CT Pelvis: There is no lymphadenopathy. No free fluid is present. The bladder appears normal. Uterus as endometrial complex thickening. There is a myomatous type calcification in the right aspect of the uterus which is unchanged. There is no acute inflammatory process.     1.  Interval significant increase in size of low-attenuation area in the dome of the right lobe of liver which contains air. This may represent necrosis or hemorrhage following therapy. Stable appearance of other smaller low-attenuation areas in the right lobe of the liver without evidence of progression of disease in these areas of metastasis. Differential diagnosis would include post therapy infection. 2.  Stable masslike density in the region of the ampulla of Vater consistent with primary carcinoma. Current dimensions are 3.1 x 2.6 cm. 3.  Persistent low-attenuation areas in the pancreatic head and proximal body which may represent secondary pancreatitis. 4.  Stable upper retroperitoneal adenopathy. 5.  Small umbilical hernia containing fat.    Ct-abdomen-pelvis With    Result Date: 10/25/2018  10/25/2018 4:28 PM HISTORY/REASON FOR EXAM:  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 Patient is status post embolization.. TECHNIQUE/EXAM DESCRIPTION:   CT scan of the abdomen and pelvis with contrast. Contrast-enhanced helical scanning was obtained from the diaphragmatic domes through the pubic symphysis following the bolus administration of nonionic contrast without complication. 80 mL of  Omnipaque 350 nonionic contrast was administered without complication. Low dose optimization technique was utilized for this CT exam including automated exposure control and adjustment of the mA and/or kV according to patient size. COMPARISON: PET/CT 7/12/2018, MRI 8/6/2018 and CT abdomen 3/27/2018 FINDINGS: The visualized lung bases are unremarkable. CT Abdomen: There are several hypodense lesions in the hepatic dome with a couple of foci of air. A hypodense mass within air-fluid level in the right hepatic lobe, segment 8, measures approximately 2.1 cm. No significant rim enhancement is identified. There are new  hypodense lesions in the anterior right hepatic lobe measuring up to approximately 7 mm. There is pneumobilia, consistent with prior hepaticojejunostomy. The gallbladder has been removed. The common bile duct remains dilated with thickening of the duodenal wall and enhancement. Maximum diameter of the common bile duct is approximately 14 mm. There are multiple coils in the region of the pancreatic head. The spleen and adrenal glands are unremarkable. The kidneys enhance symmetrically. Hypodense renal masses are consistent with cysts. There is heterogeneous enhancement of the pancreatic head with an ill-defined cystic mass. The mass is difficult to measure, but is approximately 4.6 x 4 cm. The duct does not appear dilated more distally. There are multiple diverticula in the descending and sigmoid colon. There are scattered arterial calcifications. Enlarged aortocaval lymph node on image 37 measures approximately 15 mm short axis, previously 8 mm. CT Pelvis: The bladder is unremarkable. No significant free fluid is identified. A calcified mass in the uterus is consistent with a small leiomyoma.     1.  Ill-defined cystic mass in the pancreatic head with surrounding inflammatory changes. Findings may be related to pancreatitis in the appropriate clinical setting. Findings are new from the prior exam in  August. 2.  New hypodense lesions scattered throughout the liver was foci of air. Findings are highly likely to be related to recent embolization. No rim enhancement to suggest abscess. 3.  Duodenal wall thickening with enhancement, concerning for residual disease. Persistent common bile duct dilatation. 4.  Interval enlargement in an aortocaval lymph node, highly suspicious for metastatic disease. 5.  Diverticulosis. 6.  Status post hepaticojejunostomy.    Dx-chest-portable (1 View)    Result Date: 11/12/2018 11/12/2018 11:06 AM HISTORY/REASON FOR EXAM:  Sepsis. TECHNIQUE/EXAM DESCRIPTION AND NUMBER OF VIEWS: Single portable view of the chest. COMPARISON: 10/25/2018 FINDINGS: There is no evidence of focal consolidation or evidence of pulmonary edema. There is no evidence of pleural effusion. The heart is normal in size.     No pulmonary consolidation.    Dx-chest-portable (1 View)    Result Date: 10/25/2018  10/25/2018 11:35 AM HISTORY/REASON FOR EXAM:  LEFT shoulder pain, malaise, nausea. TECHNIQUE/EXAM DESCRIPTION AND NUMBER OF VIEWS: Single portable view of the chest. COMPARISON: 5/3/2018 FINDINGS: Cardiomediastinal contour is within normal limits. Lungs show hypoinflation. No focal pulmonary consolidation. No pleural fluid collection or pneumothorax. RIGHT chest infusion port again present. No major bony abnormality is seen. Atherosclerotic calcification of thoracic aorta.     Hypoinflation without other evidence for acute cardiopulmonary disease.    Pr-sxylgetc-qymptmtez    Result Date: 10/27/2018  10/27/2018 5:00 PM HISTORY/REASON FOR EXAM:  Jaw Pain. TECHNIQUE/EXAM DESCRIPTION AND NUMBER OF VIEWS:  1 view(s) of the mandible. COMPARISON:  None. FINDINGS: Multiple absent teeth. Many of the remaining teeth have filling/crowns. No periapical lucencies seen. No mandibular fracture.     No periapical lucencies. No mandibular fracture.    Ir-cvc Tunnel With Port Removal    Result Date: 10/28/2018   Chest port  removal HISTORY/REASON FOR EXAM: Bacteremia, concern for central line infection MEDICATIONS: Conscious sedation with 2 mcg Fentanyl and 50 mg Versed was administered during the procedure with appropriate continuous patient monitoring of cardiorespiratory function by the radiology nurse. Sedation duration: 30 minutes. TECHNIQUE/EXAM DESCRIPTION: Following informed consent patient was prepped and draped in the usual fashion. The procedure was performed using MAXIMAL STERILE BARRIER technique including sterile gown, mask, cap, and donning of sterile gloves following appropriate hand hygiene and/or  sterile scrub. Patient skin site was prepped with 2% Chlorhexidine solution. A timeout was performed. Local anesthetic result was achieved with administration of copious 1% lidocaine with epinephrine. A skin incision was made with an 15 blade scalpel. Blunt dissection was used to retrieve the port and catheter which were observed to be intact. Hemostasis  was obtained with manual compression. The port pocket showed no signs of gross infection. The port pocket was flushed with normal saline. The port pocket was closed with deep 2-0 Vicryl sutures and a subcutaneous 4-0 Vicryl suture layer, the skin was closed with Dermabond. The patient tolerated procedure well. The skin was cleaned and a dressing was applied. COMPARISON:  None     Successful chest port removal.    Ir-picc Line Placement    Result Date: 10/29/2018  HISTORY/REASON FOR EXAM:   PICC placement. TECHNIQUE/EXAM DESCRIPTION AND NUMBER OF VIEWS:   PICC line insertion with ultrasound guidance.  The procedure was performed using maximal sterile barrier technique including sterile gown, mask, cap, and donning of sterile gloves following appropriate hand hygiene and/or sterile scrub. Patient skin site was prepped with 2% Chlorhexidine solution. FINDINGS:  PICC line insertion with Ultrasound Guidance was performed by qualified nursing staff without the assistance of a  Radiologist. PICC positioning appropriateness confirmed by 3CG technology; chest xray only needed in the instance 3CG unable to confirm placement.              Ultrasound-guided PICC placement performed by qualified nursing staff as above.     Ir-low Level Consult-outpt    Result Date: 10/22/2018  This exam has been resulted under the NOTES tab, and the signed report has been auto faxed to the ordering physician on the date/time of that signature.      Micro:  Results     Procedure Component Value Units Date/Time    CULTURE WOUND W/ GRAM STAIN [[id number]]  (Abnormal)  (Susceptibility) Collected:  11/13/18 1710    Order Status:  Completed Specimen:  Wound Updated:  11/18/18 0956     Significant Indicator POS (POS)     Source WND     Site Liver Abscess     Culture Result Wound Growth noted after further incubation, see below for  organism identification.   (A)     Gram Stain Result Few WBCs.  Many Gram negative rods.  Moderate Gram positive rods.       Culture Result Wound Candida albicans  Light growth   (A)      Lactobacillus acidophilus  Moderate growth   (A)    Narrative:       CALL  Flores  [facility] tel. [phone],  CALLED  [facility] tel. [phone] 11/18/2018, 09:27, RB PERF. RESULTS CALLED  TO:[id number], RN    Culture & Susceptibility     ENTEROCOCCUS FAECIUM     Antibiotic Sensitivity Microscan Unit Status    Ampicillin Resistant >8 mcg/mL Final    Method: SENSITIVITY, SERGE    Daptomycin Sensitive 4 mcg/mL Final    Method: SENSITIVITY, SERGE    Gent Synergy Sensitive <=500 mcg/mL Final    Method: SENSITIVITY, SERGE    Linezolid Sensitive 2 mcg/mL Final    Method: SENSITIVITY, SERGE    Penicillin Resistant >8 mcg/mL Final    Method: SENSITIVITY, SERGE    Vancomycin Resistant >16 mcg/mL Final    Method: SENSITIVITY, SERGE                       BLOOD CULTURE [[id number]]  (Abnormal) Collected:  11/12/18 1120    Order Status:  Completed Specimen:  Blood from Peripheral Updated:  11/17/18 1651     Significant Indicator POS (POS)     Source  "BLD     Site PERIPHERAL     Blood Culture Growth detected by Bactec instrument. 11/15/2018  11:59 (A)      Candida albicans  See previous culture for sensitivity report.   (A)      Bacteroides caccae (A)    Narrative:       CALL  Flores  161 tel. [phone], Notified there is KRISTY GNR  CALLED  161 tel. [phone] 11/17/2018, 16:49, RB PERF. RESULTS CALLED  TO:[phone] RN  Per Hospital Policy: Only change Specimen Src: to \"Line\" if  specified by physician order.    BLOOD CULTURE [[id number]] Collected:  11/15/18 1807    Order Status:  Completed Specimen:  Blood from Peripheral Updated:  11/16/18 0744     Significant Indicator NEG     Source BLD     Site PERIPHERAL     Blood Culture No Growth    Note: Blood cultures are incubated for 5 days and  are monitored continuously.Positive blood cultures  are called to the RN and reported as soon as  they are identified.      Narrative:       Collected By:[id number] CONCHA VELAZQUEZ  Per Hospital Policy: Only change Specimen Src: to \"Line\" if  specified by physician order.    BLOOD CULTURE [[id number]] Collected:  11/15/18 1807    Order Status:  Completed Specimen:  Blood from Peripheral Updated:  11/16/18 0744     Significant Indicator NEG     Source BLD     Site PERIPHERAL     Blood Culture No Growth    Note: Blood cultures are incubated for 5 days and  are monitored continuously.Positive blood cultures  are called to the RN and reported as soon as  they are identified.      Narrative:       Collected By:[id number] CONCHA VELAZQUEZ  Per Hospital Policy: Only change Specimen Src: to \"Line\" if  specified by physician order.    BLOOD CULTURE [[id number]]  (Abnormal) Collected:  11/12/18 1039    Order Status:  Completed Specimen:  Blood from Peripheral Updated:  11/15/18 1316     Significant Indicator POS (POS)     Source BLD     Site PERIPHERAL     Blood Culture Growth detected by Bactec instrument. 11/14/2018  10:03  Gram Stain: Yeast.   (A)      Yeast (A)    Narrative:       CALL  Flores  161 " "tel. [phone],  CALLED  161 tel. [phone] 11/14/2018, 10:06, RB PERF. RESULTS CALLED  TO:[phone]  Per Hospital Policy: Only change Specimen Src: to \"Line\" if  specified by physician order.    URINE CULTURE(NEW) [[id number]] Collected:  11/12/18 1057    Order Status:  Completed Specimen:  Urine Updated:  11/14/18 0917     Significant Indicator NEG     Source UR     Site --     Urine Culture No growth at 48 hours    Narrative:       Mini cath  Indication for culture:->Emergency Room Patient    GRAM STAIN [[id number]] Collected:  11/13/18 1710    Order Status:  Completed Specimen:  Wound Updated:  11/14/18 0907     Significant Indicator .     Source WND     Site Liver Abscess     Gram Stain Result Few WBCs.  Many Gram negative rods.  Moderate Gram positive rods.      Blood Culture [[id number]]     Order Status:  Canceled Specimen:  Blood from Peripheral     Blood Culture [[id number]]     Order Status:  Canceled Specimen:  Blood from Peripheral     URINALYSIS [[id number]] Collected:  11/12/18 1057    Order Status:  Completed Specimen:  Urine Updated:  11/12/18 1135     Color Yellow     Character Clear     Specific Gravity 1.013     Ph 7.0     Glucose Negative mg/dL      Ketones Negative mg/dL      Protein Negative mg/dL      Bilirubin Negative     Urobilinogen, Urine 0.2     Nitrite Negative     Leukocyte Esterase Negative     Occult Blood Negative     Micro Urine Req see below     Comment: Microscopic examination not performed when specimen is clear  and chemically negative for protein, blood, leukocyte esterase  and nitrite.         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]   • E coli bacteremia [R78.81]   • Lactic acidosis [E87.2]   • Deep vein thrombosis (CMS-HCC) [I82.409] [I82.409]       Plan:  Liver abscess  The CT scan shows area of necrosis consistent with post therapy infection  Patient is currently on Zosyn  Underwent IR drainage on 11/13/2018  The cultures are showing  lactobacillus, VRE and Candida albicans and strep viridans  Yeast is Candida albicans  Add Zyvox  Bacteremia  His blood cultures are also showing Bacteroides  Repeat blood cultures x2 are negative from 11/15/2018    Fungemia  ID is candida albicans  Repeat blood cultures x2 are negative from 11/15/2018    Leucocytosis  multifactorial    Ampullary carcinoma of the pancreas    History of DVT  Discussed with internal medicine.

## 2018-11-20 NOTE — PROGRESS NOTES
"Received report from Day RN, patient lying in bed asleep. No signs of pain or discomfort at this time. Patient responding with one word answer, states \"she is tired.\" Patient was up with OT today and received 5mg of Oxycodone at 1515 for pain. Bed in lowest locked position. [place] light within reach. Hourly rounding in place.        2000: Patient incontinent of urine. Patient states she would like to get out of bed to change gown and linen. This nurse attempted to have patient stand with front wheel walker and ambulate to chair. Patient very lethargic, patient unable to follow simple commands, such as \"take a step forward.\" Patient states she is confused. At present time patient A/O to self, year, and month. With assistance of Charge RN patient placed back into bed. MD and Rapid nurse notified. Interventions in place.      2110: Patient complains of new onset headache. Charge RN updated. Transport in route to take pt to CT.     2232: Dr Mcclellan updated with patient results. Per Dr Mcclellan no narcotics or benzos for the remainder of the night. f/u with MD in am. Hourly rounding in place.   "

## 2018-11-20 NOTE — CARE PLAN
Problem: Safety  Goal: Will remain free from falls  Outcome: PROGRESSING AS EXPECTED  Bed in lowest position. Call light within reach. Pt verbalized/demonstrated how to reach staff when needed. Pt belongings within reach. Night light provided.     Problem: Skin Integrity  Goal: Risk for impaired skin integrity will decrease  Outcome: PROGRESSING AS EXPECTED  Skin assessment completed. Pillows in use for support and repositioning. Frequent toileting offered.

## 2018-11-20 NOTE — PROGRESS NOTES
Received report from night RN. Assumed care of patient at 0700. Patient oriented to place and person, not time or event. Patient denies pain.     1100-Incontinent of urine and stool. Changed gown and linen, moved to chair. Patient did not eat breakfast.     1400-Incontinent of urine and stool. Changed gown and linen. Sitting comfortably in chair. Patient denies pain.

## 2018-11-20 NOTE — PROGRESS NOTES
"/62   Pulse 88   Temp 36.4 °C (97.6 °F) (Temporal)   Resp 18   Ht 1.549 m (5' 0.98\")   Wt 71 kg (156 lb 8.4 oz)   SpO2 96%   Breastfeeding? No   BMI 29.59 kg/m²     Pt is AOX3. Disoriented to time. Denies SOB, chest pain, n/v, pain. Family at bedside this morning. Right PIV infiltrated- removed. Left PIV- TKO. Uses frontwheel walker with 1 assist. POC discussed.  "

## 2018-11-20 NOTE — PROGRESS NOTES
Infectious Disease Progress Note    Author: Gabby Swenson M.D. Date & Time of service: 2018  12:16 PM    Chief Complaint:  Generalized weakness and chills    Interval History:  73-year-old female with metastatic ampullary carcinoma of the pancreas presented with generalized weakness and shaking chills.  2018-no fevers.  Complains of generalized malaise and fatigue.  Still has some pain in her right shoulder.  WBC count is 22,000.  Cultures remain negative.  2018 T-max is 98.2.  No new issues overnight.  Underwent IR drainage on 2018.  Her shoulder pain has eased since then.  11/15/2018 no fevers.  WBC 13.3 creatinine 0.47  2018 no fevers.  Denies any increased abdominal pain.  The shoulder pain is improved as well.  WBC is 12.8.  2018 no fevers.  Shoulder pain is much improved.  Denies any new issues.  - no fevers. No new issues> WBC 16.7  2018 no fevers.  WBC 15.6 no new issues overnight   AF (99.7) WBC 15.3 states eating better-denies SE abx No abd pain today  Labs Reviewed, Medications Reviewed and Radiology Reviewed.    Review of Systems:  Review of Systems   Constitutional: Positive for malaise/fatigue. Negative for chills and fever.   HENT: Negative for hearing loss.    Respiratory: Negative for cough and shortness of breath.    Cardiovascular: Negative for chest pain and leg swelling.   Gastrointestinal: Negative for abdominal pain, nausea and vomiting.   Genitourinary: Negative for dysuria.   Musculoskeletal: Negative for myalgias.        Shoulder pain has improved       Hemodynamics:  Temp (24hrs), Av.7 °C (98 °F), Min:36.1 °C (97 °F), Max:37.6 °C (99.7 °F)  Temperature: 36.4 °C (97.6 °F)  Pulse  Av.7  Min: 67  Max: 112   Blood Pressure : 150/62       Physical Exam:  Physical Exam   Constitutional: She is oriented to person, place, and time. She appears well-developed.   HENT:   Head: Normocephalic and atraumatic.   Eyes: Pupils are equal,  round, and reactive to light. EOM are normal.   Neck: Neck supple.   Cardiovascular: Normal rate and regular rhythm.    Pulmonary/Chest: Effort normal. No respiratory distress.   Abdominal: Soft. She exhibits no distension. There is no tenderness.   Right upper quadrant drain   Musculoskeletal: She exhibits no edema.   Neurological: She is alert and oriented to person, place, and time.   Skin: Skin is warm. She is not diaphoretic. No erythema.   Vitals reviewed.      Meds:    Current Facility-Administered Medications:   •  potassium chloride SA  •  ampicillin-sulbactam (UNASYN) IV  •  fluconazole  •  linezolid  •  apixaban  •  LR  •  senna-docusate **AND** polyethylene glycol/lytes **AND** magnesium hydroxide **AND** bisacodyl  •  Respiratory Care per Protocol  •  ondansetron  •  ondansetron  •  acetaminophen  •  Notify provider if pain remains uncontrolled **AND** Use the numeric rating scale (NRS-11) on regular floors and Critical-Care Pain Observation Tool (CPOT) on ICUs/Trauma to assess pain **AND** Pulse Ox (Oximetry) **AND** Pharmacy Consult Request **AND** If patient difficult to arouse and/or has respiratory depression, stop any opiates that are currently infusing and call a Rapid Response. **AND** oxyCODONE immediate-release **AND** oxyCODONE immediate-release **AND** morphine injection  •  NS  •  omeprazole  •  artificial tears  •  albuterol    Labs:  Recent Labs      11/18/18   0026  11/19/18   0006  11/20/18   0011   WBC  16.7*  15.6*  15.3*   RBC  3.86*  3.50*  3.46*   HEMOGLOBIN  10.7*  9.7*  9.9*   HEMATOCRIT  34.0*  30.5*  30.2*   MCV  88.1  87.1  87.3   MCH  27.7  27.7  28.6   RDW  49.3  49.2  50.6*   PLATELETCT  244  260  278   MPV  10.0  10.3  10.3     Recent Labs      11/18/18   0026  11/19/18   0006  11/20/18   0011   SODIUM  133*  134*  133*   POTASSIUM  3.7  3.5*  4.1   CHLORIDE  105  105  104   CO2  21  22  21   GLUCOSE  114*  107*  107*   BUN  5*  5*  5*     Recent Labs      11/18/18   0026   11/19/18   0006  11/20/18   0011   ALBUMIN   --    --   2.0*   TBILIRUBIN   --    --   0.4   ALKPHOSPHAT   --    --   160*   TOTPROTEIN   --    --   5.9*   ALTSGPT   --    --   12   ASTSGOT   --    --   25   CREATININE  0.47*  0.39*  0.55       Imaging:  Ct-abdomen-pelvis With    Result Date: 11/12/2018 11/12/2018 1:38 PM HISTORY/REASON FOR EXAM:  Abdominal pain. History of metastatic ampullary adenocarcinoma. TECHNIQUE/EXAM DESCRIPTION: CT scan of the abdomen and pelvis with contrast. Contrast-enhanced helical scanning was obtained from the diaphragmatic domes through the pubic symphysis following the bolus administration of 80 mL of Omnipaque 350 without complication. Low dose optimization technique was utilized for this CT exam including automated exposure control and adjustment of the mA and/or kV according to patient size. COMPARISON: 10/25/2018 FINDINGS: The visualized lung bases are clear. CT Abdomen: A small hiatal hernia is present. There is interval increase in size of an area of low attenuation in the dome of the right lobe of the liver which contains air. This may represent necrosis or post therapy hemorrhage following embolization. This area measures 8.8 x 7.8 x 6.4 cm. Previous measurement was 2.2 cm in maximum dimension. Multiple smaller areas of low-attenuation within the right lobe of liver are present which are suspicious for hepatic metastases. These other smaller low-attenuation areas are without significant change. The gallbladder absent. A low-attenuation area in the region of the pancreatic head is again noted and is poorly defined measuring approximately 3.1 x 2.6 cm in diameter. This is consistent with the area of ampullary carcinoma. Embolization coils are present in the GDA. There is low attenuation in the pancreatic body proximally which may represent low-attenuation  secondary to pancreatitis or spread of neoplasm. The common duct is dilated proximal to the level of the ampulla  measuring a maximum diameter of 1.6 cm. No choledocholithiasis is identified. An enlarged retroperitoneal lymph node between the upper aorta and IVC is unchanged measuring 1.5 cm in diameter. No new area of retroperitoneal adenopathy is identified. No peripancreatic adenopathy is present. The spleen appears normal. The splenic vein and portal vein are patent. The adrenal glands are not enlarged and the kidneys enhance symmetrically. Small simple cysts in the right kidney are stable. There is no lymphadenopathy. The aorta and IVC are normal in caliber. The bowel is without obstruction. The appendix appears normal. There is a small umbilical hernia containing fat. CT Pelvis: There is no lymphadenopathy. No free fluid is present. The bladder appears normal. Uterus as endometrial complex thickening. There is a myomatous type calcification in the right aspect of the uterus which is unchanged. There is no acute inflammatory process.     1.  Interval significant increase in size of low-attenuation area in the dome of the right lobe of liver which contains air. This may represent necrosis or hemorrhage following therapy. Stable appearance of other smaller low-attenuation areas in the right lobe of the liver without evidence of progression of disease in these areas of metastasis. Differential diagnosis would include post therapy infection. 2.  Stable masslike density in the region of the ampulla of Vater consistent with primary carcinoma. Current dimensions are 3.1 x 2.6 cm. 3.  Persistent low-attenuation areas in the pancreatic head and proximal body which may represent secondary pancreatitis. 4.  Stable upper retroperitoneal adenopathy. 5.  Small umbilical hernia containing fat.    Ct-abdomen-pelvis With    Result Date: 10/25/2018  10/25/2018 4:28 PM HISTORY/REASON FOR EXAM:  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 Patient is status post embolization.. TECHNIQUE/EXAM DESCRIPTION:   CT scan of  the abdomen and pelvis with contrast. Contrast-enhanced helical scanning was obtained from the diaphragmatic domes through the pubic symphysis following the bolus administration of nonionic contrast without complication. 80 mL of Omnipaque 350 nonionic contrast was administered without complication. Low dose optimization technique was utilized for this CT exam including automated exposure control and adjustment of the mA and/or kV according to patient size. COMPARISON: PET/CT 7/12/2018, MRI 8/6/2018 and CT abdomen 3/27/2018 FINDINGS: The visualized lung bases are unremarkable. CT Abdomen: There are several hypodense lesions in the hepatic dome with a couple of foci of air. A hypodense mass within air-fluid level in the right hepatic lobe, segment 8, measures approximately 2.1 cm. No significant rim enhancement is identified. There are new  hypodense lesions in the anterior right hepatic lobe measuring up to approximately 7 mm. There is pneumobilia, consistent with prior hepaticojejunostomy. The gallbladder has been removed. The common bile duct remains dilated with thickening of the duodenal wall and enhancement. Maximum diameter of the common bile duct is approximately 14 mm. There are multiple coils in the region of the pancreatic head. The spleen and adrenal glands are unremarkable. The kidneys enhance symmetrically. Hypodense renal masses are consistent with cysts. There is heterogeneous enhancement of the pancreatic head with an ill-defined cystic mass. The mass is difficult to measure, but is approximately 4.6 x 4 cm. The duct does not appear dilated more distally. There are multiple diverticula in the descending and sigmoid colon. There are scattered arterial calcifications. Enlarged aortocaval lymph node on image 37 measures approximately 15 mm short axis, previously 8 mm. CT Pelvis: The bladder is unremarkable. No significant free fluid is identified. A calcified mass in the uterus is consistent with a  small leiomyoma.     1.  Ill-defined cystic mass in the pancreatic head with surrounding inflammatory changes. Findings may be related to pancreatitis in the appropriate clinical setting. Findings are new from the prior exam in August. 2.  New hypodense lesions scattered throughout the liver was foci of air. Findings are highly likely to be related to recent embolization. No rim enhancement to suggest abscess. 3.  Duodenal wall thickening with enhancement, concerning for residual disease. Persistent common bile duct dilatation. 4.  Interval enlargement in an aortocaval lymph node, highly suspicious for metastatic disease. 5.  Diverticulosis. 6.  Status post hepaticojejunostomy.    Dx-chest-portable (1 View)    Result Date: 11/12/2018 11/12/2018 11:06 AM HISTORY/REASON FOR EXAM:  Sepsis. TECHNIQUE/EXAM DESCRIPTION AND NUMBER OF VIEWS: Single portable view of the chest. COMPARISON: 10/25/2018 FINDINGS: There is no evidence of focal consolidation or evidence of pulmonary edema. There is no evidence of pleural effusion. The heart is normal in size.     No pulmonary consolidation.    Dx-chest-portable (1 View)    Result Date: 10/25/2018  10/25/2018 11:35 AM HISTORY/REASON FOR EXAM:  LEFT shoulder pain, malaise, nausea. TECHNIQUE/EXAM DESCRIPTION AND NUMBER OF VIEWS: Single portable view of the chest. COMPARISON: 5/3/2018 FINDINGS: Cardiomediastinal contour is within normal limits. Lungs show hypoinflation. No focal pulmonary consolidation. No pleural fluid collection or pneumothorax. RIGHT chest infusion port again present. No major bony abnormality is seen. Atherosclerotic calcification of thoracic aorta.     Hypoinflation without other evidence for acute cardiopulmonary disease.    [id number]    Result Date: 10/27/2018  10/27/2018 5:00 PM HISTORY/REASON FOR EXAM:  Jaw Pain. TECHNIQUE/EXAM DESCRIPTION AND NUMBER OF VIEWS:  1 view(s) of the mandible. COMPARISON:  None. FINDINGS: Multiple absent teeth. Many of  the remaining teeth have filling/crowns. No periapical lucencies seen. No mandibular fracture.     No periapical lucencies. No mandibular fracture.    Ir-cvc Tunnel With Port Removal    Result Date: 10/28/2018   Chest port removal HISTORY/REASON FOR EXAM: Bacteremia, concern for central line infection MEDICATIONS: Conscious sedation with 2 mcg Fentanyl and 50 mg Versed was administered during the procedure with appropriate continuous patient monitoring of cardiorespiratory function by the radiology nurse. Sedation duration: 30 minutes. TECHNIQUE/EXAM DESCRIPTION: Following informed consent patient was prepped and draped in the usual fashion. The procedure was performed using MAXIMAL STERILE BARRIER technique including sterile gown, mask, cap, and donning of sterile gloves following appropriate hand hygiene and/or  sterile scrub. Patient skin site was prepped with 2% Chlorhexidine solution. A timeout was performed. Local anesthetic result was achieved with administration of copious 1% lidocaine with epinephrine. A skin incision was made with an 15 blade scalpel. Blunt dissection was used to retrieve the port and catheter which were observed to be intact. Hemostasis  was obtained with manual compression. The port pocket showed no signs of gross infection. The port pocket was flushed with normal saline. The port pocket was closed with deep 2-0 Vicryl sutures and a subcutaneous 4-0 Vicryl suture layer, the skin was closed with Dermabond. The patient tolerated procedure well. The skin was cleaned and a dressing was applied. COMPARISON:  None     Successful chest port removal.    Ir-picc Line Placement    Result Date: 10/29/2018  HISTORY/REASON FOR EXAM:   PICC placement. TECHNIQUE/EXAM DESCRIPTION AND NUMBER OF VIEWS:   PICC line insertion with ultrasound guidance.  The procedure was performed using maximal sterile barrier technique including sterile gown, mask, cap, and donning of sterile gloves following appropriate  hand hygiene and/or sterile scrub. Patient skin site was prepped with 2% Chlorhexidine solution. FINDINGS:  PICC line insertion with Ultrasound Guidance was performed by qualified nursing staff without the assistance of a Radiologist. PICC positioning appropriateness confirmed by 3CG technology; chest xray only needed in the instance 3CG unable to confirm placement.              Ultrasound-guided PICC placement performed by qualified nursing staff as above.     Ir-low Level Consult-outpt    Result Date: 10/22/2018  This exam has been resulted under the NOTES tab, and the signed report has been auto faxed to the ordering physician on the date/time of that signature.      Micro:  Results     Procedure Component Value Units Date/Time    CULTURE WOUND W/ GRAM STAIN [[id number]]  (Abnormal)  (Susceptibility) Collected:  11/13/18 1710    Order Status:  Completed Specimen:  Wound Updated:  11/18/18 0927     Significant Indicator POS (POS)     Source WND     Site Liver Abscess     Culture Result Wound Growth noted after further incubation, see below for  organism identification.   (A)     Gram Stain Result Few WBCs.  Many Gram negative rods.  Moderate Gram positive rods.       Culture Result Wound Candida albicans  Light growth   (A)      Lactobacillus acidophilus  Moderate growth   (A)    Narrative:       CALL  Flores  U tel. [phone],  CALLED  U tel. [phone] 11/18/2018, 09:27, RB PERF. RESULTS CALLED  TO:[id number] Arana    Culture & Susceptibility     ENTEROCOCCUS FAECIUM     Antibiotic Sensitivity Microscan Unit Status    Ampicillin Resistant >8 mcg/mL Final    Method: SENSITIVITY, SERGE    Daptomycin Sensitive 4 mcg/mL Final    Method: SENSITIVITY, SERGE    Gent Synergy Sensitive <=500 mcg/mL Final    Method: SENSITIVITY, SERGE    Linezolid Sensitive 2 mcg/mL Final    Method: SENSITIVITY, SERGE    Penicillin Resistant >8 mcg/mL Final    Method: SENSITIVITY, SERGE    Vancomycin Resistant >16 mcg/mL Final    Method: SENSITIVITY,  "SERGE                       BLOOD CULTURE [[id number]]  (Abnormal) Collected:  11/12/18 1120    Order Status:  Completed Specimen:  Blood from Peripheral Updated:  11/17/18 1651     Significant Indicator POS (POS)     Source BLD     Site PERIPHERAL     Blood Culture Growth detected by Bactec instrument. 11/15/2018  11:59 (A)      Candida albicans  See previous culture for sensitivity report.   (A)      Bacteroides caccae (A)    Narrative:       CALL  Flores  161 tel. [phone], Notified there is KRISTY GNR  CALLED  161 tel. [phone] 11/17/2018, 16:49, RB PERF. RESULTS CALLED  TO:[phone] RN  Per Hospital Policy: Only change Specimen Src: to \"Line\" if  specified by physician order.    BLOOD CULTURE [[id number]] Collected:  11/15/18 1807    Order Status:  Completed Specimen:  Blood from Peripheral Updated:  11/16/18 0744     Significant Indicator NEG     Source BLD     Site PERIPHERAL     Blood Culture No Growth    Note: Blood cultures are incubated for 5 days and  are monitored continuously.Positive blood cultures  are called to the RN and reported as soon as  they are identified.      Narrative:       Collected By:[id number] CONCHA STROUD.  Per Hospital Policy: Only change Specimen Src: to \"Line\" if  specified by physician order.    BLOOD CULTURE [[id number]] Collected:  11/15/18 1807    Order Status:  Completed Specimen:  Blood from Peripheral Updated:  11/16/18 0744     Significant Indicator NEG     Source BLD     Site PERIPHERAL     Blood Culture No Growth    Note: Blood cultures are incubated for 5 days and  are monitored continuously.Positive blood cultures  are called to the RN and reported as soon as  they are identified.      Narrative:       Collected By:[id number] CONCHA BEARA R.  Per Hospital Policy: Only change Specimen Src: to \"Line\" if  specified by physician order.    BLOOD CULTURE [[id number]]  (Abnormal) Collected:  11/12/18 1039    Order Status:  Completed Specimen:  Blood from Peripheral Updated:  11/15/18 1316 " "    Significant Indicator POS (POS)     Source BLD     Site PERIPHERAL     Blood Culture Growth detected by Bactec instrument. 11/14/2018  10:03  Gram Stain: Yeast.   (A)      Yeast (A)    Narrative:       CALL  Flores  161 tel. [phone],  CALLED  161 tel. [phone] 11/14/2018, 10:06, RB PERF. RESULTS CALLED  TO:[id number]  Per Hospital Policy: Only change Specimen Src: to \"Line\" if  specified by physician order.    URINE CULTURE(NEW) [[id number]] Collected:  11/12/18 1057    Order Status:  Completed Specimen:  Urine Updated:  11/14/18 0917     Significant Indicator NEG     Source UR     Site --     Urine Culture No growth at 48 hours    Narrative:       Mini cath  Indication for culture:->Emergency Room Patient    GRAM STAIN [[id number]] Collected:  11/13/18 1710    Order Status:  Completed Specimen:  Wound Updated:  11/14/18 0907     Significant Indicator .     Source WND     Site Liver Abscess     Gram Stain Result Few WBCs.  Many Gram negative rods.  Moderate Gram positive rods.      Blood Culture [[id number]]     Order Status:  Canceled Specimen:  Blood from Peripheral     Blood Culture [[id number]]     Order Status:  Canceled Specimen:  Blood from Peripheral     URINALYSIS [[id number]] Collected:  11/12/18 1057    Order Status:  Completed Specimen:  Urine Updated:  11/12/18 1135     Color Yellow     Character Clear     Specific Gravity 1.013     Ph 7.0     Glucose Negative mg/dL      Ketones Negative mg/dL      Protein Negative mg/dL      Bilirubin Negative     Urobilinogen, Urine 0.2     Nitrite Negative     Leukocyte Esterase Negative     Occult Blood Negative     Micro Urine Req see below     Comment: Microscopic examination not performed when specimen is clear  and chemically negative for protein, blood, leukocyte esterase  and nitrite.         Narrative:       Mini cath  Indication for culture:->Emergency Room Patient    Culture Respiratory W/ GRM STN [[id number]] Collected:  11/12/18 0000    Order Status:  " Canceled Specimen:  Sputum from Sputum     Urine Culture [[id number]] Collected:  11/12/18 0000    Order Status:  Canceled Specimen:  Urine           Assessment:  Active Hospital Problems    Diagnosis   • Abnormal CT of the abdomen [R93.5]   • Sepsis (HCC) [A41.9]   • 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]   • E coli bacteremia [R78.81]   • Lactic acidosis [E87.2]   • Deep vein thrombosis (CMS-HCC) [I82.409] [I82.409]       Plan:  Liver abscess  Afebrile  +leukocytosis  CT scan shows area of necrosis consistent with post therapy infection  s/p IR drainage on 11/13/2018  Cultures +  lactobacillus, VRE and Candida albicans and strep viridans  Continue Zyvox, fluconazole, and Unasyn  Endpoint clinical  Anticipate 4-6 week course    Bacteremia  Blood cultures + Bacteroides  Repeat blood cultures x2 are negative from 11/15/2018    Candidemia  Candida albicans  Repeat blood cultures x2 are negative from 11/15/2018  Changed to p.o. Diflucan  Eye exam if visual changes    Leucocytosis  Multifactorial  monitor    Ampullary carcinoma of the pancreas  Contributory factor to above

## 2018-11-21 PROBLEM — R05.9 COUGH: Status: ACTIVE | Noted: 2018-01-01

## 2018-11-21 NOTE — PROGRESS NOTES
Infectious Disease Progress Note    Author: Gabby Swenson M.D. Date & Time of service: 2018  12:13 PM    Chief Complaint:  Generalized weakness and chills    Interval History:  73-year-old female with metastatic ampullary carcinoma of the pancreas presented with generalized weakness and shaking chills.  2018-no fevers.  Complains of generalized malaise and fatigue.  Still has some pain in her right shoulder.  WBC count is 22,000.  Cultures remain negative.  2018 T-max is 98.2.  No new issues overnight.  Underwent IR drainage on 2018.  Her shoulder pain has eased since then.  11/15/2018 no fevers.  WBC 13.3 creatinine 0.47  2018 no fevers.  Denies any increased abdominal pain.  The shoulder pain is improved as well.  WBC is 12.8.  2018 no fevers.  Shoulder pain is much improved.  Denies any new issues.  - no fevers. No new issues> WBC 16.7  2018 no fevers.  WBC 15.6 no new issues overnight   AF (99.7) WBC 15.3 states eating better-denies SE abx No abd pain today   Af (99.9) WBC 9.8 minimal output from drain-plan for PICC today  Labs Reviewed, Medications Reviewed and Radiology Reviewed.    Review of Systems:  Review of Systems   Constitutional: Positive for malaise/fatigue. Negative for chills and fever.   HENT: Negative for hearing loss.    Respiratory: Negative for cough and shortness of breath.    Cardiovascular: Negative for chest pain and leg swelling.   Gastrointestinal: Negative for abdominal pain, nausea and vomiting.       Hemodynamics:  Temp (24hrs), Av °C (98.6 °F), Min:36.6 °C (97.9 °F), Max:37.7 °C (99.9 °F)  Temperature: 36.7 °C (98.1 °F)  Pulse  Av  Min: 67  Max: 112Heart Rate (Monitored): 96  Blood Pressure : 148/70       Physical Exam:  Physical Exam   Constitutional: She appears well-developed.   HENT:   Head: Normocephalic and atraumatic.   Eyes: Pupils are equal, round, and reactive to light. EOM are normal. No scleral  icterus.   Neck: Neck supple.   Cardiovascular: Normal rate.    Pulmonary/Chest: Effort normal. No respiratory distress. She has no wheezes.   Abdominal: Soft. She exhibits no distension. There is no tenderness.   Right upper quadrant drain   Musculoskeletal: She exhibits no edema.   Neurological: She is alert.   Skin: Skin is warm. She is not diaphoretic. No erythema.   Nursing note and vitals reviewed.      Meds:    Current Facility-Administered Medications:   •  ipratropium-albuterol  •  potassium chloride SA  •  ampicillin-sulbactam (UNASYN) IV  •  fluconazole  •  linezolid  •  apixaban  •  senna-docusate **AND** polyethylene glycol/lytes **AND** magnesium hydroxide **AND** bisacodyl  •  Respiratory Care per Protocol  •  ondansetron  •  ondansetron  •  acetaminophen  •  Notify provider if pain remains uncontrolled **AND** Use the numeric rating scale (NRS-11) on regular floors and Critical-Care Pain Observation Tool (CPOT) on ICUs/Trauma to assess pain **AND** Pulse Ox (Oximetry) **AND** Pharmacy Consult Request **AND** If patient difficult to arouse and/or has respiratory depression, stop any opiates that are currently infusing and call a Rapid Response. **AND** oxyCODONE immediate-release **AND** oxyCODONE immediate-release **AND** morphine injection  •  NS  •  omeprazole  •  artificial tears  •  albuterol    Labs:  Recent Labs      11/19/18 0006 11/20/18 0011 11/21/18   0035   WBC  15.6*  15.3*  9.8   RBC  3.50*  3.46*  3.07*   HEMOGLOBIN  9.7*  9.9*  8.7*   HEMATOCRIT  30.5*  30.2*  26.8*   MCV  87.1  87.3  87.3   MCH  27.7  28.6  28.3   RDW  49.2  50.6*  51.7*   PLATELETCT  260  278  223   MPV  10.3  10.3  9.4     Recent Labs      11/19/18 0006  11/20/18 0011  11/21/18   0035   SODIUM  134*  133*  131*   POTASSIUM  3.5*  4.1  4.2   CHLORIDE  105  104  103   CO2  22  21  21   GLUCOSE  107*  107*  112*   BUN  5*  5*  6*     Recent Labs      11/19/18   0006  11/20/18   0011  11/21/18   0035   ALBUMIN    --   2.0*  1.9*   TBILIRUBIN   --   0.4  0.3   ALKPHOSPHAT   --   160*  153*   TOTPROTEIN   --   5.9*  5.8*   ALTSGPT   --   12  12   ASTSGOT   --   25  26   CREATININE  0.39*  0.55  0.58       Imaging:  Ct-abdomen-pelvis With    Result Date: 11/12/2018 11/12/2018 1:38 PM HISTORY/REASON FOR EXAM:  Abdominal pain. History of metastatic ampullary adenocarcinoma. FINDINGS: The visualized lung bases are clear. CT Abdomen: A small hiatal hernia is present. There is interval increase in size of an area of low attenuation in the dome of the right lobe of the liver which contains air. This may represent necrosis or post therapy hemorrhage following embolization. This area measures 8.8 x 7.8 x 6.4 cm. Previous measurement was 2.2 cm in maximum dimension. Multiple smaller areas of low-attenuation within the right lobe of liver are present which are suspicious for hepatic metastases. These other smaller low-attenuation areas are without significant change. The gallbladder absent. A low-attenuation area in the region of the pancreatic head is again noted and is poorly defined measuring approximately 3.1 x 2.6 cm in diameter. This is consistent with the area of ampullary carcinoma. Embolization coils are present in the GDA. There is low attenuation in the pancreatic body proximally which may represent low-attenuation  secondary to pancreatitis or spread of neoplasm. The common duct is dilated proximal to the level of the ampulla measuring a maximum diameter of 1.6 cm. No choledocholithiasis is identified. An enlarged retroperitoneal lymph node between the upper aorta and IVC is unchanged measuring 1.5 cm in diameter. No new area of retroperitoneal adenopathy is identified. No peripancreatic adenopathy is present. The spleen appears normal. The splenic vein and portal vein are patent. The adrenal glands are not enlarged and the kidneys enhance symmetrically. Small simple cysts in the right kidney are stable. There is no  lymphadenopathy. The aorta and IVC are normal in caliber. The bowel is without obstruction. The appendix appears normal. There is a small umbilical hernia containing fat. CT Pelvis: There is no lymphadenopathy. No free fluid is present. The bladder appears normal. Uterus as endometrial complex thickening. There is a myomatous type calcification in the right aspect of the uterus which is unchanged. There is no acute inflammatory process.     1.  Interval significant increase in size of low-attenuation area in the dome of the right lobe of liver which contains air. This may represent necrosis or hemorrhage following therapy. Stable appearance of other smaller low-attenuation areas in the right lobe of the liver without evidence of progression of disease in these areas of metastasis. Differential diagnosis would include post therapy infection. 2.  Stable masslike density in the region of the ampulla of Vater consistent with primary carcinoma. Current dimensions are 3.1 x 2.6 cm. 3.  Persistent low-attenuation areas in the pancreatic head and proximal body which may represent secondary pancreatitis. 4.  Stable upper retroperitoneal adenopathy. 5.  Small umbilical hernia containing fat.    Ct-abdomen-pelvis With    Result Date: 10/25/2018  10/25/2018 4:28 PM HISTORY/REASON FOR EXAM:  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 Patient is status post embolization.. TECHNIQUE/EXAM DESCRIPTION:   CT scan of the abdomen and pelvis with contrast. Contrast-enhanced helical scanning was obtained from the diaphragmatic domes through the pubic symphysis following the bolus administration of nonionic contrast without complication. 80 mL of Omnipaque 350 nonionic contrast was administered without complication. Low dose optimization technique was utilized for this CT exam including automated exposure control and adjustment of the mA and/or kV according to patient size. COMPARISON: PET/CT 7/12/2018, MRI  8/6/2018 and CT abdomen 3/27/2018 FINDINGS: The visualized lung bases are unremarkable. CT Abdomen: There are several hypodense lesions in the hepatic dome with a couple of foci of air. A hypodense mass within air-fluid level in the right hepatic lobe, segment 8, measures approximately 2.1 cm. No significant rim enhancement is identified. There are new  hypodense lesions in the anterior right hepatic lobe measuring up to approximately 7 mm. There is pneumobilia, consistent with prior hepaticojejunostomy. The gallbladder has been removed. The common bile duct remains dilated with thickening of the duodenal wall and enhancement. Maximum diameter of the common bile duct is approximately 14 mm. There are multiple coils in the region of the pancreatic head. The spleen and adrenal glands are unremarkable. The kidneys enhance symmetrically. Hypodense renal masses are consistent with cysts. There is heterogeneous enhancement of the pancreatic head with an ill-defined cystic mass. The mass is difficult to measure, but is approximately 4.6 x 4 cm. The duct does not appear dilated more distally. There are multiple diverticula in the descending and sigmoid colon. There are scattered arterial calcifications. Enlarged aortocaval lymph node on image 37 measures approximately 15 mm short axis, previously 8 mm. CT Pelvis: The bladder is unremarkable. No significant free fluid is identified. A calcified mass in the uterus is consistent with a small leiomyoma.     1.  Ill-defined cystic mass in the pancreatic head with surrounding inflammatory changes. Findings may be related to pancreatitis in the appropriate clinical setting. Findings are new from the prior exam in August. 2.  New hypodense lesions scattered throughout the liver was foci of air. Findings are highly likely to be related to recent embolization. No rim enhancement to suggest abscess. 3.  Duodenal wall thickening with enhancement, concerning for residual disease.  Persistent common bile duct dilatation. 4.  Interval enlargement in an aortocaval lymph node, highly suspicious for metastatic disease. 5.  Diverticulosis. 6.  Status post hepaticojejunostomy.    Dx-chest-portable (1 View)    Result Date: 11/12/2018 11/12/2018 11:06 AM HISTORY/REASON FOR EXAM:  Sepsis. TECHNIQUE/EXAM DESCRIPTION AND NUMBER OF VIEWS: Single portable view of the chest. COMPARISON: 10/25/2018 FINDINGS: There is no evidence of focal consolidation or evidence of pulmonary edema. There is no evidence of pleural effusion. The heart is normal in size.     No pulmonary consolidation.    Dx-chest-portable (1 View)    Result Date: 10/25/2018  10/25/2018 11:35 AM HISTORY/REASON FOR EXAM:  LEFT shoulder pain, malaise, nausea. TECHNIQUE/EXAM DESCRIPTION AND NUMBER OF VIEWS: Single portable view of the chest. COMPARISON: 5/3/2018 FINDINGS: Cardiomediastinal contour is within normal limits. Lungs show hypoinflation. No focal pulmonary consolidation. No pleural fluid collection or pneumothorax. RIGHT chest infusion port again present. No major bony abnormality is seen. Atherosclerotic calcification of thoracic aorta.     Hypoinflation without other evidence for acute cardiopulmonary disease.    Lc-[id number]-[id number]    Result Date: 10/27/2018  10/27/2018 5:00 PM HISTORY/REASON FOR EXAM:  Jaw Pain. TECHNIQUE/EXAM DESCRIPTION AND NUMBER OF VIEWS:  1 view(s) of the mandible. COMPARISON:  None. FINDINGS: Multiple absent teeth. Many of the remaining teeth have filling/crowns. No periapical lucencies seen. No mandibular fracture.     No periapical lucencies. No mandibular fracture.    Ir-cvc Tunnel With Port Removal    Result Date: 10/28/2018   Chest port removal HISTORY/REASON FOR EXAM: Bacteremia, concern for central line infection MEDICATIONS: Conscious sedation with 2 mcg Fentanyl and 50 mg Versed was administered during the procedure with appropriate continuous patient monitoring of cardiorespiratory function by the  radiology nurse. Sedation duration: 30 minutes. TECHNIQUE/EXAM DESCRIPTION: Following informed consent patient was prepped and draped in the usual fashion. The procedure was performed using MAXIMAL STERILE BARRIER technique including sterile gown, mask, cap, and donning of sterile gloves following appropriate hand hygiene and/or  sterile scrub. Patient skin site was prepped with 2% Chlorhexidine solution. A timeout was performed. Local anesthetic result was achieved with administration of copious 1% lidocaine with epinephrine. A skin incision was made with an 15 blade scalpel. Blunt dissection was used to retrieve the port and catheter which were observed to be intact. Hemostasis  was obtained with manual compression. The port pocket showed no signs of gross infection. The port pocket was flushed with normal saline. The port pocket was closed with deep 2-0 Vicryl sutures and a subcutaneous 4-0 Vicryl suture layer, the skin was closed with Dermabond. The patient tolerated procedure well. The skin was cleaned and a dressing was applied. COMPARISON:  None     Successful chest port removal.    Ir-picc Line Placement    Result Date: 10/29/2018  HISTORY/REASON FOR EXAM:   PICC placement. TECHNIQUE/EXAM DESCRIPTION AND NUMBER OF VIEWS:   PICC line insertion with ultrasound guidance.  The procedure was performed using maximal sterile barrier technique including sterile gown, mask, cap, and donning of sterile gloves following appropriate hand hygiene and/or sterile scrub. Patient skin site was prepped with 2% Chlorhexidine solution. FINDINGS:  PICC line insertion with Ultrasound Guidance was performed by qualified nursing staff without the assistance of a Radiologist. PICC positioning appropriateness confirmed by 3CG technology; chest xray only needed in the instance 3CG unable to confirm placement.              Ultrasound-guided PICC placement performed by qualified nursing staff as above.     Ir-low Level  Consult-outpt    Result Date: 10/22/2018  This exam has been resulted under the NOTES tab, and the signed report has been auto faxed to the ordering physician on the date/time of that signature.      Micro:  Results     Procedure Component Value Units Date/Time    CULTURE WOUND W/ GRAM STAIN [[id number]]  (Abnormal)  (Susceptibility) Collected:  11/13/18 1710    Order Status:  Completed Specimen:  Wound Updated:  11/18/18 0927     Significant Indicator POS (POS)     Source WND     Site Liver Abscess     Culture Result Wound Growth noted after further incubation, see below for  organism identification.   (A)     Gram Stain Result Few WBCs.  Many Gram negative rods.  Moderate Gram positive rods.       Culture Result Wound Candida albicans  Light growth   (A)      Lactobacillus acidophilus  Moderate growth   (A)    Narrative:       CALL  [facility]ores  CN tel. [phone],  CALLED  [facility] tel. [phone] 11/18/2018, 09:27, RB PERF. RESULTS CALLED  TO:[id number], RN    Culture & Susceptibility     ENTEROCOCCUS FAECIUM     Antibiotic Sensitivity Microscan Unit Status    Ampicillin Resistant >8 mcg/mL Final    Method: SENSITIVITY, SERGE    Daptomycin Sensitive 4 mcg/mL Final    Method: SENSITIVITY, SERGE    Gent Synergy Sensitive <=500 mcg/mL Final    Method: SENSITIVITY, SERGE    Linezolid Sensitive 2 mcg/mL Final    Method: SENSITIVITY, SERGE    Penicillin Resistant >8 mcg/mL Final    Method: SENSITIVITY, SERGE    Vancomycin Resistant >16 mcg/mL Final    Method: SENSITIVITY, SERGE                       BLOOD CULTURE [[id number]]  (Abnormal) Collected:  11/12/18 1120    Order Status:  Completed Specimen:  Blood from Peripheral Updated:  11/17/18 1651     Significant Indicator POS (POS)     Source BLD     Site PERIPHERAL     Blood Culture Growth detected by Bactec instrument. 11/15/2018  11:59 (A)      Candida albicans  See previous culture for sensitivity report.   (A)      Bacteroides caccae (A)    Narrative:       CALL  [facility]  161 tel. [phone],  "Notified there is KRISTY GNR  CALLED  161 tel. [phone] 11/17/2018, 16:49, RB PERF. RESULTS CALLED  TO:[id number] RN  Per Hospital Policy: Only change Specimen Src: to \"Line\" if  specified by physician order.    BLOOD CULTURE [[id number]] Collected:  11/15/18 1807    Order Status:  Completed Specimen:  Blood from Peripheral Updated:  11/16/18 0744     Significant Indicator NEG     Source BLD     Site PERIPHERAL     Blood Culture No Growth    Note: Blood cultures are incubated for 5 days and  are monitored continuously.Positive blood cultures  are called to the RN and reported as soon as  they are identified.      Narrative:       Collected By:[id number] LocaidA R.  Per Hospital Policy: Only change Specimen Src: to \"Line\" if  specified by physician order.    BLOOD CULTURE [[id number]] Collected:  11/15/18 1807    Order Status:  Completed Specimen:  Blood from Peripheral Updated:  11/16/18 0744     Significant Indicator NEG     Source BLD     Site PERIPHERAL     Blood Culture No Growth    Note: Blood cultures are incubated for 5 days and  are monitored continuously.Positive blood cultures  are called to the RN and reported as soon as  they are identified.      Narrative:       Collected By:[id number] LocaidA R.  Per Hospital Policy: Only change Specimen Src: to \"Line\" if  specified by physician order.    BLOOD CULTURE [[id number]]  (Abnormal) Collected:  11/12/18 1039    Order Status:  Completed Specimen:  Blood from Peripheral Updated:  11/15/18 1316     Significant Indicator POS (POS)     Source BLD     Site PERIPHERAL     Blood Culture Growth detected by Bactec instrument. 11/14/2018  10:03  Gram Stain: Yeast.   (A)      Yeast (A)    Narrative:       CALL  [facility]  161 tel. [phone],  CALLED  161 tel. [phone] 11/14/2018, 10:06, RB PERF. RESULTS CALLED  TO:[phone]  Per Hospital Policy: Only change Specimen Src: to \"Line\" if  specified by physician order.    URINE CULTURE(NEW) [[id number]] Collected:  11/12/18 1057    " Order Status:  Completed Specimen:  Urine Updated:  11/14/18 0917     Significant Indicator NEG     Source UR     Site --     Urine Culture No growth at 48 hours    Narrative:       Mini cath  Indication for culture:->Emergency Room Patient    GRAM STAIN [[id number]] Collected:  11/13/18 1710    Order Status:  Completed Specimen:  Wound Updated:  11/14/18 0907     Significant Indicator .     Source WND     Site Liver Abscess     Gram Stain Result Few WBCs.  Many Gram negative rods.  Moderate Gram positive rods.      Blood Culture [[id number]]     Order Status:  Canceled Specimen:  Blood from Peripheral     Blood Culture [[id number]]     Order Status:  Canceled Specimen:  Blood from Peripheral     URINALYSIS [[id number]] Collected:  11/12/18 1057    Order Status:  Completed Specimen:  Urine Updated:  11/12/18 1135     Color Yellow     Character Clear     Specific Gravity 1.013     Ph 7.0     Glucose Negative mg/dL      Ketones Negative mg/dL      Protein Negative mg/dL      Bilirubin Negative     Urobilinogen, Urine 0.2     Nitrite Negative     Leukocyte Esterase Negative     Occult Blood Negative     Micro Urine Req see below     Comment: Microscopic examination not performed when specimen is clear  and chemically negative for protein, blood, leukocyte esterase  and nitrite.         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]   • E coli bacteremia [R78.81]   • Lactic acidosis [E87.2]   • Deep vein thrombosis (CMS-HCC) [I82.409] [I82.409]       Plan:  Liver abscess  Afebrile  Resolved leukocytosis  CT  scan 11/12 shows area of necrosis/hemorrhage   s/p IR drainage on 11/13/2018  Cultures +  lactobacillus, VRE and Candida albicans and strep viridans  Continue Zyvox, fluconazole, and Unasyn  Recommend repeat CT prior to dc drain to verify drain placement and assess size of abscess  Anticipate 4-6 week course    Bacteremia  Blood cultures + Bacteroides  Repeat blood cultures x2 are negative from 11/15/2018  OK to place PICC now    Candidemia  Candida albicans  Repeat blood cultures x2 are negative from 11/15/2018  Changed to p.o. Diflucan  Eye exam if visual changes    Leucocytosis  Multifactorial  monitor    Ampullary carcinoma of the pancreas  Contributory factor to above    DW Dr Yang

## 2018-11-21 NOTE — PROCEDURES
Vascular Access Team     Date of Insertion: 11/21/18  Arm Circumference: 28  Internal length: 42  External Length: 2  Vein Occupancy %: 37  Reason for PICC: antibiotic therapy   Labs: WBC 9.8, , BUN 6, Cr 0.58, GFR >60, INR 1.36 on 10/27/18,    Consents confirmed, vessel patency confirmed with ultrasound. Risks and benefits of procedure explained to patient and education regarding central line associated bloodstream infections provided. Questions answered.     PICC placed in RUE per MD order with ultrasound guidance, initial arm circumference 28cm. 4 Fr, single lumen PICC placed in basilic vein after 1 attempt(s). 2 cc's of 1% lidocaine injected intradermally, 21 gauge microintroducer needle and modified Seldinger technique used. 44 cm catheter inserted with good blood return. Secured at 2 cm marker. Each lumen flushed without resistance with 10 mL 0.9% normal saline. PICC line secured with Biopatch and Tegaderm.     PICC placement is confirmed by nurse using 3CG technology. PICC line is appropriate for use at this time. Patient tolerated procedure well, without complications.  Patient condition relayed to unit RN or ordering physician via this post procedure note in the EMR.     Ultrasound images uploaded to PACS and viewable in the EMR - yes  Ultrasound imaged printed and placed in paper chart - no     BARD Power PICC ref # [id number], Lot # [id number]

## 2018-11-21 NOTE — PROGRESS NOTES
Received bedside report from night shift RN. Assumed care of patient. Pt assessed and stable. VSS. Patient reports 0/10 pain at this time. Discussed plan of care for day with patient and patient's son Sea and received verbal understanding. Call light within reach, bed alarm active, bed in low position.

## 2018-11-21 NOTE — PROGRESS NOTES
Placed pt on 1 L O2 due to SOB. Paged Respiratory to notify of increased SOB, wheezing. RT to assess at bedside.

## 2018-11-21 NOTE — PROGRESS NOTES
Ambulated patient to the bathroom with standby assistance and front wheel walker.  Patient then ambulated to bedside chair.  Suction set up.  New lines placed with continuous fluids at 30 mL/hr to keep PICC line open.  Antibiotics infusing.

## 2018-11-21 NOTE — PROGRESS NOTES
Received report from day RN and assumed care of patient at 1900.  Assessed patient and reviewed labs and notes.  Patient is AOx4, up with assist x1 and front-wheel walker.  Patient reports abdominal pain with coughing; declines intervention.  Patient saturating fine on 1L O2 NC and had one respiratory therapy treatment overnight and one administration of albuterol this AM for wheezing.  Patient is short of breath with exertion; respiratory rate elevated at beginning of shift but within normal range this AM.  Patient had tmax of 99.9 F (oral) at 2000; patient was medicated with acetaminophen per MAR.  Patient is afebrile this AM.  Consent for radiology procedure printed and placed in chart but not signed by patient.  Patient states she is unaware of PICC placement procedure.  Biliary drain in place; output is small, thin, brown/serosanguinous.  Patient incontinent of urine overnight and got out of bed one time without calling.  CNA responded to bed alarm and found patient standing in urine.  Patient alert and oriented this AM and calling appropriately.  POC reviewed, patient board updated, safety precautions in place, call light within reach, and patient educated to call for assistance.

## 2018-11-21 NOTE — CARE PLAN
Problem: Urinary Elimination:  Goal: Ability to reestablish a normal urinary elimination pattern will improve  Patient ambulates to bathroom to urinate.  Occasional incontinence episodes particularly at night.  Patient calls for assistance during the day.    Problem: Respiratory:  Goal: Respiratory status will improve  Patient with significant expiratory wheezes and tachypnea this am.  Called respiratory for evaluation and treatment.     Problem: Mobility  Goal: Risk for activity intolerance will decrease  Patient requires 1 person assistance and use of front wheel walker for ambulation.  PT/OT following.

## 2018-11-21 NOTE — PROGRESS NOTES
"Surgical Progress Note:      No acute events  PICC placed    PE:  /70   Pulse 96   Temp 36.7 °C (98.1 °F) (Temporal)   Resp 18   Ht 1.549 m (5' 0.98\")   Wt 71 kg (156 lb 8.4 oz)   SpO2 99%   Breastfeeding? No   BMI 29.59 kg/m²     I/O:   Intake/Output Summary (Last 24 hours) at 11/21/18 1142  Last data filed at 11/21/18 0551   Gross per 24 hour   Intake              100 ml   Output               10 ml   Net               90 ml     UOP:  PO:  IV:    GEN: NAD  COR: RRR  PULM: CTA  ABD: soft, NTND, drain 10 cc serosanguinous      Labs:  Recent Labs      11/19/18   0006  11/20/18   0011  11/21/18   0035   WBC  15.6*  15.3*  9.8   RBC  3.50*  3.46*  3.07*   HEMOGLOBIN  9.7*  9.9*  8.7*   HEMATOCRIT  30.5*  30.2*  26.8*   MCV  87.1  87.3  87.3   MCH  27.7  28.6  28.3   RDW  49.2  50.6*  51.7*   PLATELETCT  260  278  223   MPV  10.3  10.3  9.4     Recent Labs      11/19/18   0006  11/20/18   0011  11/21/18   0035   SODIUM  134*  133*  131*   POTASSIUM  3.5*  4.1  4.2   CHLORIDE  105  104  103   CO2  22  21  21   GLUCOSE  107*  107*  112*   BUN  5*  5*  6*         A/P:   Abscess  Drain output decreased  Reportedly having CT done  If CT shows collection resolved/improved, can dc drain       Jose Yang M.D.  [facility]  [phone]    "

## 2018-11-21 NOTE — PROGRESS NOTES
[facility] Medicine Daily Progress Note    Date of Service  11/20/2018    Chief Complaint  73 y.o. female admitted 11/12/2018 with fevers and chills in setting of metastatic ampullary carcioma.    Hospital Course    Patient admitted and started again on IV antibitoics.  She was found to have a liver abscess and required drainage, cultures from this have shown VRE, candida and lactobacillus acidophilus and blood cultures showing candida.  ID has been managing antibiotics and antifungals.       Interval Problem Update  Patient had episode of incontinence overnight along with confusion and new difficulty following commands.  CT head was done showing no acute findings with suspicion of possible s/e of pain medication.  Her mentation has improved but she continues to have urinary and bowel incontinence, likely with fatigue and now frequent need for toileting due to her antibiotics and IV hydration.    Consultants/Specialty  ID - Leela    Code Status  full    Disposition  tbd    Review of Systems  Review of Systems   Constitutional: Positive for malaise/fatigue. Negative for chills and fever.   HENT: Negative for congestion and sore throat.    Eyes: Negative for blurred vision and photophobia.   Respiratory: Negative for cough and shortness of breath.    Cardiovascular: Negative for chest pain, claudication and leg swelling.   Gastrointestinal: Negative for abdominal pain, constipation, diarrhea, heartburn and vomiting.   Genitourinary: Negative for dysuria and hematuria.   Musculoskeletal: Negative for joint pain and myalgias.   Skin: Negative for itching and rash.   Neurological: Positive for weakness. Negative for dizziness, sensory change, speech change and headaches.   Psychiatric/Behavioral: Negative for depression. The patient is not nervous/anxious and does not have insomnia.         Physical Exam  Temp:  [36.4 °C (97.6 °F)-37.7 °C (99.9 °F)] 37.7 °C (99.9 °F)  Pulse:  [] 90  Resp:  [16-28] 20  BP:  (129-150)/() 139/65    Physical Exam   Constitutional: She is oriented to person, place, and time. She appears well-developed and well-nourished. No distress.   HENT:   Head: Normocephalic and atraumatic.   Eyes: Conjunctivae are normal. No scleral icterus.   Neck: Neck supple. No JVD present.   Cardiovascular: Normal rate, regular rhythm and normal heart sounds.  Exam reveals no gallop and no friction rub.    No murmur heard.  Pulmonary/Chest: Effort normal and breath sounds normal. No respiratory distress. She exhibits no tenderness.   Abdominal: Soft. Bowel sounds are normal. She exhibits no distension. There is no guarding.   Musculoskeletal: She exhibits no edema or tenderness.   Lymphadenopathy:     She has no cervical adenopathy.   Neurological: She is alert and oriented to person, place, and time. No cranial nerve deficit.   Skin: Skin is warm and dry. She is not diaphoretic. No erythema. No pallor.   Psychiatric: She has a normal mood and affect. Her behavior is normal.   Nursing note and vitals reviewed.      Fluids  No intake or output data in the 24 hours ending 11/20/18 2247    Laboratory  Recent Labs      11/18/18   0026  11/19/18   0006  11/20/18   0011   WBC  16.7*  15.6*  15.3*   RBC  3.86*  3.50*  3.46*   HEMOGLOBIN  10.7*  9.7*  9.9*   HEMATOCRIT  34.0*  30.5*  30.2*   MCV  88.1  87.1  87.3   MCH  27.7  27.7  28.6   MCHC  31.5*  31.8*  32.8*   RDW  49.3  49.2  50.6*   PLATELETCT  244  260  278   MPV  10.0  10.3  10.3     Recent Labs      11/18/18   0026  11/19/18   0006  11/20/18   0011   SODIUM  133*  134*  133*   POTASSIUM  3.7  3.5*  4.1   CHLORIDE  105  105  104   CO2  21  22  21   GLUCOSE  114*  107*  107*   BUN  5*  5*  5*   CREATININE  0.47*  0.39*  0.55   CALCIUM  8.1*  7.8*  8.0*                   Imaging  CT-HEAD W/O   Final Result      1. Cerebral atrophy.   2. White matter lucencies most consistent with small vessel ischemic change versus demyelination or gliosis.   3. Remote  LEFT cerebellar infarction   4. Otherwise, Head CT without contrast with no acute findings. No evidence of acute cerebral infarction, hemorrhage or mass lesion.      CT-DRAIN-LIVER ABSCESS-CYST   Final Result      Successful CT-guided drain placement in the right lobe liver necrosis/abscess.      CT-ABDOMEN-PELVIS WITH   Final Result      1.  Interval significant increase in size of low-attenuation area in the dome of the right lobe of liver which contains air. This may represent necrosis or hemorrhage following therapy. Stable appearance of other smaller low-attenuation areas in the    right lobe of the liver without evidence of progression of disease in these areas of metastasis. Differential diagnosis would include post therapy infection.      2.  Stable masslike density in the region of the ampulla of Vater consistent with primary carcinoma. Current dimensions are 3.1 x 2.6 cm.      3.  Persistent low-attenuation areas in the pancreatic head and proximal body which may represent secondary pancreatitis.      4.  Stable upper retroperitoneal adenopathy.      5.  Small umbilical hernia containing fat.      DX-CHEST-PORTABLE (1 VIEW)   Final Result      No pulmonary consolidation.      IR-PICC LINE PLACEMENT    (Results Pending)        Assessment/Plan  Liver abscess- (present on admission)   Assessment & Plan    Fluid collection in the liver drained by interventional radiology  Cultures enterococcus [VRE]  ID following   Continue zosyn, Linazolid and micofungin          Bacteremia- (present on admission)   Assessment & Plan    Received treatment for E. coli bacteremia in early November, her port was removed  Admitted with sepsis  Blood cultures from 11/12/2018 are growing lactobacillus, Candida albicans and strep viridans. Liver abscess cultures from 11/13/2018 are positive for VRE.  Currently on linazolid and micofungin and zosyn. ID following.     ID requesting to wait on PICC until blood cultures from 11/15/18/  finalized to make sure no growth given complicated clinical picture.           Sepsis (HCC)- (present on admission)   Assessment & Plan    This is sepsis (without associated acute organ dysfunction).   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

## 2018-11-21 NOTE — CARE PLAN
Problem: Communication  Goal: The ability to communicate needs accurately and effectively will improve  Outcome: PROGRESSING AS EXPECTED  Pt uses call light appropriately. Asked for something for her cough. MD notified.     Problem: Safety  Goal: Will remain free from injury  Outcome: PROGRESSING AS EXPECTED  Pt sat in chair for most of day. Chair alarm in use. Patient calls appropriately.

## 2018-11-22 NOTE — PROGRESS NOTES
Infectious Disease Progress Note    Author: Yuli Olmedo M.D. Date & Time of service: 2018  7:23 AM    Chief Complaint:  Generalized weakness and chills    Interval History:  73-year-old female with metastatic ampullary carcinoma of the pancreas presented with generalized weakness and shaking chills.  2018-no fevers.  Complains of generalized malaise and fatigue.  Still has some pain in her right shoulder.  WBC count is 22,000.  Cultures remain negative.  2018 T-max is 98.2.  No new issues overnight.  Underwent IR drainage on 2018.  Her shoulder pain has eased since then.  11/15/2018 no fevers.  WBC 13.3 creatinine 0.47  2018 no fevers.  Denies any increased abdominal pain.  The shoulder pain is improved as well.  WBC is 12.8.  2018 no fevers.  Shoulder pain is much improved.  Denies any new issues.  - no fevers. No new issues> WBC 16.7  2018 no fevers.  WBC 15.6 no new issues overnight   AF (99.7) WBC 15.3 states eating better-denies SE abx No abd pain today   Af (99.9) WBC 9.8 minimal output from drain-plan for PICC today  2018 T-max is 100.3.  No new issues overnight.  WBCs 10.  AST 40 ALT 17  Labs Reviewed, Medications Reviewed and Radiology Reviewed.    Review of Systems:  Review of Systems   Constitutional: Positive for malaise/fatigue. Negative for chills and fever.   HENT: Negative for hearing loss.    Respiratory: Negative for cough and shortness of breath.    Cardiovascular: Negative for chest pain and leg swelling.   Gastrointestinal: Positive for abdominal pain. Negative for nausea and vomiting.        Slight abdominal pain off and on       Hemodynamics:  Temp (24hrs), Av.3 °C (99.1 °F), Min:36.7 °C (98.1 °F), Max:37.9 °C (100.3 °F)  Temperature: 37.1 °C (98.8 °F)  Pulse  Av.3  Min: 67  Max: 112Heart Rate (Monitored): 96  Blood Pressure : 146/69       Physical Exam:  Physical Exam   Constitutional: She appears well-developed.    HENT:   Head: Normocephalic and atraumatic.   Eyes: Pupils are equal, round, and reactive to light. EOM are normal. No scleral icterus.   Neck: Neck supple.   Cardiovascular: Normal rate.    Pulmonary/Chest: Effort normal. No respiratory distress. She has no wheezes.   Abdominal: Soft. She exhibits no distension. There is no tenderness.   Right upper quadrant drain   Musculoskeletal: She exhibits no edema.   Neurological: She is alert.   Skin: Skin is warm. She is not diaphoretic. No erythema.   Nursing note and vitals reviewed.      Meds:    Current Facility-Administered Medications:   •  furosemide  •  ipratropium-albuterol  •  potassium chloride SA  •  ampicillin-sulbactam (UNASYN) IV  •  fluconazole  •  linezolid  •  apixaban  •  senna-docusate **AND** polyethylene glycol/lytes **AND** magnesium hydroxide **AND** bisacodyl  •  Respiratory Care per Protocol  •  ondansetron  •  ondansetron  •  acetaminophen  •  Notify provider if pain remains uncontrolled **AND** Use the numeric rating scale (NRS-11) on regular floors and Critical-Care Pain Observation Tool (CPOT) on ICUs/Trauma to assess pain **AND** Pulse Ox (Oximetry) **AND** Pharmacy Consult Request **AND** If patient difficult to arouse and/or has respiratory depression, stop any opiates that are currently infusing and call a Rapid Response. **AND** oxyCODONE immediate-release **AND** oxyCODONE immediate-release **AND** morphine injection  •  NS  •  omeprazole  •  artificial tears  •  albuterol    Labs:  Recent Labs      11/20/18 0011  11/21/18 0035 11/22/18 0015   WBC  15.3*  9.8  9.8   RBC  3.46*  3.07*  3.50*   HEMOGLOBIN  9.9*  8.7*  9.9*   HEMATOCRIT  30.2*  26.8*  31.1*   MCV  87.3  87.3  88.9   MCH  28.6  28.3  28.3   RDW  50.6*  51.7*  54.5*   PLATELETCT  278  223  283   MPV  10.3  9.4  9.5     Recent Labs      11/20/18   0011  11/21/18 0035  11/22/18 0015   SODIUM  133*  131*  131*   POTASSIUM  4.1  4.2  4.2   CHLORIDE  104  103  100    CO2  21  21  20   GLUCOSE  107*  112*  114*   BUN  5*  6*  7*     Recent Labs      11/20/18   0011  11/21/18   0035  11/22/18   0015   ALBUMIN  2.0*  1.9*  2.4*   TBILIRUBIN  0.4  0.3  0.3   ALKPHOSPHAT  160*  153*  192*   TOTPROTEIN  5.9*  5.8*  7.2   ALTSGPT  12  12  17   ASTSGOT  25  26  40   CREATININE  0.55  0.58  0.59       Imaging:  Ct-abdomen-pelvis With    Result Date: 11/12/2018 11/12/2018 1:38 PM HISTORY/REASON FOR EXAM:  Abdominal pain. History of metastatic ampullary adenocarcinoma. FINDINGS: The visualized lung bases are clear. CT Abdomen: A small hiatal hernia is present. There is interval increase in size of an area of low attenuation in the dome of the right lobe of the liver which contains air. This may represent necrosis or post therapy hemorrhage following embolization. This area measures 8.8 x 7.8 x 6.4 cm. Previous measurement was 2.2 cm in maximum dimension. Multiple smaller areas of low-attenuation within the right lobe of liver are present which are suspicious for hepatic metastases. These other smaller low-attenuation areas are without significant change. The gallbladder absent. A low-attenuation area in the region of the pancreatic head is again noted and is poorly defined measuring approximately 3.1 x 2.6 cm in diameter. This is consistent with the area of ampullary carcinoma. Embolization coils are present in the GDA. There is low attenuation in the pancreatic body proximally which may represent low-attenuation  secondary to pancreatitis or spread of neoplasm. The common duct is dilated proximal to the level of the ampulla measuring a maximum diameter of 1.6 cm. No choledocholithiasis is identified. An enlarged retroperitoneal lymph node between the upper aorta and IVC is unchanged measuring 1.5 cm in diameter. No new area of retroperitoneal adenopathy is identified. No peripancreatic adenopathy is present. The spleen appears normal. The splenic vein and portal vein are patent. The  adrenal glands are not enlarged and the kidneys enhance symmetrically. Small simple cysts in the right kidney are stable. There is no lymphadenopathy. The aorta and IVC are normal in caliber. The bowel is without obstruction. The appendix appears normal. There is a small umbilical hernia containing fat. CT Pelvis: There is no lymphadenopathy. No free fluid is present. The bladder appears normal. Uterus as endometrial complex thickening. There is a myomatous type calcification in the right aspect of the uterus which is unchanged. There is no acute inflammatory process.     1.  Interval significant increase in size of low-attenuation area in the dome of the right lobe of liver which contains air. This may represent necrosis or hemorrhage following therapy. Stable appearance of other smaller low-attenuation areas in the right lobe of the liver without evidence of progression of disease in these areas of metastasis. Differential diagnosis would include post therapy infection. 2.  Stable masslike density in the region of the ampulla of Vater consistent with primary carcinoma. Current dimensions are 3.1 x 2.6 cm. 3.  Persistent low-attenuation areas in the pancreatic head and proximal body which may represent secondary pancreatitis. 4.  Stable upper retroperitoneal adenopathy. 5.  Small umbilical hernia containing fat.    Ct-abdomen-pelvis With    Result Date: 10/25/2018  10/25/2018 4:28 PM HISTORY/REASON FOR EXAM:  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 Patient is status post embolization.. TECHNIQUE/EXAM DESCRIPTION:   CT scan of the abdomen and pelvis with contrast. Contrast-enhanced helical scanning was obtained from the diaphragmatic domes through the pubic symphysis following the bolus administration of nonionic contrast without complication. 80 mL of Omnipaque 350 nonionic contrast was administered without complication. Low dose optimization technique was utilized for this CT exam  including automated exposure control and adjustment of the mA and/or kV according to patient size. COMPARISON: PET/CT 7/12/2018, MRI 8/6/2018 and CT abdomen 3/27/2018 FINDINGS: The visualized lung bases are unremarkable. CT Abdomen: There are several hypodense lesions in the hepatic dome with a couple of foci of air. A hypodense mass within air-fluid level in the right hepatic lobe, segment 8, measures approximately 2.1 cm. No significant rim enhancement is identified. There are new  hypodense lesions in the anterior right hepatic lobe measuring up to approximately 7 mm. There is pneumobilia, consistent with prior hepaticojejunostomy. The gallbladder has been removed. The common bile duct remains dilated with thickening of the duodenal wall and enhancement. Maximum diameter of the common bile duct is approximately 14 mm. There are multiple coils in the region of the pancreatic head. The spleen and adrenal glands are unremarkable. The kidneys enhance symmetrically. Hypodense renal masses are consistent with cysts. There is heterogeneous enhancement of the pancreatic head with an ill-defined cystic mass. The mass is difficult to measure, but is approximately 4.6 x 4 cm. The duct does not appear dilated more distally. There are multiple diverticula in the descending and sigmoid colon. There are scattered arterial calcifications. Enlarged aortocaval lymph node on image 37 measures approximately 15 mm short axis, previously 8 mm. CT Pelvis: The bladder is unremarkable. No significant free fluid is identified. A calcified mass in the uterus is consistent with a small leiomyoma.     1.  Ill-defined cystic mass in the pancreatic head with surrounding inflammatory changes. Findings may be related to pancreatitis in the appropriate clinical setting. Findings are new from the prior exam in August. 2.  New hypodense lesions scattered throughout the liver was foci of air. Findings are highly likely to be related to recent  embolization. No rim enhancement to suggest abscess. 3.  Duodenal wall thickening with enhancement, concerning for residual disease. Persistent common bile duct dilatation. 4.  Interval enlargement in an aortocaval lymph node, highly suspicious for metastatic disease. 5.  Diverticulosis. 6.  Status post hepaticojejunostomy.    Dx-chest-portable (1 View)    Result Date: 11/12/2018 11/12/2018 11:06 AM HISTORY/REASON FOR EXAM:  Sepsis. TECHNIQUE/EXAM DESCRIPTION AND NUMBER OF VIEWS: Single portable view of the chest. COMPARISON: 10/25/2018 FINDINGS: There is no evidence of focal consolidation or evidence of pulmonary edema. There is no evidence of pleural effusion. The heart is normal in size.     No pulmonary consolidation.    Dx-chest-portable (1 View)    Result Date: 10/25/2018  10/25/2018 11:35 AM HISTORY/REASON FOR EXAM:  LEFT shoulder pain, malaise, nausea. TECHNIQUE/EXAM DESCRIPTION AND NUMBER OF VIEWS: Single portable view of the chest. COMPARISON: 5/3/2018 FINDINGS: Cardiomediastinal contour is within normal limits. Lungs show hypoinflation. No focal pulmonary consolidation. No pleural fluid collection or pneumothorax. RIGHT chest infusion port again present. No major bony abnormality is seen. Atherosclerotic calcification of thoracic aorta.     Hypoinflation without other evidence for acute cardiopulmonary disease.    [id number]    Result Date: 10/27/2018  10/27/2018 5:00 PM HISTORY/REASON FOR EXAM:  Jaw Pain. TECHNIQUE/EXAM DESCRIPTION AND NUMBER OF VIEWS:  1 view(s) of the mandible. COMPARISON:  None. FINDINGS: Multiple absent teeth. Many of the remaining teeth have filling/crowns. No periapical lucencies seen. No mandibular fracture.     No periapical lucencies. No mandibular fracture.    Ir-cvc Tunnel With Port Removal    Result Date: 10/28/2018   Chest port removal HISTORY/REASON FOR EXAM: Bacteremia, concern for central line infection MEDICATIONS: Conscious sedation with 2 mcg Fentanyl and  50 mg Versed was administered during the procedure with appropriate continuous patient monitoring of cardiorespiratory function by the radiology nurse. Sedation duration: 30 minutes. TECHNIQUE/EXAM DESCRIPTION: Following informed consent patient was prepped and draped in the usual fashion. The procedure was performed using MAXIMAL STERILE BARRIER technique including sterile gown, mask, cap, and donning of sterile gloves following appropriate hand hygiene and/or  sterile scrub. Patient skin site was prepped with 2% Chlorhexidine solution. A timeout was performed. Local anesthetic result was achieved with administration of copious 1% lidocaine with epinephrine. A skin incision was made with an 15 blade scalpel. Blunt dissection was used to retrieve the port and catheter which were observed to be intact. Hemostasis  was obtained with manual compression. The port pocket showed no signs of gross infection. The port pocket was flushed with normal saline. The port pocket was closed with deep 2-0 Vicryl sutures and a subcutaneous 4-0 Vicryl suture layer, the skin was closed with Dermabond. The patient tolerated procedure well. The skin was cleaned and a dressing was applied. COMPARISON:  None     Successful chest port removal.    Ir-picc Line Placement    Result Date: 10/29/2018  HISTORY/REASON FOR EXAM:   PICC placement. TECHNIQUE/EXAM DESCRIPTION AND NUMBER OF VIEWS:   PICC line insertion with ultrasound guidance.  The procedure was performed using maximal sterile barrier technique including sterile gown, mask, cap, and donning of sterile gloves following appropriate hand hygiene and/or sterile scrub. Patient skin site was prepped with 2% Chlorhexidine solution. FINDINGS:  PICC line insertion with Ultrasound Guidance was performed by qualified nursing staff without the assistance of a Radiologist. PICC positioning appropriateness confirmed by 3CG technology; chest xray only needed in the instance 3CG unable to confirm  placement.              Ultrasound-guided PICC placement performed by qualified nursing staff as above.     Ir-low Level Consult-outpt    Result Date: 10/22/2018  This exam has been resulted under the NOTES tab, and the signed report has been auto faxed to the ordering physician on the date/time of that signature.      Micro:  Results     Procedure Component Value Units Date/Time    CULTURE WOUND W/ GRAM STAIN [[id number]]  (Abnormal)  (Susceptibility) Collected:  11/13/18 1710    Order Status:  Completed Specimen:  Wound Updated:  11/18/18 0927     Significant Indicator POS (POS)     Source WND     Site Liver Abscess     Culture Result Wound Growth noted after further incubation, see below for  organism identification.   (A)     Gram Stain Result Few WBCs.  Many Gram negative rods.  Moderate Gram positive rods.       Culture Result Wound Candida albicans  Light growth   (A)      Lactobacillus acidophilus  Moderate growth   (A)    Narrative:       CALL  Flores  CNU tel. [phone],  CALLED  CNU tel. [phone] 11/18/2018, 09:27, RB PERF. RESULTS CALLED  TO:[id number], RN    Culture & Susceptibility     ENTEROCOCCUS FAECIUM     Antibiotic Sensitivity Microscan Unit Status    Ampicillin Resistant >8 mcg/mL Final    Method: SENSITIVITY, SERGE    Daptomycin Sensitive 4 mcg/mL Final    Method: SENSITIVITY, SERGE    Gent Synergy Sensitive <=500 mcg/mL Final    Method: SENSITIVITY, SERGE    Linezolid Sensitive 2 mcg/mL Final    Method: SENSITIVITY, SERGE    Penicillin Resistant >8 mcg/mL Final    Method: SENSITIVITY, SERGE    Vancomycin Resistant >16 mcg/mL Final    Method: SENSITIVITY, SERGE                       BLOOD CULTURE [[id number]]  (Abnormal) Collected:  11/12/18 1120    Order Status:  Completed Specimen:  Blood from Peripheral Updated:  11/17/18 1651     Significant Indicator POS (POS)     Source BLD     Site PERIPHERAL     Blood Culture Growth detected by Bactec instrument. 11/15/2018  11:59 (A)      Candida albicans  See previous  "culture for sensitivity report.   (A)      Bacteroides caccae (A)    Narrative:       CALL  Flores  161 tel. [phone], Notified there is KRISTY GNR  CALLED  161 tel. [phone] 11/17/2018, 16:49, RB PERF. RESULTS CALLED  TO:[id number] RN  Per Hospital Policy: Only change Specimen Src: to \"Line\" if  specified by physician order.    BLOOD CULTURE [[id number]] Collected:  11/15/18 1807    Order Status:  Completed Specimen:  Blood from Peripheral Updated:  11/16/18 0744     Significant Indicator NEG     Source BLD     Site PERIPHERAL     Blood Culture No Growth    Note: Blood cultures are incubated for 5 days and  are monitored continuously.Positive blood cultures  are called to the RN and reported as soon as  they are identified.      Narrative:       Collected By:[id number] CONCHA VELAZQUEZ  Per Hospital Policy: Only change Specimen Src: to \"Line\" if  specified by physician order.    BLOOD CULTURE [[id number]] Collected:  11/15/18 1807    Order Status:  Completed Specimen:  Blood from Peripheral Updated:  11/16/18 0744     Significant Indicator NEG     Source BLD     Site PERIPHERAL     Blood Culture No Growth    Note: Blood cultures are incubated for 5 days and  are monitored continuously.Positive blood cultures  are called to the RN and reported as soon as  they are identified.      Narrative:       Collected By:[id number] CONCHA HILLIARD RCecil  Per Hospital Policy: Only change Specimen Src: to \"Line\" if  specified by physician order.    BLOOD CULTURE [[id number]]  (Abnormal) Collected:  11/12/18 1039    Order Status:  Completed Specimen:  Blood from Peripheral Updated:  11/15/18 1316     Significant Indicator POS (POS)     Source BLD     Site PERIPHERAL     Blood Culture Growth detected by Bactec instrument. 11/14/2018  10:03  Gram Stain: Yeast.   (A)      Yeast (A)    Narrative:       CALL  Flores  161 tel. [phone],  CALLED  161 tel. [phone] 11/14/2018, 10:06, RB PERF. RESULTS CALLED  TO:[phone]  Per Hospital Policy: Only change " "Specimen Src: to \"Line\" if  specified by physician order.    URINE CULTURE(NEW) [[id number]] Collected:  11/12/18 1057    Order Status:  Completed Specimen:  Urine Updated:  11/14/18 0917     Significant Indicator NEG     Source UR     Site --     Urine Culture No growth at 48 hours    Narrative:       Mini cath  Indication for culture:->Emergency Room Patient    GRAM STAIN [[id number]] Collected:  11/13/18 1710    Order Status:  Completed Specimen:  Wound Updated:  11/14/18 0907     Significant Indicator .     Source WND     Site Liver Abscess     Gram Stain Result Few WBCs.  Many Gram negative rods.  Moderate Gram positive rods.      Blood Culture [[id number]]     Order Status:  Canceled Specimen:  Blood from Peripheral     Blood Culture [[id number]]     Order Status:  Canceled Specimen:  Blood from Peripheral     URINALYSIS [[id number]] Collected:  11/12/18 1057    Order Status:  Completed Specimen:  Urine Updated:  11/12/18 1135     Color Yellow     Character Clear     Specific Gravity 1.013     Ph 7.0     Glucose Negative mg/dL      Ketones Negative mg/dL      Protein Negative mg/dL      Bilirubin Negative     Urobilinogen, Urine 0.2     Nitrite Negative     Leukocyte Esterase Negative     Occult Blood Negative     Micro Urine Req see below     Comment: Microscopic examination not performed when specimen is clear  and chemically negative for protein, blood, leukocyte esterase  and nitrite.         Narrative:       Mini cath  Indication for culture:->Emergency Room Patient    Culture Respiratory W/ GRM STN [[id number]] Collected:  11/12/18 0000    Order Status:  Canceled Specimen:  Sputum from Sputum     Urine Culture [[id number]] Collected:  11/12/18 0000    Order Status:  Canceled Specimen:  Urine           Assessment:  Active Hospital Problems    Diagnosis   • Abnormal CT of the abdomen [R93.5]   • Sepsis (HCC) [A41.9]   • 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]   • E coli bacteremia [R78.81]   • Lactic acidosis [E87.2] "   • Deep vein thrombosis (CMS-HCC) [I82.409] [I82.409]       Plan:  Liver abscess  Afebrile  Resolved leukocytosis  CT scan 11/12 shows area of necrosis/hemorrhage   s/p IR drainage on 11/13/2018  Cultures +  lactobacillus, VRE and Candida albicans and strep viridans  Continue Zyvox, fluconazole, and Unasyn  Recommend repeat CT prior to dc drain to verify drain placement and assess size of abscess  Anticipate 4-6 week course. may be able to change to oral soon    Bacteremia  Blood cultures + Bacteroides  Repeat blood cultures x2 are negative from 11/15/2018      Candidemia  Candida albicans  Repeat blood cultures x2 are negative from 11/15/2018  Changed to p.o. Diflucan  Eye exam if visual changes    Leucocytosis  Multifactorial  monitor    Ampullary carcinoma of the pancreas  Contributory factor to above    DW Dr Yang

## 2018-11-22 NOTE — PROGRESS NOTES
Pt a/o x 4. C/o minor abdominal pain; declines intervention. Pt to have CT scan of abdomen today. Family at bedside; notified of POC. Pt up frequently to BSC to urinate. Pt taking lasix. Call light is within reach, BA in place, and rounding taking place every hour.

## 2018-11-22 NOTE — CARE PLAN
Problem: Communication  Goal: The ability to communicate needs accurately and effectively will improve  POC reviewed with pt and family.    Problem: Safety  Goal: Will remain free from injury    Intervention: Provide assistance with mobility  Pt up with one assist to BSC for safety

## 2018-11-22 NOTE — CARE PLAN
Problem: Infection  Goal: Will remain free from infection  Outcome: PROGRESSING AS EXPECTED  Pt. Afebrile, administrating IV abx    Problem: Skin Integrity  Goal: Risk for impaired skin integrity will decrease  Outcome: PROGRESSING AS EXPECTED  Pt. Turns self, skin intact, no areas of redness, promptly providing perineal care for incontinence

## 2018-11-22 NOTE — PROGRESS NOTES
Hospital Medicine Daily Progress Note    Date of Service  11/21/2018    Chief Complaint  73 y.o. female admitted 11/12/2018 with fevers and chills in setting of metastatic ampullary carcioma.    Hospital Course    Patient admitted and started again on IV antibitoics.  She was found to have a liver abscess and required drainage, cultures from this have shown VRE, candida and lactobacillus acidophilus and blood cultures showing candida.  ID has been managing antibiotics and antifungals.       Interval Problem Update  11/20 Patient had episode of incontinence overnight along with confusion and new difficulty following commands.  CT head was done showing no acute findings with suspicion of possible s/e of pain medication.  Her mentation has improved but she continues to have urinary and bowel incontinence, likely with fatigue and now frequent need for toileting due to her antibiotics and IV hydration.  11/21 Patient's cough persisting but she remains afebrile.  I ordered PICC line for placement as patient will need continued antibiotics for 6 week duration, final blood cultures have resulted negative.  CXR ordered and consistent with interstitial edema - will start IV lasix to see if this improves her oxygen saturation and cough. WBC continues to drop.    Consultants/Specialty  ID - Leela    Code Status  full    Disposition  tbd    Review of Systems  Review of Systems   Constitutional: Positive for malaise/fatigue. Negative for chills and fever.   HENT: Negative for congestion and sore throat.    Eyes: Negative for blurred vision and photophobia.   Respiratory: Positive for cough and shortness of breath. Negative for wheezing.    Cardiovascular: Negative for chest pain, claudication and leg swelling.   Gastrointestinal: Negative for abdominal pain, constipation, diarrhea, heartburn and vomiting.   Genitourinary: Negative for dysuria and hematuria.   Musculoskeletal: Negative for joint pain and myalgias.   Skin:  Negative for itching and rash.   Neurological: Positive for weakness. Negative for dizziness, sensory change, speech change and headaches.   Psychiatric/Behavioral: Negative for depression. The patient is not nervous/anxious and does not have insomnia.         Physical Exam  Temp:  [36.6 °C (97.9 °F)-37.7 °C (99.9 °F)] 37.3 °C (99.2 °F)  Pulse:  [] 101  Resp:  [18-28] 22  BP: (108-148)/(56-72) 144/72    Physical Exam   Constitutional: She is oriented to person, place, and time. She appears well-developed and well-nourished. No distress.   HENT:   Head: Normocephalic and atraumatic.   Eyes: Conjunctivae are normal. No scleral icterus.   Neck: Neck supple. No JVD present.   Cardiovascular: Normal rate, regular rhythm and normal heart sounds.  Exam reveals no gallop and no friction rub.    No murmur heard.  Pulmonary/Chest: Effort normal. No respiratory distress. She has rales (mild). She exhibits no tenderness.   Abdominal: Soft. Bowel sounds are normal. She exhibits no distension. There is no guarding.   Musculoskeletal: She exhibits no edema or tenderness.   Lymphadenopathy:     She has no cervical adenopathy.   Neurological: She is alert and oriented to person, place, and time. No cranial nerve deficit.   Skin: Skin is warm and dry. She is not diaphoretic. No erythema. No pallor.   Psychiatric: She has a normal mood and affect. Her behavior is normal.   Nursing note and vitals reviewed.      Fluids    Intake/Output Summary (Last 24 hours) at 11/21/18 1913  Last data filed at 11/21/18 1806   Gross per 24 hour   Intake              300 ml   Output               25 ml   Net              275 ml       Laboratory  Recent Labs      11/19/18   0006  11/20/18   0011  11/21/18   0035   WBC  15.6*  15.3*  9.8   RBC  3.50*  3.46*  3.07*   HEMOGLOBIN  9.7*  9.9*  8.7*   HEMATOCRIT  30.5*  30.2*  26.8*   MCV  87.1  87.3  87.3   MCH  27.7  28.6  28.3   MCHC  31.8*  32.8*  32.5*   RDW  49.2  50.6*  51.7*   PLATELETCT  260   278  223   MPV  10.3  10.3  9.4     Recent Labs      11/19/18   0006  11/20/18   0011  11/21/18   0035   SODIUM  134*  133*  131*   POTASSIUM  3.5*  4.1  4.2   CHLORIDE  105  104  103   CO2  22  21  21   GLUCOSE  107*  107*  112*   BUN  5*  5*  6*   CREATININE  0.39*  0.55  0.58   CALCIUM  7.8*  8.0*  7.5*                   Imaging  DX-CHEST-PORTABLE (1 VIEW)   Final Result      New interstitial opacity is worrisome for edema      Hilar prominence could be from lymphadenopathy or pulmonary arterial hypertension      Stable right hemidiaphragm elevation      IR-PICC LINE PLACEMENT   Final Result                  Ultrasound-guided PICC placement performed by qualified nursing staff as    above.          CT-HEAD W/O   Final Result      1. Cerebral atrophy.   2. White matter lucencies most consistent with small vessel ischemic change versus demyelination or gliosis.   3. Remote LEFT cerebellar infarction   4. Otherwise, Head CT without contrast with no acute findings. No evidence of acute cerebral infarction, hemorrhage or mass lesion.      CT-DRAIN-LIVER ABSCESS-CYST   Final Result      Successful CT-guided drain placement in the right lobe liver necrosis/abscess.      CT-ABDOMEN-PELVIS WITH   Final Result      1.  Interval significant increase in size of low-attenuation area in the dome of the right lobe of liver which contains air. This may represent necrosis or hemorrhage following therapy. Stable appearance of other smaller low-attenuation areas in the    right lobe of the liver without evidence of progression of disease in these areas of metastasis. Differential diagnosis would include post therapy infection.      2.  Stable masslike density in the region of the ampulla of Vater consistent with primary carcinoma. Current dimensions are 3.1 x 2.6 cm.      3.  Persistent low-attenuation areas in the pancreatic head and proximal body which may represent secondary pancreatitis.      4.  Stable upper retroperitoneal  adenopathy.      5.  Small umbilical hernia containing fat.      DX-CHEST-PORTABLE (1 VIEW)   Final Result      No pulmonary consolidation.           Assessment/Plan  Liver abscess- (present on admission)   Assessment & Plan    Fluid collection in the liver drained by interventional radiology  Cultures enterococcus [VRE]  ID following   Continue zosyn, Linazolid and micofungin          Bacteremia- (present on admission)   Assessment & Plan    Received treatment for E. coli bacteremia in early November, her port was removed  Admitted with sepsis  Blood cultures from 11/12/2018 are growing lactobacillus, Candida albicans and strep viridans. Liver abscess cultures from 11/13/2018 are positive for VRE.  Currently on linazolid and micofungin and zosyn. ID following.     ID requesting to wait on PICC until blood cultures from 11/15/18/ finalized to make sure no growth given complicated clinical picture.           Sepsis (HCC)- (present on admission)   Assessment & Plan    This is sepsis (without associated acute organ dysfunction).   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

## 2018-11-22 NOTE — PROGRESS NOTES
Received RN report, assumed pt. Care at 2030. Pt. Aox4, incontinent of urine, changed linens. Pt. Complains of abdominal pain, heat applied. Pt. Has no signs of distress and denies having any needs at this time. Bed in lowest position with wheels locked, bed alarm in place. Call light within reach.

## 2018-11-22 NOTE — ASSESSMENT & PLAN NOTE
CT shows pleural effusion right lung - thoracentesis improved symptom  Responded well to diuresis

## 2018-11-22 NOTE — PROGRESS NOTES
Received report, assumed pt. Care at 2030. Pt. Aox4, no signs of distress. Pt complains of abdominal pain, heat applied. Pt. Incontinent, linens changed, perineal care provided. Bed in lowest position with wheels locked, bed alarm in place. Call light within reach.

## 2018-11-23 PROBLEM — J90 PLEURAL EFFUSION: Status: ACTIVE | Noted: 2018-01-01

## 2018-11-23 NOTE — DIETARY
Nutrition Services: Update   Pt is currently on regular diet. PO improving, >50% various meals.   Wt 11/17: 71 kg via bed scale    S/p thoracentesis today and had been NPO for procedure. PO appears to be improving, last several documented meals/snacks >50%.    Recommendations/Plan:  1. Encourage intake of meals  2. Document intake of all meals as % taken in ADL's to provide interdisciplinary communication across all shifts.   3. Please obtain new measured weight as medically feasible.  4. Nutrition rep will continue to see patient for ongoing meal and snack preferences.  5. Obtain supplement order per RD as needed.    RD following

## 2018-11-23 NOTE — PROGRESS NOTES
Hospital Medicine Daily Progress Note    Date of Service  11/22/2018    Chief Complaint  73 y.o. female admitted 11/12/2018 with fevers and chills in setting of metastatic ampullary carcioma.    Hospital Course    Patient admitted and started again on IV antibitoics.  She was found to have a liver abscess and required drainage, cultures from this have shown VRE, candida and lactobacillus acidophilus and blood cultures showing candida.  ID has been managing antibiotics and antifungals.       Interval Problem Update  11/20 Patient had episode of incontinence overnight along with confusion and new difficulty following commands.  CT head was done showing no acute findings with suspicion of possible s/e of pain medication.  Her mentation has improved but she continues to have urinary and bowel incontinence, likely with fatigue and now frequent need for toileting due to her antibiotics and IV hydration.  11/21 Patient's cough persisting but she remains afebrile.  I ordered PICC line for placement as patient will need continued antibiotics for 6 week duration, final blood cultures have resulted negative.  CXR ordered and consistent with interstitial edema - will start IV lasix to see if this improves her oxygen saturation and cough. WBC continues to drop.  11/22 Patient states she is feeling slightly better today, having to urinate more often since starting diuretic but lungs are sounding slightly better.  CT abdomen completed and shows abscess is smaller but still present and drain no longer in place.  She also now has a moderate right pleural effusion.  I've requested IR to reposition drain in liver abscess and to also drain what the pleural effusion.  Patient requests a call to be made to her son, Sea.    Consultants/Specialty  ID - Leela/Honorio    Code Status  full    Disposition  tbd    Review of Systems  Review of Systems   Constitutional: Positive for malaise/fatigue. Negative for chills and fever.   HENT:  Negative for congestion and sore throat.    Eyes: Negative for blurred vision and photophobia.   Respiratory: Positive for cough (slightly better) and shortness of breath. Negative for wheezing.    Cardiovascular: Negative for chest pain, claudication and leg swelling.   Gastrointestinal: Negative for abdominal pain, constipation, diarrhea, heartburn and vomiting.   Genitourinary: Negative for dysuria and hematuria.   Musculoskeletal: Negative for joint pain and myalgias.   Skin: Negative for itching and rash.   Neurological: Positive for weakness. Negative for dizziness, sensory change, speech change and headaches.   Psychiatric/Behavioral: Negative for depression. The patient is not nervous/anxious and does not have insomnia.         Physical Exam  Temp:  [37 °C (98.6 °F)-37.9 °C (100.3 °F)] 37 °C (98.6 °F)  Pulse:  [88-97] 97  Resp:  [18-20] 18  BP: (139-155)/(65-69) 139/67    Physical Exam   Constitutional: She is oriented to person, place, and time. She appears well-developed and well-nourished. No distress.   HENT:   Head: Normocephalic and atraumatic.   Eyes: Conjunctivae are normal. No scleral icterus.   Neck: Neck supple. No JVD present.   Cardiovascular: Normal rate, regular rhythm and normal heart sounds.  Exam reveals no gallop and no friction rub.    No murmur heard.  Pulmonary/Chest: Effort normal. No respiratory distress. She has rales (mild). She exhibits no tenderness.   Abdominal: Soft. Bowel sounds are normal. She exhibits no distension. There is no guarding.   Musculoskeletal: She exhibits no edema or tenderness.   Lymphadenopathy:     She has no cervical adenopathy.   Neurological: She is alert and oriented to person, place, and time. No cranial nerve deficit.   Skin: Skin is warm and dry. She is not diaphoretic. No erythema. No pallor.   Psychiatric: She has a normal mood and affect. Her behavior is normal.   Nursing note and vitals reviewed.      Fluids    Intake/Output Summary (Last 24 hours)  at 11/22/18 1745  Last data filed at 11/22/18 1654   Gross per 24 hour   Intake              100 ml   Output              815 ml   Net             -715 ml       Laboratory  Recent Labs      11/20/18   0011  11/21/18   0035  11/22/18   0015   WBC  15.3*  9.8  9.8   RBC  3.46*  3.07*  3.50*   HEMOGLOBIN  9.9*  8.7*  9.9*   HEMATOCRIT  30.2*  26.8*  31.1*   MCV  87.3  87.3  88.9   MCH  28.6  28.3  28.3   MCHC  32.8*  32.5*  31.8*   RDW  50.6*  51.7*  54.5*   PLATELETCT  278  223  283   MPV  10.3  9.4  9.5     Recent Labs      11/20/18   0011  11/21/18 0035  11/22/18   0015   SODIUM  133*  131*  131*   POTASSIUM  4.1  4.2  4.2   CHLORIDE  104  103  100   CO2  21 21 20   GLUCOSE  107*  112*  114*   BUN  5*  6*  7*   CREATININE  0.55  0.58  0.59   CALCIUM  8.0*  7.5*  7.9*                   Imaging  CT-ABDOMEN W/O   Final Result      Hepatic dome abscess pigtail catheter has pulled out and now terminates just deep to the thoracic cage. Despite this the size of the abscess has diminished measuring 75 x 48 from 88 x 64 mm      New mild left hepatic pneumobilia      Ampullary region mass is difficult to precisely measure but does appear to exert some mass effect upon the third portion of the duodenum. No bowel obstruction is detected.      New medium-sized right ovarian pleural effusion does not have loculation features but there is subsegmental atelectasis      DX-CHEST-PORTABLE (1 VIEW)   Final Result      New interstitial opacity is worrisome for edema      Hilar prominence could be from lymphadenopathy or pulmonary arterial hypertension      Stable right hemidiaphragm elevation      IR-PICC LINE PLACEMENT   Final Result                  Ultrasound-guided PICC placement performed by qualified nursing staff as    above.          CT-HEAD W/O   Final Result      1. Cerebral atrophy.   2. White matter lucencies most consistent with small vessel ischemic change versus demyelination or gliosis.   3. Remote LEFT cerebellar  infarction   4. Otherwise, Head CT without contrast with no acute findings. No evidence of acute cerebral infarction, hemorrhage or mass lesion.      CT-DRAIN-LIVER ABSCESS-CYST   Final Result      Successful CT-guided drain placement in the right lobe liver necrosis/abscess.      CT-ABDOMEN-PELVIS WITH   Final Result      1.  Interval significant increase in size of low-attenuation area in the dome of the right lobe of liver which contains air. This may represent necrosis or hemorrhage following therapy. Stable appearance of other smaller low-attenuation areas in the    right lobe of the liver without evidence of progression of disease in these areas of metastasis. Differential diagnosis would include post therapy infection.      2.  Stable masslike density in the region of the ampulla of Vater consistent with primary carcinoma. Current dimensions are 3.1 x 2.6 cm.      3.  Persistent low-attenuation areas in the pancreatic head and proximal body which may represent secondary pancreatitis.      4.  Stable upper retroperitoneal adenopathy.      5.  Small umbilical hernia containing fat.      DX-CHEST-PORTABLE (1 VIEW)   Final Result      No pulmonary consolidation.      CT-DRAIN-LIVER ABSCESS-CYST    (Results Pending)   IR-THORACENTESIS PUNCTURE RIGHT    (Results Pending)        Assessment/Plan  Liver abscess- (present on admission)   Assessment & Plan    Fluid collection smaller in liver on CT, drain no longer within the abscess and needs to be repositioned - orders placed  Cultures enterococcus [VRE]  ID following   Continue zosyn, Linazolid and micofungin          Bacteremia- (present on admission)   Assessment & Plan    Received treatment for E. coli bacteremia in early November, her port was removed  Admitted with sepsis  Blood cultures from 11/12/2018 are growing lactobacillus, Candida albicans and strep viridans. Liver abscess cultures from 11/13/2018 are positive for VRE.  Currently on linazolid and  micofungin and zosyn. ID following.     PICC placed 11/21/18             Sepsis (HCC)- (present on admission)   Assessment & Plan    This is sepsis (without associated acute organ dysfunction).   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  Additional XRT is planned as outpatient       Fungemia   Assessment & Plan    Mycofungin   ID following        Deep vein thrombosis (CMS-HCC) [I82.409]- (present on admission)   Assessment & Plan    Apixaban        Cough   Assessment & Plan    Non-productive, CXR concerning for edema  Low likelihood of infection given broad spectrum antibiotics for VRE  Improvement with diuresis  CT shows pleural effusion right lung - thoracentesis ordered in conjunction with drain replacement for liver           Lactic acidosis- (present on admission)   Assessment & Plan    Improved with fluid resuscitation and treatment of infection            VTE prophylaxis: Eliquis

## 2018-11-23 NOTE — PROGRESS NOTES
RT THORACENTESIS DONE IN US    450 CC REMOVED AND SENT TO LAB    BP CHARTED ON TECH WORKSHEET    PT RETURNED TO ROOM IN STABLE CONDITION

## 2018-11-23 NOTE — OR SURGEON
Immediate Post- Operative Note    PostOp Diagnosis: Effusion    Procedure(s): Right thoracentesis with sonographic guidance    Estimated Blood Loss: Less than 2 ml    Complications: None    11/23/2018     2:35 PM     Boo Conn

## 2018-11-23 NOTE — CARE PLAN
Problem: Infection  Goal: Will remain free from infection  Outcome: PROGRESSING AS EXPECTED  IV abx in use. Contact precautions in place.     Problem: Urinary Elimination:  Goal: Ability to reestablish a normal urinary elimination pattern will improve  Outcome: PROGRESSING AS EXPECTED  Patient with urinary frequency and urgency due to diuretics. Patient appropriately calling for assistance to bedside commode.

## 2018-11-23 NOTE — PROGRESS NOTES
Patient is alert and oriented. NPO at 0000. Patient sleeping up in recliner due to frequent urination patient states it is easier and more comfortable. Waffle chair pad under patient while in recliner. Patient appropriately calling for assistance. Bed alarm in use. Frequent urination. IV abx in use. JACQUELINE to right side with minimal output. Patient reports understanding of plan to advance JACQUELINE drain into fluid filled area.

## 2018-11-24 NOTE — PROGRESS NOTES
IR CT RN note:    Site Confirmed with MD, patient and RN pre procedure   Liver drain placement by MD Murray assisted by CT Tech Rajwinder,Right lateral abdomen access site;  Liver abscess drain placed   BS Flexima APDL Locking pigtail 10 fr 25cm REF# [id number]# [id number]  25 mL sample sent to lab      End Tidal CO2 range 19-29 during procedure   Patient tolerated procedure, hemodynamically stable; pt awake and talking post procedure; report given to ROMANA Pino;   patient to be transported back to room via IR RN monitored

## 2018-11-24 NOTE — PROGRESS NOTES
Infectious Disease Progress Note    Author: Yuli Olmedo M.D. Date & Time of service: 2018  1:57 PM    Chief Complaint:  Generalized weakness and chills    Interval History:  73-year-old female with metastatic ampullary carcinoma of the pancreas presented with generalized weakness and shaking chills.  2018-no fevers.  Complains of generalized malaise and fatigue.  Still has some pain in her right shoulder.  WBC count is 22,000.  Cultures remain negative.  2018 T-max is 98.2.  No new issues overnight.  Underwent IR drainage on 2018.  Her shoulder pain has eased since then.  11/15/2018 no fevers.  WBC 13.3 creatinine 0.47  2018 no fevers.  Denies any increased abdominal pain.  The shoulder pain is improved as well.  WBC is 12.8.  2018 no fevers.  Shoulder pain is much improved.  Denies any new issues.  - no fevers. No new issues> WBC 16.7  2018 no fevers.  WBC 15.6 no new issues overnight   AF (99.7) WBC 15.3 states eating better-denies SE abx No abd pain today   Af (99.9) WBC 9.8 minimal output from drain-plan for PICC today  2018 T-max is 100.3.  No new issues overnight.  WBCs 10.  AST 40 ALT 17  2018 no fevers.  The drain is not draining much.  Underwent thoracentesis today.  2018 underwent another drain placement.  Complains of some abdominal pain otherwise denies any complaints.  WBC is 8.4  Labs Reviewed, Medications Reviewed and Radiology Reviewed.    Review of Systems:  Review of Systems   Constitutional: Positive for malaise/fatigue. Negative for chills and fever.   HENT: Negative for hearing loss.    Respiratory: Negative for cough and shortness of breath.    Cardiovascular: Negative for chest pain and leg swelling.   Gastrointestinal: Positive for abdominal pain. Negative for nausea and vomiting.        Slight abdominal pain off and on       Hemodynamics:  Temp (24hrs), Av.5 °C (97.7 °F), Min:36.2 °C (97.1 °F), Max:37.1 °C  (98.7 °F)  Temperature: 36.2 °C (97.1 °F)  Pulse  Av.6  Min: 67  Max: 112   Blood Pressure : 122/54       Physical Exam:  Physical Exam   Constitutional: She appears well-developed.   HENT:   Head: Normocephalic and atraumatic.   Eyes: Pupils are equal, round, and reactive to light. EOM are normal. No scleral icterus.   Neck: Neck supple.   Cardiovascular: Normal rate.    Pulmonary/Chest: Effort normal. No respiratory distress. She has no wheezes.   Abdominal: Soft. She exhibits no distension. There is no tenderness.   Right upper quadrant drain   Musculoskeletal: She exhibits no edema.   Neurological: She is alert.   Skin: Skin is warm. She is not diaphoretic. No erythema.   Nursing note and vitals reviewed.      Meds:    Current Facility-Administered Medications:   •  furosemide  •  ipratropium-albuterol  •  potassium chloride SA  •  ampicillin-sulbactam (UNASYN) IV  •  fluconazole  •  senna-docusate **AND** polyethylene glycol/lytes **AND** magnesium hydroxide **AND** bisacodyl  •  Respiratory Care per Protocol  •  ondansetron  •  ondansetron  •  acetaminophen  •  Notify provider if pain remains uncontrolled **AND** Use the numeric rating scale (NRS-11) on regular floors and Critical-Care Pain Observation Tool (CPOT) on ICUs/Trauma to assess pain **AND** Pulse Ox (Oximetry) **AND** Pharmacy Consult Request **AND** If patient difficult to arouse and/or has respiratory depression, stop any opiates that are currently infusing and call a Rapid Response. **AND** oxyCODONE immediate-release **AND** oxyCODONE immediate-release **AND** morphine injection  •  NS  •  omeprazole  •  artificial tears  •  albuterol    Labs:  Recent Labs      18   0015  18   0047  18   0059   WBC  9.8  9.3  8.4   RBC  3.50*  3.49*  3.05*   HEMOGLOBIN  9.9*  10.0*  8.7*   HEMATOCRIT  31.1*  30.7*  27.1*   MCV  88.9  88.0  88.9   MCH  28.3  28.7  28.5   RDW  54.5*  54.9*  54.4*   PLATELETCT  283  223  168   MPV  9.5  9.9   9.6     Recent Labs      11/22/18   0015  11/23/18   0047  11/24/18   0059   SODIUM  131*  129*  131*   POTASSIUM  4.2  4.5  4.0   CHLORIDE  100  97  99   CO2  20  23  24   GLUCOSE  114*  105*  99   BUN  7*  8  9     Recent Labs      11/22/18   0015  11/23/18   0047  11/24/18   0059   ALBUMIN  2.4*  2.3*  2.0*   TBILIRUBIN  0.3  0.3  0.3   ALKPHOSPHAT  192*  177*  146*   TOTPROTEIN  7.2  7.0  6.1   ALTSGPT  17  15  11   ASTSGOT  40  38  28   CREATININE  0.59  0.55  0.58       Imaging:  Ct-abdomen-pelvis With    Result Date: 11/12/2018 11/12/2018 1:38 PM HISTORY/REASON FOR EXAM:  Abdominal pain. History of metastatic ampullary adenocarcinoma. FINDINGS: The visualized lung bases are clear. CT Abdomen: A small hiatal hernia is present. There is interval increase in size of an area of low attenuation in the dome of the right lobe of the liver which contains air. This may represent necrosis or post therapy hemorrhage following embolization. This area measures 8.8 x 7.8 x 6.4 cm. Previous measurement was 2.2 cm in maximum dimension. Multiple smaller areas of low-attenuation within the right lobe of liver are present which are suspicious for hepatic metastases. These other smaller low-attenuation areas are without significant change. The gallbladder absent. A low-attenuation area in the region of the pancreatic head is again noted and is poorly defined measuring approximately 3.1 x 2.6 cm in diameter. This is consistent with the area of ampullary carcinoma. Embolization coils are present in the GDA. There is low attenuation in the pancreatic body proximally which may represent low-attenuation  secondary to pancreatitis or spread of neoplasm. The common duct is dilated proximal to the level of the ampulla measuring a maximum diameter of 1.6 cm. No choledocholithiasis is identified. An enlarged retroperitoneal lymph node between the upper aorta and IVC is unchanged measuring 1.5 cm in diameter. No new area of  retroperitoneal adenopathy is identified. No peripancreatic adenopathy is present. The spleen appears normal. The splenic vein and portal vein are patent. The adrenal glands are not enlarged and the kidneys enhance symmetrically. Small simple cysts in the right kidney are stable. There is no lymphadenopathy. The aorta and IVC are normal in caliber. The bowel is without obstruction. The appendix appears normal. There is a small umbilical hernia containing fat. CT Pelvis: There is no lymphadenopathy. No free fluid is present. The bladder appears normal. Uterus as endometrial complex thickening. There is a myomatous type calcification in the right aspect of the uterus which is unchanged. There is no acute inflammatory process.     1.  Interval significant increase in size of low-attenuation area in the dome of the right lobe of liver which contains air. This may represent necrosis or hemorrhage following therapy. Stable appearance of other smaller low-attenuation areas in the right lobe of the liver without evidence of progression of disease in these areas of metastasis. Differential diagnosis would include post therapy infection. 2.  Stable masslike density in the region of the ampulla of Vater consistent with primary carcinoma. Current dimensions are 3.1 x 2.6 cm. 3.  Persistent low-attenuation areas in the pancreatic head and proximal body which may represent secondary pancreatitis. 4.  Stable upper retroperitoneal adenopathy. 5.  Small umbilical hernia containing fat.    Ct-abdomen-pelvis With    Result Date: 10/25/2018  10/25/2018 4:28 PM HISTORY/REASON FOR EXAM:  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 Patient is status post embolization.. TECHNIQUE/EXAM DESCRIPTION:   CT scan of the abdomen and pelvis with contrast. Contrast-enhanced helical scanning was obtained from the diaphragmatic domes through the pubic symphysis following the bolus administration of nonionic contrast without  complication. 80 mL of Omnipaque 350 nonionic contrast was administered without complication. Low dose optimization technique was utilized for this CT exam including automated exposure control and adjustment of the mA and/or kV according to patient size. COMPARISON: PET/CT 7/12/2018, MRI 8/6/2018 and CT abdomen 3/27/2018 FINDINGS: The visualized lung bases are unremarkable. CT Abdomen: There are several hypodense lesions in the hepatic dome with a couple of foci of air. A hypodense mass within air-fluid level in the right hepatic lobe, segment 8, measures approximately 2.1 cm. No significant rim enhancement is identified. There are new  hypodense lesions in the anterior right hepatic lobe measuring up to approximately 7 mm. There is pneumobilia, consistent with prior hepaticojejunostomy. The gallbladder has been removed. The common bile duct remains dilated with thickening of the duodenal wall and enhancement. Maximum diameter of the common bile duct is approximately 14 mm. There are multiple coils in the region of the pancreatic head. The spleen and adrenal glands are unremarkable. The kidneys enhance symmetrically. Hypodense renal masses are consistent with cysts. There is heterogeneous enhancement of the pancreatic head with an ill-defined cystic mass. The mass is difficult to measure, but is approximately 4.6 x 4 cm. The duct does not appear dilated more distally. There are multiple diverticula in the descending and sigmoid colon. There are scattered arterial calcifications. Enlarged aortocaval lymph node on image 37 measures approximately 15 mm short axis, previously 8 mm. CT Pelvis: The bladder is unremarkable. No significant free fluid is identified. A calcified mass in the uterus is consistent with a small leiomyoma.     1.  Ill-defined cystic mass in the pancreatic head with surrounding inflammatory changes. Findings may be related to pancreatitis in the appropriate clinical setting. Findings are new from  the prior exam in August. 2.  New hypodense lesions scattered throughout the liver was foci of air. Findings are highly likely to be related to recent embolization. No rim enhancement to suggest abscess. 3.  Duodenal wall thickening with enhancement, concerning for residual disease. Persistent common bile duct dilatation. 4.  Interval enlargement in an aortocaval lymph node, highly suspicious for metastatic disease. 5.  Diverticulosis. 6.  Status post hepaticojejunostomy.    Dx-chest-portable (1 View)    Result Date: 11/12/2018 11/12/2018 11:06 AM HISTORY/REASON FOR EXAM:  Sepsis. TECHNIQUE/EXAM DESCRIPTION AND NUMBER OF VIEWS: Single portable view of the chest. COMPARISON: 10/25/2018 FINDINGS: There is no evidence of focal consolidation or evidence of pulmonary edema. There is no evidence of pleural effusion. The heart is normal in size.     No pulmonary consolidation.    Dx-chest-portable (1 View)    Result Date: 10/25/2018  10/25/2018 11:35 AM HISTORY/REASON FOR EXAM:  LEFT shoulder pain, malaise, nausea. TECHNIQUE/EXAM DESCRIPTION AND NUMBER OF VIEWS: Single portable view of the chest. COMPARISON: 5/3/2018 FINDINGS: Cardiomediastinal contour is within normal limits. Lungs show hypoinflation. No focal pulmonary consolidation. No pleural fluid collection or pneumothorax. RIGHT chest infusion port again present. No major bony abnormality is seen. Atherosclerotic calcification of thoracic aorta.     Hypoinflation without other evidence for acute cardiopulmonary disease.    [id number]-[id number]    Result Date: 10/27/2018  10/27/2018 5:00 PM HISTORY/REASON FOR EXAM:  Jaw Pain. TECHNIQUE/EXAM DESCRIPTION AND NUMBER OF VIEWS:  1 view(s) of the mandible. COMPARISON:  None. FINDINGS: Multiple absent teeth. Many of the remaining teeth have filling/crowns. No periapical lucencies seen. No mandibular fracture.     No periapical lucencies. No mandibular fracture.    Ir-cvc Tunnel With Port Removal    Result Date:  10/28/2018   Chest port removal HISTORY/REASON FOR EXAM: Bacteremia, concern for central line infection MEDICATIONS: Conscious sedation with 2 mcg Fentanyl and 50 mg Versed was administered during the procedure with appropriate continuous patient monitoring of cardiorespiratory function by the radiology nurse. Sedation duration: 30 minutes. TECHNIQUE/EXAM DESCRIPTION: Following informed consent patient was prepped and draped in the usual fashion. The procedure was performed using MAXIMAL STERILE BARRIER technique including sterile gown, mask, cap, and donning of sterile gloves following appropriate hand hygiene and/or  sterile scrub. Patient skin site was prepped with 2% Chlorhexidine solution. A timeout was performed. Local anesthetic result was achieved with administration of copious 1% lidocaine with epinephrine. A skin incision was made with an 15 blade scalpel. Blunt dissection was used to retrieve the port and catheter which were observed to be intact. Hemostasis  was obtained with manual compression. The port pocket showed no signs of gross infection. The port pocket was flushed with normal saline. The port pocket was closed with deep 2-0 Vicryl sutures and a subcutaneous 4-0 Vicryl suture layer, the skin was closed with Dermabond. The patient tolerated procedure well. The skin was cleaned and a dressing was applied. COMPARISON:  None     Successful chest port removal.    Ir-picc Line Placement    Result Date: 10/29/2018  HISTORY/REASON FOR EXAM:   PICC placement. TECHNIQUE/EXAM DESCRIPTION AND NUMBER OF VIEWS:   PICC line insertion with ultrasound guidance.  The procedure was performed using maximal sterile barrier technique including sterile gown, mask, cap, and donning of sterile gloves following appropriate hand hygiene and/or sterile scrub. Patient skin site was prepped with 2% Chlorhexidine solution. FINDINGS:  PICC line insertion with Ultrasound Guidance was performed by qualified nursing staff without  the assistance of a Radiologist. PICC positioning appropriateness confirmed by 3CG technology; chest xray only needed in the instance 3CG unable to confirm placement.              Ultrasound-guided PICC placement performed by qualified nursing staff as above.     Ir-low Level Consult-outpt    Result Date: 10/22/2018  This exam has been resulted under the NOTES tab, and the signed report has been auto faxed to the ordering physician on the date/time of that signature.      Micro:  Results     Procedure Component Value Units Date/Time    CULTURE WOUND W/ GRAM STAIN [[id number]]  (Abnormal)  (Susceptibility) Collected:  11/13/18 1710    Order Status:  Completed Specimen:  Wound Updated:  11/18/18 0973     Significant Indicator POS (POS)     Source WND     Site Liver Abscess     Culture Result Wound Growth noted after further incubation, see below for  organism identification.   (A)     Gram Stain Result Few WBCs.  Many Gram negative rods.  Moderate Gram positive rods.       Culture Result Wound Candida albicans  Light growth   (A)      Lactobacillus acidophilus  Moderate growth   (A)    Narrative:       CALL  Flores  [facility] tel. [phone],  CALLED  [facility] tel. [phone] 11/18/2018, 09:27, RB PERF. RESULTS CALLED  TO:[id number], RN    Culture & Susceptibility     ENTEROCOCCUS FAECIUM     Antibiotic Sensitivity Microscan Unit Status    Ampicillin Resistant >8 mcg/mL Final    Method: SENSITIVITY, SERGE    Daptomycin Sensitive 4 mcg/mL Final    Method: SENSITIVITY, SERGE    Gent Synergy Sensitive <=500 mcg/mL Final    Method: SENSITIVITY, SERGE    Linezolid Sensitive 2 mcg/mL Final    Method: SENSITIVITY, SERGE    Penicillin Resistant >8 mcg/mL Final    Method: SENSITIVITY, SERGE    Vancomycin Resistant >16 mcg/mL Final    Method: SENSITIVITY, SERGE                       BLOOD CULTURE [[id number]]  (Abnormal) Collected:  11/12/18 1120    Order Status:  Completed Specimen:  Blood from Peripheral Updated:  11/17/18 1651     Significant Indicator POS  "(POS)     Source BLD     Site PERIPHERAL     Blood Culture Growth detected by Bactec instrument. 11/15/2018  11:59 (A)      Candida albicans  See previous culture for sensitivity report.   (A)      Bacteroides caccae (A)    Narrative:       CALL  Flores  161 tel. [phone], Notified there is KRISTY GNR  CALLED  161 tel. [phone] 11/17/2018, 16:49, RB PERF. RESULTS CALLED  TO:[id number] RN  Per Hospital Policy: Only change Specimen Src: to \"Line\" if  specified by physician order.    BLOOD CULTURE [[id number]] Collected:  11/15/18 1807    Order Status:  Completed Specimen:  Blood from Peripheral Updated:  11/16/18 0744     Significant Indicator NEG     Source BLD     Site PERIPHERAL     Blood Culture No Growth    Note: Blood cultures are incubated for 5 days and  are monitored continuously.Positive blood cultures  are called to the RN and reported as soon as  they are identified.      Narrative:       Collected By:[id number] CONCHA VLEAZQUEZ  Per Hospital Policy: Only change Specimen Src: to \"Line\" if  specified by physician order.    BLOOD CULTURE [[id number]] Collected:  11/15/18 1807    Order Status:  Completed Specimen:  Blood from Peripheral Updated:  11/16/18 0744     Significant Indicator NEG     Source BLD     Site PERIPHERAL     Blood Culture No Growth    Note: Blood cultures are incubated for 5 days and  are monitored continuously.Positive blood cultures  are called to the RN and reported as soon as  they are identified.      Narrative:       Collected By:[id number] CONCHA HILLIARD R.  Per Hospital Policy: Only change Specimen Src: to \"Line\" if  specified by physician order.    BLOOD CULTURE [[id number]]  (Abnormal) Collected:  11/12/18 1039    Order Status:  Completed Specimen:  Blood from Peripheral Updated:  11/15/18 1316     Significant Indicator POS (POS)     Source BLD     Site PERIPHERAL     Blood Culture Growth detected by Bactec instrument. 11/14/2018  10:03  Gram Stain: Yeast.   (A)      Yeast (A)    Narrative:    " "   CALL  Flores  161 tel. [phone],  CALLED  161 tel. [phone] 11/14/2018, 10:06, RB PERF. RESULTS CALLED  TO:[phone]  Per Hospital Policy: Only change Specimen Src: to \"Line\" if  specified by physician order.    URINE CULTURE(NEW) [[id number]] Collected:  11/12/18 1057    Order Status:  Completed Specimen:  Urine Updated:  11/14/18 0917     Significant Indicator NEG     Source UR     Site --     Urine Culture No growth at 48 hours    Narrative:       Mini cath  Indication for culture:->Emergency Room Patient    GRAM STAIN [[id number]] Collected:  11/13/18 1710    Order Status:  Completed Specimen:  Wound Updated:  11/14/18 0907     Significant Indicator .     Source WND     Site Liver Abscess     Gram Stain Result Few WBCs.  Many Gram negative rods.  Moderate Gram positive rods.      Blood Culture [[id number]]     Order Status:  Canceled Specimen:  Blood from Peripheral     Blood Culture [[id number]]     Order Status:  Canceled Specimen:  Blood from Peripheral     URINALYSIS [[id number]] Collected:  11/12/18 1057    Order Status:  Completed Specimen:  Urine Updated:  11/12/18 1135     Color Yellow     Character Clear     Specific Gravity 1.013     Ph 7.0     Glucose Negative mg/dL      Ketones Negative mg/dL      Protein Negative mg/dL      Bilirubin Negative     Urobilinogen, Urine 0.2     Nitrite Negative     Leukocyte Esterase Negative     Occult Blood Negative     Micro Urine Req see below     Comment: Microscopic examination not performed when specimen is clear  and chemically negative for protein, blood, leukocyte esterase  and nitrite.         Narrative:       Mini cath  Indication for culture:->Emergency Room Patient    Culture Respiratory W/ GRM STN [[id number]] Collected:  11/12/18 0000    Order Status:  Canceled Specimen:  Sputum from Sputum     Urine Culture [[id number]] Collected:  11/12/18 0000    Order Status:  Canceled Specimen:  Urine           Assessment:  Active Hospital Problems    Diagnosis   • " Abnormal CT of the abdomen [R93.5]   • Sepsis (HCC) [A41.9]   • 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]   • E coli bacteremia [R78.81]   • Lactic acidosis [E87.2]   • Deep vein thrombosis (CMS-HCC) [I82.409] [I82.409]       Plan:  Liver abscess  Afebrile  Resolved leukocytosis  CT scan 11/12 shows area of necrosis/hemorrhage   s/p IR drainage on 11/13/2018  Cultures +  lactobacillus, VRE and Candida albicans and strep viridans  Continue Zyvox, fluconazole, and Unasyn  Underwent CT scan on 11/22/2018  Underwent another drain placement on 11/24/2018  The CT scan showed a collated pleural effusion hence underwent thoracentesis on 11/23/2018.  That fluid shows 2752 WBCs with 46% polys.  Follow the cytology    Bacteremia  Blood cultures + Bacteroides  Repeat blood cultures x2 are negative from 11/15/2018      Candidemia  Candida albicans  Repeat blood cultures x2 are negative from 11/15/2018  Changed to p.o. Diflucan  Eye exam if visual changes    Leucocytosis-resolved  Multifactorial  9.3 at last check    Ampullary carcinoma of the pancreas  Contributory factor to above    Prognosis  Guarded    DW Dr Yang

## 2018-11-24 NOTE — CARE PLAN
Problem: Communication  Goal: The ability to communicate needs accurately and effectively will improve  Outcome: PROGRESSING AS EXPECTED  Pt uses call light appropriately for assistance.     Problem: Infection  Goal: Will remain free from infection  Outcome: PROGRESSING AS EXPECTED  Patient has single lumen PICC that is currently patent. IV ABX q6hr. Contact precautions in place. Assess for signs and symptoms of infection.

## 2018-11-24 NOTE — PROGRESS NOTES
Patient alert and oriented. Waffle overlay on bed. Unable to use mepilx due to incontinence. Patient with right upper quadrant pain. Tylenol used once for pain management. Patient slept after tylenol use. Patient reported pain relief from tylenol. NPO since 0000. Patient verbalizes understanding for plan to have JACQUELINE drain advanced into fluid filled area near liver. 2 L nasal cannula in place. PICC with positive blood return. Dressing changed with sterile field.

## 2018-11-24 NOTE — OR SURGEON
Immediate Post- Operative Note        PostOp Diagnosis:Liver abscess      Procedure(s): CT guided drain palcement      Estimated Blood Loss: Less than 5 ml        Complications: None            11/24/2018     9:35 AM     Wil Murray

## 2018-11-24 NOTE — PROGRESS NOTES
Pt reports pain is tolerable without pain medication, and by using pillow as abdominal binder when coughing. Pt is up SBA to commode. Diarrhea noted, Dr Brown aware, no new orders- stool softeners held. Thoracentesis completed today. LIZZIE drain exchange delayed today d/t eliquis having to be on hold for 48hrs prior, pt will be NPO at midnight in preparation for lizzie drain exchange tomorrow, pt and NOC RN aware. Fall precautions maintained.

## 2018-11-24 NOTE — PROGRESS NOTES
Infectious Disease Progress Note    Author: Yuli Olmedo M.D. Date & Time of service: 2018  4:19 PM    Chief Complaint:  Generalized weakness and chills    Interval History:  73-year-old female with metastatic ampullary carcinoma of the pancreas presented with generalized weakness and shaking chills.  2018-no fevers.  Complains of generalized malaise and fatigue.  Still has some pain in her right shoulder.  WBC count is 22,000.  Cultures remain negative.  2018 T-max is 98.2.  No new issues overnight.  Underwent IR drainage on 2018.  Her shoulder pain has eased since then.  11/15/2018 no fevers.  WBC 13.3 creatinine 0.47  2018 no fevers.  Denies any increased abdominal pain.  The shoulder pain is improved as well.  WBC is 12.8.  2018 no fevers.  Shoulder pain is much improved.  Denies any new issues.  - no fevers. No new issues> WBC 16.7  2018 no fevers.  WBC 15.6 no new issues overnight   AF (99.7) WBC 15.3 states eating better-denies SE abx No abd pain today   Af (99.9) WBC 9.8 minimal output from drain-plan for PICC today  2018 T-max is 100.3.  No new issues overnight.  WBCs 10.  AST 40 ALT 17  2018 no fevers.  The drain is not draining much.  Underwent thoracentesis today.  Labs Reviewed, Medications Reviewed and Radiology Reviewed.    Review of Systems:  Review of Systems   Constitutional: Positive for malaise/fatigue. Negative for chills and fever.   HENT: Negative for hearing loss.    Respiratory: Negative for cough and shortness of breath.    Cardiovascular: Negative for chest pain and leg swelling.   Gastrointestinal: Positive for abdominal pain. Negative for nausea and vomiting.        Slight abdominal pain off and on       Hemodynamics:  Temp (24hrs), Av.5 °C (97.7 °F), Min:36.3 °C (97.3 °F), Max:36.9 °C (98.4 °F)  Temperature: 36.3 °C (97.3 °F)  Pulse  Av.7  Min: 67  Max: 112   Blood Pressure : 136/67       Physical  Exam:  Physical Exam   Constitutional: She appears well-developed.   HENT:   Head: Normocephalic and atraumatic.   Eyes: Pupils are equal, round, and reactive to light. EOM are normal. No scleral icterus.   Neck: Neck supple.   Cardiovascular: Normal rate.    Pulmonary/Chest: Effort normal. No respiratory distress. She has no wheezes.   Abdominal: Soft. She exhibits no distension. There is no tenderness.   Right upper quadrant drain   Musculoskeletal: She exhibits no edema.   Neurological: She is alert.   Skin: Skin is warm. She is not diaphoretic. No erythema.   Nursing note and vitals reviewed.      Meds:    Current Facility-Administered Medications:   •  furosemide  •  ipratropium-albuterol  •  potassium chloride SA  •  ampicillin-sulbactam (UNASYN) IV  •  fluconazole  •  linezolid  •  apixaban  •  senna-docusate **AND** polyethylene glycol/lytes **AND** magnesium hydroxide **AND** bisacodyl  •  Respiratory Care per Protocol  •  ondansetron  •  ondansetron  •  acetaminophen  •  Notify provider if pain remains uncontrolled **AND** Use the numeric rating scale (NRS-11) on regular floors and Critical-Care Pain Observation Tool (CPOT) on ICUs/Trauma to assess pain **AND** Pulse Ox (Oximetry) **AND** Pharmacy Consult Request **AND** If patient difficult to arouse and/or has respiratory depression, stop any opiates that are currently infusing and call a Rapid Response. **AND** oxyCODONE immediate-release **AND** oxyCODONE immediate-release **AND** morphine injection  •  NS  •  omeprazole  •  artificial tears  •  albuterol    Labs:  Recent Labs      11/21/18 0035  11/22/18 0015  11/23/18   0047   WBC  9.8  9.8  9.3   RBC  3.07*  3.50*  3.49*   HEMOGLOBIN  8.7*  9.9*  10.0*   HEMATOCRIT  26.8*  31.1*  30.7*   MCV  87.3  88.9  88.0   MCH  28.3  28.3  28.7   RDW  51.7*  54.5*  54.9*   PLATELETCT  223  283  223   MPV  9.4  9.5  9.9     Recent Labs      11/21/18   0035  11/22/18   0015  11/23/18   0047   SODIUM  131*   131*  129*   POTASSIUM  4.2  4.2  4.5   CHLORIDE  103  100  97   CO2  21  20  23   GLUCOSE  112*  114*  105*   BUN  6*  7*  8     Recent Labs      11/21/18   0035  11/22/18   0015  11/23/18   0047   ALBUMIN  1.9*  2.4*  2.3*   TBILIRUBIN  0.3  0.3  0.3   ALKPHOSPHAT  153*  192*  177*   TOTPROTEIN  5.8*  7.2  7.0   ALTSGPT  12  17  15   ASTSGOT  26  40  38   CREATININE  0.58  0.59  0.55       Imaging:  Ct-abdomen-pelvis With    Result Date: 11/12/2018 11/12/2018 1:38 PM HISTORY/REASON FOR EXAM:  Abdominal pain. History of metastatic ampullary adenocarcinoma. FINDINGS: The visualized lung bases are clear. CT Abdomen: A small hiatal hernia is present. There is interval increase in size of an area of low attenuation in the dome of the right lobe of the liver which contains air. This may represent necrosis or post therapy hemorrhage following embolization. This area measures 8.8 x 7.8 x 6.4 cm. Previous measurement was 2.2 cm in maximum dimension. Multiple smaller areas of low-attenuation within the right lobe of liver are present which are suspicious for hepatic metastases. These other smaller low-attenuation areas are without significant change. The gallbladder absent. A low-attenuation area in the region of the pancreatic head is again noted and is poorly defined measuring approximately 3.1 x 2.6 cm in diameter. This is consistent with the area of ampullary carcinoma. Embolization coils are present in the GDA. There is low attenuation in the pancreatic body proximally which may represent low-attenuation  secondary to pancreatitis or spread of neoplasm. The common duct is dilated proximal to the level of the ampulla measuring a maximum diameter of 1.6 cm. No choledocholithiasis is identified. An enlarged retroperitoneal lymph node between the upper aorta and IVC is unchanged measuring 1.5 cm in diameter. No new area of retroperitoneal adenopathy is identified. No peripancreatic adenopathy is present. The spleen  appears normal. The splenic vein and portal vein are patent. The adrenal glands are not enlarged and the kidneys enhance symmetrically. Small simple cysts in the right kidney are stable. There is no lymphadenopathy. The aorta and IVC are normal in caliber. The bowel is without obstruction. The appendix appears normal. There is a small umbilical hernia containing fat. CT Pelvis: There is no lymphadenopathy. No free fluid is present. The bladder appears normal. Uterus as endometrial complex thickening. There is a myomatous type calcification in the right aspect of the uterus which is unchanged. There is no acute inflammatory process.     1.  Interval significant increase in size of low-attenuation area in the dome of the right lobe of liver which contains air. This may represent necrosis or hemorrhage following therapy. Stable appearance of other smaller low-attenuation areas in the right lobe of the liver without evidence of progression of disease in these areas of metastasis. Differential diagnosis would include post therapy infection. 2.  Stable masslike density in the region of the ampulla of Vater consistent with primary carcinoma. Current dimensions are 3.1 x 2.6 cm. 3.  Persistent low-attenuation areas in the pancreatic head and proximal body which may represent secondary pancreatitis. 4.  Stable upper retroperitoneal adenopathy. 5.  Small umbilical hernia containing fat.    Ct-abdomen-pelvis With    Result Date: 10/25/2018  10/25/2018 4:28 PM HISTORY/REASON FOR EXAM:  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 Patient is status post embolization.. TECHNIQUE/EXAM DESCRIPTION:   CT scan of the abdomen and pelvis with contrast. Contrast-enhanced helical scanning was obtained from the diaphragmatic domes through the pubic symphysis following the bolus administration of nonionic contrast without complication. 80 mL of Omnipaque 350 nonionic contrast was administered without complication.  Low dose optimization technique was utilized for this CT exam including automated exposure control and adjustment of the mA and/or kV according to patient size. COMPARISON: PET/CT 7/12/2018, MRI 8/6/2018 and CT abdomen 3/27/2018 FINDINGS: The visualized lung bases are unremarkable. CT Abdomen: There are several hypodense lesions in the hepatic dome with a couple of foci of air. A hypodense mass within air-fluid level in the right hepatic lobe, segment 8, measures approximately 2.1 cm. No significant rim enhancement is identified. There are new  hypodense lesions in the anterior right hepatic lobe measuring up to approximately 7 mm. There is pneumobilia, consistent with prior hepaticojejunostomy. The gallbladder has been removed. The common bile duct remains dilated with thickening of the duodenal wall and enhancement. Maximum diameter of the common bile duct is approximately 14 mm. There are multiple coils in the region of the pancreatic head. The spleen and adrenal glands are unremarkable. The kidneys enhance symmetrically. Hypodense renal masses are consistent with cysts. There is heterogeneous enhancement of the pancreatic head with an ill-defined cystic mass. The mass is difficult to measure, but is approximately 4.6 x 4 cm. The duct does not appear dilated more distally. There are multiple diverticula in the descending and sigmoid colon. There are scattered arterial calcifications. Enlarged aortocaval lymph node on image 37 measures approximately 15 mm short axis, previously 8 mm. CT Pelvis: The bladder is unremarkable. No significant free fluid is identified. A calcified mass in the uterus is consistent with a small leiomyoma.     1.  Ill-defined cystic mass in the pancreatic head with surrounding inflammatory changes. Findings may be related to pancreatitis in the appropriate clinical setting. Findings are new from the prior exam in August. 2.  New hypodense lesions scattered throughout the liver was foci of  air. Findings are highly likely to be related to recent embolization. No rim enhancement to suggest abscess. 3.  Duodenal wall thickening with enhancement, concerning for residual disease. Persistent common bile duct dilatation. 4.  Interval enlargement in an aortocaval lymph node, highly suspicious for metastatic disease. 5.  Diverticulosis. 6.  Status post hepaticojejunostomy.    Dx-chest-portable (1 View)    Result Date: 11/12/2018 11/12/2018 11:06 AM HISTORY/REASON FOR EXAM:  Sepsis. TECHNIQUE/EXAM DESCRIPTION AND NUMBER OF VIEWS: Single portable view of the chest. COMPARISON: 10/25/2018 FINDINGS: There is no evidence of focal consolidation or evidence of pulmonary edema. There is no evidence of pleural effusion. The heart is normal in size.     No pulmonary consolidation.    Dx-chest-portable (1 View)    Result Date: 10/25/2018  10/25/2018 11:35 AM HISTORY/REASON FOR EXAM:  LEFT shoulder pain, malaise, nausea. TECHNIQUE/EXAM DESCRIPTION AND NUMBER OF VIEWS: Single portable view of the chest. COMPARISON: 5/3/2018 FINDINGS: Cardiomediastinal contour is within normal limits. Lungs show hypoinflation. No focal pulmonary consolidation. No pleural fluid collection or pneumothorax. RIGHT chest infusion port again present. No major bony abnormality is seen. Atherosclerotic calcification of thoracic aorta.     Hypoinflation without other evidence for acute cardiopulmonary disease.    Aq-byorejvv-toxbfzrwf    Result Date: 10/27/2018  10/27/2018 5:00 PM HISTORY/REASON FOR EXAM:  Jaw Pain. TECHNIQUE/EXAM DESCRIPTION AND NUMBER OF VIEWS:  1 view(s) of the mandible. COMPARISON:  None. FINDINGS: Multiple absent teeth. Many of the remaining teeth have filling/crowns. No periapical lucencies seen. No mandibular fracture.     No periapical lucencies. No mandibular fracture.    Ir-cvc Tunnel With Port Removal    Result Date: 10/28/2018   Chest port removal HISTORY/REASON FOR EXAM: Bacteremia, concern for central line infection  MEDICATIONS: Conscious sedation with 2 mcg Fentanyl and 50 mg Versed was administered during the procedure with appropriate continuous patient monitoring of cardiorespiratory function by the radiology nurse. Sedation duration: 30 minutes. TECHNIQUE/EXAM DESCRIPTION: Following informed consent patient was prepped and draped in the usual fashion. The procedure was performed using MAXIMAL STERILE BARRIER technique including sterile gown, mask, cap, and donning of sterile gloves following appropriate hand hygiene and/or  sterile scrub. Patient skin site was prepped with 2% Chlorhexidine solution. A timeout was performed. Local anesthetic result was achieved with administration of copious 1% lidocaine with epinephrine. A skin incision was made with an 15 blade scalpel. Blunt dissection was used to retrieve the port and catheter which were observed to be intact. Hemostasis  was obtained with manual compression. The port pocket showed no signs of gross infection. The port pocket was flushed with normal saline. The port pocket was closed with deep 2-0 Vicryl sutures and a subcutaneous 4-0 Vicryl suture layer, the skin was closed with Dermabond. The patient tolerated procedure well. The skin was cleaned and a dressing was applied. COMPARISON:  None     Successful chest port removal.    Ir-picc Line Placement    Result Date: 10/29/2018  HISTORY/REASON FOR EXAM:   PICC placement. TECHNIQUE/EXAM DESCRIPTION AND NUMBER OF VIEWS:   PICC line insertion with ultrasound guidance.  The procedure was performed using maximal sterile barrier technique including sterile gown, mask, cap, and donning of sterile gloves following appropriate hand hygiene and/or sterile scrub. Patient skin site was prepped with 2% Chlorhexidine solution. FINDINGS:  PICC line insertion with Ultrasound Guidance was performed by qualified nursing staff without the assistance of a Radiologist. PICC positioning appropriateness confirmed by 3CG technology; chest  xray only needed in the instance 3CG unable to confirm placement.              Ultrasound-guided PICC placement performed by qualified nursing staff as above.     Ir-low Level Consult-outpt    Result Date: 10/22/2018  This exam has been resulted under the NOTES tab, and the signed report has been auto faxed to the ordering physician on the date/time of that signature.      Micro:  Results     Procedure Component Value Units Date/Time    CULTURE WOUND W/ GRAM STAIN [[id number]]  (Abnormal)  (Susceptibility) Collected:  11/13/18 1710    Order Status:  Completed Specimen:  Wound Updated:  11/18/18 0927     Significant Indicator POS (POS)     Source WND     Site Liver Abscess     Culture Result Wound Growth noted after further incubation, see below for  organism identification.   (A)     Gram Stain Result Few WBCs.  Many Gram negative rods.  Moderate Gram positive rods.       Culture Result Wound Candida albicans  Light growth   (A)      Lactobacillus acidophilus  Moderate growth   (A)    Narrative:       CALL  Flores  [facility] tel. [phone],  CALLED  [facility] tel. [phone] 11/18/2018, 09:27, RB PERF. RESULTS CALLED  TO:[id number], RN    Culture & Susceptibility     ENTEROCOCCUS FAECIUM     Antibiotic Sensitivity Microscan Unit Status    Ampicillin Resistant >8 mcg/mL Final    Method: SENSITIVITY, SERGE    Daptomycin Sensitive 4 mcg/mL Final    Method: SENSITIVITY, SERGE    Gent Synergy Sensitive <=500 mcg/mL Final    Method: SENSITIVITY, SERGE    Linezolid Sensitive 2 mcg/mL Final    Method: SENSITIVITY, SERGE    Penicillin Resistant >8 mcg/mL Final    Method: SENSITIVITY, SERGE    Vancomycin Resistant >16 mcg/mL Final    Method: SENSITIVITY, SERGE                       BLOOD CULTURE [[id number]]  (Abnormal) Collected:  11/12/18 1120    Order Status:  Completed Specimen:  Blood from Peripheral Updated:  11/17/18 1651     Significant Indicator POS (POS)     Source BLD     Site PERIPHERAL     Blood Culture Growth detected by Bactec instrument.  "11/15/2018  11:59 (A)      Candida albicans  See previous culture for sensitivity report.   (A)      Bacteroides caccae (A)    Narrative:       CALL  Flores  161 tel. [phone], Notified there is KRISTY GNR  CALLED  161 tel. [phone] 11/17/2018, 16:49, RB PERF. RESULTS CALLED  TO:[phone] RN  Per Hospital Policy: Only change Specimen Src: to \"Line\" if  specified by physician order.    BLOOD CULTURE [[id number]] Collected:  11/15/18 1807    Order Status:  Completed Specimen:  Blood from Peripheral Updated:  11/16/18 0744     Significant Indicator NEG     Source BLD     Site PERIPHERAL     Blood Culture No Growth    Note: Blood cultures are incubated for 5 days and  are monitored continuously.Positive blood cultures  are called to the RN and reported as soon as  they are identified.      Narrative:       Collected By:[id number] CONCHA VELAZQUEZ  Per Hospital Policy: Only change Specimen Src: to \"Line\" if  specified by physician order.    BLOOD CULTURE [[id number]] Collected:  11/15/18 1807    Order Status:  Completed Specimen:  Blood from Peripheral Updated:  11/16/18 0744     Significant Indicator NEG     Source BLD     Site PERIPHERAL     Blood Culture No Growth    Note: Blood cultures are incubated for 5 days and  are monitored continuously.Positive blood cultures  are called to the RN and reported as soon as  they are identified.      Narrative:       Collected By:[id number] CONCHA VELAZQUEZ  Per Hospital Policy: Only change Specimen Src: to \"Line\" if  specified by physician order.    BLOOD CULTURE [[id number]]  (Abnormal) Collected:  11/12/18 1039    Order Status:  Completed Specimen:  Blood from Peripheral Updated:  11/15/18 1316     Significant Indicator POS (POS)     Source BLD     Site PERIPHERAL     Blood Culture Growth detected by Bactec instrument. 11/14/2018  10:03  Gram Stain: Yeast.   (A)      Yeast (A)    Narrative:       CALL  Flores  161 tel. [phone],  CALLED  161 tel. [phone] 11/14/2018, 10:06, RB PERF. " "RESULTS CALLED  TO:[id number]  Per Hospital Policy: Only change Specimen Src: to \"Line\" if  specified by physician order.    URINE CULTURE(NEW) [[id number]] Collected:  11/12/18 1057    Order Status:  Completed Specimen:  Urine Updated:  11/14/18 0917     Significant Indicator NEG     Source UR     Site --     Urine Culture No growth at 48 hours    Narrative:       Mini cath  Indication for culture:->Emergency Room Patient    GRAM STAIN [[id number]] Collected:  11/13/18 1710    Order Status:  Completed Specimen:  Wound Updated:  11/14/18 0907     Significant Indicator .     Source WND     Site Liver Abscess     Gram Stain Result Few WBCs.  Many Gram negative rods.  Moderate Gram positive rods.      Blood Culture [[id number]]     Order Status:  Canceled Specimen:  Blood from Peripheral     Blood Culture [[id number]]     Order Status:  Canceled Specimen:  Blood from Peripheral     URINALYSIS [[id number]] Collected:  11/12/18 1057    Order Status:  Completed Specimen:  Urine Updated:  11/12/18 1135     Color Yellow     Character Clear     Specific Gravity 1.013     Ph 7.0     Glucose Negative mg/dL      Ketones Negative mg/dL      Protein Negative mg/dL      Bilirubin Negative     Urobilinogen, Urine 0.2     Nitrite Negative     Leukocyte Esterase Negative     Occult Blood Negative     Micro Urine Req see below     Comment: Microscopic examination not performed when specimen is clear  and chemically negative for protein, blood, leukocyte esterase  and nitrite.         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  That fluid shows 2752 WBCs with 46% polys.  Follow the cytology    Bacteremia  Blood cultures + Bacteroides  Repeat blood cultures x2 are negative from 11/15/2018      Candidemia  Candida albicans  Repeat blood cultures x2 are negative from 11/15/2018  Changed to p.o. Diflucan  Eye exam if visual changes    Leucocytosis-resolved  Multifactorial  9.3 at last check    Ampullary carcinoma of the pancreas  Contributory factor to above    DW Dr Yang

## 2018-11-24 NOTE — PROGRESS NOTES
Hospital Medicine Daily Progress Note    Date of Service  11/23/2018    Chief Complaint  73 y.o. female admitted 11/12/2018 with fevers and chills in setting of metastatic ampullary carcioma.    Hospital Course    Patient admitted and started again on IV antibitoics.  She was found to have a liver abscess and required drainage, cultures from this have shown VRE, candida and lactobacillus acidophilus and blood cultures showing candida.  ID has been managing antibiotics and antifungals.       Interval Problem Update  11/20 Patient had episode of incontinence overnight along with confusion and new difficulty following commands.  CT head was done showing no acute findings with suspicion of possible s/e of pain medication.  Her mentation has improved but she continues to have urinary and bowel incontinence, likely with fatigue and now frequent need for toileting due to her antibiotics and IV hydration.  11/21 Patient's cough persisting but she remains afebrile.  I ordered PICC line for placement as patient will need continued antibiotics for 6 week duration, final blood cultures have resulted negative.  CXR ordered and consistent with interstitial edema - will start IV lasix to see if this improves her oxygen saturation and cough. WBC continues to drop.  11/22 Patient states she is feeling slightly better today, having to urinate more often since starting diuretic but lungs are sounding slightly better.  CT abdomen completed and shows abscess is smaller but still present and drain no longer in place.  She also now has a moderate right pleural effusion.  I've requested IR to reposition drain in liver abscess and to also drain what the pleural effusion.  Patient requests a call to be made to her son, Sea.  11/23 Patient had thoracentesis without issue and is breathing slightly better, delay in repositioning of drain due to recent dose of eliquis and need to be out of system for 48 hours to lessen the chance of bleeding.   Repeat culture on fluid ordered.    Consultants/Specialty  ID - Leela/Honorio    Code Status  full    Disposition  tbd    Review of Systems  Review of Systems   Constitutional: Positive for malaise/fatigue. Negative for chills and fever.   HENT: Negative for congestion and sore throat.    Eyes: Negative for blurred vision and photophobia.   Respiratory: Positive for cough (slightly better) and shortness of breath. Negative for wheezing.    Cardiovascular: Negative for chest pain, claudication and leg swelling.   Gastrointestinal: Negative for abdominal pain, constipation, diarrhea, heartburn, nausea and vomiting.   Genitourinary: Negative for dysuria and hematuria.   Musculoskeletal: Negative for joint pain and myalgias.   Skin: Negative for itching and rash.   Neurological: Positive for weakness. Negative for dizziness, sensory change, speech change and headaches.   Psychiatric/Behavioral: Negative for depression. The patient is not nervous/anxious and does not have insomnia.         Physical Exam  Temp:  [36.3 °C (97.3 °F)-37.1 °C (98.7 °F)] 37.1 °C (98.7 °F)  Pulse:  [86-94] 86  Resp:  [16-18] 18  BP: (129-136)/(67-81) 131/69    Physical Exam   Constitutional: She is oriented to person, place, and time. She appears well-developed and well-nourished. No distress.   HENT:   Head: Normocephalic and atraumatic.   Eyes: Conjunctivae are normal. No scleral icterus.   Neck: Neck supple. No JVD present.   Cardiovascular: Normal rate, regular rhythm and normal heart sounds.  Exam reveals no gallop and no friction rub.    No murmur heard.  Pulmonary/Chest: Effort normal. No respiratory distress. She has rales (mild). She exhibits no tenderness.   Abdominal: Soft. Bowel sounds are normal. She exhibits no distension. There is no guarding.   Musculoskeletal: She exhibits no edema or tenderness.   Lymphadenopathy:     She has no cervical adenopathy.   Neurological: She is alert and oriented to person, place, and time. No  cranial nerve deficit.   Skin: Skin is warm and dry. She is not diaphoretic. No erythema. No pallor.   Psychiatric: She has a normal mood and affect. Her behavior is normal.   Nursing note and vitals reviewed.      Fluids    Intake/Output Summary (Last 24 hours) at 11/23/18 1657  Last data filed at 11/23/18 1236   Gross per 24 hour   Intake              236 ml   Output                0 ml   Net              236 ml       Laboratory  Recent Labs      11/21/18 0035 11/22/18 0015  11/23/18   0047   WBC  9.8  9.8  9.3   RBC  3.07*  3.50*  3.49*   HEMOGLOBIN  8.7*  9.9*  10.0*   HEMATOCRIT  26.8*  31.1*  30.7*   MCV  87.3  88.9  88.0   MCH  28.3  28.3  28.7   MCHC  32.5*  31.8*  32.6*   RDW  51.7*  54.5*  54.9*   PLATELETCT  223  283  223   MPV  9.4  9.5  9.9     Recent Labs      11/21/18 0035 11/22/18 0015  11/23/18   0047   SODIUM  131*  131*  129*   POTASSIUM  4.2  4.2  4.5   CHLORIDE  103  100  97   CO2  21  20  23   GLUCOSE  112*  114*  105*   BUN  6*  7*  8   CREATININE  0.58  0.59  0.55   CALCIUM  7.5*  7.9*  7.9*                   Imaging  US-THORACENTESIS PUNCTURE RIGHT   Final Result      1. Ultrasound guided right sided diagnostic thoracentesis.      2. 450 mL of fluid withdrawn.      DX-CHEST-PORTABLE (1 VIEW)   Final Result      Mild right basilar atelectasis.      CT-ABDOMEN W/O   Final Result      Hepatic dome abscess pigtail catheter has pulled out and now terminates just deep to the thoracic cage. Despite this the size of the abscess has diminished measuring 75 x 48 from 88 x 64 mm      New mild left hepatic pneumobilia      Ampullary region mass is difficult to precisely measure but does appear to exert some mass effect upon the third portion of the duodenum. No bowel obstruction is detected.      New medium-sized right ovarian pleural effusion does not have loculation features but there is subsegmental atelectasis      DX-CHEST-PORTABLE (1 VIEW)   Final Result      New interstitial opacity is  worrisome for edema      Hilar prominence could be from lymphadenopathy or pulmonary arterial hypertension      Stable right hemidiaphragm elevation      IR-PICC LINE PLACEMENT   Final Result                  Ultrasound-guided PICC placement performed by qualified nursing staff as    above.          CT-HEAD W/O   Final Result      1. Cerebral atrophy.   2. White matter lucencies most consistent with small vessel ischemic change versus demyelination or gliosis.   3. Remote LEFT cerebellar infarction   4. Otherwise, Head CT without contrast with no acute findings. No evidence of acute cerebral infarction, hemorrhage or mass lesion.      CT-DRAIN-LIVER ABSCESS-CYST   Final Result      Successful CT-guided drain placement in the right lobe liver necrosis/abscess.      CT-ABDOMEN-PELVIS WITH   Final Result      1.  Interval significant increase in size of low-attenuation area in the dome of the right lobe of liver which contains air. This may represent necrosis or hemorrhage following therapy. Stable appearance of other smaller low-attenuation areas in the    right lobe of the liver without evidence of progression of disease in these areas of metastasis. Differential diagnosis would include post therapy infection.      2.  Stable masslike density in the region of the ampulla of Vater consistent with primary carcinoma. Current dimensions are 3.1 x 2.6 cm.      3.  Persistent low-attenuation areas in the pancreatic head and proximal body which may represent secondary pancreatitis.      4.  Stable upper retroperitoneal adenopathy.      5.  Small umbilical hernia containing fat.      DX-CHEST-PORTABLE (1 VIEW)   Final Result      No pulmonary consolidation.      CT-DRAIN-LIVER ABSCESS-CYST    (Results Pending)        Assessment/Plan  Liver abscess- (present on admission)   Assessment & Plan    Fluid collection smaller in liver on CT, drain no longer within the abscess and needs to be repositioned - orders placed, to be  coordinated on 11/24 - 48 hours after last eliquis dose.    Cultures enterococcus [VRE]  ID following   Continue zosyn, Linazolid and micofungin          Bacteremia- (present on admission)   Assessment & Plan    Received treatment for E. coli bacteremia in early November, her port was removed  Admitted with sepsis  Blood cultures from 11/12/2018 are growing lactobacillus, Candida albicans and strep viridans. Liver abscess cultures from 11/13/2018 are positive for VRE.  Currently on linazolid and micofungin and zosyn. ID following.     PICC placed 11/21/18             Sepsis (HCC)- (present on admission)   Assessment & Plan    This is sepsis (without associated acute organ dysfunction).   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  Additional XRT is planned as outpatient       Fungemia   Assessment & Plan    Mycofungin   ID following        Deep vein thrombosis (CMS-HCC) [I82.409]- (present on admission)   Assessment & Plan    Apixaban        Pleural effusion   Assessment & Plan    Thoracentesis 11/23/18 - 450cc removed - cytology and studies pending       Cough   Assessment & Plan    Non-productive, CXR concerning for edema  Low likelihood of infection given broad spectrum antibiotics for VRE  Improvement with diuresis  CT shows pleural effusion right lung - thoracentesis ordered in conjunction with drain replacement for liver           Lactic acidosis- (present on admission)   Assessment & Plan    Improved with fluid resuscitation and treatment of infection            VTE prophylaxis: Eliquis

## 2018-11-24 NOTE — PROGRESS NOTES
· 2 RN skin check complete.   · Devices in place oxygen tubing.  · Sacrum is red and blanching.  · Skin assessed under devices.  · The following interventions in place Barrier wipes in use, Waffle overlay placed under patient, waffle overlay on recliner as well, silicone oxygen tubing in use.

## 2018-11-24 NOTE — PROGRESS NOTES
"/69   Pulse 88   Temp 36.3 °C (97.3 °F) (Temporal)   Resp 16   Ht 1.549 m (5' 0.98\")   Wt 71 kg (156 lb 8.4 oz)   SpO2 99%   Breastfeeding? No   BMI 29.59 kg/m²     Pt is AOx3. Disoriented to time. Denies SOB, chest pain, n/v, pain. On 2 L O2 via nasal cannula. PICC- patent with positive blood return. Up with 1 with frontwheel walker. Currently NPO. Bed is locked in lowest position. POC discussed.  "

## 2018-11-24 NOTE — PROGRESS NOTES
· 2 RN skin check complete with ROMANA Oliveira  · Confirmed pressure ulcers found; None  · Silicone Oxygen tubing in place  · Barrier wipes used on bottom. Waffle overlay in use. q2 turns. Up to commode.

## 2018-11-25 NOTE — PROGRESS NOTES
Hospital Medicine Daily Progress Note    Date of Service  11/24/2018    Chief Complaint  73 y.o. female admitted 11/12/2018 with fevers and chills in setting of metastatic ampullary carcioma.    Hospital Course    Patient admitted and started again on IV antibitoics.  She was found to have a liver abscess and required drainage, cultures from this have shown VRE, candida and lactobacillus acidophilus and blood cultures showing candida.  ID has been managing antibiotics and antifungals.       Interval Problem Update  11/20 Patient had episode of incontinence overnight along with confusion and new difficulty following commands.  CT head was done showing no acute findings with suspicion of possible s/e of pain medication.  Her mentation has improved but she continues to have urinary and bowel incontinence, likely with fatigue and now frequent need for toileting due to her antibiotics and IV hydration.  11/21 Patient's cough persisting but she remains afebrile.  I ordered PICC line for placement as patient will need continued antibiotics for 6 week duration, final blood cultures have resulted negative.  CXR ordered and consistent with interstitial edema - will start IV lasix to see if this improves her oxygen saturation and cough. WBC continues to drop.  11/22 Patient states she is feeling slightly better today, having to urinate more often since starting diuretic but lungs are sounding slightly better.  CT abdomen completed and shows abscess is smaller but still present and drain no longer in place.  She also now has a moderate right pleural effusion.  I've requested IR to reposition drain in liver abscess and to also drain what the pleural effusion.  Patient requests a call to be made to her son, Sea.  11/23 Patient had thoracentesis without issue and is breathing slightly better, delay in repositioning of drain due to recent dose of eliquis and need to be out of system for 48 hours to lessen the chance of bleeding.   Repeat culture on fluid ordered.  11/24 Patient had successful placement of drain in liver fluid collection, drainage in JACQUELINE bulb is dark brown without evidence of purulence.  Culture from fluid collected.  Son at bedside when evaluated and he remarked she appears much more awake today and breathing much easier than on days past - explained the need for diuresis and the drainage of the lung have likely contributed to improvement.    Consultants/Specialty  ID - Leela/Honorio    Code Status  full    Disposition  tbd    Review of Systems  Review of Systems   Constitutional: Negative for chills, fever and malaise/fatigue.   HENT: Negative for congestion and sore throat.    Eyes: Negative for blurred vision and photophobia.   Respiratory: Positive for cough (slightly better). Negative for shortness of breath and wheezing.    Cardiovascular: Negative for chest pain, claudication and leg swelling.   Gastrointestinal: Negative for abdominal pain, constipation, diarrhea, heartburn, nausea and vomiting.   Genitourinary: Negative for dysuria and hematuria.   Musculoskeletal: Negative for joint pain and myalgias.   Skin: Negative for itching and rash.   Neurological: Positive for weakness. Negative for dizziness, sensory change, speech change and headaches.   Psychiatric/Behavioral: Negative for depression. The patient is not nervous/anxious and does not have insomnia.         Physical Exam  Temp:  [36.2 °C (97.1 °F)-36.7 °C (98 °F)] 36.3 °C (97.3 °F)  Pulse:  [72-90] 75  Resp:  [15-18] 18  BP: ()/(53-71) 130/67    Physical Exam   Constitutional: She is oriented to person, place, and time. She appears well-developed and well-nourished. No distress.   HENT:   Head: Normocephalic and atraumatic.   Eyes: Conjunctivae are normal. No scleral icterus.   Neck: Neck supple. No JVD present.   Cardiovascular: Normal rate, regular rhythm and normal heart sounds.  Exam reveals no gallop and no friction rub.    No murmur  heard.  Pulmonary/Chest: Effort normal. No respiratory distress. She has rales (mild). She exhibits no tenderness.   Abdominal: Soft. Bowel sounds are normal. She exhibits no distension. There is no guarding.   Musculoskeletal: She exhibits no edema or tenderness.   Lymphadenopathy:     She has no cervical adenopathy.   Neurological: She is alert and oriented to person, place, and time. No cranial nerve deficit.   Skin: Skin is warm and dry. She is not diaphoretic. No erythema. No pallor.   Psychiatric: She has a normal mood and affect. Her behavior is normal.   Nursing note and vitals reviewed.      Fluids    Intake/Output Summary (Last 24 hours) at 11/24/18 1644  Last data filed at 11/24/18 0933   Gross per 24 hour   Intake                0 ml   Output              325 ml   Net             -325 ml       Laboratory  Recent Labs      11/22/18   0015  11/23/18   0047  11/24/18   0059   WBC  9.8  9.3  8.4   RBC  3.50*  3.49*  3.05*   HEMOGLOBIN  9.9*  10.0*  8.7*   HEMATOCRIT  31.1*  30.7*  27.1*   MCV  88.9  88.0  88.9   MCH  28.3  28.7  28.5   MCHC  31.8*  32.6*  32.1*   RDW  54.5*  54.9*  54.4*   PLATELETCT  283  223  168   MPV  9.5  9.9  9.6     Recent Labs      11/22/18   0015  11/23/18   0047  11/24/18   0059   SODIUM  131*  129*  131*   POTASSIUM  4.2  4.5  4.0   CHLORIDE  100  97  99   CO2  20  23  24   GLUCOSE  114*  105*  99   BUN  7*  8  9   CREATININE  0.59  0.55  0.58   CALCIUM  7.9*  7.9*  7.6*     Recent Labs      11/24/18   0304   INR  1.41*               Imaging  US-THORACENTESIS PUNCTURE RIGHT   Final Result      1. Ultrasound guided right sided diagnostic thoracentesis.      2. 450 mL of fluid withdrawn.      DX-CHEST-PORTABLE (1 VIEW)   Final Result      Mild right basilar atelectasis.      CT-ABDOMEN W/O   Final Result      Hepatic dome abscess pigtail catheter has pulled out and now terminates just deep to the thoracic cage. Despite this the size of the abscess has diminished measuring 75 x 48  from 88 x 64 mm      New mild left hepatic pneumobilia      Ampullary region mass is difficult to precisely measure but does appear to exert some mass effect upon the third portion of the duodenum. No bowel obstruction is detected.      New medium-sized right ovarian pleural effusion does not have loculation features but there is subsegmental atelectasis      DX-CHEST-PORTABLE (1 VIEW)   Final Result      New interstitial opacity is worrisome for edema      Hilar prominence could be from lymphadenopathy or pulmonary arterial hypertension      Stable right hemidiaphragm elevation      IR-PICC LINE PLACEMENT   Final Result                  Ultrasound-guided PICC placement performed by qualified nursing staff as    above.          CT-HEAD W/O   Final Result      1. Cerebral atrophy.   2. White matter lucencies most consistent with small vessel ischemic change versus demyelination or gliosis.   3. Remote LEFT cerebellar infarction   4. Otherwise, Head CT without contrast with no acute findings. No evidence of acute cerebral infarction, hemorrhage or mass lesion.      CT-DRAIN-LIVER ABSCESS-CYST   Final Result      Successful CT-guided drain placement in the right lobe liver necrosis/abscess.      CT-ABDOMEN-PELVIS WITH   Final Result      1.  Interval significant increase in size of low-attenuation area in the dome of the right lobe of liver which contains air. This may represent necrosis or hemorrhage following therapy. Stable appearance of other smaller low-attenuation areas in the    right lobe of the liver without evidence of progression of disease in these areas of metastasis. Differential diagnosis would include post therapy infection.      2.  Stable masslike density in the region of the ampulla of Vater consistent with primary carcinoma. Current dimensions are 3.1 x 2.6 cm.      3.  Persistent low-attenuation areas in the pancreatic head and proximal body which may represent secondary pancreatitis.      4.   Stable upper retroperitoneal adenopathy.      5.  Small umbilical hernia containing fat.      DX-CHEST-PORTABLE (1 VIEW)   Final Result      No pulmonary consolidation.      CT-DRAIN-LIVER ABSCESS-CYST    (Results Pending)        Assessment/Plan  Liver abscess- (present on admission)   Assessment & Plan    Drain replaced 11/24/18 as previous one no longer in connection to fluid collection on CT, large amount drainage again.    Cultures enterococcus [VRE]  ID following   Continue zosyn, Linazolid and micofungin          Bacteremia- (present on admission)   Assessment & Plan    Received treatment for E. coli bacteremia in early November, her port was removed  Admitted with sepsis  Blood cultures from 11/12/2018 are growing lactobacillus, Candida albicans and strep viridans. Liver abscess cultures from 11/13/2018 are positive for VRE.  Currently on linazolid and micofungin and zosyn. ID following.     PICC placed 11/21/18             Sepsis (HCC)- (present on admission)   Assessment & Plan    This is sepsis (without associated acute organ dysfunction).   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  Additional XRT is planned as outpatient       Fungemia   Assessment & Plan    Mycofungin   ID following        Deep vein thrombosis (CMS-HCC) [I82.409]- (present on admission)   Assessment & Plan    eliquis on hold until tomorrow     Pleural effusion   Assessment & Plan    Thoracentesis 11/23/18 - 450cc removed - cytology and studies pending       Cough   Assessment & Plan    Non-productive, CXR concerning for edema  Low likelihood of infection given broad spectrum antibiotics for VRE  Improvement with diuresis  CT shows pleural effusion right lung - thoracentesis ordered in conjunction with drain replacement for liver           Lactic acidosis- (present on admission)    Assessment & Plan    Improved with fluid resuscitation and treatment of infection            VTE prophylaxis: Eliquis

## 2018-11-25 NOTE — PROGRESS NOTES
Hospital Medicine Daily Progress Note    Date of Service  11/25/2018    Chief Complaint  73 y.o. female admitted 11/12/2018 with fevers and chills in setting of metastatic ampullary carcioma.    Hospital Course    Patient admitted and started again on IV antibitoics.  She was found to have a liver abscess and required drainage, cultures from this have shown VRE, candida and lactobacillus acidophilus and blood cultures showing candida.  ID has been managing antibiotics and antifungals.       Interval Problem Update  11/20 Patient had episode of incontinence overnight along with confusion and new difficulty following commands.  CT head was done showing no acute findings with suspicion of possible s/e of pain medication.  Her mentation has improved but she continues to have urinary and bowel incontinence, likely with fatigue and now frequent need for toileting due to her antibiotics and IV hydration.  11/21 Patient's cough persisting but she remains afebrile.  I ordered PICC line for placement as patient will need continued antibiotics for 6 week duration, final blood cultures have resulted negative.  CXR ordered and consistent with interstitial edema - will start IV lasix to see if this improves her oxygen saturation and cough. WBC continues to drop.  11/22 Patient states she is feeling slightly better today, having to urinate more often since starting diuretic but lungs are sounding slightly better.  CT abdomen completed and shows abscess is smaller but still present and drain no longer in place.  She also now has a moderate right pleural effusion.  I've requested IR to reposition drain in liver abscess and to also drain what the pleural effusion.  Patient requests a call to be made to her son, Sea.  11/23 Patient had thoracentesis without issue and is breathing slightly better, delay in repositioning of drain due to recent dose of eliquis and need to be out of system for 48 hours to lessen the chance of bleeding.   Repeat culture on fluid ordered.  11/24 Patient had successful placement of drain in liver fluid collection, drainage in JACQUELINE bulb is dark brown without evidence of purulence.  Culture from fluid collected.  Son at bedside when evaluated and he remarked she appears much more awake today and breathing much easier than on days past - explained the need for diuresis and the drainage of the lung have likely contributed to improvement.  11/25 Patient feeling well, denies new complaints.  Will cut back on lasix to once daily and IV to PO.  She is currently on Unasyn and final treatment plan still pending decision by ID.  Cultures from thoracentesis showing no growth.    Consultants/Specialty  ID - Leela/Honorio    Code Status  full    Disposition  tbd    Review of Systems  Review of Systems   Constitutional: Negative for chills, fever and malaise/fatigue.   HENT: Negative for congestion and sore throat.    Eyes: Negative for blurred vision and photophobia.   Respiratory: Positive for cough (slightly better). Negative for shortness of breath and wheezing.    Cardiovascular: Negative for chest pain, claudication and leg swelling.   Gastrointestinal: Negative for abdominal pain, constipation, diarrhea, heartburn, nausea and vomiting.   Genitourinary: Negative for dysuria and hematuria.   Musculoskeletal: Negative for joint pain and myalgias.   Skin: Negative for itching and rash.   Neurological: Positive for weakness. Negative for dizziness, sensory change, speech change and headaches.   Psychiatric/Behavioral: Negative for depression. The patient is not nervous/anxious and does not have insomnia.         Physical Exam  Temp:  [36.2 °C (97.2 °F)-36.4 °C (97.6 °F)] 36.2 °C (97.2 °F)  Pulse:  [74-92] 74  Resp:  [18] 18  BP: (107-133)/(59-71) 119/71    Physical Exam   Constitutional: She is oriented to person, place, and time. She appears well-developed and well-nourished. No distress.   HENT:   Head: Normocephalic and  atraumatic.   Eyes: Conjunctivae are normal. No scleral icterus.   Neck: Neck supple. No JVD present.   Cardiovascular: Normal rate, regular rhythm and normal heart sounds.  Exam reveals no gallop and no friction rub.    No murmur heard.  Pulmonary/Chest: Effort normal. No respiratory distress. She has no rales. She exhibits no tenderness.   Abdominal: Soft. Bowel sounds are normal. She exhibits no distension. There is no guarding.   Musculoskeletal: She exhibits no edema or tenderness.   Lymphadenopathy:     She has no cervical adenopathy.   Neurological: She is alert and oriented to person, place, and time. No cranial nerve deficit.   Skin: Skin is warm and dry. She is not diaphoretic. No erythema. No pallor.   Psychiatric: She has a normal mood and affect. Her behavior is normal.   Nursing note and vitals reviewed.      Fluids    Intake/Output Summary (Last 24 hours) at 11/25/18 1415  Last data filed at 11/25/18 0935   Gross per 24 hour   Intake                0 ml   Output              305 ml   Net             -305 ml       Laboratory  Recent Labs      11/23/18   0047  11/24/18   0059  11/25/18   0015   WBC  9.3  8.4  5.9   RBC  3.49*  3.05*  3.03*   HEMOGLOBIN  10.0*  8.7*  8.6*   HEMATOCRIT  30.7*  27.1*  27.5*   MCV  88.0  88.9  90.8   MCH  28.7  28.5  28.4   MCHC  32.6*  32.1*  31.3*   RDW  54.9*  54.4*  55.6*   PLATELETCT  223  168  133*   MPV  9.9  9.6  10.2     Recent Labs      11/23/18   0047  11/24/18   0059  11/25/18   0015   SODIUM  129*  131*  133*   POTASSIUM  4.5  4.0  4.1   CHLORIDE  97  99  103   CO2  23  24  23   GLUCOSE  105*  99  122*   BUN  8  9  7*   CREATININE  0.55  0.58  0.61   CALCIUM  7.9*  7.6*  7.6*     Recent Labs      11/24/18   0304   INR  1.41*               Imaging  US-THORACENTESIS PUNCTURE RIGHT   Final Result      1. Ultrasound guided right sided diagnostic thoracentesis.      2. 450 mL of fluid withdrawn.      DX-CHEST-PORTABLE (1 VIEW)   Final Result      Mild right basilar  atelectasis.      CT-ABDOMEN W/O   Final Result      Hepatic dome abscess pigtail catheter has pulled out and now terminates just deep to the thoracic cage. Despite this the size of the abscess has diminished measuring 75 x 48 from 88 x 64 mm      New mild left hepatic pneumobilia      Ampullary region mass is difficult to precisely measure but does appear to exert some mass effect upon the third portion of the duodenum. No bowel obstruction is detected.      New medium-sized right ovarian pleural effusion does not have loculation features but there is subsegmental atelectasis      DX-CHEST-PORTABLE (1 VIEW)   Final Result      New interstitial opacity is worrisome for edema      Hilar prominence could be from lymphadenopathy or pulmonary arterial hypertension      Stable right hemidiaphragm elevation      IR-PICC LINE PLACEMENT   Final Result                  Ultrasound-guided PICC placement performed by qualified nursing staff as    above.          CT-HEAD W/O   Final Result      1. Cerebral atrophy.   2. White matter lucencies most consistent with small vessel ischemic change versus demyelination or gliosis.   3. Remote LEFT cerebellar infarction   4. Otherwise, Head CT without contrast with no acute findings. No evidence of acute cerebral infarction, hemorrhage or mass lesion.      CT-DRAIN-LIVER ABSCESS-CYST   Final Result      Successful CT-guided drain placement in the right lobe liver necrosis/abscess.      CT-ABDOMEN-PELVIS WITH   Final Result      1.  Interval significant increase in size of low-attenuation area in the dome of the right lobe of liver which contains air. This may represent necrosis or hemorrhage following therapy. Stable appearance of other smaller low-attenuation areas in the    right lobe of the liver without evidence of progression of disease in these areas of metastasis. Differential diagnosis would include post therapy infection.      2.  Stable masslike density in the region of the  ampulla of Vater consistent with primary carcinoma. Current dimensions are 3.1 x 2.6 cm.      3.  Persistent low-attenuation areas in the pancreatic head and proximal body which may represent secondary pancreatitis.      4.  Stable upper retroperitoneal adenopathy.      5.  Small umbilical hernia containing fat.      DX-CHEST-PORTABLE (1 VIEW)   Final Result      No pulmonary consolidation.      CT-DRAIN-LIVER ABSCESS-CYST    (Results Pending)        Assessment/Plan  Liver abscess- (present on admission)   Assessment & Plan    Drain replaced 11/24/18 as previous one no longer in connection to fluid collection on CT, large amount drainage again.    Cultures enterococcus [VRE]  ID following   Continue zosyn, Linazolid and micofungin          Bacteremia- (present on admission)   Assessment & Plan    Received treatment for E. coli bacteremia in early November, her port was removed  Admitted with sepsis  Blood cultures from 11/12/2018 are growing lactobacillus, Candida albicans and strep viridans. Liver abscess cultures from 11/13/2018 are positive for VRE.  Currently on unasyn/PO diflucan ID following.     PICC placed 11/21/18, if IV abx need to continue when patient would be ready to dc, family can take patient to [facility] as needed.             Sepsis (HCC)- (present on admission)   Assessment & Plan    This is sepsis (without associated acute organ dysfunction).   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  Additional XRT is planned as outpatient       Fungemia   Assessment & Plan    Mycofungin   ID following        Deep vein thrombosis (CMS-HCC) [I82.409]- (present on admission)   Assessment & Plan    eliquis resumed       Pleural effusion   Assessment & Plan    Thoracentesis 11/23/18 - 450cc removed - cytology and studies pending       Cough   Assessment & Plan     Non-productive, CXR concerning for edema  Low likelihood of infection given broad spectrum antibiotics for VRE  Improvement with diuresis  CT shows pleural effusion right lung - thoracentesis ordered in conjunction with drain replacement for liver           Lactic acidosis- (present on admission)   Assessment & Plan    Improved with fluid resuscitation and treatment of infection            VTE prophylaxis: Eliquis

## 2018-11-25 NOTE — CARE PLAN
Problem: Pain Management  Goal: Pain level will decrease to patient's comfort goal  Outcome: PROGRESSING AS EXPECTED    Intervention: Follow pain managment plan developed in collaboration with patient and Interdisciplinary Team  Pt denies c/o pain      Problem: Skin Integrity  Goal: Risk for impaired skin integrity will decrease  Outcome: PROGRESSING AS EXPECTED    Intervention: Assess risk factors for impaired skin integrity and/or pressure ulcers  2 RN skin check completed.  Intervention: Implement precautions to protect skin integrity in collaboration with the interdisciplinary team  Barrier cream and barrier wipes in use.  Pt turns self.  Waffle overlay on mattress  Silicone oxygen tubing in use

## 2018-11-25 NOTE — PROGRESS NOTES
· 2 RN skin check complete with ROMANA Snyder  · Skin assessed under devices Single lumen picc, biliary drain.  · New potential pressure ulcers noted on: none. Skin intact  · The following interventions in place waffle cushion, barrier cream, silicone oxygen tubing.

## 2018-11-25 NOTE — PROGRESS NOTES
Infectious Disease Progress Note    Author: Yuli Olmedo M.D. Date & Time of service: 11/25/2018  3:50 PM    Chief Complaint:  Generalized weakness and chills    Interval History:  73-year-old female with metastatic ampullary carcinoma of the pancreas presented with generalized weakness and shaking chills.  11/13/2018-no fevers.  Complains of generalized malaise and fatigue.  Still has some pain in her right shoulder.  WBC count is 22,000.  Cultures remain negative.  11/14/2018 T-max is 98.2.  No new issues overnight.  Underwent IR drainage on 11/13/2018.  Her shoulder pain has eased since then.  11/15/2018 no fevers.  WBC 13.3 creatinine 0.47  11/16/2018 no fevers.  Denies any increased abdominal pain.  The shoulder pain is improved as well.  WBC is 12.8.  11/17/2018 no fevers.  Shoulder pain is much improved.  Denies any new issues.  11/18- no fevers. No new issues> WBC 16.7  11/19/2018 no fevers.  WBC 15.6 no new issues overnight  11/20 AF (99.7) WBC 15.3 states eating better-denies SE abx No abd pain today  11/21 Af (99.9) WBC 9.8 minimal output from drain-plan for PICC today  11/22/2018 T-max is 100.3.  No new issues overnight.  WBCs 10.  AST 40 ALT 17  11/23/2018 no fevers.  The drain is not draining much.  Underwent thoracentesis today.  11/24/2018 underwent another drain placement.  Complains of some abdominal pain otherwise denies any complaints.  WBC is 8.4  11/25/2018 complains of malaise and fatigue.  Complains of some abdominal pain.  WBC 5.9  Labs Reviewed, Medications Reviewed and Radiology Reviewed.    Review of Systems:  Review of Systems   Constitutional: Positive for malaise/fatigue. Negative for chills and fever.   HENT: Negative for hearing loss.    Respiratory: Negative for cough and shortness of breath.    Cardiovascular: Negative for chest pain and leg swelling.   Gastrointestinal: Positive for abdominal pain. Negative for nausea and vomiting.        Slight abdominal pain off and on        Hemodynamics:  Temp (24hrs), Av.3 °C (97.4 °F), Min:36.2 °C (97.2 °F), Max:36.4 °C (97.6 °F)  Temperature: 36.2 °C (97.2 °F)  Pulse  Av.6  Min: 67  Max: 112   Blood Pressure : 119/71       Physical Exam:  Physical Exam   Constitutional: She appears well-developed.   HENT:   Head: Normocephalic and atraumatic.   Eyes: Pupils are equal, round, and reactive to light. EOM are normal. No scleral icterus.   Neck: Neck supple.   Cardiovascular: Normal rate.    Pulmonary/Chest: Effort normal. No respiratory distress. She has no wheezes.   Abdominal: Soft. She exhibits no distension. There is no tenderness.   Right upper quadrant drain   Musculoskeletal: She exhibits no edema.   Neurological: She is alert.   Skin: Skin is warm. She is not diaphoretic. No erythema.   Nursing note and vitals reviewed.      Meds:    Current Facility-Administered Medications:   •  [START ON 2018] furosemide  •  apixaban  •  ipratropium-albuterol  •  potassium chloride SA  •  ampicillin-sulbactam (UNASYN) IV  •  fluconazole  •  senna-docusate **AND** polyethylene glycol/lytes **AND** magnesium hydroxide **AND** bisacodyl  •  Respiratory Care per Protocol  •  ondansetron  •  ondansetron  •  acetaminophen  •  Notify provider if pain remains uncontrolled **AND** Use the numeric rating scale (NRS-11) on regular floors and Critical-Care Pain Observation Tool (CPOT) on ICUs/Trauma to assess pain **AND** Pulse Ox (Oximetry) **AND** Pharmacy Consult Request **AND** If patient difficult to arouse and/or has respiratory depression, stop any opiates that are currently infusing and call a Rapid Response. **AND** oxyCODONE immediate-release **AND** oxyCODONE immediate-release **AND** morphine injection  •  NS  •  omeprazole  •  artificial tears  •  albuterol    Labs:  Recent Labs      18   0047  18   0059  18   0015   WBC  9.3  8.4  5.9   RBC  3.49*  3.05*  3.03*   HEMOGLOBIN  10.0*  8.7*  8.6*   HEMATOCRIT  30.7*  27.1*   27.5*   MCV  88.0  88.9  90.8   MCH  28.7  28.5  28.4   RDW  54.9*  54.4*  55.6*   PLATELETCT  223  168  133*   MPV  9.9  9.6  10.2     Recent Labs      11/23/18   0047  11/24/18   0059  11/25/18   0015   SODIUM  129*  131*  133*   POTASSIUM  4.5  4.0  4.1   CHLORIDE  97  99  103   CO2  23  24  23   GLUCOSE  105*  99  122*   BUN  8  9  7*     Recent Labs      11/23/18 0047  11/24/18 0059  11/25/18   0015   ALBUMIN  2.3*  2.0*  2.0*   TBILIRUBIN  0.3  0.3  0.3   ALKPHOSPHAT  177*  146*  137*   TOTPROTEIN  7.0  6.1  5.9*   ALTSGPT  15  11  12   ASTSGOT  38  28  26   CREATININE  0.55  0.58  0.61       Imaging:  Ct-abdomen-pelvis With    Result Date: 11/12/2018 11/12/2018 1:38 PM HISTORY/REASON FOR EXAM:  Abdominal pain. History of metastatic ampullary adenocarcinoma. FINDINGS: The visualized lung bases are clear. CT Abdomen: A small hiatal hernia is present. There is interval increase in size of an area of low attenuation in the dome of the right lobe of the liver which contains air. This may represent necrosis or post therapy hemorrhage following embolization. This area measures 8.8 x 7.8 x 6.4 cm. Previous measurement was 2.2 cm in maximum dimension. Multiple smaller areas of low-attenuation within the right lobe of liver are present which are suspicious for hepatic metastases. These other smaller low-attenuation areas are without significant change. The gallbladder absent. A low-attenuation area in the region of the pancreatic head is again noted and is poorly defined measuring approximately 3.1 x 2.6 cm in diameter. This is consistent with the area of ampullary carcinoma. Embolization coils are present in the GDA. There is low attenuation in the pancreatic body proximally which may represent low-attenuation  secondary to pancreatitis or spread of neoplasm. The common duct is dilated proximal to the level of the ampulla measuring a maximum diameter of 1.6 cm. No choledocholithiasis is identified. An enlarged  retroperitoneal lymph node between the upper aorta and IVC is unchanged measuring 1.5 cm in diameter. No new area of retroperitoneal adenopathy is identified. No peripancreatic adenopathy is present. The spleen appears normal. The splenic vein and portal vein are patent. The adrenal glands are not enlarged and the kidneys enhance symmetrically. Small simple cysts in the right kidney are stable. There is no lymphadenopathy. The aorta and IVC are normal in caliber. The bowel is without obstruction. The appendix appears normal. There is a small umbilical hernia containing fat. CT Pelvis: There is no lymphadenopathy. No free fluid is present. The bladder appears normal. Uterus as endometrial complex thickening. There is a myomatous type calcification in the right aspect of the uterus which is unchanged. There is no acute inflammatory process.     1.  Interval significant increase in size of low-attenuation area in the dome of the right lobe of liver which contains air. This may represent necrosis or hemorrhage following therapy. Stable appearance of other smaller low-attenuation areas in the right lobe of the liver without evidence of progression of disease in these areas of metastasis. Differential diagnosis would include post therapy infection. 2.  Stable masslike density in the region of the ampulla of Vater consistent with primary carcinoma. Current dimensions are 3.1 x 2.6 cm. 3.  Persistent low-attenuation areas in the pancreatic head and proximal body which may represent secondary pancreatitis. 4.  Stable upper retroperitoneal adenopathy. 5.  Small umbilical hernia containing fat.    Ct-abdomen-pelvis With    Result Date: 10/25/2018  10/25/2018 4:28 PM HISTORY/REASON FOR EXAM:  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 Patient is status post embolization.. TECHNIQUE/EXAM DESCRIPTION:   CT scan of the abdomen and pelvis with contrast. Contrast-enhanced helical scanning was obtained from  the diaphragmatic domes through the pubic symphysis following the bolus administration of nonionic contrast without complication. 80 mL of Omnipaque 350 nonionic contrast was administered without complication. Low dose optimization technique was utilized for this CT exam including automated exposure control and adjustment of the mA and/or kV according to patient size. COMPARISON: PET/CT 7/12/2018, MRI 8/6/2018 and CT abdomen 3/27/2018 FINDINGS: The visualized lung bases are unremarkable. CT Abdomen: There are several hypodense lesions in the hepatic dome with a couple of foci of air. A hypodense mass within air-fluid level in the right hepatic lobe, segment 8, measures approximately 2.1 cm. No significant rim enhancement is identified. There are new  hypodense lesions in the anterior right hepatic lobe measuring up to approximately 7 mm. There is pneumobilia, consistent with prior hepaticojejunostomy. The gallbladder has been removed. The common bile duct remains dilated with thickening of the duodenal wall and enhancement. Maximum diameter of the common bile duct is approximately 14 mm. There are multiple coils in the region of the pancreatic head. The spleen and adrenal glands are unremarkable. The kidneys enhance symmetrically. Hypodense renal masses are consistent with cysts. There is heterogeneous enhancement of the pancreatic head with an ill-defined cystic mass. The mass is difficult to measure, but is approximately 4.6 x 4 cm. The duct does not appear dilated more distally. There are multiple diverticula in the descending and sigmoid colon. There are scattered arterial calcifications. Enlarged aortocaval lymph node on image 37 measures approximately 15 mm short axis, previously 8 mm. CT Pelvis: The bladder is unremarkable. No significant free fluid is identified. A calcified mass in the uterus is consistent with a small leiomyoma.     1.  Ill-defined cystic mass in the pancreatic head with surrounding  inflammatory changes. Findings may be related to pancreatitis in the appropriate clinical setting. Findings are new from the prior exam in August. 2.  New hypodense lesions scattered throughout the liver was foci of air. Findings are highly likely to be related to recent embolization. No rim enhancement to suggest abscess. 3.  Duodenal wall thickening with enhancement, concerning for residual disease. Persistent common bile duct dilatation. 4.  Interval enlargement in an aortocaval lymph node, highly suspicious for metastatic disease. 5.  Diverticulosis. 6.  Status post hepaticojejunostomy.    Dx-chest-portable (1 View)    Result Date: 11/12/2018 11/12/2018 11:06 AM HISTORY/REASON FOR EXAM:  Sepsis. TECHNIQUE/EXAM DESCRIPTION AND NUMBER OF VIEWS: Single portable view of the chest. COMPARISON: 10/25/2018 FINDINGS: There is no evidence of focal consolidation or evidence of pulmonary edema. There is no evidence of pleural effusion. The heart is normal in size.     No pulmonary consolidation.    Dx-chest-portable (1 View)    Result Date: 10/25/2018  10/25/2018 11:35 AM HISTORY/REASON FOR EXAM:  LEFT shoulder pain, malaise, nausea. TECHNIQUE/EXAM DESCRIPTION AND NUMBER OF VIEWS: Single portable view of the chest. COMPARISON: 5/3/2018 FINDINGS: Cardiomediastinal contour is within normal limits. Lungs show hypoinflation. No focal pulmonary consolidation. No pleural fluid collection or pneumothorax. RIGHT chest infusion port again present. No major bony abnormality is seen. Atherosclerotic calcification of thoracic aorta.     Hypoinflation without other evidence for acute cardiopulmonary disease.    An-dzusvtzy-hylrblqmj    Result Date: 10/27/2018  10/27/2018 5:00 PM HISTORY/REASON FOR EXAM:  Jaw Pain. TECHNIQUE/EXAM DESCRIPTION AND NUMBER OF VIEWS:  1 view(s) of the mandible. COMPARISON:  None. FINDINGS: Multiple absent teeth. Many of the remaining teeth have filling/crowns. No periapical lucencies seen. No mandibular  fracture.     No periapical lucencies. No mandibular fracture.    Ir-cvc Tunnel With Port Removal    Result Date: 10/28/2018   Chest port removal HISTORY/REASON FOR EXAM: Bacteremia, concern for central line infection MEDICATIONS: Conscious sedation with 2 mcg Fentanyl and 50 mg Versed was administered during the procedure with appropriate continuous patient monitoring of cardiorespiratory function by the radiology nurse. Sedation duration: 30 minutes. TECHNIQUE/EXAM DESCRIPTION: Following informed consent patient was prepped and draped in the usual fashion. The procedure was performed using MAXIMAL STERILE BARRIER technique including sterile gown, mask, cap, and donning of sterile gloves following appropriate hand hygiene and/or  sterile scrub. Patient skin site was prepped with 2% Chlorhexidine solution. A timeout was performed. Local anesthetic result was achieved with administration of copious 1% lidocaine with epinephrine. A skin incision was made with an 15 blade scalpel. Blunt dissection was used to retrieve the port and catheter which were observed to be intact. Hemostasis  was obtained with manual compression. The port pocket showed no signs of gross infection. The port pocket was flushed with normal saline. The port pocket was closed with deep 2-0 Vicryl sutures and a subcutaneous 4-0 Vicryl suture layer, the skin was closed with Dermabond. The patient tolerated procedure well. The skin was cleaned and a dressing was applied. COMPARISON:  None     Successful chest port removal.    Ir-picc Line Placement    Result Date: 10/29/2018  HISTORY/REASON FOR EXAM:   PICC placement. TECHNIQUE/EXAM DESCRIPTION AND NUMBER OF VIEWS:   PICC line insertion with ultrasound guidance.  The procedure was performed using maximal sterile barrier technique including sterile gown, mask, cap, and donning of sterile gloves following appropriate hand hygiene and/or sterile scrub. Patient skin site was prepped with 2% Chlorhexidine  solution. FINDINGS:  PICC line insertion with Ultrasound Guidance was performed by qualified nursing staff without the assistance of a Radiologist. PICC positioning appropriateness confirmed by 3CG technology; chest xray only needed in the instance 3CG unable to confirm placement.              Ultrasound-guided PICC placement performed by qualified nursing staff as above.     Ir-low Level Consult-outpt    Result Date: 10/22/2018  This exam has been resulted under the NOTES tab, and the signed report has been auto faxed to the ordering physician on the date/time of that signature.      Micro:  Results     Procedure Component Value Units Date/Time    CULTURE WOUND W/ GRAM STAIN [[id number]]  (Abnormal)  (Susceptibility) Collected:  11/13/18 1710    Order Status:  Completed Specimen:  Wound Updated:  11/18/18 0927     Significant Indicator POS (POS)     Source WND     Site Liver Abscess     Culture Result Wound Growth noted after further incubation, see below for  organism identification.   (A)     Gram Stain Result Few WBCs.  Many Gram negative rods.  Moderate Gram positive rods.       Culture Result Wound Candida albicans  Light growth   (A)      Lactobacillus acidophilus  Moderate growth   (A)    Narrative:       CALL  Flores  [facility] tel. [phone],  CALLED  [facility] tel. [phone] 11/18/2018, 09:27, RB PERF. RESULTS CALLED  TO:[phone], RN    Culture & Susceptibility     ENTEROCOCCUS FAECIUM     Antibiotic Sensitivity Microscan Unit Status    Ampicillin Resistant >8 mcg/mL Final    Method: SENSITIVITY, SERGE    Daptomycin Sensitive 4 mcg/mL Final    Method: SENSITIVITY, SERGE    Gent Synergy Sensitive <=500 mcg/mL Final    Method: SENSITIVITY, SERGE    Linezolid Sensitive 2 mcg/mL Final    Method: SENSITIVITY, SERGE    Penicillin Resistant >8 mcg/mL Final    Method: SENSITIVITY, SERGE    Vancomycin Resistant >16 mcg/mL Final    Method: SENSITIVITY, SERGE                       BLOOD CULTURE [[id number]]  (Abnormal) Collected:  11/12/18 1120  "   Order Status:  Completed Specimen:  Blood from Peripheral Updated:  11/17/18 1651     Significant Indicator POS (POS)     Source BLD     Site PERIPHERAL     Blood Culture Growth detected by Bactec instrument. 11/15/2018  11:59 (A)      Candida albicans  See previous culture for sensitivity report.   (A)      Bacteroides caccae (A)    Narrative:       CALL  Flores  161 tel. [phone], Notified there is KRISTY GNR  CALLED  161 tel. [phone] 11/17/2018, 16:49, RB PERF. RESULTS CALLED  TO:[phone] RN  Per Hospital Policy: Only change Specimen Src: to \"Line\" if  specified by physician order.    BLOOD CULTURE [[id number]] Collected:  11/15/18 1807    Order Status:  Completed Specimen:  Blood from Peripheral Updated:  11/16/18 0744     Significant Indicator NEG     Source BLD     Site PERIPHERAL     Blood Culture No Growth    Note: Blood cultures are incubated for 5 days and  are monitored continuously.Positive blood cultures  are called to the RN and reported as soon as  they are identified.      Narrative:       Collected By:[id number] CONCHA VELAZQUEZ  Per Hospital Policy: Only change Specimen Src: to \"Line\" if  specified by physician order.    BLOOD CULTURE [[id number]] Collected:  11/15/18 1807    Order Status:  Completed Specimen:  Blood from Peripheral Updated:  11/16/18 0744     Significant Indicator NEG     Source BLD     Site PERIPHERAL     Blood Culture No Growth    Note: Blood cultures are incubated for 5 days and  are monitored continuously.Positive blood cultures  are called to the RN and reported as soon as  they are identified.      Narrative:       Collected By:[id number] CONCHA HILLIARD RCecil  Per Hospital Policy: Only change Specimen Src: to \"Line\" if  specified by physician order.    BLOOD CULTURE [[id number]]  (Abnormal) Collected:  11/12/18 1039    Order Status:  Completed Specimen:  Blood from Peripheral Updated:  11/15/18 1316     Significant Indicator POS (POS)     Source BLD     Site PERIPHERAL     Blood Culture " "Growth detected by Bactec instrument. 11/14/2018  10:03  Gram Stain: Yeast.   (A)      Yeast (A)    Narrative:       CALL  Flores  161 tel. [phone],  CALLED  161 tel. [phone] 11/14/2018, 10:06, RB PERF. RESULTS CALLED  TO:[phone]  Per Hospital Policy: Only change Specimen Src: to \"Line\" if  specified by physician order.    URINE CULTURE(NEW) [[id number]] Collected:  11/12/18 1057    Order Status:  Completed Specimen:  Urine Updated:  11/14/18 0917     Significant Indicator NEG     Source UR     Site --     Urine Culture No growth at 48 hours    Narrative:       Mini cath  Indication for culture:->Emergency Room Patient    GRAM STAIN [[id number]] Collected:  11/13/18 1710    Order Status:  Completed Specimen:  Wound Updated:  11/14/18 0907     Significant Indicator .     Source WND     Site Liver Abscess     Gram Stain Result Few WBCs.  Many Gram negative rods.  Moderate Gram positive rods.      Blood Culture [[id number]]     Order Status:  Canceled Specimen:  Blood from Peripheral     Blood Culture [[id number]]     Order Status:  Canceled Specimen:  Blood from Peripheral     URINALYSIS [[id number]] Collected:  11/12/18 1057    Order Status:  Completed Specimen:  Urine Updated:  11/12/18 1135     Color Yellow     Character Clear     Specific Gravity 1.013     Ph 7.0     Glucose Negative mg/dL      Ketones Negative mg/dL      Protein Negative mg/dL      Bilirubin Negative     Urobilinogen, Urine 0.2     Nitrite Negative     Leukocyte Esterase Negative     Occult Blood Negative     Micro Urine Req see below     Comment: Microscopic examination not performed when specimen is clear  and chemically negative for protein, blood, leukocyte esterase  and nitrite.         Narrative:       Mini cath  Indication for culture:->Emergency Room Patient    Culture Respiratory W/ GRM STN [[id number]] Collected:  11/12/18 0000    Order Status:  Canceled Specimen:  Sputum from Sputum     Urine Culture [[id number]] Collected:  11/12/18 0000 "    Order Status:  Canceled Specimen:  Urine           Assessment:  Active Hospital Problems    Diagnosis   • Abnormal CT of the abdomen [R93.5]   • Sepsis (HCC) [A41.9]   • 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]   • E coli bacteremia [R78.81]   • Lactic acidosis [E87.2]   • Deep vein thrombosis (CMS-HCC) [I82.409] [I82.409]       Plan:  Liver abscess  Afebrile  Resolved leukocytosis  CT scan 11/12 shows area of necrosis/hemorrhage   s/p IR drainage on 11/13/2018  Cultures +  lactobacillus, VRE and Candida albicans and strep viridans  Continue Zyvox, fluconazole, and Unasyn  Underwent CT scan on 11/22/2018  Underwent another drain placement on 11/24/2018  The CT scan showed a collated pleural effusion hence underwent thoracentesis on 11/23/2018.  That fluid shows 2752 WBCs with 46% polys.  Follow the cytology  May be able to go home on Dapto and Invanz with fluconazole  Bacteremia  Blood cultures + Bacteroides  Repeat blood cultures x2 are negative from 11/15/2018      Candidemia  Candida albicans  Repeat blood cultures x2 are negative from 11/15/2018  Changed to p.o. Diflucan  Eye exam if visual changes    Leucocytosis-resolved  Multifactorial      Ampullary carcinoma of the pancreas  Contributory factor to above    Prognosis  Guarded    DW Dr Brown

## 2018-11-25 NOTE — PROGRESS NOTES
Received report from day RN. POC discussed with patient, pt verbalizes understanding and agreement.  A&Ox4. Pt is up with 1x assist. She continues to be incontinent due to lasix. Educated pt to call when she needs to void an we will get there as soon as we can so that she can ambulate to the bathroom. Skin is intact. Waffle cushion in place. PICC patent, positive blood return. Proper contact precautions in place. Biliary drain flushed per orders. Pt with small amount of brown fluid. Pt is refusing bed alarm, charge RN aware. Call light in reach, bed in low position, Q2 hr rounding.

## 2018-11-25 NOTE — CARE PLAN
"Problem: Communication  Goal: The ability to communicate needs accurately and effectively will improve  Outcome: PROGRESSING AS EXPECTED   Pt calls appropriately for assistance. When patient pushes call light, she states, \"I usually have to go right away and I can't hold it.\"    Problem: Safety  Goal: Will remain free from injury  Outcome: PROGRESSING AS EXPECTED  Sitting in chair. Chair alarm on. Bed is locked in lowest position with alarm on.       "

## 2018-11-25 NOTE — PROGRESS NOTES
Pt is in contact precaution. A&Ox4. Pt incontinent with urine; changed gown and cleaned with wipes and applied barrier cream. Skin is intact, no redness. Pt turns side to side without pain or discomfort. PICC patent with blood return. Biliary drain in place. Pt is coughing, non-productive, will monitor. Call light is within reach, bed is lock and is in low position.

## 2018-11-25 NOTE — PROGRESS NOTES
· 2 RN skin check complete.   · Devices in place biliary drain, PICC line.  · Skin assessed under devices: Yes.  · Confirmed pressure ulcers found: None; skin is intact.  · The following interventions in place: waffle overlay on mattress, pt turns self, silicone oxygen tubing, barrier wipes, barrier cream in use..

## 2018-11-26 NOTE — PROGRESS NOTES
Received change of shift report from day RN. Assumed pt. Care at 1900. Pt. Aox4, lying in bed, no signs of distress. Pt. Denies pain. Biliary drain observed, recently flushed, will flush later in shift. Pt. Has no complaints or requests at this time. Bed in lowest position with wheels locked, bed alarm and hourly rounding in place. Call light within reach.

## 2018-11-26 NOTE — CARE PLAN
Problem: Safety  Goal: Will remain free from falls  Outcome: PROGRESSING AS EXPECTED      Problem: Pain Management  Goal: Pain level will decrease to patient's comfort goal  Outcome: PROGRESSING AS EXPECTED  Pt. Verbalizes satisfaction with pain control

## 2018-11-26 NOTE — PROGRESS NOTES
Infectious Disease Progress Note    Author: Morelia Dc M.D. Date & Time of service: 11/26/2018  8:48 AM    Chief Complaint:  Generalized weakness and chills    Interval History:  73-year-old female with metastatic ampullary carcinoma of the pancreas presented with generalized weakness and shaking chills.  11/13/2018-no fevers.  Complains of generalized malaise and fatigue.  Still has some pain in her right shoulder.  WBC count is 22,000.  Cultures remain negative.  11/14/2018 T-max is 98.2.  No new issues overnight.  Underwent IR drainage on 11/13/2018.  Her shoulder pain has eased since then.  11/15/2018 no fevers.  WBC 13.3 creatinine 0.47  11/16/2018 no fevers.  Denies any increased abdominal pain.  The shoulder pain is improved as well.  WBC is 12.8.  11/17/2018 no fevers.  Shoulder pain is much improved.  Denies any new issues.  11/18- no fevers. No new issues> WBC 16.7  11/19/2018 no fevers.  WBC 15.6 no new issues overnight  11/20 AF (99.7) WBC 15.3 states eating better-denies SE abx No abd pain today  11/21 Af (99.9) WBC 9.8 minimal output from drain-plan for PICC today  11/22/2018 T-max is 100.3.  No new issues overnight.  WBCs 10.  AST 40 ALT 17  11/23/2018 no fevers.  The drain is not draining much.  Underwent thoracentesis today.  11/24/2018 underwent another drain placement.  Complains of some abdominal pain otherwise denies any complaints.  WBC is 8.4  11/25/2018 complains of malaise and fatigue.  Complains of some abdominal pain.  WBC 5.9, Labs Reviewed, Medications Reviewed and Radiology Reviewed.  11/26 AF, labs reviewed, imaging reviewed, pt with no complaints     Review of Systems:  Review of Systems   Constitutional: Negative for chills, fever and malaise/fatigue.   Respiratory: Negative for shortness of breath.    Cardiovascular: Negative for chest pain.   Gastrointestinal: Negative for abdominal pain, diarrhea, nausea and vomiting.   Musculoskeletal: Negative for back pain.   Skin:  Negative for rash.       Hemodynamics:  Temp (24hrs), Av.2 °C (97.2 °F), Min:36.1 °C (97 °F), Max:36.4 °C (97.5 °F)  Temperature: 36.1 °C (97 °F)  Pulse  Av.7  Min: 67  Max: 112   Blood Pressure : 122/53       Physical Exam:  Physical Exam   Constitutional: She is oriented to person, place, and time. She appears well-developed and well-nourished.   Cardiovascular: Normal rate, regular rhythm and normal heart sounds.    Pulmonary/Chest: Effort normal and breath sounds normal.   Abdominal: Soft. Bowel sounds are normal. She exhibits distension.   Drain in place, small amount of biliary fluid    Musculoskeletal: Normal range of motion. She exhibits edema.   Neurological: She is alert and oriented to person, place, and time.   Skin: Skin is warm and dry.       Meds:    Current Facility-Administered Medications:   •  furosemide  •  apixaban  •  ipratropium-albuterol  •  potassium chloride SA  •  ampicillin-sulbactam (UNASYN) IV  •  fluconazole  •  senna-docusate **AND** polyethylene glycol/lytes **AND** magnesium hydroxide **AND** bisacodyl  •  Respiratory Care per Protocol  •  ondansetron  •  ondansetron  •  acetaminophen  •  Notify provider if pain remains uncontrolled **AND** Use the numeric rating scale (NRS-11) on regular floors and Critical-Care Pain Observation Tool (CPOT) on ICUs/Trauma to assess pain **AND** Pulse Ox (Oximetry) **AND** Pharmacy Consult Request **AND** If patient difficult to arouse and/or has respiratory depression, stop any opiates that are currently infusing and call a Rapid Response. **AND** oxyCODONE immediate-release **AND** oxyCODONE immediate-release **AND** morphine injection  •  NS  •  omeprazole  •  artificial tears  •  albuterol    Labs:  Recent Labs      18   0059  18   0015  18   0020   WBC  8.4  5.9  7.8   RBC  3.05*  3.03*  2.43*   HEMOGLOBIN  8.7*  8.6*  7.0*   HEMATOCRIT  27.1*  27.5*  22.1*   MCV  88.9  90.8  90.9   MCH  28.5  28.4  28.8   RDW  54.4*   55.6*  56.0*   PLATELETCT  168  133*  178   MPV  9.6  10.2  10.3     Recent Labs      11/24/18   0059  11/25/18   0015  11/26/18   0020   SODIUM  131*  133*  135   POTASSIUM  4.0  4.1  4.9   CHLORIDE  99  103  105   CO2  24  23  24   GLUCOSE  99  122*  109*   BUN  9  7*  9     Recent Labs      11/24/18   0059  11/25/18   0015  11/26/18   0020   ALBUMIN  2.0*  2.0*  2.2*   TBILIRUBIN  0.3  0.3  0.4   ALKPHOSPHAT  146*  137*  143*   TOTPROTEIN  6.1  5.9*  6.7   ALTSGPT  11  12  11   ASTSGOT  28  26  27   CREATININE  0.58  0.61  0.51       Imaging:  Ct-abdomen W/o    Result Date: 11/22/2018 11/22/2018 1:11 PM HISTORY/REASON FOR EXAM:  Liver abscess follow-up. Metastatic ampullary adenocarcinoma TECHNIQUE/EXAM DESCRIPTION: CT scan of the abdomen without contrast. Noncontrast helical sections were obtained from the diaphragmatic domes through the iliac crests Low dose optimization technique was utilized for this CT exam including automated exposure control and adjustment of the mA and/or kV according to patient size. COMPARISON: 11/12/18 and 11/13/2018 FINDINGS: Limited by lack of IV contrast New moderate right low-density layering pleural effusion with adjacent compressive atelectasis Right hepatic pigtail catheter has been displaced and now terminates deep to the anterior rib cage. It is no longer located in the hepatic dome abscess. The abscess is 75 x 48 mm in axial dimensions, diminished from 88 x 64 mm. Again it has fluid and gas  components There is some new left hepatic biliary favored over portal venous gas No abnormal perihepatic fluid collections detected. No visualized abscess is seen in the upper abdomen With lack of contrast it is difficult to evaluate the ampullary region mass. No gross interval change is seen and again there is artifact in the region from embolization. No free air Some mass effect on the third portion of the duodenum secondary to the mass. No adrenal mass or splenomegaly Medium-sized  umbilical hernia contains fat. Previously a small loop of small bowel extending into the defect. No hydronephrosis     Hepatic dome abscess pigtail catheter has pulled out and now terminates just deep to the thoracic cage. Despite this the size of the abscess has diminished measuring 75 x 48 from 88 x 64 mm New mild left hepatic pneumobilia Ampullary region mass is difficult to precisely measure but does appear to exert some mass effect upon the third portion of the duodenum. No bowel obstruction is detected. New medium-sized right ovarian pleural effusion does not have loculation features but there is subsegmental atelectasis    Ct-abdomen-pelvis With    Result Date: 11/12/2018 11/12/2018 1:38 PM HISTORY/REASON FOR EXAM:  Abdominal pain. History of metastatic ampullary adenocarcinoma. TECHNIQUE/EXAM DESCRIPTION: CT scan of the abdomen and pelvis with contrast. Contrast-enhanced helical scanning was obtained from the diaphragmatic domes through the pubic symphysis following the bolus administration of 80 mL of Omnipaque 350 without complication. Low dose optimization technique was utilized for this CT exam including automated exposure control and adjustment of the mA and/or kV according to patient size. COMPARISON: 10/25/2018 FINDINGS: The visualized lung bases are clear. CT Abdomen: A small hiatal hernia is present. There is interval increase in size of an area of low attenuation in the dome of the right lobe of the liver which contains air. This may represent necrosis or post therapy hemorrhage following embolization. This area measures 8.8 x 7.8 x 6.4 cm. Previous measurement was 2.2 cm in maximum dimension. Multiple smaller areas of low-attenuation within the right lobe of liver are present which are suspicious for hepatic metastases. These other smaller low-attenuation areas are without significant change. The gallbladder absent. A low-attenuation area in the region of the pancreatic head is again noted and is  poorly defined measuring approximately 3.1 x 2.6 cm in diameter. This is consistent with the area of ampullary carcinoma. Embolization coils are present in the GDA. There is low attenuation in the pancreatic body proximally which may represent low-attenuation  secondary to pancreatitis or spread of neoplasm. The common duct is dilated proximal to the level of the ampulla measuring a maximum diameter of 1.6 cm. No choledocholithiasis is identified. An enlarged retroperitoneal lymph node between the upper aorta and IVC is unchanged measuring 1.5 cm in diameter. No new area of retroperitoneal adenopathy is identified. No peripancreatic adenopathy is present. The spleen appears normal. The splenic vein and portal vein are patent. The adrenal glands are not enlarged and the kidneys enhance symmetrically. Small simple cysts in the right kidney are stable. There is no lymphadenopathy. The aorta and IVC are normal in caliber. The bowel is without obstruction. The appendix appears normal. There is a small umbilical hernia containing fat. CT Pelvis: There is no lymphadenopathy. No free fluid is present. The bladder appears normal. Uterus as endometrial complex thickening. There is a myomatous type calcification in the right aspect of the uterus which is unchanged. There is no acute inflammatory process.     1.  Interval significant increase in size of low-attenuation area in the dome of the right lobe of liver which contains air. This may represent necrosis or hemorrhage following therapy. Stable appearance of other smaller low-attenuation areas in the right lobe of the liver without evidence of progression of disease in these areas of metastasis. Differential diagnosis would include post therapy infection. 2.  Stable masslike density in the region of the ampulla of Vater consistent with primary carcinoma. Current dimensions are 3.1 x 2.6 cm. 3.  Persistent low-attenuation areas in the pancreatic head and proximal body which  may represent secondary pancreatitis. 4.  Stable upper retroperitoneal adenopathy. 5.  Small umbilical hernia containing fat.    Ct-drain-liver Abscess-cyst    Result Date: 11/13/2018 11/13/2018 4:01 PM HISTORY/REASON FOR EXAM:  liver abscess. Moderate sedation was administered with continuous monitoring of the patient under the direct supervision of  Medications: 2 mg of Versed and 100 mcg of fentanyl Total sedation time: 30 minutes The biopsy/drainage was done utilizing low dose optimization CT techniques including auto modulation for  imaging and low mA CT/fluoroscopy mode for the procedure. TECHNIQUE/EXAM DESCRIPTION AND NUMBER OF VIEWS:  After the informed consent the patient was placed on the CT table on supine position. Localization CT scan demonstrates hypodense area in the right lobe of the liver. The skin over the lesion was prepped. Subsequently after injecting lidocaine a 17-gauge needle was advanced into the hypodense area. Dark-colored fluid was aspirated. A wire was released. Subsequently a 10 Welsh guiding catheter was advanced and placed with its loop in the liver. An approximately 100 mL of fluid was drained. Sample material was sent to the microbiology. The patient tolerated the procedure without any immediate complication.     Successful CT-guided drain placement in the right lobe liver necrosis/abscess.    Ct-head W/o    Result Date: 11/19/2018 11/19/2018 9:49 PM HISTORY/REASON FOR EXAM:  Acute onset of altered mental status and incontinence. History of ampullary region tumor. TECHNIQUE/EXAM DESCRIPTION AND NUMBER OF VIEWS: CT of the head without contrast. The study was performed on a GE CT scanner. Contiguous 5 mm axial sections were obtained from the skull base through the vertex. Up to date radiation dose reduction adjustments have been utilized to meet ALARA standards for radiation dose reduction. COMPARISON:  PET CT 7/12/2018 FINDINGS: The calvariae and skull base are  unremarkable. There are no extraaxial fluid collections. There is a pattern of cerebral atrophy manifest as enlargement of sulcal markings and ventricular prominence.  The ventricular system and basal cisterns are otherwise unremarkable. There are areas of hypodensity in the white matter most consistent with small vessel ischemic change versus demyelination or gliosis. There are no hemorrhagic lesions. There are no mass effects or shift of midline structures. There is a small area of encephalomalacia in the LEFT cerebellum. This is unchanged. The brainstem and posterior fossa structures are otherwise unremarkable. The paranasal sinuses and mastoids in the field of view are unremarkable.     1. Cerebral atrophy. 2. White matter lucencies most consistent with small vessel ischemic change versus demyelination or gliosis. 3. Remote LEFT cerebellar infarction 4. Otherwise, Head CT without contrast with no acute findings. No evidence of acute cerebral infarction, hemorrhage or mass lesion.    Dx-chest-portable (1 View)    Result Date: 11/23/2018 11/23/2018 2:14 PM HISTORY/REASON FOR EXAM:  Pleural Effusion. Status post right thoracentesis TECHNIQUE/EXAM DESCRIPTION AND NUMBER OF VIEWS: Single portable view of the chest. COMPARISON: 11/21/2018 FINDINGS: Single portable view of the chest demonstrates a stable cardiac silhouette and mediastinal contours. Calcification is in the aortic arch. The right hemidiaphragm is elevated. There is mild atelectasis in the right lung base. No pneumothorax is identified. Pigtail catheter projects over the right upper quadrant. There are multiple surgical clips. A right PICC line remains in place.     Mild right basilar atelectasis.    Dx-chest-portable (1 View)    Result Date: 11/21/2018 11/21/2018 5:31 PM HISTORY/REASON FOR EXAM: Cough. Congestion for a week TECHNIQUE/EXAM DESCRIPTION AND NUMBER OF VIEWS: Single AP view of the chest. COMPARISON: November 12 FINDINGS: Hardware: Right  PICC line tip overlies the cavoatrial junction Pigtail catheter overlies the elevated right hemidiaphragm, not fully visualized Lungs: Increasing perihilar opacity with some bronchial thickening noted. Minor fissure is also thickened. Pleura:  No pleural space process is seen. Heart and mediastinum: There is similar hilar and cardiac silhouette enlargement.     New interstitial opacity is worrisome for edema Hilar prominence could be from lymphadenopathy or pulmonary arterial hypertension Stable right hemidiaphragm elevation    Dx-chest-portable (1 View)    Result Date: 11/12/2018 11/12/2018 11:06 AM HISTORY/REASON FOR EXAM:  Sepsis. TECHNIQUE/EXAM DESCRIPTION AND NUMBER OF VIEWS: Single portable view of the chest. COMPARISON: 10/25/2018 FINDINGS: There is no evidence of focal consolidation or evidence of pulmonary edema. There is no evidence of pleural effusion. The heart is normal in size.     No pulmonary consolidation.    [id number]    Result Date: 10/27/2018  10/27/2018 5:00 PM HISTORY/REASON FOR EXAM:  Jaw Pain. TECHNIQUE/EXAM DESCRIPTION AND NUMBER OF VIEWS:  1 view(s) of the mandible. COMPARISON:  None. FINDINGS: Multiple absent teeth. Many of the remaining teeth have filling/crowns. No periapical lucencies seen. No mandibular fracture.     No periapical lucencies. No mandibular fracture.    Ir-cvc Tunnel With Port Removal    Result Date: 10/28/2018   Chest port removal HISTORY/REASON FOR EXAM: Bacteremia, concern for central line infection MEDICATIONS: Conscious sedation with 2 mcg Fentanyl and 50 mg Versed was administered during the procedure with appropriate continuous patient monitoring of cardiorespiratory function by the radiology nurse. Sedation duration: 30 minutes. TECHNIQUE/EXAM DESCRIPTION: Following informed consent patient was prepped and draped in the usual fashion. The procedure was performed using MAXIMAL STERILE BARRIER technique including sterile gown, mask, cap, and donning of  sterile gloves following appropriate hand hygiene and/or  sterile scrub. Patient skin site was prepped with 2% Chlorhexidine solution. A timeout was performed. Local anesthetic result was achieved with administration of copious 1% lidocaine with epinephrine. A skin incision was made with an 15 blade scalpel. Blunt dissection was used to retrieve the port and catheter which were observed to be intact. Hemostasis  was obtained with manual compression. The port pocket showed no signs of gross infection. The port pocket was flushed with normal saline. The port pocket was closed with deep 2-0 Vicryl sutures and a subcutaneous 4-0 Vicryl suture layer, the skin was closed with Dermabond. The patient tolerated procedure well. The skin was cleaned and a dressing was applied. COMPARISON:  None     Successful chest port removal.    Us-thoracentesis Puncture Right    Result Date: 11/23/2018 11/23/2018 1:15 PM HISTORY/REASON FOR EXAM:  Fluid collection TECHNIQUE/EXAM DESCRIPTION: Ultrasound-guided thoracentesis. Indication:  RIGHT pleural fluid collection. COMPARISON:  None PROCEDURE:     Informed consent was obtained. A timeout was taken. A right pleural effusion was localized with real-time ultrasound guidance. The right posterior chest wall was prepped and draped in a sterile manner. Following local anesthesia with 1% lidocaine, a 5 [place] Yueh pigtail catheter was advanced into the pleural space with trocar technique and pleural fluid was drained. The patient tolerated the procedure well without evidence of complication. A post thoracentesis chest radiograph is forthcoming. FINDINGS: Fluid was  sent to the laboratory. Fluid character: straw colored     1. Ultrasound guided right sided diagnostic thoracentesis. 2. 450 mL of fluid withdrawn.    Ir-picc Line Placement    Result Date: 11/21/2018  HISTORY/REASON FOR EXAM:   PICC placement. TECHNIQUE/EXAM DESCRIPTION AND NUMBER OF VIEWS:   PICC line insertion with ultrasound  guidance.  The procedure was performed using maximal sterile barrier technique including sterile gown, mask, cap, and donning of sterile gloves following appropriate hand hygiene and/or sterile scrub. Patient skin site was prepped with 2% Chlorhexidine solution. FINDINGS:  PICC line insertion with Ultrasound Guidance was performed by qualified nursing staff without the assistance of a Radiologist. PICC positioning appropriateness confirmed by 3CG technology; chest xray only needed in the instance 3CG unable to confirm placement.              Ultrasound-guided PICC placement performed by qualified nursing staff as above.     Ir-picc Line Placement    Result Date: 10/29/2018  HISTORY/REASON FOR EXAM:   PICC placement. TECHNIQUE/EXAM DESCRIPTION AND NUMBER OF VIEWS:   PICC line insertion with ultrasound guidance.  The procedure was performed using maximal sterile barrier technique including sterile gown, mask, cap, and donning of sterile gloves following appropriate hand hygiene and/or sterile scrub. Patient skin site was prepped with 2% Chlorhexidine solution. FINDINGS:  PICC line insertion with Ultrasound Guidance was performed by qualified nursing staff without the assistance of a Radiologist. PICC positioning appropriateness confirmed by 3CG technology; chest xray only needed in the instance 3CG unable to confirm placement.              Ultrasound-guided PICC placement performed by qualified nursing staff as above.       Micro:  Results     Procedure Component Value Units Date/Time    FLUID CULTURE W/GRAM STAIN [[id number]] Collected:  11/23/18 1330    Order Status:  Completed Specimen:  Body Fluid Updated:  11/25/18 0914     Significant Indicator NEG     Source BF     Site Thoracentesis Fluid     Culture Result Bdf No growth at 48 hours     Gram Stain Result Few WBCs.  No organisms seen.      Narrative:       RT pl eff 11/23/2018  14:50    FUNGAL CULTURE [[id number]] Collected:  11/23/18 1330    Order Status:   Completed Specimen:  Body Fluid Updated:  11/25/18 0914     Significant Indicator NEG     Source BF     Site Thoracentesis Fluid     Fungal Culture Culture in progress.    Narrative:       RT pl eff 11/23/2018  14:50    AFB CULTURE [[id number]] Collected:  11/23/18 1330    Order Status:  Completed Specimen:  Body Fluid Updated:  11/25/18 0914     Significant Indicator NEG     Source BF     Site Thoracentesis Fluid     AFB Culture Culture in progress.     AFB Smear Results No acid fast bacilli seen.    Narrative:       RT pl eff 11/23/2018  14:50    GRAM STAIN [[id number]] Collected:  11/23/18 1330    Order Status:  Completed Specimen:  Body Fluid Updated:  11/24/18 1953     Significant Indicator .     Source BF     Site Thoracentesis Fluid     Gram Stain Result Few WBCs.  No organisms seen.      Narrative:       RT pl eff 11/23/2018  14:50    ACID FAST STAIN [[id number]] Collected:  11/23/18 1330    Order Status:  Completed Specimen:  Body Fluid Updated:  11/24/18 1953     Significant Indicator NEG     Source BF     Site Thoracentesis Fluid     AFB Smear Results No acid fast bacilli seen.    Narrative:       RT pl eff 11/23/2018  14:50    FLUID CULTURE [[id number]]     Order Status:  No result Specimen:  Body Fluid from Other Body Fluid     FLUID CULTURE W/GRAM STAIN [[id number]]     Order Status:  No result Specimen:  Body Fluid from Thoracentesis Fluid     AFB CULTURE [[id number]]     Order Status:  No result Specimen:  Body Fluid from Thoracentesis Fluid     FUNGAL CULTURE [[id number]]     Order Status:  No result Specimen:  Body Fluid from Thoracentesis Fluid     BLOOD CULTURE [[id number]] Collected:  11/15/18 1807    Order Status:  Completed Specimen:  Blood from Peripheral Updated:  11/20/18 2100     Significant Indicator NEG     Source BLD     Site PERIPHERAL     Blood Culture No growth after 5 days of incubation.    Narrative:       Collected By:[id number] CONCHA VELAZQUEZ  Per Hospital Policy: Only change Specimen  "Src: to \"Line\" if  specified by physician order.    BLOOD CULTURE [[id number]] Collected:  11/15/18 1807    Order Status:  Completed Specimen:  Blood from Peripheral Updated:  11/20/18 2100     Significant Indicator NEG     Source BLD     Site PERIPHERAL     Blood Culture No growth after 5 days of incubation.    Narrative:       Collected By:[id number] CONCHA VELAZQUEZ  Per Hospital Policy: Only change Specimen Src: to \"Line\" if  specified by physician order.    FUNGAL SERGE INTERPRETATION [[id number]] Collected:  11/12/18 1039    Order Status:  Completed Specimen:  Blood Updated:  11/20/18 0833     Significant Indicator NEG     Source BLD     Site PERIPHERAL     Fungal SERGE Interp --     SERGE Interpretative Data:  -  Units: mcg/mL (micrograms/mL)  SDD: Susceptible-dose dependent (SDD category implies  clinical efficacy when higher than normal dosage of a drug  can be used and maximal possible blood level achieved.)  NS: Nonsusceptible (This category is used for organisms that  currently have only a susceptible interpretive category, but  not intermediate or resistant interpretive categories.)  None: No CLSI interpretive guidelines available at this time.  -  If an AMPHOTERICIN B SERGE of >1 mcg/mL is obtained for Candida  spp., that isolate is likely resistant to AMPHOTERICIN B.  There are no CLSI breakpoints.  -  FLUCYTOSINE SERGE breakpoints are based largely on historical  data and partially on the drug's pharmacokinetics.  -  For FLUCONAZOLE, the guidelines are based on extensive  experience with mucosal and invasive infections due to  Candida spp. When an isolate is identified as Candida  glabrata and the SERGE is <32mcg/mL, patients should receive  a maximum dosage regimen of FLUCONAZOLE.  Isolates of Candida  krusei are assumed to be intrinsically resistant to  FLUCONAZOLE and their MICs should not be interpreted using  this scale.  -  For ITRACONAZOLE, the data is based entirely on experience  with mucosal infections, and " "data supporting breakpoints for  invasive infections due to Candida spp. are not available.  -  For ANIDULAFUNGIN, CASPOFUNGIN, MICAFUNGIN, and VORICONZOLE  the data are based substantially on experience with  non-neutropenic patients with candidemia, and their clinical  relevance in other settings is uncertain.  -  For POSACONAZOLE the majority of isolates are inhibited by  <1 mcg/mL.  However, data are not yet available to indicate  a correlation between SERGE and outcome of treatment with this  agent.  -  Susceptible Dose Dependent (SDD) applies to FLUCONAZOLE and  ITRACONAZOLE. Susceptible is dependent on achieving the  maximum possible blood level.  -  The Sensititre YeastOne Susceptibility plates have been  validated for use at [facility]. These  plates are designed for Research Use Only. However, there are  CLSI approved documents for interpretive criteria for  5-FLUCYTOSINE, FLUCONAZOLE, ITRACONAZOLE, VORICONAZOLE, and  the ECHINOCANDINS. As with any in vitro susceptibility  testing method, the results of testing should be correlated  with the patient's clinical response to prescribed therapy.      Narrative:       CALL  [place]  161 tel. [phone],  CALLED  161 tel. [phone] 11/14/2018, 10:06, RB PERF. RESULTS CALLED  TO:[phone]  Per Hospital Policy: Only change Specimen Src: to \"Line\" if  specified by physician order.    BLOOD CULTURE [[id number]]  (Abnormal)  (Susceptibility) Collected:  11/12/18 1039    Order Status:  Completed Specimen:  Blood from Peripheral Updated:  11/20/18 0832     Significant Indicator POS (POS)     Source BLD     Site PERIPHERAL     Blood Culture Growth detected by Bactec instrument. 11/14/2018  10:03 (A)      Candida albicans (A)    Narrative:       CALL  [place]  161 tel. [phone],  CALLED  161 tel. [phone] 11/14/2018, 10:06, RB PERF. RESULTS CALLED  TO:[phone]  Per Hospital Policy: Only change Specimen Src: to \"Line\" if  specified by physician order.    " Culture & Susceptibility     ANGELICA ALBICANS     Antibiotic Sensitivity Microscan Unit Status    Amphoter B 24hr No Interpretation 0.5 mcg/mL Final    Method: SENSITIVITY, SERGE    Anidulafungin 24hr Sensitive <0.015 mcg/mL Final    Method: SENSITIVITY, SERGE    Caspofungin 24hr Sensitive 0.03 mcg/mL Final    Method: SENSITIVITY, SERGE    Fluconazole 24hr Sensitive 0.5 mcg/mL Final    Method: SENSITIVITY, SERGE    Micafungin 24hr Sensitive 0.015 mcg/mL Final    Method: SENSITIVITY, SERGE    Voriconazole 24hr Sensitive <0.008 mcg/mL Final    Method: SENSITIVITY, SERGE                             Assessment:  Active Hospital Problems    Diagnosis   • Bacteremia [R78.81]   • Liver abscess [K75.0]   • Sepsis (HCC) [A41.9]   • 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]   • Fungemia [B49]   • Deep vein thrombosis (CMS-HCC) [I82.409] [I82.409]   • Lactic acidosis [E87.2]   • Pleural effusion [J90]   • Cough [R05]        Plan:  Liver abscess  Afebrile  Resolved leukocytosis  CT scan 11/12 shows area of necrosis/hemorrhage   s/p IR drainage on 11/13/2018  Cultures +  lactobacillus, VRE and Candida albicans and strep viridans  Underwent CT scan on 11/22/2018- size of the abscess has diminished measuring 75 x 48 from 88 x 64 mm but abscess still present and contains air with new mild left hepatic pneumobilia  Underwent another drain placement on 11/24/2018 as catheter was not correctly posittioned   The CT scan showed a collated pleural effusion hence underwent thoracentesis on 11/23/2018.  That fluid shows 2752 WBCs with 46% polys.  Follow the cytology  Linezolid fell off over weekend - restated dapto due to anemia   May be able to go home on Dapto and Invanz with fluconazole\    ---Continue Daptomycin, fluconazole, and Unasyn for at least 4 weeks from prior CT abdomen and then will need to re-image prior to stopping     Bacteremia  Blood cultures + Bacteroides  Repeat blood cultures x2 are negative from 11/15/2018  - see abx  above      Candidemia- Candida albicans  Repeat blood cultures x2 are negative from 11/15/2018  Changed to p.o. Diflucan  - see abx above     --- Obtain TTE to rule-out IE in setting of candidemia   --- Eye exam if visual changes      Pleural effusions  - Thora 11/23, cx with no growth      Leucocytosis-resolved  Multifactorial        Ampullary carcinoma of the pancreas  Contributory factor to above     Prognosis  Guarded     DW Dr Brown

## 2018-11-26 NOTE — PROGRESS NOTES
· 2 RN skin check complete.   · Devices in place: O2 nasal cannula.  · Skin assessed under devices: red, blanching (bilat ears).  · Confirmed pressure ulcers: none  · New potential pressure ulcers: none  · Interventions in place: Pt. Turns self, barrier cream, barrier wipes, waffle cushion, silicone O2 tubing

## 2018-11-27 NOTE — PROGRESS NOTES
Recv'd report from night ROMANA Lowry at 0655, assumed care with ROMANA Lopez.  VSS, lethargic oriented x4, poor appetite. RA, JACQUELINE drain output minimal. 1 assist up to chair. Sea, son present at shift change and lunch.  Plan radiation or PO chemo on hold until 4 weeks of antibiotics and repeat CT per ID.

## 2018-11-27 NOTE — PROGRESS NOTES
Infectious Disease Progress Note    Author: Morelia Dc M.D. Date & Time of service: 11/27/2018  2:23 PM    Chief Complaint:  Liver abscess, candidemia & bacteremia     Interval History:  73-year-old female with metastatic ampullary carcinoma of the pancreas presented with generalized weakness and shaking chills.  11/13/2018-no fevers.  Complains of generalized malaise and fatigue.  Still has some pain in her right shoulder.  WBC count is 22,000.  Cultures remain negative.  11/14/2018 T-max is 98.2.  No new issues overnight.  Underwent IR drainage on 11/13/2018.  Her shoulder pain has eased since then.  11/15/2018 no fevers.  WBC 13.3 creatinine 0.47  11/16/2018 no fevers.  Denies any increased abdominal pain.  The shoulder pain is improved as well.  WBC is 12.8.  11/17/2018 no fevers.  Shoulder pain is much improved.  Denies any new issues.  11/18- no fevers. No new issues> WBC 16.7  11/19/2018 no fevers.  WBC 15.6 no new issues overnight  11/20 AF (99.7) WBC 15.3 states eating better-denies SE abx No abd pain today  11/21 Af (99.9) WBC 9.8 minimal output from drain-plan for PICC today  11/22/2018 T-max is 100.3.  No new issues overnight.  WBCs 10.  AST 40 ALT 17  11/23/2018 no fevers.  The drain is not draining much.  Underwent thoracentesis today.  11/24/2018 underwent another drain placement.  Complains of some abdominal pain otherwise denies any complaints.  WBC is 8.4  11/25/2018 complains of malaise and fatigue.  Complains of some abdominal pain.  WBC 5.9, Labs Reviewed, Medications Reviewed and Radiology Reviewed.  11/26 AF, labs reviewed, imaging reviewed, pt with no complaints   11/27 AF, labs reviewed, pt with cough and somnolence        Review of Systems:  Review of Systems   Constitutional: Positive for malaise/fatigue. Negative for chills and fever.   Respiratory: Positive for cough. Negative for sputum production and shortness of breath.    Cardiovascular: Negative for chest pain and leg  swelling.   Gastrointestinal: Negative for abdominal pain, diarrhea, nausea and vomiting.   Skin: Negative for itching and rash.       Hemodynamics:  Temp (24hrs), Av.7 °C (98 °F), Min:36.4 °C (97.5 °F), Max:37 °C (98.6 °F)  Temperature: 36.7 °C (98.1 °F)  Pulse  Av.7  Min: 67  Max: 112   Blood Pressure : 122/74       Physical Exam:  Physical Exam   Constitutional: She appears well-developed and well-nourished.   Cardiovascular: Normal rate, regular rhythm and normal heart sounds.    Pulmonary/Chest: Effort normal and breath sounds normal.   Abdominal: Soft. Bowel sounds are normal. She exhibits distension. There is no tenderness. There is no rebound.   RUQ abdominal drain in place   Musculoskeletal: Normal range of motion. She exhibits no edema.   Neurological:   Somnolent    Skin: Skin is warm and dry.       Meds:    Current Facility-Administered Medications:   •  [COMPLETED] piperacillin-tazobactam **AND** piperacillin-tazobactam  •  DAPTOmycin  •  furosemide  •  apixaban  •  ipratropium-albuterol  •  potassium chloride SA  •  fluconazole  •  senna-docusate **AND** polyethylene glycol/lytes **AND** magnesium hydroxide **AND** bisacodyl  •  Respiratory Care per Protocol  •  ondansetron  •  ondansetron  •  acetaminophen  •  Notify provider if pain remains uncontrolled **AND** Use the numeric rating scale (NRS-11) on regular floors and Critical-Care Pain Observation Tool (CPOT) on ICUs/Trauma to assess pain **AND** Pulse Ox (Oximetry) **AND** Pharmacy Consult Request **AND** If patient difficult to arouse and/or has respiratory depression, stop any opiates that are currently infusing and call a Rapid Response. **AND** oxyCODONE immediate-release **AND** oxyCODONE immediate-release **AND** morphine injection  •  NS  •  omeprazole  •  artificial tears  •  albuterol    Labs:  Recent Labs      18   0015  18   0020  18   0042   WBC  5.9  7.8  6.1   RBC  3.03*  2.43*  2.99*   HEMOGLOBIN  8.6*   7.0*  8.3*   HEMATOCRIT  27.5*  22.1*  27.0*   MCV  90.8  90.9  90.3   MCH  28.4  28.8  27.8   RDW  55.6*  56.0*  56.2*   PLATELETCT  133*  178  102*   MPV  10.2  10.3  10.3     Recent Labs      11/25/18   0015  11/26/18   0020  11/27/18   0042   SODIUM  133*  135  135   POTASSIUM  4.1  4.9  4.5   CHLORIDE  103  105  107   CO2  23  24  23   GLUCOSE  122*  109*  106*   BUN  7*  9  7*     Recent Labs      11/25/18   0015  11/26/18   0020  11/27/18   0042   ALBUMIN  2.0*  2.2*  2.0*   TBILIRUBIN  0.3  0.4  0.4   ALKPHOSPHAT  137*  143*  135*   TOTPROTEIN  5.9*  6.7  5.9*   ALTSGPT  12  11  10   ASTSGOT  26  27  22   CREATININE  0.61  0.51  0.51       Imaging:  Ct-abdomen W/o    Result Date: 11/22/2018 11/22/2018 1:11 PM HISTORY/REASON FOR EXAM:  Liver abscess follow-up. Metastatic ampullary adenocarcinoma TECHNIQUE/EXAM DESCRIPTION: CT scan of the abdomen without contrast. Noncontrast helical sections were obtained from the diaphragmatic domes through the iliac crests Low dose optimization technique was utilized for this CT exam including automated exposure control and adjustment of the mA and/or kV according to patient size. COMPARISON: 11/12/18 and 11/13/2018 FINDINGS: Limited by lack of IV contrast New moderate right low-density layering pleural effusion with adjacent compressive atelectasis Right hepatic pigtail catheter has been displaced and now terminates deep to the anterior rib cage. It is no longer located in the hepatic dome abscess. The abscess is 75 x 48 mm in axial dimensions, diminished from 88 x 64 mm. Again it has fluid and gas  components There is some new left hepatic biliary favored over portal venous gas No abnormal perihepatic fluid collections detected. No visualized abscess is seen in the upper abdomen With lack of contrast it is difficult to evaluate the ampullary region mass. No gross interval change is seen and again there is artifact in the region from embolization. No free air Some mass  effect on the third portion of the duodenum secondary to the mass. No adrenal mass or splenomegaly Medium-sized umbilical hernia contains fat. Previously a small loop of small bowel extending into the defect. No hydronephrosis     Hepatic dome abscess pigtail catheter has pulled out and now terminates just deep to the thoracic cage. Despite this the size of the abscess has diminished measuring 75 x 48 from 88 x 64 mm New mild left hepatic pneumobilia Ampullary region mass is difficult to precisely measure but does appear to exert some mass effect upon the third portion of the duodenum. No bowel obstruction is detected. New medium-sized right ovarian pleural effusion does not have loculation features but there is subsegmental atelectasis    Ct-abdomen-pelvis With    Result Date: 11/12/2018 11/12/2018 1:38 PM HISTORY/REASON FOR EXAM:  Abdominal pain. History of metastatic ampullary adenocarcinoma. TECHNIQUE/EXAM DESCRIPTION: CT scan of the abdomen and pelvis with contrast. Contrast-enhanced helical scanning was obtained from the diaphragmatic domes through the pubic symphysis following the bolus administration of 80 mL of Omnipaque 350 without complication. Low dose optimization technique was utilized for this CT exam including automated exposure control and adjustment of the mA and/or kV according to patient size. COMPARISON: 10/25/2018 FINDINGS: The visualized lung bases are clear. CT Abdomen: A small hiatal hernia is present. There is interval increase in size of an area of low attenuation in the dome of the right lobe of the liver which contains air. This may represent necrosis or post therapy hemorrhage following embolization. This area measures 8.8 x 7.8 x 6.4 cm. Previous measurement was 2.2 cm in maximum dimension. Multiple smaller areas of low-attenuation within the right lobe of liver are present which are suspicious for hepatic metastases. These other smaller low-attenuation areas are without significant  change. The gallbladder absent. A low-attenuation area in the region of the pancreatic head is again noted and is poorly defined measuring approximately 3.1 x 2.6 cm in diameter. This is consistent with the area of ampullary carcinoma. Embolization coils are present in the GDA. There is low attenuation in the pancreatic body proximally which may represent low-attenuation  secondary to pancreatitis or spread of neoplasm. The common duct is dilated proximal to the level of the ampulla measuring a maximum diameter of 1.6 cm. No choledocholithiasis is identified. An enlarged retroperitoneal lymph node between the upper aorta and IVC is unchanged measuring 1.5 cm in diameter. No new area of retroperitoneal adenopathy is identified. No peripancreatic adenopathy is present. The spleen appears normal. The splenic vein and portal vein are patent. The adrenal glands are not enlarged and the kidneys enhance symmetrically. Small simple cysts in the right kidney are stable. There is no lymphadenopathy. The aorta and IVC are normal in caliber. The bowel is without obstruction. The appendix appears normal. There is a small umbilical hernia containing fat. CT Pelvis: There is no lymphadenopathy. No free fluid is present. The bladder appears normal. Uterus as endometrial complex thickening. There is a myomatous type calcification in the right aspect of the uterus which is unchanged. There is no acute inflammatory process.     1.  Interval significant increase in size of low-attenuation area in the dome of the right lobe of liver which contains air. This may represent necrosis or hemorrhage following therapy. Stable appearance of other smaller low-attenuation areas in the right lobe of the liver without evidence of progression of disease in these areas of metastasis. Differential diagnosis would include post therapy infection. 2.  Stable masslike density in the region of the ampulla of Vater consistent with primary carcinoma. Current  dimensions are 3.1 x 2.6 cm. 3.  Persistent low-attenuation areas in the pancreatic head and proximal body which may represent secondary pancreatitis. 4.  Stable upper retroperitoneal adenopathy. 5.  Small umbilical hernia containing fat.    Ct-drain-liver Abscess-cyst    Result Date: 11/27/2018 11/24/2018 9:02 AM HISTORY/REASON FOR EXAM:  current drain pulled out of abscess, needs to be repositioned. Moderate sedation was administered with continuous monitoring of the patient under the direct supervision of  Medication: 100 mcg of fentanyl and 2 mg of Versed Total sedation time: 30 minutes The biopsy/drainage was done utilizing low dose optimization CT techniques including auto modulation for  imaging and low mA CT/fluoroscopy mode for the procedure. TECHNIQUE/EXAM DESCRIPTION AND NUMBER OF VIEWS:  After the informed consent the patient was placed on the angiographic table and supine position. Localization CT scan demonstrates a low-density area in the right lobe of the liver. The previously placed catheter was migrated upwards. The previously placed catheter was. After injecting lidocaine, a 17-gauge introducer needle was advanced into the fluid collection. After confirming the needle position, a wire was introduced. After dilating the tract, a new 10 [place] pigtail catheter was advanced and placed with its loop in the fluid collection. The tube is attached to the suction bulb. The patient tolerated procedure without any immediate complication.     1.  Successful CT-guided drainage of liver abscess. 2.  The previously placed migrated pigtail drainage catheter was removed.    Ct-drain-liver Abscess-cyst    Result Date: 11/13/2018 11/13/2018 4:01 PM HISTORY/REASON FOR EXAM:  liver abscess. Moderate sedation was administered with continuous monitoring of the patient under the direct supervision of  Medications: 2 mg of Versed and 100 mcg of fentanyl Total sedation time: 30 minutes The  biopsy/drainage was done utilizing low dose optimization CT techniques including auto modulation for  imaging and low mA CT/fluoroscopy mode for the procedure. TECHNIQUE/EXAM DESCRIPTION AND NUMBER OF VIEWS:  After the informed consent the patient was placed on the CT table on supine position. Localization CT scan demonstrates hypodense area in the right lobe of the liver. The skin over the lesion was prepped. Subsequently after injecting lidocaine a 17-gauge needle was advanced into the hypodense area. Dark-colored fluid was aspirated. A wire was released. Subsequently a 10 [place] guiding catheter was advanced and placed with its loop in the liver. An approximately 100 mL of fluid was drained. Sample material was sent to the microbiology. The patient tolerated the procedure without any immediate complication.     Successful CT-guided drain placement in the right lobe liver necrosis/abscess.    Ct-head W/o    Result Date: 11/19/2018 11/19/2018 9:49 PM HISTORY/REASON FOR EXAM:  Acute onset of altered mental status and incontinence. History of ampullary region tumor. TECHNIQUE/EXAM DESCRIPTION AND NUMBER OF VIEWS: CT of the head without contrast. The study was performed on a GE CT scanner. Contiguous 5 mm axial sections were obtained from the skull base through the vertex. Up to date radiation dose reduction adjustments have been utilized to meet ALARA standards for radiation dose reduction. COMPARISON:  PET CT 7/12/2018 FINDINGS: The calvariae and skull base are unremarkable. There are no extraaxial fluid collections. There is a pattern of cerebral atrophy manifest as enlargement of sulcal markings and ventricular prominence.  The ventricular system and basal cisterns are otherwise unremarkable. There are areas of hypodensity in the white matter most consistent with small vessel ischemic change versus demyelination or gliosis. There are no hemorrhagic lesions. There are no mass effects or shift of midline  structures. There is a small area of encephalomalacia in the LEFT cerebellum. This is unchanged. The brainstem and posterior fossa structures are otherwise unremarkable. The paranasal sinuses and mastoids in the field of view are unremarkable.     1. Cerebral atrophy. 2. White matter lucencies most consistent with small vessel ischemic change versus demyelination or gliosis. 3. Remote LEFT cerebellar infarction 4. Otherwise, Head CT without contrast with no acute findings. No evidence of acute cerebral infarction, hemorrhage or mass lesion.    Dx-chest-portable (1 View)    Result Date: 11/23/2018 11/23/2018 2:14 PM HISTORY/REASON FOR EXAM:  Pleural Effusion. Status post right thoracentesis TECHNIQUE/EXAM DESCRIPTION AND NUMBER OF VIEWS: Single portable view of the chest. COMPARISON: 11/21/2018 FINDINGS: Single portable view of the chest demonstrates a stable cardiac silhouette and mediastinal contours. Calcification is in the aortic arch. The right hemidiaphragm is elevated. There is mild atelectasis in the right lung base. No pneumothorax is identified. Pigtail catheter projects over the right upper quadrant. There are multiple surgical clips. A right PICC line remains in place.     Mild right basilar atelectasis.    Dx-chest-portable (1 View)    Result Date: 11/21/2018 11/21/2018 5:31 PM HISTORY/REASON FOR EXAM: Cough. Congestion for a week TECHNIQUE/EXAM DESCRIPTION AND NUMBER OF VIEWS: Single AP view of the chest. COMPARISON: November 12 FINDINGS: Hardware: Right PICC line tip overlies the cavoatrial junction Pigtail catheter overlies the elevated right hemidiaphragm, not fully visualized Lungs: Increasing perihilar opacity with some bronchial thickening noted. Minor fissure is also thickened. Pleura:  No pleural space process is seen. Heart and mediastinum: There is similar hilar and cardiac silhouette enlargement.     New interstitial opacity is worrisome for edema Hilar prominence could be from  lymphadenopathy or pulmonary arterial hypertension Stable right hemidiaphragm elevation    Dx-chest-portable (1 View)    Result Date: 11/12/2018 11/12/2018 11:06 AM HISTORY/REASON FOR EXAM:  Sepsis. TECHNIQUE/EXAM DESCRIPTION AND NUMBER OF VIEWS: Single portable view of the chest. COMPARISON: 10/25/2018 FINDINGS: There is no evidence of focal consolidation or evidence of pulmonary edema. There is no evidence of pleural effusion. The heart is normal in size.     No pulmonary consolidation.    Us-thoracentesis Puncture Right    Result Date: 11/23/2018 11/23/2018 1:15 PM HISTORY/REASON FOR EXAM:  Fluid collection TECHNIQUE/EXAM DESCRIPTION: Ultrasound-guided thoracentesis. Indication:  RIGHT pleural fluid collection. COMPARISON:  None PROCEDURE:     Informed consent was obtained. A timeout was taken. A right pleural effusion was localized with real-time ultrasound guidance. The right posterior chest wall was prepped and draped in a sterile manner. Following local anesthesia with 1% lidocaine, a 5 [place] Nor1[place] pigtail catheter was advanced into the pleural space with trocar technique and pleural fluid was drained. The patient tolerated the procedure well without evidence of complication. A post thoracentesis chest radiograph is forthcoming. FINDINGS: Fluid was  sent to the laboratory. Fluid character: straw colored     1. Ultrasound guided right sided diagnostic thoracentesis. 2. 450 mL of fluid withdrawn.    Ir-picc Line Placement    Result Date: 11/21/2018  HISTORY/REASON FOR EXAM:   PICC placement. TECHNIQUE/EXAM DESCRIPTION AND NUMBER OF VIEWS:   PICC line insertion with ultrasound guidance.  The procedure was performed using maximal sterile barrier technique including sterile gown, mask, cap, and donning of sterile gloves following appropriate hand hygiene and/or sterile scrub. Patient skin site was prepped with 2% Chlorhexidine solution. FINDINGS:  PICC line insertion with Ultrasound Guidance was performed by  qualified nursing staff without the assistance of a Radiologist. PICC positioning appropriateness confirmed by 3CG technology; chest xray only needed in the instance 3CG unable to confirm placement.              Ultrasound-guided PICC placement performed by qualified nursing staff as above.     Ir-picc Line Placement    Result Date: 10/29/2018  HISTORY/REASON FOR EXAM:   PICC placement. TECHNIQUE/EXAM DESCRIPTION AND NUMBER OF VIEWS:   PICC line insertion with ultrasound guidance.  The procedure was performed using maximal sterile barrier technique including sterile gown, mask, cap, and donning of sterile gloves following appropriate hand hygiene and/or sterile scrub. Patient skin site was prepped with 2% Chlorhexidine solution. FINDINGS:  PICC line insertion with Ultrasound Guidance was performed by qualified nursing staff without the assistance of a Radiologist. PICC positioning appropriateness confirmed by 3CG technology; chest xray only needed in the instance 3CG unable to confirm placement.              Ultrasound-guided PICC placement performed by qualified nursing staff as above.       Micro:  Results     Procedure Component Value Units Date/Time    FLUID CULTURE W/GRAM STAIN [[id number]] Collected:  11/23/18 1330    Order Status:  Completed Specimen:  Body Fluid Updated:  11/27/18 0754     Significant Indicator NEG     Source BF     Site Thoracentesis Fluid     Culture Result Bdf No growth at 4 days     Gram Stain Result Few WBCs.  No organisms seen.      Narrative:       RT pl eff 11/23/2018  14:50    FUNGAL CULTURE [[id number]] Collected:  11/23/18 1330    Order Status:  Completed Specimen:  Body Fluid Updated:  11/27/18 0754     Significant Indicator NEG     Source BF     Site Thoracentesis Fluid     Fungal Culture Culture in progress.    Narrative:       RT pl eff 11/23/2018  14:50    AFB CULTURE [[id number]] Collected:  11/23/18 1330    Order Status:  Completed Specimen:  Body Fluid Updated:  11/27/18 0754  "    Significant Indicator NEG     Source BF     Site Thoracentesis Fluid     AFB Culture Culture in progress.     AFB Smear Results No acid fast bacilli seen.    Narrative:       RT pl eff 11/23/2018  14:50    GRAM STAIN [[id number]] Collected:  11/23/18 1330    Order Status:  Completed Specimen:  Body Fluid Updated:  11/24/18 1953     Significant Indicator .     Source BF     Site Thoracentesis Fluid     Gram Stain Result Few WBCs.  No organisms seen.      Narrative:       RT pl eff 11/23/2018  14:50    ACID FAST STAIN [[id number]] Collected:  11/23/18 1330    Order Status:  Completed Specimen:  Body Fluid Updated:  11/24/18 1953     Significant Indicator NEG     Source BF     Site Thoracentesis Fluid     AFB Smear Results No acid fast bacilli seen.    Narrative:       RT pl eff 11/23/2018  14:50    FLUID CULTURE [[id number]]     Order Status:  No result Specimen:  Body Fluid from Other Body Fluid     FLUID CULTURE W/GRAM STAIN [[id number]]     Order Status:  No result Specimen:  Body Fluid from Thoracentesis Fluid     AFB CULTURE [[id number]]     Order Status:  No result Specimen:  Body Fluid from Thoracentesis Fluid     FUNGAL CULTURE [[id number]]     Order Status:  No result Specimen:  Body Fluid from Thoracentesis Fluid     BLOOD CULTURE [[id number]] Collected:  11/15/18 1807    Order Status:  Completed Specimen:  Blood from Peripheral Updated:  11/20/18 2100     Significant Indicator NEG     Source BLD     Site PERIPHERAL     Blood Culture No growth after 5 days of incubation.    Narrative:       Collected By:[id number] CONCHA VELAZQUEZ  Per Hospital Policy: Only change Specimen Src: to \"Line\" if  specified by physician order.    BLOOD CULTURE [[id number]] Collected:  11/15/18 1807    Order Status:  Completed Specimen:  Blood from Peripheral Updated:  11/20/18 2100     Significant Indicator NEG     Source BLD     Site PERIPHERAL     Blood Culture No growth after 5 days of incubation.    Narrative:       Collected " "By:[id number] CONCHA VELAZQUEZ  Per Hospital Policy: Only change Specimen Src: to \"Line\" if  specified by physician order.          Assessment:  Active Hospital Problems    Diagnosis   • Bacteremia [R78.81]   • Liver abscess [K75.0]   • Sepsis (HCC) [A41.9]   • 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]   • Fungemia [B49]   • Deep vein thrombosis (CMS-HCC) [I82.409] [I82.409]   • Lactic acidosis [E87.2]   • Pleural effusion [J90]   • Cough [R05]       Plan:    Liver abscess  Afebrile  Resolved leukocytosis  CT scan 11/12 shows area of necrosis/hemorrhage   s/p IR drainage on 11/13/2018  Cultures +  lactobacillus, VRE and Candida albicans and strep viridans  Underwent CT scan on 11/22/2018- size of the abscess has diminished measuring 75 x 48 from 88 x 64 mm but abscess still present and contains air with new mild left hepatic pneumobilia  Underwent another drain placement on 11/24/2018 as catheter was not correctly posittioned   The CT scan showed a collated pleural effusion hence underwent thoracentesis on 11/23/2018.  That fluid shows 2752 WBCs with 46% polys.  Follow the cytology  Linezolid fell off over weekend - restated dapto due to anemia   May be able to go home on Dapto and Invanz with fluconazole\     ---Continue Daptomycin, fluconazole, and will change Unasyn to Zosyn ( E coli with resistance to ampicillin) for at least 4 weeks from prior CT abdomen on 11/22 and then will need to re-image prior to stopping   --- End on 12/20/18 pending CT, may need to extend      Bacteremia  Blood cultures + Bacteroides  Repeat blood cultures x2 are negative from 11/15/2018  - see abx above      Candidemia- Candida albicans  Repeat blood cultures x2 are negative from 11/15/2018  Changed to p.o. Diflucan  - see abx above      --- Obtain TTE to rule-out IE in setting of candidemia - this may effect abx duration if positive   --- Eye exam if visual changes     Cough   - Unlikely bacterial infection as on broad spectrum abx      Pleural " effusions  - Thora 11/23, cx with no growth   - The CT scan showed a collated pleural effusion hence underwent thoracentesis on 11/23/2018.  That fluid shows 2752 WBCs with 46% polys.  Follow the cytology       Leucocytosis-resolved  Multifactorial        Ampullary carcinoma of the pancreas  Contributory factor to above     Prognosis  Guarded     DW Dr Brown

## 2018-11-27 NOTE — PROGRESS NOTES
Received a call from Rad/Onc per Dr Vigil, radiation still on hold- reason unknown. Dr Vigil not available today. Will follow up with Dr Cedeno about oral chemotherapy.

## 2018-11-27 NOTE — PROGRESS NOTES
· 2 RN skin check complete.   · Devices in place; Oxygen tubing, JACQUELINE to right side.  · Skin assessed under devices; Skin is intact and no redness noted under oxygen tubing.  · Sacrum is red and blanching, heals with blanching redness.  · The following interventions in place; Waffle overlay in place, barrier wipes and barrier cream in use, heals floated to minimize pressure, silicon Oxygen tubing in use.

## 2018-11-27 NOTE — CARE PLAN
Problem: Bowel/Gastric:  Goal: Normal bowel function is maintained or improved  Outcome: PROGRESSING AS EXPECTED      Problem: Urinary Elimination:  Goal: Ability to reestablish a normal urinary elimination pattern will improve  Outcome: PROGRESSING AS EXPECTED  Patient incontinent over night.

## 2018-11-27 NOTE — PROGRESS NOTES
2 RN skin check done with Shawna AVENDANO. Pt incontinent but skin clean dry and intact. Bottom slightly red but blanching. Patient on waffle. JACQUELINE drain site clean dry and intact and 10cc flushed.

## 2018-11-27 NOTE — PROGRESS NOTES
Patient alert and oriented.  in use. Patient weaned off of nasal cannula. Drain to liver to bulb suction flushed with 10 ml normal saline. Patient incontinent. Up with one person assist. Bed alarm in place. PICC with blood return.

## 2018-11-27 NOTE — PROGRESS NOTES
Hospital Medicine Daily Progress Note    Date of Service  11/26/2018    Chief Complaint  73 y.o. female admitted 11/12/2018 with fevers and chills in setting of metastatic ampullary carcioma.    Hospital Course    Patient admitted and started again on IV antibitoics.  She was found to have a liver abscess and required drainage, cultures from this have shown VRE, candida and lactobacillus acidophilus and blood cultures showing candida.  ID has been managing antibiotics and antifungals.       Interval Problem Update  11/20 Patient had episode of incontinence overnight along with confusion and new difficulty following commands.  CT head was done showing no acute findings with suspicion of possible s/e of pain medication.  Her mentation has improved but she continues to have urinary and bowel incontinence, likely with fatigue and now frequent need for toileting due to her antibiotics and IV hydration.  11/21 Patient's cough persisting but she remains afebrile.  I ordered PICC line for placement as patient will need continued antibiotics for 6 week duration, final blood cultures have resulted negative.  CXR ordered and consistent with interstitial edema - will start IV lasix to see if this improves her oxygen saturation and cough. WBC continues to drop.  11/22 Patient states she is feeling slightly better today, having to urinate more often since starting diuretic but lungs are sounding slightly better.  CT abdomen completed and shows abscess is smaller but still present and drain no longer in place.  She also now has a moderate right pleural effusion.  I've requested IR to reposition drain in liver abscess and to also drain what the pleural effusion.  Patient requests a call to be made to her son, Sea.  11/23 Patient had thoracentesis without issue and is breathing slightly better, delay in repositioning of drain due to recent dose of eliquis and need to be out of system for 48 hours to lessen the chance of bleeding.   Repeat culture on fluid ordered.  11/24 Patient had successful placement of drain in liver fluid collection, drainage in JACQUELINE bulb is dark brown without evidence of purulence.  Culture from fluid collected.  Son at bedside when evaluated and he remarked she appears much more awake today and breathing much easier than on days past - explained the need for diuresis and the drainage of the lung have likely contributed to improvement.  11/25 Patient feeling well, denies new complaints.  Will cut back on lasix to once daily and IV to PO.  She is currently on Unasyn and final treatment plan still pending decision by ID.  Cultures from thoracentesis showing no growth.  11/26 Patient states she has had good urinary continence since decreasing lasix to once daily.  No acute changes, ID developing duration of antibiotic treatment.  Radiation therapy to start tomorrow.    Consultants/Specialty  ID - Leela/Honorio    Code Status  full    Disposition  tbd    Review of Systems  Review of Systems   Constitutional: Negative for chills, fever and malaise/fatigue.   HENT: Negative for congestion and sore throat.    Eyes: Negative for blurred vision and photophobia.   Respiratory: Positive for cough (slightly better). Negative for shortness of breath and wheezing.    Cardiovascular: Negative for chest pain, claudication and leg swelling.   Gastrointestinal: Negative for abdominal pain, constipation, diarrhea, heartburn, nausea and vomiting.   Genitourinary: Negative for dysuria and hematuria.   Musculoskeletal: Negative for joint pain and myalgias.   Skin: Negative for itching and rash.   Neurological: Positive for weakness. Negative for dizziness, sensory change, speech change and headaches.   Psychiatric/Behavioral: Negative for depression. The patient is not nervous/anxious and does not have insomnia.         Physical Exam  Temp:  [36.1 °C (97 °F)-37 °C (98.6 °F)] 37 °C (98.6 °F)  Pulse:  [79-89] 79  Resp:  [16-18] 16  BP:  (103-122)/(52-61) 121/61    Physical Exam   Constitutional: She is oriented to person, place, and time. She appears well-developed and well-nourished. No distress.   HENT:   Head: Normocephalic and atraumatic.   Eyes: Conjunctivae are normal. No scleral icterus.   Neck: Neck supple. No JVD present.   Cardiovascular: Normal rate, regular rhythm and normal heart sounds.  Exam reveals no gallop and no friction rub.    No murmur heard.  Pulmonary/Chest: Effort normal. No respiratory distress. She has no rales. She exhibits no tenderness.   Abdominal: Soft. Bowel sounds are normal. She exhibits no distension. There is no guarding.   Musculoskeletal: She exhibits no edema or tenderness.   Lymphadenopathy:     She has no cervical adenopathy.   Neurological: She is alert and oriented to person, place, and time. No cranial nerve deficit.   Skin: Skin is warm and dry. She is not diaphoretic. No erythema. No pallor.   Psychiatric: She has a normal mood and affect. Her behavior is normal.   Nursing note and vitals reviewed.      Fluids    Intake/Output Summary (Last 24 hours) at 11/26/18 2007  Last data filed at 11/26/18 1820   Gross per 24 hour   Intake                0 ml   Output              270 ml   Net             -270 ml       Laboratory  Recent Labs      11/24/18   0059  11/25/18   0015  11/26/18   0020   WBC  8.4  5.9  7.8   RBC  3.05*  3.03*  2.43*   HEMOGLOBIN  8.7*  8.6*  7.0*   HEMATOCRIT  27.1*  27.5*  22.1*   MCV  88.9  90.8  90.9   MCH  28.5  28.4  28.8   MCHC  32.1*  31.3*  31.7*   RDW  54.4*  55.6*  56.0*   PLATELETCT  168  133*  178   MPV  9.6  10.2  10.3     Recent Labs      11/24/18   0059  11/25/18   0015  11/26/18   0020   SODIUM  131*  133*  135   POTASSIUM  4.0  4.1  4.9   CHLORIDE  99  103  105   CO2  24  23  24   GLUCOSE  99  122*  109*   BUN  9  7*  9   CREATININE  0.58  0.61  0.51   CALCIUM  7.6*  7.6*  7.9*     Recent Labs      11/24/18   0304   INR  1.41*               Imaging  US-THORACENTESIS  PUNCTURE RIGHT   Final Result      1. Ultrasound guided right sided diagnostic thoracentesis.      2. 450 mL of fluid withdrawn.      DX-CHEST-PORTABLE (1 VIEW)   Final Result      Mild right basilar atelectasis.      CT-ABDOMEN W/O   Final Result      Hepatic dome abscess pigtail catheter has pulled out and now terminates just deep to the thoracic cage. Despite this the size of the abscess has diminished measuring 75 x 48 from 88 x 64 mm      New mild left hepatic pneumobilia      Ampullary region mass is difficult to precisely measure but does appear to exert some mass effect upon the third portion of the duodenum. No bowel obstruction is detected.      New medium-sized right ovarian pleural effusion does not have loculation features but there is subsegmental atelectasis      DX-CHEST-PORTABLE (1 VIEW)   Final Result      New interstitial opacity is worrisome for edema      Hilar prominence could be from lymphadenopathy or pulmonary arterial hypertension      Stable right hemidiaphragm elevation      IR-PICC LINE PLACEMENT   Final Result                  Ultrasound-guided PICC placement performed by qualified nursing staff as    above.          CT-HEAD W/O   Final Result      1. Cerebral atrophy.   2. White matter lucencies most consistent with small vessel ischemic change versus demyelination or gliosis.   3. Remote LEFT cerebellar infarction   4. Otherwise, Head CT without contrast with no acute findings. No evidence of acute cerebral infarction, hemorrhage or mass lesion.      CT-DRAIN-LIVER ABSCESS-CYST   Final Result      Successful CT-guided drain placement in the right lobe liver necrosis/abscess.      CT-ABDOMEN-PELVIS WITH   Final Result      1.  Interval significant increase in size of low-attenuation area in the dome of the right lobe of liver which contains air. This may represent necrosis or hemorrhage following therapy. Stable appearance of other smaller low-attenuation areas in the    right lobe of  the liver without evidence of progression of disease in these areas of metastasis. Differential diagnosis would include post therapy infection.      2.  Stable masslike density in the region of the ampulla of Vater consistent with primary carcinoma. Current dimensions are 3.1 x 2.6 cm.      3.  Persistent low-attenuation areas in the pancreatic head and proximal body which may represent secondary pancreatitis.      4.  Stable upper retroperitoneal adenopathy.      5.  Small umbilical hernia containing fat.      DX-CHEST-PORTABLE (1 VIEW)   Final Result      No pulmonary consolidation.      CT-DRAIN-LIVER ABSCESS-CYST    (Results Pending)        Assessment/Plan  Liver abscess- (present on admission)   Assessment & Plan    Drain replaced 11/24/18 as previous one no longer in connection to fluid collection on CT, large amount drainage again.    Cultures enterococcus [VRE]  ID following   Continue zosyn, Linazolid and micofungin          Bacteremia- (present on admission)   Assessment & Plan    Received treatment for E. coli bacteremia in early November, her port was removed  Admitted with sepsis  Blood cultures from 11/12/2018 are growing lactobacillus, Candida albicans and strep viridans. Liver abscess cultures from 11/13/2018 are positive for VRE.  Currently on unasyn/PO diflucan ID following.     PICC placed 11/21/18, if IV abx need to continue when patient would be ready to dc, [facility].             Sepsis (HCC)- (present on admission)   Assessment & Plan    This is sepsis (without associated acute organ dysfunction).   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  XRT is planned to start tomorrow       Fungemia   Assessment & Plan    Mycofungin   ID following        Deep vein thrombosis (CMS-HCC) [I82.409]- (present on admission)    Assessment & Plan    eliquis resumed       Pleural effusion   Assessment & Plan    Thoracentesis 11/23/18 - 450cc removed - cytology and studies pending       Cough   Assessment & Plan    Non-productive, CXR concerning for edema  Low likelihood of infection given broad spectrum antibiotics for VRE  Improvement with diuresis  CT shows pleural effusion right lung - thoracentesis ordered in conjunction with drain replacement for liver           Lactic acidosis- (present on admission)   Assessment & Plan    Improved with fluid resuscitation and treatment of infection            VTE prophylaxis: Eliquis

## 2018-11-27 NOTE — CARE PLAN
Problem: Communication  Goal: The ability to communicate needs accurately and effectively will improve  Outcome: PROGRESSING AS EXPECTED      Problem: Safety  Goal: Will remain free from falls  Outcome: PROGRESSING AS EXPECTED      Problem: Pain Management  Goal: Pain level will decrease to patient's comfort goal  Outcome: PROGRESSING AS EXPECTED

## 2018-11-28 NOTE — PROGRESS NOTES
Hospital Medicine Daily Progress Note    Date of Service  11/27/2018    Chief Complaint  73 y.o. female admitted 11/12/2018 with fevers and chills in setting of metastatic ampullary carcioma.    Hospital Course    Patient admitted and started again on IV antibitoics.  She was found to have a liver abscess and required drainage, cultures from this have shown VRE, candida and lactobacillus acidophilus and blood cultures showing candida.  ID has been managing antibiotics and antifungals.       Interval Problem Update  11/20 Patient had episode of incontinence overnight along with confusion and new difficulty following commands.  CT head was done showing no acute findings with suspicion of possible s/e of pain medication.  Her mentation has improved but she continues to have urinary and bowel incontinence, likely with fatigue and now frequent need for toileting due to her antibiotics and IV hydration.  11/21 Patient's cough persisting but she remains afebrile.  I ordered PICC line for placement as patient will need continued antibiotics for 6 week duration, final blood cultures have resulted negative.  CXR ordered and consistent with interstitial edema - will start IV lasix to see if this improves her oxygen saturation and cough. WBC continues to drop.  11/22 Patient states she is feeling slightly better today, having to urinate more often since starting diuretic but lungs are sounding slightly better.  CT abdomen completed and shows abscess is smaller but still present and drain no longer in place.  She also now has a moderate right pleural effusion.  I've requested IR to reposition drain in liver abscess and to also drain what the pleural effusion.  Patient requests a call to be made to her son, Sea.  11/23 Patient had thoracentesis without issue and is breathing slightly better, delay in repositioning of drain due to recent dose of eliquis and need to be out of system for 48 hours to lessen the chance of bleeding.   Repeat culture on fluid ordered.  11/24 Patient had successful placement of drain in liver fluid collection, drainage in JACQUELINE bulb is dark brown without evidence of purulence.  Culture from fluid collected.  Son at bedside when evaluated and he remarked she appears much more awake today and breathing much easier than on days past - explained the need for diuresis and the drainage of the lung have likely contributed to improvement.  11/25 Patient feeling well, denies new complaints.  Will cut back on lasix to once daily and IV to PO.  She is currently on Unasyn and final treatment plan still pending decision by ID.  Cultures from thoracentesis showing no growth.  11/26 Patient states she has had good urinary continence since decreasing lasix to once daily.  No acute changes, ID developing duration of antibiotic treatment.  Radiation therapy to start tomorrow.  11/27:  Ordered TTE per ID recs since VRE, Ecoli found on liver abscess culture.  Explained to patient that since has an active liver abscess, will need to hold on chemo and XRT until antibiotics have adequately eliminated any infection.  Tentative stop date 12/20 with repeat CT abdomen to ensure resolution.  Last CT abdomen 11/22 with abscess size 75mm x 48mm down from prior size 88 x 64mm.  Doing well, no abdominal pain or distention noted on exam, appetite good, afebrile.    Consultants/Specialty  ID - Leela/Honorio    Code Status  full    Disposition  tbd    Review of Systems  Review of Systems   Constitutional: Negative for chills, fever and malaise/fatigue.   HENT: Negative for congestion and sore throat.    Eyes: Negative for blurred vision and photophobia.   Respiratory: Positive for cough (slightly better). Negative for shortness of breath and wheezing.    Cardiovascular: Negative for chest pain, claudication and leg swelling.   Gastrointestinal: Negative for abdominal pain, constipation, diarrhea, heartburn, nausea and vomiting.   Genitourinary:  Negative for dysuria and hematuria.   Musculoskeletal: Negative for joint pain and myalgias.   Skin: Negative for itching and rash.   Neurological: Positive for weakness. Negative for dizziness, sensory change, speech change and headaches.   Psychiatric/Behavioral: Negative for depression. The patient is not nervous/anxious and does not have insomnia.         Physical Exam  Temp:  [36.4 °C (97.5 °F)-36.7 °C (98.1 °F)] 36.4 °C (97.5 °F)  Pulse:  [] 101  Resp:  [17-18] 17  BP: (111-122)/(58-77) 111/77    Physical Exam   Constitutional: She is oriented to person, place, and time. She appears well-developed and well-nourished. No distress.   HENT:   Head: Normocephalic and atraumatic.   Eyes: Conjunctivae are normal. No scleral icterus.   Neck: Neck supple. No JVD present.   Cardiovascular: Normal rate, regular rhythm and normal heart sounds.  Exam reveals no gallop and no friction rub.    No murmur heard.  Pulmonary/Chest: Effort normal. No respiratory distress. She has no rales. She exhibits no tenderness.   Abdominal: Soft. Bowel sounds are normal. She exhibits no distension. There is no guarding.   Musculoskeletal: She exhibits no edema or tenderness.   Lymphadenopathy:     She has no cervical adenopathy.   Neurological: She is alert and oriented to person, place, and time. No cranial nerve deficit.   Skin: Skin is warm and dry. She is not diaphoretic. No erythema. No pallor.   Psychiatric: She has a normal mood and affect. Her behavior is normal.   Nursing note and vitals reviewed.      Fluids    Intake/Output Summary (Last 24 hours) at 11/27/18 2116  Last data filed at 11/27/18 1839   Gross per 24 hour   Intake             1313 ml   Output               60 ml   Net             1253 ml       Laboratory  Recent Labs      11/25/18   0015  11/26/18   0020  11/27/18   0042   WBC  5.9  7.8  6.1   RBC  3.03*  2.43*  2.99*   HEMOGLOBIN  8.6*  7.0*  8.3*   HEMATOCRIT  27.5*  22.1*  27.0*   MCV  90.8  90.9  90.3   MCH   28.4  28.8  27.8   MCHC  31.3*  31.7*  30.7*   RDW  55.6*  56.0*  56.2*   PLATELETCT  133*  178  102*   MPV  10.2  10.3  10.3     Recent Labs      11/25/18   0015  11/26/18   0020  11/27/18   0042   SODIUM  133*  135  135   POTASSIUM  4.1  4.9  4.5   CHLORIDE  103  105  107   CO2  23  24  23   GLUCOSE  122*  109*  106*   BUN  7*  9  7*   CREATININE  0.61  0.51  0.51   CALCIUM  7.6*  7.9*  7.7*                   Imaging  CT-DRAIN-LIVER ABSCESS-CYST   Final Result      1.  Successful CT-guided drainage of liver abscess.   2.  The previously placed migrated pigtail drainage catheter was removed.      US-THORACENTESIS PUNCTURE RIGHT   Final Result      1. Ultrasound guided right sided diagnostic thoracentesis.      2. 450 mL of fluid withdrawn.      DX-CHEST-PORTABLE (1 VIEW)   Final Result      Mild right basilar atelectasis.      CT-ABDOMEN W/O   Final Result      Hepatic dome abscess pigtail catheter has pulled out and now terminates just deep to the thoracic cage. Despite this the size of the abscess has diminished measuring 75 x 48 from 88 x 64 mm      New mild left hepatic pneumobilia      Ampullary region mass is difficult to precisely measure but does appear to exert some mass effect upon the third portion of the duodenum. No bowel obstruction is detected.      New medium-sized right ovarian pleural effusion does not have loculation features but there is subsegmental atelectasis      DX-CHEST-PORTABLE (1 VIEW)   Final Result      New interstitial opacity is worrisome for edema      Hilar prominence could be from lymphadenopathy or pulmonary arterial hypertension      Stable right hemidiaphragm elevation      IR-PICC LINE PLACEMENT   Final Result                  Ultrasound-guided PICC placement performed by qualified nursing staff as    above.          CT-HEAD W/O   Final Result      1. Cerebral atrophy.   2. White matter lucencies most consistent with small vessel ischemic change versus demyelination or  gliosis.   3. Remote LEFT cerebellar infarction   4. Otherwise, Head CT without contrast with no acute findings. No evidence of acute cerebral infarction, hemorrhage or mass lesion.      CT-DRAIN-LIVER ABSCESS-CYST   Final Result      Successful CT-guided drain placement in the right lobe liver necrosis/abscess.      CT-ABDOMEN-PELVIS WITH   Final Result      1.  Interval significant increase in size of low-attenuation area in the dome of the right lobe of liver which contains air. This may represent necrosis or hemorrhage following therapy. Stable appearance of other smaller low-attenuation areas in the    right lobe of the liver without evidence of progression of disease in these areas of metastasis. Differential diagnosis would include post therapy infection.      2.  Stable masslike density in the region of the ampulla of Vater consistent with primary carcinoma. Current dimensions are 3.1 x 2.6 cm.      3.  Persistent low-attenuation areas in the pancreatic head and proximal body which may represent secondary pancreatitis.      4.  Stable upper retroperitoneal adenopathy.      5.  Small umbilical hernia containing fat.      DX-CHEST-PORTABLE (1 VIEW)   Final Result      No pulmonary consolidation.      EC-ECHOCARDIOGRAM COMPLETE W/ CONT    (Results Pending)        Assessment/Plan  Liver abscess- (present on admission)   Assessment & Plan    Drain replaced 11/24/18 as previous one no longer in connection to fluid collection on CT, large amount drainage again.    Cultures enterococcus [VRE]  ID following   Continue daptomycin IV, unasyn iv, fluconozole per ID stop date 12/20 with repeat CT abdomen/pelvis at that time.     Bacteremia- (present on admission)   Assessment & Plan    Received treatment for E. coli bacteremia in early November, her port was removed  Admitted with sepsis  Blood cultures from 11/12/2018 are growing lactobacillus, Candida albicans and strep viridans. Liver abscess cultures from 11/13/2018  are positive for VRE.  Currently on unasyn/PO diflucan ID following.     PICC placed 11/21/18  IV daptomycin, IV unasyn, fluconazole tentative stop date 12/20 per ID, then repeat CT to see if further IV abx needed.  Repeat 11/24 CT guided liver abscess pigtail catheter placed after initial was found to be displaced.             Sepsis (HCC)- (present on admission)   Assessment & Plan    This is sepsis (without associated acute organ dysfunction).   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       Fungemia   Assessment & Plan    Mycofungin   ID following        Deep vein thrombosis (CMS-HCC) [I82.409]- (present on admission)   Assessment & Plan    eliquis resumed       Pleural effusion   Assessment & Plan    Thoracentesis 11/23/18 - 450cc removed - cytology and studies pending       Cough   Assessment & Plan    Non-productive, CXR concerning for edema  Low likelihood of infection given broad spectrum antibiotics for VRE  Improvement with diuresis  CT shows pleural effusion right lung - thoracentesis ordered in conjunction with drain replacement for liver  11/27:  No further cough, lungs CTA b/l.           Lactic acidosis- (present on admission)   Assessment & Plan    Improved with fluid resuscitation and treatment of infection            VTE prophylaxis: Eliquis

## 2018-11-28 NOTE — CARE PLAN
Problem: Pain Management  Goal: Pain level will decrease to patient's comfort goal  Outcome: PROGRESSING AS EXPECTED  Patient appropriately verbalizes 0 to 10 pain scale. Calls for medication as needed.     Problem: Urinary Elimination:  Goal: Ability to reestablish a normal urinary elimination pattern will improve  Outcome: PROGRESSING SLOWER THAN EXPECTED  Patient with urinary frequency and incontinence. Bed alarm in use. Patient educated to call for assistance.

## 2018-11-28 NOTE — PROGRESS NOTES
Received report from night ROMANA Lowry at 0650, assumed care with ROMANA Lopez.   VSS, Fatigued, oriented x 4. Picc with blood return. JACQUELINE drain to suction. Sea, son, present at lunch.   Plan: radiation and PO chemo on hold until 4 weeks of antibiotics and repeat CT per ID.

## 2018-11-28 NOTE — PROGRESS NOTES
Patient oriented. Patient with fatigue. Up to bedside commode. PICC with positive blood return. PICC TKO. Right JACQUELINE drain to bulb suction.

## 2018-11-28 NOTE — PROGRESS NOTES
Infectious Disease Progress Note    Author: Morelia Dc M.D. Date & Time of service: 11/28/2018  8:10 AM    Chief Complaint:  Liver abscess, candidemia & bacteremia    Interval History:  73-year-old female with metastatic ampullary carcinoma of the pancreas presented with generalized weakness and shaking chills.  11/13/2018-no fevers.  Complains of generalized malaise and fatigue.  Still has some pain in her right shoulder.  WBC count is 22,000.  Cultures remain negative.  11/14/2018 T-max is 98.2.  No new issues overnight.  Underwent IR drainage on 11/13/2018.  Her shoulder pain has eased since then.  11/15/2018 no fevers.  WBC 13.3 creatinine 0.47  11/16/2018 no fevers.  Denies any increased abdominal pain.  The shoulder pain is improved as well.  WBC is 12.8.  11/17/2018 no fevers.  Shoulder pain is much improved.  Denies any new issues.  11/18- no fevers. No new issues> WBC 16.7  11/19/2018 no fevers.  WBC 15.6 no new issues overnight  11/20 AF (99.7) WBC 15.3 states eating better-denies SE abx No abd pain today  11/21 Af (99.9) WBC 9.8 minimal output from drain-plan for PICC today  11/22/2018 T-max is 100.3.  No new issues overnight.  WBCs 10.  AST 40 ALT 17  11/23/2018 no fevers.  The drain is not draining much.  Underwent thoracentesis today.  11/24/2018 underwent another drain placement.  Complains of some abdominal pain otherwise denies any complaints.  WBC is 8.4  11/25/2018 complains of malaise and fatigue.  Complains of some abdominal pain.  WBC 5.9, Labs Reviewed, Medications Reviewed and Radiology Reviewed.  11/26 AF, labs reviewed, imaging reviewed, pt with no complaints   11/27 AF, labs reviewed, pt with cough and somnolence    11/28 AF, labs reviewed, TTE ordered due to recent candidemia        Review of Systems:  Review of Systems   Constitutional: Negative for chills and fever.   Respiratory: Negative for cough and shortness of breath.    Gastrointestinal: Negative for abdominal pain,  constipation, diarrhea, nausea and vomiting.   Genitourinary: Positive for urgency.   Musculoskeletal: Negative for back pain and myalgias.   Skin: Negative for rash.       Hemodynamics:  Temp (24hrs), Av.5 °C (97.7 °F), Min:36.2 °C (97.2 °F), Max:36.7 °C (98.1 °F)  Temperature: 36.2 °C (97.2 °F)  Pulse  Av.8  Min: 67  Max: 112   Blood Pressure : 141/54       Physical Exam:  Physical Exam   Constitutional: She appears well-developed and well-nourished.   Cardiovascular: Normal rate, regular rhythm and normal heart sounds.    Pulmonary/Chest: Effort normal. No respiratory distress. She has no wheezes. She has no rales.   Abdominal: Soft. Bowel sounds are normal. There is no tenderness.   RUQ abdominal drain in place    Musculoskeletal: Normal range of motion. She exhibits edema.   Neurological: She is alert.   Skin: Skin is warm and dry.       Meds:    Current Facility-Administered Medications:   •  [COMPLETED] piperacillin-tazobactam **AND** piperacillin-tazobactam  •  DAPTOmycin  •  furosemide  •  apixaban  •  ipratropium-albuterol  •  potassium chloride SA  •  fluconazole  •  senna-docusate **AND** polyethylene glycol/lytes **AND** magnesium hydroxide **AND** bisacodyl  •  Respiratory Care per Protocol  •  ondansetron  •  ondansetron  •  acetaminophen  •  Notify provider if pain remains uncontrolled **AND** Use the numeric rating scale (NRS-11) on regular floors and Critical-Care Pain Observation Tool (CPOT) on ICUs/Trauma to assess pain **AND** Pulse Ox (Oximetry) **AND** Pharmacy Consult Request **AND** If patient difficult to arouse and/or has respiratory depression, stop any opiates that are currently infusing and call a Rapid Response. **AND** oxyCODONE immediate-release **AND** oxyCODONE immediate-release **AND** morphine injection  •  NS  •  omeprazole  •  artificial tears  •  albuterol    Labs:  Recent Labs      18   0020  18   0042  18   0013   WBC  7.8  6.1  8.1   RBC  2.43*   2.99*  3.05*   HEMOGLOBIN  7.0*  8.3*  8.6*   HEMATOCRIT  22.1*  27.0*  27.1*   MCV  90.9  90.3  88.9   MCH  28.8  27.8  28.2   RDW  56.0*  56.2*  54.8*   PLATELETCT  178  102*  110*   MPV  10.3  10.3  11.7     Recent Labs      11/26/18   0020  11/27/18   0042  11/28/18   0013   SODIUM  135  135  133*   POTASSIUM  4.9  4.5  4.4   CHLORIDE  105  107  105   CO2  24  23  20   GLUCOSE  109*  106*  110*   BUN  9  7*  8     Recent Labs      11/26/18   0020  11/27/18   0042  11/28/18   0013   ALBUMIN  2.2*  2.0*  2.1*   TBILIRUBIN  0.4  0.4  0.4   ALKPHOSPHAT  143*  135*  137*   TOTPROTEIN  6.7  5.9*  6.5   ALTSGPT  11  10  11   ASTSGOT  27  22  19   CREATININE  0.51  0.51  0.63       Imaging:  Ct-abdomen W/o    Result Date: 11/22/2018 11/22/2018 1:11 PM HISTORY/REASON FOR EXAM:  Liver abscess follow-up. Metastatic ampullary adenocarcinoma TECHNIQUE/EXAM DESCRIPTION: CT scan of the abdomen without contrast. Noncontrast helical sections were obtained from the diaphragmatic domes through the iliac crests Low dose optimization technique was utilized for this CT exam including automated exposure control and adjustment of the mA and/or kV according to patient size. COMPARISON: 11/12/18 and 11/13/2018 FINDINGS: Limited by lack of IV contrast New moderate right low-density layering pleural effusion with adjacent compressive atelectasis Right hepatic pigtail catheter has been displaced and now terminates deep to the anterior rib cage. It is no longer located in the hepatic dome abscess. The abscess is 75 x 48 mm in axial dimensions, diminished from 88 x 64 mm. Again it has fluid and gas  components There is some new left hepatic biliary favored over portal venous gas No abnormal perihepatic fluid collections detected. No visualized abscess is seen in the upper abdomen With lack of contrast it is difficult to evaluate the ampullary region mass. No gross interval change is seen and again there is artifact in the region from  embolization. No free air Some mass effect on the third portion of the duodenum secondary to the mass. No adrenal mass or splenomegaly Medium-sized umbilical hernia contains fat. Previously a small loop of small bowel extending into the defect. No hydronephrosis     Hepatic dome abscess pigtail catheter has pulled out and now terminates just deep to the thoracic cage. Despite this the size of the abscess has diminished measuring 75 x 48 from 88 x 64 mm New mild left hepatic pneumobilia Ampullary region mass is difficult to precisely measure but does appear to exert some mass effect upon the third portion of the duodenum. No bowel obstruction is detected. New medium-sized right ovarian pleural effusion does not have loculation features but there is subsegmental atelectasis    Ct-abdomen-pelvis With    Result Date: 11/12/2018 11/12/2018 1:38 PM HISTORY/REASON FOR EXAM:  Abdominal pain. History of metastatic ampullary adenocarcinoma. TECHNIQUE/EXAM DESCRIPTION: CT scan of the abdomen and pelvis with contrast. Contrast-enhanced helical scanning was obtained from the diaphragmatic domes through the pubic symphysis following the bolus administration of 80 mL of Omnipaque 350 without complication. Low dose optimization technique was utilized for this CT exam including automated exposure control and adjustment of the mA and/or kV according to patient size. COMPARISON: 10/25/2018 FINDINGS: The visualized lung bases are clear. CT Abdomen: A small hiatal hernia is present. There is interval increase in size of an area of low attenuation in the dome of the right lobe of the liver which contains air. This may represent necrosis or post therapy hemorrhage following embolization. This area measures 8.8 x 7.8 x 6.4 cm. Previous measurement was 2.2 cm in maximum dimension. Multiple smaller areas of low-attenuation within the right lobe of liver are present which are suspicious for hepatic metastases. These other smaller  low-attenuation areas are without significant change. The gallbladder absent. A low-attenuation area in the region of the pancreatic head is again noted and is poorly defined measuring approximately 3.1 x 2.6 cm in diameter. This is consistent with the area of ampullary carcinoma. Embolization coils are present in the GDA. There is low attenuation in the pancreatic body proximally which may represent low-attenuation  secondary to pancreatitis or spread of neoplasm. The common duct is dilated proximal to the level of the ampulla measuring a maximum diameter of 1.6 cm. No choledocholithiasis is identified. An enlarged retroperitoneal lymph node between the upper aorta and IVC is unchanged measuring 1.5 cm in diameter. No new area of retroperitoneal adenopathy is identified. No peripancreatic adenopathy is present. The spleen appears normal. The splenic vein and portal vein are patent. The adrenal glands are not enlarged and the kidneys enhance symmetrically. Small simple cysts in the right kidney are stable. There is no lymphadenopathy. The aorta and IVC are normal in caliber. The bowel is without obstruction. The appendix appears normal. There is a small umbilical hernia containing fat. CT Pelvis: There is no lymphadenopathy. No free fluid is present. The bladder appears normal. Uterus as endometrial complex thickening. There is a myomatous type calcification in the right aspect of the uterus which is unchanged. There is no acute inflammatory process.     1.  Interval significant increase in size of low-attenuation area in the dome of the right lobe of liver which contains air. This may represent necrosis or hemorrhage following therapy. Stable appearance of other smaller low-attenuation areas in the right lobe of the liver without evidence of progression of disease in these areas of metastasis. Differential diagnosis would include post therapy infection. 2.  Stable masslike density in the region of the ampulla of  Vater consistent with primary carcinoma. Current dimensions are 3.1 x 2.6 cm. 3.  Persistent low-attenuation areas in the pancreatic head and proximal body which may represent secondary pancreatitis. 4.  Stable upper retroperitoneal adenopathy. 5.  Small umbilical hernia containing fat.    Ct-drain-liver Abscess-cyst    Result Date: 11/27/2018 11/24/2018 9:02 AM HISTORY/REASON FOR EXAM:  current drain pulled out of abscess, needs to be repositioned. Moderate sedation was administered with continuous monitoring of the patient under the direct supervision of  Medication: 100 mcg of fentanyl and 2 mg of Versed Total sedation time: 30 minutes The biopsy/drainage was done utilizing low dose optimization CT techniques including auto modulation for  imaging and low mA CT/fluoroscopy mode for the procedure. TECHNIQUE/EXAM DESCRIPTION AND NUMBER OF VIEWS:  After the informed consent the patient was placed on the angiographic table and supine position. Localization CT scan demonstrates a low-density area in the right lobe of the liver. The previously placed catheter was migrated upwards. The previously placed catheter was. After injecting lidocaine, a 17-gauge introducer needle was advanced into the fluid collection. After confirming the needle position, a wire was introduced. After dilating the tract, a new 10 [place] pigtail catheter was advanced and placed with its loop in the fluid collection. The tube is attached to the suction bulb. The patient tolerated procedure without any immediate complication.     1.  Successful CT-guided drainage of liver abscess. 2.  The previously placed migrated pigtail drainage catheter was removed.    Ct-drain-liver Abscess-cyst    Result Date: 11/13/2018 11/13/2018 4:01 PM HISTORY/REASON FOR EXAM:  liver abscess. Moderate sedation was administered with continuous monitoring of the patient under the direct supervision of  Medications: 2 mg of Versed and 100  mcg of fentanyl Total sedation time: 30 minutes The biopsy/drainage was done utilizing low dose optimization CT techniques including auto modulation for  imaging and low mA CT/fluoroscopy mode for the procedure. TECHNIQUE/EXAM DESCRIPTION AND NUMBER OF VIEWS:  After the informed consent the patient was placed on the CT table on supine position. Localization CT scan demonstrates hypodense area in the right lobe of the liver. The skin over the lesion was prepped. Subsequently after injecting lidocaine a 17-gauge needle was advanced into the hypodense area. Dark-colored fluid was aspirated. A wire was released. Subsequently a 10 Citizen of [place] guiding catheter was advanced and placed with its loop in the liver. An approximately 100 mL of fluid was drained. Sample material was sent to the microbiology. The patient tolerated the procedure without any immediate complication.     Successful CT-guided drain placement in the right lobe liver necrosis/abscess.    Ct-head W/o    Result Date: 11/19/2018 11/19/2018 9:49 PM HISTORY/REASON FOR EXAM:  Acute onset of altered mental status and incontinence. History of ampullary region tumor. TECHNIQUE/EXAM DESCRIPTION AND NUMBER OF VIEWS: CT of the head without contrast. The study was performed on a GE CT scanner. Contiguous 5 mm axial sections were obtained from the skull base through the vertex. Up to date radiation dose reduction adjustments have been utilized to meet ALARA standards for radiation dose reduction. COMPARISON:  PET CT 7/12/2018 FINDINGS: The calvariae and skull base are unremarkable. There are no extraaxial fluid collections. There is a pattern of cerebral atrophy manifest as enlargement of sulcal markings and ventricular prominence.  The ventricular system and basal cisterns are otherwise unremarkable. There are areas of hypodensity in the white matter most consistent with small vessel ischemic change versus demyelination or gliosis. There are no hemorrhagic lesions.  There are no mass effects or shift of midline structures. There is a small area of encephalomalacia in the LEFT cerebellum. This is unchanged. The brainstem and posterior fossa structures are otherwise unremarkable. The paranasal sinuses and mastoids in the field of view are unremarkable.     1. Cerebral atrophy. 2. White matter lucencies most consistent with small vessel ischemic change versus demyelination or gliosis. 3. Remote LEFT cerebellar infarction 4. Otherwise, Head CT without contrast with no acute findings. No evidence of acute cerebral infarction, hemorrhage or mass lesion.    Dx-chest-portable (1 View)    Result Date: 11/23/2018 11/23/2018 2:14 PM HISTORY/REASON FOR EXAM:  Pleural Effusion. Status post right thoracentesis TECHNIQUE/EXAM DESCRIPTION AND NUMBER OF VIEWS: Single portable view of the chest. COMPARISON: 11/21/2018 FINDINGS: Single portable view of the chest demonstrates a stable cardiac silhouette and mediastinal contours. Calcification is in the aortic arch. The right hemidiaphragm is elevated. There is mild atelectasis in the right lung base. No pneumothorax is identified. Pigtail catheter projects over the right upper quadrant. There are multiple surgical clips. A right PICC line remains in place.     Mild right basilar atelectasis.    Dx-chest-portable (1 View)    Result Date: 11/21/2018 11/21/2018 5:31 PM HISTORY/REASON FOR EXAM: Cough. Congestion for a week TECHNIQUE/EXAM DESCRIPTION AND NUMBER OF VIEWS: Single AP view of the chest. COMPARISON: November 12 FINDINGS: Hardware: Right PICC line tip overlies the cavoatrial junction Pigtail catheter overlies the elevated right hemidiaphragm, not fully visualized Lungs: Increasing perihilar opacity with some bronchial thickening noted. Minor fissure is also thickened. Pleura:  No pleural space process is seen. Heart and mediastinum: There is similar hilar and cardiac silhouette enlargement.     New interstitial opacity is worrisome for  edema Hilar prominence could be from lymphadenopathy or pulmonary arterial hypertension Stable right hemidiaphragm elevation    Dx-chest-portable (1 View)    Result Date: 11/12/2018 11/12/2018 11:06 AM HISTORY/REASON FOR EXAM:  Sepsis. TECHNIQUE/EXAM DESCRIPTION AND NUMBER OF VIEWS: Single portable view of the chest. COMPARISON: 10/25/2018 FINDINGS: There is no evidence of focal consolidation or evidence of pulmonary edema. There is no evidence of pleural effusion. The heart is normal in size.     No pulmonary consolidation.    Us-thoracentesis Puncture Right    Result Date: 11/23/2018 11/23/2018 1:15 PM HISTORY/REASON FOR EXAM:  Fluid collection TECHNIQUE/EXAM DESCRIPTION: Ultrasound-guided thoracentesis. Indication:  RIGHT pleural fluid collection. COMPARISON:  None PROCEDURE:     Informed consent was obtained. A timeout was taken. A right pleural effusion was localized with real-time ultrasound guidance. The right posterior chest wall was prepped and draped in a sterile manner. Following local anesthesia with 1% lidocaine, a 5 [place] pigtail catheter was advanced into the pleural space with trocar technique and pleural fluid was drained. The patient tolerated the procedure well without evidence of complication. A post thoracentesis chest radiograph is forthcoming. FINDINGS: Fluid was  sent to the laboratory. Fluid character: straw colored     1. Ultrasound guided right sided diagnostic thoracentesis. 2. 450 mL of fluid withdrawn.    Ir-picc Line Placement    Result Date: 11/21/2018  HISTORY/REASON FOR EXAM:   PICC placement. TECHNIQUE/EXAM DESCRIPTION AND NUMBER OF VIEWS:   PICC line insertion with ultrasound guidance.  The procedure was performed using maximal sterile barrier technique including sterile gown, mask, cap, and donning of sterile gloves following appropriate hand hygiene and/or sterile scrub. Patient skin site was prepped with 2% Chlorhexidine solution. FINDINGS:  PICC line insertion with  Ultrasound Guidance was performed by qualified nursing staff without the assistance of a Radiologist. PICC positioning appropriateness confirmed by 3CG technology; chest xray only needed in the instance 3CG unable to confirm placement.              Ultrasound-guided PICC placement performed by qualified nursing staff as above.     Ir-picc Line Placement    Result Date: 10/29/2018  HISTORY/REASON FOR EXAM:   PICC placement. TECHNIQUE/EXAM DESCRIPTION AND NUMBER OF VIEWS:   PICC line insertion with ultrasound guidance.  The procedure was performed using maximal sterile barrier technique including sterile gown, mask, cap, and donning of sterile gloves following appropriate hand hygiene and/or sterile scrub. Patient skin site was prepped with 2% Chlorhexidine solution. FINDINGS:  PICC line insertion with Ultrasound Guidance was performed by qualified nursing staff without the assistance of a Radiologist. PICC positioning appropriateness confirmed by 3CG technology; chest xray only needed in the instance 3CG unable to confirm placement.              Ultrasound-guided PICC placement performed by qualified nursing staff as above.       Micro:  Results     Procedure Component Value Units Date/Time    FLUID CULTURE W/GRAM STAIN [[id number]] Collected:  11/23/18 1330    Order Status:  Completed Specimen:  Body Fluid Updated:  11/27/18 0754     Significant Indicator NEG     Source BF     Site Thoracentesis Fluid     Culture Result Bdf No growth at 4 days     Gram Stain Result Few WBCs.  No organisms seen.      Narrative:       RT pl eff 11/23/2018  14:50    FUNGAL CULTURE [[id number]] Collected:  11/23/18 1330    Order Status:  Completed Specimen:  Body Fluid Updated:  11/27/18 0754     Significant Indicator NEG     Source BF     Site Thoracentesis Fluid     Fungal Culture Culture in progress.    Narrative:       RT pl eff 11/23/2018  14:50    AFB CULTURE [[id number]] Collected:  11/23/18 1330    Order Status:  Completed  Specimen:  Body Fluid Updated:  11/27/18 0754     Significant Indicator NEG     Source BF     Site Thoracentesis Fluid     AFB Culture Culture in progress.     AFB Smear Results No acid fast bacilli seen.    Narrative:       RT pl eff 11/23/2018  14:50    GRAM STAIN [[id number]] Collected:  11/23/18 1330    Order Status:  Completed Specimen:  Body Fluid Updated:  11/24/18 1953     Significant Indicator .     Source BF     Site Thoracentesis Fluid     Gram Stain Result Few WBCs.  No organisms seen.      Narrative:       RT pl eff 11/23/2018  14:50    ACID FAST STAIN [[id number]] Collected:  11/23/18 1330    Order Status:  Completed Specimen:  Body Fluid Updated:  11/24/18 1953     Significant Indicator NEG     Source BF     Site Thoracentesis Fluid     AFB Smear Results No acid fast bacilli seen.    Narrative:       RT pl eff 11/23/2018  14:50    FLUID CULTURE [[id number]]     Order Status:  No result Specimen:  Body Fluid from Other Body Fluid     FLUID CULTURE W/GRAM STAIN [[id number]]     Order Status:  No result Specimen:  Body Fluid from Thoracentesis Fluid     AFB CULTURE [[id number]]     Order Status:  No result Specimen:  Body Fluid from Thoracentesis Fluid     FUNGAL CULTURE [[id number]]     Order Status:  No result Specimen:  Body Fluid from Thoracentesis Fluid           Assessment:  Active Hospital Problems    Diagnosis   • Bacteremia [R78.81]   • Liver abscess [K75.0]   • Sepsis (HCC) [A41.9]   • 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]   • Fungemia [B49]   • Deep vein thrombosis (CMS-HCC) [I82.409] [I82.409]   • Lactic acidosis [E87.2]   • Pleural effusion [J90]   • Cough [R05]      Plan:     Liver abscess- bowel dunia on cultures and in setting of bacteremia and pancreatic cancer, no clear bowel perforation but this is a concern and evaluation with  non-contrast CT only, ampullary pancreatic mass and mass effect on duodenum noted     - CT scan 11/12 show with area of necrosis/hemorrhage, s/p IR drainage on  11/13/2018-- Cultures +  lactobacillus, VRE and Candida albicans and strep viridans  - Underwent CT scan on 11/22/2018- size of the abscess has diminished measuring 75 x 48 from 88 x 64 mm but abscess still present and contains air with new mild left hepatic pneumobilia  - Underwent another drain placement on 11/24/2018 as catheter was not correctly posittioned   - The CT scan showed pleural effusion hence underwent thoracentesis on 11/23/2018.  2752 WBCs with 46% polys.  Gram stain and cultures negative.      ---Continue Daptomycin, fluconazole, and Zosyn ( E coli with resistance to ampicillin) for at least 4 weeks from prior CT abdomen on 11/22 and then will need to re-image prior to stopping   --- End on 12/20/18 pending CT, may need to extend      Bacteremia  Blood cultures + Bacteroides  Repeat blood cultures x2 are negative from 11/15/2018  - see abx above      Candidemia- Candida albicans  Repeat blood cultures x2 are negative from 11/15/2018  Changed to p.o. 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     Prognosis  Guarded     DW Dr. [place]

## 2018-11-28 NOTE — THERAPY
"Physical Therapy Treatment completed.   Bed Mobility:  Supine to Sit: Minimal Assist (HOB flat, no rail)  Transfers: Sit to Stand: Stand by Assist  Gait: Level Of Assist: Stand by Assist with Front-Wheel Walker       Plan of Care: Will benefit from Physical Therapy 3 times per week  Discharge Recommendations: Equipment: Will Continue to Assess for Equipment Needs. Post-acute therapy: see below.    Patient with improved strength, coordination, and balance this session allowing for improvements in transfers and gait. Patient continues to be limited by decreased activity tolerance. Patient ambulated in room with FWW with SBA, required min A for bed mobility (with trunk) and sit>stand from toilet (though patient reported higher toilet seat at home, no physical assist required for sit>stand from higher surface). Patient will benefit from continued acute PT services during hospital stay to progress functional mobility and recommend ambulation with nursing to increase activity tolerance. DC disposition dependent on level of assist available at home and patient progression in acute setting. Currently recommend 24/7 supervision if returning to home.    See \"Rehab Therapy-Acute\" Patient Summary Report for complete documentation.       "

## 2018-11-28 NOTE — THERAPY
"Occupational Therapy Treatment completed with focus on ADLs and ADL transfers.  Functional Status:    Pt resting in chair when received by OT. Completed sit><stand with SBA, ambulated to bathroom using FWW. Completed toilet xfer with BSC overlay with SBA. Required max A for pericare. Pt declined further g/h. Returned to chair and sat with SBA.     Plan of Care: Will benefit from Occupational Therapy 3 times per week  Discharge Recommendations:  Equipment Will Continue to Assess for Equipment Needs. Post-acute therapy Recommend home health or outpatient transitional care services for continued occupational therapy services    See \"Rehab Therapy-Acute\" Patient Summary Report for complete documentation.   "

## 2018-11-28 NOTE — PROGRESS NOTES
· 2 RN skin check complete.  · Sacrum with redness and blanching, Heals red and blanching  · The following interventions in place; Waffle overlay on bed, Barrier cream and wipes in use.

## 2018-11-29 PROBLEM — I72.9 PSEUDOANEURYSM FOLLOWING PROCEDURE (HCC): Status: ACTIVE | Noted: 2018-01-01

## 2018-11-29 PROBLEM — T81.718A PSEUDOANEURYSM FOLLOWING PROCEDURE (HCC): Status: ACTIVE | Noted: 2018-01-01

## 2018-11-29 NOTE — CARE PLAN
Problem: Safety  Goal: Will remain free from injury  Outcome: PROGRESSING AS EXPECTED    Goal: Will remain free from falls  Outcome: PROGRESSING AS EXPECTED      Problem: Infection  Goal: Will remain free from infection  Outcome: PROGRESSING AS EXPECTED  Blood cultures drawn today, low grad temperatures

## 2018-11-29 NOTE — PROGRESS NOTES
Infectious Disease Progress Note    Author: Morelia Dc M.D. Date & Time of service: 11/29/2018  8:33 AM    Chief Complaint:  Liver abscess, candidemia & bacteremia    Interval History:  73-year-old female with metastatic ampullary carcinoma of the pancreas presented with generalized weakness and shaking chills.  11/13/2018-no fevers.  Complains of generalized malaise and fatigue.  Still has some pain in her right shoulder.  WBC count is 22,000.  Cultures remain negative.  11/14/2018 T-max is 98.2.  No new issues overnight.  Underwent IR drainage on 11/13/2018.  Her shoulder pain has eased since then.  11/15/2018 no fevers.  WBC 13.3 creatinine 0.47  11/16/2018 no fevers.  Denies any increased abdominal pain.  The shoulder pain is improved as well.  WBC is 12.8.  11/17/2018 no fevers.  Shoulder pain is much improved.  Denies any new issues.  11/18- no fevers. No new issues> WBC 16.7  11/19/2018 no fevers.  WBC 15.6 no new issues overnight  11/20 AF (99.7) WBC 15.3 states eating better-denies SE abx No abd pain today  11/21 Af (99.9) WBC 9.8 minimal output from drain-plan for PICC today  11/22/2018 T-max is 100.3.  No new issues overnight.  WBCs 10.  AST 40 ALT 17  11/23/2018 no fevers.  The drain is not draining much.  Underwent thoracentesis today.  11/24/2018 underwent another drain placement.  Complains of some abdominal pain otherwise denies any complaints.  WBC is 8.4  11/25/2018 complains of malaise and fatigue.  Complains of some abdominal pain.  WBC 5.9, Labs Reviewed, Medications Reviewed and Radiology Reviewed.  11/26 AF, labs reviewed, imaging reviewed, pt with no complaints   11/27 AF, labs reviewed, pt with cough and somnolence    11/28 AF, labs reviewed, TTE ordered due to recent candidemia   11/29, AF, mild tachycardia, satting well on RA, labs reviewed, significant increase in WBC today, TTE with no indication of IE yesterday       Review of Systems:  Review of Systems   Constitutional:  Positive for malaise/fatigue. Negative for chills and fever.   Respiratory: Negative for hemoptysis and shortness of breath.    Gastrointestinal: Positive for abdominal pain. Negative for constipation, diarrhea, nausea and vomiting.   Skin: Negative for rash.   Neurological: Positive for weakness.       Hemodynamics:  Temp (24hrs), Av.6 °C (97.8 °F), Min:36.3 °C (97.4 °F), Max:36.7 °C (98.1 °F)  Temperature: 36.6 °C (97.9 °F)  Pulse  Av.2  Min: 67  Max: 112   Blood Pressure : 139/70       Physical Exam:  Physical Exam   Constitutional: She appears well-developed and well-nourished.   Cardiovascular: Regular rhythm and normal heart sounds.    Tachycardia   Pulmonary/Chest: Effort normal.   Crackles on left, diminished breath sounds on right    Abdominal: Soft. She exhibits no distension. There is tenderness. There is no rebound and no guarding.   Musculoskeletal: She exhibits no edema.   Neurological:   Sleeping but aroused easily    Skin: Skin is warm. She is diaphoretic.       Meds:    Current Facility-Administered Medications:   •  [COMPLETED] piperacillin-tazobactam **AND** piperacillin-tazobactam  •  DAPTOmycin  •  furosemide  •  apixaban  •  ipratropium-albuterol  •  potassium chloride SA  •  fluconazole  •  senna-docusate **AND** polyethylene glycol/lytes **AND** magnesium hydroxide **AND** bisacodyl  •  Respiratory Care per Protocol  •  ondansetron  •  ondansetron  •  acetaminophen  •  Notify provider if pain remains uncontrolled **AND** Use the numeric rating scale (NRS-11) on regular floors and Critical-Care Pain Observation Tool (CPOT) on ICUs/Trauma to assess pain **AND** Pulse Ox (Oximetry) **AND** Pharmacy Consult Request **AND** If patient difficult to arouse and/or has respiratory depression, stop any opiates that are currently infusing and call a Rapid Response. **AND** oxyCODONE immediate-release **AND** oxyCODONE immediate-release **AND** morphine injection  •  NS  •  omeprazole  •   artificial tears  •  albuterol    Labs:  Recent Labs      11/27/18   0042  11/28/18   0013  11/29/18   0045   WBC  6.1  8.1  17.4*   RBC  2.99*  3.05*  3.14*   HEMOGLOBIN  8.3*  8.6*  8.9*   HEMATOCRIT  27.0*  27.1*  27.7*   MCV  90.3  88.9  88.2   MCH  27.8  28.2  28.3   RDW  56.2*  54.8*  54.1*   PLATELETCT  102*  110*  163*   MPV  10.3  11.7  11.5     Recent Labs      11/27/18   0042  11/28/18   0013  11/29/18   0045   SODIUM  135  133*  134*   POTASSIUM  4.5  4.4  4.0   CHLORIDE  107  105  106   CO2  23  20  18*   GLUCOSE  106*  110*  150*   BUN  7*  8  8   CPKTOTAL   --    --   <10     Recent Labs      11/27/18   0042  11/28/18   0013  11/29/18   0045   ALBUMIN  2.0*  2.1*  2.3*   TBILIRUBIN  0.4  0.4  0.4   ALKPHOSPHAT  135*  137*  169*   TOTPROTEIN  5.9*  6.5  7.5   ALTSGPT  10  11  11   ASTSGOT  22  19  24   CREATININE  0.51  0.63  0.78       Imaging:  Ct-abdomen W/o    Result Date: 11/22/2018 11/22/2018 1:11 PM HISTORY/REASON FOR EXAM:  Liver abscess follow-up. Metastatic ampullary adenocarcinoma TECHNIQUE/EXAM DESCRIPTION: CT scan of the abdomen without contrast. Noncontrast helical sections were obtained from the diaphragmatic domes through the iliac crests Low dose optimization technique was utilized for this CT exam including automated exposure control and adjustment of the mA and/or kV according to patient size. COMPARISON: 11/12/18 and 11/13/2018 FINDINGS: Limited by lack of IV contrast New moderate right low-density layering pleural effusion with adjacent compressive atelectasis Right hepatic pigtail catheter has been displaced and now terminates deep to the anterior rib cage. It is no longer located in the hepatic dome abscess. The abscess is 75 x 48 mm in axial dimensions, diminished from 88 x 64 mm. Again it has fluid and gas  components There is some new left hepatic biliary favored over portal venous gas No abnormal perihepatic fluid collections detected. No visualized abscess is seen in the  upper abdomen With lack of contrast it is difficult to evaluate the ampullary region mass. No gross interval change is seen and again there is artifact in the region from embolization. No free air Some mass effect on the third portion of the duodenum secondary to the mass. No adrenal mass or splenomegaly Medium-sized umbilical hernia contains fat. Previously a small loop of small bowel extending into the defect. No hydronephrosis     Hepatic dome abscess pigtail catheter has pulled out and now terminates just deep to the thoracic cage. Despite this the size of the abscess has diminished measuring 75 x 48 from 88 x 64 mm New mild left hepatic pneumobilia Ampullary region mass is difficult to precisely measure but does appear to exert some mass effect upon the third portion of the duodenum. No bowel obstruction is detected. New medium-sized right ovarian pleural effusion does not have loculation features but there is subsegmental atelectasis    Ct-abdomen-pelvis With    Result Date: 11/12/2018 11/12/2018 1:38 PM HISTORY/REASON FOR EXAM:  Abdominal pain. History of metastatic ampullary adenocarcinoma. TECHNIQUE/EXAM DESCRIPTION: CT scan of the abdomen and pelvis with contrast. Contrast-enhanced helical scanning was obtained from the diaphragmatic domes through the pubic symphysis following the bolus administration of 80 mL of Omnipaque 350 without complication. Low dose optimization technique was utilized for this CT exam including automated exposure control and adjustment of the mA and/or kV according to patient size. COMPARISON: 10/25/2018 FINDINGS: The visualized lung bases are clear. CT Abdomen: A small hiatal hernia is present. There is interval increase in size of an area of low attenuation in the dome of the right lobe of the liver which contains air. This may represent necrosis or post therapy hemorrhage following embolization. This area measures 8.8 x 7.8 x 6.4 cm. Previous measurement was 2.2 cm in maximum  dimension. Multiple smaller areas of low-attenuation within the right lobe of liver are present which are suspicious for hepatic metastases. These other smaller low-attenuation areas are without significant change. The gallbladder absent. A low-attenuation area in the region of the pancreatic head is again noted and is poorly defined measuring approximately 3.1 x 2.6 cm in diameter. This is consistent with the area of ampullary carcinoma. Embolization coils are present in the GDA. There is low attenuation in the pancreatic body proximally which may represent low-attenuation  secondary to pancreatitis or spread of neoplasm. The common duct is dilated proximal to the level of the ampulla measuring a maximum diameter of 1.6 cm. No choledocholithiasis is identified. An enlarged retroperitoneal lymph node between the upper aorta and IVC is unchanged measuring 1.5 cm in diameter. No new area of retroperitoneal adenopathy is identified. No peripancreatic adenopathy is present. The spleen appears normal. The splenic vein and portal vein are patent. The adrenal glands are not enlarged and the kidneys enhance symmetrically. Small simple cysts in the right kidney are stable. There is no lymphadenopathy. The aorta and IVC are normal in caliber. The bowel is without obstruction. The appendix appears normal. There is a small umbilical hernia containing fat. CT Pelvis: There is no lymphadenopathy. No free fluid is present. The bladder appears normal. Uterus as endometrial complex thickening. There is a myomatous type calcification in the right aspect of the uterus which is unchanged. There is no acute inflammatory process.     1.  Interval significant increase in size of low-attenuation area in the dome of the right lobe of liver which contains air. This may represent necrosis or hemorrhage following therapy. Stable appearance of other smaller low-attenuation areas in the right lobe of the liver without evidence of progression of  disease in these areas of metastasis. Differential diagnosis would include post therapy infection. 2.  Stable masslike density in the region of the ampulla of Vater consistent with primary carcinoma. Current dimensions are 3.1 x 2.6 cm. 3.  Persistent low-attenuation areas in the pancreatic head and proximal body which may represent secondary pancreatitis. 4.  Stable upper retroperitoneal adenopathy. 5.  Small umbilical hernia containing fat.    Ct-drain-liver Abscess-cyst    Result Date: 11/27/2018 11/24/2018 9:02 AM HISTORY/REASON FOR EXAM:  current drain pulled out of abscess, needs to be repositioned. Moderate sedation was administered with continuous monitoring of the patient under the direct supervision of  Medication: 100 mcg of fentanyl and 2 mg of Versed Total sedation time: 30 minutes The biopsy/drainage was done utilizing low dose optimization CT techniques including auto modulation for  imaging and low mA CT/fluoroscopy mode for the procedure. TECHNIQUE/EXAM DESCRIPTION AND NUMBER OF VIEWS:  After the informed consent the patient was placed on the angiographic table and supine position. Localization CT scan demonstrates a low-density area in the right lobe of the liver. The previously placed catheter was migrated upwards. The previously placed catheter was. After injecting lidocaine, a 17-gauge introducer needle was advanced into the fluid collection. After confirming the needle position, a wire was introduced. After dilating the tract, a new 10 Hebrew pigtail catheter was advanced and placed with its loop in the fluid collection. The tube is attached to the suction bulb. The patient tolerated procedure without any immediate complication.     1.  Successful CT-guided drainage of liver abscess. 2.  The previously placed migrated pigtail drainage catheter was removed.    Ct-drain-liver Abscess-cyst    Result Date: 11/13/2018 11/13/2018 4:01 PM HISTORY/REASON FOR EXAM:  liver abscess.  Moderate sedation was administered with continuous monitoring of the patient under the direct supervision of  Medications: 2 mg of Versed and 100 mcg of fentanyl Total sedation time: 30 minutes The biopsy/drainage was done utilizing low dose optimization CT techniques including auto modulation for  imaging and low mA CT/fluoroscopy mode for the procedure. TECHNIQUE/EXAM DESCRIPTION AND NUMBER OF VIEWS:  After the informed consent the patient was placed on the CT table on supine position. Localization CT scan demonstrates hypodense area in the right lobe of the liver. The skin over the lesion was prepped. Subsequently after injecting lidocaine a 17-gauge needle was advanced into the hypodense area. Dark-colored fluid was aspirated. A wire was released. Subsequently a 10 [place] guiding catheter was advanced and placed with its loop in the liver. An approximately 100 mL of fluid was drained. Sample material was sent to the microbiology. The patient tolerated the procedure without any immediate complication.     Successful CT-guided drain placement in the right lobe liver necrosis/abscess.    Ct-head W/o    Result Date: 11/19/2018 11/19/2018 9:49 PM HISTORY/REASON FOR EXAM:  Acute onset of altered mental status and incontinence. History of ampullary region tumor. TECHNIQUE/EXAM DESCRIPTION AND NUMBER OF VIEWS: CT of the head without contrast. The study was performed on a GE CT scanner. Contiguous 5 mm axial sections were obtained from the skull base through the vertex. Up to date radiation dose reduction adjustments have been utilized to meet ALARA standards for radiation dose reduction. COMPARISON:  PET CT 7/12/2018 FINDINGS: The calvariae and skull base are unremarkable. There are no extraaxial fluid collections. There is a pattern of cerebral atrophy manifest as enlargement of sulcal markings and ventricular prominence.  The ventricular system and basal cisterns are otherwise unremarkable. There  are areas of hypodensity in the white matter most consistent with small vessel ischemic change versus demyelination or gliosis. There are no hemorrhagic lesions. There are no mass effects or shift of midline structures. There is a small area of encephalomalacia in the LEFT cerebellum. This is unchanged. The brainstem and posterior fossa structures are otherwise unremarkable. The paranasal sinuses and mastoids in the field of view are unremarkable.     1. Cerebral atrophy. 2. White matter lucencies most consistent with small vessel ischemic change versus demyelination or gliosis. 3. Remote LEFT cerebellar infarction 4. Otherwise, Head CT without contrast with no acute findings. No evidence of acute cerebral infarction, hemorrhage or mass lesion.    Dx-chest-portable (1 View)    Result Date: 11/23/2018 11/23/2018 2:14 PM HISTORY/REASON FOR EXAM:  Pleural Effusion. Status post right thoracentesis TECHNIQUE/EXAM DESCRIPTION AND NUMBER OF VIEWS: Single portable view of the chest. COMPARISON: 11/21/2018 FINDINGS: Single portable view of the chest demonstrates a stable cardiac silhouette and mediastinal contours. Calcification is in the aortic arch. The right hemidiaphragm is elevated. There is mild atelectasis in the right lung base. No pneumothorax is identified. Pigtail catheter projects over the right upper quadrant. There are multiple surgical clips. A right PICC line remains in place.     Mild right basilar atelectasis.    Dx-chest-portable (1 View)    Result Date: 11/21/2018 11/21/2018 5:31 PM HISTORY/REASON FOR EXAM: Cough. Congestion for a week TECHNIQUE/EXAM DESCRIPTION AND NUMBER OF VIEWS: Single AP view of the chest. COMPARISON: November 12 FINDINGS: Hardware: Right PICC line tip overlies the cavoatrial junction Pigtail catheter overlies the elevated right hemidiaphragm, not fully visualized Lungs: Increasing perihilar opacity with some bronchial thickening noted. Minor fissure is also thickened. Pleura:  No  pleural space process is seen. Heart and mediastinum: There is similar hilar and cardiac silhouette enlargement.     New interstitial opacity is worrisome for edema Hilar prominence could be from lymphadenopathy or pulmonary arterial hypertension Stable right hemidiaphragm elevation    Dx-chest-portable (1 View)    Result Date: 11/12/2018 11/12/2018 11:06 AM HISTORY/REASON FOR EXAM:  Sepsis. TECHNIQUE/EXAM DESCRIPTION AND NUMBER OF VIEWS: Single portable view of the chest. COMPARISON: 10/25/2018 FINDINGS: There is no evidence of focal consolidation or evidence of pulmonary edema. There is no evidence of pleural effusion. The heart is normal in size.     No pulmonary consolidation.    Us-thoracentesis Puncture Right    Result Date: 11/23/2018 11/23/2018 1:15 PM HISTORY/REASON FOR EXAM:  Fluid collection TECHNIQUE/EXAM DESCRIPTION: Ultrasound-guided thoracentesis. Indication:  RIGHT pleural fluid collection. COMPARISON:  None PROCEDURE:     Informed consent was obtained. A timeout was taken. A right pleural effusion was localized with real-time ultrasound guidance. The right posterior chest wall was prepped and draped in a sterile manner. Following local anesthesia with 1% lidocaine, a 5 [employer] pigtail catheter was advanced into the pleural space with trocar technique and pleural fluid was drained. The patient tolerated the procedure well without evidence of complication. A post thoracentesis chest radiograph is forthcoming. FINDINGS: Fluid was  sent to the laboratory. Fluid character: straw colored     1. Ultrasound guided right sided diagnostic thoracentesis. 2. 450 mL of fluid withdrawn.    Ir-picc Line Placement    Result Date: 11/21/2018  HISTORY/REASON FOR EXAM:   PICC placement. TECHNIQUE/EXAM DESCRIPTION AND NUMBER OF VIEWS:   PICC line insertion with ultrasound guidance.  The procedure was performed using maximal sterile barrier technique including sterile gown, mask, cap, and donning of sterile gloves  following appropriate hand hygiene and/or sterile scrub. Patient skin site was prepped with 2% Chlorhexidine solution. FINDINGS:  PICC line insertion with Ultrasound Guidance was performed by qualified nursing staff without the assistance of a Radiologist. PICC positioning appropriateness confirmed by 3CG technology; chest xray only needed in the instance 3CG unable to confirm placement.              Ultrasound-guided PICC placement performed by qualified nursing staff as above.     Ec-echocardiogram Complete W/o Cont    Result Date: 2018  Transthoracic Echo Report Echocardiography Laboratory CONCLUSIONS No prior study is available for comparison. Normal transthoracic echocardiogram. SHERRIE, JANELLE Exam Date:         2018                    08:43 Exam Location:     Inpatient Priority:          Routine Ordering Physician:        ADAMA STOCKTON Referring Physician: Sonographer:               Maisha Leavitt RDCS Age:    73     Gender:    F MRN:    [id number] :    1945 BSA:    1.64   Ht (in):    60     Wt (lb):    148 Exam Type:     Complete Indications:     Endocarditis and heart valve disorders in diseases                  classified elsewhere ICD Codes:       I39 CPT Codes:       02433 BP:   111    /   77     HR:   79 Technical Quality:       Fair MEASUREMENTS  (Male / Female) Normal Values 2D ECHO LV Diastolic Diameter PLAX        3.5 cm                4.2 - 5.9 / 3.9 - 5.3 cm LV Systolic Diameter PLAX         2.1 cm                2.1 - 4.0 cm IVS Diastolic Thickness           1 cm                  LVPW Diastolic Thickness          1.1 cm                LVOT Diameter                     1.6 cm                RA Diameter                       2.9 cm                Estimated LV Ejection Fraction    65 %                  LV Ejection Fraction MOD BP       69.8 %                >= 55  % LV Ejection Fraction MOD 4C       61.7 %                LV Ejection Fraction MOD 2C       80.8 %                 LA Volume Index                   10.7 cm³/m²           16 - 28 cm³/m² IVC Diameter                      1.7 cm                M-MODE Aortic Root Diameter MM           2.4 cm                DOPPLER AV Peak Velocity                  1.4 m/s               AV Peak Gradient                  8 mmHg                AV Mean Gradient                  3.4 mmHg              LVOT Peak Velocity                0.78 m/s              AV Area Cont Eq vti               1.3 cm²               Mitral E Point Velocity           0.52 m/s              Mitral E to A Ratio               0.53                  MV Pressure Half Time             64.3 ms               MV Area PHT                       3.4 cm²               MV Deceleration Time              222 ms                TR Peak Velocity                  235 cm/s              PV Peak Velocity                  0.84 m/s              PV Peak Gradient                  2.8 mmHg              RVOT Peak Velocity                0.69 m/s              * Indicates values subject to auto-interpretation LV EF:  65    % FINDINGS Left Ventricle Normal left ventricular size and systolic function. Normal left ventricular wall thickness. Normal diastolic function. Normal regional wall motion. Left ventricular ejection fraction is visually estimated to be 65%. Right Ventricle Normal right ventricular size and systolic function. Right Atrium Normal right atrial size. Normal inferior vena cava size and inspiratory collapse. Left Atrium Normal left atrial size. Mitral Valve Structurally normal mitral valve. Mild mitral regurgitation. No mitral stenosis. Aortic Valve Structurally normal aortic valve without significant stenosis or regurgitation. Tricuspid Valve Structurally normal tricuspid valve. Mild tricuspid regurgitation. Right atrial pressure is estimated to be 3 mmHg. Estimated right ventricular systolic pressure  is 35 mmHg. Pulmonic Valve Structurally normal pulmonic valve without significant stenosis or  regurgitation. Pericardium Normal pericardium without effusion. Aorta The aortic root is normal. Gurwinder Mayberry MD (Electronically Signed) Final Date:     28 November 2018                 09:42      Micro:  Results     Procedure Component Value Units Date/Time    AFB CULTURE [[id number]] Collected:  11/23/18 1330    Order Status:  Completed Specimen:  Body Fluid Updated:  11/29/18 0804     Significant Indicator NEG     Source BF     Site Thoracentesis Fluid     AFB Culture Culture in progress.     AFB Smear Results No acid fast bacilli seen.    Narrative:       RT pl eff 11/23/2018  14:50    FLUID CULTURE W/GRAM STAIN [[id number]] Collected:  11/23/18 1330    Order Status:  Completed Specimen:  Body Fluid Updated:  11/29/18 0804     Gram Stain Result Few WBCs.  No organisms seen.       Significant Indicator NEG     Source BF     Site Thoracentesis Fluid     Culture Result Bdf No growth at 5 days    Narrative:       RT pl eff 11/23/2018  14:50    FUNGAL CULTURE [[id number]] Collected:  11/23/18 1330    Order Status:  Completed Specimen:  Body Fluid Updated:  11/29/18 0804     Significant Indicator NEG     Source BF     Site Thoracentesis Fluid     Fungal Culture Culture in progress.    Narrative:       RT pl eff 11/23/2018  14:50    GRAM STAIN [[id number]] Collected:  11/23/18 1330    Order Status:  Completed Specimen:  Body Fluid Updated:  11/24/18 1953     Significant Indicator .     Source BF     Site Thoracentesis Fluid     Gram Stain Result Few WBCs.  No organisms seen.      Narrative:       RT pl eff 11/23/2018  14:50    ACID FAST STAIN [[id number]] Collected:  11/23/18 1330    Order Status:  Completed Specimen:  Body Fluid Updated:  11/24/18 1953     Significant Indicator NEG     Source BF     Site Thoracentesis Fluid     AFB Smear Results No acid fast bacilli seen.    Narrative:       RT pl eff 11/23/2018  14:50    FLUID CULTURE [[id number]]     Order Status:  No result Specimen:  Body Fluid from Other Body  Fluid     FLUID CULTURE W/GRAM STAIN [[id number]]     Order Status:  No result Specimen:  Body Fluid from Thoracentesis Fluid     AFB CULTURE [[id number]]     Order Status:  No result Specimen:  Body Fluid from Thoracentesis Fluid     FUNGAL CULTURE [[id number]]     Order Status:  No result Specimen:  Body Fluid from Thoracentesis Fluid           Assessment:  Active Hospital Problems    Diagnosis   • Bacteremia [R78.81]   • Liver abscess [K75.0]   • Sepsis (HCC) [A41.9]   • 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]   • Fungemia [B49]   • Deep vein thrombosis (CMS-HCC) [I82.409] [I82.409]   • Lactic acidosis [E87.2]   • Pleural effusion [J90]   • Cough [R05]        Plan:    Leukocytosis  - New,  17.4 from 8.1    - Unclear etiology, no fevers, satting well on RA  --- Repeat blood cultures     Bloody drainage from abdominal drain- new   - Reports new abdominal pain overnight but now improved   --- CT C/A/P to assess for changes, new bleeding, perforations   --- H&H       Liver abscess- bowel dunia on cultures and in setting of bacteremia and pancreatic cancer, no clear bowel perforation but this is a concern and evaluation with  non-contrast CT only, ampullary pancreatic mass and mass effect on duodenum noted      - CT scan 11/12 show with area of necrosis/hemorrhage, s/p IR drainage on 11/13/2018-- Cultures +  lactobacillus, VRE and Candida albicans and strep viridans  - Underwent CT scan on 11/22/2018- size of the abscess has diminished measuring 75 x 48 from 88 x 64 mm but abscess still present and contains air with new mild left hepatic pneumobilia  - Underwent another drain placement on 11/24/2018 as catheter was not correctly posittioned   - The CT scan showed pleural effusion hence underwent thoracentesis on 11/23/2018.  2752 WBCs with 46% polys.  Gram stain and cultures negative.      ---Continue Daptomycin, fluconazole, and Zosyn ( E coli with resistance to ampicillin) for at least 4 weeks from prior CT abdomen on 11/22  and then will need to re-image prior to stopping   --- End on 12/20/18 pending CT, may need to extend      Bacteremia  Blood cultures + Bacteroides  Repeat blood cultures x2 are negative from 11/15/2018  - see abx above      Candidemia- Candida albicans  Repeat blood cultures x2 are negative from 11/15/2018  Changed to p.o. 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     Prognosis  Guarded     DW primary team.

## 2018-11-29 NOTE — PROGRESS NOTES
Hospital Medicine Daily Progress Note    Date of Service  11/28/2018    Chief Complaint  73 y.o. female admitted 11/12/2018 with fevers and chills in setting of metastatic ampullary carcioma.    Hospital Course    Patient admitted and started again on IV antibitoics.  She was found to have a liver abscess and required drainage, cultures from this have shown VRE, candida and lactobacillus acidophilus and blood cultures showing candida.  ID has been managing antibiotics and antifungals.       Interval Problem Update  11/20 Patient had episode of incontinence overnight along with confusion and new difficulty following commands.  CT head was done showing no acute findings with suspicion of possible s/e of pain medication.  Her mentation has improved but she continues to have urinary and bowel incontinence, likely with fatigue and now frequent need for toileting due to her antibiotics and IV hydration.  11/21 Patient's cough persisting but she remains afebrile.  I ordered PICC line for placement as patient will need continued antibiotics for 6 week duration, final blood cultures have resulted negative.  CXR ordered and consistent with interstitial edema - will start IV lasix to see if this improves her oxygen saturation and cough. WBC continues to drop.  11/22 Patient states she is feeling slightly better today, having to urinate more often since starting diuretic but lungs are sounding slightly better.  CT abdomen completed and shows abscess is smaller but still present and drain no longer in place.  She also now has a moderate right pleural effusion.  I've requested IR to reposition drain in liver abscess and to also drain what the pleural effusion.  Patient requests a call to be made to her son, Sea.  11/23 Patient had thoracentesis without issue and is breathing slightly better, delay in repositioning of drain due to recent dose of eliquis and need to be out of system for 48 hours to lessen the chance of bleeding.   Repeat culture on fluid ordered.  11/24 Patient had successful placement of drain in liver fluid collection, drainage in JACQUELINE bulb is dark brown without evidence of purulence.  Culture from fluid collected.  Son at bedside when evaluated and he remarked she appears much more awake today and breathing much easier than on days past - explained the need for diuresis and the drainage of the lung have likely contributed to improvement.  11/25 Patient feeling well, denies new complaints.  Will cut back on lasix to once daily and IV to PO.  She is currently on Unasyn and final treatment plan still pending decision by ID.  Cultures from thoracentesis showing no growth.  11/26 Patient states she has had good urinary continence since decreasing lasix to once daily.  No acute changes, ID developing duration of antibiotic treatment.  Radiation therapy to start tomorrow.  11/27:  Ordered TTE per ID recs since VRE, Ecoli found on liver abscess culture.  Explained to patient that since has an active liver abscess, will need to hold on chemo and XRT until antibiotics have adequately eliminated any infection.  Tentative stop date 12/20 with repeat CT abdomen to ensure resolution.  Last CT abdomen 11/22 with abscess size 75mm x 48mm down from prior size 88 x 64mm.  Doing well, no abdominal pain or distention noted on exam, appetite good, afebrile.  11/28:  Normal echo EF 65%.  No abdominal pain, eating well, no fevers.    Consultants/Specialty  ID - Leela/Honorio    Code Status  full    Disposition  tbd    Review of Systems  Review of Systems   Constitutional: Negative for chills, fever and malaise/fatigue.   HENT: Negative for congestion and sore throat.    Eyes: Negative for blurred vision and photophobia.   Respiratory: Positive for cough (slightly better). Negative for shortness of breath and wheezing.    Cardiovascular: Negative for chest pain, claudication and leg swelling.   Gastrointestinal: Negative for abdominal pain,  constipation, diarrhea, heartburn, nausea and vomiting.   Genitourinary: Negative for dysuria and hematuria.   Musculoskeletal: Negative for joint pain and myalgias.   Skin: Negative for itching and rash.   Neurological: Positive for weakness. Negative for dizziness, sensory change, speech change and headaches.   Psychiatric/Behavioral: Negative for depression. The patient is not nervous/anxious and does not have insomnia.         Physical Exam  Temp:  [36.2 °C (97.2 °F)-36.7 °C (98.1 °F)] 36.6 °C (97.8 °F)  Pulse:  [] 108  Resp:  [14-18] 18  BP: (110-141)/(54-86) 110/75    Physical Exam   Constitutional: She is oriented to person, place, and time. She appears well-developed and well-nourished. No distress.   HENT:   Head: Normocephalic and atraumatic.   Eyes: Conjunctivae are normal. No scleral icterus.   Neck: Neck supple. No JVD present.   Cardiovascular: Normal rate, regular rhythm and normal heart sounds.  Exam reveals no gallop and no friction rub.    No murmur heard.  Pulmonary/Chest: Effort normal. No respiratory distress. She has no rales. She exhibits no tenderness.   Abdominal: Soft. Bowel sounds are normal. She exhibits no distension. There is no guarding.   Musculoskeletal: She exhibits no edema or tenderness.   Lymphadenopathy:     She has no cervical adenopathy.   Neurological: She is alert and oriented to person, place, and time. No cranial nerve deficit.   Skin: Skin is warm and dry. She is not diaphoretic. No erythema. No pallor.   Psychiatric: She has a normal mood and affect. Her behavior is normal.   Nursing note and vitals reviewed.      Fluids    Intake/Output Summary (Last 24 hours) at 11/28/18 1902  Last data filed at 11/28/18 1800   Gross per 24 hour   Intake                0 ml   Output               30 ml   Net              -30 ml       Laboratory  Recent Labs      11/26/18   0020  11/27/18   0042  11/28/18   0013   WBC  7.8  6.1  8.1   RBC  2.43*  2.99*  3.05*   HEMOGLOBIN  7.0*   8.3*  8.6*   HEMATOCRIT  22.1*  27.0*  27.1*   MCV  90.9  90.3  88.9   MCH  28.8  27.8  28.2   MCHC  31.7*  30.7*  31.7*   RDW  56.0*  56.2*  54.8*   PLATELETCT  178  102*  110*   MPV  10.3  10.3  11.7     Recent Labs      11/26/18   0020  11/27/18   0042  11/28/18   0013   SODIUM  135  135  133*   POTASSIUM  4.9  4.5  4.4   CHLORIDE  105  107  105   CO2  24  23  20   GLUCOSE  109*  106*  110*   BUN  9  7*  8   CREATININE  0.51  0.51  0.63   CALCIUM  7.9*  7.7*  7.9*                   Imaging  EC-ECHOCARDIOGRAM COMPLETE W/O CONT   Final Result      CT-DRAIN-LIVER ABSCESS-CYST   Final Result      1.  Successful CT-guided drainage of liver abscess.   2.  The previously placed migrated pigtail drainage catheter was removed.      US-THORACENTESIS PUNCTURE RIGHT   Final Result      1. Ultrasound guided right sided diagnostic thoracentesis.      2. 450 mL of fluid withdrawn.      DX-CHEST-PORTABLE (1 VIEW)   Final Result      Mild right basilar atelectasis.      CT-ABDOMEN W/O   Final Result      Hepatic dome abscess pigtail catheter has pulled out and now terminates just deep to the thoracic cage. Despite this the size of the abscess has diminished measuring 75 x 48 from 88 x 64 mm      New mild left hepatic pneumobilia      Ampullary region mass is difficult to precisely measure but does appear to exert some mass effect upon the third portion of the duodenum. No bowel obstruction is detected.      New medium-sized right ovarian pleural effusion does not have loculation features but there is subsegmental atelectasis      DX-CHEST-PORTABLE (1 VIEW)   Final Result      New interstitial opacity is worrisome for edema      Hilar prominence could be from lymphadenopathy or pulmonary arterial hypertension      Stable right hemidiaphragm elevation      IR-PICC LINE PLACEMENT   Final Result                  Ultrasound-guided PICC placement performed by qualified nursing staff as    above.          CT-HEAD W/O   Final Result       1. Cerebral atrophy.   2. White matter lucencies most consistent with small vessel ischemic change versus demyelination or gliosis.   3. Remote LEFT cerebellar infarction   4. Otherwise, Head CT without contrast with no acute findings. No evidence of acute cerebral infarction, hemorrhage or mass lesion.      CT-DRAIN-LIVER ABSCESS-CYST   Final Result      Successful CT-guided drain placement in the right lobe liver necrosis/abscess.      CT-ABDOMEN-PELVIS WITH   Final Result      1.  Interval significant increase in size of low-attenuation area in the dome of the right lobe of liver which contains air. This may represent necrosis or hemorrhage following therapy. Stable appearance of other smaller low-attenuation areas in the    right lobe of the liver without evidence of progression of disease in these areas of metastasis. Differential diagnosis would include post therapy infection.      2.  Stable masslike density in the region of the ampulla of Vater consistent with primary carcinoma. Current dimensions are 3.1 x 2.6 cm.      3.  Persistent low-attenuation areas in the pancreatic head and proximal body which may represent secondary pancreatitis.      4.  Stable upper retroperitoneal adenopathy.      5.  Small umbilical hernia containing fat.      DX-CHEST-PORTABLE (1 VIEW)   Final Result      No pulmonary consolidation.           Assessment/Plan  Liver abscess- (present on admission)   Assessment & Plan    Drain replaced 11/24/18 as previous one no longer in connection to fluid collection on CT, large amount drainage again.    Cultures enterococcus [VRE]  ID following   Continue daptomycin IV, unasyn iv, fluconozole per ID stop date 12/20 with repeat CT abdomen/pelvis at that time.     Bacteremia- (present on admission)   Assessment & Plan    Received treatment for E. coli bacteremia in early November, her port was removed  Admitted with sepsis  Blood cultures from 11/12/2018 are growing lactobacillus, Candida  albicans and strep viridans. Liver abscess cultures from 11/13/2018 are positive for VRE.  Currently on unasyn/PO diflucan ID following.     PICC placed 11/21/18  IV daptomycin, IV unasyn, fluconazole tentative stop date 12/20 per ID, then repeat CT to see if further IV abx needed.  Repeat 11/24 CT guided liver abscess pigtail catheter placed after initial was found to be displaced.  11/28:  TTE negative for endocarditis.  EF 65%.             Sepsis (HCC)- (present on admission)   Assessment & Plan    This is sepsis (without associated acute organ dysfunction).   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       Fungemia   Assessment & Plan    Mycofungin   ID following        Deep vein thrombosis (CMS-HCC) [I82.409]- (present on admission)   Assessment & Plan    eliquis resumed       Pleural effusion   Assessment & Plan    Thoracentesis 11/23/18 - 450cc removed - cytology and studies pending       Cough   Assessment & Plan    Non-productive, CXR concerning for edema  Low likelihood of infection given broad spectrum antibiotics for VRE  Improvement with diuresis  CT shows pleural effusion right lung - thoracentesis ordered in conjunction with drain replacement for liver  11/27:  No further cough, lungs CTA b/l.           Lactic acidosis- (present on admission)   Assessment & Plan    Improved with fluid resuscitation and treatment of infection            VTE prophylaxis: Eliquis

## 2018-11-29 NOTE — CARE PLAN
Problem: Nutritional:  Goal: Achieve adequate nutritional intake  Patient will consume 50% of meals.   Outcome: PROGRESSING SLOWER THAN EXPECTED  PO variable; 0 - 75% of meals.

## 2018-11-29 NOTE — DIETARY
Nutrition Services:  Brief Update    · RD following pt for adequate PO intake.  · Per ADL flow sheet, PO has been variable: 0% - 75% of meals.  · Spoke with pt and son at bedside.  · Son was encouraging pt to eat.  · Son reports that he brings cream of wheat every morning and visits pt at lunch every day to help encourage PO intake.  Additional family visits at dinner.  · Discussed meal options with pt and obtained preferences for dinner; RD will add to daily menu to encourage PO.  · RD adjusted snacks per pt preference.    Recommendations/Plan:  1. Encourage intake of meals and snacks.  2. Document intake of all meals and snacks as % taken in ADLs to provide interdisciplinary communication across all shifts.   3. Monitor weight.  4. Nutrition rep will continue to see patient for ongoing meal and snack preferences.   5. Add Boost Plus nutrition supplements in [place].

## 2018-11-30 NOTE — PROGRESS NOTES
Patient continues to recover from Visceral angiogram at this time.  No complaints of pain or n/v at this time.  Site remains soft and non tender with no bleeding noted.  Pedal pulse continues at +2.  Foot continues warm to touch.  Will continue to monitor.

## 2018-11-30 NOTE — PROGRESS NOTES
Assumed care of pt @0700. Bedside report received. Pt AOX 4. Pt complains about pain. Roxicodone 5 mg given. Pt denies nausea and SOB. PICC patent with positive blood return running IV abx. JACQUELINE drain to RUQ with small dark bloody output. Oral input increasing. Last Bm this morning. Pt up with 1 p assist. Fall precaution in place. POC discussed with pt, all questions answered at this time. Pt makes needs known, call light within reach, hourly rounding in place.

## 2018-11-30 NOTE — PROGRESS NOTES
Assessment completed.  Patient resting comfortably in bed with no pain voiced.  Per orders, patient to lay supine for 4 hours.  Patient understands orders and will comply with them.  Entry site for Visceral angiogram has 2x2 pressure dressing applied to site on right groin.  Site is soft without any signs of hematoma at this time.  Will continue to check site and patient.

## 2018-11-30 NOTE — PROGRESS NOTES
Patient continues to rest comfortably without pain or discomfort.  Patient allowed to sit up in bed.  Site continues to present with no bruising or swelling noted.  Will continue to monitor for changes.

## 2018-11-30 NOTE — PROGRESS NOTES
Hospital Medicine Daily Progress Note    Date of Service  11/29/2018    Chief Complaint  73 y.o. female admitted 11/12/2018 with fevers and chills in setting of metastatic ampullary carcioma.    Hospital Course    Patient admitted and started again on IV antibitoics.  She was found to have a liver abscess and required drainage, cultures from this have shown VRE, candida and lactobacillus acidophilus and blood cultures showing candida.  ID has been managing antibiotics and antifungals.       Interval Problem Update  11/20 Patient had episode of incontinence overnight along with confusion and new difficulty following commands.  CT head was done showing no acute findings with suspicion of possible s/e of pain medication.  Her mentation has improved but she continues to have urinary and bowel incontinence, likely with fatigue and now frequent need for toileting due to her antibiotics and IV hydration.  11/21 Patient's cough persisting but she remains afebrile.  I ordered PICC line for placement as patient will need continued antibiotics for 6 week duration, final blood cultures have resulted negative.  CXR ordered and consistent with interstitial edema - will start IV lasix to see if this improves her oxygen saturation and cough. WBC continues to drop.  11/22 Patient states she is feeling slightly better today, having to urinate more often since starting diuretic but lungs are sounding slightly better.  CT abdomen completed and shows abscess is smaller but still present and drain no longer in place.  She also now has a moderate right pleural effusion.  I've requested IR to reposition drain in liver abscess and to also drain what the pleural effusion.  Patient requests a call to be made to her son, Sea.  11/23 Patient had thoracentesis without issue and is breathing slightly better, delay in repositioning of drain due to recent dose of eliquis and need to be out of system for 48 hours to lessen the chance of bleeding.   Repeat culture on fluid ordered.  11/24 Patient had successful placement of drain in liver fluid collection, drainage in JACQUELINE bulb is dark brown without evidence of purulence.  Culture from fluid collected.  Son at bedside when evaluated and he remarked she appears much more awake today and breathing much easier than on days past - explained the need for diuresis and the drainage of the lung have likely contributed to improvement.  11/25 Patient feeling well, denies new complaints.  Will cut back on lasix to once daily and IV to PO.  She is currently on Unasyn and final treatment plan still pending decision by ID.  Cultures from thoracentesis showing no growth.  11/26 Patient states she has had good urinary continence since decreasing lasix to once daily.  No acute changes, ID developing duration of antibiotic treatment.  Radiation therapy to start tomorrow.  11/27:  Ordered TTE per ID recs since VRE, Ecoli found on liver abscess culture.  Explained to patient that since has an active liver abscess, will need to hold on chemo and XRT until antibiotics have adequately eliminated any infection.  Tentative stop date 12/20 with repeat CT abdomen to ensure resolution.  Last CT abdomen 11/22 with abscess size 75mm x 48mm down from prior size 88 x 64mm.  Doing well, no abdominal pain or distention noted on exam, appetite good, afebrile.  11/28:  Normal echo EF 65%.  No abdominal pain, eating well, no fevers.  11/29:  Repeat CT ordered since bloody drainage seen from JACQUELINE drain RUQ.  Pseudoaneurysm seen from hepatic artery encircling drain.  IR consult, Dr. Higginbotham removed drain.  dc'd eliquis bid.  Concerning abscess no change in size.  Will d/w Dr. Yang since may represent necrotic mass from ampullary cancer and require surgical removal.  BCs ordered since increase in wbc 8 to 17K, no fever, pain in RUQ.  .      Consultants/Specialty  ID - Leela/Honorio    Code Status  full    Disposition  tbd    Review of Systems  Review of  Systems   Constitutional: Negative for chills, fever and malaise/fatigue.   HENT: Negative for congestion and sore throat.    Eyes: Negative for blurred vision and photophobia.   Respiratory: Positive for cough (slightly better). Negative for shortness of breath and wheezing.    Cardiovascular: Negative for chest pain, claudication and leg swelling.   Gastrointestinal: Negative for abdominal pain, constipation, diarrhea, heartburn, nausea and vomiting.   Genitourinary: Negative for dysuria and hematuria.   Musculoskeletal: Negative for joint pain and myalgias.   Skin: Negative for itching and rash.   Neurological: Positive for weakness. Negative for dizziness, sensory change, speech change and headaches.   Psychiatric/Behavioral: Negative for depression. The patient is not nervous/anxious and does not have insomnia.         Physical Exam  Temp:  [36.6 °C (97.8 °F)-37.4 °C (99.3 °F)] 36.6 °C (97.8 °F)  Pulse:  [] 89  Resp:  [17-18] 18  BP: (103-144)/(56-75) 126/63    Physical Exam   Constitutional: She is oriented to person, place, and time. She appears well-developed and well-nourished. No distress.   HENT:   Head: Normocephalic and atraumatic.   Eyes: Conjunctivae are normal. No scleral icterus.   Neck: Neck supple. No JVD present.   Cardiovascular: Normal rate, regular rhythm and normal heart sounds.  Exam reveals no gallop and no friction rub.    No murmur heard.  Pulmonary/Chest: Effort normal. No respiratory distress. She has no rales. She exhibits no tenderness.   Abdominal: Soft. Bowel sounds are normal. She exhibits no distension. There is no guarding.   Musculoskeletal: She exhibits no edema or tenderness.   Lymphadenopathy:     She has no cervical adenopathy.   Neurological: She is alert and oriented to person, place, and time. No cranial nerve deficit.   Skin: Skin is warm and dry. She is not diaphoretic. No erythema. No pallor.   Psychiatric: She has a normal mood and affect. Her behavior is  normal.   Nursing note and vitals reviewed.      Fluids    Intake/Output Summary (Last 24 hours) at 11/29/18 2156  Last data filed at 11/29/18 1900   Gross per 24 hour   Intake             1500 ml   Output              140 ml   Net             1360 ml       Laboratory  Recent Labs      11/27/18   0042  11/28/18   0013  11/29/18   0045  11/29/18   1200  11/29/18   1900   WBC  6.1  8.1  17.4*   --    --    RBC  2.99*  3.05*  3.14*   --    --    HEMOGLOBIN  8.3*  8.6*  8.9*  8.0*  7.1*   HEMATOCRIT  27.0*  27.1*  27.7*   --    --    MCV  90.3  88.9  88.2   --    --    MCH  27.8  28.2  28.3   --    --    MCHC  30.7*  31.7*  32.1*   --    --    RDW  56.2*  54.8*  54.1*   --    --    PLATELETCT  102*  110*  163*   --    --    MPV  10.3  11.7  11.5   --    --      Recent Labs      11/27/18   0042  11/28/18   0013  11/29/18   0045   SODIUM  135  133*  134*   POTASSIUM  4.5  4.4  4.0   CHLORIDE  107  105  106   CO2  23  20  18*   GLUCOSE  106*  110*  150*   BUN  7*  8  8   CREATININE  0.51  0.63  0.78   CALCIUM  7.7*  7.9*  8.2*     Recent Labs      11/29/18   1540   APTT  41.0*   INR  2.91*               Imaging  CT-CHEST,ABDOMEN,PELVIS WITH   Final Result      7.8 x 5 x 6.1 cm hypodense lesion in the right lobe of the liver is not significantly changed compared to prior. A pigtail catheter is seen within the lesion. This could represent a necrotic mass with superimposed infection. There is internal density    within the lesion which could represent sequelae of a bleed into the lesion. There is a 9 x 6.6 mm pseudoaneurysm located adjacent to the pigtail catheter. Interventional radiology consultation recommended.      Other small hypodense hepatic lesions are again noted.      Linear tract from a previous pigtail catheter is seen within the right lobe of the liver.      Dilatation of the pancreatic duct is again noted.      Prominence of the common duct measures 1.2 cm.      Enlarged peripancreatic and aortocaval lymph  nodes are again noted.      Parapelvic right renal cyst. Tiny hypodense right renal lesions are too small to characterize.      Atherosclerotic plaque.      Small to moderate right pleural effusion with overlying atelectasis/consolidation.      Colonic diverticulosis. Moderate amount of colonic stool.      Calcified uterine fibroid.      Heterogeneity of the endometrial stripe in the lower uterine body with dilatation of the uterine cavity versus endometrial thickening near the uterine fundus measuring 1.2 cm in thickness. Further evaluation with pelvic ultrasound is recommended. This    can be seen in the uterine malignancy.      Fat-containing ventral hernia.      Borderline prominent subcarinal lymph node is nonspecific.         EC-ECHOCARDIOGRAM COMPLETE W/O CONT   Final Result      CT-DRAIN-LIVER ABSCESS-CYST   Final Result      1.  Successful CT-guided drainage of liver abscess.   2.  The previously placed migrated pigtail drainage catheter was removed.      US-THORACENTESIS PUNCTURE RIGHT   Final Result      1. Ultrasound guided right sided diagnostic thoracentesis.      2. 450 mL of fluid withdrawn.      DX-CHEST-PORTABLE (1 VIEW)   Final Result      Mild right basilar atelectasis.      CT-ABDOMEN W/O   Final Result      Hepatic dome abscess pigtail catheter has pulled out and now terminates just deep to the thoracic cage. Despite this the size of the abscess has diminished measuring 75 x 48 from 88 x 64 mm      New mild left hepatic pneumobilia      Ampullary region mass is difficult to precisely measure but does appear to exert some mass effect upon the third portion of the duodenum. No bowel obstruction is detected.      New medium-sized right ovarian pleural effusion does not have loculation features but there is subsegmental atelectasis      DX-CHEST-PORTABLE (1 VIEW)   Final Result      New interstitial opacity is worrisome for edema      Hilar prominence could be from lymphadenopathy or pulmonary  arterial hypertension      Stable right hemidiaphragm elevation      IR-PICC LINE PLACEMENT   Final Result                  Ultrasound-guided PICC placement performed by qualified nursing staff as    above.          CT-HEAD W/O   Final Result      1. Cerebral atrophy.   2. White matter lucencies most consistent with small vessel ischemic change versus demyelination or gliosis.   3. Remote LEFT cerebellar infarction   4. Otherwise, Head CT without contrast with no acute findings. No evidence of acute cerebral infarction, hemorrhage or mass lesion.      CT-DRAIN-LIVER ABSCESS-CYST   Final Result      Successful CT-guided drain placement in the right lobe liver necrosis/abscess.      CT-ABDOMEN-PELVIS WITH   Final Result      1.  Interval significant increase in size of low-attenuation area in the dome of the right lobe of liver which contains air. This may represent necrosis or hemorrhage following therapy. Stable appearance of other smaller low-attenuation areas in the    right lobe of the liver without evidence of progression of disease in these areas of metastasis. Differential diagnosis would include post therapy infection.      2.  Stable masslike density in the region of the ampulla of Vater consistent with primary carcinoma. Current dimensions are 3.1 x 2.6 cm.      3.  Persistent low-attenuation areas in the pancreatic head and proximal body which may represent secondary pancreatitis.      4.  Stable upper retroperitoneal adenopathy.      5.  Small umbilical hernia containing fat.      DX-CHEST-PORTABLE (1 VIEW)   Final Result      No pulmonary consolidation.      IR-VISCERAL ANGIOGRAM - SELECTIVE    (Results Pending)        Assessment/Plan  Liver abscess- (present on admission)   Assessment & Plan    Drain replaced 11/24/18 as previous one no longer in connection to fluid collection on CT, large amount drainage again.    Cultures enterococcus [VRE]  ID following   Continue daptomycin IV, unasyn iv,  fluconozole per ID stop date 12/20 with repeat CT abdomen/pelvis at that time.     Bacteremia- (present on admission)   Assessment & Plan    Received treatment for E. coli bacteremia in early November, her port was removed  Admitted with sepsis  Blood cultures from 11/12/2018 are growing lactobacillus, Candida albicans and strep viridans. Liver abscess cultures from 11/13/2018 are positive for VRE.  Currently on unasyn/PO diflucan ID following.     PICC placed 11/21/18  IV daptomycin, IV unasyn, fluconazole tentative stop date 12/20 per ID, then repeat CT to see if further IV abx needed.  Repeat 11/24 CT guided liver abscess pigtail catheter placed after initial was found to be displaced.  11/28:  TTE negative for endocarditis.  EF 65%.  11/29:  BCs x2 ordered since spike in wbc.             Sepsis (HCC)- (present on admission)   Assessment & Plan    This is sepsis (without associated acute organ dysfunction).   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  11/29:  Review of liver abscess, no change in size, may be necrotic cancerous mass per radiologist today.  Will dw Dr. Yang if need surgical excision planned.       Fungemia   Assessment & Plan    Mycofungin   ID following        Deep vein thrombosis (CMS-HCC) [I82.409]- (present on admission)   Assessment & Plan    11/29  dc'd eliquis due to bleeding into JACQUELINE drain from pseudoaneurysm.  Last DVT 4/4/17.  No need to continue eliquis.       Pseudoaneurysm following procedure (HCC)   Assessment & Plan    11/29:  Bloody JACQUELINE drain of liver abscess.  Ordered CT abdomen/pelvis/chest:  Pseudoaneurysm of hepatic artery encircling drain.  IR consult to Dr. Higginbotham who embolized artery.  dc'd eliquis 5mg bid.         Pleural effusion   Assessment & Plan    Thoracentesis 11/23/18 - 450cc  removed - cytology and studies pending       Cough   Assessment & Plan    Non-productive, CXR concerning for edema  Low likelihood of infection given broad spectrum antibiotics for VRE  Improvement with diuresis  CT shows pleural effusion right lung - thoracentesis ordered in conjunction with drain replacement for liver  11/27:  No further cough, lungs CTA b/l.           Lactic acidosis- (present on admission)   Assessment & Plan    Improved with fluid resuscitation and treatment of infection            VTE prophylaxis: Eliquis

## 2018-11-30 NOTE — PROGRESS NOTES
IR Procedure RN's Note:    Patient consented by Dr. Higginbotham; patient and MD signed and paperwork placed in chart.    Visceral angiogram with possible embolization done by Dr. Higginbotham; RIGHT femoral artery access site; pt assessed upon arrival, pt A/Ox4, pt aware of the procedure;     Coils placed by the liver psedoaneursym:  [employer] Interlock 2D 2mm x 4cm REF#[id number] LOT#[id number]  [employer] Interlock 2D 2mm x 4cm REF#[id number] LOT#[id number]  [employer] Interlock 2D 2mm x 4cm REF#[id number] LOT#[id number]    pt appears comfortable throughout the procedure with no signs of distress or discomfort; ETCO2 monitored with a baseline of 19mmHg and ranged between 16-24mmHg throughout the procedure; closure device into the RIGHT femoral artery - TERUMO AngioSeal 6F REF#[id number] LOT#[id number]; access site CDI, soft with no signs of hematoma or bleeding, gauze and tegaderm for dressing; telephone report given to Bel; pt is awake and on 2L NC O2; pt transported to room.

## 2018-11-30 NOTE — CARE PLAN
Problem: Safety  Goal: Will remain free from injury  Outcome: PROGRESSING AS EXPECTED  Bed alarm on, A&O x4, slipper socks, bed locked and in low position, call light and personal belongings with reach, report given to CNA, appropriate signs outside door, hourly rounding in place.     Problem: Pain Management  Goal: Pain level will decrease to patient's comfort goal  Outcome: PROGRESSING AS EXPECTED  Pt complains about pain. Roxicodone 5 mg given.

## 2018-11-30 NOTE — PROGRESS NOTES
Pt denies pain and remains afebrile (low grade temps). Pt reports zofran relieves nausea minimally, will try compazine once patient is back from procedure. Pt had bright red blood in lizzie drain today, MD aware. Blood cultures drawn x2, results pending. Pt had CT of chest/abd/pelvis that showed pseudoaneurysm, currently getting a visceral angiogram done in IR, son (Sea) was notified. Pt continues to have poor appetite, with adequate fluid intake. Fall precautions maintained.

## 2018-11-30 NOTE — PROGRESS NOTES
Pt's hgb lab came back at 7.1, NOC RN aware, another cbc to be drawn this evening with routine labs.

## 2018-11-30 NOTE — OR SURGEON
Immediate Post- Operative Note        PostOp Diagnosis: Hepatic psedoaneurym      Procedure(s):Embolization of hepatic psedoaneurym    Estimated Blood Loss: Less than 5 ml        Complications: None            11/29/2018     6:25 PM     Wil Murray

## 2018-11-30 NOTE — PROGRESS NOTES
Infectious Disease Progress Note    Author: Morelia Dc M.D. Date & Time of service: 11/30/2018  7:03 AM    Chief Complaint:  Liver abscess, candidemia & bacteremia     Interval History:  73-year-old female with metastatic ampullary carcinoma of the pancreas presented with generalized weakness and shaking chills.  11/13/2018-no fevers.  Complains of generalized malaise and fatigue.  Still has some pain in her right shoulder.  WBC count is 22,000.  Cultures remain negative.  11/14/2018 T-max is 98.2.  No new issues overnight.  Underwent IR drainage on 11/13/2018.  Her shoulder pain has eased since then.  11/15/2018 no fevers.  WBC 13.3 creatinine 0.47  11/16/2018 no fevers.  Denies any increased abdominal pain.  The shoulder pain is improved as well.  WBC is 12.8.  11/17/2018 no fevers.  Shoulder pain is much improved.  Denies any new issues.  11/18- no fevers. No new issues> WBC 16.7  11/19/2018 no fevers.  WBC 15.6 no new issues overnight  11/20 AF (99.7) WBC 15.3 states eating better-denies SE abx No abd pain today  11/21 Af (99.9) WBC 9.8 minimal output from drain-plan for PICC today  11/22/2018 T-max is 100.3.  No new issues overnight.  WBCs 10.  AST 40 ALT 17  11/23/2018 no fevers.  The drain is not draining much.  Underwent thoracentesis today.  11/24/2018 underwent another drain placement.  Complains of some abdominal pain otherwise denies any complaints.  WBC is 8.4  11/25/2018 complains of malaise and fatigue.  Complains of some abdominal pain.  WBC 5.9, Labs Reviewed, Medications Reviewed and Radiology Reviewed.  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 Labs and micro results reviewed. Recent imaging reviewed.       Review of Systems:  Review of Systems   Constitutional: Positive for malaise/fatigue. Negative for chills and fever.   Respiratory: Positive for cough and sputum production. Negative for shortness of breath.    Gastrointestinal: Positive for abdominal pain and nausea. Negative for diarrhea and vomiting.   Genitourinary: Positive for urgency.   Skin: Negative for rash.   Neurological: Positive for weakness.       Hemodynamics:  Temp (24hrs), Av.8 °C (98.3 °F), Min:36.5 °C (97.7 °F), Max:37.4 °C (99.3 °F)  Temperature: 36.8 °C (98.2 °F)  Pulse  Av.4  Min: 67  Max: 112Heart Rate (Monitored): 88  Blood Pressure : 121/64, NIBP: 119/58       Physical Exam:  Physical Exam   Constitutional: She is oriented to person, place, and time. She appears well-developed and well-nourished.   Cardiovascular: Regular rhythm and normal heart sounds.    Pulmonary/Chest: Effort normal.   Crackles bilat and diminished breath sounds on the right    Abdominal: Soft. Bowel sounds are normal. She exhibits distension. She exhibits no mass. There is tenderness. There is no rebound and no guarding.   Musculoskeletal: Normal range of motion. She exhibits no edema.   Neurological: She is oriented to person, place, and time.   Somnolent   Skin: Skin is warm and dry.       Meds:    Current Facility-Administered Medications:   •  prochlorperazine  •  [COMPLETED] piperacillin-tazobactam **AND** piperacillin-tazobactam  •  DAPTOmycin  •  furosemide  •  ipratropium-albuterol  •  potassium chloride SA  •  fluconazole  •  senna-docusate **AND** polyethylene glycol/lytes **AND** magnesium hydroxide **AND** bisacodyl  •  Respiratory Care per Protocol  •  ondansetron  •  ondansetron  •  acetaminophen  •  Notify provider if pain remains  uncontrolled **AND** Use the numeric rating scale (NRS-11) on regular floors and Critical-Care Pain Observation Tool (CPOT) on ICUs/Trauma to assess pain **AND** Pulse Ox (Oximetry) **AND** Pharmacy Consult Request **AND** If patient difficult to arouse and/or has respiratory depression, stop any opiates that are currently infusing and call a Rapid Response. **AND** oxyCODONE immediate-release **AND** oxyCODONE immediate-release **AND** morphine injection  •  NS  •  omeprazole  •  artificial tears  •  albuterol    Labs:  Recent Labs      11/28/18   0013  11/29/18   0045  11/29/18   1200  11/29/18   1900   WBC  8.1  17.4*   --    --    RBC  3.05*  3.14*   --    --    HEMOGLOBIN  8.6*  8.9*  8.0*  7.1*   HEMATOCRIT  27.1*  27.7*   --    --    MCV  88.9  88.2   --    --    MCH  28.2  28.3   --    --    RDW  54.8*  54.1*   --    --    PLATELETCT  110*  163*   --    --    MPV  11.7  11.5   --    --      Recent Labs      11/28/18   0013  11/29/18   0045  11/30/18   0305   SODIUM  133*  134*  137   POTASSIUM  4.4  4.0  3.5*   CHLORIDE  105  106  110   CO2  20  18*  20   GLUCOSE  110*  150*  115*   BUN  8  8  8   CPKTOTAL   --   <10   --      Recent Labs      11/28/18   0013  11/29/18   0045  11/30/18   0305   ALBUMIN  2.1*  2.3*  2.1*   TBILIRUBIN  0.4  0.4  0.3   ALKPHOSPHAT  137*  169*  147*   TOTPROTEIN  6.5  7.5  6.7   ALTSGPT  11  11  17   ASTSGOT  19  24  37   CREATININE  0.63  0.78  0.67       Imaging:  Ct-abdomen W/o    Result Date: 11/22/2018 11/22/2018 1:11 PM HISTORY/REASON FOR EXAM:  Liver abscess follow-up. Metastatic ampullary adenocarcinoma TECHNIQUE/EXAM DESCRIPTION: CT scan of the abdomen without contrast. Noncontrast helical sections were obtained from the diaphragmatic domes through the iliac crests Low dose optimization technique was utilized for this CT exam including automated exposure control and adjustment of the mA and/or kV according to patient size. COMPARISON: 11/12/18 and 11/13/2018 FINDINGS:  Limited by lack of IV contrast New moderate right low-density layering pleural effusion with adjacent compressive atelectasis Right hepatic pigtail catheter has been displaced and now terminates deep to the anterior rib cage. It is no longer located in the hepatic dome abscess. The abscess is 75 x 48 mm in axial dimensions, diminished from 88 x 64 mm. Again it has fluid and gas  components There is some new left hepatic biliary favored over portal venous gas No abnormal perihepatic fluid collections detected. No visualized abscess is seen in the upper abdomen With lack of contrast it is difficult to evaluate the ampullary region mass. No gross interval change is seen and again there is artifact in the region from embolization. No free air Some mass effect on the third portion of the duodenum secondary to the mass. No adrenal mass or splenomegaly Medium-sized umbilical hernia contains fat. Previously a small loop of small bowel extending into the defect. No hydronephrosis     Hepatic dome abscess pigtail catheter has pulled out and now terminates just deep to the thoracic cage. Despite this the size of the abscess has diminished measuring 75 x 48 from 88 x 64 mm New mild left hepatic pneumobilia Ampullary region mass is difficult to precisely measure but does appear to exert some mass effect upon the third portion of the duodenum. No bowel obstruction is detected. New medium-sized right ovarian pleural effusion does not have loculation features but there is subsegmental atelectasis    Ct-abdomen-pelvis With    Result Date: 11/12/2018 11/12/2018 1:38 PM HISTORY/REASON FOR EXAM:  Abdominal pain. History of metastatic ampullary adenocarcinoma. TECHNIQUE/EXAM DESCRIPTION: CT scan of the abdomen and pelvis with contrast. Contrast-enhanced helical scanning was obtained from the diaphragmatic domes through the pubic symphysis following the bolus administration of 80 mL of Omnipaque 350 without complication. Low dose  optimization technique was utilized for this CT exam including automated exposure control and adjustment of the mA and/or kV according to patient size. COMPARISON: 10/25/2018 FINDINGS: The visualized lung bases are clear. CT Abdomen: A small hiatal hernia is present. There is interval increase in size of an area of low attenuation in the dome of the right lobe of the liver which contains air. This may represent necrosis or post therapy hemorrhage following embolization. This area measures 8.8 x 7.8 x 6.4 cm. Previous measurement was 2.2 cm in maximum dimension. Multiple smaller areas of low-attenuation within the right lobe of liver are present which are suspicious for hepatic metastases. These other smaller low-attenuation areas are without significant change. The gallbladder absent. A low-attenuation area in the region of the pancreatic head is again noted and is poorly defined measuring approximately 3.1 x 2.6 cm in diameter. This is consistent with the area of ampullary carcinoma. Embolization coils are present in the GDA. There is low attenuation in the pancreatic body proximally which may represent low-attenuation  secondary to pancreatitis or spread of neoplasm. The common duct is dilated proximal to the level of the ampulla measuring a maximum diameter of 1.6 cm. No choledocholithiasis is identified. An enlarged retroperitoneal lymph node between the upper aorta and IVC is unchanged measuring 1.5 cm in diameter. No new area of retroperitoneal adenopathy is identified. No peripancreatic adenopathy is present. The spleen appears normal. The splenic vein and portal vein are patent. The adrenal glands are not enlarged and the kidneys enhance symmetrically. Small simple cysts in the right kidney are stable. There is no lymphadenopathy. The aorta and IVC are normal in caliber. The bowel is without obstruction. The appendix appears normal. There is a small umbilical hernia containing fat. CT Pelvis: There is no  lymphadenopathy. No free fluid is present. The bladder appears normal. Uterus as endometrial complex thickening. There is a myomatous type calcification in the right aspect of the uterus which is unchanged. There is no acute inflammatory process.     1.  Interval significant increase in size of low-attenuation area in the dome of the right lobe of liver which contains air. This may represent necrosis or hemorrhage following therapy. Stable appearance of other smaller low-attenuation areas in the right lobe of the liver without evidence of progression of disease in these areas of metastasis. Differential diagnosis would include post therapy infection. 2.  Stable masslike density in the region of the ampulla of Vater consistent with primary carcinoma. Current dimensions are 3.1 x 2.6 cm. 3.  Persistent low-attenuation areas in the pancreatic head and proximal body which may represent secondary pancreatitis. 4.  Stable upper retroperitoneal adenopathy. 5.  Small umbilical hernia containing fat.    Ct-chest,abdomen,pelvis With    Result Date: 11/29/2018 11/29/2018 1:12 PM HISTORY/REASON FOR EXAM:  Leukocytosis. Liver abscess. TECHNIQUE/EXAM DESCRIPTION: CT scan of the chest, abdomen and pelvis with contrast. Helical scanning was obtained with intravenous contrast from the lung apices through the pubic symphysis to include the chest, abdomen and pelvis.  100 mL of Omnipaque 350 nonionic contrast was administered intravenously without complication. Low dose optimization technique was utilized for this CT exam including automated exposure control and adjustment of the mA and/or kV according to patient size. COMPARISON: 11/22/2018 FINDINGS: Atherosclerotic plaque is seen in the aorta. There is coronary artery calcification. There is a small amount of pericardial fluid. There is a small to moderate large right pleural effusion with overlying atelectasis/consolidation. There are small mediastinal lymph nodes. Subcarinal  lymph node measures 8 mm in short axis dimension. There are small hilar lymph nodes bilaterally. Anterior mediastinal lymph node measures 6.2 mm in short axis dimension. There are small axillary lymph nodes. No pneumothorax is identified. Examination is limited by respiratory motion. There is mild left lower lobe and right middle lobe atelectasis. No free air is seen in the abdomen or pelvis. There is a pigtail catheter within a hypodense hepatic lesion measuring 7.8 x 5 x 6.1 cm which is not significantly changed compared to prior. Adjacent to the pigtail catheter, there is a focal collection of contrast measuring 9 x 6.6 mm compatible with a pseudoaneurysm. A feeding vessel is seen. There is heterogeneous density within the lesion. There could have been bleeding into the lesion. No active hemorrhage is currently identified. Small hypodense lesions are again noted within the right lobe of the liver. Portal vein is patent. Gallbladder is surgically absent. Prominence of the common duct measures 1.2 cm. There is distention of the cystic duct. There is dilatation of the pancreatic duct. Streak  artifact from embolization coils is seen. There is a linear tract in the right lobe of the liver from a prior drain located more anteriorly. Spleen is not enlarged. No adrenal mass is identified. There is a parapelvic cyst on the right. Tiny hypodense right renal lesions are too small to characterize. There is no evidence of hydronephrosis. Atherosclerotic plaque is seen in the aorta. There are small inguinal lymph nodes. Enlarged aortocaval lymph node measures 1.4 cm in short axis dimension. Enlarged peripancreatic lymph nodes are seen. There is no evidence of bowel obstruction. There is colonic diverticulosis. There is a moderate amount of colonic stool. Terminal ileum is unremarkable. There is no evidence of acute appendicitis. There are postsurgical changes of the bowel in the left abdomen. A small lymph node is seen  adjacent to the distal esophagus. Bladder is mildly distended. Uterus is anteverted. There is a calcified uterine fibroid. There is Heterogeneity of the endometrial stripe in the lower uterine body with dilatation of the uterine cavity versus endometrial thickening near the uterine fundus measuring 1.2 cm in thickness. Calcific densities in the pelvis likely represent phleboliths. No free fluid is seen in the pelvis. Degenerative changes are seen in the spine. There is a fat-containing ventral hernia.     7.8 x 5 x 6.1 cm hypodense lesion in the right lobe of the liver is not significantly changed compared to prior. A pigtail catheter is seen within the lesion. This could represent a necrotic mass with superimposed infection. There is internal density within the lesion which could represent sequelae of a bleed into the lesion. There is a 9 x 6.6 mm pseudoaneurysm located adjacent to the pigtail catheter. Interventional radiology consultation recommended. Other small hypodense hepatic lesions are again noted. Linear tract from a previous pigtail catheter is seen within the right lobe of the liver. Dilatation of the pancreatic duct is again noted. Prominence of the common duct measures 1.2 cm. Enlarged peripancreatic and aortocaval lymph nodes are again noted. Parapelvic right renal cyst. Tiny hypodense right renal lesions are too small to characterize. Atherosclerotic plaque. Small to moderate right pleural effusion with overlying atelectasis/consolidation. Colonic diverticulosis. Moderate amount of colonic stool. Calcified uterine fibroid. Heterogeneity of the endometrial stripe in the lower uterine body with dilatation of the uterine cavity versus endometrial thickening near the uterine fundus measuring 1.2 cm in thickness. Further evaluation with pelvic ultrasound is recommended. This can be seen in the uterine malignancy. Fat-containing ventral hernia. Borderline prominent subcarinal lymph node is nonspecific.      Ct-drain-liver Abscess-cyst    Result Date: 11/27/2018 11/24/2018 9:02 AM HISTORY/REASON FOR EXAM:  current drain pulled out of abscess, needs to be repositioned. Moderate sedation was administered with continuous monitoring of the patient under the direct supervision of  Medication: 100 mcg of fentanyl and 2 mg of Versed Total sedation time: 30 minutes The biopsy/drainage was done utilizing low dose optimization CT techniques including auto modulation for  imaging and low mA CT/fluoroscopy mode for the procedure. TECHNIQUE/EXAM DESCRIPTION AND NUMBER OF VIEWS:  After the informed consent the patient was placed on the angiographic table and supine position. Localization CT scan demonstrates a low-density area in the right lobe of the liver. The previously placed catheter was migrated upwards. The previously placed catheter was. After injecting lidocaine, a 17-gauge introducer needle was advanced into the fluid collection. After confirming the needle position, a wire was introduced. After dilating the tract, a new 10 Micronesian pigtail catheter was advanced and placed with its loop in the fluid collection. The tube is attached to the suction bulb. The patient tolerated procedure without any immediate complication.     1.  Successful CT-guided drainage of liver abscess. 2.  The previously placed migrated pigtail drainage catheter was removed.    Ct-drain-liver Abscess-cyst    Result Date: 11/13/2018 11/13/2018 4:01 PM HISTORY/REASON FOR EXAM:  liver abscess. Moderate sedation was administered with continuous monitoring of the patient under the direct supervision of  Medications: 2 mg of Versed and 100 mcg of fentanyl Total sedation time: 30 minutes The biopsy/drainage was done utilizing low dose optimization CT techniques including auto modulation for  imaging and low mA CT/fluoroscopy mode for the procedure. TECHNIQUE/EXAM DESCRIPTION AND NUMBER OF VIEWS:  After the informed  consent the patient was placed on the CT table on supine position. Localization CT scan demonstrates hypodense area in the right lobe of the liver. The skin over the lesion was prepped. Subsequently after injecting lidocaine a 17-gauge needle was advanced into the hypodense area. Dark-colored fluid was aspirated. A wire was released. Subsequently a 10 Faroese guiding catheter was advanced and placed with its loop in the liver. An approximately 100 mL of fluid was drained. Sample material was sent to the microbiology. The patient tolerated the procedure without any immediate complication.     Successful CT-guided drain placement in the right lobe liver necrosis/abscess.    Ct-head W/o    Result Date: 11/19/2018 11/19/2018 9:49 PM HISTORY/REASON FOR EXAM:  Acute onset of altered mental status and incontinence. History of ampullary region tumor. TECHNIQUE/EXAM DESCRIPTION AND NUMBER OF VIEWS: CT of the head without contrast. The study was performed on a Lovely CT scanner. Contiguous 5 mm axial sections were obtained from the skull base through the vertex. Up to date radiation dose reduction adjustments have been utilized to meet ALARA standards for radiation dose reduction. COMPARISON:  PET CT 7/12/2018 FINDINGS: The calvariae and skull base are unremarkable. There are no extraaxial fluid collections. There is a pattern of cerebral atrophy manifest as enlargement of sulcal markings and ventricular prominence.  The ventricular system and basal cisterns are otherwise unremarkable. There are areas of hypodensity in the white matter most consistent with small vessel ischemic change versus demyelination or gliosis. There are no hemorrhagic lesions. There are no mass effects or shift of midline structures. There is a small area of encephalomalacia in the LEFT cerebellum. This is unchanged. The brainstem and posterior fossa structures are otherwise unremarkable. The paranasal sinuses and mastoids in the field of view are  unremarkable.     1. Cerebral atrophy. 2. White matter lucencies most consistent with small vessel ischemic change versus demyelination or gliosis. 3. Remote LEFT cerebellar infarction 4. Otherwise, Head CT without contrast with no acute findings. No evidence of acute cerebral infarction, hemorrhage or mass lesion.    Dx-chest-portable (1 View)    Result Date: 11/23/2018 11/23/2018 2:14 PM HISTORY/REASON FOR EXAM:  Pleural Effusion. Status post right thoracentesis TECHNIQUE/EXAM DESCRIPTION AND NUMBER OF VIEWS: Single portable view of the chest. COMPARISON: 11/21/2018 FINDINGS: Single portable view of the chest demonstrates a stable cardiac silhouette and mediastinal contours. Calcification is in the aortic arch. The right hemidiaphragm is elevated. There is mild atelectasis in the right lung base. No pneumothorax is identified. Pigtail catheter projects over the right upper quadrant. There are multiple surgical clips. A right PICC line remains in place.     Mild right basilar atelectasis.    Dx-chest-portable (1 View)    Result Date: 11/21/2018 11/21/2018 5:31 PM HISTORY/REASON FOR EXAM: Cough. Congestion for a week TECHNIQUE/EXAM DESCRIPTION AND NUMBER OF VIEWS: Single AP view of the chest. COMPARISON: November 12 FINDINGS: Hardware: Right PICC line tip overlies the cavoatrial junction Pigtail catheter overlies the elevated right hemidiaphragm, not fully visualized Lungs: Increasing perihilar opacity with some bronchial thickening noted. Minor fissure is also thickened. Pleura:  No pleural space process is seen. Heart and mediastinum: There is similar hilar and cardiac silhouette enlargement.     New interstitial opacity is worrisome for edema Hilar prominence could be from lymphadenopathy or pulmonary arterial hypertension Stable right hemidiaphragm elevation    Dx-chest-portable (1 View)    Result Date: 11/12/2018 11/12/2018 11:06 AM HISTORY/REASON FOR EXAM:  Sepsis. TECHNIQUE/EXAM DESCRIPTION AND NUMBER OF  VIEWS: Single portable view of the chest. COMPARISON: 10/25/2018 FINDINGS: There is no evidence of focal consolidation or evidence of pulmonary edema. There is no evidence of pleural effusion. The heart is normal in size.     No pulmonary consolidation.    Us-thoracentesis Puncture Right    Result Date: 11/23/2018 11/23/2018 1:15 PM HISTORY/REASON FOR EXAM:  Fluid collection TECHNIQUE/EXAM DESCRIPTION: Ultrasound-guided thoracentesis. Indication:  RIGHT pleural fluid collection. COMPARISON:  None PROCEDURE:     Informed consent was obtained. A timeout was taken. A right pleural effusion was localized with real-time ultrasound guidance. The right posterior chest wall was prepped and draped in a sterile manner. Following local anesthesia with 1% lidocaine, a 5 Hebrew [place] pigtail catheter was advanced into the pleural space with trocar technique and pleural fluid was drained. The patient tolerated the procedure well without evidence of complication. A post thoracentesis chest radiograph is forthcoming. FINDINGS: Fluid was  sent to the laboratory. Fluid character: straw colored     1. Ultrasound guided right sided diagnostic thoracentesis. 2. 450 mL of fluid withdrawn.    Ir-picc Line Placement    Result Date: 11/21/2018  HISTORY/REASON FOR EXAM:   PICC placement. TECHNIQUE/EXAM DESCRIPTION AND NUMBER OF VIEWS:   PICC line insertion with ultrasound guidance.  The procedure was performed using maximal sterile barrier technique including sterile gown, mask, cap, and donning of sterile gloves following appropriate hand hygiene and/or sterile scrub. Patient skin site was prepped with 2% Chlorhexidine solution. FINDINGS:  PICC line insertion with Ultrasound Guidance was performed by qualified nursing staff without the assistance of a Radiologist. PICC positioning appropriateness confirmed by 3CG technology; chest xray only needed in the instance 3CG unable to confirm placement.              Ultrasound-guided PICC placement  performed by qualified nursing staff as above.     Ec-echocardiogram Complete W/o Cont    Result Date: 2018  Transthoracic Echo Report Echocardiography Laboratory CONCLUSIONS No prior study is available for comparison. Normal transthoracic echocardiogram. JANELLE BALDERAS Exam Date:         2018                    08:43 Exam Location:     Inpatient Priority:          Routine Ordering Physician:        ADAMA STOCKTON Referring Physician: Sonographer:               Maisha Leavitt RDCS Age:    73     Gender:    F MRN:    [id number] :    1945 BSA:    1.64   Ht (in):    60     Wt (lb):    148 Exam Type:     Complete Indications:     Endocarditis and heart valve disorders in diseases                  classified elsewhere ICD Codes:       I39 CPT Codes:       76450 BP:   111    /   77     HR:   79 Technical Quality:       Fair MEASUREMENTS  (Male / Female) Normal Values 2D ECHO LV Diastolic Diameter PLAX        3.5 cm                4.2 - 5.9 / 3.9 - 5.3 cm LV Systolic Diameter PLAX         2.1 cm                2.1 - 4.0 cm IVS Diastolic Thickness           1 cm                  LVPW Diastolic Thickness          1.1 cm                LVOT Diameter                     1.6 cm                RA Diameter                       2.9 cm                Estimated LV Ejection Fraction    65 %                  LV Ejection Fraction MOD BP       69.8 %                >= 55  % LV Ejection Fraction MOD 4C       61.7 %                LV Ejection Fraction MOD 2C       80.8 %                LA Volume Index                   10.7 cm³/m²           16 - 28 cm³/m² IVC Diameter                      1.7 cm                M-MODE Aortic Root Diameter MM           2.4 cm                DOPPLER AV Peak Velocity                  1.4 m/s               AV Peak Gradient                  8 mmHg                AV Mean Gradient                  3.4 mmHg              LVOT Peak Velocity                0.78 m/s              AV Area Cont Eq  vti               1.3 cm²               Mitral E Point Velocity           0.52 m/s              Mitral E to A Ratio               0.53                  MV Pressure Half Time             64.3 ms               MV Area PHT                       3.4 cm²               MV Deceleration Time              222 ms                TR Peak Velocity                  235 cm/s              PV Peak Velocity                  0.84 m/s              PV Peak Gradient                  2.8 mmHg              RVOT Peak Velocity                0.69 m/s              * Indicates values subject to auto-interpretation LV EF:  65    % FINDINGS Left Ventricle Normal left ventricular size and systolic function. Normal left ventricular wall thickness. Normal diastolic function. Normal regional wall motion. Left ventricular ejection fraction is visually estimated to be 65%. Right Ventricle Normal right ventricular size and systolic function. Right Atrium Normal right atrial size. Normal inferior vena cava size and inspiratory collapse. Left Atrium Normal left atrial size. Mitral Valve Structurally normal mitral valve. Mild mitral regurgitation. No mitral stenosis. Aortic Valve Structurally normal aortic valve without significant stenosis or regurgitation. Tricuspid Valve Structurally normal tricuspid valve. Mild tricuspid regurgitation. Right atrial pressure is estimated to be 3 mmHg. Estimated right ventricular systolic pressure  is 35 mmHg. Pulmonic Valve Structurally normal pulmonic valve without significant stenosis or regurgitation. Pericardium Normal pericardium without effusion. Aorta The aortic root is normal. Gurwinder Mayberry MD (Electronically Signed) Final Date:     28 November 2018                 09:42      Micro:  Results     Procedure Component Value Units Date/Time    BLOOD CULTURE [[id number]] Collected:  11/29/18 1246    Order Status:  Completed Specimen:  Blood from Peripheral Updated:  11/29/18 1253    Narrative:        "Contact  Per Hospital Policy: Only change Specimen Src: to \"Line\" if  specified by physician order.    BLOOD CULTURE [[id number]] Collected:  11/29/18 1200    Order Status:  Completed Specimen:  Blood from Peripheral Updated:  11/29/18 1237    Narrative:       Contact  Per Hospital Policy: Only change Specimen Src: to \"Line\" if  specified by physician order.    AFB CULTURE [[id number]] Collected:  11/23/18 1330    Order Status:  Completed Specimen:  Body Fluid Updated:  11/29/18 0804     Significant Indicator NEG     Source BF     Site Thoracentesis Fluid     AFB Culture Culture in progress.     AFB Smear Results No acid fast bacilli seen.    Narrative:       RT pl eff 11/23/2018  14:50    FLUID CULTURE W/GRAM STAIN [[id number]] Collected:  11/23/18 1330    Order Status:  Completed Specimen:  Body Fluid Updated:  11/29/18 0804     Gram Stain Result Few WBCs.  No organisms seen.       Significant Indicator NEG     Source BF     Site Thoracentesis Fluid     Culture Result Bdf No growth at 5 days    Narrative:       RT pl eff 11/23/2018  14:50    FUNGAL CULTURE [[id number]] Collected:  11/23/18 1330    Order Status:  Completed Specimen:  Body Fluid Updated:  11/29/18 0804     Significant Indicator NEG     Source BF     Site Thoracentesis Fluid     Fungal Culture Culture in progress.    Narrative:       RT pl eff 11/23/2018  14:50    GRAM STAIN [[id number]] Collected:  11/23/18 1330    Order Status:  Completed Specimen:  Body Fluid Updated:  11/24/18 1953     Significant Indicator .     Source BF     Site Thoracentesis Fluid     Gram Stain Result Few WBCs.  No organisms seen.      Narrative:       RT pl eff 11/23/2018  14:50    ACID FAST STAIN [[id number]] Collected:  11/23/18 1330    Order Status:  Completed Specimen:  Body Fluid Updated:  11/24/18 1953     Significant Indicator NEG     Source BF     Site Thoracentesis Fluid     AFB Smear Results No acid fast bacilli seen.    Narrative:       RT pl eff 11/23/2018  14:50    " "      Assessment:  Active Hospital Problems    Diagnosis   • Bacteremia [R78.81]   • Liver abscess [K75.0]   • Sepsis (HCC) [A41.9]   • 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]   • Fungemia [B49]   • Deep vein thrombosis (CMS-HCC) [I82.409] [I82.409]   • Lactic acidosis [E87.2]   • Pseudoaneurysm following procedure (HCC) [I97.89, I72.9]   • Pleural effusion [J90]   • Cough [R05]      Plan:     Leukocytosis  - New,  17.4 from 8.1 on 11/29, now improving   - Likely 2/2 internal bleed, no fevers  --- Repeat blood cultures on 11/29 - not resulted yet, follow      Bloody drainage from abdominal drain  - Reports new abdominal pain overnight but now improved   --- CT C/A/P on 11/29 with: \"Adjacent to the pigtail catheter, there is a focal collection of   contrast measuring 9 x 6.6 mm compatible with a pseudoaneurysm. A feeding vessel is seen. 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  Per CT on 11/29 this could be a necrotic mass with superimposed infection.      - CT scan 11/12 show with area of necrosis/hemorrhage, s/p IR drainage on 11/13/2018-- Cultures +  lactobacillus, VRE and Candida albicans and strep viridans  - Underwent CT scan on 11/22/2018- size of the abscess has diminished measuring 75 x 48 from 88 x 64 mm but abscess still present and contains air with new mild left hepatic pneumobilia  - Underwent another drain placement on 11/24/2018 as catheter was not correctly posittioned   - CT A/P on 11/29 due to concern for bleeding: \"7.8 x 5 x 6.1 cm hypodense lesion in the right lobe of the liver is not significantly changed compared to prior. A pigtail catheter " "is seen within the lesion. This could represent a necrotic mass with superimposed infection. There is internal density within the lesion which could represent sequelae of a bleed into the lesion.\" Catheter within the lesion and not significantly changed from last CT.    ---Continue Daptomycin, fluconazole, and Zosyn (E coli with resistance to ampicillin) for at least 4 weeks from prior CT abdomen on 11/22 and then will need to re-image prior to stopping   --- End on 12/20/18 pending CT, may need to extend      Bacteremia  Blood cultures + Bacteroides  Repeat blood cultures x2 are negative from 11/15/2018  - see abx above      Candidemia- Candida albicans  Repeat blood cultures x2 are negative from 11/15/2018  Changed to p.o. Diflucan  - see abx above    - TTE to rule-out IE in setting of candidemia - no vegetations seen    --- Eye exam if visual changes     Cough   - Unlikely bacterial infection as on broad spectrum abx      Pleural effusions  - Thora 11/23, cx with no growth   - The CT scan showed pleural effusion hence underwent thoracentesis on 11/23/2018.  2752 WBCs with 46% polys.  Gram stain and cultures negative.   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     Prognosis  Guarded     DW primary team.     "

## 2018-11-30 NOTE — PROGRESS NOTES
"Surgical Progress Note:    P  Had repeat CT as well as IR embolization  Feels well this AM    PE:  /70   Pulse 86   Temp 36.2 °C (97.1 °F) (Temporal)   Resp 18   Ht 1.549 m (5' 0.98\")   Wt 67.4 kg (148 lb 9.4 oz)   SpO2 100%   Breastfeeding? No   BMI 28.09 kg/m²     I/O:   Intake/Output Summary (Last 24 hours) at 11/30/18 1532  Last data filed at 11/30/18 0455   Gross per 24 hour   Intake             1300 ml   Output               23 ml   Net             1277 ml     UOP:  PO:  IV:    GEN: NAD  COR: RRR  PULM: CTA  ABD: soft, NTND, drain with sanguinous blood consistent with old clot hemolysis      Labs:  Recent Labs      11/28/18   0013  11/29/18   0045  11/29/18   1200  11/29/18   1900  11/30/18   0305   WBC  8.1  17.4*   --    --   11.7*   RBC  3.05*  3.14*   --    --   3.13*   HEMOGLOBIN  8.6*  8.9*  8.0*  7.1*  8.9*   HEMATOCRIT  27.1*  27.7*   --    --   28.0*   MCV  88.9  88.2   --    --   88.2   MCH  28.2  28.3   --    --   28.4   RDW  54.8*  54.1*   --    --   55.2*   PLATELETCT  110*  163*   --    --   172   MPV  11.7  11.5   --    --   11.5     Recent Labs      11/28/18   0013  11/29/18   0045  11/30/18   0305   SODIUM  133*  134*  137   POTASSIUM  4.4  4.0  3.5*   CHLORIDE  105  106  110   CO2  20  18*  20   GLUCOSE  110*  150*  115*   BUN  8  8  8   CPKTOTAL   --   <10   --          A/P:   S/p IR drain liver abscess  Appears that it is mostly necrotic treatment effect mass without significant abscess at this time  Ok to pull drain if no evidence of continued hemorrhage and output decreases to  < ~30 cc/day  Ok from my point of view to restart chemo etc once drain removed    Jose Yang M.D.  [facility]  [phone]    "

## 2018-12-01 PROBLEM — D62 ACUTE ON CHRONIC BLOOD LOSS ANEMIA: Status: ACTIVE | Noted: 2018-01-01

## 2018-12-01 NOTE — PROGRESS NOTES
Infectious Disease Progress Note    Author: Morelia Dc M.D. Date & Time of service: 12/1/2018  7:03 AM    Chief Complaint:  Liver abscess, candidemia & bacteremia     Interval History:  73-year-old female with metastatic ampullary carcinoma of the pancreas presented with generalized weakness and shaking chills.  11/13/2018-no fevers.  Complains of generalized malaise and fatigue.  Still has some pain in her right shoulder.  WBC count is 22,000.  Cultures remain negative.  11/14/2018 T-max is 98.2.  No new issues overnight.  Underwent IR drainage on 11/13/2018.  Her shoulder pain has eased since then.  11/15/2018 no fevers.  WBC 13.3 creatinine 0.47  11/16/2018 no fevers.  Denies any increased abdominal pain.  The shoulder pain is improved as well.  WBC is 12.8.  11/17/2018 no fevers.  Shoulder pain is much improved.  Denies any new issues.  11/18- no fevers. No new issues> WBC 16.7  11/19/2018 no fevers.  WBC 15.6 no new issues overnight  11/20 AF (99.7) WBC 15.3 states eating better-denies SE abx No abd pain today  11/21 Af (99.9) WBC 9.8 minimal output from drain-plan for PICC today  11/22/2018 T-max is 100.3.  No new issues overnight.  WBCs 10.  AST 40 ALT 17  11/23/2018 no fevers.  The drain is not draining much.  Underwent thoracentesis today.  11/24/2018 underwent another drain placement.  Complains of some abdominal pain otherwise denies any complaints.  WBC is 8.4  11/25/2018 complains of malaise and fatigue.  Complains of some abdominal pain.  WBC 5.9, Labs Reviewed, Medications Reviewed and Radiology Reviewed.  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 Labs and micro results reviewed. Recent imaging reviewed.    EUN, Vitals reviewed- pt AF, O2 RA, Labs and micro results reviewed. Recent imaging reviewed. Pt with some nausea this am and poor appetite.         Review of Systems:  Review of Systems   Constitutional: Positive for malaise/fatigue. Negative for fever.   Respiratory: Negative for shortness of breath.    Gastrointestinal: Positive for abdominal pain and nausea. Negative for constipation, diarrhea and vomiting.   Skin: Negative for rash.       Hemodynamics:  Temp (24hrs), Av.3 °C (97.3 °F), Min:36 °C (96.8 °F), Max:36.5 °C (97.7 °F)  Temperature: 36.4 °C (97.5 °F)  Pulse  Av.5  Min: 67  Max: 112   Blood Pressure : 130/59       Physical Exam:  Physical Exam   Constitutional: She is oriented to person, place, and time. She appears well-developed and well-nourished.   Cardiovascular: Normal rate, regular rhythm and normal heart sounds.    Pulmonary/Chest: Effort normal.   Diminished on right    Abdominal: Soft. She exhibits no distension. There is tenderness. There is no rebound and no guarding.   RUQ drain with dark bloody fluid    Musculoskeletal: Normal range of motion. She exhibits no edema or tenderness.   Neurological: She is alert and oriented to person, place, and time.   Skin: Skin is warm and dry.       Meds:    Current Facility-Administered Medications:   •  potassium chloride SA  •  prochlorperazine  •  [COMPLETED] piperacillin-tazobactam **AND** piperacillin-tazobactam  •  DAPTOmycin  •  furosemide  •  ipratropium-albuterol  •  fluconazole  •  senna-docusate **AND** polyethylene glycol/lytes **AND** magnesium hydroxide **AND** bisacodyl  •  Respiratory Care per Protocol  •  ondansetron  •  ondansetron  •  acetaminophen  •  Notify provider if pain remains uncontrolled **AND** Use the  numeric rating scale (NRS-11) on regular floors and Critical-Care Pain Observation Tool (CPOT) on ICUs/Trauma to assess pain **AND** Pulse Ox (Oximetry) **AND** Pharmacy Consult Request **AND** If patient difficult to arouse and/or has respiratory depression, stop any opiates that are currently infusing and call a Rapid Response. **AND** oxyCODONE immediate-release **AND** oxyCODONE immediate-release **AND** morphine injection  •  NS  •  omeprazole  •  artificial tears  •  albuterol    Labs:  Recent Labs      11/29/18   0045   11/29/18   1900  11/30/18   0305  12/01/18   0130   WBC  17.4*   --    --   11.7*  9.7   RBC  3.14*   --    --   3.13*  2.55*   HEMOGLOBIN  8.9*   < >  7.1*  8.9*  7.3*   HEMATOCRIT  27.7*   --    --   28.0*  23.1*   MCV  88.2   --    --   88.2  90.6   MCH  28.3   --    --   28.4  28.6   RDW  54.1*   --    --   55.2*  59.0*   PLATELETCT  163*   --    --   172  219   MPV  11.5   --    --   11.5  10.8    < > = values in this interval not displayed.     Recent Labs      11/29/18   0045  11/30/18   0305  12/01/18   0130   SODIUM  134*  137  132*   POTASSIUM  4.0  3.5*  3.9   CHLORIDE  106  110  108   CO2  18*  20  20   GLUCOSE  150*  115*  99   BUN  8  8  9   CPKTOTAL  <10   --    --      Recent Labs      11/29/18   0045  11/30/18   0305  12/01/18   0130   ALBUMIN  2.3*  2.1*  2.1*   TBILIRUBIN  0.4  0.3  0.3   ALKPHOSPHAT  169*  147*  145*   TOTPROTEIN  7.5  6.7  6.4   ALTSGPT  11  17  24   ASTSGOT  24  37  66*   CREATININE  0.78  0.67  0.55       Imaging:  Ct-abdomen W/o    Result Date: 11/22/2018 11/22/2018 1:11 PM HISTORY/REASON FOR EXAM:  Liver abscess follow-up. Metastatic ampullary adenocarcinoma TECHNIQUE/EXAM DESCRIPTION: CT scan of the abdomen without contrast. Noncontrast helical sections were obtained from the diaphragmatic domes through the iliac crests Low dose optimization technique was utilized for this CT exam including automated exposure control and adjustment of the mA and/or  kV according to patient size. COMPARISON: 11/12/18 and 11/13/2018 FINDINGS: Limited by lack of IV contrast New moderate right low-density layering pleural effusion with adjacent compressive atelectasis Right hepatic pigtail catheter has been displaced and now terminates deep to the anterior rib cage. It is no longer located in the hepatic dome abscess. The abscess is 75 x 48 mm in axial dimensions, diminished from 88 x 64 mm. Again it has fluid and gas  components There is some new left hepatic biliary favored over portal venous gas No abnormal perihepatic fluid collections detected. No visualized abscess is seen in the upper abdomen With lack of contrast it is difficult to evaluate the ampullary region mass. No gross interval change is seen and again there is artifact in the region from embolization. No free air Some mass effect on the third portion of the duodenum secondary to the mass. No adrenal mass or splenomegaly Medium-sized umbilical hernia contains fat. Previously a small loop of small bowel extending into the defect. No hydronephrosis     Hepatic dome abscess pigtail catheter has pulled out and now terminates just deep to the thoracic cage. Despite this the size of the abscess has diminished measuring 75 x 48 from 88 x 64 mm New mild left hepatic pneumobilia Ampullary region mass is difficult to precisely measure but does appear to exert some mass effect upon the third portion of the duodenum. No bowel obstruction is detected. New medium-sized right ovarian pleural effusion does not have loculation features but there is subsegmental atelectasis    Ct-abdomen-pelvis With    Result Date: 11/12/2018 11/12/2018 1:38 PM HISTORY/REASON FOR EXAM:  Abdominal pain. History of metastatic ampullary adenocarcinoma. TECHNIQUE/EXAM DESCRIPTION: CT scan of the abdomen and pelvis with contrast. Contrast-enhanced helical scanning was obtained from the diaphragmatic domes through the pubic symphysis following the bolus  administration of 80 mL of Omnipaque 350 without complication. Low dose optimization technique was utilized for this CT exam including automated exposure control and adjustment of the mA and/or kV according to patient size. COMPARISON: 10/25/2018 FINDINGS: The visualized lung bases are clear. CT Abdomen: A small hiatal hernia is present. There is interval increase in size of an area of low attenuation in the dome of the right lobe of the liver which contains air. This may represent necrosis or post therapy hemorrhage following embolization. This area measures 8.8 x 7.8 x 6.4 cm. Previous measurement was 2.2 cm in maximum dimension. Multiple smaller areas of low-attenuation within the right lobe of liver are present which are suspicious for hepatic metastases. These other smaller low-attenuation areas are without significant change. The gallbladder absent. A low-attenuation area in the region of the pancreatic head is again noted and is poorly defined measuring approximately 3.1 x 2.6 cm in diameter. This is consistent with the area of ampullary carcinoma. Embolization coils are present in the GDA. There is low attenuation in the pancreatic body proximally which may represent low-attenuation  secondary to pancreatitis or spread of neoplasm. The common duct is dilated proximal to the level of the ampulla measuring a maximum diameter of 1.6 cm. No choledocholithiasis is identified. An enlarged retroperitoneal lymph node between the upper aorta and IVC is unchanged measuring 1.5 cm in diameter. No new area of retroperitoneal adenopathy is identified. No peripancreatic adenopathy is present. The spleen appears normal. The splenic vein and portal vein are patent. The adrenal glands are not enlarged and the kidneys enhance symmetrically. Small simple cysts in the right kidney are stable. There is no lymphadenopathy. The aorta and IVC are normal in caliber. The bowel is without obstruction. The appendix appears normal.  There is a small umbilical hernia containing fat. CT Pelvis: There is no lymphadenopathy. No free fluid is present. The bladder appears normal. Uterus as endometrial complex thickening. There is a myomatous type calcification in the right aspect of the uterus which is unchanged. There is no acute inflammatory process.     1.  Interval significant increase in size of low-attenuation area in the dome of the right lobe of liver which contains air. This may represent necrosis or hemorrhage following therapy. Stable appearance of other smaller low-attenuation areas in the right lobe of the liver without evidence of progression of disease in these areas of metastasis. Differential diagnosis would include post therapy infection. 2.  Stable masslike density in the region of the ampulla of Vater consistent with primary carcinoma. Current dimensions are 3.1 x 2.6 cm. 3.  Persistent low-attenuation areas in the pancreatic head and proximal body which may represent secondary pancreatitis. 4.  Stable upper retroperitoneal adenopathy. 5.  Small umbilical hernia containing fat.    Ct-chest,abdomen,pelvis With    Result Date: 11/29/2018 11/29/2018 1:12 PM HISTORY/REASON FOR EXAM:  Leukocytosis. Liver abscess. TECHNIQUE/EXAM DESCRIPTION: CT scan of the chest, abdomen and pelvis with contrast. Helical scanning was obtained with intravenous contrast from the lung apices through the pubic symphysis to include the chest, abdomen and pelvis.  100 mL of Omnipaque 350 nonionic contrast was administered intravenously without complication. Low dose optimization technique was utilized for this CT exam including automated exposure control and adjustment of the mA and/or kV according to patient size. COMPARISON: 11/22/2018 FINDINGS: Atherosclerotic plaque is seen in the aorta. There is coronary artery calcification. There is a small amount of pericardial fluid. There is a small to moderate large right pleural effusion with overlying  atelectasis/consolidation. There are small mediastinal lymph nodes. Subcarinal lymph node measures 8 mm in short axis dimension. There are small hilar lymph nodes bilaterally. Anterior mediastinal lymph node measures 6.2 mm in short axis dimension. There are small axillary lymph nodes. No pneumothorax is identified. Examination is limited by respiratory motion. There is mild left lower lobe and right middle lobe atelectasis. No free air is seen in the abdomen or pelvis. There is a pigtail catheter within a hypodense hepatic lesion measuring 7.8 x 5 x 6.1 cm which is not significantly changed compared to prior. Adjacent to the pigtail catheter, there is a focal collection of contrast measuring 9 x 6.6 mm compatible with a pseudoaneurysm. A feeding vessel is seen. There is heterogeneous density within the lesion. There could have been bleeding into the lesion. No active hemorrhage is currently identified. Small hypodense lesions are again noted within the right lobe of the liver. Portal vein is patent. Gallbladder is surgically absent. Prominence of the common duct measures 1.2 cm. There is distention of the cystic duct. There is dilatation of the pancreatic duct. Streak  artifact from embolization coils is seen. There is a linear tract in the right lobe of the liver from a prior drain located more anteriorly. Spleen is not enlarged. No adrenal mass is identified. There is a parapelvic cyst on the right. Tiny hypodense right renal lesions are too small to characterize. There is no evidence of hydronephrosis. Atherosclerotic plaque is seen in the aorta. There are small inguinal lymph nodes. Enlarged aortocaval lymph node measures 1.4 cm in short axis dimension. Enlarged peripancreatic lymph nodes are seen. There is no evidence of bowel obstruction. There is colonic diverticulosis. There is a moderate amount of colonic stool. Terminal ileum is unremarkable. There is no evidence of acute appendicitis. There are  postsurgical changes of the bowel in the left abdomen. A small lymph node is seen adjacent to the distal esophagus. Bladder is mildly distended. Uterus is anteverted. There is a calcified uterine fibroid. There is Heterogeneity of the endometrial stripe in the lower uterine body with dilatation of the uterine cavity versus endometrial thickening near the uterine fundus measuring 1.2 cm in thickness. Calcific densities in the pelvis likely represent phleboliths. No free fluid is seen in the pelvis. Degenerative changes are seen in the spine. There is a fat-containing ventral hernia.     7.8 x 5 x 6.1 cm hypodense lesion in the right lobe of the liver is not significantly changed compared to prior. A pigtail catheter is seen within the lesion. This could represent a necrotic mass with superimposed infection. There is internal density within the lesion which could represent sequelae of a bleed into the lesion. There is a 9 x 6.6 mm pseudoaneurysm located adjacent to the pigtail catheter. Interventional radiology consultation recommended. Other small hypodense hepatic lesions are again noted. Linear tract from a previous pigtail catheter is seen within the right lobe of the liver. Dilatation of the pancreatic duct is again noted. Prominence of the common duct measures 1.2 cm. Enlarged peripancreatic and aortocaval lymph nodes are again noted. Parapelvic right renal cyst. Tiny hypodense right renal lesions are too small to characterize. Atherosclerotic plaque. Small to moderate right pleural effusion with overlying atelectasis/consolidation. Colonic diverticulosis. Moderate amount of colonic stool. Calcified uterine fibroid. Heterogeneity of the endometrial stripe in the lower uterine body with dilatation of the uterine cavity versus endometrial thickening near the uterine fundus measuring 1.2 cm in thickness. Further evaluation with pelvic ultrasound is recommended. This can be seen in the uterine malignancy.  Fat-containing ventral hernia. Borderline prominent subcarinal lymph node is nonspecific.     Ct-drain-liver Abscess-cyst    Result Date: 11/27/2018 11/24/2018 9:02 AM HISTORY/REASON FOR EXAM:  current drain pulled out of abscess, needs to be repositioned. Moderate sedation was administered with continuous monitoring of the patient under the direct supervision of  Medication: 100 mcg of fentanyl and 2 mg of Versed Total sedation time: 30 minutes The biopsy/drainage was done utilizing low dose optimization CT techniques including auto modulation for  imaging and low mA CT/fluoroscopy mode for the procedure. TECHNIQUE/EXAM DESCRIPTION AND NUMBER OF VIEWS:  After the informed consent the patient was placed on the angiographic table and supine position. Localization CT scan demonstrates a low-density area in the right lobe of the liver. The previously placed catheter was migrated upwards. The previously placed catheter was. After injecting lidocaine, a 17-gauge introducer needle was advanced into the fluid collection. After confirming the needle position, a wire was introduced. After dilating the tract, a new 10 Lebanese pigtail catheter was advanced and placed with its loop in the fluid collection. The tube is attached to the suction bulb. The patient tolerated procedure without any immediate complication.     1.  Successful CT-guided drainage of liver abscess. 2.  The previously placed migrated pigtail drainage catheter was removed.    Ct-drain-liver Abscess-cyst    Result Date: 11/13/2018 11/13/2018 4:01 PM HISTORY/REASON FOR EXAM:  liver abscess. Moderate sedation was administered with continuous monitoring of the patient under the direct supervision of  Medications: 2 mg of Versed and 100 mcg of fentanyl Total sedation time: 30 minutes The biopsy/drainage was done utilizing low dose optimization CT techniques including auto modulation for  imaging and low mA CT/fluoroscopy mode  for the procedure. TECHNIQUE/EXAM DESCRIPTION AND NUMBER OF VIEWS:  After the informed consent the patient was placed on the CT table on supine position. Localization CT scan demonstrates hypodense area in the right lobe of the liver. The skin over the lesion was prepped. Subsequently after injecting lidocaine a 17-gauge needle was advanced into the hypodense area. Dark-colored fluid was aspirated. A wire was released. Subsequently a 10 Estonian guiding catheter was advanced and placed with its loop in the liver. An approximately 100 mL of fluid was drained. Sample material was sent to the microbiology. The patient tolerated the procedure without any immediate complication.     Successful CT-guided drain placement in the right lobe liver necrosis/abscess.    Ct-head W/o    Result Date: 11/19/2018 11/19/2018 9:49 PM HISTORY/REASON FOR EXAM:  Acute onset of altered mental status and incontinence. History of ampullary region tumor. TECHNIQUE/EXAM DESCRIPTION AND NUMBER OF VIEWS: CT of the head without contrast. The study was performed on a GE CT scanner. Contiguous 5 mm axial sections were obtained from the skull base through the vertex. Up to date radiation dose reduction adjustments have been utilized to meet ALARA standards for radiation dose reduction. COMPARISON:  PET CT 7/12/2018 FINDINGS: The calvariae and skull base are unremarkable. There are no extraaxial fluid collections. There is a pattern of cerebral atrophy manifest as enlargement of sulcal markings and ventricular prominence.  The ventricular system and basal cisterns are otherwise unremarkable. There are areas of hypodensity in the white matter most consistent with small vessel ischemic change versus demyelination or gliosis. There are no hemorrhagic lesions. There are no mass effects or shift of midline structures. There is a small area of encephalomalacia in the LEFT cerebellum. This is unchanged. The brainstem and posterior fossa structures are  otherwise unremarkable. The paranasal sinuses and mastoids in the field of view are unremarkable.     1. Cerebral atrophy. 2. White matter lucencies most consistent with small vessel ischemic change versus demyelination or gliosis. 3. Remote LEFT cerebellar infarction 4. Otherwise, Head CT without contrast with no acute findings. No evidence of acute cerebral infarction, hemorrhage or mass lesion.    Dx-chest-portable (1 View)    Result Date: 11/23/2018 11/23/2018 2:14 PM HISTORY/REASON FOR EXAM:  Pleural Effusion. Status post right thoracentesis TECHNIQUE/EXAM DESCRIPTION AND NUMBER OF VIEWS: Single portable view of the chest. COMPARISON: 11/21/2018 FINDINGS: Single portable view of the chest demonstrates a stable cardiac silhouette and mediastinal contours. Calcification is in the aortic arch. The right hemidiaphragm is elevated. There is mild atelectasis in the right lung base. No pneumothorax is identified. Pigtail catheter projects over the right upper quadrant. There are multiple surgical clips. A right PICC line remains in place.     Mild right basilar atelectasis.    Dx-chest-portable (1 View)    Result Date: 11/21/2018 11/21/2018 5:31 PM HISTORY/REASON FOR EXAM: Cough. Congestion for a week TECHNIQUE/EXAM DESCRIPTION AND NUMBER OF VIEWS: Single AP view of the chest. COMPARISON: November 12 FINDINGS: Hardware: Right PICC line tip overlies the cavoatrial junction Pigtail catheter overlies the elevated right hemidiaphragm, not fully visualized Lungs: Increasing perihilar opacity with some bronchial thickening noted. Minor fissure is also thickened. Pleura:  No pleural space process is seen. Heart and mediastinum: There is similar hilar and cardiac silhouette enlargement.     New interstitial opacity is worrisome for edema Hilar prominence could be from lymphadenopathy or pulmonary arterial hypertension Stable right hemidiaphragm elevation    Dx-chest-portable (1 View)    Result Date: 11/12/2018 11/12/2018  11:06 AM HISTORY/REASON FOR EXAM:  Sepsis. TECHNIQUE/EXAM DESCRIPTION AND NUMBER OF VIEWS: Single portable view of the chest. COMPARISON: 10/25/2018 FINDINGS: There is no evidence of focal consolidation or evidence of pulmonary edema. There is no evidence of pleural effusion. The heart is normal in size.     No pulmonary consolidation.    Ir-visceral Angiogram - Selective    Result Date: 11/30/2018 11/29/2018 6:29 PM HISTORY/REASON FOR EXAM:  Hepatic artery pseudoaneurysm Moderate sedation was administered with continuous monitoring of the patient under the direct supervision of  Medication: 4 mg of  Versed and 100 mcg of fentanyl Contrast: 90 mL of Omnipaque 300 Total fluoroscopic time: 12.7 minutes Total number of images stored in the PACS: 224 TECHNIQUE/EXAM DESCRIPTION AND NUMBER OF VIEWS: After the informed consent the patient was placed on the fluoroscopy table on supine position. The skin over the right groin was prepped. After injecting lidocaine a 5 Bruneian vascular sheath was introduced. Subsequently, a diagnostic catheter was advanced into the celiac trunk. Celiac angiogram was performed. It demonstrated a pseudoaneurysm from the right hepatic branch. Subsequently a microcatheter was successfully advanced into the branch supplying the pseudoaneurysm. Microcatheter angiogram confirms the pseudoaneurysm and the catheter position. Subsequently metallic fiber coils were used to embolize the branch supplying the segment aneurysm. The final angiogram demonstrates occluded branch with stagnant flow within the pseudoaneurysm. The catheters were removed. The right superficial femoral angiogram does not demonstrate any significant stenosis. The right femoral sheath was removed and hemostasis was obtained with Angio-Seal device.     1.  Right hepatic artery pseudoaneurysm. 2.  Successful embolization of branch supplying the pseudoaneurysm.    Us-thoracentesis Puncture Right    Result Date:  11/23/2018 11/23/2018 1:15 PM HISTORY/REASON FOR EXAM:  Fluid collection TECHNIQUE/EXAM DESCRIPTION: Ultrasound-guided thoracentesis. Indication:  RIGHT pleural fluid collection. COMPARISON:  None PROCEDURE:     Informed consent was obtained. A timeout was taken. A right pleural effusion was localized with real-time ultrasound guidance. The right posterior chest wall was prepped and draped in a sterile manner. Following local anesthesia with 1% lidocaine, a 5 Bengali Yueh pigtail catheter was advanced into the pleural space with trocar technique and pleural fluid was drained. The patient tolerated the procedure well without evidence of complication. A post thoracentesis chest radiograph is forthcoming. FINDINGS: Fluid was  sent to the laboratory. Fluid character: straw colored     1. Ultrasound guided right sided diagnostic thoracentesis. 2. 450 mL of fluid withdrawn.    Ir-picc Line Placement    Result Date: 11/21/2018  HISTORY/REASON FOR EXAM:   PICC placement. TECHNIQUE/EXAM DESCRIPTION AND NUMBER OF VIEWS:   PICC line insertion with ultrasound guidance.  The procedure was performed using maximal sterile barrier technique including sterile gown, mask, cap, and donning of sterile gloves following appropriate hand hygiene and/or sterile scrub. Patient skin site was prepped with 2% Chlorhexidine solution. FINDINGS:  PICC line insertion with Ultrasound Guidance was performed by qualified nursing staff without the assistance of a Radiologist. PICC positioning appropriateness confirmed by 3CG technology; chest xray only needed in the instance 3CG unable to confirm placement.              Ultrasound-guided PICC placement performed by qualified nursing staff as above.     Ec-echocardiogram Complete W/o Cont    Result Date: 11/28/2018  Transthoracic Echo Report Echocardiography Laboratory CONCLUSIONS No prior study is available for comparison. Normal transthoracic echocardiogram. JANELLE BALDERAS Exam Date:          2018                    08:43 Exam Location:     Inpatient Priority:          Routine Ordering Physician:        ADAMA STOCKTON Referring Physician: Sonographer:               Maisha Leavitt RDCS Age:    73     Gender:    F MRN:    [id number] :    1945 BSA:    1.64   Ht (in):    60     Wt (lb):    148 Exam Type:     Complete Indications:     Endocarditis and heart valve disorders in diseases                  classified elsewhere ICD Codes:       I39 CPT Codes:       72064 BP:   111    /   77     HR:   79 Technical Quality:       Fair MEASUREMENTS  (Male / Female) Normal Values 2D ECHO LV Diastolic Diameter PLAX        3.5 cm                4.2 - 5.9 / 3.9 - 5.3 cm LV Systolic Diameter PLAX         2.1 cm                2.1 - 4.0 cm IVS Diastolic Thickness           1 cm                  LVPW Diastolic Thickness          1.1 cm                LVOT Diameter                     1.6 cm                RA Diameter                       2.9 cm                Estimated LV Ejection Fraction    65 %                  LV Ejection Fraction MOD BP       69.8 %                >= 55  % LV Ejection Fraction MOD 4C       61.7 %                LV Ejection Fraction MOD 2C       80.8 %                LA Volume Index                   10.7 cm³/m²           16 - 28 cm³/m² IVC Diameter                      1.7 cm                M-MODE Aortic Root Diameter MM           2.4 cm                DOPPLER AV Peak Velocity                  1.4 m/s               AV Peak Gradient                  8 mmHg                AV Mean Gradient                  3.4 mmHg              LVOT Peak Velocity                0.78 m/s              AV Area Cont Eq vti               1.3 cm²               Mitral E Point Velocity           0.52 m/s              Mitral E to A Ratio               0.53                  MV Pressure Half Time             64.3 ms               MV Area PHT                       3.4 cm²               MV Deceleration Time               222 ms                TR Peak Velocity                  235 cm/s              PV Peak Velocity                  0.84 m/s              PV Peak Gradient                  2.8 mmHg              RVOT Peak Velocity                0.69 m/s              * Indicates values subject to auto-interpretation LV EF:  65    % FINDINGS Left Ventricle Normal left ventricular size and systolic function. Normal left ventricular wall thickness. Normal diastolic function. Normal regional wall motion. Left ventricular ejection fraction is visually estimated to be 65%. Right Ventricle Normal right ventricular size and systolic function. Right Atrium Normal right atrial size. Normal inferior vena cava size and inspiratory collapse. Left Atrium Normal left atrial size. Mitral Valve Structurally normal mitral valve. Mild mitral regurgitation. No mitral stenosis. Aortic Valve Structurally normal aortic valve without significant stenosis or regurgitation. Tricuspid Valve Structurally normal tricuspid valve. Mild tricuspid regurgitation. Right atrial pressure is estimated to be 3 mmHg. Estimated right ventricular systolic pressure  is 35 mmHg. Pulmonic Valve Structurally normal pulmonic valve without significant stenosis or regurgitation. Pericardium Normal pericardium without effusion. Aorta The aortic root is normal. Gurwinder Mayberry MD (Electronically Signed) Final Date:     28 November 2018                 09:42      Micro:  Results     Procedure Component Value Units Date/Time    BLOOD CULTURE [[id number]] Collected:  11/29/18 1200    Order Status:  Completed Specimen:  Blood from Peripheral Updated:  11/30/18 0904     Significant Indicator NEG     Source BLD     Site PERIPHERAL     Blood Culture No Growth    Note: Blood cultures are incubated for 5 days and  are monitored continuously.Positive blood cultures  are called to the RN and reported as soon as  they are identified.      Narrative:       Contact  Per Hospital Policy: Only  "change Specimen Src: to \"Line\" if  specified by physician order.    BLOOD CULTURE [[id number]] Collected:  11/29/18 1246    Order Status:  Completed Specimen:  Blood from Peripheral Updated:  11/30/18 0904     Significant Indicator NEG     Source BLD     Site PERIPHERAL     Blood Culture No Growth    Note: Blood cultures are incubated for 5 days and  are monitored continuously.Positive blood cultures  are called to the RN and reported as soon as  they are identified.      Narrative:       Contact  Per Hospital Policy: Only change Specimen Src: to \"Line\" if  specified by physician order.    AFB CULTURE [[id number]] Collected:  11/23/18 1330    Order Status:  Completed Specimen:  Body Fluid Updated:  11/29/18 0804     Significant Indicator NEG     Source BF     Site Thoracentesis Fluid     AFB Culture Culture in progress.     AFB Smear Results No acid fast bacilli seen.    Narrative:       RT pl eff 11/23/2018  14:50    FLUID CULTURE W/GRAM STAIN [[id number]] Collected:  11/23/18 1330    Order Status:  Completed Specimen:  Body Fluid Updated:  11/29/18 0804     Gram Stain Result Few WBCs.  No organisms seen.       Significant Indicator NEG     Source BF     Site Thoracentesis Fluid     Culture Result Bdf No growth at 5 days    Narrative:       RT pl eff 11/23/2018  14:50    FUNGAL CULTURE [[id number]] Collected:  11/23/18 1330    Order Status:  Completed Specimen:  Body Fluid Updated:  11/29/18 0804     Significant Indicator NEG     Source BF     Site Thoracentesis Fluid     Fungal Culture Culture in progress.    Narrative:       RT pl eff 11/23/2018  14:50    GRAM STAIN [[id number]] Collected:  11/23/18 1330    Order Status:  Completed Specimen:  Body Fluid Updated:  11/24/18 1953     Significant Indicator .     Source BF     Site Thoracentesis Fluid     Gram Stain Result Few WBCs.  No organisms seen.      Narrative:       RT pl eff 11/23/2018  14:50    ACID FAST STAIN [[id number]] Collected:  11/23/18 1330    Order " "Status:  Completed Specimen:  Body Fluid Updated:  11/24/18 1953     Significant Indicator NEG     Source BF     Site Thoracentesis Fluid     AFB Smear Results No acid fast bacilli seen.    Narrative:       RT pl eff 11/23/2018  14:50          Assessment:  Active Hospital Problems    Diagnosis   • Bacteremia [R78.81]   • Liver abscess [K75.0]   • Sepsis (HCC) [A41.9]   • 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]   • Fungemia [B49]   • Deep vein thrombosis (CMS-HCC) [I82.409] [I82.409]   • Lactic acidosis [E87.2]   • Pseudoaneurysm following procedure (HCC) [I97.89, I72.9]   • Pleural effusion [J90]   • Cough [R05]        Plan:     Leukocytosis  - New,  17.4 from 8.1 on 11/29, now improved  - Likely 2/2 internal bleed, no fevers    REC: Follow blood cultures on 11/29 to final - NGTD     Bloody drainage from abdominal drain  - Reports new abdominal pain overnight but now improved   --- CT C/A/P on 11/29 with: \"Adjacent to the pigtail catheter, there is a focal collection of   contrast measuring 9 x 6.6 mm compatible with a pseudoaneurysm. A feeding vessel is seen. 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  Per CT on 11/29 this could be a necrotic mass with superimposed infection.      - CT scan 11/12 show with area of necrosis/hemorrhage, s/p IR drainage on 11/13/2018-- Cultures +  lactobacillus, VRE and Candida albicans and strep viridans  - Underwent CT scan on 11/22/2018- size of the abscess has diminished measuring 75 x 48 from 88 x 64 mm but abscess still present and contains air with new mild left hepatic " "pneumobilia  - Underwent another drain placement on 11/24/2018 as catheter was not correctly posittioned   - CT A/P on 11/29 due to concern for bleeding: \"7.8 x 5 x 6.1 cm hypodense lesion in the right lobe of the liver is not significantly changed compared to prior. A pigtail catheter is seen within the lesion. This could represent a necrotic mass with superimposed infection. There is internal density within the lesion which could represent sequelae of a bleed into the lesion.\" Catheter within the lesion and not significantly changed from last CT.      REC: Continue Daptomycin, fluconazole, and Zosyn (E coli with resistance to ampicillin)   - plan had been to given at least 4 weeks from prior CT abdomen on 11/22 and then will need to re-image prior to stopping - however may be necrotic mass as opposed to abscess, or both   -REC: Will assess drain output and assess, End on 12/20/18 pending CT and drain output      Bacteremia  Blood cultures + Bacteroides  Repeat blood cultures x2 are negative from 11/15/2018  - see abx above      Candidemia- Candida albicans  Repeat blood cultures x2 are negative from 11/15/2018  Changed to p.o. Diflucan  - see abx above    - TTE to rule-out IE in setting of candidemia - no vegetations seen    --- Eye exam if visual changes     Cough   - Unlikely bacterial infection as on broad spectrum abx      Pleural effusions  - Thora 11/23, cx with no growth   - The CT scan showed pleural effusion hence underwent thoracentesis on 11/23/2018.  2752 WBCs with 46% polys.  Gram stain and cultures negative.   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 Yang review of CT and exam of patient, if JACQUELINE drain <30cc " "output in 24 hours, can dc drain.  Once drain dc'd should resume chemo and XRT since liver lesion is necrotic mass, not purulent fluid.\"     REC: Monitor drain output      Prognosis  Guarded     DW primary team.      Morelia Dc MD  ID      "

## 2018-12-01 NOTE — CARE PLAN
Problem: Infection  Goal: Will remain free from infection  Outcome: PROGRESSING AS EXPECTED  IV abx. Contact precaution in place( VRE).     Problem: Pain Management  Goal: Pain level will decrease to patient's comfort goal  Outcome: PROGRESSING SLOWER THAN EXPECTED  Pt rates pain 2/10, refuses pain med despite education.     Problem: Urinary Elimination:  Goal: Ability to reestablish a normal urinary elimination pattern will improve  Outcome: PROGRESSING SLOWER THAN EXPECTED  Pt incontinent of urine at times. Frequent linens check in place. Pt asks if she needs to use commode during hourly rounds.

## 2018-12-01 NOTE — PROGRESS NOTES
· 2 RN skin check complete. Bruising to upper bilateral extremities, JACQUELINE drain to RUQ.  · The following interventions in place: waffle overlay, encourage to increase oral intake.

## 2018-12-01 NOTE — PROGRESS NOTES
Assumed care of pt @0700. Bedside report received. Pt AOX 4. Pt rates pain 2/10; declines pain med. Contact precaution sin place. PICC patent with positive blood return running IV abx. JACQUELINE drain with small dark bloody output. Pt incontinent of urine at times. Last Bm this morning. Pt up with 1 p assist. Fall precaution in place. POC discussed with pt, all questions answered at this time. Pt makes needs known, call light within reach, hourly rounding in place.

## 2018-12-01 NOTE — PROGRESS NOTES
Received change of shift report from day RN. Assumed pt. Care at 1900. Pt. Aox4, no signs of distress, denies pain, family at bedside. JACQUELINE drain with dark red output, dressing intact without drainage. Pt. Has no complaints or requests at this time. Bed in lowest position with wheels locked, bed alarm in place. Call light within reach.

## 2018-12-01 NOTE — PROGRESS NOTES
Hospital Medicine Daily Progress Note    Date of Service  11/30/2018    Chief Complaint  73 y.o. female admitted 11/12/2018 with fevers and chills in setting of metastatic ampullary carcioma.    Hospital Course    Patient admitted and started again on IV antibitoics.  She was found to have a liver abscess and required drainage, cultures from this have shown VRE, candida and lactobacillus acidophilus and blood cultures showing candida.  ID has been managing antibiotics and antifungals.       Interval Problem Update  11/20 Patient had episode of incontinence overnight along with confusion and new difficulty following commands.  CT head was done showing no acute findings with suspicion of possible s/e of pain medication.  Her mentation has improved but she continues to have urinary and bowel incontinence, likely with fatigue and now frequent need for toileting due to her antibiotics and IV hydration.  11/21 Patient's cough persisting but she remains afebrile.  I ordered PICC line for placement as patient will need continued antibiotics for 6 week duration, final blood cultures have resulted negative.  CXR ordered and consistent with interstitial edema - will start IV lasix to see if this improves her oxygen saturation and cough. WBC continues to drop.  11/22 Patient states she is feeling slightly better today, having to urinate more often since starting diuretic but lungs are sounding slightly better.  CT abdomen completed and shows abscess is smaller but still present and drain no longer in place.  She also now has a moderate right pleural effusion.  I've requested IR to reposition drain in liver abscess and to also drain what the pleural effusion.  Patient requests a call to be made to her son, Sea.  11/23 Patient had thoracentesis without issue and is breathing slightly better, delay in repositioning of drain due to recent dose of eliquis and need to be out of system for 48 hours to lessen the chance of bleeding.   Repeat culture on fluid ordered.  11/24 Patient had successful placement of drain in liver fluid collection, drainage in JACQUELINE bulb is dark brown without evidence of purulence.  Culture from fluid collected.  Son at bedside when evaluated and he remarked she appears much more awake today and breathing much easier than on days past - explained the need for diuresis and the drainage of the lung have likely contributed to improvement.  11/25 Patient feeling well, denies new complaints.  Will cut back on lasix to once daily and IV to PO.  She is currently on Unasyn and final treatment plan still pending decision by ID.  Cultures from thoracentesis showing no growth.  11/26 Patient states she has had good urinary continence since decreasing lasix to once daily.  No acute changes, ID developing duration of antibiotic treatment.  Radiation therapy to start tomorrow.  11/27:  Ordered TTE per ID recs since VRE, Ecoli found on liver abscess culture.  Explained to patient that since has an active liver abscess, will need to hold on chemo and XRT until antibiotics have adequately eliminated any infection.  Tentative stop date 12/20 with repeat CT abdomen to ensure resolution.  Last CT abdomen 11/22 with abscess size 75mm x 48mm down from prior size 88 x 64mm.  Doing well, no abdominal pain or distention noted on exam, appetite good, afebrile.  11/28:  Normal echo EF 65%.  No abdominal pain, eating well, no fevers.  11/29:  Repeat CT ordered since bloody drainage seen from JACQUELINE drain RUQ.  Pseudoaneurysm seen from hepatic artery encircling drain.  IR consult, Dr. Higginbotham removed drain.  dc'd eliquis bid.  Concerning abscess no change in size.  Will d/w Dr. Yang since may represent necrotic mass from ampullary cancer and require surgical removal.  BCs ordered since increase in wbc 8 to 17K, no fever, pain in RUQ.   11/30:  Spoke with Dr. Yang regarding necrotic liver mass, no change in size on recent CT abdomen/pelvis.  Has  recommended to dc drain if <30 cc output daily.  Recommend to start chemo, XRT once drain removed.  Has dried blood in JACQUELINE drain s/p IR embolization of hepatic artery pseudoaneurysm.   Overall, patient does not have any abdominal pain or distention.    Consultants/Specialty  ID - Leela/Honorio  General surgeon Dr. Yang  IR    Code Status  full    Disposition  PT rec home with 24/7 supervision, OT rec HH, no prior home O2.    Review of Systems  Review of Systems   Constitutional: Negative for chills, fever and malaise/fatigue.   HENT: Negative for congestion and sore throat.    Eyes: Negative for blurred vision and photophobia.   Respiratory: Positive for cough (slightly better). Negative for shortness of breath and wheezing.    Cardiovascular: Negative for chest pain, claudication and leg swelling.   Gastrointestinal: Negative for abdominal pain, constipation, diarrhea, heartburn, nausea and vomiting.   Genitourinary: Negative for dysuria and hematuria.   Musculoskeletal: Negative for joint pain and myalgias.   Skin: Negative for itching and rash.   Neurological: Positive for weakness. Negative for dizziness, sensory change, speech change and headaches.   Psychiatric/Behavioral: Negative for depression. The patient is not nervous/anxious and does not have insomnia.         Physical Exam  Temp:  [36 °C (96.8 °F)-36.8 °C (98.2 °F)] 36 °C (96.8 °F)  Pulse:  [84-94] 84  Resp:  [16-18] 16  BP: (112-148)/(58-70) 124/61    Physical Exam   Constitutional: She is oriented to person, place, and time. She appears well-developed and well-nourished. No distress.   HENT:   Head: Normocephalic and atraumatic.   Eyes: Conjunctivae are normal. No scleral icterus.   Neck: Neck supple. No JVD present.   Cardiovascular: Normal rate, regular rhythm and normal heart sounds.  Exam reveals no gallop and no friction rub.    No murmur heard.  Pulmonary/Chest: Effort normal. No respiratory distress. She has no rales. She exhibits no  tenderness.   Abdominal: Soft. Bowel sounds are normal. She exhibits no distension. There is no guarding.   Musculoskeletal: She exhibits no edema or tenderness.   Lymphadenopathy:     She has no cervical adenopathy.   Neurological: She is alert and oriented to person, place, and time. No cranial nerve deficit.   Skin: Skin is warm and dry. She is not diaphoretic. No erythema. No pallor.   Psychiatric: She has a normal mood and affect. Her behavior is normal.   Nursing note and vitals reviewed.      Fluids    Intake/Output Summary (Last 24 hours) at 11/30/18 1853  Last data filed at 11/30/18 1800   Gross per 24 hour   Intake             1750 ml   Output               53 ml   Net             1697 ml       Laboratory  Recent Labs      11/28/18   0013  11/29/18   0045  11/29/18   1200  11/29/18   1900  11/30/18   0305   WBC  8.1  17.4*   --    --   11.7*   RBC  3.05*  3.14*   --    --   3.13*   HEMOGLOBIN  8.6*  8.9*  8.0*  7.1*  8.9*   HEMATOCRIT  27.1*  27.7*   --    --   28.0*   MCV  88.9  88.2   --    --   88.2   MCH  28.2  28.3   --    --   28.4   MCHC  31.7*  32.1*   --    --   32.2*   RDW  54.8*  54.1*   --    --   55.2*   PLATELETCT  110*  163*   --    --   172   MPV  11.7  11.5   --    --   11.5     Recent Labs      11/28/18   0013  11/29/18   0045  11/30/18   0305   SODIUM  133*  134*  137   POTASSIUM  4.4  4.0  3.5*   CHLORIDE  105  106  110   CO2  20  18*  20   GLUCOSE  110*  150*  115*   BUN  8  8  8   CREATININE  0.63  0.78  0.67   CALCIUM  7.9*  8.2*  7.7*     Recent Labs      11/29/18   1540   APTT  41.0*   INR  2.91*               Imaging  IR-VISCERAL ANGIOGRAM - SELECTIVE   Final Result      1.  Right hepatic artery pseudoaneurysm.   2.  Successful embolization of branch supplying the pseudoaneurysm.      CT-CHEST,ABDOMEN,PELVIS WITH   Final Result      7.8 x 5 x 6.1 cm hypodense lesion in the right lobe of the liver is not significantly changed compared to prior. A pigtail catheter is seen within the  lesion. This could represent a necrotic mass with superimposed infection. There is internal density    within the lesion which could represent sequelae of a bleed into the lesion. There is a 9 x 6.6 mm pseudoaneurysm located adjacent to the pigtail catheter. Interventional radiology consultation recommended.      Other small hypodense hepatic lesions are again noted.      Linear tract from a previous pigtail catheter is seen within the right lobe of the liver.      Dilatation of the pancreatic duct is again noted.      Prominence of the common duct measures 1.2 cm.      Enlarged peripancreatic and aortocaval lymph nodes are again noted.      Parapelvic right renal cyst. Tiny hypodense right renal lesions are too small to characterize.      Atherosclerotic plaque.      Small to moderate right pleural effusion with overlying atelectasis/consolidation.      Colonic diverticulosis. Moderate amount of colonic stool.      Calcified uterine fibroid.      Heterogeneity of the endometrial stripe in the lower uterine body with dilatation of the uterine cavity versus endometrial thickening near the uterine fundus measuring 1.2 cm in thickness. Further evaluation with pelvic ultrasound is recommended. This    can be seen in the uterine malignancy.      Fat-containing ventral hernia.      Borderline prominent subcarinal lymph node is nonspecific.         EC-ECHOCARDIOGRAM COMPLETE W/O CONT   Final Result      CT-DRAIN-LIVER ABSCESS-CYST   Final Result      1.  Successful CT-guided drainage of liver abscess.   2.  The previously placed migrated pigtail drainage catheter was removed.      US-THORACENTESIS PUNCTURE RIGHT   Final Result      1. Ultrasound guided right sided diagnostic thoracentesis.      2. 450 mL of fluid withdrawn.      DX-CHEST-PORTABLE (1 VIEW)   Final Result      Mild right basilar atelectasis.      CT-ABDOMEN W/O   Final Result      Hepatic dome abscess pigtail catheter has pulled out and now terminates just  deep to the thoracic cage. Despite this the size of the abscess has diminished measuring 75 x 48 from 88 x 64 mm      New mild left hepatic pneumobilia      Ampullary region mass is difficult to precisely measure but does appear to exert some mass effect upon the third portion of the duodenum. No bowel obstruction is detected.      New medium-sized right ovarian pleural effusion does not have loculation features but there is subsegmental atelectasis      DX-CHEST-PORTABLE (1 VIEW)   Final Result      New interstitial opacity is worrisome for edema      Hilar prominence could be from lymphadenopathy or pulmonary arterial hypertension      Stable right hemidiaphragm elevation      IR-PICC LINE PLACEMENT   Final Result                  Ultrasound-guided PICC placement performed by qualified nursing staff as    above.          CT-HEAD W/O   Final Result      1. Cerebral atrophy.   2. White matter lucencies most consistent with small vessel ischemic change versus demyelination or gliosis.   3. Remote LEFT cerebellar infarction   4. Otherwise, Head CT without contrast with no acute findings. No evidence of acute cerebral infarction, hemorrhage or mass lesion.      CT-DRAIN-LIVER ABSCESS-CYST   Final Result      Successful CT-guided drain placement in the right lobe liver necrosis/abscess.      CT-ABDOMEN-PELVIS WITH   Final Result      1.  Interval significant increase in size of low-attenuation area in the dome of the right lobe of liver which contains air. This may represent necrosis or hemorrhage following therapy. Stable appearance of other smaller low-attenuation areas in the    right lobe of the liver without evidence of progression of disease in these areas of metastasis. Differential diagnosis would include post therapy infection.      2.  Stable masslike density in the region of the ampulla of Vater consistent with primary carcinoma. Current dimensions are 3.1 x 2.6 cm.      3.  Persistent low-attenuation areas  in the pancreatic head and proximal body which may represent secondary pancreatitis.      4.  Stable upper retroperitoneal adenopathy.      5.  Small umbilical hernia containing fat.      DX-CHEST-PORTABLE (1 VIEW)   Final Result      No pulmonary consolidation.           Assessment/Plan  Liver abscess- (present on admission)   Assessment & Plan    Drain replaced 11/24/18 as previous one no longer in connection to fluid collection on CT, large amount drainage again.    Cultures enterococcus [VRE]  ID following   Continue daptomycin IV, unasyn iv, fluconozole per ID stop date 12/20 with repeat CT abdomen/pelvis at that time.  11/30:  Spoke with Dr. Yang regarding necrotic liver mass, no change in size on recent CT abdomen/pelvis.  Has recommended to dc drain if <30 cc output daily.  Recommend to start chemo, XRT once drain removed.  Has dried blood in JACQUELINE drain s/p IR embolization of hepatic artery pseudoaneurysm.   Overall, patient does not have any abdominal pain or distention.     Bacteremia- (present on admission)   Assessment & Plan    Received treatment for E. coli bacteremia in early November, her port was removed  Admitted with sepsis  Blood cultures from 11/12/2018 are growing lactobacillus, Candida albicans and strep viridans. Liver abscess cultures from 11/13/2018 are positive for VRE.  Currently on unasyn/PO diflucan ID following.     PICC placed 11/21/18  IV daptomycin, IV unasyn, fluconazole tentative stop date 12/20 per ID, then repeat CT to see if further IV abx needed.  Repeat 11/24 CT guided liver abscess pigtail catheter placed after initial was found to be displaced.  11/28:  TTE negative for endocarditis.  EF 65%.  11/29:  BCs x2 negative.             Sepsis (HCC)- (present on admission)   Assessment & Plan    This is sepsis (without associated acute organ dysfunction).   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  11/29:  Review of liver abscess, no change in size, may be necrotic cancerous mass per radiologist today.  Will dw Dr. Yang if need surgical excision planned.  11/30:  Per Dr. Yang review of CT and exam of patient, if JACQUELINE drain <30cc output in 24 hours, can dc drain.  Once drain dc'd should resume chemo and XRT since liver lesion is necrotic mass, not purulent fluid.       Fungemia   Assessment & Plan    Mycofungin   ID following        Deep vein thrombosis (CMS-HCC) [I82.409]- (present on admission)   Assessment & Plan    11/29  dc'd eliquis due to bleeding into JACQUELINE drain from pseudoaneurysm.  Last DVT 4/4/17.  No need to continue eliquis.       Pseudoaneurysm following procedure (HCC)   Assessment & Plan    11/29:  Bloody JACQUELINE drain of liver abscess.  Ordered CT abdomen/pelvis/chest:  Pseudoaneurysm of hepatic artery encircling drain.  IR consult to Dr. Higginbotham who embolized artery.  dc'd eliquis 5mg bid.         Pleural effusion   Assessment & Plan    Thoracentesis 11/23/18 - 450cc removed - cytology and studies pending       Cough   Assessment & Plan    Non-productive, CXR concerning for edema  Low likelihood of infection given broad spectrum antibiotics for VRE  Improvement with diuresis  CT shows pleural effusion right lung - thoracentesis ordered in conjunction with drain replacement for liver  11/27:  No further cough, lungs CTA b/l.           Lactic acidosis- (present on admission)   Assessment & Plan    Improved with fluid resuscitation and treatment of infection            VTE prophylaxis: Eliquis

## 2018-12-01 NOTE — CARE PLAN
Problem: Infection  Goal: Will remain free from infection  Outcome: PROGRESSING AS EXPECTED  IV abx in place, no signs of infection, afebrile    Problem: Pain Management  Goal: Pain level will decrease to patient's comfort goal  Outcome: PROGRESSING AS EXPECTED  Pt. Verbalizes satisfaction with pain control

## 2018-12-02 PROBLEM — Z71.89 ADVANCE CARE PLANNING: Status: ACTIVE | Noted: 2018-01-01

## 2018-12-02 PROBLEM — D50.0 IRON DEFICIENCY ANEMIA DUE TO CHRONIC BLOOD LOSS: Status: ACTIVE | Noted: 2018-01-01

## 2018-12-02 NOTE — PROGRESS NOTES
Infectious Disease Progress Note    Author: Morelia Dc M.D. Date & Time of service: 12/2/2018  7:43 AM    Interval History:  73-year-old female with metastatic ampullary carcinoma of the pancreas presented with generalized weakness and shaking chills.  11/13/2018-no fevers.  Complains of generalized malaise and fatigue.  Still has some pain in her right shoulder.  WBC count is 22,000.  Cultures remain negative.  11/14/2018 T-max is 98.2.  No new issues overnight.  Underwent IR drainage on 11/13/2018.  Her shoulder pain has eased since then.  11/15/2018 no fevers.  WBC 13.3 creatinine 0.47  11/16/2018 no fevers.  Denies any increased abdominal pain.  The shoulder pain is improved as well.  WBC is 12.8.  11/17/2018 no fevers.  Shoulder pain is much improved.  Denies any new issues.  11/18- no fevers. No new issues> WBC 16.7  11/19/2018 no fevers.  WBC 15.6 no new issues overnight  11/20 AF (99.7) WBC 15.3 states eating better-denies SE abx No abd pain today  11/21 Af (99.9) WBC 9.8 minimal output from drain-plan for PICC today  11/22/2018 T-max is 100.3.  No new issues overnight.  WBCs 10.  AST 40 ALT 17  11/23/2018 no fevers.  The drain is not draining much.  Underwent thoracentesis today.  11/24/2018 underwent another drain placement.  Complains of some abdominal pain otherwise denies any complaints.  WBC is 8.4  11/25/2018 complains of malaise and fatigue.  Complains of some abdominal pain.  WBC 5.9, Labs Reviewed, Medications Reviewed and Radiology Reviewed.  11/26 AF, labs reviewed, imaging reviewed, pt with no complaints   11/27 AF, labs reviewed, pt with cough and somnolence    11/28 AF, labs reviewed, TTE ordered due to recent candidemia   11/29, AF, mild tachycardia, satting well on RA, labs reviewed, significant increase in WBC today, TTE with no indication of IE yesterday  11/30, Blood in drain noted yesterday and requested imaging-  significant for hepatic pseudoaneurysm which was embolized by  IR, Vitals reviewed, pt AF, O2 on 1 liter NC (previously on RA) with downward titration. Labs and micro results reviewed. Recent imaging reviewed.    EUN, Vitals reviewed- pt AF, O2 RA, Labs and micro results reviewed. Recent imaging reviewed. Pt with some nausea this am and poor appetite.    EUN, Vitals reviewed- pt AF, O2 RA, Labs and micro results reviewed. Recent imaging reviewed. Medications reviewed.       Review of Systems:  Review of Systems   Constitutional: Negative for chills, diaphoresis, fever and malaise/fatigue.   Respiratory: Positive for cough. Negative for sputum production and shortness of breath.    Gastrointestinal: Negative for abdominal pain, constipation, diarrhea, nausea and vomiting.   Musculoskeletal: Negative for myalgias.   Skin: Negative for itching and rash.       Hemodynamics:  Temp (24hrs), Av.6 °C (97.9 °F), Min:36.2 °C (97.1 °F), Max:37.1 °C (98.7 °F)  Temperature: 37.1 °C (98.7 °F)  Pulse  Av.6  Min: 67  Max: 112   Blood Pressure : 126/59       Physical Exam:  Physical Exam   Constitutional: She is oriented to person, place, and time. She appears well-developed and well-nourished.   Cardiovascular: Normal rate, regular rhythm and normal heart sounds.    Pulmonary/Chest: Effort normal. No respiratory distress.   Diminished breath sounds on right    Abdominal: Soft. Bowel sounds are normal. There is no tenderness. There is no rebound and no guarding.   Musculoskeletal: She exhibits no edema.   Neurological: She is alert and oriented to person, place, and time.   Skin: Skin is warm and dry.       Meds:    Current Facility-Administered Medications:   •  prochlorperazine  •  [COMPLETED] piperacillin-tazobactam **AND** piperacillin-tazobactam  •  DAPTOmycin  •  ipratropium-albuterol  •  fluconazole  •  senna-docusate **AND** polyethylene glycol/lytes **AND** magnesium hydroxide **AND** bisacodyl  •  Respiratory Care per Protocol  •  ondansetron  •  ondansetron  •   acetaminophen  •  Notify provider if pain remains uncontrolled **AND** Use the numeric rating scale (NRS-11) on regular floors and Critical-Care Pain Observation Tool (CPOT) on ICUs/Trauma to assess pain **AND** Pulse Ox (Oximetry) **AND** Pharmacy Consult Request **AND** If patient difficult to arouse and/or has respiratory depression, stop any opiates that are currently infusing and call a Rapid Response. **AND** oxyCODONE immediate-release **AND** oxyCODONE immediate-release **AND** morphine injection  •  NS  •  omeprazole  •  artificial tears  •  albuterol    Labs:  Recent Labs      11/30/18   0305  12/01/18   0130  12/01/18   0844  12/01/18   1430  12/02/18   0145   WBC  11.7*  9.7   --    --   9.9   RBC  3.13*  2.55*   --    --   3.29*   HEMOGLOBIN  8.9*  7.3*  7.2*  7.3*  9.4*   HEMATOCRIT  28.0*  23.1*   --    --   29.6*   MCV  88.2  90.6   --    --   90.6   MCH  28.4  28.6   --    --   28.6   RDW  55.2*  59.0*   --    --   58.5*   PLATELETCT  172  219   --    --   324   MPV  11.5  10.8   --    --   10.8     Recent Labs      11/30/18   0305  12/01/18   0130  12/02/18   0145   SODIUM  137  132*  133*   POTASSIUM  3.5*  3.9  4.2   CHLORIDE  110  108  106   CO2  20  20  19*   GLUCOSE  115*  99  109*   BUN  8  9  9     Recent Labs      11/30/18   0305  12/01/18   0130  12/02/18   0145   ALBUMIN  2.1*  2.1*  2.3*   TBILIRUBIN  0.3  0.3  0.5   ALKPHOSPHAT  147*  145*  193*   TOTPROTEIN  6.7  6.4  7.2   ALTSGPT  17  24  22   ASTSGOT  37  66*  45   CREATININE  0.67  0.55  0.71       Imaging:  Ct-abdomen W/o    Result Date: 11/22/2018 11/22/2018 1:11 PM HISTORY/REASON FOR EXAM:  Liver abscess follow-up. Metastatic ampullary adenocarcinoma TECHNIQUE/EXAM DESCRIPTION: CT scan of the abdomen without contrast. Noncontrast helical sections were obtained from the diaphragmatic domes through the iliac crests Low dose optimization technique was utilized for this CT exam including automated exposure control and adjustment of  the mA and/or kV according to patient size. COMPARISON: 11/12/18 and 11/13/2018 FINDINGS: Limited by lack of IV contrast New moderate right low-density layering pleural effusion with adjacent compressive atelectasis Right hepatic pigtail catheter has been displaced and now terminates deep to the anterior rib cage. It is no longer located in the hepatic dome abscess. The abscess is 75 x 48 mm in axial dimensions, diminished from 88 x 64 mm. Again it has fluid and gas  components There is some new left hepatic biliary favored over portal venous gas No abnormal perihepatic fluid collections detected. No visualized abscess is seen in the upper abdomen With lack of contrast it is difficult to evaluate the ampullary region mass. No gross interval change is seen and again there is artifact in the region from embolization. No free air Some mass effect on the third portion of the duodenum secondary to the mass. No adrenal mass or splenomegaly Medium-sized umbilical hernia contains fat. Previously a small loop of small bowel extending into the defect. No hydronephrosis     Hepatic dome abscess pigtail catheter has pulled out and now terminates just deep to the thoracic cage. Despite this the size of the abscess has diminished measuring 75 x 48 from 88 x 64 mm New mild left hepatic pneumobilia Ampullary region mass is difficult to precisely measure but does appear to exert some mass effect upon the third portion of the duodenum. No bowel obstruction is detected. New medium-sized right ovarian pleural effusion does not have loculation features but there is subsegmental atelectasis    Ct-abdomen-pelvis With    Result Date: 11/12/2018 11/12/2018 1:38 PM HISTORY/REASON FOR EXAM:  Abdominal pain. History of metastatic ampullary adenocarcinoma. TECHNIQUE/EXAM DESCRIPTION: CT scan of the abdomen and pelvis with contrast. Contrast-enhanced helical scanning was obtained from the diaphragmatic domes through the pubic symphysis  following the bolus administration of 80 mL of Omnipaque 350 without complication. Low dose optimization technique was utilized for this CT exam including automated exposure control and adjustment of the mA and/or kV according to patient size. COMPARISON: 10/25/2018 FINDINGS: The visualized lung bases are clear. CT Abdomen: A small hiatal hernia is present. There is interval increase in size of an area of low attenuation in the dome of the right lobe of the liver which contains air. This may represent necrosis or post therapy hemorrhage following embolization. This area measures 8.8 x 7.8 x 6.4 cm. Previous measurement was 2.2 cm in maximum dimension. Multiple smaller areas of low-attenuation within the right lobe of liver are present which are suspicious for hepatic metastases. These other smaller low-attenuation areas are without significant change. The gallbladder absent. A low-attenuation area in the region of the pancreatic head is again noted and is poorly defined measuring approximately 3.1 x 2.6 cm in diameter. This is consistent with the area of ampullary carcinoma. Embolization coils are present in the GDA. There is low attenuation in the pancreatic body proximally which may represent low-attenuation  secondary to pancreatitis or spread of neoplasm. The common duct is dilated proximal to the level of the ampulla measuring a maximum diameter of 1.6 cm. No choledocholithiasis is identified. An enlarged retroperitoneal lymph node between the upper aorta and IVC is unchanged measuring 1.5 cm in diameter. No new area of retroperitoneal adenopathy is identified. No peripancreatic adenopathy is present. The spleen appears normal. The splenic vein and portal vein are patent. The adrenal glands are not enlarged and the kidneys enhance symmetrically. Small simple cysts in the right kidney are stable. There is no lymphadenopathy. The aorta and IVC are normal in caliber. The bowel is without obstruction. The appendix  appears normal. There is a small umbilical hernia containing fat. CT Pelvis: There is no lymphadenopathy. No free fluid is present. The bladder appears normal. Uterus as endometrial complex thickening. There is a myomatous type calcification in the right aspect of the uterus which is unchanged. There is no acute inflammatory process.     1.  Interval significant increase in size of low-attenuation area in the dome of the right lobe of liver which contains air. This may represent necrosis or hemorrhage following therapy. Stable appearance of other smaller low-attenuation areas in the right lobe of the liver without evidence of progression of disease in these areas of metastasis. Differential diagnosis would include post therapy infection. 2.  Stable masslike density in the region of the ampulla of Vater consistent with primary carcinoma. Current dimensions are 3.1 x 2.6 cm. 3.  Persistent low-attenuation areas in the pancreatic head and proximal body which may represent secondary pancreatitis. 4.  Stable upper retroperitoneal adenopathy. 5.  Small umbilical hernia containing fat.    Ct-chest,abdomen,pelvis With    Result Date: 11/29/2018 11/29/2018 1:12 PM HISTORY/REASON FOR EXAM:  Leukocytosis. Liver abscess. TECHNIQUE/EXAM DESCRIPTION: CT scan of the chest, abdomen and pelvis with contrast. Helical scanning was obtained with intravenous contrast from the lung apices through the pubic symphysis to include the chest, abdomen and pelvis.  100 mL of Omnipaque 350 nonionic contrast was administered intravenously without complication. Low dose optimization technique was utilized for this CT exam including automated exposure control and adjustment of the mA and/or kV according to patient size. COMPARISON: 11/22/2018 FINDINGS: Atherosclerotic plaque is seen in the aorta. There is coronary artery calcification. There is a small amount of pericardial fluid. There is a small to moderate large right pleural effusion with  overlying atelectasis/consolidation. There are small mediastinal lymph nodes. Subcarinal lymph node measures 8 mm in short axis dimension. There are small hilar lymph nodes bilaterally. Anterior mediastinal lymph node measures 6.2 mm in short axis dimension. There are small axillary lymph nodes. No pneumothorax is identified. Examination is limited by respiratory motion. There is mild left lower lobe and right middle lobe atelectasis. No free air is seen in the abdomen or pelvis. There is a pigtail catheter within a hypodense hepatic lesion measuring 7.8 x 5 x 6.1 cm which is not significantly changed compared to prior. Adjacent to the pigtail catheter, there is a focal collection of contrast measuring 9 x 6.6 mm compatible with a pseudoaneurysm. A feeding vessel is seen. There is heterogeneous density within the lesion. There could have been bleeding into the lesion. No active hemorrhage is currently identified. Small hypodense lesions are again noted within the right lobe of the liver. Portal vein is patent. Gallbladder is surgically absent. Prominence of the common duct measures 1.2 cm. There is distention of the cystic duct. There is dilatation of the pancreatic duct. Streak  artifact from embolization coils is seen. There is a linear tract in the right lobe of the liver from a prior drain located more anteriorly. Spleen is not enlarged. No adrenal mass is identified. There is a parapelvic cyst on the right. Tiny hypodense right renal lesions are too small to characterize. There is no evidence of hydronephrosis. Atherosclerotic plaque is seen in the aorta. There are small inguinal lymph nodes. Enlarged aortocaval lymph node measures 1.4 cm in short axis dimension. Enlarged peripancreatic lymph nodes are seen. There is no evidence of bowel obstruction. There is colonic diverticulosis. There is a moderate amount of colonic stool. Terminal ileum is unremarkable. There is no evidence of acute appendicitis. There are  postsurgical changes of the bowel in the left abdomen. A small lymph node is seen adjacent to the distal esophagus. Bladder is mildly distended. Uterus is anteverted. There is a calcified uterine fibroid. There is Heterogeneity of the endometrial stripe in the lower uterine body with dilatation of the uterine cavity versus endometrial thickening near the uterine fundus measuring 1.2 cm in thickness. Calcific densities in the pelvis likely represent phleboliths. No free fluid is seen in the pelvis. Degenerative changes are seen in the spine. There is a fat-containing ventral hernia.     7.8 x 5 x 6.1 cm hypodense lesion in the right lobe of the liver is not significantly changed compared to prior. A pigtail catheter is seen within the lesion. This could represent a necrotic mass with superimposed infection. There is internal density within the lesion which could represent sequelae of a bleed into the lesion. There is a 9 x 6.6 mm pseudoaneurysm located adjacent to the pigtail catheter. Interventional radiology consultation recommended. Other small hypodense hepatic lesions are again noted. Linear tract from a previous pigtail catheter is seen within the right lobe of the liver. Dilatation of the pancreatic duct is again noted. Prominence of the common duct measures 1.2 cm. Enlarged peripancreatic and aortocaval lymph nodes are again noted. Parapelvic right renal cyst. Tiny hypodense right renal lesions are too small to characterize. Atherosclerotic plaque. Small to moderate right pleural effusion with overlying atelectasis/consolidation. Colonic diverticulosis. Moderate amount of colonic stool. Calcified uterine fibroid. Heterogeneity of the endometrial stripe in the lower uterine body with dilatation of the uterine cavity versus endometrial thickening near the uterine fundus measuring 1.2 cm in thickness. Further evaluation with pelvic ultrasound is recommended. This can be seen in the uterine malignancy.  Fat-containing ventral hernia. Borderline prominent subcarinal lymph node is nonspecific.     Ct-drain-liver Abscess-cyst    Result Date: 11/27/2018 11/24/2018 9:02 AM HISTORY/REASON FOR EXAM:  current drain pulled out of abscess, needs to be repositioned. Moderate sedation was administered with continuous monitoring of the patient under the direct supervision of  Medication: 100 mcg of fentanyl and 2 mg of Versed Total sedation time: 30 minutes The biopsy/drainage was done utilizing low dose optimization CT techniques including auto modulation for  imaging and low mA CT/fluoroscopy mode for the procedure. TECHNIQUE/EXAM DESCRIPTION AND NUMBER OF VIEWS:  After the informed consent the patient was placed on the angiographic table and supine position. Localization CT scan demonstrates a low-density area in the right lobe of the liver. The previously placed catheter was migrated upwards. The previously placed catheter was. After injecting lidocaine, a 17-gauge introducer needle was advanced into the fluid collection. After confirming the needle position, a wire was introduced. After dilating the tract, a new 10 Barbadian pigtail catheter was advanced and placed with its loop in the fluid collection. The tube is attached to the suction bulb. The patient tolerated procedure without any immediate complication.     1.  Successful CT-guided drainage of liver abscess. 2.  The previously placed migrated pigtail drainage catheter was removed.    Ct-drain-liver Abscess-cyst    Result Date: 11/13/2018 11/13/2018 4:01 PM HISTORY/REASON FOR EXAM:  liver abscess. Moderate sedation was administered with continuous monitoring of the patient under the direct supervision of  Medications: 2 mg of Versed and 100 mcg of fentanyl Total sedation time: 30 minutes The biopsy/drainage was done utilizing low dose optimization CT techniques including auto modulation for  imaging and low mA CT/fluoroscopy mode  for the procedure. TECHNIQUE/EXAM DESCRIPTION AND NUMBER OF VIEWS:  After the informed consent the patient was placed on the CT table on supine position. Localization CT scan demonstrates hypodense area in the right lobe of the liver. The skin over the lesion was prepped. Subsequently after injecting lidocaine a 17-gauge needle was advanced into the hypodense area. Dark-colored fluid was aspirated. A wire was released. Subsequently a 10 Hebrew guiding catheter was advanced and placed with its loop in the liver. An approximately 100 mL of fluid was drained. Sample material was sent to the microbiology. The patient tolerated the procedure without any immediate complication.     Successful CT-guided drain placement in the right lobe liver necrosis/abscess.    Ct-head W/o    Result Date: 11/19/2018 11/19/2018 9:49 PM HISTORY/REASON FOR EXAM:  Acute onset of altered mental status and incontinence. History of ampullary region tumor. TECHNIQUE/EXAM DESCRIPTION AND NUMBER OF VIEWS: CT of the head without contrast. The study was performed on a GE CT scanner. Contiguous 5 mm axial sections were obtained from the skull base through the vertex. Up to date radiation dose reduction adjustments have been utilized to meet ALARA standards for radiation dose reduction. COMPARISON:  PET CT 7/12/2018 FINDINGS: The calvariae and skull base are unremarkable. There are no extraaxial fluid collections. There is a pattern of cerebral atrophy manifest as enlargement of sulcal markings and ventricular prominence.  The ventricular system and basal cisterns are otherwise unremarkable. There are areas of hypodensity in the white matter most consistent with small vessel ischemic change versus demyelination or gliosis. There are no hemorrhagic lesions. There are no mass effects or shift of midline structures. There is a small area of encephalomalacia in the LEFT cerebellum. This is unchanged. The brainstem and posterior fossa structures are  otherwise unremarkable. The paranasal sinuses and mastoids in the field of view are unremarkable.     1. Cerebral atrophy. 2. White matter lucencies most consistent with small vessel ischemic change versus demyelination or gliosis. 3. Remote LEFT cerebellar infarction 4. Otherwise, Head CT without contrast with no acute findings. No evidence of acute cerebral infarction, hemorrhage or mass lesion.    Dx-chest-portable (1 View)    Result Date: 11/23/2018 11/23/2018 2:14 PM HISTORY/REASON FOR EXAM:  Pleural Effusion. Status post right thoracentesis TECHNIQUE/EXAM DESCRIPTION AND NUMBER OF VIEWS: Single portable view of the chest. COMPARISON: 11/21/2018 FINDINGS: Single portable view of the chest demonstrates a stable cardiac silhouette and mediastinal contours. Calcification is in the aortic arch. The right hemidiaphragm is elevated. There is mild atelectasis in the right lung base. No pneumothorax is identified. Pigtail catheter projects over the right upper quadrant. There are multiple surgical clips. A right PICC line remains in place.     Mild right basilar atelectasis.    Dx-chest-portable (1 View)    Result Date: 11/21/2018 11/21/2018 5:31 PM HISTORY/REASON FOR EXAM: Cough. Congestion for a week TECHNIQUE/EXAM DESCRIPTION AND NUMBER OF VIEWS: Single AP view of the chest. COMPARISON: November 12 FINDINGS: Hardware: Right PICC line tip overlies the cavoatrial junction Pigtail catheter overlies the elevated right hemidiaphragm, not fully visualized Lungs: Increasing perihilar opacity with some bronchial thickening noted. Minor fissure is also thickened. Pleura:  No pleural space process is seen. Heart and mediastinum: There is similar hilar and cardiac silhouette enlargement.     New interstitial opacity is worrisome for edema Hilar prominence could be from lymphadenopathy or pulmonary arterial hypertension Stable right hemidiaphragm elevation    Dx-chest-portable (1 View)    Result Date: 11/12/2018 11/12/2018  11:06 AM HISTORY/REASON FOR EXAM:  Sepsis. TECHNIQUE/EXAM DESCRIPTION AND NUMBER OF VIEWS: Single portable view of the chest. COMPARISON: 10/25/2018 FINDINGS: There is no evidence of focal consolidation or evidence of pulmonary edema. There is no evidence of pleural effusion. The heart is normal in size.     No pulmonary consolidation.    Ir-visceral Angiogram - Selective    Result Date: 11/30/2018 11/29/2018 6:29 PM HISTORY/REASON FOR EXAM:  Hepatic artery pseudoaneurysm Moderate sedation was administered with continuous monitoring of the patient under the direct supervision of  Medication: 4 mg of  Versed and 100 mcg of fentanyl Contrast: 90 mL of Omnipaque 300 Total fluoroscopic time: 12.7 minutes Total number of images stored in the PACS: 224 TECHNIQUE/EXAM DESCRIPTION AND NUMBER OF VIEWS: After the informed consent the patient was placed on the fluoroscopy table on supine position. The skin over the right groin was prepped. After injecting lidocaine a 5 Israeli vascular sheath was introduced. Subsequently, a diagnostic catheter was advanced into the celiac trunk. Celiac angiogram was performed. It demonstrated a pseudoaneurysm from the right hepatic branch. Subsequently a microcatheter was successfully advanced into the branch supplying the pseudoaneurysm. Microcatheter angiogram confirms the pseudoaneurysm and the catheter position. Subsequently metallic fiber coils were used to embolize the branch supplying the segment aneurysm. The final angiogram demonstrates occluded branch with stagnant flow within the pseudoaneurysm. The catheters were removed. The right superficial femoral angiogram does not demonstrate any significant stenosis. The right femoral sheath was removed and hemostasis was obtained with Angio-Seal device.     1.  Right hepatic artery pseudoaneurysm. 2.  Successful embolization of branch supplying the pseudoaneurysm.    Us-thoracentesis Puncture Right    Result Date:  11/23/2018 11/23/2018 1:15 PM HISTORY/REASON FOR EXAM:  Fluid collection TECHNIQUE/EXAM DESCRIPTION: Ultrasound-guided thoracentesis. Indication:  RIGHT pleural fluid collection. COMPARISON:  None PROCEDURE:     Informed consent was obtained. A timeout was taken. A right pleural effusion was localized with real-time ultrasound guidance. The right posterior chest wall was prepped and draped in a sterile manner. Following local anesthesia with 1% lidocaine, a 5 Urdu Yueh pigtail catheter was advanced into the pleural space with trocar technique and pleural fluid was drained. The patient tolerated the procedure well without evidence of complication. A post thoracentesis chest radiograph is forthcoming. FINDINGS: Fluid was  sent to the laboratory. Fluid character: straw colored     1. Ultrasound guided right sided diagnostic thoracentesis. 2. 450 mL of fluid withdrawn.    Ir-picc Line Placement    Result Date: 11/21/2018  HISTORY/REASON FOR EXAM:   PICC placement. TECHNIQUE/EXAM DESCRIPTION AND NUMBER OF VIEWS:   PICC line insertion with ultrasound guidance.  The procedure was performed using maximal sterile barrier technique including sterile gown, mask, cap, and donning of sterile gloves following appropriate hand hygiene and/or sterile scrub. Patient skin site was prepped with 2% Chlorhexidine solution. FINDINGS:  PICC line insertion with Ultrasound Guidance was performed by qualified nursing staff without the assistance of a Radiologist. PICC positioning appropriateness confirmed by 3CG technology; chest xray only needed in the instance 3CG unable to confirm placement.              Ultrasound-guided PICC placement performed by qualified nursing staff as above.     Ec-echocardiogram Complete W/o Cont    Result Date: 11/28/2018  Transthoracic Echo Report Echocardiography Laboratory CONCLUSIONS No prior study is available for comparison. Normal transthoracic echocardiogram. JANELLE BALDERAS Exam Date:          2018                    08:43 Exam Location:     Inpatient Priority:          Routine Ordering Physician:        ADAMA STOCKTON Referring Physician: Sonographer:               Maisha Leavitt RDCS Age:    73     Gender:    F MRN:    [id number] :    1945 BSA:    1.64   Ht (in):    60     Wt (lb):    148 Exam Type:     Complete Indications:     Endocarditis and heart valve disorders in diseases                  classified elsewhere ICD Codes:       I39 CPT Codes:       77973 BP:   111    /   77     HR:   79 Technical Quality:       Fair MEASUREMENTS  (Male / Female) Normal Values 2D ECHO LV Diastolic Diameter PLAX        3.5 cm                4.2 - 5.9 / 3.9 - 5.3 cm LV Systolic Diameter PLAX         2.1 cm                2.1 - 4.0 cm IVS Diastolic Thickness           1 cm                  LVPW Diastolic Thickness          1.1 cm                LVOT Diameter                     1.6 cm                RA Diameter                       2.9 cm                Estimated LV Ejection Fraction    65 %                  LV Ejection Fraction MOD BP       69.8 %                >= 55  % LV Ejection Fraction MOD 4C       61.7 %                LV Ejection Fraction MOD 2C       80.8 %                LA Volume Index                   10.7 cm³/m²           16 - 28 cm³/m² IVC Diameter                      1.7 cm                M-MODE Aortic Root Diameter MM           2.4 cm                DOPPLER AV Peak Velocity                  1.4 m/s               AV Peak Gradient                  8 mmHg                AV Mean Gradient                  3.4 mmHg              LVOT Peak Velocity                0.78 m/s              AV Area Cont Eq vti               1.3 cm²               Mitral E Point Velocity           0.52 m/s              Mitral E to A Ratio               0.53                  MV Pressure Half Time             64.3 ms               MV Area PHT                       3.4 cm²               MV Deceleration Time               222 ms                TR Peak Velocity                  235 cm/s              PV Peak Velocity                  0.84 m/s              PV Peak Gradient                  2.8 mmHg              RVOT Peak Velocity                0.69 m/s              * Indicates values subject to auto-interpretation LV EF:  65    % FINDINGS Left Ventricle Normal left ventricular size and systolic function. Normal left ventricular wall thickness. Normal diastolic function. Normal regional wall motion. Left ventricular ejection fraction is visually estimated to be 65%. Right Ventricle Normal right ventricular size and systolic function. Right Atrium Normal right atrial size. Normal inferior vena cava size and inspiratory collapse. Left Atrium Normal left atrial size. Mitral Valve Structurally normal mitral valve. Mild mitral regurgitation. No mitral stenosis. Aortic Valve Structurally normal aortic valve without significant stenosis or regurgitation. Tricuspid Valve Structurally normal tricuspid valve. Mild tricuspid regurgitation. Right atrial pressure is estimated to be 3 mmHg. Estimated right ventricular systolic pressure  is 35 mmHg. Pulmonic Valve Structurally normal pulmonic valve without significant stenosis or regurgitation. Pericardium Normal pericardium without effusion. Aorta The aortic root is normal. Gurwinder Mayberry MD (Electronically Signed) Final Date:     28 November 2018                 09:42      Micro:  Results     Procedure Component Value Units Date/Time    BLOOD CULTURE [[id number]] Collected:  11/29/18 1200    Order Status:  Completed Specimen:  Blood from Peripheral Updated:  11/30/18 0904     Significant Indicator NEG     Source BLD     Site PERIPHERAL     Blood Culture No Growth    Note: Blood cultures are incubated for 5 days and  are monitored continuously.Positive blood cultures  are called to the RN and reported as soon as  they are identified.      Narrative:       Contact  Per Hospital Policy: Only  "change Specimen Src: to \"Line\" if  specified by physician order.    BLOOD CULTURE [[id number]] Collected:  11/29/18 1246    Order Status:  Completed Specimen:  Blood from Peripheral Updated:  11/30/18 0904     Significant Indicator NEG     Source BLD     Site PERIPHERAL     Blood Culture No Growth    Note: Blood cultures are incubated for 5 days and  are monitored continuously.Positive blood cultures  are called to the RN and reported as soon as  they are identified.      Narrative:       Contact  Per Hospital Policy: Only change Specimen Src: to \"Line\" if  specified by physician order.    AFB CULTURE [[id number]] Collected:  11/23/18 1330    Order Status:  Completed Specimen:  Body Fluid Updated:  11/29/18 0804     Significant Indicator NEG     Source BF     Site Thoracentesis Fluid     AFB Culture Culture in progress.     AFB Smear Results No acid fast bacilli seen.    Narrative:       RT pl eff 11/23/2018  14:50    FLUID CULTURE W/GRAM STAIN [[id number]] Collected:  11/23/18 1330    Order Status:  Completed Specimen:  Body Fluid Updated:  11/29/18 0804     Gram Stain Result Few WBCs.  No organisms seen.       Significant Indicator NEG     Source BF     Site Thoracentesis Fluid     Culture Result Bdf No growth at 5 days    Narrative:       RT pl eff 11/23/2018  14:50    FUNGAL CULTURE [[id number]] Collected:  11/23/18 1330    Order Status:  Completed Specimen:  Body Fluid Updated:  11/29/18 0804     Significant Indicator NEG     Source BF     Site Thoracentesis Fluid     Fungal Culture Culture in progress.    Narrative:       RT pl eff 11/23/2018  14:50          Assessment:  Active Hospital Problems    Diagnosis   • Bacteremia [R78.81]   • Liver abscess [K75.0]   • Sepsis (HCC) [A41.9]   • 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]   • Fungemia [B49]   • Deep vein thrombosis (CMS-HCC) [I82.409] [I82.409]   • Lactic acidosis [E87.2]   • Acute on chronic blood loss anemia (HCC) [D60.0]   • Pseudoaneurysm following procedure " "(HCC) [I97.89, I72.9]   • Pleural effusion [J90]   • Cough [R05]     Plan:     Leukocytosis  - New,  17.4 from 8.1 on 11/29, now improved  - Likely 2/2 internal bleed, no fevers     REC: Follow blood cultures on 11/29 to final - NGTD     Bloody drainage from abdominal drain- 2/2  pseudoaneurysm.  - Reports new abdominal pain overnight but now improved   - CT C/A/P on 11/29 with: \"Adjacent to the pigtail catheter, there is a focal collection of   contrast measuring 9 x 6.6 mm compatible with a pseudoaneurysm. A feeding vessel is seen. 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  Per CT on 11/29 this could be a necrotic mass with superimposed infection.      - CT scan 11/12 show with area of necrosis/hemorrhage, s/p IR drainage on 11/13/2018-- Cultures +  lactobacillus, VRE and Candida albicans and strep viridans  - Underwent CT scan on 11/22/2018- size of the abscess has diminished measuring 75 x 48 from 88 x 64 mm but abscess still present and contains air with new mild left hepatic pneumobilia  - Underwent another drain placement on 11/24/2018 as catheter was not correctly posittioned   - CT A/P on 11/29 due to concern for bleeding: \"7.8 x 5 x 6.1 cm hypodense lesion in the right lobe of the liver is not significantly changed compared to prior. A pigtail catheter is seen within the lesion. This could represent a necrotic mass with superimposed infection. There is internal density within the lesion which could represent sequelae of a bleed into the lesion.\" Catheter within the lesion and not significantly changed " "from last CT.       REC: Continue Daptomycin, fluconazole, and Zosyn (E coli with resistance to ampicillin)   - plan had been to given at least 4 weeks from prior CT abdomen on 11/22 and then will need to re-image prior to stopping - however may be necrotic mass as opposed to abscess    -REC: Monitor drain output and assess, End on 12/20/18 pending CT, but if no further drain output then CT earlier and if findings consistent with necrotic mass only may be able to stop antibiotics   (45 cc out on 12/1)      Bacteremia  Blood cultures + Bacteroides  Repeat blood cultures x2 are negative from 11/15/2018  - has completed abx course       Candidemia- Candida albicans  Repeat blood cultures x2 are negative from 11/15/2018  Changed to p.o. Diflucan  - see abx above    - TTE to rule-out IE in setting of candidemia - no vegetations seen    -  has completed a candidemia abx course      Cough   - Unlikely bacterial infection as on broad spectrum abx      Pleural effusions  - Thora 11/23, cx with no growth   - The CT scan showed pleural effusion hence underwent thoracentesis on 11/23/2018.  2752 WBCs with 46% polys.  Gram stain and cultures negative.   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 Yang review of CT and exam of patient, if JACQUELINE drain <30cc output in 24 hours, can dc drain.  Once drain dc'd should resume chemo and XRT since liver lesion is necrotic mass, not purulent fluid.\"         Prognosis  Guarded     DW primary team.      Morelia Dc MD  ID        "

## 2018-12-02 NOTE — PROGRESS NOTES
Hospital Medicine Daily Progress Note    Date of Service  12/1/2018    Chief Complaint  73 y.o. 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 Cedeno initially diagnosed 7/2016 by ERCP with stent and decompression/biopsy, attempted [facility] 8/2016 by Dr. Cormier, however had too many mets to liver, s/p chemotherapy was found to have repeat bacteremia.    Hospital Course    Patient admitted and started again on IV antibitoics.  She was found to have a liver abscess and required drainage, cultures from this have shown VRE, candida and lactobacillus acidophilus and blood cultures showing candida.  ID has been managing antibiotics and antifungals.       Interval Problem Update  11/20 Patient had episode of incontinence overnight along with confusion and new difficulty following commands.  CT head was done showing no acute findings with suspicion of possible s/e of pain medication.  Her mentation has improved but she continues to have urinary and bowel incontinence, likely with fatigue and now frequent need for toileting due to her antibiotics and IV hydration.  11/21 Patient's cough persisting but she remains afebrile.  I ordered PICC line for placement as patient will need continued antibiotics for 6 week duration, final blood cultures have resulted negative.  CXR ordered and consistent with interstitial edema - will start IV lasix to see if this improves her oxygen saturation and cough. WBC continues to drop.  11/22 Patient states she is feeling slightly better today, having to urinate more often since starting diuretic but lungs are sounding slightly better.  CT abdomen completed and shows abscess is smaller but still present and drain no longer in place.  She also now has a moderate right pleural effusion.  I've requested IR to reposition drain in liver abscess and to also drain what the pleural effusion.  Patient requests a call  to be made to her son, Sea.  11/23 Patient had thoracentesis without issue and is breathing slightly better, delay in repositioning of drain due to recent dose of eliquis and need to be out of system for 48 hours to lessen the chance of bleeding.  Repeat culture on fluid ordered.  11/24 Patient had successful placement of drain in liver fluid collection, drainage in JACQUELINE bulb is dark brown without evidence of purulence.  Culture from fluid collected.  Son at bedside when evaluated and he remarked she appears much more awake today and breathing much easier than on days past - explained the need for diuresis and the drainage of the lung have likely contributed to improvement.  11/25 Patient feeling well, denies new complaints.  Will cut back on lasix to once daily and IV to PO.  She is currently on Unasyn and final treatment plan still pending decision by ID.  Cultures from thoracentesis showing no growth.  11/26 Patient states she has had good urinary continence since decreasing lasix to once daily.  No acute changes, ID developing duration of antibiotic treatment.  Radiation therapy to start tomorrow.  11/27:  Ordered TTE per ID recs since VRE, Ecoli found on liver abscess culture.  Explained to patient that since has an active liver abscess, will need to hold on chemo and XRT until antibiotics have adequately eliminated any infection.  Tentative stop date 12/20 with repeat CT abdomen to ensure resolution.  Last CT abdomen 11/22 with abscess size 75mm x 48mm down from prior size 88 x 64mm.  Doing well, no abdominal pain or distention noted on exam, appetite good, afebrile.  11/28:  Normal echo EF 65%.  No abdominal pain, eating well, no fevers.  11/29:  Repeat CT ordered since bloody drainage seen from JACQUELINE drain RUQ.  Pseudoaneurysm seen from hepatic artery encircling drain.  IR consult, Dr. Higginbotham removed drain.  dc'd eliquis bid.  Concerning abscess no change in size.  Will d/w Dr. Yang since may represent necrotic  mass from ampullary cancer and require surgical removal.  BCs ordered since increase in wbc 8 to 17K, no fever, pain in RUQ.   11/30:  Spoke with Dr. Yang regarding necrotic liver mass, no change in size on recent CT abdomen/pelvis.  Has recommended to dc drain if <30 cc output daily.  Recommend to start chemo, XRT once drain removed.  Has dried blood in JACQUELINE drain s/p IR embolization of hepatic artery pseudoaneurysm.   Overall, patient does not have any abdominal pain or distention.  12/1:  Hg dropped 8.9 to 7.3 overnight likely due to JACQUELINE drain hepatic artery pseudoaneurysm bleed.  Drain with no further BRB, but dark fluid seen. Ordered Hg q 6 x2 stable, transfuse for Hg <7.  Orders placed, patient states she would accept blood transfusion, type and cross.  dc'd lasix and potassium since patient's intake is minimal last few days and she appears dehydrated.    Consultants/Specialty  ID - Leela/Honorio  General surgeon Dr. Yang  IR    Code Status  full    Disposition  PT rec home with 24/7 supervision, OT rec HH, no prior home O2.    Review of Systems  Review of Systems   Constitutional: Negative for chills, fever and malaise/fatigue.   HENT: Negative for congestion and sore throat.    Eyes: Negative for blurred vision and photophobia.   Respiratory: Positive for cough (slightly better). Negative for shortness of breath and wheezing.    Cardiovascular: Negative for chest pain, claudication and leg swelling.   Gastrointestinal: Negative for abdominal pain, constipation, diarrhea, heartburn, nausea and vomiting.   Genitourinary: Negative for dysuria and hematuria.   Musculoskeletal: Negative for joint pain and myalgias.   Skin: Negative for itching and rash.   Neurological: Positive for weakness. Negative for dizziness, sensory change, speech change and headaches.   Psychiatric/Behavioral: Negative for depression. The patient is not nervous/anxious and does not have insomnia.         Physical Exam  Temp:  [36.2  °C (97.1 °F)-36.7 °C (98 °F)] 36.7 °C (98 °F)  Pulse:  [84-97] 90  Resp:  [16-18] 16  BP: (112-135)/(57-67) 135/61    Physical Exam   Constitutional: She is oriented to person, place, and time. She appears well-developed and well-nourished. No distress.   HENT:   Head: Normocephalic and atraumatic.   Eyes: Conjunctivae are normal. No scleral icterus.   Neck: Neck supple. No JVD present.   Cardiovascular: Normal rate, regular rhythm and normal heart sounds.  Exam reveals no gallop and no friction rub.    No murmur heard.  Pulmonary/Chest: Effort normal. No respiratory distress. She has no rales. She exhibits no tenderness.   Abdominal: Soft. Bowel sounds are normal. She exhibits no distension. There is no guarding.   Musculoskeletal: She exhibits no edema or tenderness.   Lymphadenopathy:     She has no cervical adenopathy.   Neurological: She is alert and oriented to person, place, and time. No cranial nerve deficit.   Skin: Skin is warm and dry. She is not diaphoretic. No erythema. No pallor.   Psychiatric: She has a normal mood and affect. Her behavior is normal.   Nursing note and vitals reviewed.      Fluids    Intake/Output Summary (Last 24 hours) at 12/01/18 1927  Last data filed at 12/01/18 1712   Gross per 24 hour   Intake              580 ml   Output               35 ml   Net              545 ml       Laboratory  Recent Labs      11/29/18   0045   11/30/18   0305  12/01/18   0130  12/01/18   0844  12/01/18   1430   WBC  17.4*   --   11.7*  9.7   --    --    RBC  3.14*   --   3.13*  2.55*   --    --    HEMOGLOBIN  8.9*   < >  8.9*  7.3*  7.2*  7.3*   HEMATOCRIT  27.7*   --   28.0*  23.1*   --    --    MCV  88.2   --   88.2  90.6   --    --    MCH  28.3   --   28.4  28.6   --    --    MCHC  32.1*   --   32.2*  31.6*   --    --    RDW  54.1*   --   55.2*  59.0*   --    --    PLATELETCT  163*   --   172  219   --    --    MPV  11.5   --   11.5  10.8   --    --     < > = values in this interval not displayed.      Recent Labs      11/29/18   0045  11/30/18   0305  12/01/18   0130   SODIUM  134*  137  132*   POTASSIUM  4.0  3.5*  3.9   CHLORIDE  106  110  108   CO2  18*  20  20   GLUCOSE  150*  115*  99   BUN  8  8  9   CREATININE  0.78  0.67  0.55   CALCIUM  8.2*  7.7*  7.7*     Recent Labs      11/29/18   1540   APTT  41.0*   INR  2.91*               Imaging  IR-VISCERAL ANGIOGRAM - SELECTIVE   Final Result      1.  Right hepatic artery pseudoaneurysm.   2.  Successful embolization of branch supplying the pseudoaneurysm.      CT-CHEST,ABDOMEN,PELVIS WITH   Final Result      7.8 x 5 x 6.1 cm hypodense lesion in the right lobe of the liver is not significantly changed compared to prior. A pigtail catheter is seen within the lesion. This could represent a necrotic mass with superimposed infection. There is internal density    within the lesion which could represent sequelae of a bleed into the lesion. There is a 9 x 6.6 mm pseudoaneurysm located adjacent to the pigtail catheter. Interventional radiology consultation recommended.      Other small hypodense hepatic lesions are again noted.      Linear tract from a previous pigtail catheter is seen within the right lobe of the liver.      Dilatation of the pancreatic duct is again noted.      Prominence of the common duct measures 1.2 cm.      Enlarged peripancreatic and aortocaval lymph nodes are again noted.      Parapelvic right renal cyst. Tiny hypodense right renal lesions are too small to characterize.      Atherosclerotic plaque.      Small to moderate right pleural effusion with overlying atelectasis/consolidation.      Colonic diverticulosis. Moderate amount of colonic stool.      Calcified uterine fibroid.      Heterogeneity of the endometrial stripe in the lower uterine body with dilatation of the uterine cavity versus endometrial thickening near the uterine fundus measuring 1.2 cm in thickness. Further evaluation with pelvic ultrasound is recommended. This    can  be seen in the uterine malignancy.      Fat-containing ventral hernia.      Borderline prominent subcarinal lymph node is nonspecific.         EC-ECHOCARDIOGRAM COMPLETE W/O CONT   Final Result      CT-DRAIN-LIVER ABSCESS-CYST   Final Result      1.  Successful CT-guided drainage of liver abscess.   2.  The previously placed migrated pigtail drainage catheter was removed.      US-THORACENTESIS PUNCTURE RIGHT   Final Result      1. Ultrasound guided right sided diagnostic thoracentesis.      2. 450 mL of fluid withdrawn.      DX-CHEST-PORTABLE (1 VIEW)   Final Result      Mild right basilar atelectasis.      CT-ABDOMEN W/O   Final Result      Hepatic dome abscess pigtail catheter has pulled out and now terminates just deep to the thoracic cage. Despite this the size of the abscess has diminished measuring 75 x 48 from 88 x 64 mm      New mild left hepatic pneumobilia      Ampullary region mass is difficult to precisely measure but does appear to exert some mass effect upon the third portion of the duodenum. No bowel obstruction is detected.      New medium-sized right ovarian pleural effusion does not have loculation features but there is subsegmental atelectasis      DX-CHEST-PORTABLE (1 VIEW)   Final Result      New interstitial opacity is worrisome for edema      Hilar prominence could be from lymphadenopathy or pulmonary arterial hypertension      Stable right hemidiaphragm elevation      IR-PICC LINE PLACEMENT   Final Result                  Ultrasound-guided PICC placement performed by qualified nursing staff as    above.          CT-HEAD W/O   Final Result      1. Cerebral atrophy.   2. White matter lucencies most consistent with small vessel ischemic change versus demyelination or gliosis.   3. Remote LEFT cerebellar infarction   4. Otherwise, Head CT without contrast with no acute findings. No evidence of acute cerebral infarction, hemorrhage or mass lesion.      CT-DRAIN-LIVER ABSCESS-CYST   Final Result       Successful CT-guided drain placement in the right lobe liver necrosis/abscess.      CT-ABDOMEN-PELVIS WITH   Final Result      1.  Interval significant increase in size of low-attenuation area in the dome of the right lobe of liver which contains air. This may represent necrosis or hemorrhage following therapy. Stable appearance of other smaller low-attenuation areas in the    right lobe of the liver without evidence of progression of disease in these areas of metastasis. Differential diagnosis would include post therapy infection.      2.  Stable masslike density in the region of the ampulla of Vater consistent with primary carcinoma. Current dimensions are 3.1 x 2.6 cm.      3.  Persistent low-attenuation areas in the pancreatic head and proximal body which may represent secondary pancreatitis.      4.  Stable upper retroperitoneal adenopathy.      5.  Small umbilical hernia containing fat.      DX-CHEST-PORTABLE (1 VIEW)   Final Result      No pulmonary consolidation.           Assessment/Plan  Liver abscess- (present on admission)   Assessment & Plan    Drain replaced 11/24/18 as previous one no longer in connection to fluid collection on CT, large amount drainage again.    Cultures enterococcus [VRE]  ID following   Continue daptomycin IV, unasyn iv, fluconozole per ID stop date 12/20 with repeat CT abdomen/pelvis at that time.  11/30:  Spoke with Dr. Yang regarding necrotic liver mass, no change in size on recent CT abdomen/pelvis.  Has recommended to dc drain if <30 cc output daily.  Recommend to start chemo, XRT once drain removed.  Has dried blood in JACQUELINE drain s/p IR embolization of hepatic artery pseudoaneurysm.   Overall, patient does not have any abdominal pain or distention.     Bacteremia- (present on admission)   Assessment & Plan    Received treatment for E. coli bacteremia in early November, her port was removed  Admitted with sepsis  Blood cultures from 11/12/2018 are growing lactobacillus,  Candida albicans and strep viridans. Liver abscess cultures from 11/13/2018 are positive for VRE.  Currently on unasyn/PO diflucan ID following.     PICC placed 11/21/18  IV daptomycin, IV unasyn, fluconazole tentative stop date 12/20 per ID, then repeat CT to see if further IV abx needed.  Repeat 11/24 CT guided liver abscess pigtail catheter placed after initial was found to be displaced.  11/28:  TTE negative for endocarditis.  EF 65%.  11/29:  BCs x2 negative.             Sepsis (HCC)- (present on admission)   Assessment & Plan    This is sepsis (without associated acute organ dysfunction).   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  11/29:  Review of liver abscess, no change in size, may be necrotic cancerous mass per radiologist today.  Will dw Dr. Yang if need surgical excision planned.  11/30:  Per Dr. Yang review of CT and exam of patient, if JACQUELINE drain <30cc output in 24 hours, can dc drain.  Once drain dc'd should resume chemo and XRT since liver lesion is necrotic mass, not purulent fluid.       Fungemia   Assessment & Plan    Mycofungin   ID following        Deep vein thrombosis (CMS-HCC) [I82.409]- (present on admission)   Assessment & Plan    11/29  dc'd eliquis due to bleeding into JACQUELINE drain from pseudoaneurysm.  Last DVT 4/4/17.  No need to continue eliquis.       Acute on chronic blood loss anemia (HCC)   Assessment & Plan    12/1:  Ordered iron studies.  Had dc'd eliquis on 11/29 with finding of hepatic artery pseudoaneurysm and bleed.  Blood in JACQUELINE drain has subsided.  Hg trending down, HG q 6 hours, transfuse for Hg <7.  Type and cross ordered.     Pseudoaneurysm following procedure (HCC)   Assessment & Plan    11/29:  Bloody JACQUELINE drain of liver abscess.  Ordered CT abdomen/pelvis/chest:  Pseudoaneurysm  of hepatic artery encircling drain.  IR consult to Dr. Higginbotham who embolized artery.  dc'd eliquis 5mg bid.         Pleural effusion   Assessment & Plan    Thoracentesis 11/23/18 - 450cc removed - cytology and studies pending       Cough   Assessment & Plan    Non-productive, CXR concerning for edema  Low likelihood of infection given broad spectrum antibiotics for VRE  Improvement with diuresis  CT shows pleural effusion right lung - thoracentesis ordered in conjunction with drain replacement for liver  11/27:  No further cough, lungs CTA b/l.           Lactic acidosis- (present on admission)   Assessment & Plan    Improved with fluid resuscitation and treatment of infection            VTE prophylaxis: Eliquis

## 2018-12-02 NOTE — CARE PLAN
Problem: Pain Management  Goal: Pain level will decrease to patient's comfort goal  Outcome: PROGRESSING AS EXPECTED  Pt. Verbalizes satisfaction with pain control    Problem: Urinary Elimination:  Goal: Ability to reestablish a normal urinary elimination pattern will improve  Outcome: PROGRESSING AS EXPECTED  Pt. Continent this shift

## 2018-12-02 NOTE — PROGRESS NOTES
Received change of shift report from day RN. Assumed pt. Care at 1900. Pt. Sleeping, no signs of distress, even chest rise. Bed in lowest position with wheels locked, bed alarm and hourly rounding in place. Call light within reach.

## 2018-12-02 NOTE — CARE PLAN
Problem: Communication  Goal: The ability to communicate needs accurately and effectively will improve  Outcome: PROGRESSING AS EXPECTED  Calls appropriately     Problem: Knowledge Deficit  Goal: Knowledge of disease process/condition, treatment plan, diagnostic tests, and medications will improve  Outcome: PROGRESSING AS EXPECTED  Understands POC    Problem: Skin Integrity  Goal: Risk for impaired skin integrity will decrease  Outcome: PROGRESSING AS EXPECTED  Continent, upx1 hand held aassist

## 2018-12-03 NOTE — CONSULTS
Consult Note: Oncology    Date of consultation: 12/3/2018 2:06 PM    Referring provider: Dr Birmingham  Reason for consultation: Reason for consultation: Metastatic pancreatic carcinoma, BRCA mutated    Liver abscess/bleeding after Y 90     History of presenting illness:   adenocarcinoma of primary ampulla diagnosed in 7/2016.    -seen by Dr. Jacobs and underwent an attempted Whipple’s in 8/2016. The surgery was aborted secondary to multiple liver lesions and s/p wedge resection of the liver which was positive for poorly differentiated adenocarcinoma.  PET scan showed uptake in multiple hepatic lesions and uptake in the ampulla. He also had uptake in bilateral hilar area   -gemcitabine and Abraxane - > single agent gemcitabine. She received chemotherapy on 1/18/17. Further cycles have been held secondary to increased fatigue.     3/20/17 CT CAP showed thrombus seen in the right external iliac and common femoral vein. Previous foci in the liver are no longer seen and no residual or recurrent mass seen in the surgical bed.  She was started on Eliquis.     -11/1/17-interim CT scan shows disease progression with a new enhancing mass in the uncinate process in. Ampullary area with some mass effect on the distal CBD. There is also new adenopathy surrounding the mass. new tumor thrombus in the posterior SMV with 180 degrees abutment of the adjacent mass     -11 /15/2017- started gemcitabine and Abraxane alternate week with good tolerance.     -2/5/18-CT abdomen, pelvis-Increased size of enhancing mass in the uncinate process and periampullary region which currently measures 4.0 x 4.4 cm compared to 3.6 x 4.1 cm., slight decrease in the adenopathy anterior to the pancreas, slight increase in aortocaval adenopathy. Increased nonocclusive thrombus in the SMV.          -Started 5FU/Onvyde with some diarrhea and intolerance problem with subsequent cycles.  -Status post 6 cycles        -PET scan-7/12/18-     focal but slightly  ill-defined uptake in the posterior right lobe of the liver which is suspicious for hepatic metastases although no focal mass is seen on the current CT images. There is questionable subtle lobular enhancement in this region on the previous CT of 3/27/2018. If clinically indicated, this could be further assessed with MRI of the liver.  2.  Focal uptake in the duodenum near the ampulla bladder which is nonspecific and could represent residual tumor or physiologic uptake.     -discussed in our GI tumor board with consensus to proceed with Y 90 treatment to the liver followed by chemoradiation to the ampullary lesion and peripancreatic lymph nodes. Since the liver involved segments 5, 6, and 7, we were unable to plan a segmentectomy and instead recommended right lobe intervention  -10/9/18- s/p Y 90 SIRT  -Multiple hospital admissions with gram-negative sepsis   -She had Port-A-Cath removed before  -In the interim she has developed mild increase in size of the aortocaval lymph node.  -Plan was to do chemoradiation with single agent Xeloda however she is again admitted with liver abscess .  CT scan shows abscess cavity and drainage taken out.  She required embolization for bleeding to the abscess cavity.  Overall the pancreatic mass and the portal caval lymph nodes are stable according to the imaging.  She does have some weakness and deconditioning.  Has not received xeloda by mail.  Past Medical History:    Past Medical History:   Diagnosis Date   • Ampullary carcinoma (HCC)    • Anemia    • Arthritis     hands/fingers/right knee   • Asthma    • Blood clotting disorder (HCC)     blood clot 2016   • Cancer (HCC) 2016    Ampullary CA   • Cataract 09-    bilat.,no surgery   • Dental disorder     upper partial   • Encounter for antineoplastic chemotherapy 4/6/2018   • Heart burn    • High cholesterol    • Hypertension    • Indigestion    • Jaundice 2016       Past surgical history:    Past Surgical History:    Procedure Laterality Date   • CATH PLACEMENT Right 9/9/2016    Procedure: CATH PLACEMENT cephalic power port  ;  Surgeon: Kurtis Jacobs M.D.;  Location: SURGERY [facility];  Service:    • WHIPPLE PROCEDURE  8/15/2016    Procedure: Exploratoy Laparotomy, Da-en-y;  Surgeon: Kurtis Jacobs M.D.;  Location: SURGERY [facility];  Service:    • NODE DISSECTION  8/15/2016    Procedure: omental flap, Liver Wedge, cholecystectomy ;  Surgeon: Kurtis Jacobs M.D.;  Location: SURGERY [facility];  Service:    • STENT PLACEMENT  7/2016    gallbladder   • CATARACT EXTRACTION WITH IOL     • CHOLECYSTECTOMY     • TONSILLECTOMY         Allergies:  Tape    Medications:    Current Facility-Administered Medications   Medication Dose Route Frequency Provider Last Rate Last Dose   • SODIUM CHLORIDE 0.9 % IV SOLN            • guaiFENesin (ROBITUSSIN) 100 MG/5ML solution 200 mg  10 mL Oral Q4HRS PRN Elin Birmingham M.D.       • iron sucrose (VENOFER) injection 200 mg  200 mg Intravenous DAILY Elin Birmingham M.D.   200 mg at 12/03/18 0817   • prochlorperazine (COMPAZINE) injection 10 mg  10 mg Intravenous Q6HRS PRN Elin Birmingham M.D.       • piperacillin-tazobactam (ZOSYN) 3.375 g in  mL IVPB  3.375 g Intravenous Q8HRS Morelia Dc M.D.   Stopped at 12/03/18 1224   • DAPTOmycin (CUBICIN) 670 mg in NS 50 mL IVPB  10 mg/kg Intravenous Q24HR Morelia Dc M.D. 100 mL/hr at 12/03/18 1254 670 mg at 12/03/18 1254   • ipratropium-albuterol (DUONEB) nebulizer solution  3 mL Nebulization Q4H PRN (RT) Rhonda Brown D.O.   3 mL at 11/21/18 0814   • fluconazole (DIFLUCAN) tablet 400 mg  400 mg Oral DAILY Morelia Dc M.D.   400 mg at 12/03/18 0552   • senna-docusate (PERICOLACE or SENOKOT S) 8.6-50 MG per tablet 2 Tab  2 Tab Oral BID Ronald King M.D.   2 Tab at 12/03/18 0552    And   • polyethylene glycol/lytes (MIRALAX) PACKET 1 Packet  1 Packet Oral QDAY PRN Ronald  NELIA King        And   • magnesium hydroxide (MILK OF MAGNESIA) suspension 30 mL  30 mL Oral QDAY PRN Ronald King M.D.        And   • bisacodyl (DULCOLAX) suppository 10 mg  10 mg Rectal QDAY PRN Ronald King M.D.       • Respiratory Care per Protocol   Nebulization Continuous RT Ronald King M.D.       • ondansetron (ZOFRAN) syringe/vial injection 4 mg  4 mg Intravenous Q4HRS PRN Ronald King M.D.   4 mg at 12/01/18 1516   • ondansetron (ZOFRAN ODT) dispertab 4 mg  4 mg Oral Q4HRS PRN Ronald King M.D.   4 mg at 11/28/18 2136   • acetaminophen (TYLENOL) tablet 650 mg  650 mg Oral Q6HRS PRN Ronald King M.D.   650 mg at 11/23/18 2142   • Pharmacy Consult Request ...Pain Management Review   Other PRN Ronald King M.D.        And   • oxyCODONE immediate-release (ROXICODONE) tablet 5 mg  5 mg Oral Q3HRS PRN Ronald King M.D.   5 mg at 12/03/18 1302    And   • oxyCODONE immediate-release (ROXICODONE) tablet 10 mg  10 mg Oral Q3HRS PRN Ronald King M.D.        And   • morphine (pf) 4 mg/ml injection 4 mg  4 mg Intravenous Q3HRS PRN Ronald King M.D.   2 mg at 11/13/18 1840   • NS (BOLUS) infusion 500 mL  500 mL Intravenous Once PRN Ronald King M.D.       • omeprazole (PRILOSEC) capsule 20 mg  20 mg Oral QDAY Ronald King M.D.   20 mg at 12/03/18 0552   • artificial tears 1.4 % ophthalmic solution 1-2 Drop  1-2 Drop Both Eyes Q2HRS PRN Ronald King M.D.   2 Drop at 11/17/18 0643   • albuterol inhaler 2 Puff  2 Puff Inhalation Q4HRS PRN Ronald King M.D.   2 Puff at 11/25/18 0539       Social History:     Social History     Social History   • Marital status:      Spouse name: N/A   • Number of children: N/A   • Years of education: N/A     Occupational History   • retired       Social History Main Topics   • Smoking status: Never Smoker   • Smokeless tobacco: Never Used   • Alcohol use No   • Drug use: No   • Sexual activity: Not  "on file     Other Topics Concern   • Not on file     Social History Narrative   • No narrative on file       Family History:     Family History   Problem Relation Age of Onset   • Cancer Other         unknown cancer       Review of Systems:  All other review of systems are negative except what was mentioned above in the HPI.    Constitutional: No fever, chills, positive weight loss ,malaise/fatigue.    HEENT: No new auditory or visual complaints. No sore throat and neck pain.     Respiratory:No new cough, sputum production, shortness of breath and wheezing.    Cardiovascular: No new chest pain, palpitations, orthopnea and leg swelling.    Gastrointestinal: No heartburn, nausea, vomiting , positive abdominal pain, hematochezia or melena     Genitourinary: Negative for dysuria, hematuria    Musculoskeletal: No new arthralgias or myalgias   Skin: Negative for rash and itching.    Neurological: Negative for focal weakness or headaches.    Endo/Heme/Allergies: No abnormal bleed/bruise.    Psychiatric/Behavioral: No new depression/anxiety.    Physical Exam:  Vitals:   /78   Pulse 74   Temp 36.9 °C (98.5 °F) (Oral)   Resp 16   Ht 1.549 m (5' 0.98\")   Wt 58.6 kg (129 lb 3 oz)   SpO2 97%   Breastfeeding? No   BMI 24.42 kg/m²     General: Not in acute distress, alert and oriented x 3  HEENT: No pallor, icterus. Oropharynx clear.   Neck: Supple, no palpable masses.  Lymph nodes: No palpable cervical, supraclavicular, axillary or inguinal lymphadenopathy.    CVS: regular rate and rhythm, no rubs or gallops  RESP: Clear to auscultate bilaterally, no wheezing or crackles.   ABD: Soft, non tender, non distended, positive bowel sounds, no palpable organomegaly  EXT: No edema or cyanosis  CNS: Alert and oriented x3, No focal deficits.  Skin- No rash      Labs:   Recent Labs      12/01/18   0130  12/01/18   0844  12/01/18   1430  12/02/18   0145  12/03/18   0300   RBC  2.55*   --    --   3.29*  2.43*   HEMOGLOBIN  7.3*  " 7.2*  7.3*  9.4*  6.8*   HEMATOCRIT  23.1*   --    --   29.6*  22.3*   PLATELETCT  219   --    --   324  333   IRON   --   15*   --    --    --    FERRITIN   --   324.7*   --    --    --    TOTIRONBC   --   203*   --    --    --      Lab Results   Component Value Date/Time    SODIUM 135 12/03/2018 03:00 AM    POTASSIUM 3.7 12/03/2018 03:00 AM    CHLORIDE 108 12/03/2018 03:00 AM    CO2 20 12/03/2018 03:00 AM    GLUCOSE 91 12/03/2018 03:00 AM    BUN 7 (L) 12/03/2018 03:00 AM    CREATININE 0.57 12/03/2018 03:00 AM        Assessment and Plan:    Metastatic pancreatic carcinoma, BRCA mutated -she had chemosensitive disease with good treatment response in 2016 2017 and 2018.  S/p Y90 SIRT to the unresectable liver lesions, tentative plan was to do Xeloda based radiation to the pancreatic mass  Unfortunately developed liver abscess requiring drainage.  She is still pretty weak and deconditioned.  Reassured the patient and family that her pancreatic lesion and the portocaval lymphadenopathy is stable and there is no urgency in starting chemoradiation at this point.  Hopefully her performance status will improve over the next 2-3 weeks.  We will see her as an outpatient.  I will let Dr. Benavides know about her admission.  Okay to delay radiation.    Liver abscess-she is on Cubicin and needs prolonged antibiotics.  History of DVT-off of anticoagulation due to recent bleeding of the liver abscess cavity.    Please make follow-up with my clinic in 2-3 weeks from now and we will reassess her performance status.    She agreed and verbalized her agreement and understanding with the current plan.  I answered all questions and concerns she has at this time.              Thank you for allowing me to participate in her care.    Please note that this dictation was created using voice recognition software. I have made every reasonable attempt to correct obvious errors, but I expect that there are errors of grammar and possibly content that  I did not discover before finalizing the note.      SIGNATURES:  Claude Cedeno    CC:  Wally Knox D.O.  No ref. provider found

## 2018-12-03 NOTE — PROGRESS NOTES
Lab called with critical result of Hemoglobin at 6.8. Critical lab result read back to ROMANA Hernández.   Dr. Cotter notified of critical lab result at 6.8.  Critical lab result read back by Dr. Cotter.

## 2018-12-03 NOTE — THERAPY
OT treatment attempted, pt's hemoglobin at 6.8. Will re-attempt when levels are appropriate.     Please page with any questions.    Bindu Garcia, OTR/L [phone]

## 2018-12-03 NOTE — ASSESSMENT & PLAN NOTE
12/2:  Daughter at bedside, gave updates and asked with patient present to discuss code status as a family.  Daughter understands since they recently went through this with patient's .  12/3:  Dr. Cedeno met with family, continue full code.  Has responsive pancreatic cancer.

## 2018-12-03 NOTE — PROGRESS NOTES
Hospital Medicine Daily Progress Note    Date of Service  12/2/2018    Chief Complaint  73 y.o. 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 Cedeno initially diagnosed 7/2016 by ERCP with stent and decompression/biopsy, attempted [facility] 8/2016 by Dr. Cormier, however had too many mets to liver, s/p chemotherapy was found to have repeat bacteremia.    Hospital Course    Patient admitted and started again on IV antibitoics.  She was found to have a liver abscess and required drainage, cultures from this have shown VRE, candida and lactobacillus acidophilus and blood cultures showing candida.  ID has been managing antibiotics and antifungals.       Interval Problem Update  11/20 Patient had episode of incontinence overnight along with confusion and new difficulty following commands.  CT head was done showing no acute findings with suspicion of possible s/e of pain medication.  Her mentation has improved but she continues to have urinary and bowel incontinence, likely with fatigue and now frequent need for toileting due to her antibiotics and IV hydration.  11/21 Patient's cough persisting but she remains afebrile.  I ordered PICC line for placement as patient will need continued antibiotics for 6 week duration, final blood cultures have resulted negative.  CXR ordered and consistent with interstitial edema - will start IV lasix to see if this improves her oxygen saturation and cough. WBC continues to drop.  11/22 Patient states she is feeling slightly better today, having to urinate more often since starting diuretic but lungs are sounding slightly better.  CT abdomen completed and shows abscess is smaller but still present and drain no longer in place.  She also now has a moderate right pleural effusion.  I've requested IR to reposition drain in liver abscess and to also drain what the pleural effusion.  Patient requests a call  to be made to her son, Sea.  11/23 Patient had thoracentesis without issue and is breathing slightly better, delay in repositioning of drain due to recent dose of eliquis and need to be out of system for 48 hours to lessen the chance of bleeding.  Repeat culture on fluid ordered.  11/24 Patient had successful placement of drain in liver fluid collection, drainage in JACQUELINE bulb is dark brown without evidence of purulence.  Culture from fluid collected.  Son at bedside when evaluated and he remarked she appears much more awake today and breathing much easier than on days past - explained the need for diuresis and the drainage of the lung have likely contributed to improvement.  11/25 Patient feeling well, denies new complaints.  Will cut back on lasix to once daily and IV to PO.  She is currently on Unasyn and final treatment plan still pending decision by ID.  Cultures from thoracentesis showing no growth.  11/26 Patient states she has had good urinary continence since decreasing lasix to once daily.  No acute changes, ID developing duration of antibiotic treatment.  Radiation therapy to start tomorrow.  11/27:  Ordered TTE per ID recs since VRE, Ecoli found on liver abscess culture.  Explained to patient that since has an active liver abscess, will need to hold on chemo and XRT until antibiotics have adequately eliminated any infection.  Tentative stop date 12/20 with repeat CT abdomen to ensure resolution.  Last CT abdomen 11/22 with abscess size 75mm x 48mm down from prior size 88 x 64mm.  Doing well, no abdominal pain or distention noted on exam, appetite good, afebrile.  11/28:  Normal echo EF 65%.  No abdominal pain, eating well, no fevers.  11/29:  Repeat CT ordered since bloody drainage seen from JACQUELINE drain RUQ.  Pseudoaneurysm seen from hepatic artery encircling drain.  IR consult, Dr. Higginbotham removed drain.  dc'd eliquis bid.  Concerning abscess no change in size.  Will d/w Dr. Yang since may represent necrotic  mass from ampullary cancer and require surgical removal.  BCs ordered since increase in wbc 8 to 17K, no fever, pain in RUQ.   11/30:  Spoke with Dr. Yang regarding necrotic liver mass, no change in size on recent CT abdomen/pelvis.  Has recommended to dc drain if <30 cc output daily.  Recommend to start chemo, XRT once drain removed.  Has dried blood in JACQUELINE drain s/p IR embolization of hepatic artery pseudoaneurysm.   Overall, patient does not have any abdominal pain or distention.  12/1:  Hg dropped 8.9 to 7.3 overnight likely due to JACQUELINE drain hepatic artery pseudoaneurysm bleed.  Drain with no further BRB, but dark fluid seen. Ordered Hg q 6 x2 stable, transfuse for Hg <7.  Orders placed, patient states she would accept blood transfusion, type and cross.  dc'd lasix and potassium since patient's intake is minimal last few days and she appears dehydrated.  JACQUELINE drain with 35cc last 24 hours.  Spoke to patient and daughter at bedside addressing full code status, want family meeting to decide.  12/2: stable, daughter at bedside, told her she can talk to Dr. Cedeno tomorrow for his plan on her pancreatic cancer and discuss code status.  She states that she has noted her mother eats the food she brings in to her, not so much the hospital food.  No further bleeding in JACQUELINE drain.  Stable hg.    Consultants/Specialty  ID - Leela/Honorio  General surgeon Dr. Yang  IR    Code Status  full    Disposition  PT rec home with 24/7 supervision, OT rec HH, no prior home O2.    Review of Systems  Review of Systems   Constitutional: Negative for chills, fever and malaise/fatigue.   HENT: Negative for congestion and sore throat.    Eyes: Negative for blurred vision and photophobia.   Respiratory: Positive for cough (slightly better). Negative for shortness of breath and wheezing.    Cardiovascular: Negative for chest pain, claudication and leg swelling.   Gastrointestinal: Negative for abdominal pain, constipation, diarrhea,  heartburn, nausea and vomiting.   Genitourinary: Negative for dysuria and hematuria.   Musculoskeletal: Negative for joint pain and myalgias.   Skin: Negative for itching and rash.   Neurological: Positive for weakness. Negative for dizziness, sensory change, speech change and headaches.   Psychiatric/Behavioral: Negative for depression. The patient is not nervous/anxious and does not have insomnia.         Physical Exam  Temp:  [36.6 °C (97.9 °F)-37.2 °C (98.9 °F)] 37.2 °C (98.9 °F)  Pulse:  [] 89  Resp:  [16-18] 16  BP: (124-131)/(59-72) 124/61    Physical Exam   Constitutional: She is oriented to person, place, and time. She appears well-developed and well-nourished. No distress.   HENT:   Head: Normocephalic and atraumatic.   Eyes: Conjunctivae are normal. No scleral icterus.   Neck: Neck supple. No JVD present.   Cardiovascular: Normal rate, regular rhythm and normal heart sounds.  Exam reveals no gallop and no friction rub.    No murmur heard.  Pulmonary/Chest: Effort normal. No respiratory distress. She has no rales. She exhibits no tenderness.   Abdominal: Soft. Bowel sounds are normal. She exhibits no distension. There is no guarding.   Musculoskeletal: She exhibits no edema or tenderness.   Lymphadenopathy:     She has no cervical adenopathy.   Neurological: She is alert and oriented to person, place, and time. No cranial nerve deficit.   Skin: Skin is warm and dry. She is not diaphoretic. No erythema. No pallor.   Psychiatric: She has a normal mood and affect. Her behavior is normal.   Nursing note and vitals reviewed.      Fluids    Intake/Output Summary (Last 24 hours) at 12/02/18 1700  Last data filed at 12/01/18 1712   Gross per 24 hour   Intake                0 ml   Output               20 ml   Net              -20 ml       Laboratory  Recent Labs      11/30/18   0305  12/01/18   0130  12/01/18   0844  12/01/18   1430  12/02/18   0145   WBC  11.7*  9.7   --    --   9.9   RBC  3.13*  2.55*   --     --   3.29*   HEMOGLOBIN  8.9*  7.3*  7.2*  7.3*  9.4*   HEMATOCRIT  28.0*  23.1*   --    --   29.6*   MCV  88.2  90.6   --    --   90.6   MCH  28.4  28.6   --    --   28.6   MCHC  32.2*  31.6*   --    --   31.5*   RDW  55.2*  59.0*   --    --   58.5*   PLATELETCT  172  219   --    --   324   MPV  11.5  10.8   --    --   10.8     Recent Labs      11/30/18   0305  12/01/18   0130  12/02/18   0145   SODIUM  137  132*  133*   POTASSIUM  3.5*  3.9  4.2   CHLORIDE  110  108  106   CO2  20  20  19*   GLUCOSE  115*  99  109*   BUN  8  9  9   CREATININE  0.67  0.55  0.71   CALCIUM  7.7*  7.7*  8.2*                   Imaging  IR-VISCERAL ANGIOGRAM - SELECTIVE   Final Result      1.  Right hepatic artery pseudoaneurysm.   2.  Successful embolization of branch supplying the pseudoaneurysm.      CT-CHEST,ABDOMEN,PELVIS WITH   Final Result      7.8 x 5 x 6.1 cm hypodense lesion in the right lobe of the liver is not significantly changed compared to prior. A pigtail catheter is seen within the lesion. This could represent a necrotic mass with superimposed infection. There is internal density    within the lesion which could represent sequelae of a bleed into the lesion. There is a 9 x 6.6 mm pseudoaneurysm located adjacent to the pigtail catheter. Interventional radiology consultation recommended.      Other small hypodense hepatic lesions are again noted.      Linear tract from a previous pigtail catheter is seen within the right lobe of the liver.      Dilatation of the pancreatic duct is again noted.      Prominence of the common duct measures 1.2 cm.      Enlarged peripancreatic and aortocaval lymph nodes are again noted.      Parapelvic right renal cyst. Tiny hypodense right renal lesions are too small to characterize.      Atherosclerotic plaque.      Small to moderate right pleural effusion with overlying atelectasis/consolidation.      Colonic diverticulosis. Moderate amount of colonic stool.      Calcified uterine  fibroid.      Heterogeneity of the endometrial stripe in the lower uterine body with dilatation of the uterine cavity versus endometrial thickening near the uterine fundus measuring 1.2 cm in thickness. Further evaluation with pelvic ultrasound is recommended. This    can be seen in the uterine malignancy.      Fat-containing ventral hernia.      Borderline prominent subcarinal lymph node is nonspecific.         EC-ECHOCARDIOGRAM COMPLETE W/O CONT   Final Result      CT-DRAIN-LIVER ABSCESS-CYST   Final Result      1.  Successful CT-guided drainage of liver abscess.   2.  The previously placed migrated pigtail drainage catheter was removed.      US-THORACENTESIS PUNCTURE RIGHT   Final Result      1. Ultrasound guided right sided diagnostic thoracentesis.      2. 450 mL of fluid withdrawn.      DX-CHEST-PORTABLE (1 VIEW)   Final Result      Mild right basilar atelectasis.      CT-ABDOMEN W/O   Final Result      Hepatic dome abscess pigtail catheter has pulled out and now terminates just deep to the thoracic cage. Despite this the size of the abscess has diminished measuring 75 x 48 from 88 x 64 mm      New mild left hepatic pneumobilia      Ampullary region mass is difficult to precisely measure but does appear to exert some mass effect upon the third portion of the duodenum. No bowel obstruction is detected.      New medium-sized right ovarian pleural effusion does not have loculation features but there is subsegmental atelectasis      DX-CHEST-PORTABLE (1 VIEW)   Final Result      New interstitial opacity is worrisome for edema      Hilar prominence could be from lymphadenopathy or pulmonary arterial hypertension      Stable right hemidiaphragm elevation      IR-PICC LINE PLACEMENT   Final Result                  Ultrasound-guided PICC placement performed by qualified nursing staff as    above.          CT-HEAD W/O   Final Result      1. Cerebral atrophy.   2. White matter lucencies most consistent with small vessel  ischemic change versus demyelination or gliosis.   3. Remote LEFT cerebellar infarction   4. Otherwise, Head CT without contrast with no acute findings. No evidence of acute cerebral infarction, hemorrhage or mass lesion.      CT-DRAIN-LIVER ABSCESS-CYST   Final Result      Successful CT-guided drain placement in the right lobe liver necrosis/abscess.      CT-ABDOMEN-PELVIS WITH   Final Result      1.  Interval significant increase in size of low-attenuation area in the dome of the right lobe of liver which contains air. This may represent necrosis or hemorrhage following therapy. Stable appearance of other smaller low-attenuation areas in the    right lobe of the liver without evidence of progression of disease in these areas of metastasis. Differential diagnosis would include post therapy infection.      2.  Stable masslike density in the region of the ampulla of Vater consistent with primary carcinoma. Current dimensions are 3.1 x 2.6 cm.      3.  Persistent low-attenuation areas in the pancreatic head and proximal body which may represent secondary pancreatitis.      4.  Stable upper retroperitoneal adenopathy.      5.  Small umbilical hernia containing fat.      DX-CHEST-PORTABLE (1 VIEW)   Final Result      No pulmonary consolidation.           Assessment/Plan  Liver abscess- (present on admission)   Assessment & Plan    Drain replaced 11/24/18 as previous one no longer in connection to fluid collection on CT, large amount drainage again.    Cultures enterococcus [VRE]  ID following   Continue daptomycin IV, unasyn iv, fluconozole per ID stop date 12/20 with repeat CT abdomen/pelvis at that time.  11/30:  Spoke with Dr. Yang regarding necrotic liver mass, no change in size on recent CT abdomen/pelvis.  Has recommended to dc drain if <30 cc output daily.  Recommend to start chemo, XRT once drain removed.  Has dried blood in JACQUELINE drain s/p IR embolization of hepatic artery pseudoaneurysm.   Overall, patient  does not have any abdominal pain or distention.     Bacteremia- (present on admission)   Assessment & Plan    Received treatment for E. coli bacteremia in early November, her port was removed  Admitted with sepsis  Blood cultures from 11/12/2018 are growing lactobacillus, Candida albicans and strep viridans. Liver abscess cultures from 11/13/2018 are positive for VRE.  Currently on unasyn/PO diflucan ID following.     PICC placed 11/21/18  IV daptomycin, IV unasyn, fluconazole tentative stop date 12/20 per ID, then repeat CT to see if further IV abx needed.  Repeat 11/24 CT guided liver abscess pigtail catheter placed after initial was found to be displaced.  11/28:  TTE negative for endocarditis.  EF 65%.  11/29:  BCs x2 negative.             Sepsis (HCC)- (present on admission)   Assessment & Plan    This is sepsis (without associated acute organ dysfunction).   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  11/29:  Review of liver abscess, no change in size, may be necrotic cancerous mass per radiologist today.  Will dw Dr. Yang if need surgical excision planned.  11/30:  Per Dr. Yang review of CT and exam of patient, if JACQUELINE drain <30cc output in 24 hours, can dc drain.  Once drain dc'd should resume chemo and XRT since liver lesion is necrotic mass, not purulent fluid.       Fungemia   Assessment & Plan    Mycofungin   ID following        Deep vein thrombosis (CMS-HCC) [I82.409]- (present on admission)   Assessment & Plan    11/29  dc'd eliquis due to bleeding into JACQUELINE drain from pseudoaneurysm.  Last DVT 4/4/17.  No need to continue eliquis.       Advance care planning   Assessment & Plan    12/2:  Daughter at bedside, gave updates and asked with patient present to discuss code status as a family.  Daughter understands  since they recently went through this with patient's .     Iron deficiency anemia due to chronic blood loss- (present on admission)   Assessment & Plan    12/1:  Ordered iron studies.  Had dc'd eliquis on 11/29 with finding of hepatic artery pseudoaneurysm and bleed.  Blood in JACQUELINE drain has subsided.  Hg trending down, HG q 6 hours, transfuse for Hg <7.  Type and cross ordered.  12/2:  iron 15, ferritin 324.  Ordered IV venofer x 5 days. Stable Hg.     Pseudoaneurysm following procedure (HCC)   Assessment & Plan    11/29:  Bloody JACQUELINE drain of liver abscess.  Ordered CT abdomen/pelvis/chest:  Pseudoaneurysm of hepatic artery encircling drain.  IR consult to Dr. Higginbotham who embolized artery.  dc'd eliquis 5mg bid.  12/2:  No further BRB in drain since 11/29 after stopped eliquis and embolization.         Pleural effusion- (present on admission)   Assessment & Plan    Thoracentesis 11/23/18 - 450cc removed - cytology and studies pending       Cough   Assessment & Plan    Non-productive, CXR concerning for edema  Low likelihood of infection given broad spectrum antibiotics for VRE  Improvement with diuresis  CT shows pleural effusion right lung - thoracentesis ordered in conjunction with drain replacement for liver  11/27:  No further cough, lungs CTA b/l.           Lactic acidosis- (present on admission)   Assessment & Plan    Improved with fluid resuscitation and treatment of infection            VTE prophylaxis: Eliquis

## 2018-12-03 NOTE — PROGRESS NOTES
Infectious Disease Progress Note    Author: Shayan Infante M.D. Date & Time of service: 12/3/2018  12:19 PM    Interval History:  73-year-old female with metastatic ampullary carcinoma of the pancreas presented with generalized weakness and shaking chills.  11/13/2018-no fevers.  Complains of generalized malaise and fatigue.  Still has some pain in her right shoulder.  WBC count is 22,000.  Cultures remain negative.  11/14/2018 T-max is 98.2.  No new issues overnight.  Underwent IR drainage on 11/13/2018.  Her shoulder pain has eased since then.  11/15/2018 no fevers.  WBC 13.3 creatinine 0.47  11/16/2018 no fevers.  Denies any increased abdominal pain.  The shoulder pain is improved as well.  WBC is 12.8.  11/17/2018 no fevers.  Shoulder pain is much improved.  Denies any new issues.  11/18- no fevers. No new issues> WBC 16.7  11/19/2018 no fevers.  WBC 15.6 no new issues overnight  11/20 AF (99.7) WBC 15.3 states eating better-denies SE abx No abd pain today  11/21 Af (99.9) WBC 9.8 minimal output from drain-plan for PICC today  11/22/2018 T-max is 100.3.  No new issues overnight.  WBCs 10.  AST 40 ALT 17  11/23/2018 no fevers.  The drain is not draining much.  Underwent thoracentesis today.  11/24/2018 underwent another drain placement.  Complains of some abdominal pain otherwise denies any complaints.  WBC is 8.4  11/25/2018 complains of malaise and fatigue.  Complains of some abdominal pain.  WBC 5.9, Labs Reviewed, Medications Reviewed and Radiology Reviewed.  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 Labs and micro results reviewed. Recent imaging reviewed.    EUN, Vitals reviewed- pt AF, O2 RA, Labs and micro results reviewed. Recent imaging reviewed. Pt with some nausea this am and poor appetite.    EUN, Vitals reviewed- pt AF, O2 RA, Labs and micro results reviewed. Recent imaging reviewed. Medications reviewed.   12/3 afebrile, white count 7.6.  Continues to have some dry cough.  Tolerating antibiotics, no new issues.      Review of Systems:  Review of Systems   Constitutional: Negative for chills, diaphoresis, fever and malaise/fatigue.   Respiratory: Positive for cough. Negative for sputum production and shortness of breath.    Cardiovascular: Negative for chest pain.   Gastrointestinal: Negative for abdominal pain, constipation, diarrhea, nausea and vomiting.   Genitourinary: Negative for dysuria.   Musculoskeletal: Negative for myalgias.   Skin: Negative for itching and rash.   All other systems reviewed and are negative.      Hemodynamics:  Temp (24hrs), Av.3 °C (83 °F), Min:-13.5 °C (7.7 °F), Max:37.2 °C (98.9 °F)  Temperature: 36.9 °C (98.5 °F)  Pulse  Av.6  Min: 67  Max: 112   Blood Pressure : 137/78       Physical Exam:  Physical Exam   Constitutional: She is oriented to person, place, and time. She appears well-developed and well-nourished. No distress.   HENT:   Head: Normocephalic and atraumatic.   Mouth/Throat: No oropharyngeal exudate.   Eyes: Pupils are equal, round, and reactive to light. Conjunctivae and EOM are normal. No scleral icterus.   Neck: Normal range of motion.   Cardiovascular: Normal rate, regular rhythm and normal heart sounds.    Pulmonary/Chest: Effort normal. No respiratory distress. She has no wheezes.   Diminished breath sounds on right    Abdominal: Soft. Bowel sounds are normal. There is tenderness. There is no rebound and no guarding.   Epigastric tenderness  Right upper  quadrant drain removed, dressing in place   Musculoskeletal: She exhibits no edema.   Lymphadenopathy:     She has no cervical adenopathy.   Neurological: She is alert and oriented to person, place, and time.   No gross focal neuro deficit   Skin: Skin is warm and dry. She is not diaphoretic.   Nursing note and vitals reviewed.      Meds:    Current Facility-Administered Medications:   •  NS  •  iron sucrose  •  prochlorperazine  •  [COMPLETED] piperacillin-tazobactam **AND** piperacillin-tazobactam  •  DAPTOmycin  •  ipratropium-albuterol  •  fluconazole  •  senna-docusate **AND** polyethylene glycol/lytes **AND** magnesium hydroxide **AND** bisacodyl  •  Respiratory Care per Protocol  •  ondansetron  •  ondansetron  •  acetaminophen  •  Notify provider if pain remains uncontrolled **AND** Use the numeric rating scale (NRS-11) on regular floors and Critical-Care Pain Observation Tool (CPOT) on ICUs/Trauma to assess pain **AND** Pulse Ox (Oximetry) **AND** Pharmacy Consult Request **AND** If patient difficult to arouse and/or has respiratory depression, stop any opiates that are currently infusing and call a Rapid Response. **AND** oxyCODONE immediate-release **AND** oxyCODONE immediate-release **AND** morphine injection  •  NS  •  omeprazole  •  artificial tears  •  albuterol    Labs:  Recent Labs      12/01/18 0130 12/01/18   1430  12/02/18   0145  12/03/18   0300   WBC  9.7   --    --   9.9  7.6   RBC  2.55*   --    --   3.29*  2.43*   HEMOGLOBIN  7.3*   < >  7.3*  9.4*  6.8*   HEMATOCRIT  23.1*   --    --   29.6*  22.3*   MCV  90.6   --    --   90.6  90.1   MCH  28.6   --    --   28.6  28.0   RDW  59.0*   --    --   58.5*  58.2*   PLATELETCT  219   --    --   324  333   MPV  10.8   --    --   10.8  10.1    < > = values in this interval not displayed.     Recent Labs      12/01/18 0130 12/02/18   0145  12/03/18   0300   SODIUM  132*  133*  135   POTASSIUM  3.9  4.2  3.7   CHLORIDE  108  106  108   CO2  20   19*  20   GLUCOSE  99  109*  91   BUN  9  9  7*     Recent Labs      12/01/18   0130  12/02/18   0145  12/03/18   0300   ALBUMIN  2.1*  2.3*  2.0*   TBILIRUBIN  0.3  0.5  0.4   ALKPHOSPHAT  145*  193*  153*   TOTPROTEIN  6.4  7.2  6.4   ALTSGPT  24  22  15   ASTSGOT  66*  45  23   CREATININE  0.55  0.71  0.57       Imaging:  Ct-abdomen W/o    Result Date: 11/22/2018 11/22/2018 1:11 PM HISTORY/REASON FOR EXAM:  Liver abscess follow-up. Metastatic ampullary adenocarcinoma TECHNIQUE/EXAM DESCRIPTION: CT scan of the abdomen without contrast. Noncontrast helical sections were obtained from the diaphragmatic domes through the iliac crests Low dose optimization technique was utilized for this CT exam including automated exposure control and adjustment of the mA and/or kV according to patient size. COMPARISON: 11/12/18 and 11/13/2018 FINDINGS: Limited by lack of IV contrast New moderate right low-density layering pleural effusion with adjacent compressive atelectasis Right hepatic pigtail catheter has been displaced and now terminates deep to the anterior rib cage. It is no longer located in the hepatic dome abscess. The abscess is 75 x 48 mm in axial dimensions, diminished from 88 x 64 mm. Again it has fluid and gas  components There is some new left hepatic biliary favored over portal venous gas No abnormal perihepatic fluid collections detected. No visualized abscess is seen in the upper abdomen With lack of contrast it is difficult to evaluate the ampullary region mass. No gross interval change is seen and again there is artifact in the region from embolization. No free air Some mass effect on the third portion of the duodenum secondary to the mass. No adrenal mass or splenomegaly Medium-sized umbilical hernia contains fat. Previously a small loop of small bowel extending into the defect. No hydronephrosis     Hepatic dome abscess pigtail catheter has pulled out and now terminates just deep to the thoracic cage.  Despite this the size of the abscess has diminished measuring 75 x 48 from 88 x 64 mm New mild left hepatic pneumobilia Ampullary region mass is difficult to precisely measure but does appear to exert some mass effect upon the third portion of the duodenum. No bowel obstruction is detected. New medium-sized right ovarian pleural effusion does not have loculation features but there is subsegmental atelectasis    Ct-abdomen-pelvis With    Result Date: 11/12/2018 11/12/2018 1:38 PM HISTORY/REASON FOR EXAM:  Abdominal pain. History of metastatic ampullary adenocarcinoma. TECHNIQUE/EXAM DESCRIPTION: CT scan of the abdomen and pelvis with contrast. Contrast-enhanced helical scanning was obtained from the diaphragmatic domes through the pubic symphysis following the bolus administration of 80 mL of Omnipaque 350 without complication. Low dose optimization technique was utilized for this CT exam including automated exposure control and adjustment of the mA and/or kV according to patient size. COMPARISON: 10/25/2018 FINDINGS: The visualized lung bases are clear. CT Abdomen: A small hiatal hernia is present. There is interval increase in size of an area of low attenuation in the dome of the right lobe of the liver which contains air. This may represent necrosis or post therapy hemorrhage following embolization. This area measures 8.8 x 7.8 x 6.4 cm. Previous measurement was 2.2 cm in maximum dimension. Multiple smaller areas of low-attenuation within the right lobe of liver are present which are suspicious for hepatic metastases. These other smaller low-attenuation areas are without significant change. The gallbladder absent. A low-attenuation area in the region of the pancreatic head is again noted and is poorly defined measuring approximately 3.1 x 2.6 cm in diameter. This is consistent with the area of ampullary carcinoma. Embolization coils are present in the GDA. There is low attenuation in the pancreatic body  proximally which may represent low-attenuation  secondary to pancreatitis or spread of neoplasm. The common duct is dilated proximal to the level of the ampulla measuring a maximum diameter of 1.6 cm. No choledocholithiasis is identified. An enlarged retroperitoneal lymph node between the upper aorta and IVC is unchanged measuring 1.5 cm in diameter. No new area of retroperitoneal adenopathy is identified. No peripancreatic adenopathy is present. The spleen appears normal. The splenic vein and portal vein are patent. The adrenal glands are not enlarged and the kidneys enhance symmetrically. Small simple cysts in the right kidney are stable. There is no lymphadenopathy. The aorta and IVC are normal in caliber. The bowel is without obstruction. The appendix appears normal. There is a small umbilical hernia containing fat. CT Pelvis: There is no lymphadenopathy. No free fluid is present. The bladder appears normal. Uterus as endometrial complex thickening. There is a myomatous type calcification in the right aspect of the uterus which is unchanged. There is no acute inflammatory process.     1.  Interval significant increase in size of low-attenuation area in the dome of the right lobe of liver which contains air. This may represent necrosis or hemorrhage following therapy. Stable appearance of other smaller low-attenuation areas in the right lobe of the liver without evidence of progression of disease in these areas of metastasis. Differential diagnosis would include post therapy infection. 2.  Stable masslike density in the region of the ampulla of Vater consistent with primary carcinoma. Current dimensions are 3.1 x 2.6 cm. 3.  Persistent low-attenuation areas in the pancreatic head and proximal body which may represent secondary pancreatitis. 4.  Stable upper retroperitoneal adenopathy. 5.  Small umbilical hernia containing fat.    Ct-chest,abdomen,pelvis With    Result Date: 11/29/2018 11/29/2018 1:12 PM  HISTORY/REASON FOR EXAM:  Leukocytosis. Liver abscess. TECHNIQUE/EXAM DESCRIPTION: CT scan of the chest, abdomen and pelvis with contrast. Helical scanning was obtained with intravenous contrast from the lung apices through the pubic symphysis to include the chest, abdomen and pelvis.  100 mL of Omnipaque 350 nonionic contrast was administered intravenously without complication. Low dose optimization technique was utilized for this CT exam including automated exposure control and adjustment of the mA and/or kV according to patient size. COMPARISON: 11/22/2018 FINDINGS: Atherosclerotic plaque is seen in the aorta. There is coronary artery calcification. There is a small amount of pericardial fluid. There is a small to moderate large right pleural effusion with overlying atelectasis/consolidation. There are small mediastinal lymph nodes. Subcarinal lymph node measures 8 mm in short axis dimension. There are small hilar lymph nodes bilaterally. Anterior mediastinal lymph node measures 6.2 mm in short axis dimension. There are small axillary lymph nodes. No pneumothorax is identified. Examination is limited by respiratory motion. There is mild left lower lobe and right middle lobe atelectasis. No free air is seen in the abdomen or pelvis. There is a pigtail catheter within a hypodense hepatic lesion measuring 7.8 x 5 x 6.1 cm which is not significantly changed compared to prior. Adjacent to the pigtail catheter, there is a focal collection of contrast measuring 9 x 6.6 mm compatible with a pseudoaneurysm. A feeding vessel is seen. There is heterogeneous density within the lesion. There could have been bleeding into the lesion. No active hemorrhage is currently identified. Small hypodense lesions are again noted within the right lobe of the liver. Portal vein is patent. Gallbladder is surgically absent. Prominence of the common duct measures 1.2 cm. There is distention of the cystic duct. There is dilatation of the  pancreatic duct. Streak  artifact from embolization coils is seen. There is a linear tract in the right lobe of the liver from a prior drain located more anteriorly. Spleen is not enlarged. No adrenal mass is identified. There is a parapelvic cyst on the right. Tiny hypodense right renal lesions are too small to characterize. There is no evidence of hydronephrosis. Atherosclerotic plaque is seen in the aorta. There are small inguinal lymph nodes. Enlarged aortocaval lymph node measures 1.4 cm in short axis dimension. Enlarged peripancreatic lymph nodes are seen. There is no evidence of bowel obstruction. There is colonic diverticulosis. There is a moderate amount of colonic stool. Terminal ileum is unremarkable. There is no evidence of acute appendicitis. There are postsurgical changes of the bowel in the left abdomen. A small lymph node is seen adjacent to the distal esophagus. Bladder is mildly distended. Uterus is anteverted. There is a calcified uterine fibroid. There is Heterogeneity of the endometrial stripe in the lower uterine body with dilatation of the uterine cavity versus endometrial thickening near the uterine fundus measuring 1.2 cm in thickness. Calcific densities in the pelvis likely represent phleboliths. No free fluid is seen in the pelvis. Degenerative changes are seen in the spine. There is a fat-containing ventral hernia.     7.8 x 5 x 6.1 cm hypodense lesion in the right lobe of the liver is not significantly changed compared to prior. A pigtail catheter is seen within the lesion. This could represent a necrotic mass with superimposed infection. There is internal density within the lesion which could represent sequelae of a bleed into the lesion. There is a 9 x 6.6 mm pseudoaneurysm located adjacent to the pigtail catheter. Interventional radiology consultation recommended. Other small hypodense hepatic lesions are again noted. Linear tract from a previous pigtail catheter is seen within the  right lobe of the liver. Dilatation of the pancreatic duct is again noted. Prominence of the common duct measures 1.2 cm. Enlarged peripancreatic and aortocaval lymph nodes are again noted. Parapelvic right renal cyst. Tiny hypodense right renal lesions are too small to characterize. Atherosclerotic plaque. Small to moderate right pleural effusion with overlying atelectasis/consolidation. Colonic diverticulosis. Moderate amount of colonic stool. Calcified uterine fibroid. Heterogeneity of the endometrial stripe in the lower uterine body with dilatation of the uterine cavity versus endometrial thickening near the uterine fundus measuring 1.2 cm in thickness. Further evaluation with pelvic ultrasound is recommended. This can be seen in the uterine malignancy. Fat-containing ventral hernia. Borderline prominent subcarinal lymph node is nonspecific.     Ct-drain-liver Abscess-cyst    Result Date: 11/27/2018 11/24/2018 9:02 AM HISTORY/REASON FOR EXAM:  current drain pulled out of abscess, needs to be repositioned. Moderate sedation was administered with continuous monitoring of the patient under the direct supervision of  Medication: 100 mcg of fentanyl and 2 mg of Versed Total sedation time: 30 minutes The biopsy/drainage was done utilizing low dose optimization CT techniques including auto modulation for  imaging and low mA CT/fluoroscopy mode for the procedure. TECHNIQUE/EXAM DESCRIPTION AND NUMBER OF VIEWS:  After the informed consent the patient was placed on the angiographic table and supine position. Localization CT scan demonstrates a low-density area in the right lobe of the liver. The previously placed catheter was migrated upwards. The previously placed catheter was. After injecting lidocaine, a 17-gauge introducer needle was advanced into the fluid collection. After confirming the needle position, a wire was introduced. After dilating the tract, a new 10 Portuguese pigtail catheter was advanced  and placed with its loop in the fluid collection. The tube is attached to the suction bulb. The patient tolerated procedure without any immediate complication.     1.  Successful CT-guided drainage of liver abscess. 2.  The previously placed migrated pigtail drainage catheter was removed.    Ct-drain-liver Abscess-cyst    Result Date: 11/13/2018 11/13/2018 4:01 PM HISTORY/REASON FOR EXAM:  liver abscess. Moderate sedation was administered with continuous monitoring of the patient under the direct supervision of  Medications: 2 mg of Versed and 100 mcg of fentanyl Total sedation time: 30 minutes The biopsy/drainage was done utilizing low dose optimization CT techniques including auto modulation for  imaging and low mA CT/fluoroscopy mode for the procedure. TECHNIQUE/EXAM DESCRIPTION AND NUMBER OF VIEWS:  After the informed consent the patient was placed on the CT table on supine position. Localization CT scan demonstrates hypodense area in the right lobe of the liver. The skin over the lesion was prepped. Subsequently after injecting lidocaine a 17-gauge needle was advanced into the hypodense area. Dark-colored fluid was aspirated. A wire was released. Subsequently a 10 Welsh guiding catheter was advanced and placed with its loop in the liver. An approximately 100 mL of fluid was drained. Sample material was sent to the microbiology. The patient tolerated the procedure without any immediate complication.     Successful CT-guided drain placement in the right lobe liver necrosis/abscess.    Ct-head W/o    Result Date: 11/19/2018 11/19/2018 9:49 PM HISTORY/REASON FOR EXAM:  Acute onset of altered mental status and incontinence. History of ampullary region tumor. TECHNIQUE/EXAM DESCRIPTION AND NUMBER OF VIEWS: CT of the head without contrast. The study was performed on a GE CT scanner. Contiguous 5 mm axial sections were obtained from the skull base through the vertex. Up to date radiation dose  reduction adjustments have been utilized to meet ALARA standards for radiation dose reduction. COMPARISON:  PET CT 7/12/2018 FINDINGS: The calvariae and skull base are unremarkable. There are no extraaxial fluid collections. There is a pattern of cerebral atrophy manifest as enlargement of sulcal markings and ventricular prominence.  The ventricular system and basal cisterns are otherwise unremarkable. There are areas of hypodensity in the white matter most consistent with small vessel ischemic change versus demyelination or gliosis. There are no hemorrhagic lesions. There are no mass effects or shift of midline structures. There is a small area of encephalomalacia in the LEFT cerebellum. This is unchanged. The brainstem and posterior fossa structures are otherwise unremarkable. The paranasal sinuses and mastoids in the field of view are unremarkable.     1. Cerebral atrophy. 2. White matter lucencies most consistent with small vessel ischemic change versus demyelination or gliosis. 3. Remote LEFT cerebellar infarction 4. Otherwise, Head CT without contrast with no acute findings. No evidence of acute cerebral infarction, hemorrhage or mass lesion.    Dx-chest-portable (1 View)    Result Date: 11/23/2018 11/23/2018 2:14 PM HISTORY/REASON FOR EXAM:  Pleural Effusion. Status post right thoracentesis TECHNIQUE/EXAM DESCRIPTION AND NUMBER OF VIEWS: Single portable view of the chest. COMPARISON: 11/21/2018 FINDINGS: Single portable view of the chest demonstrates a stable cardiac silhouette and mediastinal contours. Calcification is in the aortic arch. The right hemidiaphragm is elevated. There is mild atelectasis in the right lung base. No pneumothorax is identified. Pigtail catheter projects over the right upper quadrant. There are multiple surgical clips. A right PICC line remains in place.     Mild right basilar atelectasis.    Dx-chest-portable (1 View)    Result Date: 11/21/2018 11/21/2018 5:31 PM HISTORY/REASON  FOR EXAM: Cough. Congestion for a week TECHNIQUE/EXAM DESCRIPTION AND NUMBER OF VIEWS: Single AP view of the chest. COMPARISON: November 12 FINDINGS: Hardware: Right PICC line tip overlies the cavoatrial junction Pigtail catheter overlies the elevated right hemidiaphragm, not fully visualized Lungs: Increasing perihilar opacity with some bronchial thickening noted. Minor fissure is also thickened. Pleura:  No pleural space process is seen. Heart and mediastinum: There is similar hilar and cardiac silhouette enlargement.     New interstitial opacity is worrisome for edema Hilar prominence could be from lymphadenopathy or pulmonary arterial hypertension Stable right hemidiaphragm elevation    Dx-chest-portable (1 View)    Result Date: 11/12/2018 11/12/2018 11:06 AM HISTORY/REASON FOR EXAM:  Sepsis. TECHNIQUE/EXAM DESCRIPTION AND NUMBER OF VIEWS: Single portable view of the chest. COMPARISON: 10/25/2018 FINDINGS: There is no evidence of focal consolidation or evidence of pulmonary edema. There is no evidence of pleural effusion. The heart is normal in size.     No pulmonary consolidation.    Ir-visceral Angiogram - Selective    Result Date: 11/30/2018 11/29/2018 6:29 PM HISTORY/REASON FOR EXAM:  Hepatic artery pseudoaneurysm Moderate sedation was administered with continuous monitoring of the patient under the direct supervision of  Medication: 4 mg of  Versed and 100 mcg of fentanyl Contrast: 90 mL of Omnipaque 300 Total fluoroscopic time: 12.7 minutes Total number of images stored in the PACS: 224 TECHNIQUE/EXAM DESCRIPTION AND NUMBER OF VIEWS: After the informed consent the patient was placed on the fluoroscopy table on supine position. The skin over the right groin was prepped. After injecting lidocaine a 5 Y[place]ut vascular sheath was introduced. Subsequently, a diagnostic catheter was advanced into the celiac trunk. Celiac angiogram was performed. It demonstrated a pseudoaneurysm from the right  hepatic branch. Subsequently a microcatheter was successfully advanced into the branch supplying the pseudoaneurysm. Microcatheter angiogram confirms the pseudoaneurysm and the catheter position. Subsequently metallic fiber coils were used to embolize the branch supplying the segment aneurysm. The final angiogram demonstrates occluded branch with stagnant flow within the pseudoaneurysm. The catheters were removed. The right superficial femoral angiogram does not demonstrate any significant stenosis. The right femoral sheath was removed and hemostasis was obtained with Angio-Seal device.     1.  Right hepatic artery pseudoaneurysm. 2.  Successful embolization of branch supplying the pseudoaneurysm.    Us-thoracentesis Puncture Right    Result Date: 11/23/2018 11/23/2018 1:15 PM HISTORY/REASON FOR EXAM:  Fluid collection TECHNIQUE/EXAM DESCRIPTION: Ultrasound-guided thoracentesis. Indication:  RIGHT pleural fluid collection. COMPARISON:  None PROCEDURE:     Informed consent was obtained. A timeout was taken. A right pleural effusion was localized with real-time ultrasound guidance. The right posterior chest wall was prepped and draped in a sterile manner. Following local anesthesia with 1% lidocaine, a 5 [place] Yueh pigtail catheter was advanced into the pleural space with trocar technique and pleural fluid was drained. The patient tolerated the procedure well without evidence of complication. A post thoracentesis chest radiograph is forthcoming. FINDINGS: Fluid was  sent to the laboratory. Fluid character: straw colored     1. Ultrasound guided right sided diagnostic thoracentesis. 2. 450 mL of fluid withdrawn.    Ir-picc Line Placement    Result Date: 11/21/2018  HISTORY/REASON FOR EXAM:   PICC placement. TECHNIQUE/EXAM DESCRIPTION AND NUMBER OF VIEWS:   PICC line insertion with ultrasound guidance.  The procedure was performed using maximal sterile barrier technique including sterile gown, mask, cap, and donning  of sterile gloves following appropriate hand hygiene and/or sterile scrub. Patient skin site was prepped with 2% Chlorhexidine solution. FINDINGS:  PICC line insertion with Ultrasound Guidance was performed by qualified nursing staff without the assistance of a Radiologist. PICC positioning appropriateness confirmed by 3CG technology; chest xray only needed in the instance 3CG unable to confirm placement.              Ultrasound-guided PICC placement performed by qualified nursing staff as above.     Ec-echocardiogram Complete W/o Cont    Result Date: 2018  Transthoracic Echo Report Echocardiography Laboratory CONCLUSIONS No prior study is available for comparison. Normal transthoracic echocardiogram. JANELLE BALDERAS Exam Date:         2018                    08:43 Exam Location:     Inpatient Priority:          Routine Ordering Physician:        ADAMA STOCKTON Referring Physician: Sonographer:               Maisha Leavitt RDCS Age:    73     Gender:    F MRN:    [id number] :    1945 BSA:    1.64   Ht (in):    60     Wt (lb):    148 Exam Type:     Complete Indications:     Endocarditis and heart valve disorders in diseases                  classified elsewhere ICD Codes:       I39 CPT Codes:       23538 BP:   111    /   77     HR:   79 Technical Quality:       Fair MEASUREMENTS  (Male / Female) Normal Values 2D ECHO LV Diastolic Diameter PLAX        3.5 cm                4.2 - 5.9 / 3.9 - 5.3 cm LV Systolic Diameter PLAX         2.1 cm                2.1 - 4.0 cm IVS Diastolic Thickness           1 cm                  LVPW Diastolic Thickness          1.1 cm                LVOT Diameter                     1.6 cm                RA Diameter                       2.9 cm                Estimated LV Ejection Fraction    65 %                  LV Ejection Fraction MOD BP       69.8 %                >= 55  % LV Ejection Fraction MOD 4C       61.7 %                LV Ejection Fraction MOD 2C       80.8  %                LA Volume Index                   10.7 cm³/m²           16 - 28 cm³/m² IVC Diameter                      1.7 cm                M-MODE Aortic Root Diameter MM           2.4 cm                DOPPLER AV Peak Velocity                  1.4 m/s               AV Peak Gradient                  8 mmHg                AV Mean Gradient                  3.4 mmHg              LVOT Peak Velocity                0.78 m/s              AV Area Cont Eq vti               1.3 cm²               Mitral E Point Velocity           0.52 m/s              Mitral E to A Ratio               0.53                  MV Pressure Half Time             64.3 ms               MV Area PHT                       3.4 cm²               MV Deceleration Time              222 ms                TR Peak Velocity                  235 cm/s              PV Peak Velocity                  0.84 m/s              PV Peak Gradient                  2.8 mmHg              RVOT Peak Velocity                0.69 m/s              * Indicates values subject to auto-interpretation LV EF:  65    % FINDINGS Left Ventricle Normal left ventricular size and systolic function. Normal left ventricular wall thickness. Normal diastolic function. Normal regional wall motion. Left ventricular ejection fraction is visually estimated to be 65%. Right Ventricle Normal right ventricular size and systolic function. Right Atrium Normal right atrial size. Normal inferior vena cava size and inspiratory collapse. Left Atrium Normal left atrial size. Mitral Valve Structurally normal mitral valve. Mild mitral regurgitation. No mitral stenosis. Aortic Valve Structurally normal aortic valve without significant stenosis or regurgitation. Tricuspid Valve Structurally normal tricuspid valve. Mild tricuspid regurgitation. Right atrial pressure is estimated to be 3 mmHg. Estimated right ventricular systolic pressure  is 35 mmHg. Pulmonic Valve Structurally normal pulmonic valve without  "significant stenosis or regurgitation. Pericardium Normal pericardium without effusion. Aorta The aortic root is normal. Gurwinder Mayberry MD (Electronically Signed) Final Date:     28 November 2018                 09:42      Micro:  Results     Procedure Component Value Units Date/Time    BLOOD CULTURE [[id number]] Collected:  11/29/18 1200    Order Status:  Completed Specimen:  Blood from Peripheral Updated:  11/30/18 0904     Significant Indicator NEG     Source BLD     Site PERIPHERAL     Blood Culture No Growth    Note: Blood cultures are incubated for 5 days and  are monitored continuously.Positive blood cultures  are called to the RN and reported as soon as  they are identified.      Narrative:       Contact  Per Hospital Policy: Only change Specimen Src: to \"Line\" if  specified by physician order.    BLOOD CULTURE [[id number]] Collected:  11/29/18 1246    Order Status:  Completed Specimen:  Blood from Peripheral Updated:  11/30/18 0904     Significant Indicator NEG     Source BLD     Site PERIPHERAL     Blood Culture No Growth    Note: Blood cultures are incubated for 5 days and  are monitored continuously.Positive blood cultures  are called to the RN and reported as soon as  they are identified.      Narrative:       Contact  Per Hospital Policy: Only change Specimen Src: to \"Line\" if  specified by physician order.    AFB CULTURE [[id number]] Collected:  11/23/18 1330    Order Status:  Completed Specimen:  Body Fluid Updated:  11/29/18 0804     Significant Indicator NEG     Source BF     Site Thoracentesis Fluid     AFB Culture Culture in progress.     AFB Smear Results No acid fast bacilli seen.    Narrative:       RT pl eff 11/23/2018  14:50    FLUID CULTURE W/GRAM STAIN [[id number]] Collected:  11/23/18 1330    Order Status:  Completed Specimen:  Body Fluid Updated:  11/29/18 0804     Gram Stain Result Few WBCs.  No organisms seen.       Significant Indicator NEG     Source BF     Site Thoracentesis Fluid " "    Culture Result Bdf No growth at 5 days    Narrative:       RT pl eff 11/23/2018  14:50    FUNGAL CULTURE [[id number]] Collected:  11/23/18 1330    Order Status:  Completed Specimen:  Body Fluid Updated:  11/29/18 0804     Significant Indicator NEG     Source BF     Site Thoracentesis Fluid     Fungal Culture Culture in progress.    Narrative:       RT pl eff 11/23/2018  14:50          Assessment:  Superinfected necrotic mass of liver versus liver abscess  Bacteroides bacteremia, completed treatment  Candida albicans candidemia, completed treatment  Recent E. coli bacteremia  Ampullary carcinoma of the pancreas, stage IV    Plan:     Bloody drainage from abdominal drain- 2/2  pseudoaneurysm.  - Reports new abdominal pain overnight but now improved   - CT C/A/P on 11/29 with: \"Adjacent to the pigtail catheter, there is a focal collection of   contrast measuring 9 x 6.6 mm compatible with a pseudoaneurysm. A feeding vessel is seen. 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  Per CT on 11/29 this could be a necrotic mass with superimposed infection.      - CT scan 11/12 show with area of necrosis/hemorrhage, s/p IR drainage on 11/13/2018-- Cultures +  lactobacillus, VRE and Candida albicans and strep viridans  - Underwent CT scan on 11/22/2018- size of the abscess has diminished measuring 75 x 48 from 88 x 64 mm but abscess still present and contains air with new mild left hepatic pneumobilia  - Underwent another drain placement on 11/24/2018 as catheter was not correctly posittioned   - CT A/P on 11/29 due to concern for bleeding: \"7.8 x 5 x 6.1 cm hypodense lesion in the right lobe of the liver is not significantly " "changed compared to prior. A pigtail catheter is seen within the lesion. This could represent a necrotic mass with superimposed infection. There is internal density within the lesion which could represent sequelae of a bleed into the lesion.\" Catheter within the lesion and not significantly changed from last CT.       REC: Continue Daptomycin, fluconazole, and Zosyn (E coli with resistance to ampicillin)   - plan had been to given at least 4 weeks from prior CT abdomen on 11/22 and then will need to re-image prior to stopping - however may be superinfected necrotic mass as opposed to abscess  -Drain removed 12/3, End antibiotics on 12/20/18 pending CT, repeat CT in the coming weeks    Bacteremia  Blood cultures + Bacteroides  Repeat blood cultures x2 are negative from 11/15/2018  -has completed abx course       Candidemia- Candida albicans  Repeat blood cultures x2 are negative from 11/15/2018  Changed to p.o. Diflucan  - see abx above   - TTE to rule-out IE in setting of candidemia - no vegetations seen    - has completed a candidemia abx course     Recent E. coli bacteremia  -From 10/25  -Repeat blood cultures from 10/27 were negative, being covered with the current antibiotic therapy that she is on    Dry cough   - Unlikely bacterial infection as on broad spectrum abx      Pleural effusions  - Thora 11/23, cx with no growth   - The CT scan showed pleural effusion hence underwent thoracentesis on 11/23/2018.  2752 WBCs with 46% polys.  Gram stain and cultures negative.   - CT chest on 11/29 \"moderate large right pleural effusion with overlying atelectasis/consolidation\"  - Continue abx as above but if pt develops new fevers or respiratory distress would need repeat thoracentesis with cultures      Ampullary carcinoma of the pancreas stage IV  Contributory factor to above   Per notes \"11/30:  Per Dr. Yang review of CT and exam of patient, if JACQUELINE drain <30cc output in 24 hours, can dc drain.  Once drain dc'd " "should resume chemo and XRT since liver lesion is necrotic mass, not purulent fluid.\"       Prognosis  Guarded     DW primary team.      ID will follow.  Please call with questions.     "

## 2018-12-03 NOTE — CARE PLAN
Problem: Safety  Goal: Will remain free from injury  Outcome: PROGRESSING AS EXPECTED  Bed alarm on, A&O x4, slipper socks, bed locked and in low position, call light and personal belongings with reach, report given to CNA, appropriate signs outside door, hourly rounding in place.     Problem: Pain Management  Goal: Pain level will decrease to patient's comfort goal  Outcome: PROGRESSING AS EXPECTED  Pt complains about pain. Roxicodone 5 mg given.    Problem: Urinary Elimination:  Goal: Ability to reestablish a normal urinary elimination pattern will improve  Outcome: PROGRESSING AS EXPECTED  Pt incontinent at times. Frequent linens check in place.     Problem: Skin Integrity  Goal: Risk for impaired skin integrity will decrease  Outcome: PROGRESSING AS EXPECTED  Waffle overlay in place.

## 2018-12-03 NOTE — PROGRESS NOTES
Assumed care of pt @0700. Bedside report received. Pt AOX 4. Pt complains about pain. Roxicodone 5 mg given. Pt denies nausea and SOB. JACQUELINE removed this morning. PICC patent with positive blood return running IV abx. Pt incontinent of urine at times. Last Bm this morning. Pt up with 1 p assist. Fall precaution in place. POC discussed with pt, all questions answered at this time. Pt makes needs known, call light within reach, hourly rounding in place.

## 2018-12-03 NOTE — PROGRESS NOTES
Orders received from Dr. Cotter to transfuse 1 unit of PBRC with premedication of tylenol and benadryl

## 2018-12-03 NOTE — CARE PLAN
Problem: Safety  Goal: Will remain free from injury    Intervention: Provide assistance with mobility  Educated pt to call for assistance before getting OOB      Problem: Pain Management  Goal: Pain level will decrease to patient's comfort goal    Intervention: Follow pain managment plan developed in collaboration with patient and Interdisciplinary Team  Educated pt to verbalize when pain is not tolerable, assessing pain Q4 per protocol

## 2018-12-03 NOTE — PROGRESS NOTES
"Report received from day RN, assumed Care.   Patient is AOx4, responds appropriately.      Pain controlled at this time.  Patient is tolerating regular diet, denies nausea/vomiting. + flatus (last BM 12/2), + void.  Ambulates with a one person assist.    R upper quadrant abdominal JACQUELINE with sanguinous output, MD aware. R arm single lumen PICC running TKO.    /66   Pulse 92   Temp 36.3 °C (97.3 °F) (Temporal)   Resp 18   Ht 1.549 m (5' 0.98\")   Wt 58.6 kg (129 lb 3 oz)   SpO2 97%   Breastfeeding? No   BMI 24.42 kg/m²      Plan of care discussed, all questions answered.    Explained importance of calling before getting OOB and pt verbalizes understanding.       Call light and belongings within reach, treaded slipper socks on, bed in lowest locked position.  Hourly rounding in place, all needs met at this time  "

## 2018-12-04 PROBLEM — Z71.89 ADVANCE CARE PLANNING: Status: RESOLVED | Noted: 2018-01-01 | Resolved: 2018-01-01

## 2018-12-04 NOTE — PROGRESS NOTES
Infectious Disease Progress Note    Author: Shayan Infante M.D. Date & Time of service: 12/4/2018  12:28 PM    Interval History:  73-year-old female with metastatic ampullary carcinoma of the pancreas presented with generalized weakness and shaking chills.  11/13/2018-no fevers.  Complains of generalized malaise and fatigue.  Still has some pain in her right shoulder.  WBC count is 22,000.  Cultures remain negative.  11/14/2018 T-max is 98.2.  No new issues overnight.  Underwent IR drainage on 11/13/2018.  Her shoulder pain has eased since then.  11/15/2018 no fevers.  WBC 13.3 creatinine 0.47  11/16/2018 no fevers.  Denies any increased abdominal pain.  The shoulder pain is improved as well.  WBC is 12.8.  11/17/2018 no fevers.  Shoulder pain is much improved.  Denies any new issues.  11/18- no fevers. No new issues> WBC 16.7  11/19/2018 no fevers.  WBC 15.6 no new issues overnight  11/20 AF (99.7) WBC 15.3 states eating better-denies SE abx No abd pain today  11/21 Af (99.9) WBC 9.8 minimal output from drain-plan for PICC today  11/22/2018 T-max is 100.3.  No new issues overnight.  WBCs 10.  AST 40 ALT 17  11/23/2018 no fevers.  The drain is not draining much.  Underwent thoracentesis today.  11/24/2018 underwent another drain placement.  Complains of some abdominal pain otherwise denies any complaints.  WBC is 8.4  11/25/2018 complains of malaise and fatigue.  Complains of some abdominal pain.  WBC 5.9, Labs Reviewed, Medications Reviewed and Radiology Reviewed.  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 Labs and micro results reviewed. Recent imaging reviewed.    EUN, Vitals reviewed- pt AF, O2 RA, Labs and micro results reviewed. Recent imaging reviewed. Pt with some nausea this am and poor appetite.    EUN, Vitals reviewed- pt AF, O2 RA, Labs and micro results reviewed. Recent imaging reviewed. Medications reviewed.   12/3 afebrile, white count 7.6.  Continues to have some dry cough.  Tolerating antibiotics, no new issues.   afebrile, white count 13.4.  Reports that she continues to have dry cough, which she describes as her trying to get rid of a sensation of mucus in her throat.  Tolerating antibiotics, no new issues.    Review of Systems:  Review of Systems   Constitutional: Negative for chills, diaphoresis, fever and malaise/fatigue.   Respiratory: Positive for cough. Negative for sputum production and shortness of breath.    Cardiovascular: Negative for chest pain.   Gastrointestinal: Negative for abdominal pain, constipation, diarrhea, nausea and vomiting.   Genitourinary: Negative for dysuria.   Musculoskeletal: Negative for myalgias.   Skin: Negative for itching and rash.   All other systems reviewed and are negative.      Hemodynamics:  Temp (24hrs), Av.3 °C (97.4 °F), Min:36.1 °C (97 °F), Max:36.5 °C (97.7 °F)  Temperature: 36.1 °C (97 °F)  Pulse  Av.9  Min: 67  Max: 112   Blood Pressure : 133/73       Physical Exam:  Physical Exam   Constitutional: She is oriented to person, place, and time. She appears well-developed and well-nourished. No distress.   HENT:   Head: Normocephalic and atraumatic.   Mouth/Throat: No oropharyngeal exudate.   Eyes: Pupils are equal, round, and reactive to light. Conjunctivae and EOM are normal. No scleral icterus.   Neck: Normal range of motion.   Cardiovascular: Normal rate, regular rhythm and normal heart sounds.    Pulmonary/Chest: Effort normal. No respiratory  distress. She has no wheezes.   Diminished breath sounds on right    Abdominal: Soft. Bowel sounds are normal. There is tenderness. There is no rebound and no guarding.   Epigastric tenderness  Right upper quadrant drain removed, dressing in place, nontender   Musculoskeletal: She exhibits no edema.   Lymphadenopathy:     She has no cervical adenopathy.   Neurological: She is alert and oriented to person, place, and time.   No gross focal neuro deficit   Skin: Skin is warm and dry. She is not diaphoretic.   Nursing note and vitals reviewed.      Meds:    Current Facility-Administered Medications:   •  guaiFENesin  •  iron sucrose  •  prochlorperazine  •  [COMPLETED] piperacillin-tazobactam **AND** piperacillin-tazobactam  •  DAPTOmycin  •  ipratropium-albuterol  •  fluconazole  •  senna-docusate **AND** polyethylene glycol/lytes **AND** magnesium hydroxide **AND** bisacodyl  •  Respiratory Care per Protocol  •  ondansetron  •  ondansetron  •  acetaminophen  •  Notify provider if pain remains uncontrolled **AND** Use the numeric rating scale (NRS-11) on regular floors and Critical-Care Pain Observation Tool (CPOT) on ICUs/Trauma to assess pain **AND** Pulse Ox (Oximetry) **AND** Pharmacy Consult Request **AND** If patient difficult to arouse and/or has respiratory depression, stop any opiates that are currently infusing and call a Rapid Response. **AND** oxyCODONE immediate-release **AND** oxyCODONE immediate-release **AND** morphine injection  •  NS  •  omeprazole  •  artificial tears  •  albuterol    Labs:  Recent Labs      12/02/18 0145 12/03/18   0300  12/04/18   0015   WBC  9.9  7.6  13.4*   RBC  3.29*  2.43*  3.64*   HEMOGLOBIN  9.4*  6.8*  10.2*   HEMATOCRIT  29.6*  22.3*  31.7*   MCV  90.6  90.1  87.1   MCH  28.6  28.0  28.0   RDW  58.5*  58.2*  51.0*   PLATELETCT  324  333  362   MPV  10.8  10.1  9.8     Recent Labs      12/02/18   0145  12/03/18   0300  12/04/18   0015   SODIUM  133*  135  135    POTASSIUM  4.2  3.7  3.7   CHLORIDE  106  108  109   CO2  19*  20  19*   GLUCOSE  109*  91  104*   BUN  9  7*  6*     Recent Labs      12/02/18   0145  12/03/18   0300  12/04/18   0015   ALBUMIN  2.3*  2.0*  2.1*   TBILIRUBIN  0.5  0.4  0.6   ALKPHOSPHAT  193*  153*  153*   TOTPROTEIN  7.2  6.4  6.8   ALTSGPT  22  15  13   ASTSGOT  45  23  22   CREATININE  0.71  0.57  0.58       Imaging:  Ct-abdomen W/o    Result Date: 11/22/2018 11/22/2018 1:11 PM HISTORY/REASON FOR EXAM:  Liver abscess follow-up. Metastatic ampullary adenocarcinoma TECHNIQUE/EXAM DESCRIPTION: CT scan of the abdomen without contrast. Noncontrast helical sections were obtained from the diaphragmatic domes through the iliac crests Low dose optimization technique was utilized for this CT exam including automated exposure control and adjustment of the mA and/or kV according to patient size. COMPARISON: 11/12/18 and 11/13/2018 FINDINGS: Limited by lack of IV contrast New moderate right low-density layering pleural effusion with adjacent compressive atelectasis Right hepatic pigtail catheter has been displaced and now terminates deep to the anterior rib cage. It is no longer located in the hepatic dome abscess. The abscess is 75 x 48 mm in axial dimensions, diminished from 88 x 64 mm. Again it has fluid and gas  components There is some new left hepatic biliary favored over portal venous gas No abnormal perihepatic fluid collections detected. No visualized abscess is seen in the upper abdomen With lack of contrast it is difficult to evaluate the ampullary region mass. No gross interval change is seen and again there is artifact in the region from embolization. No free air Some mass effect on the third portion of the duodenum secondary to the mass. No adrenal mass or splenomegaly Medium-sized umbilical hernia contains fat. Previously a small loop of small bowel extending into the defect. No hydronephrosis     Hepatic dome abscess pigtail catheter has  pulled out and now terminates just deep to the thoracic cage. Despite this the size of the abscess has diminished measuring 75 x 48 from 88 x 64 mm New mild left hepatic pneumobilia Ampullary region mass is difficult to precisely measure but does appear to exert some mass effect upon the third portion of the duodenum. No bowel obstruction is detected. New medium-sized right ovarian pleural effusion does not have loculation features but there is subsegmental atelectasis    Ct-abdomen-pelvis With    Result Date: 11/12/2018 11/12/2018 1:38 PM HISTORY/REASON FOR EXAM:  Abdominal pain. History of metastatic ampullary adenocarcinoma. TECHNIQUE/EXAM DESCRIPTION: CT scan of the abdomen and pelvis with contrast. Contrast-enhanced helical scanning was obtained from the diaphragmatic domes through the pubic symphysis following the bolus administration of 80 mL of Omnipaque 350 without complication. Low dose optimization technique was utilized for this CT exam including automated exposure control and adjustment of the mA and/or kV according to patient size. COMPARISON: 10/25/2018 FINDINGS: The visualized lung bases are clear. CT Abdomen: A small hiatal hernia is present. There is interval increase in size of an area of low attenuation in the dome of the right lobe of the liver which contains air. This may represent necrosis or post therapy hemorrhage following embolization. This area measures 8.8 x 7.8 x 6.4 cm. Previous measurement was 2.2 cm in maximum dimension. Multiple smaller areas of low-attenuation within the right lobe of liver are present which are suspicious for hepatic metastases. These other smaller low-attenuation areas are without significant change. The gallbladder absent. A low-attenuation area in the region of the pancreatic head is again noted and is poorly defined measuring approximately 3.1 x 2.6 cm in diameter. This is consistent with the area of ampullary carcinoma. Embolization coils are present in the  GDA. There is low attenuation in the pancreatic body proximally which may represent low-attenuation  secondary to pancreatitis or spread of neoplasm. The common duct is dilated proximal to the level of the ampulla measuring a maximum diameter of 1.6 cm. No choledocholithiasis is identified. An enlarged retroperitoneal lymph node between the upper aorta and IVC is unchanged measuring 1.5 cm in diameter. No new area of retroperitoneal adenopathy is identified. No peripancreatic adenopathy is present. The spleen appears normal. The splenic vein and portal vein are patent. The adrenal glands are not enlarged and the kidneys enhance symmetrically. Small simple cysts in the right kidney are stable. There is no lymphadenopathy. The aorta and IVC are normal in caliber. The bowel is without obstruction. The appendix appears normal. There is a small umbilical hernia containing fat. CT Pelvis: There is no lymphadenopathy. No free fluid is present. The bladder appears normal. Uterus as endometrial complex thickening. There is a myomatous type calcification in the right aspect of the uterus which is unchanged. There is no acute inflammatory process.     1.  Interval significant increase in size of low-attenuation area in the dome of the right lobe of liver which contains air. This may represent necrosis or hemorrhage following therapy. Stable appearance of other smaller low-attenuation areas in the right lobe of the liver without evidence of progression of disease in these areas of metastasis. Differential diagnosis would include post therapy infection. 2.  Stable masslike density in the region of the ampulla of Vater consistent with primary carcinoma. Current dimensions are 3.1 x 2.6 cm. 3.  Persistent low-attenuation areas in the pancreatic head and proximal body which may represent secondary pancreatitis. 4.  Stable upper retroperitoneal adenopathy. 5.  Small umbilical hernia containing fat.    Ct-chest,abdomen,pelvis  With    Result Date: 11/29/2018 11/29/2018 1:12 PM HISTORY/REASON FOR EXAM:  Leukocytosis. Liver abscess. TECHNIQUE/EXAM DESCRIPTION: CT scan of the chest, abdomen and pelvis with contrast. Helical scanning was obtained with intravenous contrast from the lung apices through the pubic symphysis to include the chest, abdomen and pelvis.  100 mL of Omnipaque 350 nonionic contrast was administered intravenously without complication. Low dose optimization technique was utilized for this CT exam including automated exposure control and adjustment of the mA and/or kV according to patient size. COMPARISON: 11/22/2018 FINDINGS: Atherosclerotic plaque is seen in the aorta. There is coronary artery calcification. There is a small amount of pericardial fluid. There is a small to moderate large right pleural effusion with overlying atelectasis/consolidation. There are small mediastinal lymph nodes. Subcarinal lymph node measures 8 mm in short axis dimension. There are small hilar lymph nodes bilaterally. Anterior mediastinal lymph node measures 6.2 mm in short axis dimension. There are small axillary lymph nodes. No pneumothorax is identified. Examination is limited by respiratory motion. There is mild left lower lobe and right middle lobe atelectasis. No free air is seen in the abdomen or pelvis. There is a pigtail catheter within a hypodense hepatic lesion measuring 7.8 x 5 x 6.1 cm which is not significantly changed compared to prior. Adjacent to the pigtail catheter, there is a focal collection of contrast measuring 9 x 6.6 mm compatible with a pseudoaneurysm. A feeding vessel is seen. There is heterogeneous density within the lesion. There could have been bleeding into the lesion. No active hemorrhage is currently identified. Small hypodense lesions are again noted within the right lobe of the liver. Portal vein is patent. Gallbladder is surgically absent. Prominence of the common duct measures 1.2 cm. There is distention  of the cystic duct. There is dilatation of the pancreatic duct. Streak  artifact from embolization coils is seen. There is a linear tract in the right lobe of the liver from a prior drain located more anteriorly. Spleen is not enlarged. No adrenal mass is identified. There is a parapelvic cyst on the right. Tiny hypodense right renal lesions are too small to characterize. There is no evidence of hydronephrosis. Atherosclerotic plaque is seen in the aorta. There are small inguinal lymph nodes. Enlarged aortocaval lymph node measures 1.4 cm in short axis dimension. Enlarged peripancreatic lymph nodes are seen. There is no evidence of bowel obstruction. There is colonic diverticulosis. There is a moderate amount of colonic stool. Terminal ileum is unremarkable. There is no evidence of acute appendicitis. There are postsurgical changes of the bowel in the left abdomen. A small lymph node is seen adjacent to the distal esophagus. Bladder is mildly distended. Uterus is anteverted. There is a calcified uterine fibroid. There is Heterogeneity of the endometrial stripe in the lower uterine body with dilatation of the uterine cavity versus endometrial thickening near the uterine fundus measuring 1.2 cm in thickness. Calcific densities in the pelvis likely represent phleboliths. No free fluid is seen in the pelvis. Degenerative changes are seen in the spine. There is a fat-containing ventral hernia.     7.8 x 5 x 6.1 cm hypodense lesion in the right lobe of the liver is not significantly changed compared to prior. A pigtail catheter is seen within the lesion. This could represent a necrotic mass with superimposed infection. There is internal density within the lesion which could represent sequelae of a bleed into the lesion. There is a 9 x 6.6 mm pseudoaneurysm located adjacent to the pigtail catheter. Interventional radiology consultation recommended. Other small hypodense hepatic lesions are again noted. Linear tract from a  previous pigtail catheter is seen within the right lobe of the liver. Dilatation of the pancreatic duct is again noted. Prominence of the common duct measures 1.2 cm. Enlarged peripancreatic and aortocaval lymph nodes are again noted. Parapelvic right renal cyst. Tiny hypodense right renal lesions are too small to characterize. Atherosclerotic plaque. Small to moderate right pleural effusion with overlying atelectasis/consolidation. Colonic diverticulosis. Moderate amount of colonic stool. Calcified uterine fibroid. Heterogeneity of the endometrial stripe in the lower uterine body with dilatation of the uterine cavity versus endometrial thickening near the uterine fundus measuring 1.2 cm in thickness. Further evaluation with pelvic ultrasound is recommended. This can be seen in the uterine malignancy. Fat-containing ventral hernia. Borderline prominent subcarinal lymph node is nonspecific.     Ct-drain-liver Abscess-cyst    Result Date: 11/27/2018 11/24/2018 9:02 AM HISTORY/REASON FOR EXAM:  current drain pulled out of abscess, needs to be repositioned. Moderate sedation was administered with continuous monitoring of the patient under the direct supervision of  Medication: 100 mcg of fentanyl and 2 mg of Versed Total sedation time: 30 minutes The biopsy/drainage was done utilizing low dose optimization CT techniques including auto modulation for  imaging and low mA CT/fluoroscopy mode for the procedure. TECHNIQUE/EXAM DESCRIPTION AND NUMBER OF VIEWS:  After the informed consent the patient was placed on the angiographic table and supine position. Localization CT scan demonstrates a low-density area in the right lobe of the liver. The previously placed catheter was migrated upwards. The previously placed catheter was. After injecting lidocaine, a 17-gauge introducer needle was advanced into the fluid collection. After confirming the needle position, a wire was introduced. After dilating the tract,  a new 10 Mongolian pigtail catheter was advanced and placed with its loop in the fluid collection. The tube is attached to the suction bulb. The patient tolerated procedure without any immediate complication.     1.  Successful CT-guided drainage of liver abscess. 2.  The previously placed migrated pigtail drainage catheter was removed.    Ct-drain-liver Abscess-cyst    Result Date: 11/13/2018 11/13/2018 4:01 PM HISTORY/REASON FOR EXAM:  liver abscess. Moderate sedation was administered with continuous monitoring of the patient under the direct supervision of  Medications: 2 mg of Versed and 100 mcg of fentanyl Total sedation time: 30 minutes The biopsy/drainage was done utilizing low dose optimization CT techniques including auto modulation for  imaging and low mA CT/fluoroscopy mode for the procedure. TECHNIQUE/EXAM DESCRIPTION AND NUMBER OF VIEWS:  After the informed consent the patient was placed on the CT table on supine position. Localization CT scan demonstrates hypodense area in the right lobe of the liver. The skin over the lesion was prepped. Subsequently after injecting lidocaine a 17-gauge needle was advanced into the hypodense area. Dark-colored fluid was aspirated. A wire was released. Subsequently a 10 Mongolian guiding catheter was advanced and placed with its loop in the liver. An approximately 100 mL of fluid was drained. Sample material was sent to the microbiology. The patient tolerated the procedure without any immediate complication.     Successful CT-guided drain placement in the right lobe liver necrosis/abscess.    Ct-head W/o    Result Date: 11/19/2018 11/19/2018 9:49 PM HISTORY/REASON FOR EXAM:  Acute onset of altered mental status and incontinence. History of ampullary region tumor. TECHNIQUE/EXAM DESCRIPTION AND NUMBER OF VIEWS: CT of the head without contrast. The study was performed on a [employer] CT scanner. Contiguous 5 mm axial sections were obtained from the skull base  through the vertex. Up to date radiation dose reduction adjustments have been utilized to meet ALARA standards for radiation dose reduction. COMPARISON:  PET CT 7/12/2018 FINDINGS: The calvariae and skull base are unremarkable. There are no extraaxial fluid collections. There is a pattern of cerebral atrophy manifest as enlargement of sulcal markings and ventricular prominence.  The ventricular system and basal cisterns are otherwise unremarkable. There are areas of hypodensity in the white matter most consistent with small vessel ischemic change versus demyelination or gliosis. There are no hemorrhagic lesions. There are no mass effects or shift of midline structures. There is a small area of encephalomalacia in the LEFT cerebellum. This is unchanged. The brainstem and posterior fossa structures are otherwise unremarkable. The paranasal sinuses and mastoids in the field of view are unremarkable.     1. Cerebral atrophy. 2. White matter lucencies most consistent with small vessel ischemic change versus demyelination or gliosis. 3. Remote LEFT cerebellar infarction 4. Otherwise, Head CT without contrast with no acute findings. No evidence of acute cerebral infarction, hemorrhage or mass lesion.    Dx-chest-portable (1 View)    Result Date: 11/23/2018 11/23/2018 2:14 PM HISTORY/REASON FOR EXAM:  Pleural Effusion. Status post right thoracentesis TECHNIQUE/EXAM DESCRIPTION AND NUMBER OF VIEWS: Single portable view of the chest. COMPARISON: 11/21/2018 FINDINGS: Single portable view of the chest demonstrates a stable cardiac silhouette and mediastinal contours. Calcification is in the aortic arch. The right hemidiaphragm is elevated. There is mild atelectasis in the right lung base. No pneumothorax is identified. Pigtail catheter projects over the right upper quadrant. There are multiple surgical clips. A right PICC line remains in place.     Mild right basilar atelectasis.    Dx-chest-portable (1 View)    Result Date:  11/21/2018 11/21/2018 5:31 PM HISTORY/REASON FOR EXAM: Cough. Congestion for a week TECHNIQUE/EXAM DESCRIPTION AND NUMBER OF VIEWS: Single AP view of the chest. COMPARISON: November 12 FINDINGS: Hardware: Right PICC line tip overlies the cavoatrial junction Pigtail catheter overlies the elevated right hemidiaphragm, not fully visualized Lungs: Increasing perihilar opacity with some bronchial thickening noted. Minor fissure is also thickened. Pleura:  No pleural space process is seen. Heart and mediastinum: There is similar hilar and cardiac silhouette enlargement.     New interstitial opacity is worrisome for edema Hilar prominence could be from lymphadenopathy or pulmonary arterial hypertension Stable right hemidiaphragm elevation    Dx-chest-portable (1 View)    Result Date: 11/12/2018 11/12/2018 11:06 AM HISTORY/REASON FOR EXAM:  Sepsis. TECHNIQUE/EXAM DESCRIPTION AND NUMBER OF VIEWS: Single portable view of the chest. COMPARISON: 10/25/2018 FINDINGS: There is no evidence of focal consolidation or evidence of pulmonary edema. There is no evidence of pleural effusion. The heart is normal in size.     No pulmonary consolidation.    Ir-visceral Angiogram - Selective    Result Date: 11/30/2018 11/29/2018 6:29 PM HISTORY/REASON FOR EXAM:  Hepatic artery pseudoaneurysm Moderate sedation was administered with continuous monitoring of the patient under the direct supervision of  Medication: 4 mg of  Versed and 100 mcg of fentanyl Contrast: 90 mL of Omnipaque 300 Total fluoroscopic time: 12.7 minutes Total number of images stored in the PACS: 224 TECHNIQUE/EXAM DESCRIPTION AND NUMBER OF VIEWS: After the informed consent the patient was placed on the fluoroscopy table on supine position. The skin over the right groin was prepped. After injecting lidocaine a 5 Maltese vascular sheath was introduced. Subsequently, a diagnostic catheter was advanced into the celiac trunk. Celiac angiogram was performed. It  demonstrated a pseudoaneurysm from the right hepatic branch. Subsequently a microcatheter was successfully advanced into the branch supplying the pseudoaneurysm. Microcatheter angiogram confirms the pseudoaneurysm and the catheter position. Subsequently metallic fiber coils were used to embolize the branch supplying the segment aneurysm. The final angiogram demonstrates occluded branch with stagnant flow within the pseudoaneurysm. The catheters were removed. The right superficial femoral angiogram does not demonstrate any significant stenosis. The right femoral sheath was removed and hemostasis was obtained with Angio-Seal device.     1.  Right hepatic artery pseudoaneurysm. 2.  Successful embolization of branch supplying the pseudoaneurysm.    Us-thoracentesis Puncture Right    Result Date: 11/23/2018 11/23/2018 1:15 PM HISTORY/REASON FOR EXAM:  Fluid collection TECHNIQUE/EXAM DESCRIPTION: Ultrasound-guided thoracentesis. Indication:  RIGHT pleural fluid collection. COMPARISON:  None PROCEDURE:     Informed consent was obtained. A timeout was taken. A right pleural effusion was localized with real-time ultrasound guidance. The right posterior chest wall was prepped and draped in a sterile manner. Following local anesthesia with 1% lidocaine, a 5 [place] Yueh pigtail catheter was advanced into the pleural space with trocar technique and pleural fluid was drained. The patient tolerated the procedure well without evidence of complication. A post thoracentesis chest radiograph is forthcoming. FINDINGS: Fluid was  sent to the laboratory. Fluid character: straw colored     1. Ultrasound guided right sided diagnostic thoracentesis. 2. 450 mL of fluid withdrawn.    Ir-picc Line Placement    Result Date: 11/21/2018  HISTORY/REASON FOR EXAM:   PICC placement. TECHNIQUE/EXAM DESCRIPTION AND NUMBER OF VIEWS:   PICC line insertion with ultrasound guidance.  The procedure was performed using maximal sterile barrier technique  including sterile gown, mask, cap, and donning of sterile gloves following appropriate hand hygiene and/or sterile scrub. Patient skin site was prepped with 2% Chlorhexidine solution. FINDINGS:  PICC line insertion with Ultrasound Guidance was performed by qualified nursing staff without the assistance of a Radiologist. PICC positioning appropriateness confirmed by 3CG technology; chest xray only needed in the instance 3CG unable to confirm placement.              Ultrasound-guided PICC placement performed by qualified nursing staff as above.     Ec-echocardiogram Complete W/o Cont    Result Date: 2018  Transthoracic Echo Report Echocardiography Laboratory CONCLUSIONS No prior study is available for comparison. Normal transthoracic echocardiogram. JANELLE BALDERAS Exam Date:         2018                    08:43 Exam Location:     Inpatient Priority:          Routine Ordering Physician:        ADAMA STOCKTON Referring Physician: Sonographer:               Maisha Leavitt RDCS Age:    73     Gender:    F MRN:    [id number] :    1945 BSA:    1.64   Ht (in):    60     Wt (lb):    148 Exam Type:     Complete Indications:     Endocarditis and heart valve disorders in diseases                  classified elsewhere ICD Codes:       I39 CPT Codes:       01160 BP:   111    /   77     HR:   79 Technical Quality:       Fair MEASUREMENTS  (Male / Female) Normal Values 2D ECHO LV Diastolic Diameter PLAX        3.5 cm                4.2 - 5.9 / 3.9 - 5.3 cm LV Systolic Diameter PLAX         2.1 cm                2.1 - 4.0 cm IVS Diastolic Thickness           1 cm                  LVPW Diastolic Thickness          1.1 cm                LVOT Diameter                     1.6 cm                RA Diameter                       2.9 cm                Estimated LV Ejection Fraction    65 %                  LV Ejection Fraction MOD BP       69.8 %                >= 55  % LV Ejection Fraction MOD 4C       61.7 %                 LV Ejection Fraction MOD 2C       80.8 %                LA Volume Index                   10.7 cm³/m²           16 - 28 cm³/m² IVC Diameter                      1.7 cm                M-MODE Aortic Root Diameter MM           2.4 cm                DOPPLER AV Peak Velocity                  1.4 m/s               AV Peak Gradient                  8 mmHg                AV Mean Gradient                  3.4 mmHg              LVOT Peak Velocity                0.78 m/s              AV Area Cont Eq vti               1.3 cm²               Mitral E Point Velocity           0.52 m/s              Mitral E to A Ratio               0.53                  MV Pressure Half Time             64.3 ms               MV Area PHT                       3.4 cm²               MV Deceleration Time              222 ms                TR Peak Velocity                  235 cm/s              PV Peak Velocity                  0.84 m/s              PV Peak Gradient                  2.8 mmHg              RVOT Peak Velocity                0.69 m/s              * Indicates values subject to auto-interpretation LV EF:  65    % FINDINGS Left Ventricle Normal left ventricular size and systolic function. Normal left ventricular wall thickness. Normal diastolic function. Normal regional wall motion. Left ventricular ejection fraction is visually estimated to be 65%. Right Ventricle Normal right ventricular size and systolic function. Right Atrium Normal right atrial size. Normal inferior vena cava size and inspiratory collapse. Left Atrium Normal left atrial size. Mitral Valve Structurally normal mitral valve. Mild mitral regurgitation. No mitral stenosis. Aortic Valve Structurally normal aortic valve without significant stenosis or regurgitation. Tricuspid Valve Structurally normal tricuspid valve. Mild tricuspid regurgitation. Right atrial pressure is estimated to be 3 mmHg. Estimated right ventricular systolic pressure  is 35 mmHg. Pulmonic Valve  "Structurally normal pulmonic valve without significant stenosis or regurgitation. Pericardium Normal pericardium without effusion. Aorta The aortic root is normal. Gurwinder Mayberry MD (Electronically Signed) Final Date:     28 November 2018                 09:42      Micro:  Results     Procedure Component Value Units Date/Time    BLOOD CULTURE [[id number]] Collected:  11/29/18 1200    Order Status:  Completed Specimen:  Blood from Peripheral Updated:  11/30/18 0904     Significant Indicator NEG     Source BLD     Site PERIPHERAL     Blood Culture No Growth    Note: Blood cultures are incubated for 5 days and  are monitored continuously.Positive blood cultures  are called to the RN and reported as soon as  they are identified.      Narrative:       Contact  Per Hospital Policy: Only change Specimen Src: to \"Line\" if  specified by physician order.    BLOOD CULTURE [[id number]] Collected:  11/29/18 1246    Order Status:  Completed Specimen:  Blood from Peripheral Updated:  11/30/18 0904     Significant Indicator NEG     Source BLD     Site PERIPHERAL     Blood Culture No Growth    Note: Blood cultures are incubated for 5 days and  are monitored continuously.Positive blood cultures  are called to the RN and reported as soon as  they are identified.      Narrative:       Contact  Per Hospital Policy: Only change Specimen Src: to \"Line\" if  specified by physician order.    AFB CULTURE [[id number]] Collected:  11/23/18 1330    Order Status:  Completed Specimen:  Body Fluid Updated:  11/29/18 0804     Significant Indicator NEG     Source BF     Site Thoracentesis Fluid     AFB Culture Culture in progress.     AFB Smear Results No acid fast bacilli seen.    Narrative:       RT pl eff 11/23/2018  14:50    FLUID CULTURE W/GRAM STAIN [[id number]] Collected:  11/23/18 1330    Order Status:  Completed Specimen:  Body Fluid Updated:  11/29/18 0804     Gram Stain Result Few WBCs.  No organisms seen.       Significant Indicator NEG " "    Source BF     Site Thoracentesis Fluid     Culture Result Bdf No growth at 5 days    Narrative:       RT pl eff 11/23/2018  14:50    FUNGAL CULTURE [[id number]] Collected:  11/23/18 1330    Order Status:  Completed Specimen:  Body Fluid Updated:  11/29/18 0804     Significant Indicator NEG     Source BF     Site Thoracentesis Fluid     Fungal Culture Culture in progress.    Narrative:       RT pl eff 11/23/2018  14:50          Assessment:  Superinfected necrotic mass of liver versus liver abscess  Bacteroides bacteremia, completed treatment  Candida albicans candidemia, completed treatment  Recent E. coli bacteremia  Ampullary carcinoma of the pancreas, stage IV    Plan:     Bloody drainage from abdominal drain- 2/2  pseudoaneurysm.  - Reports new abdominal pain overnight but now improved   - CT C/A/P on 11/29 with: \"Adjacent to the pigtail catheter, there is a focal collection of   contrast measuring 9 x 6.6 mm compatible with a pseudoaneurysm. A feeding vessel is seen. 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  Per CT on 11/29 this could be a necrotic mass with superimposed infection.      - CT scan 11/12 show with area of necrosis/hemorrhage, s/p IR drainage on 11/13/2018-- Cultures +  lactobacillus, VRE and Candida albicans and strep viridans  - Underwent CT scan on 11/22/2018- size of the abscess has diminished measuring 75 x 48 from 88 x 64 mm but abscess still present and contains air with new mild left hepatic pneumobilia  - Underwent another drain placement on 11/24/2018 as catheter was not correctly posittioned   - CT A/P on 11/29 due to concern for bleeding: \"7.8 x 5 x 6.1 cm hypodense lesion in the " "right lobe of the liver is not significantly changed compared to prior. A pigtail catheter is seen within the lesion. This could represent a necrotic mass with superimposed infection. There is internal density within the lesion which could represent sequelae of a bleed into the lesion.\" Catheter within the lesion and not significantly changed from last CT.       REC: Continue IV daptomycin 10mg/kg q24h, PO fluconazole 400 mg daily, and IV Zosyn 3.375 g every 8 hours (E coli with resistance to ampicillin)   - plan had been to given at least 4 weeks from prior CT abdomen on 11/22 and then will need to re-image prior to stopping - however may be superinfected necrotic mass as opposed to abscess  -Drain removed 12/3, End antibiotics on 12/20/18 pending CT, repeat CT in the coming weeks    Bacteremia, completed therapy  Blood cultures + Bacteroides  Repeat blood cultures x2 are negative from 11/15/2018  -has completed abx course       Candidemia- Candida albicans, completed therapy  Repeat blood cultures x2 are negative from 11/15/2018  Changed to p.o. Diflucan  - see abx above   - TTE to rule-out IE in setting of candidemia - no vegetations seen    - has completed a candidemia abx course     Recent E. coli bacteremia  -From 10/25  -Repeat blood cultures from 10/27 were negative, being covered with the current antibiotic therapy that she is on    Dry cough   - Unlikely bacterial infection as on broad spectrum abx   -Monitor for now     Pleural effusions  - Thora 11/23, cx with no growth   - The CT scan showed pleural effusion hence underwent thoracentesis on 11/23/2018.  2752 WBCs with 46% polys.  Gram stain and cultures negative.   - CT chest on 11/29 \"moderate large right pleural effusion with overlying atelectasis/consolidation\"  - Continue abx as above but if pt develops new fevers or respiratory distress would need repeat thoracentesis with cultures      Ampullary carcinoma of the pancreas stage IV  Contributory " factor to above   -Holding on chemo until infection resolved      Prognosis  Guarded     DW primary team.      ID will follow.  Please call with questions.

## 2018-12-04 NOTE — PROGRESS NOTES
Hospital Medicine Daily Progress Note    Date of Service  12/3/2018    Chief Complaint  73 y.o. 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 Cedeno initially diagnosed 7/2016 by ERCP with stent and decompression/biopsy, attempted Whipple 8/2016 by Dr. Cormier, however had too many mets to liver, s/p chemotherapy was found to have repeat bacteremia.    Hospital Course    Patient admitted and started again on IV antibitoics.  She was found to have a liver abscess and required drainage, cultures from this have shown VRE, candida and lactobacillus acidophilus and blood cultures showing candida.  ID has been managing antibiotics and antifungals.       Interval Problem Update  11/20 Patient had episode of incontinence overnight along with confusion and new difficulty following commands.  CT head was done showing no acute findings with suspicion of possible s/e of pain medication.  Her mentation has improved but she continues to have urinary and bowel incontinence, likely with fatigue and now frequent need for toileting due to her antibiotics and IV hydration.  11/21 Patient's cough persisting but she remains afebrile.  I ordered PICC line for placement as patient will need continued antibiotics for 6 week duration, final blood cultures have resulted negative.  CXR ordered and consistent with interstitial edema - will start IV lasix to see if this improves her oxygen saturation and cough. WBC continues to drop.  11/22 Patient states she is feeling slightly better today, having to urinate more often since starting diuretic but lungs are sounding slightly better.  CT abdomen completed and shows abscess is smaller but still present and drain no longer in place.  She also now has a moderate right pleural effusion.  I've requested IR to reposition drain in liver abscess and to also drain what the pleural effusion.  Patient requests a call  to be made to her son, Sea.  11/23 Patient had thoracentesis without issue and is breathing slightly better, delay in repositioning of drain due to recent dose of eliquis and need to be out of system for 48 hours to lessen the chance of bleeding.  Repeat culture on fluid ordered.  11/24 Patient had successful placement of drain in liver fluid collection, drainage in JACQUELINE bulb is dark brown without evidence of purulence.  Culture from fluid collected.  Son at bedside when evaluated and he remarked she appears much more awake today and breathing much easier than on days past - explained the need for diuresis and the drainage of the lung have likely contributed to improvement.  11/25 Patient feeling well, denies new complaints.  Will cut back on lasix to once daily and IV to PO.  She is currently on Unasyn and final treatment plan still pending decision by ID.  Cultures from thoracentesis showing no growth.  11/26 Patient states she has had good urinary continence since decreasing lasix to once daily.  No acute changes, ID developing duration of antibiotic treatment.  Radiation therapy to start tomorrow.  11/27:  Ordered TTE per ID recs since VRE, Ecoli found on liver abscess culture.  Explained to patient that since has an active liver abscess, will need to hold on chemo and XRT until antibiotics have adequately eliminated any infection.  Tentative stop date 12/20 with repeat CT abdomen to ensure resolution.  Last CT abdomen 11/22 with abscess size 75mm x 48mm down from prior size 88 x 64mm.  Doing well, no abdominal pain or distention noted on exam, appetite good, afebrile.  11/28:  Normal echo EF 65%.  No abdominal pain, eating well, no fevers.  11/29:  Repeat CT ordered since bloody drainage seen from JACQUELINE drain RUQ.  Pseudoaneurysm seen from hepatic artery encircling drain.  IR consult, Dr. Higginbotham removed drain.  dc'd eliquis bid.  Concerning abscess no change in size.  Will d/w Dr. Yang since may represent necrotic  mass from ampullary cancer and require surgical removal.  BCs ordered since increase in wbc 8 to 17K, no fever, pain in RUQ.   11/30:  Spoke with Dr. Yang regarding necrotic liver mass, no change in size on recent CT abdomen/pelvis.  Has recommended to dc drain if <30 cc output daily.  Recommend to start chemo, XRT once drain removed.  Has dried blood in JACQUELINE drain s/p IR embolization of hepatic artery pseudoaneurysm.   Overall, patient does not have any abdominal pain or distention.  12/1:  Hg dropped 8.9 to 7.3 overnight likely due to JACQUELINE drain hepatic artery pseudoaneurysm bleed.  Drain with no further BRB, but dark fluid seen. Ordered Hg q 6 x2 stable, transfuse for Hg <7.  Orders placed, patient states she would accept blood transfusion, type and cross.  dc'd lasix and potassium since patient's intake is minimal last few days and she appears dehydrated.  JACQUELINE drain with 35cc last 24 hours.  Spoke to patient and daughter at bedside addressing full code status, want family meeting to decide.  12/2: stable, daughter at bedside, told her she can talk to Dr. Cedeno tomorrow for his plan on her pancreatic cancer and discuss code status.  She states that she has noted her mother eats the food she brings in to her, not so much the hospital food.  No further bleeding in JACQUELINE drain.  Stable hg.  12/3:  Drop in Hg to 6.8 transfused 1 unit PRBCs, stable VSS. No further bleeding from JACQUELINE drain, only 20cc drainage in last 24 hours.  Order to dc drain as per Dr. Yang.  Continue IV abx per ID team.  Spoke with daughter reassuring her that I have asked Dr. Cedeno to review case, recent events and to give her an update on the POC.  He states her pancreatic cancer has been very responsive in the last 4 years.  Hold on chemoradiation until the necrotic mass/infection/bacteremia/abx finished per ID in ~4 weeks.    Consultants/Specialty  ID - [facility]  General surgeon Dr. Trey Cedeno    Code  Status  full    Disposition  PT rec home with 24/7 supervision, OT rec HH, no prior home O2.  Has family, lives with son.    Review of Systems  Review of Systems   Constitutional: Negative for chills, fever and malaise/fatigue.   HENT: Negative for congestion and sore throat.    Eyes: Negative for blurred vision and photophobia.   Respiratory: Positive for cough (slightly better). Negative for shortness of breath and wheezing.    Cardiovascular: Negative for chest pain, claudication and leg swelling.   Gastrointestinal: Negative for abdominal pain, constipation, diarrhea, heartburn, nausea and vomiting.   Genitourinary: Negative for dysuria and hematuria.   Musculoskeletal: Negative for joint pain and myalgias.   Skin: Negative for itching and rash.   Neurological: Positive for weakness. Negative for dizziness, sensory change, speech change and headaches.   Psychiatric/Behavioral: Negative for depression. The patient is not nervous/anxious and does not have insomnia.         Physical Exam  Temp:  [-13.5 °C (7.7 °F)-36.9 °C (98.5 °F)] 36.3 °C (97.4 °F)  Pulse:  [] 95  Resp:  [16-18] 18  BP: (115-137)/(54-78) 137/75    Physical Exam   Constitutional: She is oriented to person, place, and time. She appears well-developed and well-nourished. No distress.   HENT:   Head: Normocephalic and atraumatic.   Eyes: Conjunctivae are normal. No scleral icterus.   Neck: Neck supple. No JVD present.   Cardiovascular: Normal rate, regular rhythm and normal heart sounds.  Exam reveals no gallop and no friction rub.    No murmur heard.  Pulmonary/Chest: Effort normal. No respiratory distress. She has no rales. She exhibits no tenderness.   Abdominal: Soft. Bowel sounds are normal. She exhibits no distension. There is no guarding.   Musculoskeletal: She exhibits no edema or tenderness.   Lymphadenopathy:     She has no cervical adenopathy.   Neurological: She is alert and oriented to person, place, and time. No cranial nerve  deficit.   Skin: Skin is warm and dry. She is not diaphoretic. No erythema. No pallor.   Psychiatric: She has a normal mood and affect. Her behavior is normal.   Nursing note and vitals reviewed.      Fluids    Intake/Output Summary (Last 24 hours) at 12/03/18 2039  Last data filed at 12/03/18 1550   Gross per 24 hour   Intake             1236 ml   Output              220 ml   Net             1016 ml       Laboratory  Recent Labs      12/01/18   0130 12/01/18   1430  12/02/18   0145  12/03/18   0300   WBC  9.7   --    --   9.9  7.6   RBC  2.55*   --    --   3.29*  2.43*   HEMOGLOBIN  7.3*   < >  7.3*  9.4*  6.8*   HEMATOCRIT  23.1*   --    --   29.6*  22.3*   MCV  90.6   --    --   90.6  90.1   MCH  28.6   --    --   28.6  28.0   MCHC  31.6*   --    --   31.5*  31.1*   RDW  59.0*   --    --   58.5*  58.2*   PLATELETCT  219   --    --   324  333   MPV  10.8   --    --   10.8  10.1    < > = values in this interval not displayed.     Recent Labs      12/01/18 0130 12/02/18   0145  12/03/18   0300   SODIUM  132*  133*  135   POTASSIUM  3.9  4.2  3.7   CHLORIDE  108  106  108   CO2  20  19*  20   GLUCOSE  99  109*  91   BUN  9  9  7*   CREATININE  0.55  0.71  0.57   CALCIUM  7.7*  8.2*  7.8*                   Imaging  DX-CHEST-PORTABLE (1 VIEW)   Final Result      1.  Persistent hypoinflation with elevation of RIGHT hemidiaphragm.   2.  Minimal worsening RIGHT midlung atelectasis.   3.  Possible minimal RIGHT pleural effusion.      IR-VISCERAL ANGIOGRAM - SELECTIVE   Final Result      1.  Right hepatic artery pseudoaneurysm.   2.  Successful embolization of branch supplying the pseudoaneurysm.      CT-CHEST,ABDOMEN,PELVIS WITH   Final Result      7.8 x 5 x 6.1 cm hypodense lesion in the right lobe of the liver is not significantly changed compared to prior. A pigtail catheter is seen within the lesion. This could represent a necrotic mass with superimposed infection. There is internal density    within the lesion  which could represent sequelae of a bleed into the lesion. There is a 9 x 6.6 mm pseudoaneurysm located adjacent to the pigtail catheter. Interventional radiology consultation recommended.      Other small hypodense hepatic lesions are again noted.      Linear tract from a previous pigtail catheter is seen within the right lobe of the liver.      Dilatation of the pancreatic duct is again noted.      Prominence of the common duct measures 1.2 cm.      Enlarged peripancreatic and aortocaval lymph nodes are again noted.      Parapelvic right renal cyst. Tiny hypodense right renal lesions are too small to characterize.      Atherosclerotic plaque.      Small to moderate right pleural effusion with overlying atelectasis/consolidation.      Colonic diverticulosis. Moderate amount of colonic stool.      Calcified uterine fibroid.      Heterogeneity of the endometrial stripe in the lower uterine body with dilatation of the uterine cavity versus endometrial thickening near the uterine fundus measuring 1.2 cm in thickness. Further evaluation with pelvic ultrasound is recommended. This    can be seen in the uterine malignancy.      Fat-containing ventral hernia.      Borderline prominent subcarinal lymph node is nonspecific.         EC-ECHOCARDIOGRAM COMPLETE W/O CONT   Final Result      CT-DRAIN-LIVER ABSCESS-CYST   Final Result      1.  Successful CT-guided drainage of liver abscess.   2.  The previously placed migrated pigtail drainage catheter was removed.      US-THORACENTESIS PUNCTURE RIGHT   Final Result      1. Ultrasound guided right sided diagnostic thoracentesis.      2. 450 mL of fluid withdrawn.      DX-CHEST-PORTABLE (1 VIEW)   Final Result      Mild right basilar atelectasis.      CT-ABDOMEN W/O   Final Result      Hepatic dome abscess pigtail catheter has pulled out and now terminates just deep to the thoracic cage. Despite this the size of the abscess has diminished measuring 75 x 48 from 88 x 64 mm      New  mild left hepatic pneumobilia      Ampullary region mass is difficult to precisely measure but does appear to exert some mass effect upon the third portion of the duodenum. No bowel obstruction is detected.      New medium-sized right ovarian pleural effusion does not have loculation features but there is subsegmental atelectasis      DX-CHEST-PORTABLE (1 VIEW)   Final Result      New interstitial opacity is worrisome for edema      Hilar prominence could be from lymphadenopathy or pulmonary arterial hypertension      Stable right hemidiaphragm elevation      IR-PICC LINE PLACEMENT   Final Result                  Ultrasound-guided PICC placement performed by qualified nursing staff as    above.          CT-HEAD W/O   Final Result      1. Cerebral atrophy.   2. White matter lucencies most consistent with small vessel ischemic change versus demyelination or gliosis.   3. Remote LEFT cerebellar infarction   4. Otherwise, Head CT without contrast with no acute findings. No evidence of acute cerebral infarction, hemorrhage or mass lesion.      CT-DRAIN-LIVER ABSCESS-CYST   Final Result      Successful CT-guided drain placement in the right lobe liver necrosis/abscess.      CT-ABDOMEN-PELVIS WITH   Final Result      1.  Interval significant increase in size of low-attenuation area in the dome of the right lobe of liver which contains air. This may represent necrosis or hemorrhage following therapy. Stable appearance of other smaller low-attenuation areas in the    right lobe of the liver without evidence of progression of disease in these areas of metastasis. Differential diagnosis would include post therapy infection.      2.  Stable masslike density in the region of the ampulla of Vater consistent with primary carcinoma. Current dimensions are 3.1 x 2.6 cm.      3.  Persistent low-attenuation areas in the pancreatic head and proximal body which may represent secondary pancreatitis.      4.  Stable upper retroperitoneal  adenopathy.      5.  Small umbilical hernia containing fat.      DX-CHEST-PORTABLE (1 VIEW)   Final Result      No pulmonary consolidation.           Assessment/Plan  Liver abscess- (present on admission)   Assessment & Plan    Drain replaced 11/24/18 as previous one no longer in connection to fluid collection on CT, large amount drainage again.    Cultures enterococcus [VRE]  ID following   Continue daptomycin IV, unasyn iv, fluconozole per ID stop date 12/20 with repeat CT abdomen/pelvis at that time.  11/30:  Spoke with Dr. Yang regarding necrotic liver mass, no change in size on recent CT abdomen/pelvis.  Has recommended to dc drain if <30 cc output daily.  Recommend to start chemo, XRT once drain removed.  Has dried blood in JACQUELINE drain s/p IR embolization of hepatic artery pseudoaneurysm.   Overall, patient does not have any abdominal pain or distention.  12/3:  Pulled JACQUELINE drain since 20cc output in last 24 hours.  Per Dr. Cedeno, to hold on any XRT or chemotherapy until after infection has cleared and off abx per ID ~4 weeks duration.       Bacteremia- (present on admission)   Assessment & Plan    Received treatment for E. coli bacteremia in early November, her port was removed  Admitted with sepsis  Blood cultures from 11/12/2018 are growing lactobacillus, Candida albicans and strep viridans. Liver abscess cultures from 11/13/2018 are positive for VRE.  Currently on unasyn/PO diflucan ID following.     PICC placed 11/21/18  IV daptomycin, IV unasyn, fluconazole tentative stop date 12/20 per ID, then repeat CT to see if further IV abx needed.  Repeat 11/24 CT guided liver abscess pigtail catheter placed after initial was found to be displaced.  11/28:  TTE negative for endocarditis.  EF 65%.  11/29:  BCs x2 negative.             Sepsis (HCC)- (present on admission)   Assessment & Plan    This is sepsis (without associated acute organ dysfunction).   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  11/29:  Review of liver abscess, no change in size, may be necrotic cancerous mass per radiologist today.  Will dw Dr. Yang if need surgical excision planned.  11/30:  Per Dr. Yang review of CT and exam of patient, if JACQUELINE drain <30cc output in 24 hours, can dc drain.  Once drain dc'd should resume chemo and XRT since liver lesion is necrotic mass, not purulent fluid.       Fungemia   Assessment & Plan    Mycofungin   ID following        Deep vein thrombosis (CMS-HCC) [I82.409]- (present on admission)   Assessment & Plan    11/29  dc'd eliquis due to bleeding into JACQUELINE drain from pseudoaneurysm.  Last DVT 4/4/17.  No need to continue eliquis.       Advance care planning   Assessment & Plan    12/2:  Daughter at bedside, gave updates and asked with patient present to discuss code status as a family.  Daughter understands since they recently went through this with patient's .  12/3:  Dr. Cedeno met with family, continue full code.  Has responsive pancreatic cancer.     Iron deficiency anemia due to chronic blood loss- (present on admission)   Assessment & Plan    12/1:  Ordered iron studies.  Had dc'd eliquis on 11/29 with finding of hepatic artery pseudoaneurysm and bleed.  Blood in JACQUELINE drain has subsided.  Hg trending down, HG q 6 hours, transfuse for Hg <7.  Type and cross ordered.  12/2:  iron 15, ferritin 324.  Ordered IV venofer x 5 days. Stable Hg.     Pseudoaneurysm following procedure (HCC)   Assessment & Plan    11/29:  Bloody JACQUELINE drain of liver abscess.  Ordered CT abdomen/pelvis/chest:  Pseudoaneurysm of hepatic artery encircling drain.  IR consult to Dr. Higginbotham who embolized artery.  dc'd eliquis 5mg bid.  12/2:  No further BRB in drain since 11/29 after stopped eliquis and embolization.  12/3:  JACQUELINE drain pulled  since 20cc output in 24 hours.  Hopefully avoid another bleeding complication.         Pleural effusion- (present on admission)   Assessment & Plan    Thoracentesis 11/23/18 - 450cc removed - cytology and studies pending       Cough   Assessment & Plan    Non-productive, CXR concerning for edema  Low likelihood of infection given broad spectrum antibiotics for VRE  Improvement with diuresis  CT shows pleural effusion right lung - thoracentesis ordered in conjunction with drain replacement for liver  11/27:  No further cough, lungs CTA b/l.           Lactic acidosis- (present on admission)   Assessment & Plan    Improved with fluid resuscitation and treatment of infection            VTE prophylaxis: SCDs

## 2018-12-04 NOTE — CARE PLAN
Problem: Communication  Goal: The ability to communicate needs accurately and effectively will improve  Outcome: PROGRESSING AS EXPECTED  Discussed POC for night with patient and family at bedside. Encouraged to call for assistance when needed.     Problem: Safety  Goal: Will remain free from falls  Outcome: PROGRESSING AS EXPECTED  Bed in lowest position. Call light within reach. Pt verbalized/demonstrated how to reach staff when needed. Pt belongings within reach. Bed alarm on. Night light provided.

## 2018-12-04 NOTE — CARE PLAN
Problem: Nutritional:  Goal: Achieve adequate nutritional intake  Patient will consume 50% of meals.   Outcome: PROGRESSING AS EXPECTED  PO ~50% of meals.   Pt continues to receive food from home to encourage PO.  Nutrition Representative to continue to visit patient for meal choices.

## 2018-12-04 NOTE — PROGRESS NOTES
"Patient A/O x 4. Sitting up in bed visiting with family. No complaints of pain or discomfort. Discussed POC for night with patient at bedside. Patient or family has no questions or concerns at this time. JACQUELINE drain was pulled today. Site is [facility]. Patient received 1 unit of RBC today and tolerated well. Call light and personal belongings within reach. Hourly rounding and contact precautions in place.     /75   Pulse 95   Temp 36.3 °C (97.4 °F) (Oral)   Resp 18   Ht 1.549 m (5' 0.98\")   Wt 58.6 kg (129 lb 3 oz)   SpO2 97%   Breastfeeding? No   BMI 24.42 kg/m²     "

## 2018-12-05 NOTE — DISCHARGE PLANNING
Anticipated Discharge Disposition:SNF/Outpt Infusion/HHC    Action: Reviewed continued POC in IDT rounds.  PICC line placed. Pt receiving IV Daptomycin Q 24 hrs, and IV Zosyn, Q 8 hrs.  End date for abx is 12/20/18, with anticipated CT. Will explore outpt options r/t IV Abx need.     Barriers to Discharge: unkown    Plan: Will meet with pt to identify discharge planning options.

## 2018-12-05 NOTE — PROGRESS NOTES
"[facility] Medicine Daily Progress Note    Date of Service  12/4/2018    Chief Complaint  73 y.o. female admitted 11/12/2018 with fevers and chills in setting of metastatic ampullary carcioma.    Hospital Course    Patient admitted and started again on IV antibitoics.  She was found to have a liver abscess and required drainage, cultures from this have shown VRE, candida and lactobacillus acidophilus and blood cultures showing candida.  ID has been managing antibiotics and antifungals.  Patient had JACQUELINE drain replaced as no longer in contact with liver fluid collection, developed bloody drainage on 11/29 and pseudoaneurysm found, embolization completed and no further bleeding.  Drainage decreased and JACQUELINE drain discontinued on 12/3.       Interval Problem Update  12/4 Patient states she feels \"fine\".  She does have cough but is mild and not similar to previous cough related to pleural effusion.  Abx to continue through 12/20/18 and chemo and rads to start thereafter.      Consultants/Specialty  ID - Leela/Honorio    Code Status  full    Disposition  tbd    Review of Systems  Review of Systems   Constitutional: Negative for chills, fever and malaise/fatigue.   HENT: Negative for congestion and sore throat.    Eyes: Negative for blurred vision and photophobia.   Respiratory: Positive for cough (mild). Negative for shortness of breath and wheezing.    Cardiovascular: Negative for chest pain, claudication and leg swelling.   Gastrointestinal: Negative for abdominal pain, constipation, diarrhea, heartburn, nausea and vomiting.   Genitourinary: Negative for dysuria and hematuria.   Musculoskeletal: Negative for joint pain and myalgias.   Skin: Negative for itching and rash.   Neurological: Positive for weakness. Negative for dizziness, sensory change, speech change and headaches.   Psychiatric/Behavioral: Negative for depression. The patient is not nervous/anxious and does not have insomnia.         Physical Exam  Temp:  [36.1 " °C (97 °F)-36.7 °C (98 °F)] 36.7 °C (98 °F)  Pulse:  [] 86  Resp:  [18-20] 18  BP: (108-134)/(59-73) 130/59    Physical Exam   Constitutional: She is oriented to person, place, and time. She appears well-developed and well-nourished. No distress.   HENT:   Head: Normocephalic and atraumatic.   Eyes: Conjunctivae are normal. No scleral icterus.   Neck: Neck supple. No JVD present.   Cardiovascular: Normal rate, regular rhythm and normal heart sounds.  Exam reveals no gallop and no friction rub.    No murmur heard.  Pulmonary/Chest: Effort normal. No respiratory distress. She has no rales. She exhibits no tenderness.   Abdominal: Soft. Bowel sounds are normal. She exhibits no distension. There is no guarding.   Musculoskeletal: She exhibits no edema or tenderness.   Lymphadenopathy:     She has no cervical adenopathy.   Neurological: She is alert and oriented to person, place, and time. No cranial nerve deficit.   Skin: Skin is warm and dry. She is not diaphoretic. No erythema. No pallor.   Psychiatric: She has a normal mood and affect. Her behavior is normal.   Nursing note and vitals reviewed.      Fluids  No intake or output data in the 24 hours ending 12/04/18 2029    Laboratory  Recent Labs      12/02/18   0145  12/03/18   0300  12/04/18   0015   WBC  9.9  7.6  13.4*   RBC  3.29*  2.43*  3.64*   HEMOGLOBIN  9.4*  6.8*  10.2*   HEMATOCRIT  29.6*  22.3*  31.7*   MCV  90.6  90.1  87.1   MCH  28.6  28.0  28.0   MCHC  31.5*  31.1*  32.2*   RDW  58.5*  58.2*  51.0*   PLATELETCT  324  333  362   MPV  10.8  10.1  9.8     Recent Labs      12/02/18   0145  12/03/18   0300  12/04/18   0015   SODIUM  133*  135  135   POTASSIUM  4.2  3.7  3.7   CHLORIDE  106  108  109   CO2  19*  20  19*   GLUCOSE  109*  91  104*   BUN  9  7*  6*   CREATININE  0.71  0.57  0.58   CALCIUM  8.2*  7.8*  8.1*                   Imaging  IR-VISCERAL ANGIOGRAM - SELECTIVE   Final Result   Addendum 1 of 1   Addendum:   Total sedation time: 1  hour      Final      1.  Right hepatic artery pseudoaneurysm.   2.  Successful embolization of branch supplying the pseudoaneurysm.      DX-CHEST-PORTABLE (1 VIEW)   Final Result      1.  Persistent hypoinflation with elevation of RIGHT hemidiaphragm.   2.  Minimal worsening RIGHT midlung atelectasis.   3.  Possible minimal RIGHT pleural effusion.      CT-CHEST,ABDOMEN,PELVIS WITH   Final Result      7.8 x 5 x 6.1 cm hypodense lesion in the right lobe of the liver is not significantly changed compared to prior. A pigtail catheter is seen within the lesion. This could represent a necrotic mass with superimposed infection. There is internal density    within the lesion which could represent sequelae of a bleed into the lesion. There is a 9 x 6.6 mm pseudoaneurysm located adjacent to the pigtail catheter. Interventional radiology consultation recommended.      Other small hypodense hepatic lesions are again noted.      Linear tract from a previous pigtail catheter is seen within the right lobe of the liver.      Dilatation of the pancreatic duct is again noted.      Prominence of the common duct measures 1.2 cm.      Enlarged peripancreatic and aortocaval lymph nodes are again noted.      Parapelvic right renal cyst. Tiny hypodense right renal lesions are too small to characterize.      Atherosclerotic plaque.      Small to moderate right pleural effusion with overlying atelectasis/consolidation.      Colonic diverticulosis. Moderate amount of colonic stool.      Calcified uterine fibroid.      Heterogeneity of the endometrial stripe in the lower uterine body with dilatation of the uterine cavity versus endometrial thickening near the uterine fundus measuring 1.2 cm in thickness. Further evaluation with pelvic ultrasound is recommended. This    can be seen in the uterine malignancy.      Fat-containing ventral hernia.      Borderline prominent subcarinal lymph node is nonspecific.         EC-ECHOCARDIOGRAM COMPLETE W/O  CONT   Final Result      CT-DRAIN-LIVER ABSCESS-CYST   Final Result      1.  Successful CT-guided drainage of liver abscess.   2.  The previously placed migrated pigtail drainage catheter was removed.      US-THORACENTESIS PUNCTURE RIGHT   Final Result      1. Ultrasound guided right sided diagnostic thoracentesis.      2. 450 mL of fluid withdrawn.      DX-CHEST-PORTABLE (1 VIEW)   Final Result      Mild right basilar atelectasis.      CT-ABDOMEN W/O   Final Result      Hepatic dome abscess pigtail catheter has pulled out and now terminates just deep to the thoracic cage. Despite this the size of the abscess has diminished measuring 75 x 48 from 88 x 64 mm      New mild left hepatic pneumobilia      Ampullary region mass is difficult to precisely measure but does appear to exert some mass effect upon the third portion of the duodenum. No bowel obstruction is detected.      New medium-sized right ovarian pleural effusion does not have loculation features but there is subsegmental atelectasis      DX-CHEST-PORTABLE (1 VIEW)   Final Result      New interstitial opacity is worrisome for edema      Hilar prominence could be from lymphadenopathy or pulmonary arterial hypertension      Stable right hemidiaphragm elevation      IR-PICC LINE PLACEMENT   Final Result                  Ultrasound-guided PICC placement performed by qualified nursing staff as    above.          CT-HEAD W/O   Final Result      1. Cerebral atrophy.   2. White matter lucencies most consistent with small vessel ischemic change versus demyelination or gliosis.   3. Remote LEFT cerebellar infarction   4. Otherwise, Head CT without contrast with no acute findings. No evidence of acute cerebral infarction, hemorrhage or mass lesion.      CT-DRAIN-LIVER ABSCESS-CYST   Final Result      Successful CT-guided drain placement in the right lobe liver necrosis/abscess.      CT-ABDOMEN-PELVIS WITH   Final Result      1.  Interval significant increase in size of  low-attenuation area in the dome of the right lobe of liver which contains air. This may represent necrosis or hemorrhage following therapy. Stable appearance of other smaller low-attenuation areas in the    right lobe of the liver without evidence of progression of disease in these areas of metastasis. Differential diagnosis would include post therapy infection.      2.  Stable masslike density in the region of the ampulla of Vater consistent with primary carcinoma. Current dimensions are 3.1 x 2.6 cm.      3.  Persistent low-attenuation areas in the pancreatic head and proximal body which may represent secondary pancreatitis.      4.  Stable upper retroperitoneal adenopathy.      5.  Small umbilical hernia containing fat.      DX-CHEST-PORTABLE (1 VIEW)   Final Result      No pulmonary consolidation.           Assessment/Plan  Liver abscess- (present on admission)   Assessment & Plan    Drain dc 12/3/18  Cultures enterococcus [VRE]  ID following   Continue daptomycin IV, unasyn iv, fluconozole per ID stop date 12/20 with repeat CT abdomen/pelvis at that time.       Bacteremia- (present on admission)   Assessment & Plan    Received treatment for E. coli bacteremia in early November, her port was removed  Admitted with sepsis  Blood cultures from 11/12/2018 are growing lactobacillus, Candida albicans and strep viridans. Liver abscess cultures from 11/13/2018 are positive for VRE.  Currently on unasyn/PO diflucan ID following.     PICC placed 11/21/18  IV daptomycin, IV unasyn, fluconazole tentative stop date 12/20 per ID, then repeat CT to see if further IV abx needed.  Repeat 11/24 CT guided liver abscess pigtail catheter placed after initial was found to be displaced.  TTE negative for endocarditis.  EF 65%.               Sepsis (HCC)- (present on admission)   Assessment & Plan    This is sepsis (without associated acute organ dysfunction).   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         Fungemia   Assessment & Plan    ID following        Deep vein thrombosis (CMS-HCC) [I82.409]- (present on admission)   Assessment & Plan    11/29  dc'd eliquis due to bleeding into JACQUELINE drain from pseudoaneurysm.  Last DVT 4/4/17.  No need to continue eliquis.       Iron deficiency anemia due to chronic blood loss- (present on admission)   Assessment & Plan    Dr Birmingham ordered iron replacement       Pseudoaneurysm following procedure (HCC)   Assessment & Plan     Pseudoaneurysm of hepatic artery encircling drain.  IR consult to Dr. Higginbotham who embolized artery.  dc'd eliquis 5mg bid.  JACQUELINE drain discontinued 12/3.         Pleural effusion- (present on admission)   Assessment & Plan    Thoracentesis 11/23/18 - 450cc removed - cytology showed no malignant cells\     Cough   Assessment & Plan    CT shows pleural effusion right lung - thoracentesis improved symptom  Responded well to diuresis             Lactic acidosis- (present on admission)   Assessment & Plan    Improved with fluid resuscitation and treatment of infection            VTE prophylaxis: SCDs, eliquis stopped d/t bleeding.

## 2018-12-05 NOTE — THERAPY
"Occupational Therapy Treatment completed with focus on ADLs and ADL transfers.  Functional Status:    Pt resting in bed when received by OT, agreeable to session. Supv for bed mobility with HOB slightly elevated and requiring slightly increased time. Sit><stand with supv. Pt ambulated to bathroom with FWW and completed toilet xfer with SBA. Independent with pericare. Washed hands at sink with supv. Pt returned to chair and demonstrated LB dressing (socks) Mod I. Pt reports no concerns about going home and feels she has adequate support and assist should she need it. No further acute OT needs at this time, recommend D/C home.     Plan of Care: Patient with no further skilled OT needs in the acute care setting at this time  Discharge Recommendations:  Equipment No Equipment Needed. Post-acute therapy Anticipate that the patient will have no further occupational therapy needs after discharge from the hospital.     See \"Rehab Therapy-Acute\" Patient Summary Report for complete documentation.   "

## 2018-12-05 NOTE — CARE PLAN
Problem: Bowel/Gastric:  Goal: Normal bowel function is maintained or improved  Outcome: PROGRESSING AS EXPECTED      Problem: Knowledge Deficit  Goal: Knowledge of disease process/condition, treatment plan, diagnostic tests, and medications will improve  Outcome: PROGRESSING AS EXPECTED      Problem: Pain Management  Goal: Pain level will decrease to patient's comfort goal  Outcome: PROGRESSING AS EXPECTED

## 2018-12-05 NOTE — CARE PLAN
Problem: Safety  Goal: Will remain free from injury  Outcome: PROGRESSING AS EXPECTED  Educated PT on use of call light   PT oriented to room, all possessions within reach, call light within reach   Slip resistant socks on PT (yellow if fall risk)        Problem: Bowel/Gastric:  Goal: Will not experience complications related to bowel motility  Outcome: PROGRESSING AS EXPECTED  Bowel protocol given per MAR   PT and family educated about diet, fluid intake and activity to promote bowel function   Bathroom opportunities scheduled and/or offered          Anesthesia Type: 1% lidocaine with epinephrine

## 2018-12-05 NOTE — PROGRESS NOTES
Infectious Disease Progress Note    Author: Shayan Infante M.D. Date & Time of service: 12/5/2018  9:51 AM    Interval History:  73-year-old female with metastatic ampullary carcinoma of the pancreas presented with generalized weakness and shaking chills.  11/13/2018-no fevers.  Complains of generalized malaise and fatigue.  Still has some pain in her right shoulder.  WBC count is 22,000.  Cultures remain negative.  11/14/2018 T-max is 98.2.  No new issues overnight.  Underwent IR drainage on 11/13/2018.  Her shoulder pain has eased since then.  11/15/2018 no fevers.  WBC 13.3 creatinine 0.47  11/16/2018 no fevers.  Denies any increased abdominal pain.  The shoulder pain is improved as well.  WBC is 12.8.  11/17/2018 no fevers.  Shoulder pain is much improved.  Denies any new issues.  11/18- no fevers. No new issues> WBC 16.7  11/19/2018 no fevers.  WBC 15.6 no new issues overnight  11/20 AF (99.7) WBC 15.3 states eating better-denies SE abx No abd pain today  11/21 Af (99.9) WBC 9.8 minimal output from drain-plan for PICC today  11/22/2018 T-max is 100.3.  No new issues overnight.  WBCs 10.  AST 40 ALT 17  11/23/2018 no fevers.  The drain is not draining much.  Underwent thoracentesis today.  11/24/2018 underwent another drain placement.  Complains of some abdominal pain otherwise denies any complaints.  WBC is 8.4  11/25/2018 complains of malaise and fatigue.  Complains of some abdominal pain.  WBC 5.9, Labs Reviewed, Medications Reviewed and Radiology Reviewed.  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 Labs and micro results reviewed. Recent imaging reviewed.    EUN, Vitals reviewed- pt AF, O2 RA, Labs and micro results reviewed. Recent imaging reviewed. Pt with some nausea this am and poor appetite.    EUN, Vitals reviewed- pt AF, O2 RA, Labs and micro results reviewed. Recent imaging reviewed. Medications reviewed.   12/3 afebrile, white count 7.6.  Continues to have some dry cough.  Tolerating antibiotics, no new issues.   afebrile, white count 13.4.  Reports that she continues to have dry cough, which she describes as her trying to get rid of a sensation of mucus in her throat.  Tolerating antibiotics, no new issues.   T-max 99, white count down to 10.6.  Primary team obtained CT scan given some abdominal distention and drop in hemoglobin.  Patient reports feeling a little cold, abdominal pain has improved.    Review of Systems:  Review of Systems   Constitutional: Negative for chills, diaphoresis, fever and malaise/fatigue.   Respiratory: Positive for cough. Negative for sputum production and shortness of breath.    Cardiovascular: Negative for chest pain.   Gastrointestinal: Negative for abdominal pain, constipation, diarrhea, nausea and vomiting.   Genitourinary: Negative for dysuria.   Musculoskeletal: Negative for myalgias.   Skin: Negative for itching and rash.   All other systems reviewed and are negative.      Hemodynamics:  Temp (24hrs), Av.8 °C (98.2 °F), Min:36.4 °C (97.6 °F), Max:37.2 °C (99 °F)  Temperature: 36.7 °C (98 °F)  Pulse  Av.9  Min: 67  Max: 112   Blood Pressure : 120/60       Physical Exam:  Physical Exam   Constitutional: She is oriented to person, place, and time. She appears well-developed and well-nourished. No distress.   HENT:   Head: Normocephalic and atraumatic.   Mouth/Throat: No oropharyngeal exudate.   Eyes: Pupils are equal, round, and reactive to light. Conjunctivae  and EOM are normal. No scleral icterus.   Neck: Normal range of motion.   Cardiovascular: Normal rate, regular rhythm and normal heart sounds.    Pulmonary/Chest: Effort normal. No respiratory distress. She has no wheezes.   Diminished basal breath sounds   Abdominal: Soft. Bowel sounds are normal. There is no tenderness. There is no rebound and no guarding.   Right upper quadrant drain removed, dressing in place, nontender   Musculoskeletal: She exhibits no edema.   Lymphadenopathy:     She has no cervical adenopathy.   Neurological: She is alert and oriented to person, place, and time.   No gross focal neuro deficit   Skin: Skin is warm and dry. She is not diaphoretic.   Nursing note and vitals reviewed.      Meds:    Current Facility-Administered Medications:   •  guaiFENesin  •  iron sucrose  •  prochlorperazine  •  [COMPLETED] piperacillin-tazobactam **AND** piperacillin-tazobactam  •  DAPTOmycin  •  ipratropium-albuterol  •  fluconazole  •  senna-docusate **AND** polyethylene glycol/lytes **AND** magnesium hydroxide **AND** bisacodyl  •  Respiratory Care per Protocol  •  ondansetron  •  ondansetron  •  acetaminophen  •  Notify provider if pain remains uncontrolled **AND** Use the numeric rating scale (NRS-11) on regular floors and Critical-Care Pain Observation Tool (CPOT) on ICUs/Trauma to assess pain **AND** Pulse Ox (Oximetry) **AND** Pharmacy Consult Request **AND** If patient difficult to arouse and/or has respiratory depression, stop any opiates that are currently infusing and call a Rapid Response. **AND** oxyCODONE immediate-release **AND** oxyCODONE immediate-release **AND** morphine injection  •  NS  •  omeprazole  •  artificial tears  •  albuterol    Labs:  Recent Labs      12/03/18   0300  12/04/18   0015  12/05/18   0030   WBC  7.6  13.4*  10.6   RBC  2.43*  3.64*  3.04*   HEMOGLOBIN  6.8*  10.2*  8.8*   HEMATOCRIT  22.3*  31.7*  27.1*   MCV  90.1  87.1  89.1   MCH  28.0  28.0  28.9   RDW  58.2*   51.0*  53.4*   PLATELETCT  333  362  322   MPV  10.1  9.8  9.9     Recent Labs      12/03/18   0300  12/04/18   0015  12/05/18   0030   SODIUM  135  135  136   POTASSIUM  3.7  3.7  3.4*   CHLORIDE  108  109  109   CO2  20  19*  20   GLUCOSE  91  104*  130*   BUN  7*  6*  7*     Recent Labs      12/03/18   0300  12/04/18   0015  12/05/18   0030   ALBUMIN  2.0*  2.1*  1.9*   TBILIRUBIN  0.4  0.6  0.4   ALKPHOSPHAT  153*  153*  134*   TOTPROTEIN  6.4  6.8  6.1   ALTSGPT  15  13  10   ASTSGOT  23  22  16   CREATININE  0.57  0.58  0.57       Imaging:  Ct-abdomen W/o    Result Date: 11/22/2018 11/22/2018 1:11 PM HISTORY/REASON FOR EXAM:  Liver abscess follow-up. Metastatic ampullary adenocarcinoma TECHNIQUE/EXAM DESCRIPTION: CT scan of the abdomen without contrast. Noncontrast helical sections were obtained from the diaphragmatic domes through the iliac crests Low dose optimization technique was utilized for this CT exam including automated exposure control and adjustment of the mA and/or kV according to patient size. COMPARISON: 11/12/18 and 11/13/2018 FINDINGS: Limited by lack of IV contrast New moderate right low-density layering pleural effusion with adjacent compressive atelectasis Right hepatic pigtail catheter has been displaced and now terminates deep to the anterior rib cage. It is no longer located in the hepatic dome abscess. The abscess is 75 x 48 mm in axial dimensions, diminished from 88 x 64 mm. Again it has fluid and gas  components There is some new left hepatic biliary favored over portal venous gas No abnormal perihepatic fluid collections detected. No visualized abscess is seen in the upper abdomen With lack of contrast it is difficult to evaluate the ampullary region mass. No gross interval change is seen and again there is artifact in the region from embolization. No free air Some mass effect on the third portion of the duodenum secondary to the mass. No adrenal mass or splenomegaly Medium-sized  umbilical hernia contains fat. Previously a small loop of small bowel extending into the defect. No hydronephrosis     Hepatic dome abscess pigtail catheter has pulled out and now terminates just deep to the thoracic cage. Despite this the size of the abscess has diminished measuring 75 x 48 from 88 x 64 mm New mild left hepatic pneumobilia Ampullary region mass is difficult to precisely measure but does appear to exert some mass effect upon the third portion of the duodenum. No bowel obstruction is detected. New medium-sized right ovarian pleural effusion does not have loculation features but there is subsegmental atelectasis    Ct-abdomen-pelvis With    Result Date: 11/12/2018 11/12/2018 1:38 PM HISTORY/REASON FOR EXAM:  Abdominal pain. History of metastatic ampullary adenocarcinoma. TECHNIQUE/EXAM DESCRIPTION: CT scan of the abdomen and pelvis with contrast. Contrast-enhanced helical scanning was obtained from the diaphragmatic domes through the pubic symphysis following the bolus administration of 80 mL of Omnipaque 350 without complication. Low dose optimization technique was utilized for this CT exam including automated exposure control and adjustment of the mA and/or kV according to patient size. COMPARISON: 10/25/2018 FINDINGS: The visualized lung bases are clear. CT Abdomen: A small hiatal hernia is present. There is interval increase in size of an area of low attenuation in the dome of the right lobe of the liver which contains air. This may represent necrosis or post therapy hemorrhage following embolization. This area measures 8.8 x 7.8 x 6.4 cm. Previous measurement was 2.2 cm in maximum dimension. Multiple smaller areas of low-attenuation within the right lobe of liver are present which are suspicious for hepatic metastases. These other smaller low-attenuation areas are without significant change. The gallbladder absent. A low-attenuation area in the region of the pancreatic head is again noted and is  poorly defined measuring approximately 3.1 x 2.6 cm in diameter. This is consistent with the area of ampullary carcinoma. Embolization coils are present in the GDA. There is low attenuation in the pancreatic body proximally which may represent low-attenuation  secondary to pancreatitis or spread of neoplasm. The common duct is dilated proximal to the level of the ampulla measuring a maximum diameter of 1.6 cm. No choledocholithiasis is identified. An enlarged retroperitoneal lymph node between the upper aorta and IVC is unchanged measuring 1.5 cm in diameter. No new area of retroperitoneal adenopathy is identified. No peripancreatic adenopathy is present. The spleen appears normal. The splenic vein and portal vein are patent. The adrenal glands are not enlarged and the kidneys enhance symmetrically. Small simple cysts in the right kidney are stable. There is no lymphadenopathy. The aorta and IVC are normal in caliber. The bowel is without obstruction. The appendix appears normal. There is a small umbilical hernia containing fat. CT Pelvis: There is no lymphadenopathy. No free fluid is present. The bladder appears normal. Uterus as endometrial complex thickening. There is a myomatous type calcification in the right aspect of the uterus which is unchanged. There is no acute inflammatory process.     1.  Interval significant increase in size of low-attenuation area in the dome of the right lobe of liver which contains air. This may represent necrosis or hemorrhage following therapy. Stable appearance of other smaller low-attenuation areas in the right lobe of the liver without evidence of progression of disease in these areas of metastasis. Differential diagnosis would include post therapy infection. 2.  Stable masslike density in the region of the ampulla of Vater consistent with primary carcinoma. Current dimensions are 3.1 x 2.6 cm. 3.  Persistent low-attenuation areas in the pancreatic head and proximal body which  may represent secondary pancreatitis. 4.  Stable upper retroperitoneal adenopathy. 5.  Small umbilical hernia containing fat.    Ct-chest,abdomen,pelvis With    Result Date: 11/29/2018 11/29/2018 1:12 PM HISTORY/REASON FOR EXAM:  Leukocytosis. Liver abscess. TECHNIQUE/EXAM DESCRIPTION: CT scan of the chest, abdomen and pelvis with contrast. Helical scanning was obtained with intravenous contrast from the lung apices through the pubic symphysis to include the chest, abdomen and pelvis.  100 mL of Omnipaque 350 nonionic contrast was administered intravenously without complication. Low dose optimization technique was utilized for this CT exam including automated exposure control and adjustment of the mA and/or kV according to patient size. COMPARISON: 11/22/2018 FINDINGS: Atherosclerotic plaque is seen in the aorta. There is coronary artery calcification. There is a small amount of pericardial fluid. There is a small to moderate large right pleural effusion with overlying atelectasis/consolidation. There are small mediastinal lymph nodes. Subcarinal lymph node measures 8 mm in short axis dimension. There are small hilar lymph nodes bilaterally. Anterior mediastinal lymph node measures 6.2 mm in short axis dimension. There are small axillary lymph nodes. No pneumothorax is identified. Examination is limited by respiratory motion. There is mild left lower lobe and right middle lobe atelectasis. No free air is seen in the abdomen or pelvis. There is a pigtail catheter within a hypodense hepatic lesion measuring 7.8 x 5 x 6.1 cm which is not significantly changed compared to prior. Adjacent to the pigtail catheter, there is a focal collection of contrast measuring 9 x 6.6 mm compatible with a pseudoaneurysm. A feeding vessel is seen. There is heterogeneous density within the lesion. There could have been bleeding into the lesion. No active hemorrhage is currently identified. Small hypodense lesions are again noted within  the right lobe of the liver. Portal vein is patent. Gallbladder is surgically absent. Prominence of the common duct measures 1.2 cm. There is distention of the cystic duct. There is dilatation of the pancreatic duct. Streak  artifact from embolization coils is seen. There is a linear tract in the right lobe of the liver from a prior drain located more anteriorly. Spleen is not enlarged. No adrenal mass is identified. There is a parapelvic cyst on the right. Tiny hypodense right renal lesions are too small to characterize. There is no evidence of hydronephrosis. Atherosclerotic plaque is seen in the aorta. There are small inguinal lymph nodes. Enlarged aortocaval lymph node measures 1.4 cm in short axis dimension. Enlarged peripancreatic lymph nodes are seen. There is no evidence of bowel obstruction. There is colonic diverticulosis. There is a moderate amount of colonic stool. Terminal ileum is unremarkable. There is no evidence of acute appendicitis. There are postsurgical changes of the bowel in the left abdomen. A small lymph node is seen adjacent to the distal esophagus. Bladder is mildly distended. Uterus is anteverted. There is a calcified uterine fibroid. There is Heterogeneity of the endometrial stripe in the lower uterine body with dilatation of the uterine cavity versus endometrial thickening near the uterine fundus measuring 1.2 cm in thickness. Calcific densities in the pelvis likely represent phleboliths. No free fluid is seen in the pelvis. Degenerative changes are seen in the spine. There is a fat-containing ventral hernia.     7.8 x 5 x 6.1 cm hypodense lesion in the right lobe of the liver is not significantly changed compared to prior. A pigtail catheter is seen within the lesion. This could represent a necrotic mass with superimposed infection. There is internal density within the lesion which could represent sequelae of a bleed into the lesion. There is a 9 x 6.6 mm pseudoaneurysm located  adjacent to the pigtail catheter. Interventional radiology consultation recommended. Other small hypodense hepatic lesions are again noted. Linear tract from a previous pigtail catheter is seen within the right lobe of the liver. Dilatation of the pancreatic duct is again noted. Prominence of the common duct measures 1.2 cm. Enlarged peripancreatic and aortocaval lymph nodes are again noted. Parapelvic right renal cyst. Tiny hypodense right renal lesions are too small to characterize. Atherosclerotic plaque. Small to moderate right pleural effusion with overlying atelectasis/consolidation. Colonic diverticulosis. Moderate amount of colonic stool. Calcified uterine fibroid. Heterogeneity of the endometrial stripe in the lower uterine body with dilatation of the uterine cavity versus endometrial thickening near the uterine fundus measuring 1.2 cm in thickness. Further evaluation with pelvic ultrasound is recommended. This can be seen in the uterine malignancy. Fat-containing ventral hernia. Borderline prominent subcarinal lymph node is nonspecific.     Ct-drain-liver Abscess-cyst    Result Date: 11/27/2018 11/24/2018 9:02 AM HISTORY/REASON FOR EXAM:  current drain pulled out of abscess, needs to be repositioned. Moderate sedation was administered with continuous monitoring of the patient under the direct supervision of  Medication: 100 mcg of fentanyl and 2 mg of Versed Total sedation time: 30 minutes The biopsy/drainage was done utilizing low dose optimization CT techniques including auto modulation for  imaging and low mA CT/fluoroscopy mode for the procedure. TECHNIQUE/EXAM DESCRIPTION AND NUMBER OF VIEWS:  After the informed consent the patient was placed on the angiographic table and supine position. Localization CT scan demonstrates a low-density area in the right lobe of the liver. The previously placed catheter was migrated upwards. The previously placed catheter was. After injecting  lidocaine, a 17-gauge introducer needle was advanced into the fluid collection. After confirming the needle position, a wire was introduced. After dilating the tract, a new 10 [place] pigtail catheter was advanced and placed with its loop in the fluid collection. The tube is attached to the suction bulb. The patient tolerated procedure without any immediate complication.     1.  Successful CT-guided drainage of liver abscess. 2.  The previously placed migrated pigtail drainage catheter was removed.    Ct-drain-liver Abscess-cyst    Result Date: 11/13/2018 11/13/2018 4:01 PM HISTORY/REASON FOR EXAM:  liver abscess. Moderate sedation was administered with continuous monitoring of the patient under the direct supervision of  Medications: 2 mg of Versed and 100 mcg of fentanyl Total sedation time: 30 minutes The biopsy/drainage was done utilizing low dose optimization CT techniques including auto modulation for  imaging and low mA CT/fluoroscopy mode for the procedure. TECHNIQUE/EXAM DESCRIPTION AND NUMBER OF VIEWS:  After the informed consent the patient was placed on the CT table on supine position. Localization CT scan demonstrates hypodense area in the right lobe of the liver. The skin over the lesion was prepped. Subsequently after injecting lidocaine a 17-gauge needle was advanced into the hypodense area. Dark-colored fluid was aspirated. A wire was released. Subsequently a 10 [place]ian guiding catheter was advanced and placed with its loop in the liver. An approximately 100 mL of fluid was drained. Sample material was sent to the microbiology. The patient tolerated the procedure without any immediate complication.     Successful CT-guided drain placement in the right lobe liver necrosis/abscess.    Ct-head W/o    Result Date: 11/19/2018 11/19/2018 9:49 PM HISTORY/REASON FOR EXAM:  Acute onset of altered mental status and incontinence. History of ampullary region tumor. TECHNIQUE/EXAM DESCRIPTION  AND NUMBER OF VIEWS: CT of the head without contrast. The study was performed on a GE CT scanner. Contiguous 5 mm axial sections were obtained from the skull base through the vertex. Up to date radiation dose reduction adjustments have been utilized to meet ALARA standards for radiation dose reduction. COMPARISON:  PET CT 7/12/2018 FINDINGS: The calvariae and skull base are unremarkable. There are no extraaxial fluid collections. There is a pattern of cerebral atrophy manifest as enlargement of sulcal markings and ventricular prominence.  The ventricular system and basal cisterns are otherwise unremarkable. There are areas of hypodensity in the white matter most consistent with small vessel ischemic change versus demyelination or gliosis. There are no hemorrhagic lesions. There are no mass effects or shift of midline structures. There is a small area of encephalomalacia in the LEFT cerebellum. This is unchanged. The brainstem and posterior fossa structures are otherwise unremarkable. The paranasal sinuses and mastoids in the field of view are unremarkable.     1. Cerebral atrophy. 2. White matter lucencies most consistent with small vessel ischemic change versus demyelination or gliosis. 3. Remote LEFT cerebellar infarction 4. Otherwise, Head CT without contrast with no acute findings. No evidence of acute cerebral infarction, hemorrhage or mass lesion.    Dx-chest-portable (1 View)    Result Date: 11/23/2018 11/23/2018 2:14 PM HISTORY/REASON FOR EXAM:  Pleural Effusion. Status post right thoracentesis TECHNIQUE/EXAM DESCRIPTION AND NUMBER OF VIEWS: Single portable view of the chest. COMPARISON: 11/21/2018 FINDINGS: Single portable view of the chest demonstrates a stable cardiac silhouette and mediastinal contours. Calcification is in the aortic arch. The right hemidiaphragm is elevated. There is mild atelectasis in the right lung base. No pneumothorax is identified. Pigtail catheter projects over the right upper  quadrant. There are multiple surgical clips. A right PICC line remains in place.     Mild right basilar atelectasis.    Dx-chest-portable (1 View)    Result Date: 11/21/2018 11/21/2018 5:31 PM HISTORY/REASON FOR EXAM: Cough. Congestion for a week TECHNIQUE/EXAM DESCRIPTION AND NUMBER OF VIEWS: Single AP view of the chest. COMPARISON: November 12 FINDINGS: Hardware: Right PICC line tip overlies the cavoatrial junction Pigtail catheter overlies the elevated right hemidiaphragm, not fully visualized Lungs: Increasing perihilar opacity with some bronchial thickening noted. Minor fissure is also thickened. Pleura:  No pleural space process is seen. Heart and mediastinum: There is similar hilar and cardiac silhouette enlargement.     New interstitial opacity is worrisome for edema Hilar prominence could be from lymphadenopathy or pulmonary arterial hypertension Stable right hemidiaphragm elevation    Dx-chest-portable (1 View)    Result Date: 11/12/2018 11/12/2018 11:06 AM HISTORY/REASON FOR EXAM:  Sepsis. TECHNIQUE/EXAM DESCRIPTION AND NUMBER OF VIEWS: Single portable view of the chest. COMPARISON: 10/25/2018 FINDINGS: There is no evidence of focal consolidation or evidence of pulmonary edema. There is no evidence of pleural effusion. The heart is normal in size.     No pulmonary consolidation.    Ir-visceral Angiogram - Selective    Result Date: 11/30/2018 11/29/2018 6:29 PM HISTORY/REASON FOR EXAM:  Hepatic artery pseudoaneurysm Moderate sedation was administered with continuous monitoring of the patient under the direct supervision of  Medication: 4 mg of  Versed and 100 mcg of fentanyl Contrast: 90 mL of Omnipaque 300 Total fluoroscopic time: 12.7 minutes Total number of images stored in the PACS: 224 TECHNIQUE/EXAM DESCRIPTION AND NUMBER OF VIEWS: After the informed consent the patient was placed on the fluoroscopy table on supine position. The skin over the right groin was prepped. After injecting  lidocaine a 5 [place]ian vascular sheath was introduced. Subsequently, a diagnostic catheter was advanced into the celiac trunk. Celiac angiogram was performed. It demonstrated a pseudoaneurysm from the right hepatic branch. Subsequently a microcatheter was successfully advanced into the branch supplying the pseudoaneurysm. Microcatheter angiogram confirms the pseudoaneurysm and the catheter position. Subsequently metallic fiber coils were used to embolize the branch supplying the segment aneurysm. The final angiogram demonstrates occluded branch with stagnant flow within the pseudoaneurysm. The catheters were removed. The right superficial femoral angiogram does not demonstrate any significant stenosis. The right femoral sheath was removed and hemostasis was obtained with Angio-Seal device.     1.  Right hepatic artery pseudoaneurysm. 2.  Successful embolization of branch supplying the pseudoaneurysm.    Us-thoracentesis Puncture Right    Result Date: 11/23/2018 11/23/2018 1:15 PM HISTORY/REASON FOR EXAM:  Fluid collection TECHNIQUE/EXAM DESCRIPTION: Ultrasound-guided thoracentesis. Indication:  RIGHT pleural fluid collection. COMPARISON:  None PROCEDURE:     Informed consent was obtained. A timeout was taken. A right pleural effusion was localized with real-time ultrasound guidance. The right posterior chest wall was prepped and draped in a sterile manner. Following local anesthesia with 1% lidocaine, a 5 [place] Yueh pigtail catheter was advanced into the pleural space with trocar technique and pleural fluid was drained. The patient tolerated the procedure well without evidence of complication. A post thoracentesis chest radiograph is forthcoming. FINDINGS: Fluid was  sent to the laboratory. Fluid character: straw colored     1. Ultrasound guided right sided diagnostic thoracentesis. 2. 450 mL of fluid withdrawn.    Ir-picc Line Placement    Result Date: 11/21/2018  HISTORY/REASON FOR EXAM:   PICC placement.  TECHNIQUE/EXAM DESCRIPTION AND NUMBER OF VIEWS:   PICC line insertion with ultrasound guidance.  The procedure was performed using maximal sterile barrier technique including sterile gown, mask, cap, and donning of sterile gloves following appropriate hand hygiene and/or sterile scrub. Patient skin site was prepped with 2% Chlorhexidine solution. FINDINGS:  PICC line insertion with Ultrasound Guidance was performed by qualified nursing staff without the assistance of a Radiologist. PICC positioning appropriateness confirmed by 3CG technology; chest xray only needed in the instance 3CG unable to confirm placement.              Ultrasound-guided PICC placement performed by qualified nursing staff as above.     Ec-echocardiogram Complete W/o Cont    Result Date: 2018  Transthoracic Echo Report Echocardiography Laboratory CONCLUSIONS No prior study is available for comparison. Normal transthoracic echocardiogram. JANELLE BALDERAS Exam Date:         2018                    08:43 Exam Location:     Inpatient Priority:          Routine Ordering Physician:        ADAMA STOCKTON Referring Physician: Sonographer:               Maisha Leavitt RDCS Age:    73     Gender:    F MRN:    [id number] :    1945 BSA:    1.64   Ht (in):    60     Wt (lb):    148 Exam Type:     Complete Indications:     Endocarditis and heart valve disorders in diseases                  classified elsewhere ICD Codes:       I39 CPT Codes:       14822 BP:   111    /   77     HR:   79 Technical Quality:       Fair MEASUREMENTS  (Male / Female) Normal Values 2D ECHO LV Diastolic Diameter PLAX        3.5 cm                4.2 - 5.9 / 3.9 - 5.3 cm LV Systolic Diameter PLAX         2.1 cm                2.1 - 4.0 cm IVS Diastolic Thickness           1 cm                  LVPW Diastolic Thickness          1.1 cm                LVOT Diameter                     1.6 cm                RA Diameter                       2.9 cm                 Estimated LV Ejection Fraction    65 %                  LV Ejection Fraction MOD BP       69.8 %                >= 55  % LV Ejection Fraction MOD 4C       61.7 %                LV Ejection Fraction MOD 2C       80.8 %                LA Volume Index                   10.7 cm³/m²           16 - 28 cm³/m² IVC Diameter                      1.7 cm                M-MODE Aortic Root Diameter MM           2.4 cm                DOPPLER AV Peak Velocity                  1.4 m/s               AV Peak Gradient                  8 mmHg                AV Mean Gradient                  3.4 mmHg              LVOT Peak Velocity                0.78 m/s              AV Area Cont Eq vti               1.3 cm²               Mitral E Point Velocity           0.52 m/s              Mitral E to A Ratio               0.53                  MV Pressure Half Time             64.3 ms               MV Area PHT                       3.4 cm²               MV Deceleration Time              222 ms                TR Peak Velocity                  235 cm/s              PV Peak Velocity                  0.84 m/s              PV Peak Gradient                  2.8 mmHg              RVOT Peak Velocity                0.69 m/s              * Indicates values subject to auto-interpretation LV EF:  65    % FINDINGS Left Ventricle Normal left ventricular size and systolic function. Normal left ventricular wall thickness. Normal diastolic function. Normal regional wall motion. Left ventricular ejection fraction is visually estimated to be 65%. Right Ventricle Normal right ventricular size and systolic function. Right Atrium Normal right atrial size. Normal inferior vena cava size and inspiratory collapse. Left Atrium Normal left atrial size. Mitral Valve Structurally normal mitral valve. Mild mitral regurgitation. No mitral stenosis. Aortic Valve Structurally normal aortic valve without significant stenosis or regurgitation. Tricuspid Valve Structurally normal  "tricuspid valve. Mild tricuspid regurgitation. Right atrial pressure is estimated to be 3 mmHg. Estimated right ventricular systolic pressure  is 35 mmHg. Pulmonic Valve Structurally normal pulmonic valve without significant stenosis or regurgitation. Pericardium Normal pericardium without effusion. Aorta The aortic root is normal. Gurwinder Mayberry MD (Electronically Signed) Final Date:     28 November 2018                 09:42      Micro:  Results     Procedure Component Value Units Date/Time    BLOOD CULTURE [[id number]] Collected:  11/29/18 1246    Order Status:  Completed Specimen:  Blood from Peripheral Updated:  12/04/18 1500     Significant Indicator NEG     Source BLD     Site PERIPHERAL     Blood Culture No growth after 5 days of incubation.    Narrative:       Contact  Per Hospital Policy: Only change Specimen Src: to \"Line\" if  specified by physician order.    BLOOD CULTURE [[id number]] Collected:  11/29/18 1200    Order Status:  Completed Specimen:  Blood from Peripheral Updated:  12/04/18 1300     Significant Indicator NEG     Source BLD     Site PERIPHERAL     Blood Culture No growth after 5 days of incubation.    Narrative:       Contact  Per Hospital Policy: Only change Specimen Src: to \"Line\" if  specified by physician order.    AFB CULTURE [[id number]] Collected:  11/23/18 1330    Order Status:  Completed Specimen:  Body Fluid Updated:  11/29/18 0804     Significant Indicator NEG     Source BF     Site Thoracentesis Fluid     AFB Culture Culture in progress.     AFB Smear Results No acid fast bacilli seen.    Narrative:       RT pl eff 11/23/2018  14:50    FLUID CULTURE W/GRAM STAIN [[id number]] Collected:  11/23/18 1330    Order Status:  Completed Specimen:  Body Fluid Updated:  11/29/18 0804     Gram Stain Result Few WBCs.  No organisms seen.       Significant Indicator NEG     Source BF     Site Thoracentesis Fluid     Culture Result Bdf No growth at 5 days    Narrative:       RT pl eff " "11/23/2018  14:50    FUNGAL CULTURE [[id number]] Collected:  11/23/18 1330    Order Status:  Completed Specimen:  Body Fluid Updated:  11/29/18 0804     Significant Indicator NEG     Source BF     Site Thoracentesis Fluid     Fungal Culture Culture in progress.    Narrative:       RT pl eff 11/23/2018  14:50          Assessment:  Superinfected necrotic mass of liver versus liver abscess  Bacteroides bacteremia, completed treatment  Candida albicans candidemia, completed treatment  Recent E. coli bacteremia  Ampullary carcinoma of the pancreas, stage IV    Plan:     Bloody drainage from abdominal drain- 2/2  Pseudoaneurysm, resolved.  - CT C/A/P on 11/29 with: \"Adjacent to the pigtail catheter, there is a focal collection of   contrast measuring 9 x 6.6 mm compatible with a pseudoaneurysm. A feeding vessel is seen. There is heterogeneous density within the lesion.\"     - Pt was seen by IR and embolization performed on 11/29   - Drain removed 12/3  - Repeat CT 12/5 with no evidence of active extravasation per my read     Liver abscess vs. superinfected necrotic mass- bowel dunia on cultures and in setting of bacteremia and pancreatic cancer, no clear bowel perforation but this is a concern, ampullary pancreatic mass and mass effect on duodenum noted.  Per CT on 11/29 this could be a necrotic mass with superimposed infection.   - CT scan 11/12 show with area of necrosis/hemorrhage, s/p IR drainage on 11/13/2018-- Cultures +  lactobacillus, VRE and Candida albicans and strep viridans  - Underwent CT scan on 11/22/2018- size of the abscess has diminished measuring 75 x 48 from 88 x 64 mm but abscess still present and contains air with new mild left hepatic pneumobilia  - Underwent another drain placement on 11/24/2018 as catheter was not correctly posittioned   - CT A/P on 11/29 due to concern for bleeding: \"7.8 x 5 x 6.1 cm hypodense lesion in the right lobe of the liver is not significantly changed compared to prior. A " "pigtail catheter is seen within the lesion. This could represent a necrotic mass with superimposed infection. There is internal density within the lesion which could represent sequelae of a bleed into the lesion.\" Catheter within the lesion and not significantly changed from last CT.       REC: Continue IV daptomycin 10mg/kg q24h, PO fluconazole 400 mg daily. Change IV Zosyn to IV ceftriaxone 2g q24h (E coli was sensitive)   - plan had been to given at least 4 weeks from prior CT abdomen on 11/22 and then will need to re-image prior to stopping - however may be superinfected necrotic mass as opposed to abscess  -Drain removed 12/3, End antibiotics on 12/20/18    -Monitor weekly CK.  Ordered with a.m. labs    Bacteremia, completed therapy  Blood cultures + Bacteroides  Repeat blood cultures x2 are negative from 11/15/2018  -has completed abx course       Candidemia- Candida albicans, completed therapy  Repeat blood cultures x2 are negative from 11/15/2018  Changed to p.o. Diflucan  - see abx above   - TTE to rule-out IE in setting of candidemia - no vegetations seen    - has completed a candidemia abx course     Recent E. coli bacteremia  -From 10/25  -Repeat blood cultures from 10/27 were negative, being covered with the current antibiotic therapy that she is on    Dry cough   -Monitor for now  -Encourage incentive spirometry given atelectasis on chest x-ray     Pleural effusions  - Thora 11/23, cx with no growth   - The CT scan showed pleural effusion hence underwent thoracentesis on 11/23/2018.  2752 WBCs with 46% polys.  Gram stain and cultures negative.   - CT chest on 11/29 \"moderate large right pleural effusion with overlying atelectasis/consolidation\"  - Continue abx as above but if pt develops new fevers or respiratory distress would need repeat thoracentesis with cultures      Ampullary carcinoma of the pancreas stage IV  Contributory factor to above   -Holding on chemo until infection resolved    "   Prognosis guarded     DW primary team.      ID will follow.  Please call with questions.

## 2018-12-05 NOTE — PROGRESS NOTES
Received report from day RN, assumed care of PT at 1900. PT A&Ox 4, resting comfortably in bed, ABX running via PICC, no unmet needs at this time, discussed plan of care for this shift.  Pain managed with meds per MAR. PT up SBA, safety precautions in place. Call light and personal possessions within reach.

## 2018-12-05 NOTE — DISCHARGE PLANNING
Anticipated Discharge Disposition: HHC/Home Infusion     Action: Met with pt to discuss discharge planning needs.  Pt Aox4, pleasant affect.  Pt lives with family, reports independence in ADL's/IADL's prior to admission. Confirmed PCP as Dr. Lemos, and physical address of [address] [place] [facility] [address].  Reviewed current POC and needed medical interventions r/t antibiotic administration.  Pt has used [facility] in the past, has never had infusion services provided in the home.    Pt requesting this CM RN contact her son Sea Poole [phone].   Attempted contact with Sea lopez  requesting return PC.     Barriers to Discharge: none    Plan: Obtain discharge planning decisions from pt's son Prema/t HHC and infusion choice.  Awaiting return call from him.

## 2018-12-06 NOTE — FACE TO FACE
Face to Face Supporting Documentation - Home Health    The encounter with this patient was in whole or in part the primary reason for home health admission.    Date of encounter:   Patient:                    MRN:                       YOB: 2018  Elin Poole  [id number]  1945     Home health to see patient for:  Skilled Nursing care for assessment, interventions & education, Physical Therapy evaluation and treatment and Occupational therapy evaluation and treatment    Skilled need for:  Exacerbation of Chronic Disease State ampullary pancreatic cancer, bacteremia, fungemia    Skilled nursing interventions to include:  Comment: .    Homebound status evidenced by:  Needs the assistance of another person in order to leave the home. Leaving home requires a considerable and taxing effort. There is a normal inability to leave the home.    Community Physician to provide follow up care: Wally Knox D.O.     Optional Interventions? No      I certify the face to face encounter for this home health care referral meets the CMS requirements and the encounter/clinical assessment with the patient was, in whole, or in part, for the medical condition(s) listed above, which is the primary reason for home health care. Based on my clinical findings: the service(s) are medically necessary, support the need for home health care, and the homebound criteria are met.  I certify that this patient has had a face to face encounter by myself.  Rhonda Brown D.O. - NPI: [id number]

## 2018-12-06 NOTE — DISCHARGE PLANNING
Received Choice form at [phone] from Sharita (RNCM)  Agency/Facility Name: [facility]  Referral sent per Choice form @ [phone]

## 2018-12-06 NOTE — DISCHARGE PLANNING
Anticipated Discharge Disposition: Home/C    Action: Discussed discharge POC with Dr. Brown and Dr. Olmedo.  PO abx being prescribed at discharge.   Faxed RX for Zyvox to [facility] on [facility].   F [phone] P [phone] S/w Barry, awaiting return call in regards to copay/PAR.    HHC placed, referral faxed to [facility] per protocol.     Barriers to Discharge: none    Plan: Pt will return to aaron Osei's home at address:   [address] [place] [address], [place] [address].  Pending referral to [facility].  Awaiting information r/t above RX.

## 2018-12-06 NOTE — TELEPHONE ENCOUNTER
Faxed Rx of Xeloda 1,000mg BID to [facility] specialty pharmacy so rx will be ready for delivery when patient is discharged.    [facility] ran benefits and RX, her copay is $0 with Med B + supplemental    [facility] left message on voicemail to call [facility] when DC when ready for delivery and will be overnighted at that time

## 2018-12-06 NOTE — DISCHARGE PLANNING
Anticipated Discharge Disposition: HHC/Infusion    Action: Pt's aaron Osei, at pt's bedside.  Provided choice forms, he completed for Infusion and HHC services.   Provided education r/t both services, and advised referrals would be placed.   Sea provided copy of Advanced Directives.  Will have them scanned into EPIC.   S/w Dr. Brown regarding needed ID orders for outpt abx. Will obtain needed orders.     Barriers to Discharge: none    Plan:  Referrals placed to [facility] RN/PT/OT/[facility], and [facility] for continued abx infusions.  Pt will return to aaron Osei's home at address:   [address] [place] [address], [place] [address]

## 2018-12-06 NOTE — DISCHARGE PLANNING
We are currently verifying MD acceptance of your patient. This will be resolved ASAP. I Thank you for your patience.

## 2018-12-06 NOTE — PROGRESS NOTES
"Hospital Medicine Daily Progress Note    Date of Service  12/5/2018    Chief Complaint  73 y.o. female admitted 11/12/2018 with fevers and chills in setting of metastatic ampullary carcioma.    Hospital Course    Patient admitted and started again on IV antibitoics.  She was found to have a liver abscess and required drainage, cultures from this have shown VRE, candida and lactobacillus acidophilus and blood cultures showing candida.  ID has been managing antibiotics and antifungals.  Patient had JACQUELINE drain replaced as no longer in contact with liver fluid collection, developed bloody drainage on 11/29 and pseudoaneurysm found, embolization completed and no further bleeding.  Drainage decreased and JACQUELINE drain discontinued on 12/3.       Interval Problem Update  12/4 Patient states she feels \"fine\".  She does have cough but is mild and not similar to previous cough related to pleural effusion.  Abx to continue through 12/20/18 and chemo and rads to start thereafter.    12/5 Patient feeling more RUQ pain today since drain removed, she had drop in H/H overnight with concern of possible bleeding again given recent clipping of pseudoaneurysm.  CTA abdomen shows no evidence of active bleeding or extravasation of IV contrast.  Her nectrotic liver mass appears to be same.  She states it feels tender more superficial in the skin rather than deep inside like before - she does have inflammation of the tract where the drain was on CT scan.  She is on good antibiotic coverage and discussed with ID for recommendations regarding a home infusion regimen.     Consultants/Specialty  ID - Leela/Honorio    Code Status  full    Disposition  tbd    Review of Systems  Review of Systems   Constitutional: Negative for chills, fever and malaise/fatigue.   HENT: Negative for congestion and sore throat.    Eyes: Negative for blurred vision and photophobia.   Respiratory: Positive for cough (mild). Negative for shortness of breath and wheezing.  "   Cardiovascular: Negative for chest pain, claudication and leg swelling.   Gastrointestinal: Positive for abdominal pain (superficial RUQ pain.). Negative for constipation, diarrhea, heartburn, nausea and vomiting.   Genitourinary: Negative for dysuria and hematuria.   Musculoskeletal: Negative for joint pain and myalgias.   Skin: Negative for itching and rash.   Neurological: Positive for weakness. Negative for dizziness, sensory change, speech change and headaches.   Psychiatric/Behavioral: Negative for depression. The patient is not nervous/anxious and does not have insomnia.         Physical Exam  Temp:  [36.1 °C (97 °F)-37.2 °C (99 °F)] 36.1 °C (97 °F)  Pulse:  [80-99] 99  Resp:  [15-18] 15  BP: (114-126)/(60-75) 124/75    Physical Exam   Constitutional: She is oriented to person, place, and time. She appears well-developed and well-nourished. No distress.   HENT:   Head: Normocephalic and atraumatic.   Eyes: Conjunctivae are normal. No scleral icterus.   Neck: Neck supple. No JVD present.   Cardiovascular: Normal rate, regular rhythm and normal heart sounds.  Exam reveals no gallop and no friction rub.    No murmur heard.  Pulmonary/Chest: Effort normal. No respiratory distress. She has no rales. She exhibits no tenderness.   Abdominal: Soft. Bowel sounds are normal. She exhibits no distension. There is no guarding.   Musculoskeletal: She exhibits no edema or tenderness.   Lymphadenopathy:     She has no cervical adenopathy.   Neurological: She is alert and oriented to person, place, and time. No cranial nerve deficit.   Skin: Skin is warm and dry. She is not diaphoretic. No erythema. No pallor.   Psychiatric: She has a normal mood and affect. Her behavior is normal.   Nursing note and vitals reviewed.      Fluids  No intake or output data in the 24 hours ending 12/05/18 1925    Laboratory  Recent Labs      12/03/18   0300  12/04/18   0015  12/05/18   0030   WBC  7.6  13.4*  10.6   RBC  2.43*  3.64*  3.04*    HEMOGLOBIN  6.8*  10.2*  8.8*   HEMATOCRIT  22.3*  31.7*  27.1*   MCV  90.1  87.1  89.1   MCH  28.0  28.0  28.9   MCHC  31.1*  32.2*  32.5*   RDW  58.2*  51.0*  53.4*   PLATELETCT  333  362  322   MPV  10.1  9.8  9.9     Recent Labs      12/03/18   0300  12/04/18   0015  12/05/18   0030   SODIUM  135  135  136   POTASSIUM  3.7  3.7  3.4*   CHLORIDE  108  109  109   CO2  20  19*  20   GLUCOSE  91  104*  130*   BUN  7*  6*  7*   CREATININE  0.57  0.58  0.57   CALCIUM  7.8*  8.1*  7.9*                   Imaging  CTA ABDOMEN PELVIS W & W/O POST PROCESS   Final Result         1.  No evidence of active extravasation, pseudoaneurysm or truncated vessel in the previously demonstrated area of pseudoaneurysm in the right lobe of the liver. Embolization coils are present.   2.  Interval removal of a right percutaneous hepatic drain. Essentially unchanged size of the heterogeneous gas and fluid collection in the right lobe of the liver which now measures 7 x 5.7 cm, previously 7.9 x 5 cm.   3.  Grossly stable small right pleural effusion with associated compressive atelectasis.   4.  Diverticulosis.   5.  Fat-containing ventral hernia.      IR-VISCERAL ANGIOGRAM - SELECTIVE   Final Result   Addendum 1 of 1   Addendum:   Total sedation time: 1 hour      Final      1.  Right hepatic artery pseudoaneurysm.   2.  Successful embolization of branch supplying the pseudoaneurysm.      DX-CHEST-PORTABLE (1 VIEW)   Final Result      1.  Persistent hypoinflation with elevation of RIGHT hemidiaphragm.   2.  Minimal worsening RIGHT midlung atelectasis.   3.  Possible minimal RIGHT pleural effusion.      CT-CHEST,ABDOMEN,PELVIS WITH   Final Result      7.8 x 5 x 6.1 cm hypodense lesion in the right lobe of the liver is not significantly changed compared to prior. A pigtail catheter is seen within the lesion. This could represent a necrotic mass with superimposed infection. There is internal density    within the lesion which could  represent sequelae of a bleed into the lesion. There is a 9 x 6.6 mm pseudoaneurysm located adjacent to the pigtail catheter. Interventional radiology consultation recommended.      Other small hypodense hepatic lesions are again noted.      Linear tract from a previous pigtail catheter is seen within the right lobe of the liver.      Dilatation of the pancreatic duct is again noted.      Prominence of the common duct measures 1.2 cm.      Enlarged peripancreatic and aortocaval lymph nodes are again noted.      Parapelvic right renal cyst. Tiny hypodense right renal lesions are too small to characterize.      Atherosclerotic plaque.      Small to moderate right pleural effusion with overlying atelectasis/consolidation.      Colonic diverticulosis. Moderate amount of colonic stool.      Calcified uterine fibroid.      Heterogeneity of the endometrial stripe in the lower uterine body with dilatation of the uterine cavity versus endometrial thickening near the uterine fundus measuring 1.2 cm in thickness. Further evaluation with pelvic ultrasound is recommended. This    can be seen in the uterine malignancy.      Fat-containing ventral hernia.      Borderline prominent subcarinal lymph node is nonspecific.         EC-ECHOCARDIOGRAM COMPLETE W/O CONT   Final Result      CT-DRAIN-LIVER ABSCESS-CYST   Final Result      1.  Successful CT-guided drainage of liver abscess.   2.  The previously placed migrated pigtail drainage catheter was removed.      US-THORACENTESIS PUNCTURE RIGHT   Final Result      1. Ultrasound guided right sided diagnostic thoracentesis.      2. 450 mL of fluid withdrawn.      DX-CHEST-PORTABLE (1 VIEW)   Final Result      Mild right basilar atelectasis.      CT-ABDOMEN W/O   Final Result      Hepatic dome abscess pigtail catheter has pulled out and now terminates just deep to the thoracic cage. Despite this the size of the abscess has diminished measuring 75 x 48 from 88 x 64 mm      New mild left  hepatic pneumobilia      Ampullary region mass is difficult to precisely measure but does appear to exert some mass effect upon the third portion of the duodenum. No bowel obstruction is detected.      New medium-sized right ovarian pleural effusion does not have loculation features but there is subsegmental atelectasis      DX-CHEST-PORTABLE (1 VIEW)   Final Result      New interstitial opacity is worrisome for edema      Hilar prominence could be from lymphadenopathy or pulmonary arterial hypertension      Stable right hemidiaphragm elevation      IR-PICC LINE PLACEMENT   Final Result                  Ultrasound-guided PICC placement performed by qualified nursing staff as    above.          CT-HEAD W/O   Final Result      1. Cerebral atrophy.   2. White matter lucencies most consistent with small vessel ischemic change versus demyelination or gliosis.   3. Remote LEFT cerebellar infarction   4. Otherwise, Head CT without contrast with no acute findings. No evidence of acute cerebral infarction, hemorrhage or mass lesion.      CT-DRAIN-LIVER ABSCESS-CYST   Final Result      Successful CT-guided drain placement in the right lobe liver necrosis/abscess.      CT-ABDOMEN-PELVIS WITH   Final Result      1.  Interval significant increase in size of low-attenuation area in the dome of the right lobe of liver which contains air. This may represent necrosis or hemorrhage following therapy. Stable appearance of other smaller low-attenuation areas in the    right lobe of the liver without evidence of progression of disease in these areas of metastasis. Differential diagnosis would include post therapy infection.      2.  Stable masslike density in the region of the ampulla of Vater consistent with primary carcinoma. Current dimensions are 3.1 x 2.6 cm.      3.  Persistent low-attenuation areas in the pancreatic head and proximal body which may represent secondary pancreatitis.      4.  Stable upper retroperitoneal  adenopathy.      5.  Small umbilical hernia containing fat.      DX-CHEST-PORTABLE (1 VIEW)   Final Result      No pulmonary consolidation.           Assessment/Plan  Liver abscess- (present on admission)   Assessment & Plan    Drain dc 12/3/18  Cultures enterococcus [VRE]  ID following   Continue daptomycin IV, unasyn iv, fluconozole per ID stop date 12/20 with repeat CT abdomen/pelvis at that time.       Bacteremia- (present on admission)   Assessment & Plan    Received treatment for E. coli bacteremia in early November, her port was removed  Admitted with sepsis  Blood cultures from 11/12/2018 are growing lactobacillus, Candida albicans and strep viridans. Liver abscess cultures from 11/13/2018 are positive for VRE.  Currently on unasyn/PO diflucan ID following.     PICC placed 11/21/18  IV daptomycin, IV unasyn, fluconazole tentative stop date 12/20 per ID, then repeat CT to see if further IV abx needed.  Repeat 11/24 CT guided liver abscess pigtail catheter placed after initial was found to be displaced.  TTE negative for endocarditis.  EF 65%.  ID making recommendation for home infusion                 Sepsis (HCC)- (present on admission)   Assessment & Plan    This is sepsis (without associated acute organ dysfunction).   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         Fungemia   Assessment & Plan    ID following        Deep vein thrombosis (CMS-HCC) [I82.409]- (present on admission)   Assessment & Plan    11/29  dc'd eliquis due to bleeding into JACQUELINE drain from pseudoaneurysm.  Last DVT 4/4/17.  No need to continue eliquis.       Iron deficiency anemia due to chronic blood loss- (present on admission)    Assessment & Plan    Dr Birmingham ordered iron replacement       Pseudoaneurysm following procedure (HCC)   Assessment & Plan     Pseudoaneurysm of hepatic artery encircling drain.  IR consult to Dr. Higginbotham who embolized artery.  dc'd eliquis 5mg bid.  JACQULEINE drain discontinued 12/3.         Pleural effusion- (present on admission)   Assessment & Plan    Thoracentesis 11/23/18 - 450cc removed - cytology showed no malignant cells\     Cough   Assessment & Plan    CT shows pleural effusion right lung - thoracentesis improved symptom  Responded well to diuresis             Lactic acidosis- (present on admission)   Assessment & Plan    Improved with fluid resuscitation and treatment of infection            VTE prophylaxis: SCDs, eliquis stopped d/t bleeding.

## 2018-12-06 NOTE — PROGRESS NOTES
Received report from day RN, assumed care of PT at 1900. PT A&Ox 4, PICC running ABX, on isolation precautions for VRE, no unmet needs at this time, discussed plan of care for this shift.  Pain managed with meds per MAR. PT up SBAx1 w/FWW, safety precautions in place. Call light and personal possessions within reach.

## 2018-12-06 NOTE — DISCHARGE INSTRUCTIONS
Discharge Instructions    Discharged to home by car with relative. Discharged via wheelchair, hospital escort: Yes.  Special equipment needed: Not Applicable    Be sure to schedule a follow-up appointment with your primary care doctor or any specialists as instructed.     Discharge Plan:   Diet Plan: Discussed  Activity Level: Discussed  Confirmed Follow up Appointment: Appointment Scheduled  Confirmed Symptoms Management: Discussed  Influenza Vaccine Indication: Not indicated: Previously immunized this influenza season and > 8 years of age    I understand that a diet low in cholesterol, fat, and sodium is recommended for good health. Unless I have been given specific instructions below for another diet, I accept this instruction as my diet prescription.   Other diet: regilar    Special Instructions: None    · Is patient discharged on Warfarin / Coumadin?   No     Depression / Suicide Risk    As you are discharged from this [facility] facility, it is important to learn how to keep safe from harming yourself.    Recognize the warning signs:  · Abrupt changes in personality, positive or negative- including increase in energy   · Giving away possessions  · Change in eating patterns- significant weight changes-  positive or negative  · Change in sleeping patterns- unable to sleep or sleeping all the time   · Unwillingness or inability to communicate  · Depression  · Unusual sadness, discouragement and loneliness  · Talk of wanting to die  · Neglect of personal appearance   · Rebelliousness- reckless behavior  · Withdrawal from people/activities they love  · Confusion- inability to concentrate     If you or a loved one observes any of these behaviors or has concerns about self-harm, here's what you can do:  · Talk about it- your feelings and reasons for harming yourself  · Remove any means that you might use to hurt yourself (examples: pills, rope, extension cords, firearm)  · Get professional help from the community  (Mental Health, Substance Abuse, psychological counseling)  · Do not be alone:Call your Safe Contact- someone whom you trust who will be there for you.  · Call your local CRISIS HOTLINE 725-3897 or 731-525-9101  · Call your local Children's Mobile Crisis Response Team [place] (434) 577-1153 or www.[employer]  · Call the toll free National Suicide Prevention Hotlines   · National Suicide Prevention Lifeline 255-137-FIYD (0338)  · National Hope Line Network 800-SUICIDE (949-2646)

## 2018-12-06 NOTE — DISCHARGE PLANNING
Anticipated Discharge Disposition: Home/[facility]    Action: Received return call from Barry. Reports No copay or PA needed for Zyvox.    Notified Dr. Brown of this in rounds.    Hard copy Rx provided to bedside RN Sola.     Barriers to Discharge: none    Plan: Home with [facility].  Pt's son Sea obtained all needed RX.

## 2018-12-06 NOTE — DISCHARGE PLANNING
ATTN: Case Management  RE: Referral for Home Health                We would like to take this opportunity to thank you for submitting a referral for your patient to continue care with [facility]. Our skilled team is dedicated to helping all patients recover and gain independence in the home setting.            As of 12/6/18, we have accepted the above patient into our service. A [facility] clinician will be out to see the patient within 48 hours to conduct our initial visit. If you have any questions or concerns regarding the patient’s transition to Home Health, please do not hesitate to contact us. We are open for referrals 7 days a week from 8AM to 5PM at [phone].      We look forward to collaborating with you,  [facility] Team

## 2018-12-06 NOTE — PROGRESS NOTES
Infectious Disease Progress Note    Author: Yuli Olmedo M.D. Date & Time of service: 12/6/2018  8:34 AM    Interval History:  73-year-old female with metastatic ampullary carcinoma of the pancreas presented with generalized weakness and shaking chills.  11/13/2018-no fevers.  Complains of generalized malaise and fatigue.  Still has some pain in her right shoulder.  WBC count is 22,000.  Cultures remain negative.  11/14/2018 T-max is 98.2.  No new issues overnight.  Underwent IR drainage on 11/13/2018.  Her shoulder pain has eased since then.  11/15/2018 no fevers.  WBC 13.3 creatinine 0.47  11/16/2018 no fevers.  Denies any increased abdominal pain.  The shoulder pain is improved as well.  WBC is 12.8.  11/17/2018 no fevers.  Shoulder pain is much improved.  Denies any new issues.  11/18- no fevers. No new issues> WBC 16.7  11/19/2018 no fevers.  WBC 15.6 no new issues overnight  11/20 AF (99.7) WBC 15.3 states eating better-denies SE abx No abd pain today  11/21 Af (99.9) WBC 9.8 minimal output from drain-plan for PICC today  11/22/2018 T-max is 100.3.  No new issues overnight.  WBCs 10.  AST 40 ALT 17  11/23/2018 no fevers.  The drain is not draining much.  Underwent thoracentesis today.  11/24/2018 underwent another drain placement.  Complains of some abdominal pain otherwise denies any complaints.  WBC is 8.4  11/25/2018 complains of malaise and fatigue.  Complains of some abdominal pain.  WBC 5.9, Labs Reviewed, Medications Reviewed and Radiology Reviewed.  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 Labs and micro results reviewed. Recent imaging reviewed.    EUN, Vitals reviewed- pt AF, O2 RA, Labs and micro results reviewed. Recent imaging reviewed. Pt with some nausea this am and poor appetite.    EUN, Vitals reviewed- pt AF, O2 RA, Labs and micro results reviewed. Recent imaging reviewed. Medications reviewed.   12/3 afebrile, white count 7.6.  Continues to have some dry cough.  Tolerating antibiotics, no new issues.   afebrile, white count 13.4.  Reports that she continues to have dry cough, which she describes as her trying to get rid of a sensation of mucus in her throat.  Tolerating antibiotics, no new issues.   T-max 99, white count down to 10.6.  Primary team obtained CT scan given some abdominal distention and drop in hemoglobin.  Patient reports feeling a little cold, abdominal pain has improved.  2018 no fevers.  Denies any abdominal pain.  Patient is anxious to go home  Review of Systems:  Review of Systems   Constitutional: Negative for chills, diaphoresis, fever and malaise/fatigue.   Respiratory: Negative for cough, sputum production and shortness of breath.    Cardiovascular: Negative for chest pain.   Gastrointestinal: Negative for abdominal pain, constipation, diarrhea, nausea and vomiting.   Genitourinary: Negative for dysuria.   Musculoskeletal: Negative for myalgias.   Skin: Negative for itching and rash.   All other systems reviewed and are negative.      Hemodynamics:  Temp (24hrs), Av.6 °C (97.8 °F), Min:36.1 °C (97 °F), Max:37.1 °C (98.8 °F)  Temperature: 37.1 °C (98.8 °F)  Pulse  Av.9  Min: 67  Max: 112   Blood Pressure : 137/64       Physical Exam:  Physical Exam   Constitutional: She is oriented to person, place, and time. She appears well-developed and well-nourished. No distress.   HENT:   Head: Normocephalic and atraumatic.   Mouth/Throat: No oropharyngeal exudate.    Eyes: Pupils are equal, round, and reactive to light. Conjunctivae and EOM are normal. No scleral icterus.   Neck: Normal range of motion.   Cardiovascular: Normal rate, regular rhythm and normal heart sounds.    Pulmonary/Chest: Effort normal. No respiratory distress. She has no wheezes.   Diminished basal breath sounds   Abdominal: Soft. Bowel sounds are normal. There is no tenderness. There is no rebound and no guarding.   Right upper quadrant drain removed, dressing in place, nontender   Musculoskeletal: She exhibits no edema.   Lymphadenopathy:     She has no cervical adenopathy.   Neurological: She is alert and oriented to person, place, and time.   No gross focal neuro deficit   Skin: Skin is warm and dry. She is not diaphoretic.   Nursing note and vitals reviewed.      Meds:    Current Facility-Administered Medications:   •  cefTRIAXone (ROCEPHIN) IV  •  guaiFENesin  •  prochlorperazine  •  DAPTOmycin  •  ipratropium-albuterol  •  fluconazole  •  senna-docusate **AND** polyethylene glycol/lytes **AND** magnesium hydroxide **AND** bisacodyl  •  Respiratory Care per Protocol  •  ondansetron  •  ondansetron  •  acetaminophen  •  Notify provider if pain remains uncontrolled **AND** Use the numeric rating scale (NRS-11) on regular floors and Critical-Care Pain Observation Tool (CPOT) on ICUs/Trauma to assess pain **AND** Pulse Ox (Oximetry) **AND** Pharmacy Consult Request **AND** If patient difficult to arouse and/or has respiratory depression, stop any opiates that are currently infusing and call a Rapid Response. **AND** oxyCODONE immediate-release **AND** oxyCODONE immediate-release **AND** morphine injection  •  NS  •  omeprazole  •  artificial tears  •  albuterol    Labs:  Recent Labs      12/04/18   0015  12/05/18   0030  12/06/18   0600   WBC  13.4*  10.6  11.0*   RBC  3.64*  3.04*  3.24*   HEMOGLOBIN  10.2*  8.8*  9.5*   HEMATOCRIT  31.7*  27.1*  29.2*   MCV  87.1  89.1  90.1   MCH  28.0  28.9  29.3    RDW  51.0*  53.4*  54.6*   PLATELETCT  362  322  296   MPV  9.8  9.9  9.8     Recent Labs      12/04/18   0015  12/05/18   0030  12/06/18   0600   SODIUM  135  136  136   POTASSIUM  3.7  3.4*  3.3*   CHLORIDE  109  109  112   CO2  19*  20  19*   GLUCOSE  104*  130*  115*   BUN  6*  7*  5*   CPKTOTAL   --    --   12     Recent Labs      12/04/18   0015  12/05/18   0030  12/06/18   0600   ALBUMIN  2.1*  1.9*  1.9*   TBILIRUBIN  0.6  0.4  0.4   ALKPHOSPHAT  153*  134*  133*   TOTPROTEIN  6.8  6.1  6.3   ALTSGPT  13  10  9   ASTSGOT  22  16  17   CREATININE  0.58  0.57  0.51       Imaging:  Ct-abdomen W/o    Result Date: 11/22/2018 11/22/2018 1:11 PM HISTORY/REASON FOR EXAM:  Liver abscess follow-up. Metastatic ampullary adenocarcinoma TECHNIQUE/EXAM DESCRIPTION: CT scan of the abdomen without contrast. Noncontrast helical sections were obtained from the diaphragmatic domes through the iliac crests Low dose optimization technique was utilized for this CT exam including automated exposure control and adjustment of the mA and/or kV according to patient size. COMPARISON: 11/12/18 and 11/13/2018 FINDINGS: Limited by lack of IV contrast New moderate right low-density layering pleural effusion with adjacent compressive atelectasis Right hepatic pigtail catheter has been displaced and now terminates deep to the anterior rib cage. It is no longer located in the hepatic dome abscess. The abscess is 75 x 48 mm in axial dimensions, diminished from 88 x 64 mm. Again it has fluid and gas  components There is some new left hepatic biliary favored over portal venous gas No abnormal perihepatic fluid collections detected. No visualized abscess is seen in the upper abdomen With lack of contrast it is difficult to evaluate the ampullary region mass. No gross interval change is seen and again there is artifact in the region from embolization. No free air Some mass effect on the third portion of the duodenum secondary to the mass. No  adrenal mass or splenomegaly Medium-sized umbilical hernia contains fat. Previously a small loop of small bowel extending into the defect. No hydronephrosis     Hepatic dome abscess pigtail catheter has pulled out and now terminates just deep to the thoracic cage. Despite this the size of the abscess has diminished measuring 75 x 48 from 88 x 64 mm New mild left hepatic pneumobilia Ampullary region mass is difficult to precisely measure but does appear to exert some mass effect upon the third portion of the duodenum. No bowel obstruction is detected. New medium-sized right ovarian pleural effusion does not have loculation features but there is subsegmental atelectasis    Ct-abdomen-pelvis With    Result Date: 11/12/2018 11/12/2018 1:38 PM HISTORY/REASON FOR EXAM:  Abdominal pain. History of metastatic ampullary adenocarcinoma. TECHNIQUE/EXAM DESCRIPTION: CT scan of the abdomen and pelvis with contrast. Contrast-enhanced helical scanning was obtained from the diaphragmatic domes through the pubic symphysis following the bolus administration of 80 mL of Omnipaque 350 without complication. Low dose optimization technique was utilized for this CT exam including automated exposure control and adjustment of the mA and/or kV according to patient size. COMPARISON: 10/25/2018 FINDINGS: The visualized lung bases are clear. CT Abdomen: A small hiatal hernia is present. There is interval increase in size of an area of low attenuation in the dome of the right lobe of the liver which contains air. This may represent necrosis or post therapy hemorrhage following embolization. This area measures 8.8 x 7.8 x 6.4 cm. Previous measurement was 2.2 cm in maximum dimension. Multiple smaller areas of low-attenuation within the right lobe of liver are present which are suspicious for hepatic metastases. These other smaller low-attenuation areas are without significant change. The gallbladder absent. A low-attenuation area in the region of  the pancreatic head is again noted and is poorly defined measuring approximately 3.1 x 2.6 cm in diameter. This is consistent with the area of ampullary carcinoma. Embolization coils are present in the GDA. There is low attenuation in the pancreatic body proximally which may represent low-attenuation  secondary to pancreatitis or spread of neoplasm. The common duct is dilated proximal to the level of the ampulla measuring a maximum diameter of 1.6 cm. No choledocholithiasis is identified. An enlarged retroperitoneal lymph node between the upper aorta and IVC is unchanged measuring 1.5 cm in diameter. No new area of retroperitoneal adenopathy is identified. No peripancreatic adenopathy is present. The spleen appears normal. The splenic vein and portal vein are patent. The adrenal glands are not enlarged and the kidneys enhance symmetrically. Small simple cysts in the right kidney are stable. There is no lymphadenopathy. The aorta and IVC are normal in caliber. The bowel is without obstruction. The appendix appears normal. There is a small umbilical hernia containing fat. CT Pelvis: There is no lymphadenopathy. No free fluid is present. The bladder appears normal. Uterus as endometrial complex thickening. There is a myomatous type calcification in the right aspect of the uterus which is unchanged. There is no acute inflammatory process.     1.  Interval significant increase in size of low-attenuation area in the dome of the right lobe of liver which contains air. This may represent necrosis or hemorrhage following therapy. Stable appearance of other smaller low-attenuation areas in the right lobe of the liver without evidence of progression of disease in these areas of metastasis. Differential diagnosis would include post therapy infection. 2.  Stable masslike density in the region of the ampulla of Vater consistent with primary carcinoma. Current dimensions are 3.1 x 2.6 cm. 3.  Persistent low-attenuation areas in  the pancreatic head and proximal body which may represent secondary pancreatitis. 4.  Stable upper retroperitoneal adenopathy. 5.  Small umbilical hernia containing fat.    Ct-chest,abdomen,pelvis With    Result Date: 11/29/2018 11/29/2018 1:12 PM HISTORY/REASON FOR EXAM:  Leukocytosis. Liver abscess. TECHNIQUE/EXAM DESCRIPTION: CT scan of the chest, abdomen and pelvis with contrast. Helical scanning was obtained with intravenous contrast from the lung apices through the pubic symphysis to include the chest, abdomen and pelvis.  100 mL of Omnipaque 350 nonionic contrast was administered intravenously without complication. Low dose optimization technique was utilized for this CT exam including automated exposure control and adjustment of the mA and/or kV according to patient size. COMPARISON: 11/22/2018 FINDINGS: Atherosclerotic plaque is seen in the aorta. There is coronary artery calcification. There is a small amount of pericardial fluid. There is a small to moderate large right pleural effusion with overlying atelectasis/consolidation. There are small mediastinal lymph nodes. Subcarinal lymph node measures 8 mm in short axis dimension. There are small hilar lymph nodes bilaterally. Anterior mediastinal lymph node measures 6.2 mm in short axis dimension. There are small axillary lymph nodes. No pneumothorax is identified. Examination is limited by respiratory motion. There is mild left lower lobe and right middle lobe atelectasis. No free air is seen in the abdomen or pelvis. There is a pigtail catheter within a hypodense hepatic lesion measuring 7.8 x 5 x 6.1 cm which is not significantly changed compared to prior. Adjacent to the pigtail catheter, there is a focal collection of contrast measuring 9 x 6.6 mm compatible with a pseudoaneurysm. A feeding vessel is seen. There is heterogeneous density within the lesion. There could have been bleeding into the lesion. No active hemorrhage is currently identified.  Small hypodense lesions are again noted within the right lobe of the liver. Portal vein is patent. Gallbladder is surgically absent. Prominence of the common duct measures 1.2 cm. There is distention of the cystic duct. There is dilatation of the pancreatic duct. Streak  artifact from embolization coils is seen. There is a linear tract in the right lobe of the liver from a prior drain located more anteriorly. Spleen is not enlarged. No adrenal mass is identified. There is a parapelvic cyst on the right. Tiny hypodense right renal lesions are too small to characterize. There is no evidence of hydronephrosis. Atherosclerotic plaque is seen in the aorta. There are small inguinal lymph nodes. Enlarged aortocaval lymph node measures 1.4 cm in short axis dimension. Enlarged peripancreatic lymph nodes are seen. There is no evidence of bowel obstruction. There is colonic diverticulosis. There is a moderate amount of colonic stool. Terminal ileum is unremarkable. There is no evidence of acute appendicitis. There are postsurgical changes of the bowel in the left abdomen. A small lymph node is seen adjacent to the distal esophagus. Bladder is mildly distended. Uterus is anteverted. There is a calcified uterine fibroid. There is Heterogeneity of the endometrial stripe in the lower uterine body with dilatation of the uterine cavity versus endometrial thickening near the uterine fundus measuring 1.2 cm in thickness. Calcific densities in the pelvis likely represent phleboliths. No free fluid is seen in the pelvis. Degenerative changes are seen in the spine. There is a fat-containing ventral hernia.     7.8 x 5 x 6.1 cm hypodense lesion in the right lobe of the liver is not significantly changed compared to prior. A pigtail catheter is seen within the lesion. This could represent a necrotic mass with superimposed infection. There is internal density within the lesion which could represent sequelae of a bleed into the lesion. There  is a 9 x 6.6 mm pseudoaneurysm located adjacent to the pigtail catheter. Interventional radiology consultation recommended. Other small hypodense hepatic lesions are again noted. Linear tract from a previous pigtail catheter is seen within the right lobe of the liver. Dilatation of the pancreatic duct is again noted. Prominence of the common duct measures 1.2 cm. Enlarged peripancreatic and aortocaval lymph nodes are again noted. Parapelvic right renal cyst. Tiny hypodense right renal lesions are too small to characterize. Atherosclerotic plaque. Small to moderate right pleural effusion with overlying atelectasis/consolidation. Colonic diverticulosis. Moderate amount of colonic stool. Calcified uterine fibroid. Heterogeneity of the endometrial stripe in the lower uterine body with dilatation of the uterine cavity versus endometrial thickening near the uterine fundus measuring 1.2 cm in thickness. Further evaluation with pelvic ultrasound is recommended. This can be seen in the uterine malignancy. Fat-containing ventral hernia. Borderline prominent subcarinal lymph node is nonspecific.     Ct-drain-liver Abscess-cyst    Result Date: 11/27/2018 11/24/2018 9:02 AM HISTORY/REASON FOR EXAM:  current drain pulled out of abscess, needs to be repositioned. Moderate sedation was administered with continuous monitoring of the patient under the direct supervision of  Medication: 100 mcg of fentanyl and 2 mg of Versed Total sedation time: 30 minutes The biopsy/drainage was done utilizing low dose optimization CT techniques including auto modulation for  imaging and low mA CT/fluoroscopy mode for the procedure. TECHNIQUE/EXAM DESCRIPTION AND NUMBER OF VIEWS:  After the informed consent the patient was placed on the angiographic table and supine position. Localization CT scan demonstrates a low-density area in the right lobe of the liver. The previously placed catheter was migrated upwards. The previously  placed catheter was. After injecting lidocaine, a 17-gauge introducer needle was advanced into the fluid collection. After confirming the needle position, a wire was introduced. After dilating the tract, a new 10 Jordanian pigtail catheter was advanced and placed with its loop in the fluid collection. The tube is attached to the suction bulb. The patient tolerated procedure without any immediate complication.     1.  Successful CT-guided drainage of liver abscess. 2.  The previously placed migrated pigtail drainage catheter was removed.    Ct-drain-liver Abscess-cyst    Result Date: 11/13/2018 11/13/2018 4:01 PM HISTORY/REASON FOR EXAM:  liver abscess. Moderate sedation was administered with continuous monitoring of the patient under the direct supervision of  Medications: 2 mg of Versed and 100 mcg of fentanyl Total sedation time: 30 minutes The biopsy/drainage was done utilizing low dose optimization CT techniques including auto modulation for  imaging and low mA CT/fluoroscopy mode for the procedure. TECHNIQUE/EXAM DESCRIPTION AND NUMBER OF VIEWS:  After the informed consent the patient was placed on the CT table on supine position. Localization CT scan demonstrates hypodense area in the right lobe of the liver. The skin over the lesion was prepped. Subsequently after injecting lidocaine a 17-gauge needle was advanced into the hypodense area. Dark-colored fluid was aspirated. A wire was released. Subsequently a 10 Jordanian guiding catheter was advanced and placed with its loop in the liver. An approximately 100 mL of fluid was drained. Sample material was sent to the microbiology. The patient tolerated the procedure without any immediate complication.     Successful CT-guided drain placement in the right lobe liver necrosis/abscess.    Ct-head W/o    Result Date: 11/19/2018 11/19/2018 9:49 PM HISTORY/REASON FOR EXAM:  Acute onset of altered mental status and incontinence. History of ampullary  region tumor. TECHNIQUE/EXAM DESCRIPTION AND NUMBER OF VIEWS: CT of the head without contrast. The study was performed on a GE CT scanner. Contiguous 5 mm axial sections were obtained from the skull base through the vertex. Up to date radiation dose reduction adjustments have been utilized to meet ALARA standards for radiation dose reduction. COMPARISON:  PET CT 7/12/2018 FINDINGS: The calvariae and skull base are unremarkable. There are no extraaxial fluid collections. There is a pattern of cerebral atrophy manifest as enlargement of sulcal markings and ventricular prominence.  The ventricular system and basal cisterns are otherwise unremarkable. There are areas of hypodensity in the white matter most consistent with small vessel ischemic change versus demyelination or gliosis. There are no hemorrhagic lesions. There are no mass effects or shift of midline structures. There is a small area of encephalomalacia in the LEFT cerebellum. This is unchanged. The brainstem and posterior fossa structures are otherwise unremarkable. The paranasal sinuses and mastoids in the field of view are unremarkable.     1. Cerebral atrophy. 2. White matter lucencies most consistent with small vessel ischemic change versus demyelination or gliosis. 3. Remote LEFT cerebellar infarction 4. Otherwise, Head CT without contrast with no acute findings. No evidence of acute cerebral infarction, hemorrhage or mass lesion.    Dx-chest-portable (1 View)    Result Date: 11/23/2018 11/23/2018 2:14 PM HISTORY/REASON FOR EXAM:  Pleural Effusion. Status post right thoracentesis TECHNIQUE/EXAM DESCRIPTION AND NUMBER OF VIEWS: Single portable view of the chest. COMPARISON: 11/21/2018 FINDINGS: Single portable view of the chest demonstrates a stable cardiac silhouette and mediastinal contours. Calcification is in the aortic arch. The right hemidiaphragm is elevated. There is mild atelectasis in the right lung base. No pneumothorax is identified. Pigtail  catheter projects over the right upper quadrant. There are multiple surgical clips. A right PICC line remains in place.     Mild right basilar atelectasis.    Dx-chest-portable (1 View)    Result Date: 11/21/2018 11/21/2018 5:31 PM HISTORY/REASON FOR EXAM: Cough. Congestion for a week TECHNIQUE/EXAM DESCRIPTION AND NUMBER OF VIEWS: Single AP view of the chest. COMPARISON: November 12 FINDINGS: Hardware: Right PICC line tip overlies the cavoatrial junction Pigtail catheter overlies the elevated right hemidiaphragm, not fully visualized Lungs: Increasing perihilar opacity with some bronchial thickening noted. Minor fissure is also thickened. Pleura:  No pleural space process is seen. Heart and mediastinum: There is similar hilar and cardiac silhouette enlargement.     New interstitial opacity is worrisome for edema Hilar prominence could be from lymphadenopathy or pulmonary arterial hypertension Stable right hemidiaphragm elevation    Dx-chest-portable (1 View)    Result Date: 11/12/2018 11/12/2018 11:06 AM HISTORY/REASON FOR EXAM:  Sepsis. TECHNIQUE/EXAM DESCRIPTION AND NUMBER OF VIEWS: Single portable view of the chest. COMPARISON: 10/25/2018 FINDINGS: There is no evidence of focal consolidation or evidence of pulmonary edema. There is no evidence of pleural effusion. The heart is normal in size.     No pulmonary consolidation.    Ir-visceral Angiogram - Selective    Result Date: 11/30/2018 11/29/2018 6:29 PM HISTORY/REASON FOR EXAM:  Hepatic artery pseudoaneurysm Moderate sedation was administered with continuous monitoring of the patient under the direct supervision of  Medication: 4 mg of  Versed and 100 mcg of fentanyl Contrast: 90 mL of Omnipaque 300 Total fluoroscopic time: 12.7 minutes Total number of images stored in the PACS: 224 TECHNIQUE/EXAM DESCRIPTION AND NUMBER OF VIEWS: After the informed consent the patient was placed on the fluoroscopy table on supine position. The skin over the  right groin was prepped. After injecting lidocaine a 5 Macedonian vascular sheath was introduced. Subsequently, a diagnostic catheter was advanced into the celiac trunk. Celiac angiogram was performed. It demonstrated a pseudoaneurysm from the right hepatic branch. Subsequently a microcatheter was successfully advanced into the branch supplying the pseudoaneurysm. Microcatheter angiogram confirms the pseudoaneurysm and the catheter position. Subsequently metallic fiber coils were used to embolize the branch supplying the segment aneurysm. The final angiogram demonstrates occluded branch with stagnant flow within the pseudoaneurysm. The catheters were removed. The right superficial femoral angiogram does not demonstrate any significant stenosis. The right femoral sheath was removed and hemostasis was obtained with Angio-Seal device.     1.  Right hepatic artery pseudoaneurysm. 2.  Successful embolization of branch supplying the pseudoaneurysm.    Us-thoracentesis Puncture Right    Result Date: 11/23/2018 11/23/2018 1:15 PM HISTORY/REASON FOR EXAM:  Fluid collection TECHNIQUE/EXAM DESCRIPTION: Ultrasound-guided thoracentesis. Indication:  RIGHT pleural fluid collection. COMPARISON:  None PROCEDURE:     Informed consent was obtained. A timeout was taken. A right pleural effusion was localized with real-time ultrasound guidance. The right posterior chest wall was prepped and draped in a sterile manner. Following local anesthesia with 1% lidocaine, a 5 Macedonian Yueh pigtail catheter was advanced into the pleural space with trocar technique and pleural fluid was drained. The patient tolerated the procedure well without evidence of complication. A post thoracentesis chest radiograph is forthcoming. FINDINGS: Fluid was  sent to the laboratory. Fluid character: straw colored     1. Ultrasound guided right sided diagnostic thoracentesis. 2. 450 mL of fluid withdrawn.    Ir-picc Line Placement    Result Date:  2018  HISTORY/REASON FOR EXAM:   PICC placement. TECHNIQUE/EXAM DESCRIPTION AND NUMBER OF VIEWS:   PICC line insertion with ultrasound guidance.  The procedure was performed using maximal sterile barrier technique including sterile gown, mask, cap, and donning of sterile gloves following appropriate hand hygiene and/or sterile scrub. Patient skin site was prepped with 2% Chlorhexidine solution. FINDINGS:  PICC line insertion with Ultrasound Guidance was performed by qualified nursing staff without the assistance of a Radiologist. PICC positioning appropriateness confirmed by 3CG technology; chest xray only needed in the instance 3CG unable to confirm placement.              Ultrasound-guided PICC placement performed by qualified nursing staff as above.     Ec-echocardiogram Complete W/o Cont    Result Date: 2018  Transthoracic Echo Report Echocardiography Laboratory CONCLUSIONS No prior study is available for comparison. Normal transthoracic echocardiogram. JANELLE BALDERAS Exam Date:         2018                    08:43 Exam Location:     Inpatient Priority:          Routine Ordering Physician:        ADAMA STOCKTON Referring Physician: Sonographer:               Maisha Leavitt RDCS Age:    73     Gender:    F MRN:    [id number] :    1945 BSA:    1.64   Ht (in):    60     Wt (lb):    148 Exam Type:     Complete Indications:     Endocarditis and heart valve disorders in diseases                  classified elsewhere ICD Codes:       I39 CPT Codes:       47892 BP:   111    /   77     HR:   79 Technical Quality:       Fair MEASUREMENTS  (Male / Female) Normal Values 2D ECHO LV Diastolic Diameter PLAX        3.5 cm                4.2 - 5.9 / 3.9 - 5.3 cm LV Systolic Diameter PLAX         2.1 cm                2.1 - 4.0 cm IVS Diastolic Thickness           1 cm                  LVPW Diastolic Thickness          1.1 cm                LVOT Diameter                     1.6 cm                RA  Diameter                       2.9 cm                Estimated LV Ejection Fraction    65 %                  LV Ejection Fraction MOD BP       69.8 %                >= 55  % LV Ejection Fraction MOD 4C       61.7 %                LV Ejection Fraction MOD 2C       80.8 %                LA Volume Index                   10.7 cm³/m²           16 - 28 cm³/m² IVC Diameter                      1.7 cm                M-MODE Aortic Root Diameter MM           2.4 cm                DOPPLER AV Peak Velocity                  1.4 m/s               AV Peak Gradient                  8 mmHg                AV Mean Gradient                  3.4 mmHg              LVOT Peak Velocity                0.78 m/s              AV Area Cont Eq vti               1.3 cm²               Mitral E Point Velocity           0.52 m/s              Mitral E to A Ratio               0.53                  MV Pressure Half Time             64.3 ms               MV Area PHT                       3.4 cm²               MV Deceleration Time              222 ms                TR Peak Velocity                  235 cm/s              PV Peak Velocity                  0.84 m/s              PV Peak Gradient                  2.8 mmHg              RVOT Peak Velocity                0.69 m/s              * Indicates values subject to auto-interpretation LV EF:  65    % FINDINGS Left Ventricle Normal left ventricular size and systolic function. Normal left ventricular wall thickness. Normal diastolic function. Normal regional wall motion. Left ventricular ejection fraction is visually estimated to be 65%. Right Ventricle Normal right ventricular size and systolic function. Right Atrium Normal right atrial size. Normal inferior vena cava size and inspiratory collapse. Left Atrium Normal left atrial size. Mitral Valve Structurally normal mitral valve. Mild mitral regurgitation. No mitral stenosis. Aortic Valve Structurally normal aortic valve without significant stenosis or  "regurgitation. Tricuspid Valve Structurally normal tricuspid valve. Mild tricuspid regurgitation. Right atrial pressure is estimated to be 3 mmHg. Estimated right ventricular systolic pressure  is 35 mmHg. Pulmonic Valve Structurally normal pulmonic valve without significant stenosis or regurgitation. Pericardium Normal pericardium without effusion. Aorta The aortic root is normal. Gurwinder Mayberry MD (Electronically Signed) Final Date:     28 November 2018                 09:42      Micro:  Results     Procedure Component Value Units Date/Time    BLOOD CULTURE [[id number]] Collected:  11/29/18 1246    Order Status:  Completed Specimen:  Blood from Peripheral Updated:  12/04/18 1500     Significant Indicator NEG     Source BLD     Site PERIPHERAL     Blood Culture No growth after 5 days of incubation.    Narrative:       Contact  Per Hospital Policy: Only change Specimen Src: to \"Line\" if  specified by physician order.    BLOOD CULTURE [[id number]] Collected:  11/29/18 1200    Order Status:  Completed Specimen:  Blood from Peripheral Updated:  12/04/18 1300     Significant Indicator NEG     Source BLD     Site PERIPHERAL     Blood Culture No growth after 5 days of incubation.    Narrative:       Contact  Per Hospital Policy: Only change Specimen Src: to \"Line\" if  specified by physician order.          Assessment:  Superinfected necrotic mass of liver versus liver abscess  Bacteroides bacteremia, completed treatment  Candida albicans candidemia, completed treatment  Recent E. coli bacteremia  Ampullary carcinoma of the pancreas, stage IV    Plan:     Bloody drainage from abdominal drain- 2/2  Pseudoaneurysm, resolved.  - CT C/A/P on 11/29 with: \"Adjacent to the pigtail catheter, there is a focal collection of   contrast measuring 9 x 6.6 mm compatible with a pseudoaneurysm. A feeding vessel is seen. There is heterogeneous density within the lesion.\"     - Pt was seen by IR and embolization performed on 11/29   - " "Drain removed 12/3  - Repeat CT 12/5 with no evidence of active extravasation per my read     Liver abscess vs. superinfected necrotic mass- bowel dunia on cultures and in setting of bacteremia and pancreatic cancer, no clear bowel perforation but this is a concern, ampullary pancreatic mass and mass effect on duodenum noted.  Per CT on 11/29 this could be a necrotic mass with superimposed infection.   - CT scan 11/12 show with area of necrosis/hemorrhage, s/p IR drainage on 11/13/2018-- Cultures +  lactobacillus, VRE and Candida albicans and strep viridans  - Underwent CT scan on 11/22/2018- size of the abscess has diminished measuring 75 x 48 from 88 x 64 mm but abscess still present and contains air with new mild left hepatic pneumobilia  - Underwent another drain placement on 11/24/2018 as catheter was not correctly posittioned   - CT A/P on 11/29 due to concern for bleeding: \"7.8 x 5 x 6.1 cm hypodense lesion in the right lobe of the liver is not significantly changed compared to prior. A pigtail catheter is seen within the lesion. This could represent a necrotic mass with superimposed infection. There is internal density within the lesion which could represent sequelae of a bleed into the lesion.\" Catheter within the lesion and not significantly changed from last CT.       Drain was removed on 12/3/2018  Patient needs antibiotics through 12/20/2018.  I am going to change her to p.o. cefdinir plus Flagyl plus Zyvox.  She will continue with the p.o. Diflucan  The PICC can be pulled if okay with hematology    Bacteremia, completed therapy  Blood cultures + Bacteroides  Repeat blood cultures x2 are negative from 11/15/2018  -has completed abx course       Candidemia- Candida albicans, completed therapy  Repeat blood cultures x2 are negative from 11/15/2018  Changed to p.o. Diflucan  - see abx above   - TTE to rule-out IE in setting of candidemia - no vegetations seen    - has completed a candidemia abx course " "    Recent E. coli bacteremia  -From 10/25  -Repeat blood cultures from 10/27 were negative, being covered with the current antibiotic therapy that she is on    Dry cough   -Monitor for now  -Encourage incentive spirometry given atelectasis on chest x-ray     Pleural effusions  - Thora 11/23, cx with no growth   - The CT scan showed pleural effusion hence underwent thoracentesis on 11/23/2018.  2752 WBCs with 46% polys.  Gram stain and cultures negative.   - CT chest on 11/29 \"moderate large right pleural effusion with overlying atelectasis/consolidation\"  - Continue abx as above but if pt develops new fevers or respiratory distress would need repeat thoracentesis with cultures      Ampullary carcinoma of the pancreas stage IV  Contributory factor to above         Prognosis guarded     DW primary team.      ID will follow.  Please call with questions.     "

## 2018-12-07 NOTE — PROGRESS NOTES
"Received call from Richard Osei was $2400 for medication, called back to Barry at [facility] in [place] and asked him why he had said it was a $0 copay, he said it was run as a \"partial\" prescription and the full one wasn't resulted to him.  He gave me the prior authorization number to call [phone] with ID# [id number], prescription # [id number] or #[id number].  "

## 2018-12-07 NOTE — DISCHARGE PLANNING
"12/07/18  Pt discharged yesterday.     Received information from Dr. Brown r/t needed PA for Zyvox.   Contacted [phone] David s/w [employer], provided member ID #[id number].  Reviewed needed medication. Received faxed confirmation of PAR.   Contacted [facility] on [facility] [phone].  Reviewed with and advised Rae of information received from this pharmacy, yesterday. Rae sts it was an error.  Advised a PAR had been and this CM RN needed confirmation of available supply of medication.  Rae sts, \"We have 14 pills available. The family can pick it up and they won't have to pay the copay of $285.00 until full shipment is received on Monday Dec 10th.\"    Contacted pt's son Sea, [phone], notified of above information, provided  of Rae.  He sts no issues in obtaining medication.        "

## 2018-12-07 NOTE — DISCHARGE SUMMARY
Discharge Summary    CHIEF COMPLAINT ON ADMISSION  Chief Complaint   Patient presents with   • Shaking     started 1 hr ago, recent hx of same and her port was removed       Reason for Admission  Shaking/Shoulder Pain     Admission Date  11/12/2018    CODE STATUS  Prior    HPI & HOSPITAL COURSE  This is a 73 y.o. female here with chills and shaking that started 1 hour prior to arrival to the hospital.  She had right upper quadrant pain and CT showed liver fluid collection consistent with abscess.  General surgery was consulted and recommended IR place drain in the fluid collection rather than surgical intervention.  Drain was placed on 11/13 and fluid collection did drain slightly, she had blood cultures positive for candida and liver fluid cultures positive for VRE and candida.  She was seen by infectious disease who have been managing antibiotics and antifungal medications until discharge.   PICC line was placed for continued antibiotics and anticipation of need for IV antibiotics on discharge.  On day of discharge ID felt she could be managed on oral medications and these have been prescribed as requested.  Patient also had replacement of drain on 11/24 as it was no longer within the fluid collection, more fluid drained but on 11/29 it became bloody and she was found to have a hepatic pseudoaneurysm adjacent to the drain which required embolization and she had no further bleeding.  She also required thoracentesis on 11/23 for a right pleural effusion.  She was also seen by her oncologist and recommended chemo and radiation be delayed until antibiotics have finished on 12/20/18.       Therefore, she is discharged in good and stable condition to home with organized home healthcare and close outpatient follow-up.    The patient met 2-midnight criteria for an inpatient stay at the time of discharge.    Discharge Date  12/6/2018    FOLLOW UP ITEMS POST DISCHARGE  12/6/18     DISCHARGE DIAGNOSES  Active Problems:     Ampullary carcinoma (HCC) POA: Yes    Sepsis (HCC) POA: Yes    Bacteremia POA: Yes    Liver abscess POA: Yes    Deep vein thrombosis (CMS-HCC) [I82.409] POA: Yes    Fungemia POA: Unknown    Lactic acidosis POA: Yes    Cough POA: Unknown    Pleural effusion POA: Yes    Pseudoaneurysm following procedure (HCC) POA: No    Iron deficiency anemia due to chronic blood loss POA: Yes  Resolved Problems:    Advance care planning POA: Unknown      FOLLOW UP  Future Appointments  Date Time Provider Department Center   3/11/2019 9:30 AM [facility] EXAM 4 [facility] None     Wally Knox D.O.  [address]  [facility] [address]  [phone]    Go on 12/13/2018  PLEASE ARRIVE 2:10PM FOR YOUR APPOINTMENT. THANK YOU    [place] [address] [address] [facility] [place] [address]  [phone]          MEDICATIONS ON DISCHARGE     Medication List      START taking these medications      Instructions   cefdinir 300 MG Caps  Commonly known as:  OMNICEF   Take 1 Cap by mouth every 12 hours for 14 days.  Dose:  300 mg     fluconazole 200 MG Tabs  Start taking on:  12/7/2018  Commonly known as:  DIFLUCAN   Take 2 Tabs by mouth every day for 13 days.  Dose:  400 mg     linezolid 600 MG Tabs  Commonly known as:  ZYVOX   Take 1 Tab by mouth every 12 hours for 14 days.  Dose:  600 mg     metroNIDAZOLE 500 MG Tabs  Commonly known as:  FLAGYL   Take 1 Tab by mouth every 8 hours for 14 days.  Dose:  500 mg     oxyCODONE immediate-release 5 MG Tabs  Commonly known as:  ROXICODONE   Take 1 Tab by mouth every 3 hours as needed for Severe Pain for up to 14 days.  Dose:  5 mg        CONTINUE taking these medications      Instructions   losartan-hydrochlorothiazide 50-12.5 MG per tablet  Commonly known as:  HYZAAR   Take 1 Tab by mouth every day.  Dose:  1 Tab     omeprazole 20 MG delayed-release capsule  Commonly known as:  PRILOSEC   TAKE 1 CAPSULE BY MOUTH EVERYDAY     potassium chloride SA 10 MEQ Tbcr  Commonly known as:  K-DUR   Take 2 Tabs by mouth 2  times a day.  Dose:  20 mEq     RESTASIS 0.05 % ophthalmic emulsion  Generic drug:  cyclosporin   Place 1 Drop in both eyes 2 times a day.  Dose:  1 Drop        STOP taking these medications    ELIQUIS 5mg Tabs  Generic drug:  apixaban     NS SOLN 100 mL with cefTRIAXone 2 GM SOLR 2 g     vancomycin 50 mg/mL 50 mg/mL Soln            Allergies  Allergies   Allergen Reactions   • Tape Unspecified     Sensitive skin       DIET  No orders of the defined types were placed in this encounter.      ACTIVITY  As tolerated.  Weight bearing as tolerated    CONSULTATIONS  Yuli Olmedo - [facility] Yang - surgery  Victor Hugo Zaidi - [facility] - oncology    PROCEDURES  11/13/18 - hepatic drain  11/21/18 - PICC placement  11/23/18 - thoracentesis  11/24/18 - repositioning hepatic drain  11/29/18 - embolization hepatic pseudoaneurysm    LABORATORY  Lab Results   Component Value Date    SODIUM 136 12/06/2018    POTASSIUM 3.3 (L) 12/06/2018    CHLORIDE 112 12/06/2018    CO2 19 (L) 12/06/2018    GLUCOSE 115 (H) 12/06/2018    BUN 5 (L) 12/06/2018    CREATININE 0.51 12/06/2018        Lab Results   Component Value Date    WBC 11.0 (H) 12/06/2018    HEMOGLOBIN 9.5 (L) 12/06/2018    HEMATOCRIT 29.2 (L) 12/06/2018    PLATELETCT 296 12/06/2018        Total time of the discharge process exceeds 40 minutes.

## 2018-12-10 NOTE — TELEPHONE ENCOUNTER
Received referral from [facility]. Medications reviewed against discharge summary 12/6/18. No clinically significant interactions or medication issues noted.     Sri Johnson, PharmD

## 2018-12-11 NOTE — TELEPHONE ENCOUNTER
Called patient to schedule a follow up appointment with . Patient is scheduled for 12/19/18 1000, left voicemail.

## 2018-12-17 PROBLEM — Z66 DNR (DO NOT RESUSCITATE): Status: ACTIVE | Noted: 2018-01-01

## 2018-12-17 PROBLEM — E83.42 HYPOMAGNESEMIA: Status: ACTIVE | Noted: 2018-01-01

## 2018-12-17 PROBLEM — R11.2 NON-INTRACTABLE VOMITING WITH NAUSEA: Status: ACTIVE | Noted: 2018-01-01

## 2018-12-17 NOTE — TELEPHONE ENCOUNTER
Pt's son Sea called stating pt is feeling tired, has a loss of appetite, and generally not feeling well. Wanted to know if an earlier appt could be made or he would take pt to the ER. I offered to reschedule appt to 12/18/18, but son refused.  Sent information to Dr. Olmedo who said the pt is terminally ill and needs to the ER.  Called son back and informed him, he said already working on that.  -AMP

## 2018-12-17 NOTE — ED TRIAGE NOTES
Pt to triage .  Chief Complaint   Patient presents with   • Weakness     increased weakness for 2 days. hst of recent admission for a liver abcsess    • RUQ Pain   pt to triage for ekg

## 2018-12-17 NOTE — ED NOTES
Coleen from Lab called with critical result of Sodium 110 at 12:09. Critical lab result read back to Coleen lab.   Dr. Loza notified of critical lab result at 12:11. Critical lab result read back by Dr. Loza.

## 2018-12-17 NOTE — ED NOTES
Med rec completed per pt's family  Allergies reviewed    Pt is currently on 4 antibiotics and started all of them on 12-6-18

## 2018-12-17 NOTE — ED PROVIDER NOTES
ED Provider Note    Scribed for Sherman Loza M.D. by Nas Callejas. 12/17/2018  12:10 PM    Primary care provider: Wally Knox D.O.  Means of arrival: walk in  History obtained from: patient  History limited by: none    CHIEF COMPLAINT  Chief Complaint   Patient presents with   • Weakness     increased weakness for 2 days. hst of recent admission for a liver abcsess    • RUQ Pain       HPI  Elin Poole is a 73 y.o. Female with a history of metastatic ampullary carcinoma who presents to the Emergency Department complaining of gradually worsening weakness for the last 2 days. Patient reports associated abdominal pain. Son reports that she has been improving since her discharge until 2 days ago when she started to decline. He states that she has been increasingly weak and nonverbal. Son has been maintaining her PO food and fluids intake during her convalescence. Family denies fever, vomiting, diarrhea.     REVIEW OF SYSTEMS  Pertinent positives include weakness, abdominal pain.   Pertinent negatives include no fever, vomiting, diarrhea.    All other systems reviewed and negative. See HPI for further details.     PAST MEDICAL HISTORY   has a past medical history of Ampullary carcinoma (HCC); Anemia; Arthritis; Asthma; Blood clotting disorder (HCC); Cancer (HCC) (2016); Cataract (09-); Dental disorder; Encounter for antineoplastic chemotherapy (4/6/2018); Heart burn; High cholesterol; Hypertension; Indigestion; and Jaundice (2016).    SURGICAL HISTORY   has a past surgical history that includes stent placement (7/2016); whipple procedure (8/15/2016); node dissection (8/15/2016); cholecystectomy; cath placement (Right, 9/9/2016); tonsillectomy; and cataract extraction with iol.    SOCIAL HISTORY  Social History   Substance Use Topics   • Smoking status: Never Smoker   • Smokeless tobacco: Never Used   • Alcohol use No      History   Drug Use No       FAMILY HISTORY  Family History   Problem  "Relation Age of Onset   • Cancer Other         unknown cancer       CURRENT MEDICATIONS  Home Medications     Reviewed by Ana Blankenship R.N. (Registered Nurse) on 12/17/18 at 1032  Med List Status: Partial   Medication Last Dose Status   acetaminophen (TYLENOL) 325 MG Tab prn Active   artificial tear (ARTIFICIAL TEARS) 0.4 % ophthalmic solution 12/17/2018 Active   cefdinir (OMNICEF) 300 MG Cap 12/17/2018 Active   fluconazole (DIFLUCAN) 200 MG Tab 12/17/2018 Active   linezolid (ZYVOX) 600 MG Tab 12/17/2018 Active   loperamide (IMODIUM) 2 MG Cap 12/17/2018 Active   losartan-hydrochlorothiazide (HYZAAR) 50-12.5 MG per tablet 12/17/2018 Active   metroNIDAZOLE (FLAGYL) 500 MG Tab 12/17/2018 Active   Multiple Vitamins-Minerals (MULTIVITAMIN & MINERAL PO) 12/17/2018 Active   non-formulary med 12/17/2018 Active   omeprazole (PRILOSEC) 20 MG delayed-release capsule 12/17/2018 Active   ondansetron (ZOFRAN ODT) 4 MG TABLET DISPERSIBLE 12/17/2018 Active   oxyCODONE immediate-release (ROXICODONE) 5 MG Tab 12/17/2018 Active   polyethyl glycol-propyl glycol (SYSTANE) 0.4-0.3 % Solution 12/17/2018 Active   potassium chloride SA (K-DUR) 10 MEQ Tab CR 12/17/2018 Active   tramadol (ULTRAM) 50 MG Tab pr Active                ALLERGIES  Allergies   Allergen Reactions   • Tape Unspecified     Sensitive skin       PHYSICAL EXAM  VITAL SIGNS: /83   Pulse (!) 124   Temp 36.1 °C (97 °F) (Temporal)   Resp 14   Ht 1.676 m (5' 6\")   Wt 47.2 kg (104 lb)   SpO2 96%   BMI 16.79 kg/m²     Nursing note and vitals reviewed.  Constitutional: Well-developed and well-nourished. No distress. Chronically ill appearing.   HENT: Head is normocephalic and atraumatic. Oropharynx is clear and dry without exudate or erythema. Dry mucous membranes.  Eyes: Pupils are equal, round, and reactive to light. Conjunctiva are normal.   Cardiovascular: Tachycardic, regular rhythm. No murmur heard. Normal radial pulses.  Pulmonary/Chest: Breath sounds " normal. No wheezes or rales.   Abdominal: Soft and non-tender. No distention    Musculoskeletal: Extremities exhibit normal range of motion without edema or tenderness.   Neurological: Awake, alert. Follows verbal commands. No focal deficits noted.  Skin: Skin is warm and dry. No rash.   Psychiatric: Normal mood and affect. Appropriate for clinical situation.    DIAGNOSTIC STUDIES / PROCEDURES    EKG Interpretation  Interpreted by me as below.     LABS  Results for orders placed or performed during the hospital encounter of 12/17/18   CBC WITH DIFFERENTIAL   Result Value Ref Range    WBC 9.0 4.8 - 10.8 K/uL    RBC 3.82 (L) 4.20 - 5.40 M/uL    Hemoglobin 11.3 (L) 12.0 - 16.0 g/dL    Hematocrit 33.4 (L) 37.0 - 47.0 %    MCV 87.4 81.4 - 97.8 fL    MCH 29.6 27.0 - 33.0 pg    MCHC 33.8 33.6 - 35.0 g/dL    RDW 64.6 (H) 35.9 - 50.0 fL    Platelet Count 174 164 - 446 K/uL    MPV 10.0 9.0 - 12.9 fL    Neutrophils-Polys 86.60 (H) 44.00 - 72.00 %    Lymphocytes 8.00 (L) 22.00 - 41.00 %    Monocytes 3.10 0.00 - 13.40 %    Eosinophils 0.30 0.00 - 6.90 %    Basophils 0.40 0.00 - 1.80 %    Immature Granulocytes 1.60 (H) 0.00 - 0.90 %    Nucleated RBC 0.20 /100 WBC    Neutrophils (Absolute) 7.79 (H) 2.00 - 7.15 K/uL    Lymphs (Absolute) 0.72 (L) 1.00 - 4.80 K/uL    Monos (Absolute) 0.28 0.00 - 0.85 K/uL    Eos (Absolute) 0.03 0.00 - 0.51 K/uL    Baso (Absolute) 0.04 0.00 - 0.12 K/uL    Immature Granulocytes (abs) 0.14 (H) 0.00 - 0.11 K/uL    NRBC (Absolute) 0.02 K/uL   COMP METABOLIC PANEL   Result Value Ref Range    Sodium 110 (LL) 135 - 145 mmol/L    Potassium 5.3 3.6 - 5.5 mmol/L    Chloride 90 (L) 96 - 112 mmol/L    Co2 14 (L) 20 - 33 mmol/L    Anion Gap 6.0 0.0 - 11.9    Glucose 139 (H) 65 - 99 mg/dL    Bun 14 8 - 22 mg/dL    Creatinine 0.69 0.50 - 1.40 mg/dL    Calcium 8.7 8.5 - 10.5 mg/dL    AST(SGOT) 40 12 - 45 U/L    ALT(SGPT) 21 2 - 50 U/L    Alkaline Phosphatase 267 (H) 30 - 99 U/L    Total Bilirubin 0.6 0.1 - 1.5 mg/dL     Albumin 2.6 (L) 3.2 - 4.9 g/dL    Total Protein 7.9 6.0 - 8.2 g/dL    Globulin 5.3 (H) 1.9 - 3.5 g/dL    A-G Ratio 0.5 g/dL   LIPASE   Result Value Ref Range    Lipase 95 (H) 11 - 82 U/L   ESTIMATED GFR   Result Value Ref Range    GFR If African American >60 >60 mL/min/1.73 m 2    GFR If Non African American >60 >60 mL/min/1.73 m 2   EKG (NOW)   Result Value Ref Range    Report       [facility] Emergency Dept.    Test Date:  2018  Pt Name:    JANELLE BALDERAS             Department: ER  MRN:        [id number]                      Room:  Gender:     Female                       Technician: [id number]  :        1945                   Requested By:ER TRIAGE PROTOCOL  Order #:    [id number]                    Reading MD:    Measurements  Intervals                                Axis  Rate:       117                          P:          30  PA:         156                          QRS:        37  QRSD:       64                           T:          147  QT:         288  QTc:        402    Interpretive Statements  SINUS TACHYCARDIA  NONSPECIFIC T ABNORMALITIES, ANT-LAT LEADS  BASELINE WANDER IN LEAD(S) V4  Compared to ECG 2018 10:58:31  No significant changes     All labs reviewed by me.    RADIOLOGY  CT-HEAD W/O    (Results Pending)   DX-CHEST-PORTABLE (1 VIEW)    (Results Pending)   The radiologist's interpretation of all radiological studies have been reviewed by me.    COURSE & MEDICAL DECISION MAKING  Nursing notes, VS, PMSFHx reviewed in chart.     Review of past medical records shows the patient was discharged 11 days ago from the hospital for liver abscess She had a drainage procedure performed by IR. She has a history of metastatic.ampullary carcinoma.     12:10 PM - Patient seen and examined at bedside. Discussed her evaluation in the ED and admission to the hospital. Patient will be treated with ns 1000 ml. Intravenous fluids were administered for hyponatremia. Ordered CBC  with differential, CMP, Lipase, Urinalysis to evaluate her symptoms. The differential diagnoses include but are not limited to: hyponatremia     12:22 PM - I discussed the patient's case and the above findings with Dr. Lemon (hospitalist) who agrees to admit the patient.     Patient presents today with generalized weakness.  She has significant hyponatremia with a sodium of 110.  Cautious of sodium replacement has been initiated with normal saline at 250 mL's per hour for 1 L.  She will require a recheck of her sodium level.    She certainly does seem to be dehydrated on exam.  However the exact etiology of her profound hyponatremia is unclear.  She has not had any seizure activity.  No respiratory symptoms or headache.  Spoke with the on-call intensivist.  He recommended we get a head CT.  This is been ordered.  I ordered a chest x-ray to evaluate for any pulmonary pathology as a possible cause of SIADH.  Patient will be admitted to the hospital for further evaluation and treatment.    DISPOSITION:  Patient will be admitted to hospital in critical condition.    CRITICAL CARE  I provided critical care services, which included medication orders, frequent reevaluations of the patient's condition and response to treatment, ordering and reviewing test results, and discussing the case with various consultants.  The critical care time associated with the care of the patient was 35 minutes. Review chart for interventions. This time is exclusive of any other billable procedures.      FINAL IMPRESSION  1. Hyponatremia    2. Dehydration    3. Generalized weakness     Critical care time 35 minutes as above.      Nas LOWERY (Pascale), am scribing for, and in the presence of, Sherman Loza M.D..    Electronically signed by: Nas Callejas (Pascale), 12/17/2018    Sherman LOWERY M.D. personally performed the services described in this documentation, as scribed by Nas Callejas in my presence, and it  is both accurate and complete.    The note accurately reflects work and decisions made by me.  Sherman Loza  12/17/2018  1:35 PM

## 2018-12-18 NOTE — ASSESSMENT & PLAN NOTE
Improving, has reached a low normal  Urinalysis and urine lites reviewed again with nephrology  Continue serial BMP  Remains off salt tabs  Titrate 3% saline daily as needed  Continue maintenance dose IV normal saline at 75 cc/h  IV Lasix at low dose to facilitate free water removal, no change  Continue to avoid hypotonic fluids  Monitor for the need to treat with Vaprisol

## 2018-12-18 NOTE — ASSESSMENT & PLAN NOTE
Monitor with daily CBC using conservative transfusion strategy  No bleeding clinically  No change current plan

## 2018-12-18 NOTE — PROGRESS NOTES
2 RN Skin check complete.    Areas of note:     Previous drain site in RUQ of abdomen, healing, covered in biatin light. Skin CDI.  Old abdominal midline incision scar.  Sacrum discolored but blanching.

## 2018-12-18 NOTE — ASSESSMENT & PLAN NOTE
Repletion and monitor daily  Also monitor and replete potassium  Replete IV for now given ileus and GI symptoms

## 2018-12-18 NOTE — CONSULTS
Critical Care Consultation    Date of consult: 12/17/2018    Referring Physician  Dr. Jerome Barrios    Reason for Consultation  Severe hyponatremia    History of Presenting Illness  73 y.o. 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 Cedeno.  She was admitted from November 12 - December 6 to [facility] where she was found to have a liver abscess that grew out VRE and Candida.  She was treated with intravenous antibiotics followed by transition to oral antibiotics by infectious disease before discharge home.  She was doing fairly well and able to walk with minimal assistance and tolerating minimal diet and her medications up until 2 days ago when she started to have increased generalized weakness, drowsiness, headache and today with nausea and vomiting.  She was brought into the emergency department by her son and daughter and was found to be severely hyponatremic with a sodium level of 110.  She underwent CT imaging of her brain as well as chest abdomen and pelvis and is admitted to the intensive care unit where I was consulted for assistance in her critical care management.  Per her son, patient is always preferred a low-salt diet and has had hyponatremia in the past although on chart review has been as low as 128 but never down to 110.  She frequently has nausea and vomiting with her chemotherapy medications but her son feels that she has been drinking adequate amounts of free water with her pills and meals.  No seizure activity was noted at home nor in the emergency department.    Code Status  DNAR/DNI    Review of Systems  Review of Systems   Constitutional: Positive for appetite change and fatigue. Negative for activity change, chills, fever and unexpected weight change.   HENT: Negative for congestion and trouble swallowing.    Eyes: Negative for photophobia and visual disturbance.    Respiratory: Negative for cough, shortness of breath and stridor.    Cardiovascular: Positive for leg swelling. Negative for chest pain and palpitations.   Gastrointestinal: Positive for nausea and vomiting. Negative for abdominal distention, abdominal pain, blood in stool, constipation and diarrhea.   Endocrine: Negative for cold intolerance and polyuria.   Genitourinary: Negative for difficulty urinating, flank pain and vaginal bleeding.   Musculoskeletal: Negative for back pain and neck pain.   Skin: Negative for pallor and rash.   Allergic/Immunologic: Positive for immunocompromised state.   Neurological: Positive for speech difficulty, weakness (Generalized), light-headedness and headaches. Negative for dizziness, tremors, seizures and numbness.   Hematological: Does not bruise/bleed easily.   Psychiatric/Behavioral: Negative for confusion. The patient is not nervous/anxious.    All other systems reviewed and are negative.      Past Medical History   has a past medical history of Ampullary carcinoma (HCC); Anemia; Arthritis; Asthma; Blood clotting disorder (HCC); Cancer (HCC) (2016); Cataract (09-); Dental disorder; Encounter for antineoplastic chemotherapy (4/6/2018); Heart burn; High cholesterol; Hypertension; Indigestion; and Jaundice (2016).    Surgical History   has a past surgical history that includes stent placement (7/2016); whipple procedure (8/15/2016); node dissection (8/15/2016); cholecystectomy; cath placement (Right, 9/9/2016); tonsillectomy; and cataract extraction with iol.    Family History  family history includes Cancer in her other.    Social History   reports that she has never smoked. She has never used smokeless tobacco. She reports that she does not drink alcohol or use drugs.    Medications  Home Medications     Reviewed by Collin Garay (Pharmacy Tech) on 12/17/18 at 1248  Med List Status: Complete   Medication Last Dose Status   acetaminophen (TYLENOL) 325 MG Tab PRN  Active   artificial tear (ARTIFICIAL TEARS) 0.4 % ophthalmic solution 12/17/2018 Active   cefdinir (OMNICEF) 300 MG Cap 12/17/2018 Active   fluconazole (DIFLUCAN) 200 MG Tab 12/17/2018 Active   linezolid (ZYVOX) 600 MG Tab 12/17/2018 Active   metroNIDAZOLE (FLAGYL) 500 MG Tab 12/17/2018 Active   Multiple Vitamins-Minerals (MULTIVITAMIN & MINERAL PO) 12/16/2018 Active   non-formulary med 12/16/2018 Active   omeprazole (PRILOSEC) 20 MG delayed-release capsule 12/16/2018 Active   ondansetron (ZOFRAN ODT) 4 MG TABLET DISPERSIBLE PRN Active   potassium chloride SA (K-DUR) 10 MEQ Tab CR 12/17/2018 Active   tramadol (ULTRAM) 50 MG Tab 12/16/2018 Active              Current Facility-Administered Medications   Medication Dose Route Frequency Provider Last Rate Last Dose   • acetaminophen (TYLENOL) tablet 325 mg  325 mg Oral Q6HRS PRN Jerome Barrios M.D.       • cefdinir (OMNICEF) capsule 300 mg  300 mg Oral Q12HRS Jerome Barrios M.D.   300 mg at 12/17/18 2146   • [START ON 12/18/2018] fluconazole (DIFLUCAN) tablet 400 mg  400 mg Oral DAILY Jerome Barrios M.D.       • linezolid (ZYVOX) tablet 600 mg  600 mg Oral Q12HRS Jerome Barrios M.D.       • metroNIDAZOLE (FLAGYL) tablet 500 mg  500 mg Oral Q8HRS Jerome Barrios M.D.   500 mg at 12/17/18 2146   • [START ON 12/18/2018] omeprazole (PRILOSEC) capsule 20 mg  20 mg Oral DAILY Jerome Barrios M.D.       • tramadol (ULTRAM) 50 MG tablet 50 mg  50 mg Oral Q6HRS PRN Jerome Barrios M.D.       • senna-docusate (PERICOLACE or SENOKOT S) 8.6-50 MG per tablet 2 Tab  2 Tab Oral BID Jerome Barrios M.D.        And   • polyethylene glycol/lytes (MIRALAX) PACKET 1 Packet  1 Packet Oral QDAY PRN Jerome Barrios M.D.        And   • magnesium hydroxide (MILK OF MAGNESIA) suspension 30 mL  30 mL Oral QDAY PRN Jerome Barrios M.D.        And   • bisacodyl (DULCOLAX) suppository 10 mg  10 mg Rectal QDAY PRN Jerome Barrios M.D.       •  ondansetron (ZOFRAN) syringe/vial injection 4 mg  4 mg Intravenous Q4HRS PRN Jeroem Barrios M.D.       • ondansetron (ZOFRAN ODT) dispertab 4 mg  4 mg Oral Q4HRS PRN Jerome Barrios M.D.   4 mg at 12/17/18 1839   • artificial tears 1.4 % ophthalmic solution 1 Drop  1 Drop Both Eyes Q2HRS PRN Jerome Barrios M.D.       • NS infusion   Intravenous Continuous Jerome Barrios M.D. 75 mL/hr at 12/17/18 1747     • prochlorperazine (COMPAZINE) injection 10 mg  10 mg Intravenous Q4HRS PRN Jeremy M Gonda, M.D.   10 mg at 12/17/18 2146       Allergies  Allergies   Allergen Reactions   • Tape Unspecified     Sensitive skin       Vital Signs last 24 hours  Temp:  [35.6 °C (96 °F)-36.1 °C (97 °F)] 35.7 °C (96.3 °F)  Pulse:  [] 90  Resp:  [14-26] 15  BP: (128)/(83) 128/83    Physical Exam  Physical Exam   Constitutional: She is oriented to person, place, and time. She appears well-developed. No distress.   Frail, elderly female   HENT:   Head: Normocephalic and atraumatic.   Nose: Nose normal.   Mouth/Throat: Mucous membranes are dry. No oropharyngeal exudate.   Eyes: Pupils are equal, round, and reactive to light. Conjunctivae are normal. No scleral icterus.   Neck: Neck supple. No JVD present. No tracheal deviation present.   Flattened neck veins   Cardiovascular: Normal rate, regular rhythm, normal heart sounds and intact distal pulses.    No murmur heard.  Pulmonary/Chest: Effort normal and breath sounds normal. No respiratory distress. She has no wheezes. She has no rales.   Abdominal: Soft. Bowel sounds are normal. She exhibits no distension. There is tenderness (Minimal right upper quadrant). There is no rebound.   Musculoskeletal: She exhibits edema. She exhibits no tenderness or deformity.   Lymphadenopathy:     She has no cervical adenopathy.   Neurological: She is alert and oriented to person, place, and time. No cranial nerve deficit. She exhibits normal muscle tone.   Skin: Skin is warm and  dry. No pallor.   Psychiatric: She has a normal mood and affect. Her behavior is normal. Judgment and thought content normal.   Nursing note and vitals reviewed.      Fluids    Intake/Output Summary (Last 24 hours) at 12/17/18 2151  Last data filed at 12/17/18 2000   Gross per 24 hour   Intake           539.17 ml   Output              550 ml   Net           -10.83 ml       Laboratory  Recent Results (from the past 48 hour(s))   CBC WITH DIFFERENTIAL    Collection Time: 12/17/18 10:18 AM   Result Value Ref Range    WBC 9.0 4.8 - 10.8 K/uL    RBC 3.82 (L) 4.20 - 5.40 M/uL    Hemoglobin 11.3 (L) 12.0 - 16.0 g/dL    Hematocrit 33.4 (L) 37.0 - 47.0 %    MCV 87.4 81.4 - 97.8 fL    MCH 29.6 27.0 - 33.0 pg    MCHC 33.8 33.6 - 35.0 g/dL    RDW 64.6 (H) 35.9 - 50.0 fL    Platelet Count 174 164 - 446 K/uL    MPV 10.0 9.0 - 12.9 fL    Neutrophils-Polys 86.60 (H) 44.00 - 72.00 %    Lymphocytes 8.00 (L) 22.00 - 41.00 %    Monocytes 3.10 0.00 - 13.40 %    Eosinophils 0.30 0.00 - 6.90 %    Basophils 0.40 0.00 - 1.80 %    Immature Granulocytes 1.60 (H) 0.00 - 0.90 %    Nucleated RBC 0.20 /100 WBC    Neutrophils (Absolute) 7.79 (H) 2.00 - 7.15 K/uL    Lymphs (Absolute) 0.72 (L) 1.00 - 4.80 K/uL    Monos (Absolute) 0.28 0.00 - 0.85 K/uL    Eos (Absolute) 0.03 0.00 - 0.51 K/uL    Baso (Absolute) 0.04 0.00 - 0.12 K/uL    Immature Granulocytes (abs) 0.14 (H) 0.00 - 0.11 K/uL    NRBC (Absolute) 0.02 K/uL   COMP METABOLIC PANEL    Collection Time: 12/17/18 10:18 AM   Result Value Ref Range    Sodium 110 (LL) 135 - 145 mmol/L    Potassium 5.3 3.6 - 5.5 mmol/L    Chloride 90 (L) 96 - 112 mmol/L    Co2 14 (L) 20 - 33 mmol/L    Anion Gap 6.0 0.0 - 11.9    Glucose 139 (H) 65 - 99 mg/dL    Bun 14 8 - 22 mg/dL    Creatinine 0.69 0.50 - 1.40 mg/dL    Calcium 8.7 8.5 - 10.5 mg/dL    AST(SGOT) 40 12 - 45 U/L    ALT(SGPT) 21 2 - 50 U/L    Alkaline Phosphatase 267 (H) 30 - 99 U/L    Total Bilirubin 0.6 0.1 - 1.5 mg/dL    Albumin 2.6 (L) 3.2 - 4.9 g/dL     Total Protein 7.9 6.0 - 8.2 g/dL    Globulin 5.3 (H) 1.9 - 3.5 g/dL    A-G Ratio 0.5 g/dL   LIPASE    Collection Time: 18 10:18 AM   Result Value Ref Range    Lipase 95 (H) 11 - 82 U/L   ESTIMATED GFR    Collection Time: 18 10:18 AM   Result Value Ref Range    GFR If African American >60 >60 mL/min/1.73 m 2    GFR If Non African American >60 >60 mL/min/1.73 m 2   EKG (NOW)    Collection Time: 18 10:40 AM   Result Value Ref Range    Report       [facility]    Test Date:  2018  Pt Name:    JANELLE BALDERAS             Department: ER  MRN:        [id number]                      Room:  Gender:     Female                       Technician: [id number]  :        1945                   Requested By:ER TRIAGE PROTOCOL  Order #:    [id number]                    Reading MD: LOLLY CANDELARIO MD    Measurements  Intervals                                Axis  Rate:       117                          P:          30  RI:         156                          QRS:        37  QRSD:       64                           T:          147  QT:         288  QTc:        402    Interpretive Statements  SINUS TACHYCARDIA  NONSPECIFIC T ABNORMALITIES, ANT-LAT LEADS  BASELINE WANDER IN LEAD(S) V4  Compared to ECG 2018 10:58:31  No significant changes    Electronically Signed On 2018 12:43:03 PST by LOLLY CANDELARIO MD     TSH (Thyroid Stimulating Hormone)    Collection Time: 18 10:48 AM   Result Value Ref Range    TSH 8.770 (H) 0.380 - 5.330 uIU/mL   Magnesium    Collection Time: 18 12:23 PM   Result Value Ref Range    Magnesium 1.5 1.5 - 2.5 mg/dL   BLOOD CULTURE,HOLD    Collection Time: 18 12:45 PM   Result Value Ref Range    Blood Culture Hold Collected    COMP METABOLIC PANEL    Collection Time: 18  4:00 PM   Result Value Ref Range    Sodium 112 (LL) 135 - 145 mmol/L    Potassium 5.1 3.6 - 5.5 mmol/L    Chloride 89 (L) 96 - 112 mmol/L    Co2 13  (L) 20 - 33 mmol/L    Anion Gap 10.0 0.0 - 11.9    Glucose 198 (H) 65 - 99 mg/dL    Bun 14 8 - 22 mg/dL    Creatinine 0.69 0.50 - 1.40 mg/dL    Calcium 8.1 (L) 8.5 - 10.5 mg/dL    AST(SGOT) 45 12 - 45 U/L    ALT(SGPT) 19 2 - 50 U/L    Alkaline Phosphatase 218 (H) 30 - 99 U/L    Total Bilirubin 0.5 0.1 - 1.5 mg/dL    Albumin 2.3 (L) 3.2 - 4.9 g/dL    Total Protein 7.0 6.0 - 8.2 g/dL    Globulin 4.7 (H) 1.9 - 3.5 g/dL    A-G Ratio 0.5 g/dL   ESTIMATED GFR    Collection Time: 12/17/18  4:00 PM   Result Value Ref Range    GFR If African American >60 >60 mL/min/1.73 m 2    GFR If Non African American >60 >60 mL/min/1.73 m 2       Imaging  CT-CHEST,ABDOMEN,PELVIS WITH   Final Result      1.  New dilatation of the esophagus diffusely. This may represent acute achalasia or reflux.      2.  No thoracic adenopathy or pleural effusion. No pulmonary consolidation.      3.  Stable appearance of low-attenuation area in the right lobe of the liver and several other low-attenuation areas which may represent metastases. Recurrent abscess cannot be excluded.      4.  New marked dilatation of the cecum and ascending colon with stool and fluid which may represent constipation or partial obstruction. No evidence of small bowel obstruction.      5.  No renal obstruction.      6.  No free fluid or abscess.      7.  New low-attenuation area within the uterus which could represent an area of uterine hemorrhage.      CT-HEAD W/O   Final Result      1.  No CT evidence of acute infarct, hemorrhage or mass.   2.  Mild to moderate global parenchymal atrophy and chronic small ischemic changes.   3.  Old left cerebellar infarct.      DX-CHEST-PORTABLE (1 VIEW)   Final Result      No acute cardiac or pulmonary abnormality is noted.       *Personally reviewed chest x-ray and compared to prior film showing no acute cardiopulmonary process, no tubes or lines   *Personally reviewed EKG and compared to prior showing sinus tachycardia with a rate of 117  with nonspecific T wave abnormalities in the anterior leads    Assessment/Plan  Liver abscess- (present on admission)   Assessment & Plan    Status post drainage during last hospital stay, now removed  Continue antibiotics per infectious disease prior recommendations     Hyponatremia- (present on admission)   Assessment & Plan    Severe, symptomatic  Unclear etiology, likely hypovolemic with components of chronic hyponatremia, low salt intake, potential medication induced  Urinalysis and urine lites pending  Very cautious replacement currently using normal saline at 75 mL's an hour with every 6 hours serum sodium levels  Goal sodium correction no greater than 8 mEq in the next 24 hours  Consider more aggressive correction should she have seizures or become obtunded  Seizure precautions     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)   Assessment & Plan    Chemo and radiation currently on hold  Oncology aware  Analgesia as needed  Palliative care consultation may be warranted depending on goals of care in the next 1-2 days     Hypokalemia- (present on admission)   Assessment & Plan    Repletion and monitor daily     Hypomagnesemia   Assessment & Plan    Repletion and monitor daily     Non-intractable vomiting with nausea   Assessment & Plan    Likely secondary to hyponatremia  Antiemetics including as needed Zofran and Compazine     DNR (do not resuscitate)- (present on admission)   Assessment & Plan          Iron deficiency anemia due to chronic blood loss- (present on admission)   Assessment & Plan    Monitor with daily CBC using conservative transfusion strategy     DNR/DNI, prophylaxis    Discussed patient condition and risk of morbidity and/or mortality with Hospitalist, Family, RN, RT, Pharmacy, Charge nurse / hot rounds and Patient.    The patient remains critically ill.  Critical care time = 35 minutes in directly providing and coordinating critical care and extensive data review.  No time overlap and  excludes procedures.

## 2018-12-18 NOTE — CARE PLAN
Problem: Communication  Goal: The ability to communicate needs accurately and effectively will improve    Intervention: Educate patient and significant other/support system about the plan of care, procedures, treatments, medications and allow for questions  Discuss plan of care with care team and updated family, all questions and concerns answered. Family demonstrated understanding.       Problem: Fluid Volume:  Goal: Will maintain balanced intake and output    Intervention: Monitor, educate, and encourage compliance with therapeutic intake of liquids  Will continue to monitor patients sodium levels appropriately

## 2018-12-18 NOTE — H&P
DATE OF ADMISSION:  12/17/2018                                                                                     IDENTIFICATION:  This is a 73-year-old female.    PRIMARY CARE PHYSICIAN:  Wally Knox DO    CHIEF COMPLAINT:  Weakness, lethargy and right-sided abdominal pain.    HISTORY OF PRESENT ILLNESS:  This is a 73-year-old female with a history of   ampullary carcinoma and underwent recent treatment for liver abscess where she   developed bacteremia and fungemia.  The patient is with a history of   chemotherapy up until approximately 6 months ago.  The patient also was   previously treated for high 90s.  She presents to the emergency room with   weakness increasing over the last several days and right abdominal pain.  The   patient is seen here in the emergency room and found with a significant   critical hyponatremia at 110.  She is to be admitted for further correction   and evaluation.  The patient is here with her daughter who gives some   additional history.    ALLERGIES:  TO TAPE.    MEDICATIONS:  Currently:  1.  Atenolol 25 mg p.o. q. 6 hours.  2.  Artificial tears 1 drop both eyes b.i.d.  3.  Cefdinir 300 mg p.o. q. 12 hours.  4.  Diflucan 200 mg 2 tablets p.o. daily.  5.  Zyvox 600 mg p.o. b.i.d.  6.  Flagyl 500 mg p.o. q. 8 hours.  7.  Multivitamin p.o. daily.  8.  ____ joint supplement p.o. daily.  9.  Prilosec 20 mg b.i.d.  10.  Zofran 4 mg p.r.n.  11.  Potassium chloride 10 mEq 2 tablets p.o. b.i.d.  12.  Tramadol 50 mg q. 6 hours.    PAST MEDICAL HISTORY:  1.  Recent liver abscess treatment where the patient had a drain placed.  This   was complicated by pseudoaneurysm, which required embolization.  The patient   is with previous cultures positive for VRE and Candida where she had fungemia   and bacteremia.  2.  History of ampullary carcinoma.  3.  Osteoarthritis.  4.  Anemia.  5.  History of clotting.  6.  Dyslipidemia.    PAST SURGICAL HISTORY:  1.  History of cataract repairs.  2.  History  of cholecystectomy.  3.  History of biliary stent placement.  4.  T and A.    SOCIAL HISTORY:  The patient is a nonsmoker, nondrinker.  She denies drug use.    She lives with family, daughter.    FAMILY HISTORY:  Negative for contributory condition.  She denies a history of   GI malignancy.    REVIEW OF SYSTEMS:  She denies fevers or chills.  HEENT:  Some headaches.  She is lethargic, otherwise no complaints.  CARDIOVASCULAR:  No chest pain, palpitation, PND or orthopnea.  LUNGS:  Without cough or sputum production.  GASTROINTESTINAL:  Significant severe nausea and right flank pain that is   worse over the last 1-2 days.  GENITOURINARY:  No dysuria or hematuria.  MUSCULOSKELETAL:  Without unusual back pain or joint pain.  NEUROLOGIC:  No focal weakness, numbness or tingling.  She is lethargic.  LYMPHATICS:  No swelling.  SKIN:  Without lesions.    PHYSICAL EXAMINATION:  VITAL SIGNS:  The patient is found to have temperature 36.1, pulse 124,   respirations 14, and blood pressure 128/87.  The patient is saturating 96% on   low-flow oxygen.  GENERAL:  This is a pleasant  male, in no acute distress, no acute   respiratory distress.  Well developed, well nourished.  HEENT:  Normocephalic, atraumatic.  Extraocular movements are moist.    Nasopharynx is otherwise clear.  NECK:  Stiff range of motion.  No lymphadenopathy, thyromegaly.  CHEST:  Normal bony structures.  LUNGS:  Clear to auscultation anteriorly.  HEART:  Regular rate.  S1 and S2 are normal.  No S3, S4.  ABDOMEN:  Protuberant.  There is some tenderness over the right upper   quadrant.  There is no rebound or guarding.  GENITOURINARY:  Normal external female genitalia.  RECTAL:  Deferred.  MUSCULOSKELETAL:  No clubbing, cyanosis or edema.  NEUROLOGIC:  The patient is alert and oriented x4.  She is lethargic, but   nonfocal.    LABORATORY DATA AND IMAGING:  White cell count is 9, hemoglobin 11.3,   hematocrit 32.4, platelet count 174.  Sodium is reported  at 110, potassium   5.3, chloride 90, bicarbonate 14, glucose 139.  Her alkaline phosphatase is   elevated at 267, albumin 2.6.  Her lipase is 95.  TSH is 8.7, magnesium is   1.5.  EKG is reported as sinus tachycardia at 117 without acute ST-T changes.    A chest x-ray is reported as no cardiopulmonary abnormality.  CT abdomen and   CT head is currently pending.    ASSESSMENT AND PLAN:  1.  Critical hyponatremia at 110, unclear etiology, possibly secondary to   syndrome of inappropriate antidiuretic hormone secretion versus other.  The   patient at this time is requiring ICU admission for q. 6 checks of sodium   levels and slow correction to avoid neurologic compromise, pontine   demyelinization syndrome.  The patient is currently on normal saline which   will be reduced from the ER physician's initial dosing of 200 mL per hour.  We   will assess her next sodium level and adjust her drip rate accordingly.  The   patient is also undergoing CT scanning of the head since she is lethargic and   certainly has some risk of injury.  2.  Nausea, vomiting, with right upper quadrant pain with a history of   ampullary tumor and liver abscess.  We will repeat a CT abdomen and pelvis to   reassess her status.  The patient might require additional surgical   consultation.  4.  Recent liver abscess with bacteremia and fungemia; currently on continued   antibiotic therapy, which has a stop date of 12/20/2018.  Reconsideration of   ID consult while in house and further treatment.  5.  Right flank pain with prior liver pathology.  Again, we will await   re-imaging and direct further therapy pending results.  6.  Metabolic acidosis.  7.  Significant protein-calorie malnutrition, at least moderate to severe.  8.  Anemia of prior iron deficiency.  9.  Do not resuscitate status.    OVERALL PLAN:  The patient is admitted to ICU with critical hyponatremia of   110, metabolic acidosis, liver pathology as outlined.  The patient will    require 2+ overnight stay in the hospital.  She is referred to the critical   care for additional management in the ICU.  She might require infectious   disease consultation as well as surgical consultation.    Time spent on this admission is 65 minutes.       ____________________________________     MD MOISÉS PEREZ / MELODY    DD:  12/17/2018 22:13:43  DT:  12/18/2018 02:38:14    D#:  [id number]  Job#:  [id number]

## 2018-12-18 NOTE — DISCHARGE PLANNING
[facility] Please write home care orders upon discharge to home for continuum of care.Please [facility] at  /[phone] if there are any questions.

## 2018-12-18 NOTE — ASSESSMENT & PLAN NOTE
Repeat aspiration 12/21 now growing gram-negative rods!  Status post drainage during last hospital stay and status post prolonged antibiotic course  Follow-up CT suggest persistence and necrotic tumor/abscess  Repeat IR drainage and placement of catheter 12/21  Current antibiotics include Zosyn, daptomycin, Diflucan  Follow-up imaging next week with repeat CT

## 2018-12-18 NOTE — ASSESSMENT & PLAN NOTE
Severe hyponatremia on admit with a sodium of 110 on admit thus ICU admission.  Was on hypertonic saline, unsure of cause of hyponatremia  Comfort care

## 2018-12-18 NOTE — ASSESSMENT & PLAN NOTE
Repletion and monitor daily  Also monitor and replete magnesium as needed  No change in current plan

## 2018-12-18 NOTE — DISCHARGE PLANNING
Care Transition Team Assessment  In the case of an emergency, pt's legal NOK is son, Sea Horner     RNCM called daughterCammy  and obtained the information used in this assessment. Pt verified accuracy of facesheet. Pt lives in a mobile home with daughter but has been staying at son's single story home.   Pt uses [facility] pharmacy on [address]. Prior to current hospitalization, pt intermitently needed assist ADLS/IADLS. Pt does not drive but  is able to attend necessary MD appointments.  Pt has a good support system. Pt denies any hx of substance use and denies any dx of mh. Pt was receiving HH with [facility] and would like to resume at d/c. Family will care for pt and will not consider SNF placement.      Information Source daughter  Orientation : Unable to Assess  Information Given By: Relative  Informant's Name:  (Rosa Reyes, daughter)  Who is responsible for making decisions for patient? : Patient         Elopement Risk  Legal Hold: No  Ambulatory or Self Mobile in Wheelchair: No-Not an Elopement Risk  Elopement Risk: Not at Risk for Elopement    Interdisciplinary Discharge Planning  Does Admitting Nurse Feel This Could be a Complex Discharge?: No  Primary Care Physician:  (Dr. Knox)  Lives with - Patient's Self Care Capacity: Adult Children  Support Systems: Family Member(s)  Housing / Facility: 1 Story House  Do You Take your Prescribed Medications Regularly: Yes  Mobility Issues: Yes  Prior Services: Skilled Home Health Services, Continuous (24 Hour) Care Giving Family, Intermittent Physical Support for ADL Per Service, Intermittent Physical Support for ADL Per Family  Durable Medical Equipment: Walker, Commode    Discharge Preparedness  What is your plan after discharge?: Home with help  What are your discharge supports?: Child  Prior Functional Level: Ambulatory, Independent with Medication Management, Needs Assist with Medication Management, Uses Cane, Uses Walker, Uses Wheelchair  Difficulity  with ADLs: Bathing, Toileting, Walking  Difficulity with IADLs: Cooking, Driving, Keeping track of finances, Laundry, Managing medication, Shopping, Using the telephone or computer    Functional Assesment  Prior Functional Level: Ambulatory, Independent with Medication Management, Needs Assist with Medication Management, Uses Cane, Uses Walker, Uses Wheelchair    Finances  Prescription Coverage: Yes              Advance Directive  Advance Directive?: DPOA for Health Care  Durable Power of  Name and Contact :  (Son, Sea Poole )         Psychological Assessment  History of Substance Abuse: None  History of Psychiatric Problems: No         Anticipated Discharge Information  Anticipated discharge disposition: [facility], Home  Discharge Address: [address] [place] [address]  Discharge Contact Phone Number: [phone]

## 2018-12-18 NOTE — PROGRESS NOTES
Patient transferred to S130 with ACLS RN and CCT.  Patient's daughter Cammy at bedside.  Both patient and patient updated on unit routine and plan of care.  Questions and concerns addressed.  Report given to receiving ROMANA Ledesma.

## 2018-12-18 NOTE — PROGRESS NOTES
[facility] Medicine Daily Progress Note    Date of Service  12/18/2018    Chief Complaint  73 y.o. female admitted 12/17/2018 with weakness.    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 She presented to the ER with progressive weakness and was found to have a severely low sodium of 110 and has been admitted to the ICU in guarded condition.       Interval Problem Update  12/18: RN notes that she has had SBP up to 160's. She is tolerating a full liquid diet though had an episode of emesis last night. She remains on IV fluids with D5 1/2 NS. Sodium is 117 this morning. Her repeat later this morning was 115.     Consultants/Specialty  Critical Care. I discussed with Dr. Machado on ICU Hot Rounds.     Code Status  DNR/DNI    Disposition  ICU    Review of Systems  Review of Systems   Constitutional: Positive for malaise/fatigue and weight loss. Negative for chills and fever.   Respiratory: Negative for shortness of breath.    Cardiovascular: Negative for chest pain.   Gastrointestinal:        Poor appetite    Neurological: Positive for weakness.   All other systems reviewed and are negative.       Physical Exam  Temp:  [35.6 °C (96 °F)-36.1 °C (97 °F)] 35.9 °C (96.6 °F)  Pulse:  [] 87  Resp:  [13-26] 13  BP: (128)/(83) 128/83    Physical Exam   Constitutional: She is oriented to person, place, and time. No distress.   HENT:   Head: Normocephalic and atraumatic.   Dry mucous membranes.    Neck: Neck supple.   Cardiovascular: Normal rate and regular rhythm.    No murmur heard.  Pulmonary/Chest: Effort normal. No respiratory distress. She has no wheezes.   Abdominal: She exhibits distension. There is no tenderness.   Musculoskeletal: She exhibits no edema or tenderness.   Neurological: She is alert and oriented to person, place, and time.   Skin: Skin is warm and dry. She is not diaphoretic.   Psychiatric: She has a normal mood and affect. Her  behavior is normal.   Nursing note and vitals reviewed.      Fluids    Intake/Output Summary (Last 24 hours) at 12/18/18 0748  Last data filed at 12/18/18 0600   Gross per 24 hour   Intake          1439.17 ml   Output              650 ml   Net           789.17 ml       Laboratory  Recent Labs      12/17/18   1018  12/18/18   0315   WBC  9.0  10.8   RBC  3.82*  3.62*   HEMOGLOBIN  11.3*  10.9*   HEMATOCRIT  33.4*  32.1*   MCV  87.4  88.7   MCH  29.6  30.1   MCHC  33.8  34.0   RDW  64.6*  66.5*   PLATELETCT  174  117*   MPV  10.0  10.2     Recent Labs      12/17/18   1600  12/17/18   2207  12/18/18   0315   SODIUM  112*  114*  117*   POTASSIUM  5.1  4.4  4.6   CHLORIDE  89*  90*  92*   CO2  13*  14*  15*   GLUCOSE  198*  198*  178*   BUN  14  12  11   CREATININE  0.69  0.67  0.56   CALCIUM  8.1*  8.7  9.2             Recent Labs      12/18/18   0315   TRIGLYCERIDE  73   HDL  38*   LDL  29       Imaging  CT-CHEST,ABDOMEN,PELVIS WITH   Final Result      1.  New dilatation of the esophagus diffusely. This may represent acute achalasia or reflux.      2.  No thoracic adenopathy or pleural effusion. No pulmonary consolidation.      3.  Stable appearance of low-attenuation area in the right lobe of the liver and several other low-attenuation areas which may represent metastases. Recurrent abscess cannot be excluded.      4.  New marked dilatation of the cecum and ascending colon with stool and fluid which may represent constipation or partial obstruction. No evidence of small bowel obstruction.      5.  No renal obstruction.      6.  No free fluid or abscess.      7.  New low-attenuation area within the uterus which could represent an area of uterine hemorrhage.      CT-HEAD W/O   Final Result      1.  No CT evidence of acute infarct, hemorrhage or mass.   2.  Mild to moderate global parenchymal atrophy and chronic small ischemic changes.   3.  Old left cerebellar infarct.      DX-CHEST-PORTABLE (1 VIEW)   Final Result       No acute cardiac or pulmonary abnormality is noted.           Assessment/Plan  * Hyponatremia- (present on admission)   Assessment & Plan    Severe hyponatremia on admit with a sodium of 110 on admit thus ICU admission.  IV fluids with goal no more of 9.  BMPs q 6 hours.  Na this morning is 117.  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 Cedeno, oncology      Non-intractable vomiting with nausea- (present on admission)   Assessment & Plan    With poor appetite.      DNR (do not resuscitate)- (present on admission)   Assessment & Plan    Per patient's request.           VTE prophylaxis: SCDs

## 2018-12-18 NOTE — ASSESSMENT & PLAN NOTE
Increasing in severity  Likely secondary to primary abdominal process ampullary cancer which may be persistent/recurrent infection, hyponatremia and other metabolic abnormalities contributing  Antiemetics including as needed Zofran and Compazine  Waxing and waning, salt tabs or oral potassium have not helped, continue monitor  For refractory vomiting place NG tube to suction

## 2018-12-18 NOTE — PROGRESS NOTES
1700 patient arrived to s-130 with ACLS RN from ED.   2 RN skin check complete  Noted discoloration on sacrum, mepilex applied.   Patient has previous healing incision wound under upper right breast, biotin light, wound intact, clean and dry.   Old midline incision, scar noted,   No other concerns at this time

## 2018-12-18 NOTE — PROGRESS NOTES
Dr. Machado updated on patient's current Sodium of 112 (up from 110).  Orders received to continue to check BMP every 6 hours and notify MD if a change >9 is noted.  Primary RN Callie updated.

## 2018-12-18 NOTE — CARE PLAN
Problem: Communication  Goal: The ability to communicate needs accurately and effectively will improve    Intervention: Educate patient and significant other/support system about the plan of care, procedures, treatments, medications and allow for questions  Discuss plan of care with care team and updated family, all questions and concerns answered. Family demonstrated understanding.       Problem: Skin Integrity  Goal: Risk for impaired skin integrity will decrease    Intervention: Assess risk factors for impaired skin integrity and/or pressure ulcers  Patient turned Q2 hours, check bony prominences for break down and reposition pillows. Mepilex applied

## 2018-12-18 NOTE — ASSESSMENT & PLAN NOTE
History of liver abscess for which she had been on Zyvox, Omnicef, diflucan through 12/20.   Drain in place - bile in drain only  Comfort care

## 2018-12-18 NOTE — PROGRESS NOTES
Maida from Lab called with critical result of Sodium of 112 at 1727. Critical lab result read back to Maida.   Dr. Machado notified of critical lab result at 1730.  Critical lab result read back by Dr. Machado.

## 2018-12-18 NOTE — ASSESSMENT & PLAN NOTE
Reviewed with patient, reaffirmed again today  Patient considering comfort care and hospice with discharged home

## 2018-12-18 NOTE — PROGRESS NOTES
Critical Care Progress Note    Date of admission  12/17/2018    Chief Complaint  73 y.o. female admitted 12/17/2018 with severe hyponatremia    Hospital Course    73 y.o. 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 Cedeno.  She was admitted from November 12 - December 6 to [facility] where she was found to have a liver abscess that grew out VRE and Candida.  She was treated with intravenous antibiotics followed by transition to oral antibiotics by infectious disease before discharge home.  She was doing fairly well and able to walk with minimal assistance and tolerating minimal diet and her medications up until 2 days ago when she started to have increased generalized weakness, drowsiness, headache and today with nausea and vomiting.  She was brought into the emergency department by her son and daughter and was found to be severely hyponatremic with a sodium level of 110.  She underwent CT imaging of her brain as well as chest abdomen and pelvis and is admitted to the intensive care unit where I was consulted for assistance in her critical care management.  Per her son, patient is always preferred a low-salt diet and has had hyponatremia in the past although on chart review has been as low as 128 but never down to 110.  She frequently has nausea and vomiting with her chemotherapy medications but her son feels that she has been drinking adequate amounts of free water with her pills and meals.  No seizure activity was noted at home nor in the emergency department.      Interval Problem Update  Reviewed last 24 hour events:    Last Na 117, slowly trending up   A&O x4  ABD pain at baseline - slt worse?  SR 70s  SBp 120-160s  Afebrile  Full liq diet  Emesis last night x one  UO adequate?  Incontinent  PIVs  IVF 1/2 NS 75 cc/hr  Room air  SCDs  CT noted  Hgb min  delta  Zyvox/Diflucan/Cefdnir/Flagyl      IS  DDAVP PRN  Adjust IVF for Na correction      Review of Systems  Review of Systems   Constitutional: Positive for malaise/fatigue. Negative for chills, diaphoresis and fever.   HENT: Negative for congestion and sore throat.    Respiratory: Negative for cough, sputum production and shortness of breath.    Cardiovascular: Negative for chest pain and palpitations.   Gastrointestinal: Negative for abdominal pain, nausea and vomiting.   Genitourinary: Negative.    Musculoskeletal: Negative for back pain.   Neurological: Positive for weakness.        Vital Signs for last 24 hours   Temp:  [35.6 °C (96.1 °F)-36.4 °C (97.6 °F)] 36.4 °C (97.6 °F)  Pulse:  [] 74  Resp:  [13-43] 14    Hemodynamic parameters for last 24 hours       Respiratory       Physical Exam   Physical Exam   Constitutional: She appears well-nourished. She appears lethargic. She is cooperative.  Non-toxic appearance. No distress. Nasal cannula in place.   HENT:   Head: Atraumatic.   Mouth/Throat: Oropharynx is clear and moist.   Eyes: Pupils are equal, round, and reactive to light. EOM are normal. No scleral icterus.   Neck: Neck supple. No JVD present.   Cardiovascular: Normal rate, regular rhythm and intact distal pulses.    No murmur heard.  Pulmonary/Chest: Effort normal and breath sounds normal. No respiratory distress. She has no wheezes. She has no rhonchi. She has no rales.   Abdominal: Soft. Bowel sounds are normal. She exhibits no distension. There is no hepatosplenomegaly. There is tenderness. There is no rigidity, no guarding and no CVA tenderness.   Neurological: She has normal strength. She appears lethargic. No cranial nerve deficit or sensory deficit. GCS eye subscore is 4. GCS verbal subscore is 5. GCS motor subscore is 6.   Skin: She is not diaphoretic. No cyanosis. Nails show no clubbing.   Psychiatric: Her speech is normal and behavior is normal. Her mood appears not anxious.        Medications  Current Facility-Administered Medications   Medication Dose Route Frequency Provider Last Rate Last Dose   • 1/2 NS infusion   Intravenous Continuous Jeremy M Gonda, M.D. 75 mL/hr at 12/18/18 1804     • insulin regular (HUMULIN R) injection 1-6 Units  1-6 Units Subcutaneous 4X/DAY ACHS Jeremy M Gonda, M.D.   Stopped at 12/18/18 1254    And   • glucose 4 g chewable tablet 16 g  16 g Oral Q15 MIN PRN Jeremy M Gonda, M.D.        And   • dextrose 50% (D50W) injection 25 mL  25 mL Intravenous Q15 MIN PRN Jeremy M Gonda, M.D.       • acetaminophen (TYLENOL) tablet 325 mg  325 mg Oral Q6HRS PRN Jerome Barrios M.D.       • cefdinir (OMNICEF) capsule 300 mg  300 mg Oral Q12HRS Jerome Barrios M.D.   300 mg at 12/18/18 2108   • fluconazole (DIFLUCAN) tablet 400 mg  400 mg Oral DAILY Jerome Barrios M.D.   400 mg at 12/18/18 0534   • linezolid (ZYVOX) tablet 600 mg  600 mg Oral Q12HRS Jerome Barrios M.D.   600 mg at 12/18/18 2108   • metroNIDAZOLE (FLAGYL) tablet 500 mg  500 mg Oral Q8HRS Jerome Barrios M.D.   500 mg at 12/18/18 2208   • omeprazole (PRILOSEC) capsule 20 mg  20 mg Oral DAILY Jerome Barrios M.D.   20 mg at 12/18/18 0534   • tramadol (ULTRAM) 50 MG tablet 50 mg  50 mg Oral Q6HRS PRN Jerome Barrios M.D.       • senna-docusate (PERICOLACE or SENOKOT S) 8.6-50 MG per tablet 2 Tab  2 Tab Oral BID Jerome Barrios M.D.   2 Tab at 12/18/18 1815    And   • polyethylene glycol/lytes (MIRALAX) PACKET 1 Packet  1 Packet Oral QDAY PRN Jerome Barrios M.D.        And   • magnesium hydroxide (MILK OF MAGNESIA) suspension 30 mL  30 mL Oral QDAY PRN Jerome Barrios M.D.        And   • bisacodyl (DULCOLAX) suppository 10 mg  10 mg Rectal QDAY PRN Jerome Barrios M.D.       • ondansetron (ZOFRAN) syringe/vial injection 4 mg  4 mg Intravenous Q4HRS PRN Jerome Barrios M.D.       • ondansetron (ZOFRAN ODT) dispertab 4 mg  4 mg Oral Q4HRS PRN Jerome Barrios,  M.D.   4 mg at 12/17/18 1839   • artificial tears 1.4 % ophthalmic solution 1 Drop  1 Drop Both Eyes Q2HRS PRN Jerome Barrios M.D.       • prochlorperazine (COMPAZINE) injection 10 mg  10 mg Intravenous Q4HRS PRN Jeremy M Gonda, M.D.   10 mg at 12/18/18 1803       Fluids    Intake/Output Summary (Last 24 hours) at 12/18/18 2235  Last data filed at 12/18/18 2200   Gross per 24 hour   Intake             2280 ml   Output              120 ml   Net             2160 ml       Laboratory          Recent Labs      12/17/18   1223   12/18/18   0315  12/18/18   0955  12/18/18   1606   SODIUM   --    < >  117*  115*  116*   POTASSIUM   --    < >  4.6  4.6  4.8   CHLORIDE   --    < >  92*  91*  90*   CO2   --    < >  15*  17*  16*   BUN   --    < >  11  9  8   CREATININE   --    < >  0.56  0.56  0.47*   MAGNESIUM  1.5   --    --    --   1.8   CALCIUM   --    < >  9.2  8.8  9.2    < > = values in this interval not displayed.     Recent Labs      12/17/18   1018  12/17/18   1600   12/18/18   0315  12/18/18   0955  12/18/18   1606   ALTSGPT  21  19   --   20   --    --    ASTSGOT  40  45   --   32   --    --    ALKPHOSPHAT  267*  218*   --   235*   --    --    TBILIRUBIN  0.6  0.5   --   0.5   --    --    LIPASE  95*   --    --    --    --    --    GLUCOSE  139*  198*   < >  178*  135*  147*    < > = values in this interval not displayed.     Recent Labs      12/17/18   1018  12/17/18   1600  12/18/18   0315   WBC  9.0   --   10.8   NEUTSPOLYS  86.60*   --   91.20*   LYMPHOCYTES  8.00*   --   5.50*   MONOCYTES  3.10   --   2.00   EOSINOPHILS  0.30   --   0.20   BASOPHILS  0.40   --   0.40   ASTSGOT  40  45  32   ALTSGPT  21  19  20   ALKPHOSPHAT  267*  218*  235*   TBILIRUBIN  0.6  0.5  0.5     Recent Labs      12/17/18   1018  12/18/18   0315   RBC  3.82*  3.62*   HEMOGLOBIN  11.3*  10.9*   HEMATOCRIT  33.4*  32.1*   PLATELETCT  174  117*       Imaging  X-Ray:  I have personally reviewed the images and compared with prior  images.  EKG:  I have personally reviewed the images and compared with prior images.  CT:    Reviewed  Echo:   Reviewed    Assessment/Plan  * Hyponatremia- (present on admission)   Assessment & Plan    Severe, symptomatic, improved  Unclear etiology, likely hypovolemic with components of chronic hyponatremia, low salt intake, potential medication induced  Urinalysis and urine lites reviewed  Very cautious replacement currently using normal saline at 75 mL's an hour with every 6 hours serum sodium levels  Goal sodium correction no greater than 9 mEq in the next 24 hours  Consider more aggressive correction should she have seizures or become obtunded  Seizure precautions ongoing     Liver abscess- (present on admission)   Assessment & Plan    Status post drainage during last hospital stay, now removed  Continue antibiotics per infectious disease prior recommendations,  Monitor for need to reimage     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)   Assessment & Plan    Chemo and radiation currently on hold  Oncology aware  Analgesia as needed  Palliative care consultation may be warranted depending on goals of care in the next 1-2 days, correct hyponatremia first     Hypokalemia- (present on admission)   Assessment & Plan    Repletion and monitor daily  Also monitor and replete magnesium as needed     Hypomagnesemia   Assessment & Plan    Repletion and monitor daily  Also monitor and replete potassium     Non-intractable vomiting with nausea- (present on admission)   Assessment & Plan    Likely secondary to hyponatremia  Antiemetics including as needed Zofran and Compazine  Improved today     DNR (do not resuscitate)- (present on admission)   Assessment & Plan    Reviewed with patient, reaffirmed      Iron deficiency anemia due to chronic blood loss- (present on admission)   Assessment & Plan    Monitor with daily CBC using conservative transfusion strategy  No bleeding clinically          VTE:   Contraindicated  Ulcer: PPI  Lines: None    I have performed a physical exam and reviewed and updated ROS and Plan today (12/18/2018). In review of yesterday's note (12/17/2018), there are no changes except as documented above.     Discussed patient condition and risk of morbidity and/or mortality with Hospitalist, RN, RT, Pharmacy, Charge nurse / hot rounds and Patient

## 2018-12-18 NOTE — ASSESSMENT & PLAN NOTE
Chemo and radiation currently on hold  Oncology aware  Analgesia as needed  Palliative care consultation ongoing  Repeat CT scans show significant ileus, cannot rule out pneumatosis  Repeat CT scan shows presumed liver abscess, query necrotic metastatic lesion  History of Whipple procedure  Prognosis remains poor

## 2018-12-18 NOTE — PROGRESS NOTES
2 RN skin check complete  Noted discoloration on sacrum, and moisturziure fissure noted, mepilex applied. Picture taken and uploaded to [facility], wound consult will be placed.   Patient has previous healing incision wound under upper right breast, biotin light, wound intact, clean and dry.   Old midline incision, scar noted,   No other concerns at this time

## 2018-12-18 NOTE — ASSESSMENT & PLAN NOTE
Undergoing chemo and radiation per Dr. Cedeno, oncology   Stage IV.   Hospice referral placed.   Comfort measures for now.

## 2018-12-19 NOTE — PROGRESS NOTES
Hospital Medicine Daily Progress Note    Date of Service  12/19/2018    Chief Complaint  73 y.o. female admitted 12/17/2018 with weakness.    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 She presented to the ER with progressive weakness and was found to have a severely low sodium of 110 and has been admitted to the ICU in guarded condition.       Interval Problem Update  12/18: RN notes that she has had SBP up to 160's. She is tolerating a full liquid diet though had an episode of emesis last night. She remains on IV fluids with D5 1/2 NS. Sodium is 117 this morning. Her repeat later this morning was 115.   12/19: patient seen in the ICU this morning. RN notes that she has been afebrile. She is tolerating a full liquid diet. Na this morning is down to 113 though was down to 112 last night. Her right arm IV appears to have infiltrated.   Consultants/Specialty  Critical Care. I discussed with Dr. Machado on ICU Hot Rounds.     Code Status  DNR/DNI    Disposition  ICU    Review of Systems  Review of Systems   Constitutional: Positive for malaise/fatigue and weight loss. Negative for chills and fever.   Respiratory: Negative for shortness of breath.    Cardiovascular: Negative for chest pain.   Gastrointestinal:        Poor appetite    Musculoskeletal:        Right arm peripheral IV is infiltrated.    Neurological: Positive for weakness.   All other systems reviewed and are negative.       Physical Exam  Temp:  [35.6 °C (96.1 °F)-36.4 °C (97.6 °F)] 36.4 °C (97.6 °F)  Pulse:  [] 81  Resp:  [10-43] 12    Physical Exam   Constitutional: She is oriented to person, place, and time. No distress.   HENT:   Head: Normocephalic and atraumatic.   Dry mucous membranes.    Neck: Neck supple.   Cardiovascular: Normal rate and regular rhythm.    No murmur heard.  Pulmonary/Chest: Effort normal. No respiratory distress. She has no wheezes.   Abdominal: She  exhibits distension. There is no tenderness.   Musculoskeletal: She exhibits no edema or tenderness.   Neurological: She is oriented to person, place, and time.   sleepy   Skin: Skin is warm and dry. She is not diaphoretic.   Psychiatric: She has a normal mood and affect. Her behavior is normal.   Nursing note and vitals reviewed.      Fluids    Intake/Output Summary (Last 24 hours) at 12/19/18 0739  Last data filed at 12/19/18 0600   Gross per 24 hour   Intake             2433 ml   Output              345 ml   Net             2088 ml       Laboratory  Recent Labs      12/17/18   1018  12/18/18   0315   WBC  9.0  10.8   RBC  3.82*  3.62*   HEMOGLOBIN  11.3*  10.9*   HEMATOCRIT  33.4*  32.1*   MCV  87.4  88.7   MCH  29.6  30.1   MCHC  33.8  34.0   RDW  64.6*  66.5*   PLATELETCT  174  117*   MPV  10.0  10.2     Recent Labs      12/18/18   1606  12/18/18   2230  12/19/18   0347   SODIUM  116*  112*  114*   POTASSIUM  4.8  5.6*  4.8   CHLORIDE  90*  86*  88*   CO2  16*  17*  19*   GLUCOSE  147*  109*  99   BUN  8  7*  8   CREATININE  0.47*  0.47*  0.42*   CALCIUM  9.2  8.8  8.7             Recent Labs      12/18/18   0315   TRIGLYCERIDE  73   HDL  38*   LDL  29       Imaging  CT-CHEST,ABDOMEN,PELVIS WITH   Final Result      1.  New dilatation of the esophagus diffusely. This may represent acute achalasia or reflux.      2.  No thoracic adenopathy or pleural effusion. No pulmonary consolidation.      3.  Stable appearance of low-attenuation area in the right lobe of the liver and several other low-attenuation areas which may represent metastases. Recurrent abscess cannot be excluded.      4.  New marked dilatation of the cecum and ascending colon with stool and fluid which may represent constipation or partial obstruction. No evidence of small bowel obstruction.      5.  No renal obstruction.      6.  No free fluid or abscess.      7.  New low-attenuation area within the uterus which could represent an area of uterine  hemorrhage.      CT-HEAD W/O   Final Result      1.  No CT evidence of acute infarct, hemorrhage or mass.   2.  Mild to moderate global parenchymal atrophy and chronic small ischemic changes.   3.  Old left cerebellar infarct.      DX-CHEST-PORTABLE (1 VIEW)   Final Result      No acute cardiac or pulmonary abnormality is noted.           Assessment/Plan  * Hyponatremia- (present on admission)   Assessment & Plan    Severe hyponatremia on admit with a sodium of 110 on admit thus ICU admission.  IV fluids with goal no more of 9.  BMPs q 6 hours.  Na this morning has gone down to 113.  She is quite symptomatic with the hyponatremia  Due to poor IV access, a midline has been ordered.      Liver abscess- (present on admission)   Assessment & Plan    History of liver abscess for which she is on Zyvox, Omnicef, diflucan through 12/20.   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 Cedeno, oncology      Non-intractable vomiting with nausea- (present on admission)   Assessment & Plan    With poor appetite.      DNR (do not resuscitate)- (present on admission)   Assessment & Plan    Per patient's request.           VTE prophylaxis: SCDs

## 2018-12-19 NOTE — PROGRESS NOTES
Critical Care Progress Note    Date of admission  12/17/2018    Chief Complaint  73 y.o. female admitted 12/17/2018 with severe hyponatremia    Hospital Course    73 y.o. 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 Cedeno.  She was admitted from November 12 - December 6 to [facility] where she was found to have a liver abscess that grew out VRE and Candida.  She was treated with intravenous antibiotics followed by transition to oral antibiotics by infectious disease before discharge home.  She was doing fairly well and able to walk with minimal assistance and tolerating minimal diet and her medications up until 2 days ago when she started to have increased generalized weakness, drowsiness, headache and today with nausea and vomiting.  She was brought into the emergency department by her son and daughter and was found to be severely hyponatremic with a sodium level of 110.  She underwent CT imaging of her brain as well as chest abdomen and pelvis and is admitted to the intensive care unit where I was consulted for assistance in her critical care management.  Per her son, patient is always preferred a low-salt diet and has had hyponatremia in the past although on chart review has been as low as 128 but never down to 110.  She frequently has nausea and vomiting with her chemotherapy medications but her son feels that she has been drinking adequate amounts of free water with her pills and meals.  No seizure activity was noted at home nor in the emergency department.      Interval Problem Update  Reviewed last 24 hour events:    A&O x4   More alert today  ABD pain no change  Na 114 this AM, U-Na 89  NS 83 cc/hr  SR 70s  SBp 130s  Afeb  Room air  Full liq diet  UO ok  No CXR    Same ABX - 4 drug regimen - stop 12/20  PPI    Needs PEP  Mobilize  Saline, consider salt tabs  Lasix?  ID f/u?  Bowel  care protocols  IV access difficult       YESTERDAY  Last Na 117, slowly trending up   A&O x4  ABD pain at baseline - slt worse?  SR 70s  SBp 120-160s  Afebrile  Full liq diet  Emesis last night x one  UO adequate?  Incontinent  PIVs  IVF 1/2 NS 75 cc/hr  Room air  SCDs  CT noted  Hgb min delta  Zyvox/Diflucan/Cefdnir/Flagyl    IS  DDAVP PRN  Adjust IVF for Na correction    Review of Systems  Review of Systems   Constitutional: Positive for malaise/fatigue (No change). Negative for chills, diaphoresis and fever.   HENT: Negative for congestion and sore throat.    Respiratory: Negative for cough, hemoptysis, sputum production and shortness of breath.    Cardiovascular: Negative for chest pain, palpitations, leg swelling and PND.   Gastrointestinal: Negative for abdominal pain, nausea and vomiting.        Poor appetite, patient encouraged   Genitourinary: Negative.    Musculoskeletal: Positive for back pain (Chronic) and joint pain (Chronic).   Neurological: Positive for weakness (Better). Negative for sensory change, focal weakness and headaches.   Complete review performed again, see above changes    Vital Signs for last 24 hours   Temp:  [35.4 °C (95.8 °F)-36.4 °C (97.6 °F)] 36.4 °C (97.6 °F)  Pulse:  [] 78  Resp:  [12-54] 14    Hemodynamic parameters for last 24 hours       Respiratory       Physical Exam   Physical Exam   Constitutional: She appears well-nourished. She appears lethargic. She is cooperative.  Non-toxic appearance. No distress. Nasal cannula in place.   HENT:   Head: Atraumatic.   Mouth/Throat: Oropharynx is clear and moist.   Eyes: Pupils are equal, round, and reactive to light. EOM are normal. No scleral icterus.   Neck: Neck supple. No JVD present.   Cardiovascular: Normal rate, regular rhythm and intact distal pulses.    No murmur heard.  Pulmonary/Chest: Effort normal and breath sounds normal. No respiratory distress. She has no wheezes. She has no rhonchi. She has no rales.   Abdominal:  Soft. Bowel sounds are normal. She exhibits no distension. There is no hepatosplenomegaly. There is tenderness (Improved, persisting right upper quadrant). There is no rigidity, no rebound, no guarding, no CVA tenderness and negative Ospina's sign.   Neurological: She has normal strength. She appears lethargic. No cranial nerve deficit or sensory deficit. GCS eye subscore is 4. GCS verbal subscore is 5. GCS motor subscore is 6.   Following well, diffuse weakness, no focality   Skin: She is not diaphoretic. No cyanosis. Nails show no clubbing.   Psychiatric: Her speech is normal and behavior is normal. Thought content normal. Her mood appears not anxious. Cognition and memory are normal.   Complete follow-up exam performed, see above changes    Medications  Current Facility-Administered Medications   Medication Dose Route Frequency Provider Last Rate Last Dose   • NS infusion   Intravenous Continuous Jeremy M Gonda, M.D. 83 mL/hr at 12/19/18 2155     • sodium chloride (SALT) tablet 1 g  1 g Oral TID WITH MEALS George Mauricio M.D.   1 g at 12/19/18 2003   • insulin regular (HUMULIN R) injection 1-6 Units  1-6 Units Subcutaneous 4X/DAY ACHS Jeremy M Gonda, M.D.   Stopped at 12/18/18 1254    And   • glucose 4 g chewable tablet 16 g  16 g Oral Q15 MIN PRN Jeremy M Gonda, M.D.        And   • dextrose 50% (D50W) injection 25 mL  25 mL Intravenous Q15 MIN PRN Jeremy M Gonda, M.D.       • acetaminophen (TYLENOL) tablet 325 mg  325 mg Oral Q6HRS PRN Jerome Barrios M.D.       • cefdinir (OMNICEF) capsule 300 mg  300 mg Oral Q12HRS Keaton Machado M.D.   300 mg at 12/19/18 2155   • fluconazole (DIFLUCAN) tablet 400 mg  400 mg Oral DAILY Keaton Machado M.D.   400 mg at 12/19/18 0534   • linezolid (ZYVOX) tablet 600 mg  600 mg Oral Q12HRS Jerome Barrios M.D.   600 mg at 12/19/18 2155   • metroNIDAZOLE (FLAGYL) tablet 500 mg  500 mg Oral Q8HRS Keaton Machado M.D.   500 mg at 12/19/18 2155   • omeprazole  (PRILOSEC) capsule 20 mg  20 mg Oral DAILY Jerome Barrios M.D.   20 mg at 12/19/18 0533   • tramadol (ULTRAM) 50 MG tablet 50 mg  50 mg Oral Q6HRS PRN Jerome Barrios M.D.       • senna-docusate (PERICOLACE or SENOKOT S) 8.6-50 MG per tablet 2 Tab  2 Tab Oral BID Jerome Barrios M.D.   2 Tab at 12/19/18 0533    And   • polyethylene glycol/lytes (MIRALAX) PACKET 1 Packet  1 Packet Oral QDAY PRN Jerome Barrios M.D.        And   • magnesium hydroxide (MILK OF MAGNESIA) suspension 30 mL  30 mL Oral QDAY PRN Jerome Barrios M.D.        And   • bisacodyl (DULCOLAX) suppository 10 mg  10 mg Rectal QDAY PRN Jerome Barrios M.D.       • ondansetron (ZOFRAN) syringe/vial injection 4 mg  4 mg Intravenous Q4HRS PRJOSIAH Barrios M.D.       • ondansetron (ZOFRAN ODT) dispertab 4 mg  4 mg Oral Q4HRS PRN Jerome Barrios M.D.   4 mg at 12/17/18 1839   • artificial tears 1.4 % ophthalmic solution 1 Drop  1 Drop Both Eyes Q2HRS PRN Jerome Barrios M.D.       • prochlorperazine (COMPAZINE) injection 10 mg  10 mg Intravenous Q4HRS PRN Jeremy M Gonda, M.D.   10 mg at 12/18/18 1803       Fluids    Intake/Output Summary (Last 24 hours) at 12/19/18 2331  Last data filed at 12/19/18 2200   Gross per 24 hour   Intake             2831 ml   Output              900 ml   Net             1931 ml       Laboratory          Recent Labs      12/17/18   1223   12/18/18   1606   12/19/18   0347  12/19/18   1012  12/19/18   1827   SODIUM   --    < >  116*   < >  114*  113*  115*   POTASSIUM   --    < >  4.8   < >  4.8  4.9  5.0   CHLORIDE   --    < >  90*   < >  88*  88*  91*   CO2   --    < >  16*   < >  19*  18*  16*   BUN   --    < >  8   < >  8  8  8   CREATININE   --    < >  0.47*   < >  0.42*  0.48*  0.49*   MAGNESIUM  1.5   --   1.8   --   2.3   --    --    CALCIUM   --    < >  9.2   < >  8.7  8.6  8.9    < > = values in this interval not displayed.     Recent Labs      12/17/18   1018  12/17/18   1600    12/18/18   0315   12/19/18   0347  12/19/18   1012  12/19/18   1827   ALTSGPT  21  19   --   20   --    --    --    --    ASTSGOT  40  45   --   32   --    --    --    --    ALKPHOSPHAT  267*  218*   --   235*   --    --    --    --    TBILIRUBIN  0.6  0.5   --   0.5   --    --    --    --    LIPASE  95*   --    --    --    --    --    --    --    GLUCOSE  139*  198*   < >  178*   < >  99  113*  121*    < > = values in this interval not displayed.     Recent Labs      12/17/18   1018  12/17/18   1600  12/18/18   0315   WBC  9.0   --   10.8   NEUTSPOLYS  86.60*   --   91.20*   LYMPHOCYTES  8.00*   --   5.50*   MONOCYTES  3.10   --   2.00   EOSINOPHILS  0.30   --   0.20   BASOPHILS  0.40   --   0.40   ASTSGOT  40  45  32   ALTSGPT  21  19  20   ALKPHOSPHAT  267*  218*  235*   TBILIRUBIN  0.6  0.5  0.5     Recent Labs      12/17/18   1018  12/18/18   0315   RBC  3.82*  3.62*   HEMOGLOBIN  11.3*  10.9*   HEMATOCRIT  33.4*  32.1*   PLATELETCT  174  117*       Imaging  X-Ray:  I have personally reviewed the images and compared with prior images.  EKG:  I have personally reviewed the images and compared with prior images.  CT:    Reviewed  Echo:   Reviewed    Assessment/Plan  * Hyponatremia- (present on admission)   Assessment & Plan    Severe, symptomatic, wax/wane sodium levels  Unclear etiology, likely hypovolemic with components of chronic hyponatremia, low salt intake, potential medication induced  Urinalysis and urine lites reviewed  Change to salt tabs   Goal sodium correction no greater than 9 mEq in the next 24 hours  Consider more aggressive correction should she have seizures or become obtunded  Seizure precautions ongoing       Liver abscess- (present on admission)   Assessment & Plan    Status post drainage during last hospital stay, now removed  Continue antibiotics per infectious disease prior recommendations, to complete 12/20  Since still having some discomfort right upper quadrant consider reimaging  abdomen prior to discharge and post completion of antibiotics     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     Hypokalemia- (present on admission)   Assessment & Plan    Repletion and monitor daily  Also monitor and replete magnesium as needed  No change in current plan     Hypomagnesemia   Assessment & Plan    Repletion and monitor daily  Also monitor and replete potassium       Non-intractable vomiting with nausea- (present on admission)   Assessment & Plan    Likely secondary to hyponatremia  Antiemetics including as needed Zofran and Compazine  Unchanged/improved, continue monitor     DNR (do not resuscitate)- (present on admission)   Assessment & Plan    Reviewed with patient, reaffirmed again today, cc if clinically deteriorates      Iron deficiency anemia due to chronic blood loss- (present on admission)   Assessment & Plan    Monitor with daily CBC using conservative transfusion strategy  No bleeding clinically  No change current plan          VTE:  Contraindicated  Ulcer: PPI  Lines: None    I have performed a physical exam and reviewed and updated ROS and Plan today (12/19/2018). In review of yesterday's note (12/18/2018), there are no changes except as documented above.     Discussed patient condition and risk of morbidity and/or mortality with Hospitalist, RN, RT, Pharmacy, Charge nurse / hot rounds and Patient

## 2018-12-19 NOTE — CARE PLAN
Problem: Safety  Goal: Will remain free from injury  Outcome: PROGRESSING AS EXPECTED  Bed is locked and in low position. Patient is close to nursing station. Bed rails are up x3. Call light is within reach and educated on how to use it.     Problem: Skin Integrity  Goal: Risk for impaired skin integrity will decrease  Outcome: PROGRESSING AS EXPECTED  Patient is turned every 2 hours. Mepilex on sacrum. Pillows under elbows. 2 RN skin assessment completed.

## 2018-12-19 NOTE — CARE PLAN
Problem: Safety  Goal: Will remain free from injury    Intervention: Provide assistance with mobility  Safety precautions in place      Problem: Knowledge Deficit  Goal: Knowledge of disease process/condition, treatment plan, diagnostic tests, and medications will improve    Intervention: Assess knowledge level of disease process/condition, treatment plan, diagnostic tests, and medications  Updated on plan of care

## 2018-12-20 NOTE — PROGRESS NOTES
2 RN skin check complete  Noted discoloration on sacrum, and moisturziure fissure noted, mepilex in place  Patient has previous healing incision wound under upper right breast, biotin light, wound intact, clean and dry.   Old midline incision, scar noted,   No other concerns at this time

## 2018-12-20 NOTE — PROGRESS NOTES
0930- Patients abdomen appears more distended and firm then previous assessments, Dr. Machado and Dr. Mauricio notified and abd ct ordered w/ contrast  Patient has had a low temp this morning, see vitals, both MDs aware  Son notified   1230- consent for contrast signed  GFR >60  BUN 10  CRT 0.45

## 2018-12-20 NOTE — PROCEDURES
Vascular Access Team    Date of Insertion: 12/20/18  Arm Circumference: n/a  Line Length: 20  Line Size: 8  Vein Occupancy %: 45  Reason for Midline: Access    Orders confirmed, vessel patency confirmed with ultrasound. Risks and benefits of procedure explained to patient and education regarding line associated bloodstream infections provided. Questions answered.     PowerGlide Midline placed in LUE per MD order with ultrasound guidance. 20g, 10 cm line placed in basilic vein after 1 attempt(s).  Catheter inserted with good blood return. Secured with 0cm external from insertion site.  Flushed without resistance with 10 mL 0.9% normal saline. Midline secured with Biopatch and Tegaderm.     Midline placement is confirmed by nurse using ultrasound and ability to flush and draw blood. Midline is appropriate for use at this time.  No X-ray is needed for placement confirmation. Pt tolerated procedure well.  Patient condition relayed to unit RN or ordering physician via this post procedure note in the EMR.    Ultrasound images uploaded to PACS and viewable in the EMR - yes  Ultrasound imaged printed and placed in paper chart - no      BARD PowerGlide Midline ref # [id number], Lot # [id number]

## 2018-12-20 NOTE — CARE PLAN
Problem: Communication  Goal: The ability to communicate needs accurately and effectively will improve    Intervention: Educate patient and significant other/support system about the plan of care, procedures, treatments, medications and allow for questions  Discuss plan of care with care team and updated family, all questions and concerns answered. Patient is going for a abd CT at 1430 because of distention and son at bedside. Family demonstrated understanding. Will continue to discuss plan of care with patient and family       Problem: Safety  Goal: Will remain free from falls    Intervention: Implement fall precautions  Hopsital clothing and socks on patient, RN present for mobilization and bed alarm in use

## 2018-12-20 NOTE — PROGRESS NOTES
1230: Dr. Mauricio at bedside, discussed patient feeling increasingly weak and occasional numbness throughout her body. Will continue to monitor

## 2018-12-20 NOTE — PROGRESS NOTES
Critical Care Progress Note    Date of admission  12/17/2018    Chief Complaint  73 y.o. female admitted 12/17/2018 with severe hyponatremia    Hospital Course    73 y.o. 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 Cedeno.  She was admitted from November 12 - December 6 to [facility] where she was found to have a liver abscess that grew out VRE and Candida.  She was treated with intravenous antibiotics followed by transition to oral antibiotics by infectious disease before discharge home.  She was doing fairly well and able to walk with minimal assistance and tolerating minimal diet and her medications up until 2 days ago when she started to have increased generalized weakness, drowsiness, headache and today with nausea and vomiting.  She was brought into the emergency department by her son and daughter and was found to be severely hyponatremic with a sodium level of 110.  She underwent CT imaging of her brain as well as chest abdomen and pelvis and is admitted to the intensive care unit where I was consulted for assistance in her critical care management.  Per her son, patient is always preferred a low-salt diet and has had hyponatremia in the past although on chart review has been as low as 128 but never down to 110.  She frequently has nausea and vomiting with her chemotherapy medications but her son feels that she has been drinking adequate amounts of free water with her pills and meals.  No seizure activity was noted at home nor in the emergency department.      Interval Problem Update  Reviewed last 24 hour events:    Last Na 114 -> 116  &O x4  SR/ST - PVCs  SBp 110s  Full liquid diet  Emesis this AM  UO ok/low  NS 83/hr  Mobilizes  Afebrile - Tm 97.7  Room air  PPI  Last day ABX?  Salt tabs      Abdomen bit more distended and associated with nausea vomiting today  No  bleeding  Remains afebrile throughout the day, no CBC today    Mg supplement IV  Consider Lasix for severe persisting hyponatremia?  Image ABD with CT, films reviewed, significant ileus, hepatic process grossly unchanged  Continue medicines but switch to intravenous including antibiotics with CT findings  May need to get infectious disease involved again  D/c SSI and FS  Add PEP to I-S  Mobilize as tolerated  With ileus convert medicines from oral to IV route  Discontinue salt tabs, consider 3% saline     YESTERDAY  A&O x4   More alert today  ABD pain no change  Na 114 this AM, U-Na 89  NS 83 cc/hr  SR 70s  SBp 130s  Afeb  Room air  Full liq diet  UO ok  No CXR    Same ABX - 4 drug regimen - stop 12/20  PPI    Needs PEP  Mobilize  Saline, consider salt tabs  Lasix?  ID f/u?  Bowel care protocols  IV access difficult    Review of Systems  Review of Systems   Constitutional: Positive for malaise/fatigue (Worse). Negative for chills, diaphoresis and fever.   HENT: Negative for congestion and sore throat.    Respiratory: Negative for cough, hemoptysis, sputum production and shortness of breath.    Cardiovascular: Negative for chest pain, palpitations, orthopnea, leg swelling and PND.   Gastrointestinal: Positive for abdominal pain, nausea and vomiting. Negative for blood in stool, constipation and diarrhea.        Poor appetite, patient encouraged   Genitourinary: Negative.    Musculoskeletal: Positive for back pain (Chronic) and joint pain (Chronic).   Neurological: Positive for weakness (No change). Negative for sensory change, focal weakness and headaches.   Psychiatric/Behavioral: The patient is not nervous/anxious.    Complete review performed again, minimal change as above    Vital Signs for last 24 hours   Temp:  [35.4 °C (95.7 °F)-36.4 °C (97.6 °F)] 36.2 °C (97.1 °F)  Pulse:  [] 135  Resp:  [12-28] 22    Hemodynamic parameters for last 24 hours       Respiratory       Physical Exam   Physical Exam    Constitutional: She appears well-nourished. She appears lethargic. She is cooperative.  Non-toxic appearance. No distress. Nasal cannula in place.   HENT:   Head: Atraumatic.   Mouth/Throat: Oropharynx is clear and moist.   Eyes: Pupils are equal, round, and reactive to light. EOM are normal. No scleral icterus.   Neck: Neck supple. No JVD present.   Cardiovascular: Regular rhythm and intact distal pulses.  Tachycardia present.    No murmur heard.  Pulmonary/Chest: Effort normal and breath sounds normal. No respiratory distress. She has no wheezes. She has no rhonchi. She has no rales.   Abdominal: Soft. She exhibits distension. Bowel sounds are decreased. There is no hepatosplenomegaly. There is tenderness (Improved, persisting right upper quadrant). There is no rigidity, no rebound, no guarding, no CVA tenderness and negative Ospina's sign.   Neurological: She has normal strength. She appears lethargic. No cranial nerve deficit or sensory deficit. GCS eye subscore is 4. GCS verbal subscore is 5. GCS motor subscore is 6.   Following well, diffuse weakness, no focality   Skin: She is not diaphoretic. No cyanosis. Nails show no clubbing.   Psychiatric: Her speech is normal and behavior is normal. Thought content normal. Her mood appears not anxious. Cognition and memory are normal.   Vitals reviewed.  Complete follow-up exam performed again, see above changes    Medications  Current Facility-Administered Medications   Medication Dose Route Frequency Provider Last Rate Last Dose   • Respiratory Care per Protocol   Nebulization Continuous RT Keaton Machado M.D.       • 3% sodium chloride (HYPERTONIC SALINE) 500mL infusion   Intravenous Continuous George Mauricio M.D. 15 mL/hr at 12/20/18 1948     • fluconazole (DIFLUCAN) tablet 400 mg  400 mg Oral DAILY George Mauricio M.D.       • Linezolid (ZYVOX) premix 600 mg  600 mg Intravenous Q12HRS Keaton Machado M.D.   Stopped at 12/20/18 2140   • metroNIDAZOLE  (FLAGYL) IVPB 500 mg  500 mg Intravenous Q8HRS Keaton Machado M.D. 100 mL/hr at 12/20/18 2212 500 mg at 12/20/18 2212   • cefTRIAXone (ROCEPHIN) 2 g in  mL IVPB  2 g Intravenous Q24HRS Keaton Machado M.D.   Stopped at 12/20/18 2109   • pantoprazole (PROTONIX) injection 40 mg  40 mg Intravenous DAILY Keaton Machado M.D.       • sodium chloride (SALT) tablet 1 g  1 g Oral TID WITH MEALS George Mauricio M.D.   1 g at 12/20/18 1746   • dextrose 50% (D50W) injection 25 mL  25 mL Intravenous Q15 MIN PRN Jeremy M Gonda, M.D.       • acetaminophen (TYLENOL) tablet 325 mg  325 mg Oral Q6HRS PRN Jerome Barrios M.D.       • tramadol (ULTRAM) 50 MG tablet 50 mg  50 mg Oral Q6HRS PRN Jerome Barrios M.D.       • senna-docusate (PERICOLACE or SENOKOT S) 8.6-50 MG per tablet 2 Tab  2 Tab Oral BID Jerome Barrios M.D.   Stopped at 12/20/18 1800    And   • polyethylene glycol/lytes (MIRALAX) PACKET 1 Packet  1 Packet Oral QDAY PRN Jerome Barrios M.D.        And   • magnesium hydroxide (MILK OF MAGNESIA) suspension 30 mL  30 mL Oral QDAY PRN Jerome Barrios M.D.        And   • bisacodyl (DULCOLAX) suppository 10 mg  10 mg Rectal QDAY PRN Jerome Barrios M.D.       • ondansetron (ZOFRAN) syringe/vial injection 4 mg  4 mg Intravenous Q4HRS PRJOSIAH Barrios M.D.   4 mg at 12/20/18 0343   • ondansetron (ZOFRAN ODT) dispertab 4 mg  4 mg Oral Q4HRS PRJOSIAH Barrios M.D.   4 mg at 12/17/18 1839   • artificial tears 1.4 % ophthalmic solution 1 Drop  1 Drop Both Eyes Q2HRS PRJOSIAH Barrios M.D.       • prochlorperazine (COMPAZINE) injection 10 mg  10 mg Intravenous Q4HRS PRN Jeremy M Gonda, M.D.   10 mg at 12/20/18 1810       Fluids    Intake/Output Summary (Last 24 hours) at 12/21/18 0000  Last data filed at 12/21/18 0000   Gross per 24 hour   Intake             2381 ml   Output              595 ml   Net             1786 ml       Laboratory          Recent Labs      12/18/18    1606   12/19/18   0347   12/20/18   0558  12/20/18   1425  12/20/18   1700   SODIUM  116*   < >  114*   < >  116*  115*  115*   POTASSIUM  4.8   < >  4.8   < >  5.1  4.5  4.7   CHLORIDE  90*   < >  88*   < >  93*  95*  93*   CO2  16*   < >  19*   < >  15*  14*  14*   BUN  8   < >  8   < >  10  11  10   CREATININE  0.47*   < >  0.42*   < >  0.45*  0.61  0.62   MAGNESIUM  1.8   --   2.3   --   1.9   --    --    CALCIUM  9.2   < >  8.7   < >  8.4*  8.2*  8.6    < > = values in this interval not displayed.     Recent Labs      12/18/18   0315   12/20/18   0558  12/20/18   1425  12/20/18   1700   ALTSGPT  20   --    --    --    --    ASTSGOT  32   --    --    --    --    ALKPHOSPHAT  235*   --    --    --    --    TBILIRUBIN  0.5   --    --    --    --    LIPASE   --    --    --    --   102*   GLUCOSE  178*   < >  94  126*  129*    < > = values in this interval not displayed.     Recent Labs      12/18/18   0315   WBC  10.8   NEUTSPOLYS  91.20*   LYMPHOCYTES  5.50*   MONOCYTES  2.00   EOSINOPHILS  0.20   BASOPHILS  0.40   ASTSGOT  32   ALTSGPT  20   ALKPHOSPHAT  235*   TBILIRUBIN  0.5     Recent Labs      12/18/18   0315   RBC  3.62*   HEMOGLOBIN  10.9*   HEMATOCRIT  32.1*   PLATELETCT  117*       Imaging  X-Ray:  I have personally reviewed the images and compared with prior images.  EKG:  I have personally reviewed the images and compared with prior images.  CT:    Repeat CT reviewed and compared to more recent one  Echo:   Reviewed    Assessment/Plan  * Hyponatremia- (present on admission)   Assessment & Plan    Severe, symptomatic, persisting in the teens despite therapy  Unclear etiology, likely hypovolemic with components of chronic hyponatremia, low salt intake, potential medication induced  Urinalysis and urine lites reviewed  Change to salt tabs if she can take p.o. versus 3% saline IV  Goal sodium correction no greater than 9 mEq in the next 24 hours  Consider more aggressive correction should she have seizures  or become obtunded  Seizure precautions ongoing  Avoid hypotonic fluids  When clinically better and if hypervolemic initiate Lasix therapy       Liver abscess- (present on admission)   Assessment & Plan    Status post drainage during last hospital stay, now removed  Continue antibiotics per infectious disease prior recommendations, originally to complete 12/20  Still having abdominal symptoms which actually have worsened and now she has ileus  Follow-up CT does not show new fluid collections but abnormalities in particular in the liver are unchanged  Monitoring for recurrent persistent abdominal sepsis, antibiotics resumed and IV route  May need to reinvolve ID or GI/surgery     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)   Assessment & Plan    Chemo and radiation currently on hold  Oncology aware  Analgesia as needed  Palliative care consultation may be warranted depending on goals of care in the next 1-2 days, correct hyponatremia first  Repeat CT scans show significant ileus, cannot rule out pneumatosis  Switch antibiotics from oral to IV and do not stop today     Hypokalemia- (present on admission)   Assessment & Plan    Repletion and monitor daily  Also monitor and replete magnesium as needed  No change in current plan     Hypomagnesemia   Assessment & Plan    Repletion and monitor daily  Also monitor and replete potassium  Replete IV for now given ileus and GI symptoms       Non-intractable vomiting with nausea- (present on admission)   Assessment & Plan    Starting to be occurring more often unfortunately  Likely secondary to primary abdominal process which may be persistent/recurrent infection, hyponatremia and other metabolic abnormalities contributing  Antiemetics including as needed Zofran and Compazine  Unchanged/improved, continue monitor     DNR (do not resuscitate)- (present on admission)   Assessment & Plan    Reviewed with patient, reaffirmed again today, cc if clinically deteriorates       Iron deficiency anemia due to chronic blood loss- (present on admission)   Assessment & Plan    Monitor with daily CBC using conservative transfusion strategy  No bleeding clinically  No change current plan          VTE:  Contraindicated  Ulcer: PPI  Lines: None    I have performed a physical exam and reviewed and updated ROS and Plan today (12/21/2018). In review of yesterday's note (12/20/2018), there are no changes except as documented above.     Discussed patient condition and risk of morbidity and/or mortality with Hospitalist, RN, RT, Pharmacy, Charge nurse / hot rounds and Patient

## 2018-12-20 NOTE — CARE PLAN
Problem: Skin Integrity  Goal: Risk for impaired skin integrity will decrease  Outcome: PROGRESSING AS EXPECTED  Mepilex on sacrum. Patient turned every 2 hours. Pillows under arms to protect elbows.     Problem: Pain Management  Goal: Pain level will decrease to patient's comfort goal  Outcome: PROGRESSING AS EXPECTED  Pain assessed every 2 hours. Patient does not want any pain medication. Patient given extra blankets to get comfortable.

## 2018-12-20 NOTE — PROGRESS NOTES
Hospital Medicine Daily Progress Note    Date of Service  12/20/2018    Chief Complaint  73 y.o. female admitted 12/17/2018 with weakness.    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 She presented to the ER with progressive weakness and was found to have a severely low sodium of 110 and has been admitted to the ICU in guarded condition.       Interval Problem Update  12/18: RN notes that she has had SBP up to 160's. She is tolerating a full liquid diet though had an episode of emesis last night. She remains on IV fluids with D5 1/2 NS. Sodium is 117 this morning. Her repeat later this morning was 115.   12/19: patient seen in the ICU this morning. RN notes that she has been afebrile. She is tolerating a full liquid diet. Na this morning is down to 113 though was down to 112 last night. Her right arm IV appears to have infiltrated.  12/20: pt seen in the ICU this morning. She vomited this morning. Her abdomen is more distended today. Her sodium remains quite low at 116 despite adding salt tabs.    Consultants/Specialty  Critical Care. I discussed with Dr. Machado on ICU Hot Rounds.     Code Status  DNR/DNI    Disposition  ICU    Review of Systems  Review of Systems   Constitutional: Positive for malaise/fatigue and weight loss. Negative for chills and fever.   Respiratory: Negative for shortness of breath.    Cardiovascular: Negative for chest pain.   Gastrointestinal:        Poor appetite    Musculoskeletal:        Right arm peripheral IV is infiltrated.    Neurological: Positive for weakness.   All other systems reviewed and are negative.       Physical Exam  Temp:  [36 °C (96.8 °F)-36.5 °C (97.7 °F)] 36.4 °C (97.6 °F)  Pulse:  [] 79  Resp:  [12-54] 21    Physical Exam   Constitutional: No distress.   Generally ill-appearing   HENT:   Head: Normocephalic and atraumatic.   Dry mucous membranes.    Neck: Normal range of motion. Neck  supple.   Cardiovascular: Normal rate and regular rhythm.    No murmur heard.  Pulmonary/Chest: Effort normal. No respiratory distress. She has no wheezes.   Abdominal: She exhibits distension. There is tenderness.   Musculoskeletal: She exhibits no edema or tenderness.   Left arm midline catheter   Neurological:   Somnolent though she awakens.    Skin: Skin is warm and dry. She is not diaphoretic. There is pallor.   Psychiatric: She has a normal mood and affect. Her behavior is normal.   Nursing note and vitals reviewed.      Fluids    Intake/Output Summary (Last 24 hours) at 12/20/18 1325  Last data filed at 12/20/18 0600   Gross per 24 hour   Intake             2144 ml   Output              445 ml   Net             1699 ml       Laboratory  Recent Labs      12/18/18   0315   WBC  10.8   RBC  3.62*   HEMOGLOBIN  10.9*   HEMATOCRIT  32.1*   MCV  88.7   MCH  30.1   MCHC  34.0   RDW  66.5*   PLATELETCT  117*   MPV  10.2     Recent Labs      12/19/18   1827  12/20/18   0004  12/20/18   0558   SODIUM  115*  114*  116*   POTASSIUM  5.0  5.1  5.1   CHLORIDE  91*  93*  93*   CO2  16*  13*  15*   GLUCOSE  121*  100*  94   BUN  8  10  10   CREATININE  0.49*  0.49*  0.45*   CALCIUM  8.9  8.5  8.4*             Recent Labs      12/18/18   0315   TRIGLYCERIDE  73   HDL  38*   LDL  29       Imaging  CT-CHEST,ABDOMEN,PELVIS WITH   Final Result      1.  New dilatation of the esophagus diffusely. This may represent acute achalasia or reflux.      2.  No thoracic adenopathy or pleural effusion. No pulmonary consolidation.      3.  Stable appearance of low-attenuation area in the right lobe of the liver and several other low-attenuation areas which may represent metastases. Recurrent abscess cannot be excluded.      4.  New marked dilatation of the cecum and ascending colon with stool and fluid which may represent constipation or partial obstruction. No evidence of small bowel obstruction.      5.  No renal obstruction.      6.  No  free fluid or abscess.      7.  New low-attenuation area within the uterus which could represent an area of uterine hemorrhage.      CT-HEAD W/O   Final Result      1.  No CT evidence of acute infarct, hemorrhage or mass.   2.  Mild to moderate global parenchymal atrophy and chronic small ischemic changes.   3.  Old left cerebellar infarct.      DX-CHEST-PORTABLE (1 VIEW)   Final Result      No acute cardiac or pulmonary abnormality is noted.      IR-MIDLINE CATHETER INSERTION >5 YRS    (Results Pending)   CT-ABDOMEN-PELVIS WITH    (Results Pending)   IR-MIDLINE CATHETER INSERTION >5 YRS    (Results Pending)        Assessment/Plan  * Hyponatremia- (present on admission)   Assessment & Plan    Severe hyponatremia on admit with a sodium of 110 on admit thus ICU admission.  IV fluids with goal no more of 9.  BMPs q 6 hours.  Na remains low despite IV fluids with NS plus the addition of salt tabs.   She is quite symptomatic with the hyponatremia with weakness.   Due to poor IV access, a midline has been placed  She may require 3% saline     Liver abscess- (present on admission)   Assessment & Plan    History of liver abscess for which she is on Zyvox, Omnicef, diflucan through 12/20.   Recent CT was negative.  Repeat CT ordered due worsening abdominal pain.        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 Cedeno, oncology      Non-intractable vomiting with nausea- (present on admission)   Assessment & Plan    With poor appetite.      DNR (do not resuscitate)- (present on admission)   Assessment & Plan    Per patient's request.           VTE prophylaxis: SCDs

## 2018-12-21 NOTE — CONSULTS
Consults   Nephrology Inpatient Consultation    Date of Service  12/21/2018    Reason for Consultation  Hyponatremia    History of Presenting Illness  73 y.o. female admitted 12/17/2018. 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 She was admitted in Nov-Dec with liver abscess with VRE and Candida.    In Dec had normal serum Na levels, and on admission on 12/17 was noted to have a serum Na level of 110. She is currently on 3% saline     Referring Physician  Jerome Barrios M.D.    Consulting Physician  Jorge De Los Santos M.D.    Review of Systems  Review of Systems   Constitutional: Positive for malaise/fatigue.   HENT:        Dry mouth   Neurological: Positive for weakness.   All other systems reviewed and are negative.     Past Medical History  Past Medical History:   Diagnosis Date   • Encounter for antineoplastic chemotherapy 4/6/2018   • Cataract 09-    bilat.,no surgery   • Cancer (HCC) 2016    Ampullary CA   • Jaundice 2016   • Ampullary carcinoma (HCC)    • Anemia    • Arthritis     hands/fingers/right knee   • Asthma    • Blood clotting disorder (HCC)     blood clot 2016   • Dental disorder     upper partial   • Heart burn    • High cholesterol    • Hypertension    • Indigestion        Surgical History  Past Surgical History:   Procedure Laterality Date   • CATH PLACEMENT Right 9/9/2016    Procedure: CATH PLACEMENT cephalic power port  ;  Surgeon: Kurtis Jacobs M.D.;  Location: SURGERY [facility];  Service:    • WHIPPLE PROCEDURE  8/15/2016    Procedure: Exploratoy Laparotomy, Da-en-y;  Surgeon: Kurtis Jacobs M.D.;  Location: SURGERY [facility];  Service:    • NODE DISSECTION  8/15/2016    Procedure: omental flap, Liver Wedge, cholecystectomy ;  Surgeon: Kurtis Jacobs M.D.;  Location: SURGERY [facility];  Service:    • STENT PLACEMENT  7/2016    gallbladder   • CATARACT  EXTRACTION WITH IOL     • CHOLECYSTECTOMY     • TONSILLECTOMY         Medications  No current facility-administered medications on file prior to encounter.      Current Outpatient Prescriptions on File Prior to Encounter   Medication Sig Dispense Refill   • tramadol (ULTRAM) 50 MG Tab Take 50 mg by mouth every 6 hours as needed for Moderate Pain or Severe Pain.     • ondansetron (ZOFRAN ODT) 4 MG TABLET DISPERSIBLE Take 4 mg by mouth every 6 hours as needed.     • Multiple Vitamins-Minerals (MULTIVITAMIN & MINERAL PO) Take 1 Tab by mouth every day. supplement     • acetaminophen (TYLENOL) 325 MG Tab Take 325 mg by mouth every 6 hours as needed for Mild Pain.     • artificial tear (ARTIFICIAL TEARS) 0.4 % ophthalmic solution Place 1 Drop in both eyes 2 Times a Day.     • non-formulary med Take 1 Tab by mouth every day. Renuvo joint supplement     • omeprazole (PRILOSEC) 20 MG delayed-release capsule TAKE 1 CAPSULE BY MOUTH EVERYDAY 30 Cap 3   • potassium chloride SA (K-DUR) 10 MEQ Tab CR Take 2 Tabs by mouth 2 times a day. 180 Tab 3       Family History  family history includes Cancer in her other.    Social History  Social History   Substance Use Topics   • Smoking status: Never Smoker   • Smokeless tobacco: Never Used   • Alcohol use No       Allergies  Allergies   Allergen Reactions   • Tape Unspecified     Sensitive skin        Physical Exam  Laboratory/Imaging   Hemodynamics  Temp (24hrs), Av.1 °C (96.9 °F), Min:35.4 °C (95.7 °F), Max:36.4 °C (97.6 °F)   Temperature: 36.4 °C (97.6 °F)  Pulse  Av.6  Min: 69  Max: 135 Heart Rate (Monitored): 94  NIBP: 114/61      Respiratory      Respiration: 13, Pulse Oximetry: 99 %, O2 Daily Delivery Respiratory : Room Air with O2 Available     Work Of Breathing / Effort: Mild  RUL Breath Sounds: Clear, RML Breath Sounds: Diminished, RLL Breath Sounds: Diminished, SHAN Breath Sounds: Clear, LLL Breath Sounds: Diminished    Fluids       Nutrition  Orders Placed This  Encounter   Procedures   • Diet NPO     Standing Status:   Standing     Number of Occurrences:   1     Order Specific Question:   Restrict to:     Answer:   Strict [1]       Physical Exam   Constitutional: She is oriented to person, place, and time. She appears well-developed. She appears ill.   HENT:   Head: Normocephalic and atraumatic.   Cardiovascular: Normal rate and regular rhythm.    Pulmonary/Chest: Effort normal and breath sounds normal.   Musculoskeletal: She exhibits no edema or tenderness.   Neurological: She is alert and oriented to person, place, and time.   Skin: Skin is warm and dry.       Recent Labs      12/21/18   0523   WBC  10.7   RBC  3.23*   HEMOGLOBIN  9.8*   HEMATOCRIT  28.6*   MCV  88.5   MCH  30.3   MCHC  34.3   RDW  65.9*   PLATELETCT  57*   MPV  10.7     Recent Labs      12/20/18   1700  12/20/18   2336  12/21/18   0523   SODIUM  115*  114*  119*   POTASSIUM  4.7  5.4  4.5   CHLORIDE  93*  94*  97   CO2  14*  13*  15*   GLUCOSE  129*  173*  94   BUN  10  11  9   CREATININE  0.62  0.62  0.54   CALCIUM  8.6  8.0*  8.5                      Assessment/Plan     1. 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 At this time, Na level remains 119. Clinically, appears slightly dry. Currently on 3%, and unable to give PO salt tabs due to illeus.  2. Liver abscess as discussed above  3. Metastatic ampullary ca    -Will check urine osmolality to help understand disease, though must be interpreted in light of the 3%  -Suspect the true osmolality is quite high; and therefore it will be necessary to add a diuretic, in this case a loop diuretic, to impair the renal concentrating ability. However, need to monitor fluid status well.  -Will add low dose lasix 10mg daily, then based on response, consider increasing to BID as needed  -The NS may well be acting as a buffer to correct her sodium level. If, for example, the urine osmolality is quite high (as I suspect) such as 600 (for the sake of example), then giving NS will  be like giving half the dose of D5W. Will however, in discussion with Dr. Machado, goal to increase volume; will therefore use both NS and lasix in an attempt to reduce urine concentrating ability and give volume; consider vaptan IV if unable to correct  -Ultimately, when can take PO will convert to 2gm salt tabs TID with 10mg lasix PO BID

## 2018-12-21 NOTE — PROCEDURES
Procedure: central line    Indication: hyponatremia with need for access for 3% saline drip    Consent: by Ms. Poole    Description: a time-out was called. The right neck was prepped and draped in a sterile fashion.  Using sterile ultrasound guidance, the right internal jugular vein was identified. 2 mL of 1% lidocaine was used for anesthesia.  A large bore needle was used to aspirate dark, red, non-pulsating blood. Seldinger technique was used to place the central venous catheter. All three ports were aspirated and flushed with sterile saline.  The central line was adhered to the skin with sutures.     Blood loss: 1  mL    A stat chest x-ray is pending at this time.

## 2018-12-21 NOTE — PROGRESS NOTES
Critical Care Progress Note    Date of admission  12/17/2018    Chief Complaint  73 y.o. female admitted 12/17/2018 with severe hyponatremia    Hospital Course    73 y.o. 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 Cedeno.  She was admitted from November 12 - December 6 to [facility] where she was found to have a liver abscess that grew out VRE and Candida.  She was treated with intravenous antibiotics followed by transition to oral antibiotics by infectious disease before discharge home.  She was doing fairly well and able to walk with minimal assistance and tolerating minimal diet and her medications up until 2 days ago when she started to have increased generalized weakness, drowsiness, headache and today with nausea and vomiting.  She was brought into the emergency department by her son and daughter and was found to be severely hyponatremic with a sodium level of 110.  She underwent CT imaging of her brain as well as chest abdomen and pelvis and is admitted to the intensive care unit where I was consulted for assistance in her critical care management.  Per her son, patient is always preferred a low-salt diet and has had hyponatremia in the past although on chart review has been as low as 128 but never down to 110.  She frequently has nausea and vomiting with her chemotherapy medications but her son feels that she has been drinking adequate amounts of free water with her pills and meals.  No seizure activity was noted at home nor in the emergency department.      Interval Problem Update  Reviewed last 24 hour events:    Last Na 119!  3% saline 15c/hr  A&O x4  SR 70s - SBp 120s  Tm 97.6  NPO - firm but no further N/V  Low UO  CT reviewed again  Room air  PPI  ABX resumed to IV    ID to see  Renal eval noted  IR drain?  Stop salt tabs, GI intolerant  Cont ABX  NS 75  3% saline IV  IV Lasix  low-dose to facilitate free water clearance    Reviewed case at great length with nephrology, discussed different treatment options to help with clearance of free water and maintaining adequate sodium retention to correct severe hyponatremia.  Query paraneoplastic syndrome participating in this process  We also discussed treatment option of Vaprisol    ID evaluation noted, ID agrees with Dr. Mauricio and myself E IR drainage of liver abscess with repeat cultures and performance of cytology on aspirate.  Zyvox to be switched to daptomycin which will better cover VRE and continue Diflucan along with adding Zosyn    Serial BMP reviewed, sodium up to 124!     YESTERDAY  Last Na 114 -> 116  &O x4  SR/ST - PVCs  SBp 110s  Full liquid diet  Emesis this AM  UO ok/low  NS 83/hr  Mobilizes  Afebrile - Tm 97.7  Room air  PPI  Last day ABX?  Salt tabs      Abdomen bit more distended and associated with nausea vomiting today  No bleeding  Remains afebrile throughout the day, no CBC today    Mg supplement IV  Consider Lasix for severe persisting hyponatremia?  Image ABD with CT, films reviewed, significant ileus, hepatic process grossly unchanged  Continue medicines but switch to intravenous including antibiotics with CT findings  May need to get infectious disease involved again  D/c SSI and FS  Add PEP to I-S  Mobilize as tolerated  With ileus convert medicines from oral to IV route  Discontinue salt tabs, consider 3% saline    Review of Systems  Review of Systems   Constitutional: Positive for malaise/fatigue (Feels better today). Negative for chills, diaphoresis and fever.   HENT: Negative for congestion and sore throat.    Respiratory: Negative for cough, hemoptysis, sputum production and shortness of breath.    Cardiovascular: Negative for chest pain, palpitations, orthopnea, leg swelling and PND.   Gastrointestinal: Positive for abdominal pain (Improved today) and nausea (Improved dramatically). Negative for blood in  stool, constipation, diarrhea and vomiting (Resolved for now).        Poor appetite, patient encouraged   Genitourinary: Negative.    Musculoskeletal: Positive for back pain (Unchanged and chronic) and joint pain (Unchanged and chronic).   Neurological: Positive for weakness (Unchanged). Negative for sensory change, focal weakness and headaches.   Psychiatric/Behavioral: The patient is not nervous/anxious.    Complete review performed again, minimal change as above    Vital Signs for last 24 hours   Temp:  [35.6 °C (96 °F)-36.4 °C (97.6 °F)] 36.1 °C (96.9 °F)  Pulse:  [] 105  Resp:  [7-24] 18  BP: ()/(60-98) 103/67    Hemodynamic parameters for last 24 hours       Respiratory       Physical Exam   Physical Exam   Constitutional: She appears well-nourished. She appears lethargic. She is cooperative.  Non-toxic appearance. No distress. Nasal cannula in place.   HENT:   Head: Atraumatic.   Mouth/Throat: Oropharynx is clear and moist.   Eyes: Pupils are equal, round, and reactive to light. EOM are normal. No scleral icterus.   Neck: Neck supple. No JVD present.   Cardiovascular: Regular rhythm and intact distal pulses.  Tachycardia present.    No murmur heard.  Pulmonary/Chest: Effort normal and breath sounds normal. No respiratory distress. She has no wheezes. She has no rhonchi. She has no rales.   Abdominal: Soft. She exhibits distension (Improved). Bowel sounds are decreased. There is no hepatosplenomegaly. There is tenderness (Improved with some persistence in the right side). There is no rigidity, no rebound, no guarding, no CVA tenderness and negative Ospina's sign.   Neurological: She has normal strength. She appears lethargic. No cranial nerve deficit or sensory deficit. GCS eye subscore is 4. GCS verbal subscore is 5. GCS motor subscore is 6.   Following well, diffuse weakness, no focality   Skin: She is not diaphoretic. No cyanosis. Nails show no clubbing.   Psychiatric: Her speech is normal and  behavior is normal. Thought content normal. Her mood appears not anxious. Cognition and memory are normal.   Vitals reviewed.  Complete follow-up exam performed again, see above changes    Medications  Current Facility-Administered Medications   Medication Dose Route Frequency Provider Last Rate Last Dose   • fluconazole (DIFLUCAN) in NS IVPB 400 mg  400 mg Intravenous Q24HRS Keaton Machado M.D. 0 mL/hr at 12/21/18 1229 400 mg at 12/21/18 1747   • furosemide (LASIX) injection 10 mg  10 mg Intravenous Q DAY Jorge De Los Santos M.D.   10 mg at 12/21/18 1145   • NS infusion   Intravenous Continuous Jorge De Los Santos M.D. 75 mL/hr at 12/21/18 1915     • LIDOCAINE HCL 2 % INJ SOLN            • piperacillin-tazobactam (ZOSYN) 3.375 g in  mL IVPB  3.375 g Intravenous Q8HRS Gabby Swenson M.D. 25 mL/hr at 12/21/18 2203 3.375 g at 12/21/18 2203   • DAPTOmycin 400 mg in NS 50 mL IVPB  6 mg/kg Intravenous Q24HR Gabby Swenson M.D. 100 mL/hr at 12/21/18 2015 400 mg at 12/21/18 2015   • Respiratory Care per Protocol   Nebulization Continuous RT Keaton Machado M.D.       • 3% sodium chloride (HYPERTONIC SALINE) 500mL infusion   Intravenous Continuous George Mauricio M.D. 15 mL/hr at 12/20/18 1948     • pantoprazole (PROTONIX) injection 40 mg  40 mg Intravenous DAILY Keaton Machado M.D.   40 mg at 12/21/18 0529   • dextrose 50% (D50W) injection 25 mL  25 mL Intravenous Q15 MIN PRN Jeremy M Gonda, M.D.       • acetaminophen (TYLENOL) tablet 325 mg  325 mg Oral Q6HRS PRN Jerome Barrios M.D.       • tramadol (ULTRAM) 50 MG tablet 50 mg  50 mg Oral Q6HRS PRN Jerome Barrios M.D.       • senna-docusate (PERICOLACE or SENOKOT S) 8.6-50 MG per tablet 2 Tab  2 Tab Oral BID Jerome Barrios M.D.   Stopped at 12/20/18 1800    And   • polyethylene glycol/lytes (MIRALAX) PACKET 1 Packet  1 Packet Oral QDAY PRN Jerome Barrios M.D.        And   • magnesium hydroxide (MILK OF MAGNESIA) suspension 30 mL   30 mL Oral QDAY PRN Jerome Barrios M.D.        And   • bisacodyl (DULCOLAX) suppository 10 mg  10 mg Rectal QDAY PRN Jerome Barrios M.D.       • ondansetron (ZOFRAN) syringe/vial injection 4 mg  4 mg Intravenous Q4HRS PRJOSIAH Barrios M.D.   4 mg at 12/20/18 0343   • ondansetron (ZOFRAN ODT) dispertab 4 mg  4 mg Oral Q4HRS PRN Jerome Barrios M.D.   4 mg at 12/17/18 1839   • artificial tears 1.4 % ophthalmic solution 1 Drop  1 Drop Both Eyes Q2HRS PRJOSIAH Barrios M.D.       • prochlorperazine (COMPAZINE) injection 10 mg  10 mg Intravenous Q4HRS PRN Jeremy M Gonda, M.D.   10 mg at 12/20/18 1810       Fluids    Intake/Output Summary (Last 24 hours) at 12/21/18 2322  Last data filed at 12/21/18 2000   Gross per 24 hour   Intake          1701.75 ml   Output                0 ml   Net          1701.75 ml       Laboratory          Recent Labs      12/19/18   0347   12/20/18   0558   12/21/18   0523  12/21/18   1153  12/21/18   1800   SODIUM  114*   < >  116*   < >  119*  121*  124*   POTASSIUM  4.8   < >  5.1   < >  4.5  4.7  4.2   CHLORIDE  88*   < >  93*   < >  97  99  104   CO2  19*   < >  15*   < >  15*  13*  13*   BUN  8   < >  10   < >  9  9  9   CREATININE  0.42*   < >  0.45*   < >  0.54  0.62  0.57   MAGNESIUM  2.3   --   1.9   --   2.0   --    --    CALCIUM  8.7   < >  8.4*   < >  8.5  8.7  8.1*    < > = values in this interval not displayed.     Recent Labs      12/20/18   1700   12/21/18   0523  12/21/18   1153  12/21/18   1800   LIPASE  102*   --    --    --    --    GLUCOSE  129*   < >  94  93  103*    < > = values in this interval not displayed.     Recent Labs      12/21/18   0523   WBC  10.7   NEUTSPOLYS  91.00*   LYMPHOCYTES  5.10*   MONOCYTES  2.10   EOSINOPHILS  0.60   BASOPHILS  0.20     Recent Labs      12/21/18   0523  12/21/18   1243  12/21/18   1321   RBC  3.23*   --    --    HEMOGLOBIN  9.8*   --    --    HEMATOCRIT  28.6*   --    --    PLATELETCT  57*   --    --     PROTHROMBTM   --   11.7*  17.2*   INR   --   0.85*  1.39*       Imaging  X-Ray:  I have personally reviewed the images and compared with prior images.  EKG:  I have personally reviewed the images and compared with prior images.  CT:    Repeat CT reviewed and compared to more recent one  Echo:   Reviewed    Assessment/Plan  * Hyponatremia- (present on admission)   Assessment & Plan    Severe, symptomatic, persisting in the teens despite therapy  Urinalysis and urine lites reviewed  Discontinue salt tabs, patient not tolerating  Titrate 3% saline  Add maintenance dose IV normal saline  IV Lasix at low dose to facilitate free water removal  Avoid hypotonic fluids  Monitor for the need to treat with Vaprisol       Liver abscess- (present on admission)   Assessment & Plan    Status post drainage during last hospital stay and status post prolonged antibiotic course  Follow-up CT suggest persistence and necrotic tumor/abscess  Agree with ID, IR drainage with repeat cytology and culture  Follow-up CT down the road after drainage and repeat course of antibiotics  Current antibiotics include Zosyn, daptomycin, Diflucan     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)   Assessment & Plan    Chemo and radiation currently on hold  Oncology aware  Analgesia as needed  Palliative care consultation newly  Repeat CT scans show significant ileus, cannot rule out pneumatosis  Repeat CT scan shows presumed liver abscess, query necrotic metastatic lesion       Hypokalemia- (present on admission)   Assessment & Plan    Repletion and monitor daily  Also monitor and replete magnesium as needed  No change in current plan     Hypomagnesemia   Assessment & Plan    Repletion and monitor daily  Also monitor and replete potassium  Replete IV for now given ileus and GI symptoms       Non-intractable vomiting with nausea- (present on admission)   Assessment & Plan    Starting to be occurring more often unfortunately  Likely secondary to  primary abdominal process which may be persistent/recurrent infection, hyponatremia and other metabolic abnormalities contributing  Antiemetics including as needed Zofran and Compazine  Waxing and waning, salt tabs or oral potassium have not helped, continue monitor     DNR (do not resuscitate)- (present on admission)   Assessment & Plan    Reviewed with patient, reaffirmed again today, cc if clinically deteriorates      Iron deficiency anemia due to chronic blood loss- (present on admission)   Assessment & Plan    Monitor with daily CBC using conservative transfusion strategy  No bleeding clinically  No change current plan          VTE:  Contraindicated  Ulcer: PPI  Lines: None    I have performed a physical exam and reviewed and updated ROS and Plan today (12/21/2018). In review of yesterday's note (12/20/2018), there are no changes except as documented above.     Discussed patient condition and risk of morbidity and/or mortality with Hospitalist, RN, RT, Pharmacy, Charge nurse / hot rounds, Patient and infectious disease and nephrology

## 2018-12-21 NOTE — PROGRESS NOTES
2 RN skin check complete  Noted discoloration on sacrum,not blanching red area on sacrum, wound consulted, pictures taken and uploaded in epic within 24 hours of admission.  Patient has previous healing incision wound under upper right breast, biotin light, wound intact, clean and dry.   Patient has generalized bruising to the upper extremities.   Old midline incision, scar noted  No other concerns at this time

## 2018-12-21 NOTE — CARE PLAN
Problem: Venous Thromboembolism (VTW)/Deep Vein Thrombosis (DVT) Prevention:  Goal: Patient will participate in Venous Thrombosis (VTE)/Deep Vein Thrombosis (DVT)Prevention Measures  Outcome: PROGRESSING AS EXPECTED   12/21/18 0400 12/21/18 0800   Mechanical/VTE Prophylaxis   Mechanical Prophylaxis  --  SCDs, Sequential Compression Device   AV Foot Pumps Off --    LILIYA Hose (Graduated Compression Stockings) Off --    SCDs, Sequential Compression Device --  On   OTHER   Risk Assessment Score --  1   VTE RISK --  Moderate   Pharmacologic Prophylaxis Used --  Contraindicated per MD       Problem: Bowel/Gastric:  Goal: Normal bowel function is maintained or improved  Outcome: PROGRESSING AS EXPECTED   12/21/18 0600 12/21/18 0800   OTHER   Last BM --  (prior to arrival)   Number of Times Stooled 0 --

## 2018-12-21 NOTE — WOUND TEAM
[facility]  Inpatient Services  Initial Wound and Skin Care Evaluation    Admission Date: 12/17/2018     Consult Date: 12/20/18 @ 1700   HPI, PMH, SH: Reviewed    Unit where seen by Wound Team: S130/[id number]     WOUND CONSULT RELATED TO:  Coccyx wound     SUBJECTIVE:  Pt sleepy but agreeable      Self Report / Pain Level:  0/10       OBJECTIVE:  Pt in bed, A of 1 to roll, mepilex on sacrum    WOUND TYPE, LOCATION, CHARACTERISTICS (Pressure Injuries: location, stage, POA or date identified)       Pressure Injury 12/18/18 Coccyx coccyx stage 2 POA (Active)   Wound Image   12/20/2018  5:00 PM   Pressure Injury Stage 2 12/20/2018  5:00 PM   State of Healing Early/partial granulation 12/20/2018  5:00 PM   Site Assessment Red 12/20/2018  5:00 PM   Brittany-wound Assessment Pink 12/20/2018  5:00 PM   Margins Attached edges;Defined edges 12/20/2018  5:00 PM   Wound Length (cm) 1 cm 12/20/2018  5:00 PM   Wound Width (cm) 0.4 cm 12/20/2018  5:00 PM   Wound Depth (cm) 0.2 cm 12/20/2018  5:00 PM   Wound Surface Area (cm^2) 0.4 cm^2 12/20/2018  5:00 PM   Closure Secondary intention 12/20/2018  5:00 PM   Drainage Amount Scant 12/20/2018  5:00 PM   Drainage Description Serous 12/20/2018  5:00 PM   Treatments Site care;Cleansed 12/20/2018  5:00 PM   Cleansing Normal Saline Irrigation 12/20/2018  5:00 PM   Dressing Options Mepilex 12/20/2018  5:00 PM   Dressing Changed Changed 12/20/2018  5:00 PM   Dressing Change Frequency Every 48 hrs 12/20/2018  5:00 PM   NEXT Dressing Change  12/22/18 12/20/2018  5:00 PM   NEXT Weekly Photo (Inpatient Only) 12/25/18 12/20/2018  5:00 PM   WOUND NURSE ONLY - Odor None 12/20/2018  5:00 PM   WOUND NURSE ONLY - Exposed Structures None 12/20/2018  5:00 PM   WOUND NURSE ONLY - Tissue Type and Percentage 100% dark red 12/20/2018  5:00 PM   WOUND NURSE ONLY - Time Spent with Patient (mins) 60 12/20/2018  5:00 PM           Lab Values:    WBC:       WBC   Date/Time Value Ref Range Status   12/18/2018  03:15 AM 10.8 4.8 - 10.8 K/uL Final     AIC:      Lab Results   Component Value Date/Time    HBA1C 5.8 (H) 03/15/2014 09:15 AM           INTERVENTIONS BY WOUND TEAM:  Dressing removed, wound cleansed with normal saline, mepilex replaced, propped pt in partial right side lying     Dressing selection:  mepilex         Interdisciplinary consultation: Patient, Bedside RN     EVALUATION: Pt presented to ED with weakness and RUQ pain with a history of ampullary carcinoma and whipple procedure.  Pt admitted for hyponatremia, dehydration and weakness    Factors affecting wound healing: critically ill, immobility   Goals: Steady decrease in wound area and depth weekly.    NURSING PLAN OF CARE ORDERS (X):    Dressing changes: See Dressing Care orders: X  Skin care: See Skin Care orders:   Rectal tube care: See Rectal Tube Care orders:   Other orders:    RSKIN: CURRENT (X) ORDERED (O):   Q shift Crispin:  X  Q shift pressure point assessments:  X  Pressure redistribution mattress            CATINA      X    Bariatric CATINA         Bariatric foam           Heel float boots          Float Heels off Bed with Pillows               Barrier wipes         Barrier Cream         Barrier paste          Sacral silicone dressing   X      Silicone O2 tubing         Anchorfast         Cannula fixation Device (Tender )          Gray Foam Ear protectors           Trach with Optifoam split foam                 Waffle cushion        Waffle Overlay         Rectal tube or BMS         Antifungal tx      Interdry          Reposition q 2 hours    X    Up to chair    X    Ambulate      PT/OT        Dietician        Diabetes Education      PO     TF     TPN     NPO   # days   Other        WOUND TEAM PLAN OF CARE (X):   NPWT change 3 x week:        Dressing changes by wound team:       Follow up as needed:   X    Other (explain):     Anticipated discharge plans (X):   SNF:           Home Care:           Outpatient Wound Center:            Self Care:             Other:    tbd

## 2018-12-21 NOTE — PROGRESS NOTES
1830: Dr. Machado informed about patients lipase levels of 102  Patient had sips with her 1800 medications and had an episode of emesis, of 150 ml, orders to make patient strict NPO and to change PO meds to IV. Change to strict NPO. Will enter orders in epic.   Informed about patients abd x-ray

## 2018-12-21 NOTE — PROGRESS NOTES
Dr. Mauricio notified about the abd x-ray results and last Na level of 115  Pt diet to be changed to NPO sips okay with meds, orders entered in epic.

## 2018-12-21 NOTE — PROGRESS NOTES
2 RN skin check complete.   Sacrum has pressure sore, but wound has seen it. Mepilex is in place and is changed every 48 hours.

## 2018-12-21 NOTE — CARE PLAN
Problem: Safety  Goal: Will remain free from injury  Outcome: PROGRESSING AS EXPECTED  Bed is locked and in low position. Patient educated on how to use the call light. Patient is close to nursing station.     Problem: Skin Integrity  Goal: Risk for impaired skin integrity will decrease  Outcome: PROGRESSING AS EXPECTED  Patient is turned every 2 hours. Sacrum has pressure ulcer, wound has seen, and mepilex is on and changed every 48 hours unless soiled.

## 2018-12-22 PROBLEM — K56.7 ILEUS (HCC): Status: ACTIVE | Noted: 2018-01-01

## 2018-12-22 NOTE — PROGRESS NOTES
Critical Care Progress Note    Date of admission  12/17/2018    Chief Complaint  73 y.o. female admitted 12/17/2018 with severe hyponatremia    Hospital Course    73 y.o. 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 Cedeno.  She was admitted from November 12 - December 6 to [facility] where she was found to have a liver abscess that grew out VRE and Candida.  She was treated with intravenous antibiotics followed by transition to oral antibiotics by infectious disease before discharge home.  She was doing fairly well and able to walk with minimal assistance and tolerating minimal diet and her medications up until 2 days ago when she started to have increased generalized weakness, drowsiness, headache and today with nausea and vomiting.  She was brought into the emergency department by her son and daughter and was found to be severely hyponatremic with a sodium level of 110.  She underwent CT imaging of her brain as well as chest abdomen and pelvis and is admitted to the intensive care unit where I was consulted for assistance in her critical care management.  Per her son, patient is always preferred a low-salt diet and has had hyponatremia in the past although on chart review has been as low as 128 but never down to 110.  She frequently has nausea and vomiting with her chemotherapy medications but her son feels that she has been drinking adequate amounts of free water with her pills and meals.  No seizure activity was noted at home nor in the emergency department.      Interval Problem Update  Reviewed last 24 hour events:    Last Na 128  3% saline  15  NS 75cc/hr  Lasix 10  IR drain placed - cultues pending  A&O x4  SR/ST    SBp 100-120s  Afeb  NPO - ABD no change   + incontinence  R IJ CVC  EOB  Room air  IS  Zosyn/diflucan/dapto      Mobilize MORE  Mg  Reduce 3% to  10cc  Lovenox     YESTERDAY  Last Na 119!  3% saline 15c/hr  A&O x4  SR 70s - SBp 120s  Tm 97.6  NPO - firm but no further N/V  Low UO  CT reviewed again  Room air  PPI  ABX resumed to IV    ID to see  Renal eval noted  IR drain?  Stop salt tabs, GI intolerant  Cont ABX  NS 75  3% saline IV  IV Lasix low-dose to facilitate free water clearance    Reviewed case at great length with nephrology, discussed different treatment options to help with clearance of free water and maintaining adequate sodium retention to correct severe hyponatremia.  Query paraneoplastic syndrome participating in this process  We also discussed treatment option of Vaprisol    ID evaluation noted, ID agrees with Dr. Mauricio and myself E IR drainage of liver abscess with repeat cultures and performance of cytology on aspirate.  Zyvox to be switched to daptomycin which will better cover VRE and continue Diflucan along with adding Zosyn    Serial BMP reviewed, sodium up to 124!    Review of Systems  Review of Systems   Constitutional: Positive for malaise/fatigue (No change today). Negative for chills, diaphoresis and fever.   HENT: Negative for congestion and sore throat.    Respiratory: Negative for cough, hemoptysis, sputum production and shortness of breath.    Cardiovascular: Negative for chest pain, palpitations, orthopnea, leg swelling and PND.   Gastrointestinal: Positive for abdominal pain (No change or slight worse) and nausea (Waxing and waning). Negative for blood in stool, constipation, diarrhea and vomiting (Resolved for now).        Poor appetite, patient encouraged   Genitourinary: Negative.    Musculoskeletal: Positive for back pain (Unchanged and chronic) and joint pain (Unchanged and chronic).   Neurological: Positive for weakness (Unchanged). Negative for sensory change, focal weakness and headaches.   Psychiatric/Behavioral: The patient is not nervous/anxious.    Complete review performed again, minimal change as  above    Vital Signs for last 24 hours   Temp:  [36.1 °C (97 °F)-36.4 °C (97.5 °F)] 36.4 °C (97.5 °F)  Pulse:  [] 60  Resp:  [14-23] 19    Hemodynamic parameters for last 24 hours       Respiratory       Physical Exam   Physical Exam   Constitutional: She is oriented to person, place, and time. She appears well-nourished. She is cooperative.  Non-toxic appearance. No distress. Nasal cannula in place.   HENT:   Head: Atraumatic.   Mouth/Throat: Oropharynx is clear and moist.   Eyes: Pupils are equal, round, and reactive to light. EOM are normal. No scleral icterus.   Neck: Neck supple. No JVD present.   Cardiovascular: Regular rhythm and intact distal pulses.  Tachycardia present.  Exam reveals no friction rub.    No murmur heard.  Pulmonary/Chest: Effort normal and breath sounds normal. No respiratory distress. She has no wheezes. She has no rhonchi. She has no rales.   Abdominal: Soft. She exhibits distension (No change). Bowel sounds are decreased. There is no hepatosplenomegaly. There is tenderness (Slight worse with persistence especially in the right abdomen). There is no rigidity, no rebound, no guarding, no CVA tenderness and negative Ospina's sign.   Musculoskeletal: She exhibits no edema.   Neurological: She is alert and oriented to person, place, and time. She has normal strength. No cranial nerve deficit or sensory deficit. GCS eye subscore is 4. GCS verbal subscore is 5. GCS motor subscore is 6.   Following well, diffuse weakness, no focality   Skin: Skin is warm and dry. She is not diaphoretic. No cyanosis. Nails show no clubbing.   Psychiatric: Her speech is normal and behavior is normal. Thought content normal. Her mood appears not anxious. Cognition and memory are normal.   Vitals reviewed.  Complete follow-up exam performed again, see above changes    Medications  Current Facility-Administered Medications   Medication Dose Route Frequency Provider Last Rate Last Dose   • enoxaparin (LOVENOX)  inj 40 mg  40 mg Subcutaneous DAILY George Mauricio M.D.   40 mg at 12/22/18 1003   • fluconazole (DIFLUCAN) in NS IVPB 400 mg  400 mg Intravenous Q24HRS Keaton Machado M.D.   Stopped at 12/22/18 0733   • furosemide (LASIX) injection 10 mg  10 mg Intravenous Q DAY Jorge De Los Santos M.D.   10 mg at 12/22/18 0530   • NS infusion   Intravenous Continuous Jorge De Los Santos M.D. 75 mL/hr at 12/22/18 1322     • piperacillin-tazobactam (ZOSYN) 3.375 g in  mL IVPB  3.375 g Intravenous Q8HRS Gabby Swenson M.D. 25 mL/hr at 12/22/18 2110 3.375 g at 12/22/18 2110   • DAPTOmycin 400 mg in NS 50 mL IVPB  6 mg/kg Intravenous Q24HR Gabby Swenson M.D. 100 mL/hr at 12/22/18 1958 400 mg at 12/22/18 1958   • Respiratory Care per Protocol   Nebulization Continuous RT Keaton Machado M.D.       • 3% sodium chloride (HYPERTONIC SALINE) 500mL infusion   Intravenous Continuous Keaton Machado M.D. 10 mL/hr at 12/22/18 0921     • pantoprazole (PROTONIX) injection 40 mg  40 mg Intravenous DAILY Keaton Machado M.D.   40 mg at 12/22/18 0527   • dextrose 50% (D50W) injection 25 mL  25 mL Intravenous Q15 MIN PRN Jeremy M Gonda, M.D.       • acetaminophen (TYLENOL) tablet 325 mg  325 mg Oral Q6HRS PRN Jerome Barrios M.D.       • tramadol (ULTRAM) 50 MG tablet 50 mg  50 mg Oral Q6HRS PRN Jerome Barrios M.D.       • senna-docusate (PERICOLACE or SENOKOT S) 8.6-50 MG per tablet 2 Tab  2 Tab Oral BID Jerome Barrios M.D.   Stopped at 12/20/18 1800    And   • polyethylene glycol/lytes (MIRALAX) PACKET 1 Packet  1 Packet Oral QDAY PRN Jerome Barrios M.D.        And   • magnesium hydroxide (MILK OF MAGNESIA) suspension 30 mL  30 mL Oral QDAY PRN Jerome Barrios M.D.        And   • bisacodyl (DULCOLAX) suppository 10 mg  10 mg Rectal QDAY PRN Jerome Barrios M.D.       • ondansetron (ZOFRAN) syringe/vial injection 4 mg  4 mg Intravenous Q4HRS PRN Jerome Barrios M.D.   4 mg at 12/20/18 8139    • ondansetron (ZOFRAN ODT) dispertab 4 mg  4 mg Oral Q4HRS PRN Jerome Barrios M.D.   4 mg at 12/17/18 1839   • artificial tears 1.4 % ophthalmic solution 1 Drop  1 Drop Both Eyes Q2HRS PRN Jerome Barrios M.D.       • prochlorperazine (COMPAZINE) injection 10 mg  10 mg Intravenous Q4HRS PRN Jeremy M Gonda, M.D.   10 mg at 12/20/18 1810       Fluids    Intake/Output Summary (Last 24 hours) at 12/23/18 0010  Last data filed at 12/22/18 2000   Gross per 24 hour   Intake             2248 ml   Output              577 ml   Net             1671 ml       Laboratory      Recent Labs      12/22/18   0546   CPKTOTAL  14     Recent Labs      12/20/18   0558   12/21/18   0523   12/22/18   0546  12/22/18   1152  12/22/18   1730   SODIUM  116*   < >  119*   < >  128*  128*  130*   POTASSIUM  5.1   < >  4.5   < >  4.0  3.7  3.8   CHLORIDE  93*   < >  97   < >  107  108  109   CO2  15*   < >  15*   < >  15*  10*  11*   BUN  10   < >  9   < >  8  9  9   CREATININE  0.45*   < >  0.54   < >  0.55  0.56  0.56   MAGNESIUM  1.9   --   2.0   --   1.6   --    --    CALCIUM  8.4*   < >  8.5   < >  8.1*  7.6*  7.7*    < > = values in this interval not displayed.     Recent Labs      12/20/18   1700   12/22/18   0546  12/22/18   1152  12/22/18   1730   LIPASE  102*   --    --    --    --    GLUCOSE  129*   < >  86  82  79    < > = values in this interval not displayed.     Recent Labs      12/21/18   0523   WBC  10.7   NEUTSPOLYS  91.00*   LYMPHOCYTES  5.10*   MONOCYTES  2.10   EOSINOPHILS  0.60   BASOPHILS  0.20     Recent Labs      12/21/18   0523  12/21/18   1243  12/21/18   1321   RBC  3.23*   --    --    HEMOGLOBIN  9.8*   --    --    HEMATOCRIT  28.6*   --    --    PLATELETCT  57*   --    --    PROTHROMBTM   --   11.7*  17.2*   INR   --   0.85*  1.39*       Imaging  X-Ray:  I have personally reviewed the images and compared with prior images.  EKG:  I have personally reviewed the images and compared with prior images.  CT:     Repeat CT reviewed and compared to more recent one  Echo:   Reviewed    Assessment/Plan  * Hyponatremia- (present on admission)   Assessment & Plan    Finally improving!  Urinalysis and urine lites reviewed again  Serial BMP  Discontinue salt tabs, patient not tolerating  Titrate 3% saline daily  Add maintenance dose IV normal saline at 75 cc/h, continue  IV Lasix at low dose to facilitate free water removal  Avoid hypotonic fluids  Monitor for the need to treat with Vaprisol       Liver abscess- (present on admission)   Assessment & Plan    Status post drainage during last hospital stay and status post prolonged antibiotic course  Follow-up CT suggest persistence and necrotic tumor/abscess  Repeat IR drainage and placement of catheter 12/21  New cytology microbiology studies pending from fluid  Current antibiotics include Zosyn, daptomycin, Diflucan  Follow-up imaging next week with repeat CT     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)   Assessment & Plan    Chemo and radiation currently on hold  Oncology aware  Analgesia as needed  Palliative care consultation newly  Repeat CT scans show significant ileus, cannot rule out pneumatosis  Repeat CT scan shows presumed liver abscess, query necrotic metastatic lesion  History of Whipple procedure  Prognosis poor       Hypokalemia- (present on admission)   Assessment & Plan    Repletion and monitor daily  Also monitor and replete magnesium as needed  No change in current plan     Ileus (HCC)- (present on admission)   Assessment & Plan    Complex abdomen post radiation and surgery resulting in occasional complication of ileus  N.p.o. for now  Start trickle tube feeds or clear liquids p.o. when ileus improved clinically  Serial KUB as needed     Hypomagnesemia   Assessment & Plan    Repletion and monitor daily  Also monitor and replete potassium  Replete IV for now given ileus and GI symptoms       Non-intractable vomiting with nausea- (present on admission)    Assessment & Plan    Starting to be occurring more often unfortunately  Likely secondary to primary abdominal process ampullary cancer which may be persistent/recurrent infection, hyponatremia and other metabolic abnormalities contributing  Antiemetics including as needed Zofran and Compazine  Waxing and waning, salt tabs or oral potassium have not helped, continue monitor  For refractory vomiting place NG tube to suction     DNR (do not resuscitate)- (present on admission)   Assessment & Plan    Reviewed with patient, reaffirmed again today, cc if clinically deteriorates      Iron deficiency anemia due to chronic blood loss- (present on admission)   Assessment & Plan    Monitor with daily CBC using conservative transfusion strategy  No bleeding clinically  No change current plan          VTE:  Contraindicated  Ulcer: PPI  Lines: None    I have performed a physical exam and reviewed and updated ROS and Plan today (12/23/2018). In review of yesterday's note (12/22/2018), there are no changes except as documented above.     Discussed patient condition and risk of morbidity and/or mortality with Hospitalist, RN, RT, Pharmacy, Charge nurse / hot rounds and Patient

## 2018-12-22 NOTE — PROGRESS NOTES
IR Procedure RN's Note:    Patient consented by Dr. Arce prior to start of procedure; pt and MD signed consent.    Liver abscess drain placement done by Dr. Arce; RIGHT mid quadrant access site; pt assessed upon arrival, pt A/Ox4, pt aware of the procedure, pt baseline - shakiness; [employer] Flexima APDL locking pigtail 10F x 25cm REF#[id number] LOT#[id number]; 1 mL of dark red fluid obtained and sent to microbiology; pt appears comfortable throughout the procedure with no signs of distress or discomfort; ETCO2 monitored with a baseline of 21 mmHg and ranged between 17-28mmHg throughout the procedure ; access site CDI, soft with no signs of hematoma or bleeding, gauze and tegaderm for dressing; telephone report given to [place]; pt is lethargic and on 4L NC O2 post procedure and during transport; pt transported to room.

## 2018-12-22 NOTE — PROGRESS NOTES
"Nephrology Daily Progress Note    Date of Service  12/22/2018    Reason for Consult  73 y.o. female admitted 12/17/2018 with hyponatremia    Interval Problem Update  Uosm 437, given lasix and Na has been improving.  Noted IR drain of liver abscess  Cr stable    Review of Systems  ROS     Physical Exam  Temp:  [35.8 °C (96.5 °F)-36.3 °C (97.3 °F)] 36.3 °C (97.3 °F)  Pulse:  [] 107  Resp:  [7-19] 15  BP: ()/(60-98) 103/67    Physical Exam   Constitutional: She appears ill.   Cardiovascular: Normal rate and regular rhythm.    Pulmonary/Chest: Effort normal and breath sounds normal.   Musculoskeletal: She exhibits no edema or tenderness.   Skin: Skin is warm and dry.       Fluids    Intake/Output Summary (Last 24 hours) at 12/22/18 1303  Last data filed at 12/22/18 0600   Gross per 24 hour   Intake          3130.67 ml   Output              307 ml   Net          2823.67 ml       Laboratory  Recent Labs      12/21/18   0523   WBC  10.7   RBC  3.23*   HEMOGLOBIN  9.8*   HEMATOCRIT  28.6*   MCV  88.5   MCH  30.3   MCHC  34.3   RDW  65.9*   PLATELETCT  57*   MPV  10.7     Recent Labs      12/21/18   1800  12/22/18   0015  12/22/18   0546  12/22/18   1152   SODIUM  124*  126*  128*   --    POTASSIUM  4.2  3.9  4.0   --    CHLORIDE  104  105  107   --    CO2  13*  14*  15*   --    GLUCOSE  103*  96  86  82   BUN  9  9  8  9   CREATININE  0.57  0.57  0.55  0.56   CALCIUM  8.1*  7.9*  8.1*   --      Recent Labs      12/21/18   1243  12/21/18   1321   INR  0.85*  1.39*               Imaging  CT-DRAIN-LIVER ABSCESS-CYST   Final Result      1. Liver \"Abscess\" Drainage with CT guidance. Placement of 10 Sami locking loop drainage catheter via anterior percutaneous transhepatic approach. The collection yielded old bloody fluid.      DX-CHEST-LIMITED (1 VIEW)   Final Result      Right internal jugular line tip overlies the SVC. No pneumothorax.   Elevated right hemidiaphragm and right lung base atelectasis. Pneumonitis " not excluded.   Linear atelectasis within the left lung.      CT-ABDOMEN-PELVIS WITH   Final Result      Distention of the cecum and ascending colon which are filled with fluid and stool. Mild distention is seen of the transverse colon. Findings may represent ileus.      Multiple distended and fluid-filled loops of small bowel are seen. This may be related to ileus. It is difficult to definitively exclude pneumatosis involving a loop of small bowel in the lower abdomen.      Small amount of free fluid in the abdomen and pelvis.      Uterine cavity may be distended with fluid or there may be endometrial thickening which can be seen in the setting of malignancy. Further evaluation with pelvic ultrasound is recommended.      Status post cholecystectomy. Biliary ductal dilatation and pneumobilia are again noted.      Necrotic mass versus abscess in the right lobe of the liver is again seen. Other smaller hypodense hepatic lesions are unchanged.      Mild stranding adjacent to the pancreatic head. Correlation with pancreatic enzymes is recommended as pancreatitis is not excluded.      Appendix is at the upper limit of normal with surrounding fluid. Mild/early appendicitis is not excluded.      Distended esophagus can be seen in the setting of gastroesophageal reflux. Achalasia not excluded.      Small right pleural effusion and right basilar atelectasis.      Peripancreatic, retroperitoneal and mesenteric lymph nodes compatible with metastatic disease.      Small hypodense renal lesions are too small to characterize. Hypodense right renal lesion likely represents a parapelvic cyst.      Uterine fibroid.      Fat-containing umbilical hernia.      Colonic diverticulosis.      Findings discussed with Dr. Mauricio         CT-CHEST,ABDOMEN,PELVIS WITH   Final Result      1.  New dilatation of the esophagus diffusely. This may represent acute achalasia or reflux.      2.  No thoracic adenopathy or pleural effusion. No pulmonary  consolidation.      3.  Stable appearance of low-attenuation area in the right lobe of the liver and several other low-attenuation areas which may represent metastases. Recurrent abscess cannot be excluded.      4.  New marked dilatation of the cecum and ascending colon with stool and fluid which may represent constipation or partial obstruction. No evidence of small bowel obstruction.      5.  No renal obstruction.      6.  No free fluid or abscess.      7.  New low-attenuation area within the uterus which could represent an area of uterine hemorrhage.      CT-HEAD W/O   Final Result      1.  No CT evidence of acute infarct, hemorrhage or mass.   2.  Mild to moderate global parenchymal atrophy and chronic small ischemic changes.   3.  Old left cerebellar infarct.      DX-CHEST-PORTABLE (1 VIEW)   Final Result      No acute cardiac or pulmonary abnormality is noted.      IR-MIDLINE CATHETER INSERTION >5 YRS    (Results Pending)   IR-MIDLINE CATHETER INSERTION >5 YRS    (Results Pending)        Assessment/Plan  1. Hyponatremia - from inappropriate ADH as evidenced by elevated Uosm, currently weaning 3% saline. When appropriate can be changed to PO salt tabs.     Will sign off for now, please call with any issues

## 2018-12-22 NOTE — ASSESSMENT & PLAN NOTE
Persistent on imaging, pain  Patient wants to eat - instructed family she can eat whatever she wants, likely will not be much as pain and fatigue will likely limit her intake  Comfort care

## 2018-12-22 NOTE — CONSULTS
DATE OF SERVICE:  12/21/2018    INFECTIOUS DISEASE CONSULTATION    REASON FOR CONSULTATION:  Liver abscess.    CONSULTING PHYSICIAN:  George Mauricio MD    HISTORY OF PRESENT ILLNESS:  This is an unfortunate 73-year-old female known   to the ID service from prior admission who has a known history of pancreatic   cancer with concern for multiple mets versus liver abscesses.  On prior to   admission in 11/2018, there was concern for superinfected necrotic liver mass   versus liver abscess.  She did undergo pigtail catheter placement that was    complicated by development of pseudoaneurysm.  She was also treated for both   Bacteroides sepsis as well as candidemia during that hospitalization.   The cultures from her abscess grew lactobacillus, VRE, Candida   albicans, and strep viridans.  She was treated with prolonged course of Zyvox,   fluconazole and Unasyn.  She was planned to be treated for 4-6 weeks.    However, she was switched to p.o. at her discharge, she is now being admitted   to the hospital with severe hyponatremia with a sodium of 110 and an   increasing weakness and abdominal pain.  CT scan revealed persistent liver   masses.  She has been started on Flagyl, fluconazole and Zyvox.  Infectious   disease was consulted for antibiotic recommendations and management.  Of note,   her last radiation and chemotherapy was some time ago.  She remained obtunded   in the ICU, unable to obtain any additional review of history other than   generalized fatigue and abdominal pain.    ALLERGIES:  SHE IS ALLERGIC TO TAPE.    PAST MEDICAL HISTORY:  Pancreatic cancer, candidemia and gram-negative sepsis,   hyperlipidemia, hypertension, status post Whipple.    FAMILY HISTORY:  Cancer.    SOCIAL HISTORY:  She is a nonsmoker.  She does not drink or use illicit drugs.    PHYSICAL EXAMINATION:  GENERAL:  She is a well-appearing female in mild distress due to pain.  VITAL SIGNS:  She has been afebrile since admission, current  temperature is   97.6, blood pressure 103/67, pulse 114, respiratory rate of 19, oxygen   saturation 100% on 3 L nasal cannula.  She weighs 66 kilos.  HEENT:  Normocephalic, atraumatic.  Pupils are equal, round, and reactive to   light.  Extraocular movements are intact.  Oropharynx is clear.  NECK:  Supple.  CARDIOVASCULAR:  Tachycardic.  Regular rate and rhythm.  CHEST:  Decreased breath sounds at the bases.  ABDOMEN:  Distended, diffusely tender.  There is no rebound or guarding.  EXTREMITIES:  Show no cyanosis, clubbing.  There is trace edema.  NEUROLOGIC:  She is obtunded, but she does follow commands and is arousable to   voice.  SKIN: pale, no rash    CURRENT LABORATORY DATA:  White blood cell count of 10.7, H and H of 9.8 and   28.6, platelets of 57.  Sodium 121, potassium 4.7, chloride 99, BUN 9,   creatinine 0.63, lipase of 102.  Pathology from 11/24/2018 showed no obvious   malignancy in the liver mass.  Blood cultures x2 were negative from the   11/29/2018.    DIAGNOSTIC DATA:  CT scan on 12/20/2018 showed distention of the cecum and   colon.  Multiple distended fluid filled loops of bowel consistent with ileus,   necrotic mass versus abscess again noted in the right lobe of the liver   stranding consistent with pancreatitis and peripancreatic retroperitoneal and   mesenteric lymph nodes consistent with metastatic disease.    ASSESSMENT AND PLAN:  A 73-year-old female with likely metastatic necrotic   carcinoma of the pancreas who is status post radiation, Whipple, chemotherapy   and is status post recent admission from 11/12/2018 to 12/06/2018 for liver   abscess versus necrotic malignancy with bacterial superinfection with VRE,   Candida, lactobacillus and with hospitalization complicated by candidemia and   Bacteroides sepsis, now being admitted with severe hyponatremia, malaise and   persistent liver abscesses versus necrotic masses.  For the liver abscess, I   do recommend drainage.  We would repeat  cultures again with repeat pathology   as these are highly suspicious for malignancy given her extensive metastatic   disease.  For hyponatremia, unclear etiology, likely multifactorial being   corrected.  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  She has new significant thrombocytopenia.  She will   discontinue the Zyvox, start daptomycin to cover her for a prior   Vancomycin-resistant Enterococci.  She is already on fluconazole for prior   Candida albicans and add the Zosyn to cover both her lactobacillus as well as   anaerobes and gram-negative pathogens.  Discussed with internal medicine.    Thank you and we will follow with you.       ____________________________________     MD CHERELLE WARNER / MELODY    DD:  12/21/2018 18:17:10  DT:  12/21/2018 22:40:17    D#:  [id number]  Job#:  [id number]

## 2018-12-22 NOTE — PROGRESS NOTES
Hospital Medicine Daily Progress Note    Date of Service  12/22/2018    Chief Complaint  73 y.o. female admitted 12/17/2018 with weakness.    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 She presented to the ER with progressive weakness and was found to have a severely low sodium of 110 and has been admitted to the ICU in guarded condition.       Interval Problem Update  12/18: RN notes that she has had SBP up to 160's. She is tolerating a full liquid diet though had an episode of emesis last night. She remains on IV fluids with D5 1/2 NS. Sodium is 117 this morning. Her repeat later this morning was 115.   12/19: patient seen in the ICU this morning. RN notes that she has been afebrile. She is tolerating a full liquid diet. Na this morning is down to 113 though was down to 112 last night. Her right arm IV appears to have infiltrated.  12/20: pt seen in the ICU this morning. She vomited this morning. Her abdomen is more distended today. Her sodium remains quite low at 116 despite adding salt tabs.   12/21: Ms. Poole was seen in the ICU. Her sodium remains low. I discussed with Dr. De Los Santos for consultation. She put out 325 ml urine over night. 3% saline at 15 ml/hour. She is on room air.  She is amenable for radiology to place a drain int he abscess.   12/22: seen in the ICU. She underwent radiology drainage of the abscess yesterday. 7 ml out over night. No BM over night. 300 ml urine over night. I met with Ms. Poole and her daughter, Rosie, at bedside and we discussed Hospice. They are both interested in meeting with a Hospice representative. Ms. Poole is aware of her advanced cancer and wants to go home. 33 minutes spent that of which over 50% of the time was spent in counseling about end of life and Hospice.   Consultants/Specialty  Critical Care. I discussed with Dr. Machado on ICU Hot Rounds.   Nephrology   Infectious disease  Code  Status  DNR/DNI    Disposition  ICU    Review of Systems  Review of Systems   Constitutional: Positive for malaise/fatigue. Negative for chills and fever.   Respiratory: Negative for shortness of breath.    Cardiovascular: Positive for leg swelling. Negative for chest pain.   Gastrointestinal: Positive for abdominal pain and nausea.   Neurological: Positive for weakness.   All other systems reviewed and are negative.       Physical Exam  Temp:  [35.6 °C (96 °F)-36.3 °C (97.3 °F)] 36.3 °C (97.3 °F)  Pulse:  [] 92  Resp:  [7-19] 17  BP: ()/(60-98) 103/67    Physical Exam   Constitutional: She is oriented to person, place, and time. No distress.   Generally ill-appearing   HENT:   Head: Normocephalic and atraumatic.   Dry mucous membranes.    Neck: Normal range of motion. Neck supple.   Right IJ central line   Cardiovascular: Normal rate and regular rhythm.    No murmur heard.  Pulmonary/Chest: Effort normal. No respiratory distress. She has no wheezes.   Abdominal: She exhibits distension. There is tenderness.   Hypoactive bowel sounds   Musculoskeletal: She exhibits edema. She exhibits no tenderness.   Left arm midline catheter   Neurological: She is alert and oriented to person, place, and time.       Skin: Skin is warm and dry. She is not diaphoretic. There is pallor.   Psychiatric: She has a normal mood and affect. Her behavior is normal.   Nursing note and vitals reviewed.      Fluids    Intake/Output Summary (Last 24 hours) at 12/22/18 0718  Last data filed at 12/22/18 0600   Gross per 24 hour   Intake          3293.42 ml   Output              307 ml   Net          2986.42 ml       Laboratory  Recent Labs      12/21/18   0523   WBC  10.7   RBC  3.23*   HEMOGLOBIN  9.8*   HEMATOCRIT  28.6*   MCV  88.5   MCH  30.3   MCHC  34.3   RDW  65.9*   PLATELETCT  57*   MPV  10.7     Recent Labs      12/21/18   1800  12/22/18   0015  12/22/18   0546   SODIUM  124*  126*  128*   POTASSIUM  4.2  3.9  4.0   CHLORIDE  " 104  105  107   CO2  13*  14*  15*   GLUCOSE  103*  96  86   BUN  9  9  8   CREATININE  0.57  0.57  0.55   CALCIUM  8.1*  7.9*  8.1*     Recent Labs      12/21/18   1243  12/21/18   1321   INR  0.85*  1.39*               Imaging  CT-DRAIN-LIVER ABSCESS-CYST   Final Result      1. Liver \"Abscess\" Drainage with CT guidance. Placement of 10 Maltese locking loop drainage catheter via anterior percutaneous transhepatic approach. The collection yielded old bloody fluid.      DX-CHEST-LIMITED (1 VIEW)   Final Result      Right internal jugular line tip overlies the SVC. No pneumothorax.   Elevated right hemidiaphragm and right lung base atelectasis. Pneumonitis not excluded.   Linear atelectasis within the left lung.      CT-ABDOMEN-PELVIS WITH   Final Result      Distention of the cecum and ascending colon which are filled with fluid and stool. Mild distention is seen of the transverse colon. Findings may represent ileus.      Multiple distended and fluid-filled loops of small bowel are seen. This may be related to ileus. It is difficult to definitively exclude pneumatosis involving a loop of small bowel in the lower abdomen.      Small amount of free fluid in the abdomen and pelvis.      Uterine cavity may be distended with fluid or there may be endometrial thickening which can be seen in the setting of malignancy. Further evaluation with pelvic ultrasound is recommended.      Status post cholecystectomy. Biliary ductal dilatation and pneumobilia are again noted.      Necrotic mass versus abscess in the right lobe of the liver is again seen. Other smaller hypodense hepatic lesions are unchanged.      Mild stranding adjacent to the pancreatic head. Correlation with pancreatic enzymes is recommended as pancreatitis is not excluded.      Appendix is at the upper limit of normal with surrounding fluid. Mild/early appendicitis is not excluded.      Distended esophagus can be seen in the setting of gastroesophageal reflux. " Achalasia not excluded.      Small right pleural effusion and right basilar atelectasis.      Peripancreatic, retroperitoneal and mesenteric lymph nodes compatible with metastatic disease.      Small hypodense renal lesions are too small to characterize. Hypodense right renal lesion likely represents a parapelvic cyst.      Uterine fibroid.      Fat-containing umbilical hernia.      Colonic diverticulosis.      Findings discussed with Dr. Mauricio         CT-CHEST,ABDOMEN,PELVIS WITH   Final Result      1.  New dilatation of the esophagus diffusely. This may represent acute achalasia or reflux.      2.  No thoracic adenopathy or pleural effusion. No pulmonary consolidation.      3.  Stable appearance of low-attenuation area in the right lobe of the liver and several other low-attenuation areas which may represent metastases. Recurrent abscess cannot be excluded.      4.  New marked dilatation of the cecum and ascending colon with stool and fluid which may represent constipation or partial obstruction. No evidence of small bowel obstruction.      5.  No renal obstruction.      6.  No free fluid or abscess.      7.  New low-attenuation area within the uterus which could represent an area of uterine hemorrhage.      CT-HEAD W/O   Final Result      1.  No CT evidence of acute infarct, hemorrhage or mass.   2.  Mild to moderate global parenchymal atrophy and chronic small ischemic changes.   3.  Old left cerebellar infarct.      DX-CHEST-PORTABLE (1 VIEW)   Final Result      No acute cardiac or pulmonary abnormality is noted.      IR-MIDLINE CATHETER INSERTION >5 YRS    (Results Pending)   IR-MIDLINE CATHETER INSERTION >5 YRS    (Results Pending)        Assessment/Plan  * Hyponatremia- (present on admission)   Assessment & Plan    Severe hyponatremia on admit with a sodium of 110 on admit thus ICU admission.  IV fluids with goal no more of 9.  BMPs q 6 hours.  Na remains low despite IV fluids with NS plus the addition of  salt tabs thus 3% saline started at 15 ml/hour.   IV Lasix ordered.  She is quite symptomatic with the hyponatremia with weakness.   Nephrology has consulted.      Liver abscess- (present on admission)   Assessment & Plan    History of liver abscess for which she had been on Zyvox, Omnicef, diflucan through 12/20.   Antibiotics have been continued IV and infectious disease has consulted.  Repeat CT ordered due worsening abdominal pain which revealed necrotic mass vs abscess  A drain was placed by Dr. Arce on 12/21 with culture sent.  .        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 Cedeno, oncology   Stage IV.   Ms. Poole is interested in meeting with Hospice and wants to go home.   Hospice referral placed.      Ileus (HCC)- (present on admission)   Assessment & Plan    Noted on CT abd/pelvis  NPO status  IV fluids     DNR (do not resuscitate)- (present on admission)   Assessment & Plan    Per patient's request.           VTE prophylaxis: SCDs

## 2018-12-22 NOTE — OR SURGEON
Immediate Post- Operative Note        PostOp Diagnosis: LIVER DOME FLUID COLLECTION, POST CT GUIDED LIVER ABSCESS DRAIN 11/27/18 COMPLICATED BY HEPATIC PSEUDOANEURYSM REQUIRING COIL EMBOLIZATION 12/4/18. NOW HAS CAVITARY LESION AT LIVER DOME WITH AIR FLUID LEVEL.      Procedure(s): CT GUIDED CATHETER DRAINAGE LIVER WITH PLACEMENT OF 10 Czech LOCKING LOOP CATHETER     EVACUATION OF 25 ML OLD BLOOD (AS WELL AS THE AIR WITHIN THE POCKET). NO SAI PUS.    SPECIMEN SENT FOR C+S, CELL COUNT       Estimated Blood Loss: <1CC        Complications: NONE        12/21/2018     5:43 PM     Boo Arce

## 2018-12-23 NOTE — PROGRESS NOTES
Critical Care Progress Note    Date of admission  12/17/2018    Chief Complaint  73 y.o. female admitted 12/17/2018 with severe hyponatremia    Hospital Course    73 y.o. 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 Cedeno.  She was admitted from November 12 - December 6 to [facility] where she was found to have a liver abscess that grew out VRE and Candida.  She was treated with intravenous antibiotics followed by transition to oral antibiotics by infectious disease before discharge home.  She was doing fairly well and able to walk with minimal assistance and tolerating minimal diet and her medications up until 2 days ago when she started to have increased generalized weakness, drowsiness, headache and today with nausea and vomiting.  She was brought into the emergency department by her son and daughter and was found to be severely hyponatremic with a sodium level of 110.  She underwent CT imaging of her brain as well as chest abdomen and pelvis and is admitted to the intensive care unit where I was consulted for assistance in her critical care management.  Per her son, patient is always preferred a low-salt diet and has had hyponatremia in the past although on chart review has been as low as 128 but never down to 110.  She frequently has nausea and vomiting with her chemotherapy medications but her son feels that she has been drinking adequate amounts of free water with her pills and meals.  No seizure activity was noted at home nor in the emergency department.      Interval Problem Update  Reviewed last 24 hour events:    Na 132!  A&O x3-4  Hospice to see  NS 75  10CC 3%  Lasix  AF rate 100-110  NPO  Room air  IR drain 20ml/12hrs  No CXR  PPI  Diflucan/Zoayn/Dapto  Lactose GNR in liver abcess     YESTERDAY  Last Na 128  3% saline  15  NS 75cc/hr  Lasix 10  IR drain placed - cultues pending  A&O  x4  SR/ST    SBp 100-120s  Afeb  NPO - ABD no change   + incontinence  R IJ CVC  EOB  Room air  IS  Zosyn/diflucan/dapto    Mobilize MORE  Mg  Reduce 3% to 10cc  Lovenox    Review of Systems  Review of Systems   Constitutional: Positive for malaise/fatigue (Unchanged). Negative for chills, diaphoresis and fever.   HENT: Negative for congestion and sore throat.    Eyes: Negative.    Respiratory: Negative for cough, hemoptysis, sputum production and shortness of breath.    Cardiovascular: Negative for chest pain, palpitations, orthopnea, leg swelling and PND.   Gastrointestinal: Positive for abdominal pain (Unchanged), nausea (Worse) and vomiting (Associated with bile). Negative for blood in stool, constipation and diarrhea.        Poor appetite, patient encouraged   Genitourinary: Negative.    Musculoskeletal: Positive for back pain (Unchanged and chronic) and joint pain (Unchanged and chronic).   Skin: Negative for rash.   Neurological: Positive for weakness (Persists). Negative for sensory change, focal weakness and headaches.   Psychiatric/Behavioral: The patient is not nervous/anxious.    Complete review performed again, minimal change as above    Vital Signs for last 24 hours   Temp:  [36.3 °C (97.4 °F)-36.6 °C (97.9 °F)] 36.6 °C (97.9 °F)  Pulse:  [] 120  Resp:  [16-25] 18  BP: (102-117)/(58-79) 117/79    Hemodynamic parameters for last 24 hours       Respiratory       Physical Exam   Physical Exam   Constitutional: She is oriented to person, place, and time. She appears well-nourished. She is cooperative.  Non-toxic appearance. She appears ill. No distress. Nasal cannula in place.   HENT:   Head: Atraumatic.   Mouth/Throat: Oropharynx is clear and moist.   Eyes: Pupils are equal, round, and reactive to light. EOM are normal. No scleral icterus.   Neck: Neck supple. No JVD present.   Cardiovascular: Normal rate, regular rhythm and intact distal pulses.  Exam reveals no friction rub.    No  murmur heard.  Pulmonary/Chest: Effort normal and breath sounds normal. No respiratory distress. She has no wheezes. She has no rhonchi. She has no rales.   Abdominal: Soft. She exhibits distension (Slight more distended). Bowel sounds are decreased. There is no hepatosplenomegaly. There is tenderness (No change). There is no rigidity, no rebound, no guarding, no CVA tenderness and negative Ospina's sign.   Musculoskeletal: She exhibits no edema.   Neurological: She is alert and oriented to person, place, and time. She has normal strength. No cranial nerve deficit or sensory deficit. GCS eye subscore is 4. GCS verbal subscore is 5. GCS motor subscore is 6.   Following well, diffuse weakness, no focality   Skin: Skin is warm and dry. She is not diaphoretic. No cyanosis. Nails show no clubbing.   Psychiatric: Her speech is normal and behavior is normal. Thought content normal. Her mood appears not anxious. Cognition and memory are normal.   Vitals reviewed.  Complete follow-up exam performed again, see above changes    Medications  Current Facility-Administered Medications   Medication Dose Route Frequency Provider Last Rate Last Dose   • HYDROmorphone pf (DILAUDID) injection 1 mg  1 mg Intravenous Once Franklin Yates M.D.       • enoxaparin (LOVENOX) inj 40 mg  40 mg Subcutaneous DAILY George Mauricio M.D.   40 mg at 12/23/18 0519   • fluconazole (DIFLUCAN) in NS IVPB 400 mg  400 mg Intravenous Q24HRS Keaton Machado M.D.   Stopped at 12/23/18 0712   • furosemide (LASIX) injection 10 mg  10 mg Intravenous Q DAY Jorge De Los Santos M.D.   10 mg at 12/23/18 0519   • NS infusion   Intravenous Continuous Jorge De Los Santos M.D. 75 mL/hr at 12/23/18 0250     • piperacillin-tazobactam (ZOSYN) 3.375 g in  mL IVPB  3.375 g Intravenous Q8HRS Gabby Swenson M.D. 25 mL/hr at 12/23/18 2134 3.375 g at 12/23/18 2134   • DAPTOmycin 400 mg in NS 50 mL IVPB  6 mg/kg Intravenous Q24HR Gabby Swenson M.D. 100 mL/hr  at 12/23/18 2051 400 mg at 12/23/18 2051   • Respiratory Care per Protocol   Nebulization Continuous RT Keaton Machado M.D.       • 3% sodium chloride (HYPERTONIC SALINE) 500mL infusion   Intravenous Continuous Keaton Machado M.D. 10 mL/hr at 12/23/18 2046     • pantoprazole (PROTONIX) injection 40 mg  40 mg Intravenous DAILY Keaton Machado M.D.   40 mg at 12/23/18 0515   • dextrose 50% (D50W) injection 25 mL  25 mL Intravenous Q15 MIN PRN Jeremy M Gonda, M.D.       • acetaminophen (TYLENOL) tablet 325 mg  325 mg Oral Q6HRS PRN Jerome Barrios M.D.       • tramadol (ULTRAM) 50 MG tablet 50 mg  50 mg Oral Q6HRS PRN Jerome Barrios M.D.       • senna-docusate (PERICOLACE or SENOKOT S) 8.6-50 MG per tablet 2 Tab  2 Tab Oral BID Jerome Barrios M.D.   Stopped at 12/20/18 1800    And   • polyethylene glycol/lytes (MIRALAX) PACKET 1 Packet  1 Packet Oral QDAY PRN Jerome Barrios M.D.        And   • magnesium hydroxide (MILK OF MAGNESIA) suspension 30 mL  30 mL Oral QDAY PRN Jerome Barrios M.D.        And   • bisacodyl (DULCOLAX) suppository 10 mg  10 mg Rectal QDAY PRN Jerome Barrios M.D.       • ondansetron (ZOFRAN) syringe/vial injection 4 mg  4 mg Intravenous Q4HRS PRJOSIAH Barrios M.D.   4 mg at 12/20/18 0343   • ondansetron (ZOFRAN ODT) dispertab 4 mg  4 mg Oral Q4HRS PRN Jerome Barrios M.D.   4 mg at 12/17/18 1839   • artificial tears 1.4 % ophthalmic solution 1 Drop  1 Drop Both Eyes Q2HRS PRN Jerome Barrios M.D.       • prochlorperazine (COMPAZINE) injection 10 mg  10 mg Intravenous Q4HRS PRN Jeremy M Gonda, M.D.   10 mg at 12/20/18 1810       Fluids    Intake/Output Summary (Last 24 hours) at 12/24/18 0042  Last data filed at 12/23/18 1200   Gross per 24 hour   Intake             1020 ml   Output              260 ml   Net              760 ml       Laboratory      Recent Labs      12/22/18   0546   CPKTOTAL  14     Recent Labs      12/21/18   0523   12/22/18    0546   12/23/18   0152  12/23/18   0558  12/23/18   1424  12/23/18   1811   SODIUM  119*   < >  128*   < >  132*  132*  132*  136  134*   POTASSIUM  4.5   < >  4.0   < >  4.5  4.5  4.8  3.5*  3.4*   CHLORIDE  97   < >  107   < >  110  110  110  114*  113*   CO2  15*   < >  15*   < >  14*  14*  14*  14*  14*   BUN  9   < >  8   < >  9  9  9  9  10   CREATININE  0.54   < >  0.55   < >  0.53  0.53  0.52  0.58  0.58   MAGNESIUM  2.0   --   1.6   --   1.9  1.9   --    --    --    CALCIUM  8.5   < >  8.1*   < >  7.7*  7.7*  7.5*  8.2*  7.7*    < > = values in this interval not displayed.     Recent Labs      12/23/18   0558  12/23/18   1424  12/23/18   1811   GLUCOSE  68  79  76     Recent Labs      12/21/18   0523   WBC  10.7   NEUTSPOLYS  91.00*   LYMPHOCYTES  5.10*   MONOCYTES  2.10   EOSINOPHILS  0.60   BASOPHILS  0.20     Recent Labs      12/21/18   0523  12/21/18   1243  12/21/18   1321   RBC  3.23*   --    --    HEMOGLOBIN  9.8*   --    --    HEMATOCRIT  28.6*   --    --    PLATELETCT  57*   --    --    PROTHROMBTM   --   11.7*  17.2*   INR   --   0.85*  1.39*       Imaging  X-Ray:  I have personally reviewed the images and compared with prior images.  EKG:  I have personally reviewed the images and compared with prior images.  CT:    Repeat CT reviewed and compared to more recent one  Echo:   Reviewed    Assessment/Plan  * Liver abscess- (present on admission)   Assessment & Plan    Repeat aspiration 12/21 now growing gram-negative rods!  Status post drainage during last hospital stay and status post prolonged antibiotic course  Follow-up CT suggest persistence and necrotic tumor/abscess  Repeat IR drainage and placement of catheter 12/21  Current antibiotics include Zosyn, daptomycin, Diflucan  Follow-up imaging next week with repeat CT     Hyponatremia- (present on admission)   Assessment & Plan    Improving, has reached a low normal  Urinalysis and urine lites reviewed again with nephrology  Continue  serial BMP  Remains off salt tabs  Titrate 3% saline daily as needed  Continue maintenance dose IV normal saline at 75 cc/h  IV Lasix at low dose to facilitate free water removal, no change  Continue to avoid hypotonic fluids  Monitor for the need to treat with Vaprisol       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)   Assessment & Plan    Chemo and radiation currently on hold  Oncology aware  Analgesia as needed  Palliative care consultation ongoing  Repeat CT scans show significant ileus, cannot rule out pneumatosis  Repeat CT scan shows presumed liver abscess, query necrotic metastatic lesion  History of Whipple procedure  Prognosis remains poor       DNR (do not resuscitate)- (present on admission)   Assessment & Plan    Reviewed with patient, reaffirmed again today  Patient considering comfort care and hospice with discharged home     Hypokalemia- (present on admission)   Assessment & Plan    Repletion and monitor daily  Also monitor and replete magnesium as needed  No change in current plan     Ileus (HCC)- (present on admission)   Assessment & Plan    Complex abdomen post radiation and surgery resulting in waxing/waning complication of ileus  N.p.o. for now  Start trickle tube feeds or clear liquids p.o. when ileus improved clinically  Serial KUB as needed     Hypomagnesemia   Assessment & Plan    Repletion and monitor daily  Also monitor and replete potassium  Replete IV for now given ileus and GI symptoms       Non-intractable vomiting with nausea- (present on admission)   Assessment & Plan    Increasing in severity  Likely secondary to primary abdominal process ampullary cancer which may be persistent/recurrent infection, hyponatremia and other metabolic abnormalities contributing  Antiemetics including as needed Zofran and Compazine  Waxing and waning, salt tabs or oral potassium have not helped, continue monitor  For refractory vomiting place NG tube to suction     Iron deficiency anemia due to  chronic blood loss- (present on admission)   Assessment & Plan    Monitor with daily CBC using conservative transfusion strategy  No bleeding clinically  No change current plan          VTE:  Contraindicated  Ulcer: PPI  Lines: None    I have performed a physical exam and reviewed and updated ROS and Plan today (12/24/2018). In review of yesterday's note (12/23/2018), there are no changes except as documented above.    RCC will sign off when patient moves out of ICU, please contact us if we can be of assistance    Discussed patient condition and risk of morbidity and/or mortality with Hospitalist, RN, RT, Pharmacy, Charge nurse / hot rounds and Patient

## 2018-12-23 NOTE — PROGRESS NOTES
Patient drowsy but oriented and appropriate. Denies pain. Vitals WNL. BM x 2. 3% saline infusing with IVF. Patient report called to Christopher AVENDANO and patient transferred to [place] [id number] without incident.

## 2018-12-23 NOTE — PROGRESS NOTES
Hospital Medicine Daily Progress Note    Date of Service  12/23/2018    Chief Complaint  73 y.o. female admitted 12/17/2018 with weakness.    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 She presented to the ER with progressive weakness and was found to have a severely low sodium of 110 and has been admitted to the ICU in guarded condition.       Interval Problem Update  12/18: RN notes that she has had SBP up to 160's. She is tolerating a full liquid diet though had an episode of emesis last night. She remains on IV fluids with D5 1/2 NS. Sodium is 117 this morning. Her repeat later this morning was 115.   12/19: patient seen in the ICU this morning. RN notes that she has been afebrile. She is tolerating a full liquid diet. Na this morning is down to 113 though was down to 112 last night. Her right arm IV appears to have infiltrated.  12/20: pt seen in the ICU this morning. She vomited this morning. Her abdomen is more distended today. Her sodium remains quite low at 116 despite adding salt tabs.   12/21: Ms. Poole was seen in the ICU. Her sodium remains low. I discussed with Dr. De Los Santos for consultation. She put out 325 ml urine over night. 3% saline at 15 ml/hour. She is on room air.  She is amenable for radiology to place a drain int he abscess.   12/22: seen in the ICU. She underwent radiology drainage of the abscess yesterday. 7 ml out over night. No BM over night. 300 ml urine over night. I met with Ms. Poole and her daughter, Rosie, at bedside and we discussed Hospice. They are both interested in meeting with a Hospice representative. Ms. Poole is aware of her advanced cancer and wants to go home.   12/23: seen in the ICU. She remains NPO. Abdominal drain put out 20 ml over night. 3% saline drip is at 10 ml/hour. Her sodium is 132. Clinically her exam remains consistent with an ileus. Ms. Poole and I discussed Hospice again  today.   Consultants/Specialty  Critical Care. I discussed with Dr. Machado on ICU Hot Rounds.   Nephrology   Infectious disease  Hospice  Code Status  DNR/DNI    Disposition  ICU    Review of Systems  Review of Systems   Constitutional: Positive for malaise/fatigue. Negative for chills and fever.   Respiratory: Negative for shortness of breath.    Cardiovascular: Positive for leg swelling. Negative for chest pain.   Gastrointestinal: Positive for abdominal pain and nausea. Negative for diarrhea and vomiting.   Neurological: Positive for weakness.   All other systems reviewed and are negative.       Physical Exam  Temp:  [36.1 °C (97 °F)-36.4 °C (97.5 °F)] 36.4 °C (97.5 °F)  Pulse:  [] 58  Resp:  [15-23] 20  BP: (102)/(58) 102/58    Physical Exam   Constitutional: She is oriented to person, place, and time. No distress.   Generally ill-appearing   HENT:   Head: Normocephalic and atraumatic.   Dry mucous membranes.    Neck: Normal range of motion. Neck supple.   Right IJ central line   Cardiovascular: Normal rate and regular rhythm.    No murmur heard.  Sinus rhythm   Pulmonary/Chest: Effort normal. No respiratory distress. She has no wheezes.   Abdominal: She exhibits distension. There is no tenderness.   Hypoactive bowel sounds   Musculoskeletal: She exhibits edema. She exhibits no tenderness.   Left arm midline catheter   Neurological: She is alert and oriented to person, place, and time.       Skin: Skin is warm and dry. She is not diaphoretic. There is pallor.   Psychiatric:   Appropriate behavior and she is lucid   Nursing note and vitals reviewed.      Fluids    Intake/Output Summary (Last 24 hours) at 12/23/18 0741  Last data filed at 12/23/18 0600   Gross per 24 hour   Intake          1290.08 ml   Output              530 ml   Net           760.08 ml       Laboratory  Recent Labs      12/21/18   0523   WBC  10.7   RBC  3.23*   HEMOGLOBIN  9.8*   HEMATOCRIT  28.6*   MCV  88.5   MCH  30.3   MCHC  34.3  "  RDW  65.9*   PLATELETCT  57*   MPV  10.7     Recent Labs      12/23/18   0002  12/23/18   0152  12/23/18   0558   SODIUM  131*  132*  132*  132*   POTASSIUM  4.6  4.5  4.5  4.8   CHLORIDE  109  110  110  110   CO2  14*  14*  14*  14*   GLUCOSE  75  66  66  68   BUN  9  9  9  9   CREATININE  0.43*  0.53  0.53  0.52   CALCIUM  7.7*  7.7*  7.7*  7.5*     Recent Labs      12/21/18   1243  12/21/18   1321   INR  0.85*  1.39*               Imaging  CT-DRAIN-LIVER ABSCESS-CYST   Final Result      1. Liver \"Abscess\" Drainage with CT guidance. Placement of 10 Cambodian locking loop drainage catheter via anterior percutaneous transhepatic approach. The collection yielded old bloody fluid.      DX-CHEST-LIMITED (1 VIEW)   Final Result      Right internal jugular line tip overlies the SVC. No pneumothorax.   Elevated right hemidiaphragm and right lung base atelectasis. Pneumonitis not excluded.   Linear atelectasis within the left lung.      CT-ABDOMEN-PELVIS WITH   Final Result      Distention of the cecum and ascending colon which are filled with fluid and stool. Mild distention is seen of the transverse colon. Findings may represent ileus.      Multiple distended and fluid-filled loops of small bowel are seen. This may be related to ileus. It is difficult to definitively exclude pneumatosis involving a loop of small bowel in the lower abdomen.      Small amount of free fluid in the abdomen and pelvis.      Uterine cavity may be distended with fluid or there may be endometrial thickening which can be seen in the setting of malignancy. Further evaluation with pelvic ultrasound is recommended.      Status post cholecystectomy. Biliary ductal dilatation and pneumobilia are again noted.      Necrotic mass versus abscess in the right lobe of the liver is again seen. Other smaller hypodense hepatic lesions are unchanged.      Mild stranding adjacent to the pancreatic head. Correlation with pancreatic enzymes is recommended " as pancreatitis is not excluded.      Appendix is at the upper limit of normal with surrounding fluid. Mild/early appendicitis is not excluded.      Distended esophagus can be seen in the setting of gastroesophageal reflux. Achalasia not excluded.      Small right pleural effusion and right basilar atelectasis.      Peripancreatic, retroperitoneal and mesenteric lymph nodes compatible with metastatic disease.      Small hypodense renal lesions are too small to characterize. Hypodense right renal lesion likely represents a parapelvic cyst.      Uterine fibroid.      Fat-containing umbilical hernia.      Colonic diverticulosis.      Findings discussed with Dr. Mauricio         CT-CHEST,ABDOMEN,PELVIS WITH   Final Result      1.  New dilatation of the esophagus diffusely. This may represent acute achalasia or reflux.      2.  No thoracic adenopathy or pleural effusion. No pulmonary consolidation.      3.  Stable appearance of low-attenuation area in the right lobe of the liver and several other low-attenuation areas which may represent metastases. Recurrent abscess cannot be excluded.      4.  New marked dilatation of the cecum and ascending colon with stool and fluid which may represent constipation or partial obstruction. No evidence of small bowel obstruction.      5.  No renal obstruction.      6.  No free fluid or abscess.      7.  New low-attenuation area within the uterus which could represent an area of uterine hemorrhage.      CT-HEAD W/O   Final Result      1.  No CT evidence of acute infarct, hemorrhage or mass.   2.  Mild to moderate global parenchymal atrophy and chronic small ischemic changes.   3.  Old left cerebellar infarct.      DX-CHEST-PORTABLE (1 VIEW)   Final Result      No acute cardiac or pulmonary abnormality is noted.      IR-MIDLINE CATHETER INSERTION >5 YRS    (Results Pending)   IR-MIDLINE CATHETER INSERTION >5 YRS    (Results Pending)        Assessment/Plan  * Hyponatremia- (present on  admission)   Assessment & Plan    Severe hyponatremia on admit with a sodium of 110 on admit thus ICU admission.  IV fluids with goal no more of 9.  BMPs q 6 hours.  Na remained low despite IV fluids with NS plus the addition of salt tabs thus 3% saline started at 15 ml/hour.   IV Lasix 10 mg daily ordered.  She was quite symptomatic with the hyponatremia with weakness.   Nephrology has consulted.   Na 132     Liver abscess- (present on admission)   Assessment & Plan    History of liver abscess for which she had been on Zyvox, Omnicef, diflucan through 12/20.   Antibiotics have been continued IV and infectious disease has consulted.  Repeat CT ordered due worsening abdominal pain which revealed necrotic mass vs abscess  A drain was placed by Dr. Arce on 12/21 with culture sent.  Infectious disease has been consulted.   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 Cedeno, oncology   Stage IV.   Ms. Poole is interested in meeting with Hospice and wants to go home.   Hospice referral placed.   Comfort care may be indicated.      Ileus (HCC)- (present on admission)   Assessment & Plan    Noted on CT abd/pelvis  NPO status  IV fluids  Abdominal xray in the morning     DNR (do not resuscitate)- (present on admission)   Assessment & Plan    Per patient's request.           VTE prophylaxis: SCDs

## 2018-12-23 NOTE — PROGRESS NOTES
Assumed patient care @ 1430, report called by ROMANA Corado. Patient is A&Ox3-Time, c/o weakness, VSS, Patient denies pain/nausea. Triple lumen IJ patent infusing 3% NS at 10 ml/hr and NS @ 75 ml/hr + IVABX. Patient on room air, NPO, Biliary (RUQ) has minimal serosang output, abdomen distended. Family at bedside, call light w/i reach, bed in lowest/locked position, hourly rounding in place.

## 2018-12-23 NOTE — PROGRESS NOTES
MD Gonda notified of rhythm change on patient.  EKG resulted in new on-set A-Fib with a controlled rate in the 80's.  MD had no new orders at this time so as the patient is asymptomatic and the AFib is controlled.  RN will continue to monitor.

## 2018-12-23 NOTE — PROGRESS NOTES
Infectious Disease Progress Note    Author: Gabby Swenson M.D. 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    Review of Systems:  Review of Systems   Unable to perform ROS: Mental status change       Hemodynamics:  Temp (24hrs), Av.2 °C (97.1 °F), Min:35.8 °C (96.5 °F), Max:36.4 °C (97.5 °F)  Temperature: 36.3 °C (97.4 °F)  Pulse  Av.9  Min: 63  Max: 135Heart Rate (Monitored): (!) 107  Blood Pressure : 103/67, NIBP: 128/62       Physical Exam:  Physical Exam   Constitutional: She appears well-developed.   HENT:   Head: Normocephalic and atraumatic.   Eyes: Pupils are equal, round, and reactive to light. EOM are normal.   Neck: Neck supple.   Cardiovascular: Normal rate.    Pulmonary/Chest: Effort normal. No respiratory distress.   Abdominal: Soft. She exhibits distension. There is tenderness. There is no guarding.   Musculoskeletal: She exhibits edema.   Neurological:   obtunded   Skin: No rash noted. She is not diaphoretic.   Nursing note and vitals reviewed.      Meds:    Current Facility-Administered Medications:   •  enoxaparin (LOVENOX) injection  •  fluconazole (DIFLUCAN) IV  •  furosemide  •  NS  •  [COMPLETED] piperacillin-tazobactam **AND** piperacillin-tazobactam  •  DAPTOmycin  •  Respiratory Care per Protocol  •  3% sodium chloride  •  pantoprazole  •  [DISCONTINUED] insulin regular **AND** [CANCELED] Accu-Chek ACHS **AND** [CANCELED] NOTIFY MD and PharmD **AND** [DISCONTINUED] glucose 4 g **AND** dextrose 50%  •  acetaminophen  •  tramadol  •  senna-docusate **AND**  polyethylene glycol/lytes **AND** magnesium hydroxide **AND** bisacodyl  •  ondansetron  •  ondansetron  •  artificial tears  •  prochlorperazine    Labs:  Recent Labs      12/21/18   0523   WBC  10.7   RBC  3.23*   HEMOGLOBIN  9.8*   HEMATOCRIT  28.6*   MCV  88.5   MCH  30.3   RDW  65.9*   PLATELETCT  57*   MPV  10.7   NEUTSPOLYS  91.00*   LYMPHOCYTES  5.10*   MONOCYTES  2.10   EOSINOPHILS  0.60   BASOPHILS  0.20     Recent Labs      12/22/18   0015  12/22/18   0546  12/22/18   1152   SODIUM  126*  128*  128*   POTASSIUM  3.9  4.0  3.7   CHLORIDE  105  107  108   CO2  14*  15*  10*   GLUCOSE  96  86  82   BUN  9  8  9   CPKTOTAL   --   14   --      Recent Labs      12/22/18   0015  12/22/18   0546  12/22/18   1152   CREATININE  0.57  0.55  0.56       Imaging:  Ct-abdomen-pelvis With    Result Date: 12/20/2018 12/20/2018 2:55 PM HISTORY/REASON FOR EXAM: Distention. Hyperactive bowel on examination. FINDINGS: Lung bases: There is a small right pleural effusion. There is mild right basilar atelectasis. Abdomen: No free air is seen in the abdomen or pelvis. There is a lobulated lesion with an air-fluid level in the right lobe of the liver measuring 6.3 x 5.3 x 5.2 cm. This is not significantly changed in size compared to prior. Other hypodense lesions are again noted. There are surgical clips adjacent to the main lesion in the right lobe of the liver. There is pneumobilia. There is biliary ductal dilatation. Common duct measures 1.7 cm. Spleen is not enlarged. No adrenal mass is identified. There may be mild stranding  surrounding the pancreatic head. There are embolization coils in the right upper quadrant. Surgical clips are seen in the right upper quadrant status post cholecystectomy. There is heterogeneity along the uncinate process of the pancreas which may represent a mildly necrotic lymph node measuring 1.6 x 1.2 cm. Hypodense right renal lesions are too small to characterize. There is a 2 cm hypodense right  renal lesion likely a parapelvic cyst. There is no evidence of hydronephrosis. Aorta is not aneurysmal. There are small inguinal lymph nodes. There is a aortocaval lymph node measuring 1.8 x 1.3 cm. A mesenteric lymph node is centrally hypodense measuring 7.3 mm in short axis dimension. There is colonic diverticulosis. The descending and distal transverse colon is relatively decompressed. There is mild distention of the transverse colon with moderate distention of the ascending colon and cecum which are filled with fluid status stool. Distal small bowel is dilated to 3.1 cm. It is difficult to definitively exclude pneumatosis of a short loop of small bowel in the lower abdomen. There are there are multiple dilated and fluid-filled loops of small bowel. The distal esophagus is dilated and filled with contrast. Stomach is distended. There are postsurgical changes in the upper abdomen to the left of midline. The involved small bowel loop is distended. There is a peripancreatic lymph node measuring 1.5 x 1.2 cm. Bladder is mildly distended. Uterine cavity is prominent and hypodense. There is a calcified uterine fibroid. Calcific densities in the pelvis likely represent phleboliths. Small amount of free fluid is seen in the pelvis. Trace free fluid is seen in the abdomen. The appendix is fluid-filled and measures up to 7 mm in diameter. Post surgical changes are noted of the anterior abdominal wall. There is a fat-containing ventral hernia. Degenerative changes are seen in the spine.     Distention of the cecum and ascending colon which are filled with fluid and stool. Mild distention is seen of the transverse colon. Findings may represent ileus. Multiple distended and fluid-filled loops of small bowel are seen. This may be related to ileus. It is difficult to definitively exclude pneumatosis involving a loop of small bowel in the lower abdomen. Small amount of free fluid in the abdomen and pelvis. Uterine cavity may be  distended with fluid or there may be endometrial thickening which can be seen in the setting of malignancy. Further evaluation with pelvic ultrasound is recommended. Status post cholecystectomy. Biliary ductal dilatation and pneumobilia are again noted. Necrotic mass versus abscess in the right lobe of the liver is again seen. Other smaller hypodense hepatic lesions are unchanged. Mild stranding adjacent to the pancreatic head. Correlation with pancreatic enzymes is recommended as pancreatitis is not excluded. Appendix is at the upper limit of normal with surrounding fluid. Mild/early appendicitis is not excluded. Distended esophagus can be seen in the setting of gastroesophageal reflux. Achalasia not excluded. Small right pleural effusion and right basilar atelectasis. Peripancreatic, retroperitoneal and mesenteric lymph nodes compatible with metastatic disease. Small hypodense renal lesions are too small to characterize. Hypodense right renal lesion likely represents a parapelvic cyst. Uterine fibroid. Fat-containing umbilical hernia. Colonic diverticulosis. Findings discussed with Dr. Mauricio     Ct-chest,abdomen,pelvis With    Result Date: 12/17/2018 12/17/2018 3:07 PM HISTORY/REASON FOR EXAM: Right upper quadrant pain. Weakness. TECHNIQUE/EXAM DESCRIPTION: CT scan of the chest, abdomen and pelvis with contrast. Thin-section helical scanning was obtained with intravenous contrast from the lung apices through the pubic symphysis to include the chest, abdomen and pelvis. 100 mL of Omnipaque 350 nonionic contrast was administered intravenously without complication. Low dose optimization technique was utilized for this CT exam including automated exposure control and adjustment of the mA and/or kV according to patient size. COMPARISON: CT AP 12/5/2018. FINDINGS: CT Chest: There is mild dilatation and fluid distention of the esophagus diffusely. There is a small hiatal hernia. This may be secondary to a distal  esophageal stenosis or achalasia. The heart is normal in size and configuration. No mediastinal, hilar, or axillary adenopathy is present. The lung parenchyma demonstrates linear fibrotic change in the right lower lobe medially and in each lower lobe inferiorly. The bony thorax is intact. CT Abdomen: No free air is present. Embolization coils are present in the dome of the right lobe of liver with adjacent fluid and air consistent with tumor necrosis or residual abscess. The area of low attenuation measures 6.6 cm in maximum dimension and is unchanged from recent examination. The remainder the liver demonstrates scattered vague low-attenuation areas in the right lobe without significant change. No biliary dilatation is present. The gallbladder is absent. Embolization coils are present in the region of the gastroduodenal artery. There is unchanged extrahepatic biliary dilatation with the common duct have a maximum diameter of 2 cm in the head of the pancreas with normal caliber at the expected level of the ampulla. No pancreatic mass is present. The spleen appears normal. The aorta and IVC are normal in caliber. Enlarged lymph node between the aorta and IVC at the level of the lower pole of each kidney is unchanged measuring 2.1 x 1.7 cm in diameter. A small midline ventral hernia containing fat is unchanged. There is stool and fluid within dilated cecum and ascending colon. The transverse colon is normal in caliber with stool and fluid. The remainder the colon is without evidence of dilatation. No adrenal enlargement. Both kidneys function without obstruction. No free fluid is present. CT Pelvis: The bladder appears normal. The uterus is present with 2.8 cm in diameter area of low attenuation in the uterus which may represent fluid or hemorrhage within the endometrial canal. This is new compared with 12/5/2018. No pelvic sidewall or inguinal adenopathy is present.     1.  New dilatation of the esophagus diffusely.  This may represent acute achalasia or reflux. 2.  No thoracic adenopathy or pleural effusion. No pulmonary consolidation. 3.  Stable appearance of low-attenuation area in the right lobe of the liver and several other low-attenuation areas which may represent metastases. Recurrent abscess cannot be excluded. 4.  New marked dilatation of the cecum and ascending colon with stool and fluid which may represent constipation or partial obstruction. No evidence of small bowel obstruction. 5.  No renal obstruction. 6.  No free fluid or abscess. 7.  New low-attenuation area within the uterus which could represent an area of uterine hemorrhage.    Ct-chest,abdomen,pelvis With    Result Date: 11/29/2018 11/29/2018 1:12 PM HISTORY/REASON FOR EXAM:  Leukocytosis. Liver abscess. TECHNIQUE/EXAM DESCRIPTION: CT scan of the chest, abdomen and pelvis with contrast. Helical scanning was obtained with intravenous contrast from the lung apices through the pubic symphysis to include the chest, abdomen and pelvis.  100 mL of Omnipaque 350 nonionic contrast was administered intravenously without complication. Low dose optimization technique was utilized for this CT exam including automated exposure control and adjustment of the mA and/or kV according to patient size. COMPARISON: 11/22/2018 FINDINGS: Atherosclerotic plaque is seen in the aorta. There is coronary artery calcification. There is a small amount of pericardial fluid. There is a small to moderate large right pleural effusion with overlying atelectasis/consolidation. There are small mediastinal lymph nodes. Subcarinal lymph node measures 8 mm in short axis dimension. There are small hilar lymph nodes bilaterally. Anterior mediastinal lymph node measures 6.2 mm in short axis dimension. There are small axillary lymph nodes. No pneumothorax is identified. Examination is limited by respiratory motion. There is mild left lower lobe and right middle lobe atelectasis. No free air is seen  in the abdomen or pelvis. There is a pigtail catheter within a hypodense hepatic lesion measuring 7.8 x 5 x 6.1 cm which is not significantly changed compared to prior. Adjacent to the pigtail catheter, there is a focal collection of contrast measuring 9 x 6.6 mm compatible with a pseudoaneurysm. A feeding vessel is seen. There is heterogeneous density within the lesion. There could have been bleeding into the lesion. No active hemorrhage is currently identified. Small hypodense lesions are again noted within the right lobe of the liver. Portal vein is patent. Gallbladder is surgically absent. Prominence of the common duct measures 1.2 cm. There is distention of the cystic duct. There is dilatation of the pancreatic duct. Streak  artifact from embolization coils is seen. There is a linear tract in the right lobe of the liver from a prior drain located more anteriorly. Spleen is not enlarged. No adrenal mass is identified. There is a parapelvic cyst on the right. Tiny hypodense right renal lesions are too small to characterize. There is no evidence of hydronephrosis. Atherosclerotic plaque is seen in the aorta. There are small inguinal lymph nodes. Enlarged aortocaval lymph node measures 1.4 cm in short axis dimension. Enlarged peripancreatic lymph nodes are seen. There is no evidence of bowel obstruction. There is colonic diverticulosis. There is a moderate amount of colonic stool. Terminal ileum is unremarkable. There is no evidence of acute appendicitis. There are postsurgical changes of the bowel in the left abdomen. A small lymph node is seen adjacent to the distal esophagus. Bladder is mildly distended. Uterus is anteverted. There is a calcified uterine fibroid. There is Heterogeneity of the endometrial stripe in the lower uterine body with dilatation of the uterine cavity versus endometrial thickening near the uterine fundus measuring 1.2 cm in thickness. Calcific densities in the pelvis likely represent  phleboliths. No free fluid is seen in the pelvis. Degenerative changes are seen in the spine. There is a fat-containing ventral hernia.     7.8 x 5 x 6.1 cm hypodense lesion in the right lobe of the liver is not significantly changed compared to prior. A pigtail catheter is seen within the lesion. This could represent a necrotic mass with superimposed infection. There is internal density within the lesion which could represent sequelae of a bleed into the lesion. There is a 9 x 6.6 mm pseudoaneurysm located adjacent to the pigtail catheter. Interventional radiology consultation recommended. Other small hypodense hepatic lesions are again noted. Linear tract from a previous pigtail catheter is seen within the right lobe of the liver. Dilatation of the pancreatic duct is again noted. Prominence of the common duct measures 1.2 cm. Enlarged peripancreatic and aortocaval lymph nodes are again noted. Parapelvic right renal cyst. Tiny hypodense right renal lesions are too small to characterize. Atherosclerotic plaque. Small to moderate right pleural effusion with overlying atelectasis/consolidation. Colonic diverticulosis. Moderate amount of colonic stool. Calcified uterine fibroid. Heterogeneity of the endometrial stripe in the lower uterine body with dilatation of the uterine cavity versus endometrial thickening near the uterine fundus measuring 1.2 cm in thickness. Further evaluation with pelvic ultrasound is recommended. This can be seen in the uterine malignancy. Fat-containing ventral hernia. Borderline prominent subcarinal lymph node is nonspecific.     Cta Abdomen Pelvis W & W/o Post Process    Result Date: 12/5/2018 12/5/2018 11:10 AM HISTORY/REASON FOR EXAM:  Worsening abdominal pain and distention. Recently embolized pseudoaneurysm with decreasing H and H. TECHNIQUE/EXAM DESCRIPTION:  CT angiogram of the abdomen and pelvis without and with contrast, with reconstructions. Initial precontrast helical sections  were obtained from the diaphragmatic domes to the lesser trochanters. Following this, 100 mL of Omnipaque 350 nonionic contrast was administered at 5.0 mL/sec and helical scanning was obtained from the diaphragmatic domes through the lesser trochanters. Thin section overlapping reconstruction interval was utilized and multiplanar volume reformatted were generated in the sagittal and coronal planes. 3D angiographic images were reviewed on PACS. Maximum intensity projection (MIP) images were generated and reviewed. Low dose optimization technique was utilized for this CT exam including automated exposure control and adjustment of the mA and/or kV according to patient size. COMPARISON:  11/29/2018. FINDINGS: Noncontrast images: There is no intramural hematoma. There are atherosclerotic calcifications of aorta and its branch structures. Contrast images: Aorta and vasculature: There is no evidence of aortic dissection, penetrating ulcer or hemodynamically significant stenosis. There is no evidence of active extravasation, pseudoaneurysm or truncated vessel in the previously demonstrated area of pseudoaneurysm in the right lobe of the liver. Embolization coils are present. There is a small right pleural effusion with associated compressive atelectasis of the right lower lobe. A heterogeneous gas-containing collection in the right lobe of the liver appears grossly unchanged measuring 7 x 5.7 cm, previously 7.9 x 5.0 cm. There has been interval removal of a percutaneous drainage catheter in the collection. Spleen is normal in size. Pancreas is unremarkable. The gallbladder is surgically absent. Adrenal glands are normal. Kidneys enhance symmetrically. There is diverticulosis without discrete evidence of diverticulitis. The small bowel is unremarkable. There is no adenopathy or free fluid. There is a fat-containing ventral hernia.     1.  No evidence of active extravasation, pseudoaneurysm or truncated vessel in the  previously demonstrated area of pseudoaneurysm in the right lobe of the liver. Embolization coils are present. 2.  Interval removal of a right percutaneous hepatic drain. Essentially unchanged size of the heterogeneous gas and fluid collection in the right lobe of the liver which now measures 7 x 5.7 cm, previously 7.9 x 5 cm. 3.  Grossly stable small right pleural effusion with associated compressive atelectasis. 4.  Diverticulosis. 5.  Fat-containing ventral hernia.    Ct-drain-liver Abscess-cyst    Result Date: 12/21/2018 12/21/2018 4:30 PM HISTORY/REASON FOR EXAM:  Liver abscess. LIVER DOME?FLUID COLLECTION, POST CT GUIDED LIVER ABSCESS DRAIN 11/27/18 COMPLICATED BY HEPATIC PSEUDOANEURYSM REQUIRING COIL EMBOLIZATION 12/4/18. NOW HAS CAVITARY LESION AT LIVER DOME WITH AIR FLUID LEVEL. Possible liver abscess. TECHNIQUE/EXAM DESCRIPTION AND NUMBER OF VIEWS: CT-GUIDED LIVER ABSCESS DRAINAGE PROCEDURE:  Informed consent was obtained. CURRENT ALARA PRINCIPLES FOR DOSE REDUCTION TECHNIQUES WERE UTILIZED FOR THIS EXAMINATION. Localizing CT images were obtained with the patient in supine position. The gantry was tilted with 18.5 degrees cranial angulation to facilitate needle pathway cephalad toward the liver dome. The skin was prepped with Betadine and draped in a sterile fashion. Moderate sedation with Fentanyl and Versed was administered during the procedure with appropriate continuous patient monitoring by the radiology nurse. Sedation duration: 30 minutes Following local anesthesia with 1% Lidocaine, a 17 G guiding needle was placed and needle path confirmed with CT. An Amplatz guidewire was placed and following serial tract dilatation, a 10 [place] pigtail locking catheter was placed. A specimen was collected and submitted for culture and sensitivity and Gram stain. Total of 25 mL old bloody fluid was drained. No michoacano pus. The catheter was secured to the skin and connected to suction bulb drainage. Final CT images  "were obtained documenting catheter position. The patient tolerated the procedure well with no evidence of complication. COMPARISON: CT of the abdomen and pelvis 12/20/2018 FINDINGS: Localizing images show an approximately 56 mm mixed signal cavitary lesion with an air-fluid level. There is an embolization coil immediately adjacent to the anterior margin of the collection. Hyperdense areas within the collection are consistent with blood products. Also noted on the localizing images, there is pneumobilia. There are surgical clips in the right upper quadrant consistent with prior cholecystectomy.     1. Liver \"Abscess\" Drainage with CT guidance. Placement of 10 [place] locking loop drainage catheter via anterior percutaneous transhepatic approach. The collection yielded old bloody fluid.    Ct-drain-liver Abscess-cyst    Result Date: 11/27/2018 11/24/2018 9:02 AM HISTORY/REASON FOR EXAM:  current drain pulled out of abscess, needs to be repositioned. Moderate sedation was administered with continuous monitoring of the patient under the direct supervision of  Medication: 100 mcg of fentanyl and 2 mg of Versed Total sedation time: 30 minutes The biopsy/drainage was done utilizing low dose optimization CT techniques including auto modulation for  imaging and low mA CT/fluoroscopy mode for the procedure. TECHNIQUE/EXAM DESCRIPTION AND NUMBER OF VIEWS:  After the informed consent the patient was placed on the angiographic table and supine position. Localization CT scan demonstrates a low-density area in the right lobe of the liver. The previously placed catheter was migrated upwards. The previously placed catheter was. After injecting lidocaine, a 17-gauge introducer needle was advanced into the fluid collection. After confirming the needle position, a wire was introduced. After dilating the tract, a new 10 [place] pigtail catheter was advanced and placed with its loop in the fluid collection. The tube is " attached to the suction bulb. The patient tolerated procedure without any immediate complication.     1.  Successful CT-guided drainage of liver abscess. 2.  The previously placed migrated pigtail drainage catheter was removed.    Ct-head W/o    Result Date: 12/17/2018   CT HEAD WITHOUT CONTRAST 12/17/2018 3:07 PM HISTORY/REASON FOR EXAM:  Weakness TECHNIQUE/EXAM DESCRIPTION AND NUMBER OF VIEWS: CT of the head without contrast. The study was performed on a helical multidetector CT scanner. Contiguous 2.5 mm axial sections were obtained from the skull base through the vertex. Up to date radiation dose reduction adjustments have been utilized to meet ALARA standards for radiation dose reduction. COMPARISON:  None available FINDINGS: Lateral ventricles are normal in size and symmetric. Mild to moderate global parenchymal atrophy. Chronic small vessel ischemic changes. No significant mass effect or midline shift. Basal cisterns are patent. No evidence for intracranial hemorrhage. Old left cerebellar infarct. Calvaria are intact. Visualized orbits are unremarkable. Visualized mastoid air cells are clear. Visualized paranasal sinuses are unremarkable.     1.  No CT evidence of acute infarct, hemorrhage or mass. 2.  Mild to moderate global parenchymal atrophy and chronic small ischemic changes. 3.  Old left cerebellar infarct.    Dx-chest-limited (1 View)    Result Date: 12/21/2018 12/21/2018 3:52 PM HISTORY/REASON FOR EXAM: Line placement TECHNIQUE/EXAM DESCRIPTION AND NUMBER OF VIEWS: Single AP view of the chest. COMPARISON: 12/17/2018 FINDINGS: Right internal jugular line tip overlies the SVC. No pneumothorax. Elevated right hemidiaphragm and right lung base atelectasis. Linear atelectasis within the left mid and lower lung. Borderline cardiomegaly.     Right internal jugular line tip overlies the SVC. No pneumothorax. Elevated right hemidiaphragm and right lung base atelectasis. Pneumonitis not excluded. Linear  atelectasis within the left lung.    Dx-chest-portable (1 View)    Result Date: 12/17/2018 12/17/2018 1:31 PM HISTORY/REASON FOR EXAM:  Weakness. Shortness of breath. History of liver carcinoma. TECHNIQUE/EXAM DESCRIPTION AND NUMBER OF VIEWS: Single portable view of the chest. COMPARISON:  12/3/2018 FINDINGS: The cardiac silhouette is within normal limits. No infiltrates or consolidations are noted. No pleural effusion is noted. Slight elevation of the right hemidiaphragm is unchanged. Embolization coils are present in the liver and gastroduodenal artery region.     No acute cardiac or pulmonary abnormality is noted.    Dx-chest-portable (1 View)    Result Date: 12/3/2018  12/3/2018 2:26 PM HISTORY/REASON FOR EXAM:  Cough. TECHNIQUE/EXAM DESCRIPTION AND NUMBER OF VIEWS: Single portable view of the chest. COMPARISON: 11/23/2018 FINDINGS: Cardiac mediastinal contour is unchanged. Right hemidiaphragm is elevated. Lungs show hypoinflation. Linear opacity in the RIGHT midlung. Minimal blunting of RIGHT costophrenic angle. No pneumothorax. RIGHT arm PICC line tip at the cavoatrial junction level. Interval removal of drain projecting over the RIGHT upper abdomen.     1.  Persistent hypoinflation with elevation of RIGHT hemidiaphragm. 2.  Minimal worsening RIGHT midlung atelectasis. 3.  Possible minimal RIGHT pleural effusion.    Dx-chest-portable (1 View)    Result Date: 11/23/2018 11/23/2018 2:14 PM HISTORY/REASON FOR EXAM:  Pleural Effusion. Status post right thoracentesis TECHNIQUE/EXAM DESCRIPTION AND NUMBER OF VIEWS: Single portable view of the chest. COMPARISON: 11/21/2018 FINDINGS: Single portable view of the chest demonstrates a stable cardiac silhouette and mediastinal contours. Calcification is in the aortic arch. The right hemidiaphragm is elevated. There is mild atelectasis in the right lung base. No pneumothorax is identified. Pigtail catheter projects over the right upper quadrant. There are multiple  surgical clips. A right PICC line remains in place.     Mild right basilar atelectasis.    Ir-visceral Angiogram - Selective    Addendum Date: 12/4/2018    Addendum: Total sedation time: 1 hour    Result Date: 12/4/2018 11/29/2018 6:29 PM HISTORY/REASON FOR EXAM:  Hepatic artery pseudoaneurysm Moderate sedation was administered with continuous monitoring of the patient under the direct supervision of  Medication: 4 mg of  Versed and 100 mcg of fentanyl Contrast: 90 mL of Omnipaque 300 Total fluoroscopic time: 12.7 minutes Total number of images stored in the PACS: 224 TECHNIQUE/EXAM DESCRIPTION AND NUMBER OF VIEWS: After the informed consent the patient was placed on the fluoroscopy table on supine position. The skin over the right groin was prepped. After injecting lidocaine a 5 [place] vascular sheath was introduced. Subsequently, a diagnostic catheter was advanced into the celiac trunk. Celiac angiogram was performed. It demonstrated a pseudoaneurysm from the right hepatic branch. Subsequently a microcatheter was successfully advanced into the branch supplying the pseudoaneurysm. Microcatheter angiogram confirms the pseudoaneurysm and the catheter position. Subsequently metallic fiber coils were used to embolize the branch supplying the segment aneurysm. The final angiogram demonstrates occluded branch with stagnant flow within the pseudoaneurysm. The catheters were removed. The right superficial femoral angiogram does not demonstrate any significant stenosis. The right femoral sheath was removed and hemostasis was obtained with Angio-Seal device.     1.  Right hepatic artery pseudoaneurysm. 2.  Successful embolization of branch supplying the pseudoaneurysm.    Us-thoracentesis Puncture Right  Ec-echocardiogram Complete W/o Cont    Result Date: 11/28/2018  Transthoracic Echo Report Echocardiography Laboratory CONCLUSIONS No prior study is available for comparison. Normal transthoracic echocardiogram.  LV EF:  65    % FINDINGS Left Ventricle Normal left ventricular size and systolic function. Normal left ventricular wall thickness. Normal diastolic function. Normal regional wall motion. Left ventricular ejection fraction is visually estimated to be 65%. Right Ventricle Normal right ventricular size and systolic function. Right Atrium Normal right atrial size. Normal inferior vena cava size and inspiratory collapse. Left Atrium Normal left atrial size. Mitral Valve Structurally normal mitral valve. Mild mitral regurgitation. No mitral stenosis. Aortic Valve Structurally normal aortic valve without significant stenosis or regurgitation. Tricuspid Valve Structurally normal tricuspid valve. Mild tricuspid regurgitation. Right atrial pressure is estimated to be 3 mmHg. Estimated right ventricular systolic pressure  is 35 mmHg. Pulmonic Valve Structurally normal pulmonic valve without significant stenosis or regurgitation. Pericardium Normal pericardium without effusion. Aorta The aortic root is normal. Gurwinder Mayberry MD (Electronically Signed) Final Date:     28 November 2018                 09:42      Micro:  Results     Procedure Component Value Units Date/Time    CULTURE WOUND W/ GRAM STAIN [[id number]]  (Abnormal) Collected:  12/21/18 1708    Order Status:  Completed Specimen:  Wound Updated:  12/22/18 1524     Significant Indicator POS (POS)     Source WND     Site Liver Abscess Drainage     Culture Result Wound -- (A)     Gram Stain Result Rare Gram negative rods.     Culture Result Wound Lactose fermenting Gram negative haley  Moderate growth   (A)    GRAM STAIN [[id number]] Collected:  12/21/18 1708    Order Status:  Completed Specimen:  Wound Updated:  12/22/18 0653     Significant Indicator .     Source WND     Site Liver Abscess Drainage     Gram Stain Result Rare Gram negative rods.    Urinalysis [[id number]]  (Abnormal) Collected:  12/18/18 2028    Order Status:  Completed Specimen:  Urine from Urine,  "Cath Updated:  12/18/18 2054     Color Yellow     Character Clear     Specific Gravity 1.016     Ph 6.0     Glucose Negative mg/dL      Ketones Negative mg/dL      Protein 30 (A) mg/dL      Bilirubin Negative     Urobilinogen, Urine 0.2     Nitrite Negative     Leukocyte Esterase Negative     Occult Blood Negative     Micro Urine Req Microscopic    Narrative:       Culture if indicated    BLOOD CULTURE,HOLD [[id number]] Collected:  12/17/18 1245    Order Status:  Completed Updated:  12/17/18 1409     Blood Culture Hold Collected    URINALYSIS,CULTURE IF INDICATED [[id number]] Collected:  12/17/18 0000    Order Status:  Canceled Specimen:  Urine           Assessment:  Active Hospital Problems    Diagnosis   • *Hyponatremia [E87.1]   • Liver abscess [K75.0]   • 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]   • Hypokalemia [E87.6]   • DNR (do not resuscitate) [Z66]   • Non-intractable vomiting with nausea [R11.2]   • Hypomagnesemia [E83.42]   • Iron deficiency anemia due to chronic blood loss [D50.0]   • Ileus (HCC) [K56.7]       Plan:  Liver abscess vs. superinfected necrotic mass, additional work  Afebrile  No current leukocytosis  Prior CT A/P on 11/29 \"7.8 x 5 x 6.1 cm hypodense lesion in the right lobe of the liver   Current CT 12/20 air-fluid level in the right lobe of the liver measuring 6.3 x 5.3 x 5.2 cm.   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

## 2018-12-23 NOTE — ASSESSMENT & PLAN NOTE
Complex abdomen post radiation and surgery resulting in waxing/waning complication of ileus  N.p.o. for now  Start trickle tube feeds or clear liquids p.o. when ileus improved clinically  Serial KUB as needed

## 2018-12-23 NOTE — PROGRESS NOTES
Infectious Disease Progress Note    Author: Gabby Swenson M.D. 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    Review of Systems:  Review of Systems   Unable to perform ROS: Mental status change       Hemodynamics:  Temp (24hrs), Av.4 °C (97.5 °F), Min:36.3 °C (97.4 °F), Max:36.6 °C (97.8 °F)  Temperature: 36.4 °C (97.5 °F)  Pulse  Av.1  Min: 58  Max: 135Heart Rate (Monitored): (!) 114  Blood Pressure : 113/65, NIBP: 134/76       Physical Exam:  Physical Exam   Constitutional: She appears well-developed.   HENT:   Head: Normocephalic and atraumatic.   Eyes: Pupils are equal, round, and reactive to light. EOM are normal.   Neck: Neck supple.   Cardiovascular: Normal rate.    Pulmonary/Chest: Effort normal. No respiratory distress. She has no wheezes.   Abdominal: Soft. She exhibits distension. There is tenderness. There is no guarding.   Drain, brown, bloody   Musculoskeletal: She exhibits edema.   Neurological:   obtunded   Skin: Skin is warm. No rash noted. She is not diaphoretic.   Nursing note and vitals reviewed.      Meds:    Current Facility-Administered Medications:   •  enoxaparin (LOVENOX) injection  •  fluconazole (DIFLUCAN) IV  •  furosemide  •  NS  •  [COMPLETED] piperacillin-tazobactam **AND** piperacillin-tazobactam  •  DAPTOmycin  •  Respiratory Care per Protocol  •  3% sodium chloride  •  pantoprazole  •  [DISCONTINUED] insulin regular **AND** [CANCELED] Accu-Chek ACHS **AND** [CANCELED] NOTIFY MD and PharmD **AND**  [DISCONTINUED] glucose 4 g **AND** dextrose 50%  •  acetaminophen  •  tramadol  •  senna-docusate **AND** polyethylene glycol/lytes **AND** magnesium hydroxide **AND** bisacodyl  •  ondansetron  •  ondansetron  •  artificial tears  •  prochlorperazine    Labs:  Recent Labs      12/21/18   0523   WBC  10.7   RBC  3.23*   HEMOGLOBIN  9.8*   HEMATOCRIT  28.6*   MCV  88.5   MCH  30.3   RDW  65.9*   PLATELETCT  57*   MPV  10.7   NEUTSPOLYS  91.00*   LYMPHOCYTES  5.10*   MONOCYTES  2.10   EOSINOPHILS  0.60   BASOPHILS  0.20     Recent Labs      12/22/18   0546   12/23/18   0002  12/23/18   0152  12/23/18   0558   SODIUM  128*   < >  131*  132*  132*  132*   POTASSIUM  4.0   < >  4.6  4.5  4.5  4.8   CHLORIDE  107   < >  109  110  110  110   CO2  15*   < >  14*  14*  14*  14*   GLUCOSE  86   < >  75  66  66  68   BUN  8   < >  9  9  9  9   CPKTOTAL  14   --    --    --    --     < > = values in this interval not displayed.     Recent Labs      12/23/18   0002  12/23/18   0152  12/23/18   0558   CREATININE  0.43*  0.53  0.53  0.52       Imaging:  Ct-abdomen-pelvis With    Result Date: 12/20/2018 12/20/2018 2:55 PM HISTORY/REASON FOR EXAM: Distention. Hyperactive bowel on examination. FINDINGS: Lung bases: There is a small right pleural effusion. There is mild right basilar atelectasis. Abdomen: No free air is seen in the abdomen or pelvis. There is a lobulated lesion with an air-fluid level in the right lobe of the liver measuring 6.3 x 5.3 x 5.2 cm. This is not significantly changed in size compared to prior. Other hypodense lesions are again noted. There are surgical clips adjacent to the main lesion in the right lobe of the liver. There is pneumobilia. There is biliary ductal dilatation. Common duct measures 1.7 cm. Spleen is not enlarged. No adrenal mass is identified. There may be mild stranding  surrounding the pancreatic head. There are embolization coils in the right upper quadrant. Surgical clips are seen  in the right upper quadrant status post cholecystectomy. There is heterogeneity along the uncinate process of the pancreas which may represent a mildly necrotic lymph node measuring 1.6 x 1.2 cm. Hypodense right renal lesions are too small to characterize. There is a 2 cm hypodense right renal lesion likely a parapelvic cyst. There is no evidence of hydronephrosis. Aorta is not aneurysmal. There are small inguinal lymph nodes. There is a aortocaval lymph node measuring 1.8 x 1.3 cm. A mesenteric lymph node is centrally hypodense measuring 7.3 mm in short axis dimension. There is colonic diverticulosis. The descending and distal transverse colon is relatively decompressed. There is mild distention of the transverse colon with moderate distention of the ascending colon and cecum which are filled with fluid status stool. Distal small bowel is dilated to 3.1 cm. It is difficult to definitively exclude pneumatosis of a short loop of small bowel in the lower abdomen. There are there are multiple dilated and fluid-filled loops of small bowel. The distal esophagus is dilated and filled with contrast. Stomach is distended. There are postsurgical changes in the upper abdomen to the left of midline. The involved small bowel loop is distended. There is a peripancreatic lymph node measuring 1.5 x 1.2 cm. Bladder is mildly distended. Uterine cavity is prominent and hypodense. There is a calcified uterine fibroid. Calcific densities in the pelvis likely represent phleboliths. Small amount of free fluid is seen in the pelvis. Trace free fluid is seen in the abdomen. The appendix is fluid-filled and measures up to 7 mm in diameter. Post surgical changes are noted of the anterior abdominal wall. There is a fat-containing ventral hernia. Degenerative changes are seen in the spine.     Distention of the cecum and ascending colon which are filled with fluid and stool. Mild distention is seen of the transverse colon. Findings may  represent ileus. Multiple distended and fluid-filled loops of small bowel are seen. This may be related to ileus. It is difficult to definitively exclude pneumatosis involving a loop of small bowel in the lower abdomen. Small amount of free fluid in the abdomen and pelvis. Uterine cavity may be distended with fluid or there may be endometrial thickening which can be seen in the setting of malignancy. Further evaluation with pelvic ultrasound is recommended. Status post cholecystectomy. Biliary ductal dilatation and pneumobilia are again noted. Necrotic mass versus abscess in the right lobe of the liver is again seen. Other smaller hypodense hepatic lesions are unchanged. Mild stranding adjacent to the pancreatic head. Correlation with pancreatic enzymes is recommended as pancreatitis is not excluded. Appendix is at the upper limit of normal with surrounding fluid. Mild/early appendicitis is not excluded. Distended esophagus can be seen in the setting of gastroesophageal reflux. Achalasia not excluded. Small right pleural effusion and right basilar atelectasis. Peripancreatic, retroperitoneal and mesenteric lymph nodes compatible with metastatic disease. Small hypodense renal lesions are too small to characterize. Hypodense right renal lesion likely represents a parapelvic cyst. Uterine fibroid. Fat-containing umbilical hernia. Colonic diverticulosis. Findings discussed with Dr. Mauricio     Ct-chest,abdomen,pelvis With    Result Date: 12/17/2018 12/17/2018 3:07 PM HISTORY/REASON FOR EXAM: Right upper quadrant pain. Weakness. TECHNIQUE/EXAM DESCRIPTION: CT scan of the chest, abdomen and pelvis with contrast. Thin-section helical scanning was obtained with intravenous contrast from the lung apices through the pubic symphysis to include the chest, abdomen and pelvis. 100 mL of Omnipaque 350 nonionic contrast was administered intravenously without complication. Low dose optimization technique was utilized for this CT  exam including automated exposure control and adjustment of the mA and/or kV according to patient size. COMPARISON: CT AP 12/5/2018. FINDINGS: CT Chest: There is mild dilatation and fluid distention of the esophagus diffusely. There is a small hiatal hernia. This may be secondary to a distal esophageal stenosis or achalasia. The heart is normal in size and configuration. No mediastinal, hilar, or axillary adenopathy is present. The lung parenchyma demonstrates linear fibrotic change in the right lower lobe medially and in each lower lobe inferiorly. The bony thorax is intact. CT Abdomen: No free air is present. Embolization coils are present in the dome of the right lobe of liver with adjacent fluid and air consistent with tumor necrosis or residual abscess. The area of low attenuation measures 6.6 cm in maximum dimension and is unchanged from recent examination. The remainder the liver demonstrates scattered vague low-attenuation areas in the right lobe without significant change. No biliary dilatation is present. The gallbladder is absent. Embolization coils are present in the region of the gastroduodenal artery. There is unchanged extrahepatic biliary dilatation with the common duct have a maximum diameter of 2 cm in the head of the pancreas with normal caliber at the expected level of the ampulla. No pancreatic mass is present. The spleen appears normal. The aorta and IVC are normal in caliber. Enlarged lymph node between the aorta and IVC at the level of the lower pole of each kidney is unchanged measuring 2.1 x 1.7 cm in diameter. A small midline ventral hernia containing fat is unchanged. There is stool and fluid within dilated cecum and ascending colon. The transverse colon is normal in caliber with stool and fluid. The remainder the colon is without evidence of dilatation. No adrenal enlargement. Both kidneys function without obstruction. No free fluid is present. CT Pelvis: The bladder appears normal. The  uterus is present with 2.8 cm in diameter area of low attenuation in the uterus which may represent fluid or hemorrhage within the endometrial canal. This is new compared with 12/5/2018. No pelvic sidewall or inguinal adenopathy is present.     1.  New dilatation of the esophagus diffusely. This may represent acute achalasia or reflux. 2.  No thoracic adenopathy or pleural effusion. No pulmonary consolidation. 3.  Stable appearance of low-attenuation area in the right lobe of the liver and several other low-attenuation areas which may represent metastases. Recurrent abscess cannot be excluded. 4.  New marked dilatation of the cecum and ascending colon with stool and fluid which may represent constipation or partial obstruction. No evidence of small bowel obstruction. 5.  No renal obstruction. 6.  No free fluid or abscess. 7.  New low-attenuation area within the uterus which could represent an area of uterine hemorrhage.    Ct-chest,abdomen,pelvis With    Result Date: 11/29/2018 11/29/2018 1:12 PM HISTORY/REASON FOR EXAM:  Leukocytosis. Liver abscess. TECHNIQUE/EXAM DESCRIPTION: CT scan of the chest, abdomen and pelvis with contrast. Helical scanning was obtained with intravenous contrast from the lung apices through the pubic symphysis to include the chest, abdomen and pelvis.  100 mL of Omnipaque 350 nonionic contrast was administered intravenously without complication. Low dose optimization technique was utilized for this CT exam including automated exposure control and adjustment of the mA and/or kV according to patient size. COMPARISON: 11/22/2018 FINDINGS: Atherosclerotic plaque is seen in the aorta. There is coronary artery calcification. There is a small amount of pericardial fluid. There is a small to moderate large right pleural effusion with overlying atelectasis/consolidation. There are small mediastinal lymph nodes. Subcarinal lymph node measures 8 mm in short axis dimension. There are small hilar lymph  nodes bilaterally. Anterior mediastinal lymph node measures 6.2 mm in short axis dimension. There are small axillary lymph nodes. No pneumothorax is identified. Examination is limited by respiratory motion. There is mild left lower lobe and right middle lobe atelectasis. No free air is seen in the abdomen or pelvis. There is a pigtail catheter within a hypodense hepatic lesion measuring 7.8 x 5 x 6.1 cm which is not significantly changed compared to prior. Adjacent to the pigtail catheter, there is a focal collection of contrast measuring 9 x 6.6 mm compatible with a pseudoaneurysm. A feeding vessel is seen. There is heterogeneous density within the lesion. There could have been bleeding into the lesion. No active hemorrhage is currently identified. Small hypodense lesions are again noted within the right lobe of the liver. Portal vein is patent. Gallbladder is surgically absent. Prominence of the common duct measures 1.2 cm. There is distention of the cystic duct. There is dilatation of the pancreatic duct. Streak  artifact from embolization coils is seen. There is a linear tract in the right lobe of the liver from a prior drain located more anteriorly. Spleen is not enlarged. No adrenal mass is identified. There is a parapelvic cyst on the right. Tiny hypodense right renal lesions are too small to characterize. There is no evidence of hydronephrosis. Atherosclerotic plaque is seen in the aorta. There are small inguinal lymph nodes. Enlarged aortocaval lymph node measures 1.4 cm in short axis dimension. Enlarged peripancreatic lymph nodes are seen. There is no evidence of bowel obstruction. There is colonic diverticulosis. There is a moderate amount of colonic stool. Terminal ileum is unremarkable. There is no evidence of acute appendicitis. There are postsurgical changes of the bowel in the left abdomen. A small lymph node is seen adjacent to the distal esophagus. Bladder is mildly distended. Uterus is anteverted.  There is a calcified uterine fibroid. There is Heterogeneity of the endometrial stripe in the lower uterine body with dilatation of the uterine cavity versus endometrial thickening near the uterine fundus measuring 1.2 cm in thickness. Calcific densities in the pelvis likely represent phleboliths. No free fluid is seen in the pelvis. Degenerative changes are seen in the spine. There is a fat-containing ventral hernia.     7.8 x 5 x 6.1 cm hypodense lesion in the right lobe of the liver is not significantly changed compared to prior. A pigtail catheter is seen within the lesion. This could represent a necrotic mass with superimposed infection. There is internal density within the lesion which could represent sequelae of a bleed into the lesion. There is a 9 x 6.6 mm pseudoaneurysm located adjacent to the pigtail catheter. Interventional radiology consultation recommended. Other small hypodense hepatic lesions are again noted. Linear tract from a previous pigtail catheter is seen within the right lobe of the liver. Dilatation of the pancreatic duct is again noted. Prominence of the common duct measures 1.2 cm. Enlarged peripancreatic and aortocaval lymph nodes are again noted. Parapelvic right renal cyst. Tiny hypodense right renal lesions are too small to characterize. Atherosclerotic plaque. Small to moderate right pleural effusion with overlying atelectasis/consolidation. Colonic diverticulosis. Moderate amount of colonic stool. Calcified uterine fibroid. Heterogeneity of the endometrial stripe in the lower uterine body with dilatation of the uterine cavity versus endometrial thickening near the uterine fundus measuring 1.2 cm in thickness. Further evaluation with pelvic ultrasound is recommended. This can be seen in the uterine malignancy. Fat-containing ventral hernia. Borderline prominent subcarinal lymph node is nonspecific.     Cta Abdomen Pelvis W & W/o Post Process    Result Date: 12/5/2018 12/5/2018 11:10  AM HISTORY/REASON FOR EXAM:  Worsening abdominal pain and distention. Recently embolized pseudoaneurysm with decreasing H and H. TECHNIQUE/EXAM DESCRIPTION:  CT angiogram of the abdomen and pelvis without and with contrast, with reconstructions. Initial precontrast helical sections were obtained from the diaphragmatic domes to the lesser trochanters. Following this, 100 mL of Omnipaque 350 nonionic contrast was administered at 5.0 mL/sec and helical scanning was obtained from the diaphragmatic domes through the lesser trochanters. Thin section overlapping reconstruction interval was utilized and multiplanar volume reformatted were generated in the sagittal and coronal planes. 3D angiographic images were reviewed on PACS. Maximum intensity projection (MIP) images were generated and reviewed. Low dose optimization technique was utilized for this CT exam including automated exposure control and adjustment of the mA and/or kV according to patient size. COMPARISON:  11/29/2018. FINDINGS: Noncontrast images: There is no intramural hematoma. There are atherosclerotic calcifications of aorta and its branch structures. Contrast images: Aorta and vasculature: There is no evidence of aortic dissection, penetrating ulcer or hemodynamically significant stenosis. There is no evidence of active extravasation, pseudoaneurysm or truncated vessel in the previously demonstrated area of pseudoaneurysm in the right lobe of the liver. Embolization coils are present. There is a small right pleural effusion with associated compressive atelectasis of the right lower lobe. A heterogeneous gas-containing collection in the right lobe of the liver appears grossly unchanged measuring 7 x 5.7 cm, previously 7.9 x 5.0 cm. There has been interval removal of a percutaneous drainage catheter in the collection. Spleen is normal in size. Pancreas is unremarkable. The gallbladder is surgically absent. Adrenal glands are normal. Kidneys enhance  symmetrically. There is diverticulosis without discrete evidence of diverticulitis. The small bowel is unremarkable. There is no adenopathy or free fluid. There is a fat-containing ventral hernia.     1.  No evidence of active extravasation, pseudoaneurysm or truncated vessel in the previously demonstrated area of pseudoaneurysm in the right lobe of the liver. Embolization coils are present. 2.  Interval removal of a right percutaneous hepatic drain. Essentially unchanged size of the heterogeneous gas and fluid collection in the right lobe of the liver which now measures 7 x 5.7 cm, previously 7.9 x 5 cm. 3.  Grossly stable small right pleural effusion with associated compressive atelectasis. 4.  Diverticulosis. 5.  Fat-containing ventral hernia.    Ct-drain-liver Abscess-cyst    Result Date: 12/21/2018 12/21/2018 4:30 PM HISTORY/REASON FOR EXAM:  Liver abscess. LIVER DOME?FLUID COLLECTION, POST CT GUIDED LIVER ABSCESS DRAIN 11/27/18 COMPLICATED BY HEPATIC PSEUDOANEURYSM REQUIRING COIL EMBOLIZATION 12/4/18. NOW HAS CAVITARY LESION AT LIVER DOME WITH AIR FLUID LEVEL. Possible liver abscess. TECHNIQUE/EXAM DESCRIPTION AND NUMBER OF VIEWS: CT-GUIDED LIVER ABSCESS DRAINAGE PROCEDURE:  Informed consent was obtained. CURRENT ALARA PRINCIPLES FOR DOSE REDUCTION TECHNIQUES WERE UTILIZED FOR THIS EXAMINATION. Localizing CT images were obtained with the patient in supine position. The gantry was tilted with 18.5 degrees cranial angulation to facilitate needle pathway cephalad toward the liver dome. The skin was prepped with Betadine and draped in a sterile fashion. Moderate sedation with Fentanyl and Versed was administered during the procedure with appropriate continuous patient monitoring by the radiology nurse. Sedation duration: 30 minutes Following local anesthesia with 1% Lidocaine, a 17 G guiding needle was placed and needle path confirmed with CT. An Amplatz guidewire was placed and following serial tract dilatation,  "a 10 [place]ian pigtail locking catheter was placed. A specimen was collected and submitted for culture and sensitivity and Gram stain. Total of 25 mL old bloody fluid was drained. No michoacano pus. The catheter was secured to the skin and connected to suction bulb drainage. Final CT images were obtained documenting catheter position. The patient tolerated the procedure well with no evidence of complication. COMPARISON: CT of the abdomen and pelvis 12/20/2018 FINDINGS: Localizing images show an approximately 56 mm mixed signal cavitary lesion with an air-fluid level. There is an embolization coil immediately adjacent to the anterior margin of the collection. Hyperdense areas within the collection are consistent with blood products. Also noted on the localizing images, there is pneumobilia. There are surgical clips in the right upper quadrant consistent with prior cholecystectomy.     1. Liver \"Abscess\" Drainage with CT guidance. Placement of 10 Gu[place]ian locking loop drainage catheter via anterior percutaneous transhepatic approach. The collection yielded old bloody fluid.    Ct-drain-liver Abscess-cyst    Result Date: 11/27/2018 11/24/2018 9:02 AM HISTORY/REASON FOR EXAM:  current drain pulled out of abscess, needs to be repositioned. Moderate sedation was administered with continuous monitoring of the patient under the direct supervision of  Medication: 100 mcg of fentanyl and 2 mg of Versed Total sedation time: 30 minutes The biopsy/drainage was done utilizing low dose optimization CT techniques including auto modulation for  imaging and low mA CT/fluoroscopy mode for the procedure. TECHNIQUE/EXAM DESCRIPTION AND NUMBER OF VIEWS:  After the informed consent the patient was placed on the angiographic table and supine position. Localization CT scan demonstrates a low-density area in the right lobe of the liver. The previously placed catheter was migrated upwards. The previously placed catheter was. After " injecting lidocaine, a 17-gauge introducer needle was advanced into the fluid collection. After confirming the needle position, a wire was introduced. After dilating the tract, a new 10 [place]ovan pigtail catheter was advanced and placed with its loop in the fluid collection. The tube is attached to the suction bulb. The patient tolerated procedure without any immediate complication.     1.  Successful CT-guided drainage of liver abscess. 2.  The previously placed migrated pigtail drainage catheter was removed.    Ct-head W/o    Result Date: 12/17/2018   CT HEAD WITHOUT CONTRAST 12/17/2018 3:07 PM HISTORY/REASON FOR EXAM:  Weakness TECHNIQUE/EXAM DESCRIPTION AND NUMBER OF VIEWS: CT of the head without contrast. The study was performed on a helical multidetector CT scanner. Contiguous 2.5 mm axial sections were obtained from the skull base through the vertex. Up to date radiation dose reduction adjustments have been utilized to meet ALARA standards for radiation dose reduction. COMPARISON:  None available FINDINGS: Lateral ventricles are normal in size and symmetric. Mild to moderate global parenchymal atrophy. Chronic small vessel ischemic changes. No significant mass effect or midline shift. Basal cisterns are patent. No evidence for intracranial hemorrhage. Old left cerebellar infarct. Calvaria are intact. Visualized orbits are unremarkable. Visualized mastoid air cells are clear. Visualized paranasal sinuses are unremarkable.     1.  No CT evidence of acute infarct, hemorrhage or mass. 2.  Mild to moderate global parenchymal atrophy and chronic small ischemic changes. 3.  Old left cerebellar infarct.    Dx-chest-limited (1 View)    Result Date: 12/21/2018 12/21/2018 3:52 PM HISTORY/REASON FOR EXAM: Line placement TECHNIQUE/EXAM DESCRIPTION AND NUMBER OF VIEWS: Single AP view of the chest. COMPARISON: 12/17/2018 FINDINGS: Right internal jugular line tip overlies the SVC. No pneumothorax. Elevated right hemidiaphragm  and right lung base atelectasis. Linear atelectasis within the left mid and lower lung. Borderline cardiomegaly.     Right internal jugular line tip overlies the SVC. No pneumothorax. Elevated right hemidiaphragm and right lung base atelectasis. Pneumonitis not excluded. Linear atelectasis within the left lung.    Dx-chest-portable (1 View)    Result Date: 12/17/2018 12/17/2018 1:31 PM HISTORY/REASON FOR EXAM:  Weakness. Shortness of breath. History of liver carcinoma. TECHNIQUE/EXAM DESCRIPTION AND NUMBER OF VIEWS: Single portable view of the chest. COMPARISON:  12/3/2018 FINDINGS: The cardiac silhouette is within normal limits. No infiltrates or consolidations are noted. No pleural effusion is noted. Slight elevation of the right hemidiaphragm is unchanged. Embolization coils are present in the liver and gastroduodenal artery region.     No acute cardiac or pulmonary abnormality is noted.    Dx-chest-portable (1 View)    Result Date: 12/3/2018  12/3/2018 2:26 PM HISTORY/REASON FOR EXAM:  Cough. TECHNIQUE/EXAM DESCRIPTION AND NUMBER OF VIEWS: Single portable view of the chest. COMPARISON: 11/23/2018 FINDINGS: Cardiac mediastinal contour is unchanged. Right hemidiaphragm is elevated. Lungs show hypoinflation. Linear opacity in the RIGHT midlung. Minimal blunting of RIGHT costophrenic angle. No pneumothorax. RIGHT arm PICC line tip at the cavoatrial junction level. Interval removal of drain projecting over the RIGHT upper abdomen.     1.  Persistent hypoinflation with elevation of RIGHT hemidiaphragm. 2.  Minimal worsening RIGHT midlung atelectasis. 3.  Possible minimal RIGHT pleural effusion.    Dx-chest-portable (1 View)    Result Date: 11/23/2018 11/23/2018 2:14 PM HISTORY/REASON FOR EXAM:  Pleural Effusion. Status post right thoracentesis TECHNIQUE/EXAM DESCRIPTION AND NUMBER OF VIEWS: Single portable view of the chest. COMPARISON: 11/21/2018 FINDINGS: Single portable view of the chest demonstrates a stable  cardiac silhouette and mediastinal contours. Calcification is in the aortic arch. The right hemidiaphragm is elevated. There is mild atelectasis in the right lung base. No pneumothorax is identified. Pigtail catheter projects over the right upper quadrant. There are multiple surgical clips. A right PICC line remains in place.     Mild right basilar atelectasis.    Ir-visceral Angiogram - Selective    Addendum Date: 12/4/2018    Addendum: Total sedation time: 1 hour    Result Date: 12/4/2018 11/29/2018 6:29 PM HISTORY/REASON FOR EXAM:  Hepatic artery pseudoaneurysm Moderate sedation was administered with continuous monitoring of the patient under the direct supervision of  Medication: 4 mg of  Versed and 100 mcg of fentanyl Contrast: 90 mL of Omnipaque 300 Total fluoroscopic time: 12.7 minutes Total number of images stored in the PACS: 224 TECHNIQUE/EXAM DESCRIPTION AND NUMBER OF VIEWS: After the informed consent the patient was placed on the fluoroscopy table on supine position. The skin over the right groin was prepped. After injecting lidocaine a 5 [place] vascular sheath was introduced. Subsequently, a diagnostic catheter was advanced into the celiac trunk. Celiac angiogram was performed. It demonstrated a pseudoaneurysm from the right hepatic branch. Subsequently a microcatheter was successfully advanced into the branch supplying the pseudoaneurysm. Microcatheter angiogram confirms the pseudoaneurysm and the catheter position. Subsequently metallic fiber coils were used to embolize the branch supplying the segment aneurysm. The final angiogram demonstrates occluded branch with stagnant flow within the pseudoaneurysm. The catheters were removed. The right superficial femoral angiogram does not demonstrate any significant stenosis. The right femoral sheath was removed and hemostasis was obtained with Angio-Seal device.     1.  Right hepatic artery pseudoaneurysm. 2.  Successful embolization of branch  supplying the pseudoaneurysm.    Us-thoracentesis Puncture Right  Ec-echocardiogram Complete W/o Cont    Result Date: 11/28/2018  Transthoracic Echo Report Echocardiography Laboratory CONCLUSIONS No prior study is available for comparison. Normal transthoracic echocardiogram. LV EF:  65    % FINDINGS Left Ventricle Normal left ventricular size and systolic function. Normal left ventricular wall thickness. Normal diastolic function. Normal regional wall motion. Left ventricular ejection fraction is visually estimated to be 65%. Right Ventricle Normal right ventricular size and systolic function. Right Atrium Normal right atrial size. Normal inferior vena cava size and inspiratory collapse. Left Atrium Normal left atrial size. Mitral Valve Structurally normal mitral valve. Mild mitral regurgitation. No mitral stenosis. Aortic Valve Structurally normal aortic valve without significant stenosis or regurgitation. Tricuspid Valve Structurally normal tricuspid valve. Mild tricuspid regurgitation. Right atrial pressure is estimated to be 3 mmHg. Estimated right ventricular systolic pressure  is 35 mmHg. Pulmonic Valve Structurally normal pulmonic valve without significant stenosis or regurgitation. Pericardium Normal pericardium without effusion. Aorta The aortic root is normal. Gurwinder Mayberry MD (Electronically Signed) Final Date:     28 November 2018                 09:42      Micro:  Results     Procedure Component Value Units Date/Time    CULTURE WOUND W/ GRAM STAIN [[id number]]  (Abnormal) Collected:  12/21/18 1708    Order Status:  Completed Specimen:  Wound Updated:  12/23/18 1253     Significant Indicator POS (POS)     Source WND     Site Liver Abscess Drainage     Culture Result Wound -- (A)     Gram Stain Result Rare Gram negative rods.     Culture Result Wound Lactose fermenting Gram negative haley  Moderate growth   (A)      Yeast  Light growth   (A)    GRAM STAIN [[id number]] Collected:  12/21/18 1708     "Order Status:  Completed Specimen:  Wound Updated:  12/22/18 0653     Significant Indicator .     Source WND     Site Liver Abscess Drainage     Gram Stain Result Rare Gram negative rods.    Urinalysis [[id number]]  (Abnormal) Collected:  12/18/18 2028    Order Status:  Completed Specimen:  Urine from Urine, Cath Updated:  12/18/18 2054     Color Yellow     Character Clear     Specific Gravity 1.016     Ph 6.0     Glucose Negative mg/dL      Ketones Negative mg/dL      Protein 30 (A) mg/dL      Bilirubin Negative     Urobilinogen, Urine 0.2     Nitrite Negative     Leukocyte Esterase Negative     Occult Blood Negative     Micro Urine Req Microscopic    Narrative:       Culture if indicated    BLOOD CULTURE,HOLD [[id number]] Collected:  12/17/18 1245    Order Status:  Completed Updated:  12/17/18 1409     Blood Culture Hold Collected    URINALYSIS,CULTURE IF INDICATED [[id number]] Collected:  12/17/18 0000    Order Status:  Canceled Specimen:  Urine           Assessment:  Active Hospital Problems    Diagnosis   • *Hyponatremia [E87.1]   • Liver abscess [K75.0]   • 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]   • Hypokalemia [E87.6]   • DNR (do not resuscitate) [Z66]   • Non-intractable vomiting with nausea [R11.2]   • Hypomagnesemia [E83.42]   • Iron deficiency anemia due to chronic blood loss [D50.0]   • Ileus (HCC) [K56.7]       Plan:  Liver abscess vs. superinfected necrotic mass, additional work  Afebrile  No current leukocytosis  Prior CT A/P on 11/29 \"7.8 x 5 x 6.1 cm hypodense lesion in the right lobe of the liver   Current CT 12/20 air-fluid level in the right lobe of the liver measuring 6.3 x 5.3 x 5.2 cm.   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

## 2018-12-24 NOTE — PROGRESS NOTES
· 2 RN skin check complete.   · Devices in place: N/A  · Skin assessed under devices: Intact, Blanchable redness to heels bilaterally.    · Confirmed pressure ulcers: N/A.  · The following interventions in place: Barrier cream to sacral area, Q2 turns.

## 2018-12-24 NOTE — THERAPY
Attempted PT eval again, per Pts family and Pt she is going toward more of a comfort route and they are requesting no therapies at this time.  PT consult deferred.    Karyna Gibbons PT/DPT  Pager# [phone]

## 2018-12-24 NOTE — PROGRESS NOTES
Monitor Summary.  Sinus-sinus tach.  -120. Not able to read ectopy.  Rhythm is irregular at times and unable to read waves.

## 2018-12-24 NOTE — PROGRESS NOTES
Received report from day RN and assumed care of patient at 1900.  Assessed patient, reviewed labs and notes, and spoke with family present.  Patient mentation fluctuating.  Patient is fatigued and selectively responds to questioning.  At times, she can answer all mentation questions, but she also talks gibberish (strings of numbers and letters).  At first assessment, she was disoriented to time and event and required repeated prodding to follow commands.  Later in the evening, she was AOx4.  She reported abdominal pain and did not tolerate turns for cleaning or skin assessment.  On-call MD paged for IV pain medication.  Orders receieved for one-time dose of 1 mg dilaudid.  0.5 mg IV dilaudid administered per nursing judgment due to mentation and concern for opiate naivete.  Patient tolerated medication well and was able to be turned; however, she did still grimace considerably with turning.  Mentation-wise, she remained oriented but more withdrawn and required repeated questioning to answer questions immediately after dilaudid administration.  Skin is clean, dry, intact. Heels and elbows are red but blanching.  Pillows in use for positioning.  Biliary drain had 30 mL thin brown output overnight.  Patient incontinent of urine and bowel and had small bowel movement overnight.   0000 sodium results:  137.  Potassium down from 3.4 to 3.3.  IV antibiotics and hypertonic saline infusing per MAR.  Patient NPO with ice chips.  Plan of care reviewed with family, patient board updated, safety precautions and frequent rounding in place.

## 2018-12-24 NOTE — THERAPY
Attempted PT eval, Pt down for imaging.  Will re attempt as able/appropriate.    Karyna Gibbons PT/DPT  Pager# [phone]

## 2018-12-24 NOTE — PROGRESS NOTES
Infectious Disease Progress Note    Author: Zakia Ansari M.D. 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    Review of Systems:  Review of Systems   Unable to perform ROS: Mental status change     Hemodynamics:  Temp (24hrs), Av.5 °C (97.7 °F), Min:36.1 °C (97 °F), Max:37 °C (98.6 °F)  Temperature: 37 °C (98.6 °F)  Pulse  Av.5  Min: 58  Max: 135Heart Rate (Monitored): (!) 114  Blood Pressure : 106/68, NIBP: 134/76       Physical Exam:  Physical Exam   Constitutional: She appears well-developed.   HENT:   Head: Normocephalic and atraumatic.   Eyes: Pupils are equal, round, and reactive to light. EOM are normal.   Neck: Neck supple.   Right IJ catheter nontender   Cardiovascular:   Tachycardic   Pulmonary/Chest: Effort normal. No respiratory distress. She has no wheezes.   Abdominal: Soft. She exhibits distension. There is tenderness. There is no guarding.   Right-sided drain + green fluid  Midline abdominal surgical scar clean   Musculoskeletal: She exhibits edema.   Neurological:   Confused   Skin: Skin is warm. No rash noted. She is not diaphoretic.   Nursing note and vitals reviewed.      Meds:    Current Facility-Administered Medications:   •  enoxaparin (LOVENOX) injection  •  fluconazole (DIFLUCAN) IV  •  furosemide  •  NS  •  [COMPLETED] piperacillin-tazobactam **AND** piperacillin-tazobactam  •  DAPTOmycin  •  Respiratory Care per Protocol  •  3%  sodium chloride  •  pantoprazole  •  [DISCONTINUED] insulin regular **AND** [CANCELED] Accu-Chek ACHS **AND** [CANCELED] NOTIFY MD and PharmD **AND** [DISCONTINUED] glucose 4 g **AND** dextrose 50%  •  acetaminophen  •  tramadol  •  senna-docusate **AND** polyethylene glycol/lytes **AND** magnesium hydroxide **AND** bisacodyl  •  ondansetron  •  ondansetron  •  artificial tears  •  prochlorperazine    Labs:  No results for input(s): WBC, RBC, HEMOGLOBIN, HEMATOCRIT, MCV, MCH, RDW, PLATELETCT, MPV, NEUTSPOLYS, LYMPHOCYTES, MONOCYTES, EOSINOPHILS, BASOPHILS, RBCMORPHOLO in the last 72 hours.  Recent Labs      12/22/18   0546   12/23/18   1811 12/24/18   0002  12/24/18   0700   SODIUM  128*   < >  134*  137  139   POTASSIUM  4.0   < >  3.4*  3.3*  3.4*   CHLORIDE  107   < >  113*  115*  116*   CO2  15*   < >  14*  13*  14*   GLUCOSE  86   < >  76  68  74   BUN  8   < >  10  11  11   CPKTOTAL  14   --    --    --    --     < > = values in this interval not displayed.     Recent Labs      12/23/18   1811 12/24/18   0002  12/24/18   0700   CREATININE  0.58  0.52  0.62       Imaging:  Ct-abdomen-pelvis With    Result Date: 12/20/2018 12/20/2018 2:55 PM HISTORY/REASON FOR EXAM: Distention. Hyperactive bowel on examination. FINDINGS: Lung bases: There is a small right pleural effusion. There is mild right basilar atelectasis. Abdomen: No free air is seen in the abdomen or pelvis. There is a lobulated lesion with an air-fluid level in the right lobe of the liver measuring 6.3 x 5.3 x 5.2 cm. This is not significantly changed in size compared to prior. Other hypodense lesions are again noted. There are surgical clips adjacent to the main lesion in the right lobe of the liver. There is pneumobilia. There is biliary ductal dilatation. Common duct measures 1.7 cm. Spleen is not enlarged. No adrenal mass is identified. There may be mild stranding  surrounding the pancreatic head. There are embolization coils in the right upper  quadrant. Surgical clips are seen in the right upper quadrant status post cholecystectomy. There is heterogeneity along the uncinate process of the pancreas which may represent a mildly necrotic lymph node measuring 1.6 x 1.2 cm. Hypodense right renal lesions are too small to characterize. There is a 2 cm hypodense right renal lesion likely a parapelvic cyst. There is no evidence of hydronephrosis. Aorta is not aneurysmal. There are small inguinal lymph nodes. There is a aortocaval lymph node measuring 1.8 x 1.3 cm. A mesenteric lymph node is centrally hypodense measuring 7.3 mm in short axis dimension. There is colonic diverticulosis. The descending and distal transverse colon is relatively decompressed. There is mild distention of the transverse colon with moderate distention of the ascending colon and cecum which are filled with fluid status stool. Distal small bowel is dilated to 3.1 cm. It is difficult to definitively exclude pneumatosis of a short loop of small bowel in the lower abdomen. There are there are multiple dilated and fluid-filled loops of small bowel. The distal esophagus is dilated and filled with contrast. Stomach is distended. There are postsurgical changes in the upper abdomen to the left of midline. The involved small bowel loop is distended. There is a peripancreatic lymph node measuring 1.5 x 1.2 cm. Bladder is mildly distended. Uterine cavity is prominent and hypodense. There is a calcified uterine fibroid. Calcific densities in the pelvis likely represent phleboliths. Small amount of free fluid is seen in the pelvis. Trace free fluid is seen in the abdomen. The appendix is fluid-filled and measures up to 7 mm in diameter. Post surgical changes are noted of the anterior abdominal wall. There is a fat-containing ventral hernia. Degenerative changes are seen in the spine.     Distention of the cecum and ascending colon which are filled with fluid and stool. Mild distention is seen of the  transverse colon. Findings may represent ileus. Multiple distended and fluid-filled loops of small bowel are seen. This may be related to ileus. It is difficult to definitively exclude pneumatosis involving a loop of small bowel in the lower abdomen. Small amount of free fluid in the abdomen and pelvis. Uterine cavity may be distended with fluid or there may be endometrial thickening which can be seen in the setting of malignancy. Further evaluation with pelvic ultrasound is recommended. Status post cholecystectomy. Biliary ductal dilatation and pneumobilia are again noted. Necrotic mass versus abscess in the right lobe of the liver is again seen. Other smaller hypodense hepatic lesions are unchanged. Mild stranding adjacent to the pancreatic head. Correlation with pancreatic enzymes is recommended as pancreatitis is not excluded. Appendix is at the upper limit of normal with surrounding fluid. Mild/early appendicitis is not excluded. Distended esophagus can be seen in the setting of gastroesophageal reflux. Achalasia not excluded. Small right pleural effusion and right basilar atelectasis. Peripancreatic, retroperitoneal and mesenteric lymph nodes compatible with metastatic disease. Small hypodense renal lesions are too small to characterize. Hypodense right renal lesion likely represents a parapelvic cyst. Uterine fibroid. Fat-containing umbilical hernia. Colonic diverticulosis. Findings discussed with Dr. Mauricio     Ct-chest,abdomen,pelvis With    Result Date: 12/17/2018 12/17/2018 3:07 PM HISTORY/REASON FOR EXAM: Right upper quadrant pain. Weakness. TECHNIQUE/EXAM DESCRIPTION: CT scan of the chest, abdomen and pelvis with contrast. Thin-section helical scanning was obtained with intravenous contrast from the lung apices through the pubic symphysis to include the chest, abdomen and pelvis. 100 mL of Omnipaque 350 nonionic contrast was administered intravenously without complication. Low dose optimization  technique was utilized for this CT exam including automated exposure control and adjustment of the mA and/or kV according to patient size. COMPARISON: CT AP 12/5/2018. FINDINGS: CT Chest: There is mild dilatation and fluid distention of the esophagus diffusely. There is a small hiatal hernia. This may be secondary to a distal esophageal stenosis or achalasia. The heart is normal in size and configuration. No mediastinal, hilar, or axillary adenopathy is present. The lung parenchyma demonstrates linear fibrotic change in the right lower lobe medially and in each lower lobe inferiorly. The bony thorax is intact. CT Abdomen: No free air is present. Embolization coils are present in the dome of the right lobe of liver with adjacent fluid and air consistent with tumor necrosis or residual abscess. The area of low attenuation measures 6.6 cm in maximum dimension and is unchanged from recent examination. The remainder the liver demonstrates scattered vague low-attenuation areas in the right lobe without significant change. No biliary dilatation is present. The gallbladder is absent. Embolization coils are present in the region of the gastroduodenal artery. There is unchanged extrahepatic biliary dilatation with the common duct have a maximum diameter of 2 cm in the head of the pancreas with normal caliber at the expected level of the ampulla. No pancreatic mass is present. The spleen appears normal. The aorta and IVC are normal in caliber. Enlarged lymph node between the aorta and IVC at the level of the lower pole of each kidney is unchanged measuring 2.1 x 1.7 cm in diameter. A small midline ventral hernia containing fat is unchanged. There is stool and fluid within dilated cecum and ascending colon. The transverse colon is normal in caliber with stool and fluid. The remainder the colon is without evidence of dilatation. No adrenal enlargement. Both kidneys function without obstruction. No free fluid is present. CT  Pelvis: The bladder appears normal. The uterus is present with 2.8 cm in diameter area of low attenuation in the uterus which may represent fluid or hemorrhage within the endometrial canal. This is new compared with 12/5/2018. No pelvic sidewall or inguinal adenopathy is present.     1.  New dilatation of the esophagus diffusely. This may represent acute achalasia or reflux. 2.  No thoracic adenopathy or pleural effusion. No pulmonary consolidation. 3.  Stable appearance of low-attenuation area in the right lobe of the liver and several other low-attenuation areas which may represent metastases. Recurrent abscess cannot be excluded. 4.  New marked dilatation of the cecum and ascending colon with stool and fluid which may represent constipation or partial obstruction. No evidence of small bowel obstruction. 5.  No renal obstruction. 6.  No free fluid or abscess. 7.  New low-attenuation area within the uterus which could represent an area of uterine hemorrhage.    Ct-chest,abdomen,pelvis With    Result Date: 11/29/2018 11/29/2018 1:12 PM HISTORY/REASON FOR EXAM:  Leukocytosis. Liver abscess. TECHNIQUE/EXAM DESCRIPTION: CT scan of the chest, abdomen and pelvis with contrast. Helical scanning was obtained with intravenous contrast from the lung apices through the pubic symphysis to include the chest, abdomen and pelvis.  100 mL of Omnipaque 350 nonionic contrast was administered intravenously without complication. Low dose optimization technique was utilized for this CT exam including automated exposure control and adjustment of the mA and/or kV according to patient size. COMPARISON: 11/22/2018 FINDINGS: Atherosclerotic plaque is seen in the aorta. There is coronary artery calcification. There is a small amount of pericardial fluid. There is a small to moderate large right pleural effusion with overlying atelectasis/consolidation. There are small mediastinal lymph nodes. Subcarinal lymph node measures 8 mm in short  axis dimension. There are small hilar lymph nodes bilaterally. Anterior mediastinal lymph node measures 6.2 mm in short axis dimension. There are small axillary lymph nodes. No pneumothorax is identified. Examination is limited by respiratory motion. There is mild left lower lobe and right middle lobe atelectasis. No free air is seen in the abdomen or pelvis. There is a pigtail catheter within a hypodense hepatic lesion measuring 7.8 x 5 x 6.1 cm which is not significantly changed compared to prior. Adjacent to the pigtail catheter, there is a focal collection of contrast measuring 9 x 6.6 mm compatible with a pseudoaneurysm. A feeding vessel is seen. There is heterogeneous density within the lesion. There could have been bleeding into the lesion. No active hemorrhage is currently identified. Small hypodense lesions are again noted within the right lobe of the liver. Portal vein is patent. Gallbladder is surgically absent. Prominence of the common duct measures 1.2 cm. There is distention of the cystic duct. There is dilatation of the pancreatic duct. Streak  artifact from embolization coils is seen. There is a linear tract in the right lobe of the liver from a prior drain located more anteriorly. Spleen is not enlarged. No adrenal mass is identified. There is a parapelvic cyst on the right. Tiny hypodense right renal lesions are too small to characterize. There is no evidence of hydronephrosis. Atherosclerotic plaque is seen in the aorta. There are small inguinal lymph nodes. Enlarged aortocaval lymph node measures 1.4 cm in short axis dimension. Enlarged peripancreatic lymph nodes are seen. There is no evidence of bowel obstruction. There is colonic diverticulosis. There is a moderate amount of colonic stool. Terminal ileum is unremarkable. There is no evidence of acute appendicitis. There are postsurgical changes of the bowel in the left abdomen. A small lymph node is seen adjacent to the distal esophagus.  Bladder is mildly distended. Uterus is anteverted. There is a calcified uterine fibroid. There is Heterogeneity of the endometrial stripe in the lower uterine body with dilatation of the uterine cavity versus endometrial thickening near the uterine fundus measuring 1.2 cm in thickness. Calcific densities in the pelvis likely represent phleboliths. No free fluid is seen in the pelvis. Degenerative changes are seen in the spine. There is a fat-containing ventral hernia.     7.8 x 5 x 6.1 cm hypodense lesion in the right lobe of the liver is not significantly changed compared to prior. A pigtail catheter is seen within the lesion. This could represent a necrotic mass with superimposed infection. There is internal density within the lesion which could represent sequelae of a bleed into the lesion. There is a 9 x 6.6 mm pseudoaneurysm located adjacent to the pigtail catheter. Interventional radiology consultation recommended. Other small hypodense hepatic lesions are again noted. Linear tract from a previous pigtail catheter is seen within the right lobe of the liver. Dilatation of the pancreatic duct is again noted. Prominence of the common duct measures 1.2 cm. Enlarged peripancreatic and aortocaval lymph nodes are again noted. Parapelvic right renal cyst. Tiny hypodense right renal lesions are too small to characterize. Atherosclerotic plaque. Small to moderate right pleural effusion with overlying atelectasis/consolidation. Colonic diverticulosis. Moderate amount of colonic stool. Calcified uterine fibroid. Heterogeneity of the endometrial stripe in the lower uterine body with dilatation of the uterine cavity versus endometrial thickening near the uterine fundus measuring 1.2 cm in thickness. Further evaluation with pelvic ultrasound is recommended. This can be seen in the uterine malignancy. Fat-containing ventral hernia. Borderline prominent subcarinal lymph node is nonspecific.     Cta Abdomen Pelvis W & W/o Post  Process    Result Date: 12/5/2018 12/5/2018 11:10 AM HISTORY/REASON FOR EXAM:  Worsening abdominal pain and distention. Recently embolized pseudoaneurysm with decreasing H and H. TECHNIQUE/EXAM DESCRIPTION:  CT angiogram of the abdomen and pelvis without and with contrast, with reconstructions. Initial precontrast helical sections were obtained from the diaphragmatic domes to the lesser trochanters. Following this, 100 mL of Omnipaque 350 nonionic contrast was administered at 5.0 mL/sec and helical scanning was obtained from the diaphragmatic domes through the lesser trochanters. Thin section overlapping reconstruction interval was utilized and multiplanar volume reformatted were generated in the sagittal and coronal planes. 3D angiographic images were reviewed on PACS. Maximum intensity projection (MIP) images were generated and reviewed. Low dose optimization technique was utilized for this CT exam including automated exposure control and adjustment of the mA and/or kV according to patient size. COMPARISON:  11/29/2018. FINDINGS: Noncontrast images: There is no intramural hematoma. There are atherosclerotic calcifications of aorta and its branch structures. Contrast images: Aorta and vasculature: There is no evidence of aortic dissection, penetrating ulcer or hemodynamically significant stenosis. There is no evidence of active extravasation, pseudoaneurysm or truncated vessel in the previously demonstrated area of pseudoaneurysm in the right lobe of the liver. Embolization coils are present. There is a small right pleural effusion with associated compressive atelectasis of the right lower lobe. A heterogeneous gas-containing collection in the right lobe of the liver appears grossly unchanged measuring 7 x 5.7 cm, previously 7.9 x 5.0 cm. There has been interval removal of a percutaneous drainage catheter in the collection. Spleen is normal in size. Pancreas is unremarkable. The gallbladder is surgically absent.  Adrenal glands are normal. Kidneys enhance symmetrically. There is diverticulosis without discrete evidence of diverticulitis. The small bowel is unremarkable. There is no adenopathy or free fluid. There is a fat-containing ventral hernia.     1.  No evidence of active extravasation, pseudoaneurysm or truncated vessel in the previously demonstrated area of pseudoaneurysm in the right lobe of the liver. Embolization coils are present. 2.  Interval removal of a right percutaneous hepatic drain. Essentially unchanged size of the heterogeneous gas and fluid collection in the right lobe of the liver which now measures 7 x 5.7 cm, previously 7.9 x 5 cm. 3.  Grossly stable small right pleural effusion with associated compressive atelectasis. 4.  Diverticulosis. 5.  Fat-containing ventral hernia.    Ct-drain-liver Abscess-cyst    Result Date: 12/21/2018 12/21/2018 4:30 PM HISTORY/REASON FOR EXAM:  Liver abscess. LIVER DOME?FLUID COLLECTION, POST CT GUIDED LIVER ABSCESS DRAIN 11/27/18 COMPLICATED BY HEPATIC PSEUDOANEURYSM REQUIRING COIL EMBOLIZATION 12/4/18. NOW HAS CAVITARY LESION AT LIVER DOME WITH AIR FLUID LEVEL. Possible liver abscess. TECHNIQUE/EXAM DESCRIPTION AND NUMBER OF VIEWS: CT-GUIDED LIVER ABSCESS DRAINAGE PROCEDURE:  Informed consent was obtained. CURRENT ALARA PRINCIPLES FOR DOSE REDUCTION TECHNIQUES WERE UTILIZED FOR THIS EXAMINATION. Localizing CT images were obtained with the patient in supine position. The gantry was tilted with 18.5 degrees cranial angulation to facilitate needle pathway cephalad toward the liver dome. The skin was prepped with Betadine and draped in a sterile fashion. Moderate sedation with Fentanyl and Versed was administered during the procedure with appropriate continuous patient monitoring by the radiology nurse. Sedation duration: 30 minutes Following local anesthesia with 1% Lidocaine, a 17 G guiding needle was placed and needle path confirmed with CT. An Amplatz guidewire was  "placed and following serial tract dilatation, a 10 Lao pigtail locking catheter was placed. A specimen was collected and submitted for culture and sensitivity and Gram stain. Total of 25 mL old bloody fluid was drained. No michoacano pus. The catheter was secured to the skin and connected to suction bulb drainage. Final CT images were obtained documenting catheter position. The patient tolerated the procedure well with no evidence of complication. COMPARISON: CT of the abdomen and pelvis 12/20/2018 FINDINGS: Localizing images show an approximately 56 mm mixed signal cavitary lesion with an air-fluid level. There is an embolization coil immediately adjacent to the anterior margin of the collection. Hyperdense areas within the collection are consistent with blood products. Also noted on the localizing images, there is pneumobilia. There are surgical clips in the right upper quadrant consistent with prior cholecystectomy.     1. Liver \"Abscess\" Drainage with CT guidance. Placement of 10 Lao locking loop drainage catheter via anterior percutaneous transhepatic approach. The collection yielded old bloody fluid.    Ct-drain-liver Abscess-cyst    Result Date: 11/27/2018 11/24/2018 9:02 AM HISTORY/REASON FOR EXAM:  current drain pulled out of abscess, needs to be repositioned. Moderate sedation was administered with continuous monitoring of the patient under the direct supervision of  Medication: 100 mcg of fentanyl and 2 mg of Versed Total sedation time: 30 minutes The biopsy/drainage was done utilizing low dose optimization CT techniques including auto modulation for  imaging and low mA CT/fluoroscopy mode for the procedure. TECHNIQUE/EXAM DESCRIPTION AND NUMBER OF VIEWS:  After the informed consent the patient was placed on the angiographic table and supine position. Localization CT scan demonstrates a low-density area in the right lobe of the liver. The previously placed catheter was migrated " upwards. The previously placed catheter was. After injecting lidocaine, a 17-gauge introducer needle was advanced into the fluid collection. After confirming the needle position, a wire was introduced. After dilating the tract, a new 10 Croatian pigtail catheter was advanced and placed with its loop in the fluid collection. The tube is attached to the suction bulb. The patient tolerated procedure without any immediate complication.     1.  Successful CT-guided drainage of liver abscess. 2.  The previously placed migrated pigtail drainage catheter was removed.    Ct-head W/o    Result Date: 12/17/2018   CT HEAD WITHOUT CONTRAST 12/17/2018 3:07 PM HISTORY/REASON FOR EXAM:  Weakness TECHNIQUE/EXAM DESCRIPTION AND NUMBER OF VIEWS: CT of the head without contrast. The study was performed on a helical multidetector CT scanner. Contiguous 2.5 mm axial sections were obtained from the skull base through the vertex. Up to date radiation dose reduction adjustments have been utilized to meet ALARA standards for radiation dose reduction. COMPARISON:  None available FINDINGS: Lateral ventricles are normal in size and symmetric. Mild to moderate global parenchymal atrophy. Chronic small vessel ischemic changes. No significant mass effect or midline shift. Basal cisterns are patent. No evidence for intracranial hemorrhage. Old left cerebellar infarct. Calvaria are intact. Visualized orbits are unremarkable. Visualized mastoid air cells are clear. Visualized paranasal sinuses are unremarkable.     1.  No CT evidence of acute infarct, hemorrhage or mass. 2.  Mild to moderate global parenchymal atrophy and chronic small ischemic changes. 3.  Old left cerebellar infarct.    Dx-chest-limited (1 View)    Result Date: 12/21/2018 12/21/2018 3:52 PM HISTORY/REASON FOR EXAM: Line placement TECHNIQUE/EXAM DESCRIPTION AND NUMBER OF VIEWS: Single AP view of the chest. COMPARISON: 12/17/2018 FINDINGS: Right internal jugular line tip overlies the  SVC. No pneumothorax. Elevated right hemidiaphragm and right lung base atelectasis. Linear atelectasis within the left mid and lower lung. Borderline cardiomegaly.     Right internal jugular line tip overlies the SVC. No pneumothorax. Elevated right hemidiaphragm and right lung base atelectasis. Pneumonitis not excluded. Linear atelectasis within the left lung.    Dx-chest-portable (1 View)    Result Date: 12/17/2018 12/17/2018 1:31 PM HISTORY/REASON FOR EXAM:  Weakness. Shortness of breath. History of liver carcinoma. TECHNIQUE/EXAM DESCRIPTION AND NUMBER OF VIEWS: Single portable view of the chest. COMPARISON:  12/3/2018 FINDINGS: The cardiac silhouette is within normal limits. No infiltrates or consolidations are noted. No pleural effusion is noted. Slight elevation of the right hemidiaphragm is unchanged. Embolization coils are present in the liver and gastroduodenal artery region.     No acute cardiac or pulmonary abnormality is noted.    Dx-chest-portable (1 View)    Result Date: 12/3/2018  12/3/2018 2:26 PM HISTORY/REASON FOR EXAM:  Cough. TECHNIQUE/EXAM DESCRIPTION AND NUMBER OF VIEWS: Single portable view of the chest. COMPARISON: 11/23/2018 FINDINGS: Cardiac mediastinal contour is unchanged. Right hemidiaphragm is elevated. Lungs show hypoinflation. Linear opacity in the RIGHT midlung. Minimal blunting of RIGHT costophrenic angle. No pneumothorax. RIGHT arm PICC line tip at the cavoatrial junction level. Interval removal of drain projecting over the RIGHT upper abdomen.     1.  Persistent hypoinflation with elevation of RIGHT hemidiaphragm. 2.  Minimal worsening RIGHT midlung atelectasis. 3.  Possible minimal RIGHT pleural effusion.    Dx-chest-portable (1 View)    Result Date: 11/23/2018 11/23/2018 2:14 PM HISTORY/REASON FOR EXAM:  Pleural Effusion. Status post right thoracentesis TECHNIQUE/EXAM DESCRIPTION AND NUMBER OF VIEWS: Single portable view of the chest. COMPARISON: 11/21/2018 FINDINGS: Single  portable view of the chest demonstrates a stable cardiac silhouette and mediastinal contours. Calcification is in the aortic arch. The right hemidiaphragm is elevated. There is mild atelectasis in the right lung base. No pneumothorax is identified. Pigtail catheter projects over the right upper quadrant. There are multiple surgical clips. A right PICC line remains in place.     Mild right basilar atelectasis.    Ir-visceral Angiogram - Selective    Addendum Date: 12/4/2018    Addendum: Total sedation time: 1 hour    Result Date: 12/4/2018 11/29/2018 6:29 PM HISTORY/REASON FOR EXAM:  Hepatic artery pseudoaneurysm Moderate sedation was administered with continuous monitoring of the patient under the direct supervision of  Medication: 4 mg of  Versed and 100 mcg of fentanyl Contrast: 90 mL of Omnipaque 300 Total fluoroscopic time: 12.7 minutes Total number of images stored in the PACS: 224 TECHNIQUE/EXAM DESCRIPTION AND NUMBER OF VIEWS: After the informed consent the patient was placed on the fluoroscopy table on supine position. The skin over the right groin was prepped. After injecting lidocaine a 5 [place] vascular sheath was introduced. Subsequently, a diagnostic catheter was advanced into the celiac trunk. Celiac angiogram was performed. It demonstrated a pseudoaneurysm from the right hepatic branch. Subsequently a microcatheter was successfully advanced into the branch supplying the pseudoaneurysm. Microcatheter angiogram confirms the pseudoaneurysm and the catheter position. Subsequently metallic fiber coils were used to embolize the branch supplying the segment aneurysm. The final angiogram demonstrates occluded branch with stagnant flow within the pseudoaneurysm. The catheters were removed. The right superficial femoral angiogram does not demonstrate any significant stenosis. The right femoral sheath was removed and hemostasis was obtained with Angio-Seal device.     1.  Right hepatic artery  pseudoaneurysm. 2.  Successful embolization of branch supplying the pseudoaneurysm.    Us-thoracentesis Puncture Right  Ec-echocardiogram Complete W/o Cont    Result Date: 11/28/2018  Transthoracic Echo Report Echocardiography Laboratory CONCLUSIONS No prior study is available for comparison. Normal transthoracic echocardiogram. LV EF:  65    % FINDINGS Left Ventricle Normal left ventricular size and systolic function. Normal left ventricular wall thickness. Normal diastolic function. Normal regional wall motion. Left ventricular ejection fraction is visually estimated to be 65%. Right Ventricle Normal right ventricular size and systolic function. Right Atrium Normal right atrial size. Normal inferior vena cava size and inspiratory collapse. Left Atrium Normal left atrial size. Mitral Valve Structurally normal mitral valve. Mild mitral regurgitation. No mitral stenosis. Aortic Valve Structurally normal aortic valve without significant stenosis or regurgitation. Tricuspid Valve Structurally normal tricuspid valve. Mild tricuspid regurgitation. Right atrial pressure is estimated to be 3 mmHg. Estimated right ventricular systolic pressure  is 35 mmHg. Pulmonic Valve Structurally normal pulmonic valve without significant stenosis or regurgitation. Pericardium Normal pericardium without effusion. Aorta The aortic root is normal. Gurwinder Mayberry MD (Electronically Signed) Final Date:     28 November 2018                 09:42      Micro:  Results     Procedure Component Value Units Date/Time    CULTURE WOUND W/ GRAM STAIN [[id number]]  (Abnormal) Collected:  12/21/18 1708    Order Status:  Completed Specimen:  Wound Updated:  12/23/18 1253     Significant Indicator POS (POS)     Source WND     Site Liver Abscess Drainage     Culture Result Wound -- (A)     Gram Stain Result Rare Gram negative rods.     Culture Result Wound Lactose fermenting Gram negative haley  Moderate growth   (A)      Yeast  Light growth   (A)     "GRAM STAIN [[id number]] Collected:  12/21/18 1708    Order Status:  Completed Specimen:  Wound Updated:  12/22/18 0653     Significant Indicator .     Source WND     Site Liver Abscess Drainage     Gram Stain Result Rare Gram negative rods.    Urinalysis [[id number]]  (Abnormal) Collected:  12/18/18 2028    Order Status:  Completed Specimen:  Urine from Urine, Cath Updated:  12/18/18 2054     Color Yellow     Character Clear     Specific Gravity 1.016     Ph 6.0     Glucose Negative mg/dL      Ketones Negative mg/dL      Protein 30 (A) mg/dL      Bilirubin Negative     Urobilinogen, Urine 0.2     Nitrite Negative     Leukocyte Esterase Negative     Occult Blood Negative     Micro Urine Req Microscopic    Narrative:       Culture if indicated    BLOOD CULTURE,HOLD [[id number]] Collected:  12/17/18 1245    Order Status:  Completed Updated:  12/17/18 1409     Blood Culture Hold Collected          Assessment:  Active Hospital Problems    Diagnosis   • *Hyponatremia [E87.1]   • Liver abscess [K75.0]   • 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]   • Hypokalemia [E87.6]   • DNR (do not resuscitate) [Z66]   • Non-intractable vomiting with nausea [R11.2]   • Hypomagnesemia [E83.42]   • Iron deficiency anemia due to chronic blood loss [D50.0]   • Ileus (HCC) [K56.7]       Plan:  Liver abscess vs. superinfected necrotic mass, additional work  Afebrile  No current leukocytosis  Prior CT A/P on 11/29 \"7.8 x 5 x 6.1 cm hypodense lesion in the right lobe of the liver   Current CT 12/20 air-fluid level in the right lobe of the liver measuring 6.3 x 5.3 x 5.2 cm.   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

## 2018-12-24 NOTE — THERAPY
Pt's family in room, requesting to hold therapies at this time. Pt likely going route of comfort care. Cancelling order.

## 2018-12-24 NOTE — PROGRESS NOTES
· 2 RN skin check complete with ROMANA Mayer  · Devices in place: SCDs  · Skin assessed under devices: intact.  · Confirmed pressure ulcers found on: n/a.  · New potential pressure ulcers noted on: n/a. Heels and elbows red but blanching  · The following interventions in place: low air loss mattress; q2h turns; pillows in use for positioning; barrier cream; barrier wipes.

## 2018-12-24 NOTE — CARE PLAN
Problem: Safety  Goal: Will remain free from falls    Intervention: Implement fall precautions  Fall precautions in place. Bed alarm ON.       Problem: Skin Integrity  Goal: Risk for impaired skin integrity will decrease    Intervention: Assess risk factors for impaired skin integrity and/or pressure ulcers  Patient's skin assessed for breakdown per shift.

## 2018-12-25 NOTE — PROGRESS NOTES
Hospital Medicine Daily Progress Note    Date of Service  12/24/2018    Chief Complaint  73 y.o. female admitted 12/17/2018 with increased pain, weakness and lethargy.    Hospital Course    Patient with imaging again consistent with liver abscess in the location of the necrotic liver lesion.  Serum sodium was very low at 110 without explanation as to cause.  She was treated with antibiotics, hypertonic saline, and supportive care in ICU.  She made improvements with sodium, drain was placed in lesion of liver and cultures from this fluid showed carbepenem resistance enterobacter along with yeast.       Interval Problem Update  Patient having difficult to control pain upon transfer to CNU from ICU, she has had continued decline in status since I sent her home a few weeks prior.  Her pain and mentation have continued to worsen and I had a [place] discussion with patient's sons and daughter today regarding my worry of the current situation and that regardless of care, it will likely result in patient's death.  She has been on antibiotics since October and she continues to have infection despite treatment and the organisms are becoming more resistant.  They likely cannot penetrate the necrotic lesion and will continue unless surgical intervention were possible and in her current state, she would not survive a surgery even if a surgeon could be convinced to do such an intervention with her stage IV cancer.  Patient still with ileus though she states she is hungry and would rather just eat a hamburger and go home.  Hospice consultation requested, currently family does not feel they could handle hospice care at home, reassured that would be addressed at a later time and would work toward getting patient comfortable first and discuss need for transition if that proved necessary.  Son requested reassurance that if his mother showed a great improvement that the option to come off comfort care was an option - I told him that many  "times when patient's stop receiving \"medical\" treatment of their ailments and only receive medications to help them feel better, they do look markedly better for a short period of time and then start to decline again but if his mother truly looked to be improving, another discussion of reversal of comfort care can be done.  Approximately 35 minutes of discussion with patient's children spent discussion of code status, comfort care and expected course of patient's hospitalization spent.      Consultants/Specialty  ID - s/o  pulm - s/o    Code Status  Comfort care    Disposition  tbd    Review of Systems  Review of Systems   Constitutional: Positive for malaise/fatigue. Negative for chills and fever.   HENT: Positive for sore throat. Negative for congestion.    Eyes: Negative for blurred vision and photophobia.   Respiratory: Negative for cough, shortness of breath and wheezing.    Cardiovascular: Negative for chest pain, claudication and leg swelling.   Gastrointestinal: Positive for abdominal pain and constipation. Negative for diarrhea, heartburn and vomiting.   Genitourinary: Negative for dysuria and hematuria.   Musculoskeletal: Negative for joint pain and myalgias.   Skin: Negative for itching and rash.   Neurological: Positive for weakness. Negative for dizziness, sensory change, speech change and headaches.   Psychiatric/Behavioral: Negative for depression. The patient is not nervous/anxious and does not have insomnia.         Physical Exam  Temp:  [36.1 °C (97 °F)-37 °C (98.6 °F)] 37 °C (98.6 °F)  Pulse:  [] 109  Resp:  [16-18] 16  BP: (106-156)/(61-89) 106/68    Physical Exam   Constitutional: She appears well-developed and well-nourished. No distress.   HENT:   Head: Normocephalic and atraumatic.   Eyes: Conjunctivae are normal. No scleral icterus.   Neck: Neck supple. No JVD present.   Cardiovascular: Normal rate, regular rhythm and normal heart sounds.  Exam reveals no gallop and no friction rub.  " "  No murmur heard.  Pulmonary/Chest: Effort normal and breath sounds normal. No respiratory distress. She exhibits no tenderness.   Abdominal: Soft. She exhibits no distension. There is tenderness. There is no guarding.   Minimal bowel sounds     Musculoskeletal: She exhibits no edema or tenderness.   Lymphadenopathy:     She has no cervical adenopathy.   Neurological: She is alert. No cranial nerve deficit.   Skin: Skin is warm and dry. She is not diaphoretic. No erythema. No pallor.   Psychiatric: She has a normal mood and affect. Her behavior is normal.   Nursing note and vitals reviewed.      Fluids    Intake/Output Summary (Last 24 hours) at 12/24/18 1700  Last data filed at 12/24/18 0320   Gross per 24 hour   Intake                0 ml   Output               30 ml   Net              -30 ml       Laboratory      Recent Labs      12/23/18   1811  12/24/18   0002  12/24/18   0700   SODIUM  134*  137  139   POTASSIUM  3.4*  3.3*  3.4*   CHLORIDE  113*  115*  116*   CO2  14*  13*  14*   GLUCOSE  76  68  74   BUN  10  11  11   CREATININE  0.58  0.52  0.62   CALCIUM  7.7*  7.5*  7.5*                   Imaging  [id number] VIEW   Final Result      1.  Residual small bowel dilatation and residual contrast throughout the colon consistent with bowel ileus. Start numbering      2.  No free air identified in the supine image.      CT-DRAIN-LIVER ABSCESS-CYST   Final Result      1. Liver \"Abscess\" Drainage with CT guidance. Placement of 10 [place] locking loop drainage catheter via anterior percutaneous transhepatic approach. The collection yielded old bloody fluid.      DX-CHEST-LIMITED (1 VIEW)   Final Result      Right internal jugular line tip overlies the SVC. No pneumothorax.   Elevated right hemidiaphragm and right lung base atelectasis. Pneumonitis not excluded.   Linear atelectasis within the left lung.      CT-ABDOMEN-PELVIS WITH   Final Result      Distention of the cecum and ascending colon which are filled with " fluid and stool. Mild distention is seen of the transverse colon. Findings may represent ileus.      Multiple distended and fluid-filled loops of small bowel are seen. This may be related to ileus. It is difficult to definitively exclude pneumatosis involving a loop of small bowel in the lower abdomen.      Small amount of free fluid in the abdomen and pelvis.      Uterine cavity may be distended with fluid or there may be endometrial thickening which can be seen in the setting of malignancy. Further evaluation with pelvic ultrasound is recommended.      Status post cholecystectomy. Biliary ductal dilatation and pneumobilia are again noted.      Necrotic mass versus abscess in the right lobe of the liver is again seen. Other smaller hypodense hepatic lesions are unchanged.      Mild stranding adjacent to the pancreatic head. Correlation with pancreatic enzymes is recommended as pancreatitis is not excluded.      Appendix is at the upper limit of normal with surrounding fluid. Mild/early appendicitis is not excluded.      Distended esophagus can be seen in the setting of gastroesophageal reflux. Achalasia not excluded.      Small right pleural effusion and right basilar atelectasis.      Peripancreatic, retroperitoneal and mesenteric lymph nodes compatible with metastatic disease.      Small hypodense renal lesions are too small to characterize. Hypodense right renal lesion likely represents a parapelvic cyst.      Uterine fibroid.      Fat-containing umbilical hernia.      Colonic diverticulosis.      Findings discussed with Dr. Mauricio         CT-CHEST,ABDOMEN,PELVIS WITH   Final Result      1.  New dilatation of the esophagus diffusely. This may represent acute achalasia or reflux.      2.  No thoracic adenopathy or pleural effusion. No pulmonary consolidation.      3.  Stable appearance of low-attenuation area in the right lobe of the liver and several other low-attenuation areas which may represent metastases.  Recurrent abscess cannot be excluded.      4.  New marked dilatation of the cecum and ascending colon with stool and fluid which may represent constipation or partial obstruction. No evidence of small bowel obstruction.      5.  No renal obstruction.      6.  No free fluid or abscess.      7.  New low-attenuation area within the uterus which could represent an area of uterine hemorrhage.      CT-HEAD W/O   Final Result      1.  No CT evidence of acute infarct, hemorrhage or mass.   2.  Mild to moderate global parenchymal atrophy and chronic small ischemic changes.   3.  Old left cerebellar infarct.      DX-CHEST-PORTABLE (1 VIEW)   Final Result      No acute cardiac or pulmonary abnormality is noted.      IR-MIDLINE CATHETER INSERTION >5 YRS    (Results Pending)   IR-MIDLINE CATHETER INSERTION >5 YRS    (Results Pending)        Assessment/Plan  * Liver abscess- (present on admission)   Assessment & Plan    History of liver abscess for which she had been on Zyvox, Omnicef, diflucan through 12/20.   Drain in place - bile in drain only  Comfort care       Hyponatremia- (present on admission)   Assessment & Plan    Severe hyponatremia on admit with a sodium of 110 on admit thus ICU admission.  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 Cedeno, oncology   Stage IV.   Hospice referral placed.   Comfort care initiated       DNR (do not resuscitate)- (present on admission)   Assessment & Plan    Per patient's request.   Comfort care  Meeting with sons and daughter and explained the continued deterioration of patient and that further treatment will likely prove futile - patient has been having pain that had been uncontrolled, ileus likely from carcinomatosis though still without CT evidence of this, persistent/recurrent of infection of MDROs in necrotic liver lesion that is  referred to as abscess.  Sea is DPOA and he and his siblings agreed that comfort care is most appropriate treatment for their mother at this time.  Patient just wants to feel better and get home if possible.     Ileus (HCC)- (present on admission)   Assessment & Plan    Persistent on imaging, pain  Patient wants to eat - instructed family she can eat whatever she wants, likely will not be much as pain and fatigue will likely limit her intake  Comfort care            VTE prophylaxis: comfort care

## 2018-12-25 NOTE — PROGRESS NOTES
Rec'd report & assumed care of pt at 0700. Assessment completed. Pt is A&Ox3-disoriented to situation, family at bedside. Contact isolation precautions in place for MDRO. Pt denies any pain, n/v at this time. R triple lumen IJ in place, positive blood return. Transitioned to comfort care today. POC discussed & questions answered. Bed locked and in lowest position, bed alarm is on, call light within reach, non-skid socks in place, hourly rounding. Pt reports no further needs at this time.

## 2018-12-25 NOTE — PROGRESS NOTES
· 2 RN skin check complete with ROMANA Mayer  · Devices in place: n/a  · Skin assessed under devices: n/a  · Confirmed pressure ulcers found on: n/a.  · New potential pressure ulcers noted on: n/a. Heels and elbows red but blanching  · The following interventions in place: low air loss mattress; q2h turns; pillows in use for positioning; barrier cream; barrier wipes.

## 2018-12-25 NOTE — DISCHARGE PLANNING
Anticipated Discharge Disposition: Unknown    Action: Request for     Informed by bedside RN that the pt's family has requested a  at bedside.     TC to on-call Vitor Calix. Informed him of the need. Vitor states he just set up a  to come to the hospital for another pt and will contact the  and inform him of the additional request.    TC to bedside RN to update.     Barriers to Discharge: none    Plan:  to come bedside in about 2 hours.

## 2018-12-25 NOTE — CARE PLAN
Problem: Communication  Goal: The ability to communicate needs accurately and effectively will improve  Outcome: NOT MET  Pt does not call for assistance. Hourly rounding in place.     Problem: Skin Integrity  Goal: Risk for impaired skin integrity will decrease  Outcome: PROGRESSING AS EXPECTED  Pt is comfort care. Q2 turns in place.

## 2018-12-25 NOTE — PROGRESS NOTES
"/55   Pulse (!) 105   Temp 36.7 °C (98.1 °F) (Oral)   Resp 16   Ht 1.676 m (5' 6\")   Wt 67.1 kg (147 lb 14.9 oz)   SpO2 98%   Breastfeeding? No   BMI 23.88 kg/m²     Pt is AOX1. Oriented to self. Arouses to voice and touch. Falls asleep during conversation. Pt is on comfort care. Family at bedside. Bed is locked in lowest position.   "

## 2018-12-25 NOTE — PROGRESS NOTES
Received report from day RN and assumed care of comfort patient at 1900.  Assessed patient and reviewed notes.  Patient is AOx3, disoriented to event.  Patient medicated one time overnight for pain per MAR.  Daughter at bedside.  Plan of care reviewed, patient board updated, call light within reach, frequent rounding in practice.

## 2018-12-26 NOTE — PROGRESS NOTES
· 2 RN skin check complete with ROMANA Adame  · Devices in place: n/a  · Skin assessed under devices: n/a  · Confirmed pressure ulcers found on: n/a.  · New potential pressure ulcers noted on: n/a. Heels and elbows red but blanching. Sacrum intact.  · The following interventions in place: low air loss mattress; q2h turns; pillows in use for positioning; barrier cream; barrier wipes.

## 2018-12-26 NOTE — PROGRESS NOTES
Received report from day RN and assumed care of comfort patient at 1900.  Assessed patient and reviewed notes.  Patient is somnolent, nods appropriately. Unable to assess orientation.  Patient medicated one time overnight for pain per MAR.  Daughter at bedside.  Plan of care reviewed, patient board updated, call light within reach, frequent rounding in practice.

## 2018-12-26 NOTE — PROGRESS NOTES
"/61   Pulse (!) 122   Temp 37.8 °C (100 °F) (Axillary)   Resp 18   Ht 1.676 m (5' 6\")   Wt 67.1 kg (147 lb 14.9 oz)   SpO2 95%   Breastfeeding? No   BMI 23.88 kg/m²     Pt arousing to voice and touch. Nods when asked if she would like to be turned. Shakes her head when asked if she is in pain. Family is requesting pt to not be turned q2 hours. Educated pt and family on importance of turns to prevent skin breakdown. MD at bedside to discuss careplan for pt. Family states understanding of importance of turns however, requests that pt be left alone if she is comfortable. Will continue to monitor and educate as needed. Bed is locked in lowest position.   "

## 2018-12-26 NOTE — CARE PLAN
Problem: Communication  Goal: The ability to communicate needs accurately and effectively will improve  Outcome: PROGRESSING AS EXPECTED    Intervention: [place] patient and significant other/support system to call light to alert staff of needs  Family educated to call for assistance as needed.      Problem: Skin Integrity  Goal: Risk for impaired skin integrity will decrease  Outcome: PROGRESSING AS EXPECTED  2 RN skin check complete and interventions in place.

## 2018-12-26 NOTE — PROGRESS NOTES
Hospital Medicine Daily Progress Note    Date of Service  12/25/2018    Chief Complaint  73 y.o. female admitted 12/17/2018 with increased pain, weakness and lethargy.    Hospital Course    Patient with imaging again consistent with liver abscess in the location of the necrotic liver lesion.  Serum sodium was very low at 110 without explanation as to cause.  She was treated with antibiotics, hypertonic saline, and supportive care in ICU.  She made improvements with sodium, drain was placed in lesion of liver and cultures from this fluid showed carbepenem resistance enterobacter along with yeast.       Interval Problem Update  Patient having difficult to control pain upon transfer to CNU from ICU, she has had continued decline in status since I sent her home a few weeks prior.  Her pain and mentation have continued to worsen and I had a michoacano discussion with patient's sons and daughter today regarding my worry of the current situation and that regardless of care, it will likely result in patient's death.  She has been on antibiotics since October and she continues to have infection despite treatment and the organisms are becoming more resistant.  They likely cannot penetrate the necrotic lesion and will continue unless surgical intervention were possible and in her current state, she would not survive a surgery even if a surgeon could be convinced to do such an intervention with her stage IV cancer.  Patient still with ileus though she states she is hungry and would rather just eat a hamburger and go home.  Hospice consultation requested, currently family does not feel they could handle hospice care at home, reassured that would be addressed at a later time and would work toward getting patient comfortable first and discuss need for transition if that proved necessary.  Son requested reassurance that if his mother showed a great improvement that the option to come off comfort care was an option - I told him that many  "times when patient's stop receiving \"medical\" treatment of their ailments and only receive medications to help them feel better, they do look markedly better for a short period of time and then start to decline again but if his mother truly looked to be improving, another discussion of reversal of comfort care can be done.  Approximately 35 minutes of discussion with patient's children spent discussion of code status, comfort care and expected course of patient's hospitalization spent.    12/25: Patient is lying in bed comfortably.  No acute distress noted.  Family present bedside.  Questions answered.  Consultants/Specialty  ID - s/o  pulm - s/o    Code Status  Comfort care    Disposition  tbd    Review of Systems  Review of Systems   Constitutional: Positive for malaise/fatigue. Negative for chills, fever and weight loss.   HENT: Positive for sore throat. Negative for congestion, ear pain, hearing loss and tinnitus.    Eyes: Negative for blurred vision, double vision and photophobia.   Respiratory: Negative for cough, hemoptysis, shortness of breath and wheezing.    Cardiovascular: Negative for chest pain, palpitations and orthopnea.   Gastrointestinal: Positive for abdominal pain and constipation. Negative for diarrhea, heartburn, nausea and vomiting.   Genitourinary: Negative for dysuria, frequency and urgency.   Musculoskeletal: Negative for joint pain, myalgias and neck pain.   Skin: Negative for itching.   Neurological: Positive for weakness. Negative for dizziness, tingling, tremors and headaches.   Psychiatric/Behavioral: Negative for depression, hallucinations, substance abuse and suicidal ideas.        Physical Exam  Temp:  [36.7 °C (98.1 °F)] 36.7 °C (98.1 °F)  Pulse:  [105] 105  Resp:  [16] 16  BP: (137)/(55) 137/55    Physical Exam   Constitutional: She appears well-nourished. No distress.   HENT:   Head: Normocephalic and atraumatic.   Mouth/Throat: No oropharyngeal exudate.   Eyes: Pupils are equal, " "round, and reactive to light. Conjunctivae are normal. No scleral icterus.   Neck: Normal range of motion. Neck supple. No thyromegaly present.   Cardiovascular: Normal rate and regular rhythm.  Exam reveals no gallop and no friction rub.    No murmur heard.  Pulmonary/Chest: Effort normal and breath sounds normal. No respiratory distress.   Abdominal: Soft. She exhibits no distension.   Minimal bowel sounds     Musculoskeletal: She exhibits no edema or tenderness.   Lymphadenopathy:     She has no cervical adenopathy.   Neurological: She is alert.   Skin: Skin is warm and dry. She is not diaphoretic. No erythema.   Nursing note and vitals reviewed.      Fluids    Intake/Output Summary (Last 24 hours) at 12/25/18 1716  Last data filed at 12/24/18 2100   Gross per 24 hour   Intake              118 ml   Output               50 ml   Net               68 ml       Laboratory      Recent Labs      12/23/18   1811  12/24/18   0002  12/24/18   0700   SODIUM  134*  137  139   POTASSIUM  3.4*  3.3*  3.4*   CHLORIDE  113*  115*  116*   CO2  14*  13*  14*   GLUCOSE  76  68  74   BUN  10  11  11   CREATININE  0.58  0.52  0.62   CALCIUM  7.7*  7.5*  7.5*                   Imaging  [id number] VIEW   Final Result      1.  Residual small bowel dilatation and residual contrast throughout the colon consistent with bowel ileus. Start numbering      2.  No free air identified in the supine image.      CT-DRAIN-LIVER ABSCESS-CYST   Final Result      1. Liver \"Abscess\" Drainage with CT guidance. Placement of 10 Tamazight locking loop drainage catheter via anterior percutaneous transhepatic approach. The collection yielded old bloody fluid.      DX-CHEST-LIMITED (1 VIEW)   Final Result      Right internal jugular line tip overlies the SVC. No pneumothorax.   Elevated right hemidiaphragm and right lung base atelectasis. Pneumonitis not excluded.   Linear atelectasis within the left lung.      CT-ABDOMEN-PELVIS WITH   Final Result    "   Distention of the cecum and ascending colon which are filled with fluid and stool. Mild distention is seen of the transverse colon. Findings may represent ileus.      Multiple distended and fluid-filled loops of small bowel are seen. This may be related to ileus. It is difficult to definitively exclude pneumatosis involving a loop of small bowel in the lower abdomen.      Small amount of free fluid in the abdomen and pelvis.      Uterine cavity may be distended with fluid or there may be endometrial thickening which can be seen in the setting of malignancy. Further evaluation with pelvic ultrasound is recommended.      Status post cholecystectomy. Biliary ductal dilatation and pneumobilia are again noted.      Necrotic mass versus abscess in the right lobe of the liver is again seen. Other smaller hypodense hepatic lesions are unchanged.      Mild stranding adjacent to the pancreatic head. Correlation with pancreatic enzymes is recommended as pancreatitis is not excluded.      Appendix is at the upper limit of normal with surrounding fluid. Mild/early appendicitis is not excluded.      Distended esophagus can be seen in the setting of gastroesophageal reflux. Achalasia not excluded.      Small right pleural effusion and right basilar atelectasis.      Peripancreatic, retroperitoneal and mesenteric lymph nodes compatible with metastatic disease.      Small hypodense renal lesions are too small to characterize. Hypodense right renal lesion likely represents a parapelvic cyst.      Uterine fibroid.      Fat-containing umbilical hernia.      Colonic diverticulosis.      Findings discussed with Dr. Mauricio         CT-CHEST,ABDOMEN,PELVIS WITH   Final Result      1.  New dilatation of the esophagus diffusely. This may represent acute achalasia or reflux.      2.  No thoracic adenopathy or pleural effusion. No pulmonary consolidation.      3.  Stable appearance of low-attenuation area in the right lobe of the liver and  several other low-attenuation areas which may represent metastases. Recurrent abscess cannot be excluded.      4.  New marked dilatation of the cecum and ascending colon with stool and fluid which may represent constipation or partial obstruction. No evidence of small bowel obstruction.      5.  No renal obstruction.      6.  No free fluid or abscess.      7.  New low-attenuation area within the uterus which could represent an area of uterine hemorrhage.      CT-HEAD W/O   Final Result      1.  No CT evidence of acute infarct, hemorrhage or mass.   2.  Mild to moderate global parenchymal atrophy and chronic small ischemic changes.   3.  Old left cerebellar infarct.      DX-CHEST-PORTABLE (1 VIEW)   Final Result      No acute cardiac or pulmonary abnormality is noted.      IR-MIDLINE CATHETER INSERTION >5 YRS    (Results Pending)   IR-MIDLINE CATHETER INSERTION >5 YRS    (Results Pending)        Assessment/Plan  * Liver abscess- (present on admission)   Assessment & Plan    History of liver abscess for which she had been on Zyvox, Omnicef, diflucan through 12/20.   Drain in place - bile in drain only  Comfort care       Hyponatremia- (present on admission)   Assessment & Plan    Severe hyponatremia on admit with a sodium of 110 on admit thus ICU admission.  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 Cedeno, oncology   Stage IV.   Hospice referral placed.   Comfort measures for now.       DNR (do not resuscitate)- (present on admission)   Assessment & Plan    Per patient's request.   Comfort care  Meeting with sons and daughter and explained the continued deterioration of patient and that further treatment will likely prove futile - patient has been having pain that had been uncontrolled, ileus likely from carcinomatosis though still without CT evidence of this,  persistent/recurrent of infection of MDROs in necrotic liver lesion that is referred to as abscess.  Sea is DPOA and he and his siblings agreed that comfort care is most appropriate treatment for their mother at this time.         Hypokalemia- (present on admission)   Assessment & Plan    On comfort measures.     Ileus (HCC)- (present on admission)   Assessment & Plan    Persistent on imaging, pain  Patient wants to eat - instructed family she can eat whatever she wants, likely will not be much as pain and fatigue will likely limit her intake  Comfort care       Plan of care discussed with multidisciplinary team during rounds.    VTE prophylaxis: comfort care

## 2018-12-27 NOTE — DISCHARGE PLANNING
Anticipated Discharge Disposition: Hospice     Action: SW in to speak with MARILIA Poole regarding hospice choice.  He states that he anticipates pt. Passing away within a few days & that pt. Is in pain & transport to home may be difficult for pt.  Son made choice for [facility].  CF faxed to [facility].    Barriers to Discharge: Hospice acceptance    Plan: Hospice referral to [facility], awaiting acceptance.

## 2018-12-27 NOTE — PROGRESS NOTES
"Pt in and out of confusion. Did not answer orientation questions. Did answer to \"no\" when asked several times if she had any pain. Per family, she \"looks comfortable\" and did not want any pain medication given.  Medicated with low dose ativan to help with anxiety/agitation during repositioning. Pt incontinent of urine and stool; meenakshi care provided prn. Right JACQUELINE drain with 50 mL of brown output. Repositioning patient as family will allow. Isolation precautions in place. IJ with positive blood return; SL. Midline  SL with sluggish blood return. Comfort measures in place. Room close to nurses station. All needs met at this time.   "

## 2018-12-27 NOTE — DISCHARGE PLANNING
Received Choice form at 5434  Agency/Facility Name: [facility]  Referral sent per Choice form @ [phone]

## 2018-12-27 NOTE — FACE TO FACE
Notified Elif Gillette on Troup day that the patient wanted his services. Talked to patient's family today and was told the  came by on Christmas and they were extremely happy with his service.

## 2018-12-27 NOTE — PROGRESS NOTES
Hospital Medicine Daily Progress Note    Date of Service  12/26/2018    Chief Complaint  73 y.o. female admitted 12/17/2018 with increased pain, weakness and lethargy.    Hospital Course    Patient with imaging again consistent with liver abscess in the location of the necrotic liver lesion.  Serum sodium was very low at 110 without explanation as to cause.  She was treated with antibiotics, hypertonic saline, and supportive care in ICU.  She made improvements with sodium, drain was placed in lesion of liver and cultures from this fluid showed carbepenem resistance enterobacter along with yeast.       Interval Problem Update  Patient having difficult to control pain upon transfer to CNU from ICU, she has had continued decline in status since I sent her home a few weeks prior.  Her pain and mentation have continued to worsen and I had a [place] discussion with patient's sons and daughter today regarding my worry of the current situation and that regardless of care, it will likely result in patient's death.  She has been on antibiotics since October and she continues to have infection despite treatment and the organisms are becoming more resistant.  They likely cannot penetrate the necrotic lesion and will continue unless surgical intervention were possible and in her current state, she would not survive a surgery even if a surgeon could be convinced to do such an intervention with her stage IV cancer.  Patient still with ileus though she states she is hungry and would rather just eat a hamburger and go home.  Hospice consultation requested, currently family does not feel they could handle hospice care at home, reassured that would be addressed at a later time and would work toward getting patient comfortable first and discuss need for transition if that proved necessary.  Son requested reassurance that if his mother showed a great improvement that the option to come off comfort care was an option - I told him that many  "times when patient's stop receiving \"medical\" treatment of their ailments and only receive medications to help them feel better, they do look markedly better for a short period of time and then start to decline again but if his mother truly looked to be improving, another discussion of reversal of comfort care can be done.  Approximately 35 minutes of discussion with patient's children spent discussion of code status, comfort care and expected course of patient's hospitalization spent.    12/25: Patient is lying in bed comfortably.  No acute distress noted.  Family present bedside.  Questions answered.  12/26: Patient laying in bed comfortably.  Family at bedside concerned about scheduled turning of the patient.  Stated that makes her uncomfortable.  Explained that is part of comfort measures as pain will be more uncomfortable if laying in bed in one position for long time.  Consultants/Specialty  ID - s/o  pulm - s/o    Code Status  Comfort care    Disposition  tbd    Review of Systems  Review of Systems   Constitutional: Positive for malaise/fatigue. Negative for chills and fever.   HENT: Positive for sore throat. Negative for ear pain, hearing loss and tinnitus.    Eyes: Negative for blurred vision and double vision.   Respiratory: Negative for cough and hemoptysis.    Cardiovascular: Negative for chest pain and palpitations.   Gastrointestinal: Positive for abdominal pain and constipation. Negative for diarrhea, heartburn, nausea and vomiting.   Genitourinary: Negative for dysuria and urgency.   Musculoskeletal: Negative for myalgias and neck pain.   Skin: Negative for itching.   Neurological: Positive for weakness. Negative for dizziness, tingling and headaches.   Endo/Heme/Allergies: Does not bruise/bleed easily.   Psychiatric/Behavioral: Negative for depression, substance abuse and suicidal ideas.        Physical Exam  Temp:  [37.8 °C (100 °F)] 37.8 °C (100 °F)  Pulse:  [122] 122  Resp:  [18] 18  BP: " "(125)/(61) 125/61    Physical Exam   Constitutional: She appears lethargic. She has a sickly appearance. She appears ill. No distress.   HENT:   Head: Normocephalic and atraumatic.   Eyes: Pupils are equal, round, and reactive to light. Conjunctivae are normal. Right eye exhibits no discharge. Left eye exhibits no discharge.   Neck: Normal range of motion. Neck supple. No thyromegaly present.   Cardiovascular: Normal rate and regular rhythm.  Exam reveals no gallop and no friction rub.    Pulmonary/Chest: Effort normal. No respiratory distress. She has no wheezes.   Abdominal: Soft. She exhibits no distension. There is no tenderness.   Minimal bowel sounds     Musculoskeletal: She exhibits no deformity.   Neurological: She appears lethargic.   Skin: Skin is warm. She is not diaphoretic. No erythema.   Nursing note and vitals reviewed.      Fluids    Intake/Output Summary (Last 24 hours) at 12/26/18 1930  Last data filed at 12/25/18 1950   Gross per 24 hour   Intake                0 ml   Output                0 ml   Net                0 ml       Laboratory      Recent Labs      12/24/18   0002  12/24/18   0700   SODIUM  137  139   POTASSIUM  3.3*  3.4*   CHLORIDE  115*  116*   CO2  13*  14*   GLUCOSE  68  74   BUN  11  11   CREATININE  0.52  0.62   CALCIUM  7.5*  7.5*                   Imaging  [id number] VIEW   Final Result      1.  Residual small bowel dilatation and residual contrast throughout the colon consistent with bowel ileus. Start numbering      2.  No free air identified in the supine image.      CT-DRAIN-LIVER ABSCESS-CYST   Final Result      1. Liver \"Abscess\" Drainage with CT guidance. Placement of 10 Armenian locking loop drainage catheter via anterior percutaneous transhepatic approach. The collection yielded old bloody fluid.      DX-CHEST-LIMITED (1 VIEW)   Final Result      Right internal jugular line tip overlies the SVC. No pneumothorax.   Elevated right hemidiaphragm and right lung base " atelectasis. Pneumonitis not excluded.   Linear atelectasis within the left lung.      CT-ABDOMEN-PELVIS WITH   Final Result      Distention of the cecum and ascending colon which are filled with fluid and stool. Mild distention is seen of the transverse colon. Findings may represent ileus.      Multiple distended and fluid-filled loops of small bowel are seen. This may be related to ileus. It is difficult to definitively exclude pneumatosis involving a loop of small bowel in the lower abdomen.      Small amount of free fluid in the abdomen and pelvis.      Uterine cavity may be distended with fluid or there may be endometrial thickening which can be seen in the setting of malignancy. Further evaluation with pelvic ultrasound is recommended.      Status post cholecystectomy. Biliary ductal dilatation and pneumobilia are again noted.      Necrotic mass versus abscess in the right lobe of the liver is again seen. Other smaller hypodense hepatic lesions are unchanged.      Mild stranding adjacent to the pancreatic head. Correlation with pancreatic enzymes is recommended as pancreatitis is not excluded.      Appendix is at the upper limit of normal with surrounding fluid. Mild/early appendicitis is not excluded.      Distended esophagus can be seen in the setting of gastroesophageal reflux. Achalasia not excluded.      Small right pleural effusion and right basilar atelectasis.      Peripancreatic, retroperitoneal and mesenteric lymph nodes compatible with metastatic disease.      Small hypodense renal lesions are too small to characterize. Hypodense right renal lesion likely represents a parapelvic cyst.      Uterine fibroid.      Fat-containing umbilical hernia.      Colonic diverticulosis.      Findings discussed with Dr. Mauricio         CT-CHEST,ABDOMEN,PELVIS WITH   Final Result      1.  New dilatation of the esophagus diffusely. This may represent acute achalasia or reflux.      2.  No thoracic adenopathy or  pleural effusion. No pulmonary consolidation.      3.  Stable appearance of low-attenuation area in the right lobe of the liver and several other low-attenuation areas which may represent metastases. Recurrent abscess cannot be excluded.      4.  New marked dilatation of the cecum and ascending colon with stool and fluid which may represent constipation or partial obstruction. No evidence of small bowel obstruction.      5.  No renal obstruction.      6.  No free fluid or abscess.      7.  New low-attenuation area within the uterus which could represent an area of uterine hemorrhage.      CT-HEAD W/O   Final Result      1.  No CT evidence of acute infarct, hemorrhage or mass.   2.  Mild to moderate global parenchymal atrophy and chronic small ischemic changes.   3.  Old left cerebellar infarct.      DX-CHEST-PORTABLE (1 VIEW)   Final Result      No acute cardiac or pulmonary abnormality is noted.      IR-MIDLINE CATHETER INSERTION >5 YRS    (Results Pending)   IR-MIDLINE CATHETER INSERTION >5 YRS    (Results Pending)        Assessment/Plan  * Liver abscess- (present on admission)   Assessment & Plan    History of liver abscess for which she had been on Zyvox, Omnicef, diflucan through 12/20.   Drain in place - bile in drain only  Comfort care       Hyponatremia- (present on admission)   Assessment & Plan    Severe hyponatremia on admit with a sodium of 110 on admit thus ICU admission.  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 Cedeno, oncology   Stage IV.   Hospice referral placed.   Comfort measures for now.       DNR (do not resuscitate)- (present on admission)   Assessment & Plan    Per patient's request.   Comfort care  Meeting with sons and daughter and explained the continued deterioration of patient and that further treatment will likely prove futile - patient has  been having pain that had been uncontrolled, ileus likely from carcinomatosis though still without CT evidence of this, persistent/recurrent of infection of MDROs in necrotic liver lesion that is referred to as abscess.  Sea is DPOA and he and his siblings agreed that comfort care is most appropriate treatment for their mother at this time.         Hypokalemia- (present on admission)   Assessment & Plan    On comfort measures.     Ileus (HCC)- (present on admission)   Assessment & Plan    Persistent on imaging, pain  Patient wants to eat - instructed family she can eat whatever she wants, likely will not be much as pain and fatigue will likely limit her intake  Comfort care       Plan of care discussed with multidisciplinary team during rounds.    VTE prophylaxis: comfort care         0 = independent

## 2018-12-27 NOTE — PROGRESS NOTES
Pt lethargic and opens her eyes to voice occasionally. Grimacing intermittently. IV morphine given prn. Q 4 turns in place per pt's family. R IJ and L midline catheter in place and flushing well. Saline locked. Pt resting comfortably at this time. Call light within reach. Hourly rounding in place.

## 2018-12-28 NOTE — PROGRESS NOTES
Pt obtunded. No secretions. Family at bedside. Pt grimacing with movement of left arm, swollen. IV morphine given. Ativan available prn. Pt family refusing repositioning this night. R IJ and midline flushing well; SL. Comfort measures in place. Room close to nurses station. All needs met at this time.

## 2018-12-28 NOTE — HOSPICE
Referral for [facility] received today after choice was done b [facility]NKASHIF on [facility]. Met with all the pts children and explained hospice benefit and the level of care that we can provide under [facility] status after reviewing the case with Dr. Jack. Pts Children were all in agreement that she is very comfortable and they did not want to have any changes made and were happy with the care. Also spoke with the bedside RN, Pt is obtunded now and is medicated frequently for comfort and is not in any distress. I asked the family if they needed any support from [facility]. The family stated no need for support at this time that they have each other. Will have [facility] check in on pt and family tomorrow to make sure family and pt are still doing well.

## 2018-12-28 NOTE — PROGRESS NOTES
Hospital Medicine Daily Progress Note    Date of Service  12/27/2018    Chief Complaint  73 y.o. female admitted 12/17/2018 with increased pain, weakness and lethargy.    Hospital Course    Patient with imaging again consistent with liver abscess in the location of the necrotic liver lesion.  Serum sodium was very low at 110 without explanation as to cause.  She was treated with antibiotics, hypertonic saline, and supportive care in ICU.  She made improvements with sodium, drain was placed in lesion of liver and cultures from this fluid showed carbepenem resistance enterobacter along with yeast.       Interval Problem Update  Patient having difficult to control pain upon transfer to CNU from ICU, she has had continued decline in status since I sent her home a few weeks prior.  Her pain and mentation have continued to worsen and I had a michoacano discussion with patient's sons and daughter today regarding my worry of the current situation and that regardless of care, it will likely result in patient's death.  She has been on antibiotics since October and she continues to have infection despite treatment and the organisms are becoming more resistant.  They likely cannot penetrate the necrotic lesion and will continue unless surgical intervention were possible and in her current state, she would not survive a surgery even if a surgeon could be convinced to do such an intervention with her stage IV cancer.  Patient still with ileus though she states she is hungry and would rather just eat a hamburger and go home.  Hospice consultation requested, currently family does not feel they could handle hospice care at home, reassured that would be addressed at a later time and would work toward getting patient comfortable first and discuss need for transition if that proved necessary.  Son requested reassurance that if his mother showed a great improvement that the option to come off comfort care was an option - I told him that many  "times when patient's stop receiving \"medical\" treatment of their ailments and only receive medications to help them feel better, they do look markedly better for a short period of time and then start to decline again but if his mother truly looked to be improving, another discussion of reversal of comfort care can be done.  Approximately 35 minutes of discussion with patient's children spent discussion of code status, comfort care and expected course of patient's hospitalization spent.    12/25: Patient is lying in bed comfortably.  No acute distress noted.  Family present bedside.  Questions answered.  12/26: Patient laying in bed comfortably.  Family at bedside concerned about scheduled turning of the patient.  Stated that makes her uncomfortable.  Explained that is part of comfort measures as pain will be more uncomfortable if laying in bed in one position for long time.  12/27: Laying in bed comfortable.  Family members refused GIP and wanted to continue with the current care as they feel the patient is very comfortable.  Lopez catheter placed for comfort.  No new issues to report.  Consultants/Specialty  ID - s/o  pulm - s/o    Code Status  Comfort care    Disposition  tbd    Review of Systems  Review of Systems   Unable to perform ROS: Mental acuity        Physical Exam  Temp:  [37.1 °C (98.7 °F)] 37.1 °C (98.7 °F)  Pulse:  [100] 100  Resp:  [18] 18  BP: (151)/(55) 151/55    Physical Exam   Constitutional: She has a sickly appearance. She appears ill. No distress.   HENT:   Head: Normocephalic and atraumatic.   Eyes: Pupils are equal, round, and reactive to light. Conjunctivae are normal. No scleral icterus.   Neck: Normal range of motion. No tracheal deviation present. No thyromegaly present.   Cardiovascular: Normal rate and regular rhythm.  Exam reveals no gallop and no friction rub.    Pulmonary/Chest: Effort normal and breath sounds normal. No respiratory distress. She has no wheezes.   Abdominal: Soft. " "She exhibits no distension. There is no tenderness. There is no rebound.   Minimal bowel sounds     Musculoskeletal: She exhibits no deformity.   Neurological: She is unresponsive.   Not assessable as patient is obtunded   Skin: Skin is warm. She is not diaphoretic. No erythema.   Psychiatric:   Not assessable as patient is obtunded.   Nursing note and vitals reviewed.      Fluids    Intake/Output Summary (Last 24 hours) at 12/27/18 1831  Last data filed at 12/26/18 2330   Gross per 24 hour   Intake                0 ml   Output               50 ml   Net              -50 ml       Laboratory                        Imaging  [id number] VIEW   Final Result      1.  Residual small bowel dilatation and residual contrast throughout the colon consistent with bowel ileus. Start numbering      2.  No free air identified in the supine image.      CT-DRAIN-LIVER ABSCESS-CYST   Final Result      1. Liver \"Abscess\" Drainage with CT guidance. Placement of 10 Portuguese locking loop drainage catheter via anterior percutaneous transhepatic approach. The collection yielded old bloody fluid.      DX-CHEST-LIMITED (1 VIEW)   Final Result      Right internal jugular line tip overlies the SVC. No pneumothorax.   Elevated right hemidiaphragm and right lung base atelectasis. Pneumonitis not excluded.   Linear atelectasis within the left lung.      CT-ABDOMEN-PELVIS WITH   Final Result      Distention of the cecum and ascending colon which are filled with fluid and stool. Mild distention is seen of the transverse colon. Findings may represent ileus.      Multiple distended and fluid-filled loops of small bowel are seen. This may be related to ileus. It is difficult to definitively exclude pneumatosis involving a loop of small bowel in the lower abdomen.      Small amount of free fluid in the abdomen and pelvis.      Uterine cavity may be distended with fluid or there may be endometrial thickening which can be seen in the setting of malignancy. " Further evaluation with pelvic ultrasound is recommended.      Status post cholecystectomy. Biliary ductal dilatation and pneumobilia are again noted.      Necrotic mass versus abscess in the right lobe of the liver is again seen. Other smaller hypodense hepatic lesions are unchanged.      Mild stranding adjacent to the pancreatic head. Correlation with pancreatic enzymes is recommended as pancreatitis is not excluded.      Appendix is at the upper limit of normal with surrounding fluid. Mild/early appendicitis is not excluded.      Distended esophagus can be seen in the setting of gastroesophageal reflux. Achalasia not excluded.      Small right pleural effusion and right basilar atelectasis.      Peripancreatic, retroperitoneal and mesenteric lymph nodes compatible with metastatic disease.      Small hypodense renal lesions are too small to characterize. Hypodense right renal lesion likely represents a parapelvic cyst.      Uterine fibroid.      Fat-containing umbilical hernia.      Colonic diverticulosis.      Findings discussed with Dr. Mauricio         CT-CHEST,ABDOMEN,PELVIS WITH   Final Result      1.  New dilatation of the esophagus diffusely. This may represent acute achalasia or reflux.      2.  No thoracic adenopathy or pleural effusion. No pulmonary consolidation.      3.  Stable appearance of low-attenuation area in the right lobe of the liver and several other low-attenuation areas which may represent metastases. Recurrent abscess cannot be excluded.      4.  New marked dilatation of the cecum and ascending colon with stool and fluid which may represent constipation or partial obstruction. No evidence of small bowel obstruction.      5.  No renal obstruction.      6.  No free fluid or abscess.      7.  New low-attenuation area within the uterus which could represent an area of uterine hemorrhage.      CT-HEAD W/O   Final Result      1.  No CT evidence of acute infarct, hemorrhage or mass.   2.  Mild to  moderate global parenchymal atrophy and chronic small ischemic changes.   3.  Old left cerebellar infarct.      DX-CHEST-PORTABLE (1 VIEW)   Final Result      No acute cardiac or pulmonary abnormality is noted.      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 Cedeno, oncology   Stage IV.   Hospice referral placed.   Comfort measures for now.       Liver abscess- (present on admission)   Assessment & Plan    History of liver abscess for which she had been on Zyvox, Omnicef, diflucan through 12/20.   Drain in place - bile in drain only  Comfort care       Ileus (HCC)- (present on admission)   Assessment & Plan    Persistent on imaging, pain  Patient wants to eat - instructed family she can eat whatever she wants, likely will not be much as pain and fatigue will likely limit her intake  Comfort care       DNR (do not resuscitate)- (present on admission)   Assessment & Plan    Per patient's request.   Comfort care  Meeting with sons and daughter and explained the continued deterioration of patient and that further treatment will likely prove futile - patient has been having pain that had been uncontrolled, ileus likely from carcinomatosis though still without CT evidence of this, persistent/recurrent of infection of MDROs in necrotic liver lesion that is referred to as abscess.  Sea is DPOA and he and his siblings agreed that comfort care is most appropriate treatment for their mother at this time.         Hyponatremia- (present on admission)   Assessment & Plan    Severe hyponatremia on admit with a sodium of 110 on admit thus ICU admission.  Was on hypertonic saline, unsure of cause of hyponatremia  Comfort care       Hypokalemia- (present on admission)   Assessment & Plan    On comfort measures.     Plan of  care discussed with multidisciplinary team during rounds.    VTE prophylaxis: comfort care

## 2018-12-29 NOTE — PROGRESS NOTES
Pt resting comfortable in bed; obtunded. Family at bedside. Medications available for pain, anxiety, and secretions on MAR. TKO fluids infusing through midline. Comfort measures in place.

## 2018-12-29 NOTE — H&P
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 Previous admit with discharge two days prior. She had been undergoing recent chemotherapy treatment for ampullary carcinoma compounded by bacteremia and fungemia. After this admit she received a biliary drain 12/21, and on 12/22 she was found to have ileus with much nausea/vomiting. No PO intake x 2 days, pt is now obtunded and unresponsive other than moaning and grimacing with nursing interventions. Family are at bedside and have consulted with treatment team and hospice team; they would like to choose a comfort model of care for the patient based on her complex medical condition with poor prognosis. The patient was transitioned to hospice general inpatient to assist with management of symptoms.            Review of Systems:     ROS pt obtunded, unable to answer    Physical Exam:  Physical Exam   Constitutional:   Pt sleeping with HOB slightly elevated. In NAD at this time,  She has a peripheral IV and a RIJ triple-lumen central line.   HENT:   Head: Normocephalic.   Mouth/Throat: Oropharyngeal exudate present.   Cardiovascular:   Tachycardic 100-120s   Pulmonary/Chest:   LS clear in upper lobes, crackles in bases, using accessory muscles, Kussmaul's inspirations with secretions slightly audible in throat.   Abdominal: Soft. She exhibits no distension and no mass.   BS RUQ, recent ileostomy   Musculoskeletal:   Feet warm/dry with non-pitting edema, 2+ pulses   Lymphadenopathy:     She has no cervical adenopathy.   Neurological:   Does not open eyes to voice or touch.    Skin: Skin is warm  "and dry.     Past Medical History:   1.  Recent liver abscess treatment where the patient had a drain placed.  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  The patient   had cultures positive for VRE and Candida.  2.  History of ampullary carcinoma.  3.  Osteoarthritis.  4.  Anemia.  5.  History of clotting.  6.  Dyslipidemia.    Past Surgical History:  History of cataract repair. cholecystectomy, biliary stent placement, whipple 2016, T and A.    Current Outpatient Medications:  Home Medications    **Home medications have not yet been reviewed for this encounter**         Medication Allergy/Sensitivities:  Allergies   Allergen Reactions   • Tape Unspecified     Sensitive skin     Family History: Cancer (unknown relation)     Social History:  Smoking: NO  Alcohol: NO  Living situation:  Unknown, many family members at bedside    Vitals:  Weight/BMI: Body mass index is 23.73 kg/m².  Ht 1.676 m (5' 6\")   Wt 66.7 kg (147 lb)   BMI 23.73 kg/m²   Vitals:    12/28/18 1300   Weight: 66.7 kg (147 lb)   Height: 1.676 m (5' 6\")     Oxygen Therapy:   none    Lab Data Review:  No results found for this or any previous visit (from the past 24 hour(s)).    Imaging/Procedures Review:    No orders to display        Problem List:  1. Cancer-related pain  2. Ampullary cancer with metastasis   3. Necrotic lesion of liver  4. Ileus  5. Sepsis r/t E.coli  6. DVT  7. Hypokalemia  8. Hyponatremia, resolved  9. Fungemia  10. Hospice general inpatient DNR/DNI    Discussion: Pt requires a high level of nursing care to manage severe pain and symptoms of cancer, metastases and sepsis. Her condition and plan of care are discussed at length with family at bedside. She is being admitted to hospice general inpatient and will receive morphine 5-10mg IV q1hr for management of pain, glycopyrrolate for secretions, ativan 1-3mg IV q1hr for anxiety/restlessness. She will have IV zofran as needed for nausea/vomiting. This " patient will be followed by [facility] team on a daily basis to assess for symptom control as well as appropriateness for GIP level of care.   All findings have been reviewed with hospice medical director.

## 2018-12-29 NOTE — DISCHARGE SUMMARY
Discharge Summary    CHIEF COMPLAINT ON ADMISSION  Chief Complaint   Patient presents with   • Weakness     increased weakness for 2 days. hst of recent admission for a liver abcsess    • RUQ Pain       Reason for Admission  Weakness     CODE STATUS  DNAR/DNI    HPI & HOSPITAL COURSE  This is a 73 y.o. female with past medical history of ampullary carcinoma, liver abscess, dyslipidemia, anemia, and history of clotting comes in with generalized weakness and severe hyponatremia as well as right-sided abdomen pain.  Patient was undergoing chemotherapy for ampullary carcinoma.  This was complicated by bacteremia and fungemia.  Patient had a biliary drain placed on 12/21/2018.  She was also found to have ileus on 1226 218.  Patient became more obtunded and more unresponsive.  At that point family decided to care the care towards comfort care only.  CODE STATUS was changed to DNR/DNI care and comfort only.  Hospice consulted.  Patient was accepted under GIP care.  Patient then was transferred under GIP in guarded condition.       Therefore, she is discharged in guarded and stable condition to hospice.    The patient met 2-midnight criteria for an inpatient stay at the time of discharge.      FOLLOW UP ITEMS POST DISCHARGE  Per GI P    DISCHARGE DIAGNOSES  Principal Problem:    Ampullary carcinoma (HCC) POA: Yes  Active Problems:    Liver abscess POA: Yes    Hypokalemia POA: Yes    Hyponatremia POA: Yes    DNR (do not resuscitate) POA: Yes    Ileus (HCC) POA: Yes  Resolved Problems:    * No resolved hospital problems. *      FOLLOW UP  Future Appointments  Date Time Provider Department Center   3/11/2019 9:30 AM [facility] EXAM 4 VMED None     No follow-up provider specified.    MEDICATIONS ON DISCHARGE     Medication List      ASK your doctor about these medications      Instructions   acetaminophen 325 MG Tabs  Commonly known as:  TYLENOL   Take 325 mg by mouth every 6 hours as needed for Mild Pain.  Dose:  325 mg      ARTIFICIAL TEARS 0.4 % ophthalmic solution  Generic drug:  artificial tear   Place 1 Drop in both eyes 2 Times a Day.  Dose:  1 Drop     cefdinir 300 MG Caps  Commonly known as:  OMNICEF  Ask about: Should I take this medication?   Take 1 Cap by mouth every 12 hours for 14 days.  Dose:  300 mg     fluconazole 200 MG Tabs  Commonly known as:  DIFLUCAN  Ask about: Should I take this medication?   Take 2 Tabs by mouth every day for 13 days.  Dose:  400 mg     linezolid 600 MG Tabs  Commonly known as:  ZYVOX  Ask about: Should I take this medication?   Take 1 Tab by mouth every 12 hours for 14 days.  Dose:  600 mg     metroNIDAZOLE 500 MG Tabs  Commonly known as:  FLAGYL  Ask about: Should I take this medication?   Take 1 Tab by mouth every 8 hours for 14 days.  Dose:  500 mg     MULTIVITAMIN & MINERAL PO   Take 1 Tab by mouth every day. supplement  Dose:  1 Tab     non-formulary med   Take 1 Tab by mouth every day. Renuvo joint supplement  Dose:  1 Tab     omeprazole 20 MG delayed-release capsule  Commonly known as:  PRILOSEC   TAKE 1 CAPSULE BY MOUTH EVERYDAY     ondansetron 4 MG Tbdp  Commonly known as:  ZOFRAN ODT   Take 4 mg by mouth every 6 hours as needed.  Dose:  4 mg     potassium chloride SA 10 MEQ Tbcr  Commonly known as:  K-DUR   Take 2 Tabs by mouth 2 times a day.  Dose:  20 mEq     ULTRAM 50 MG Tabs  Generic drug:  tramadol   Take 50 mg by mouth every 6 hours as needed for Moderate Pain or Severe Pain.  Dose:  50 mg            Allergies  Allergies   Allergen Reactions   • Tape Unspecified     Sensitive skin       DIET  No orders of the defined types were placed in this encounter.      ACTIVITY  Per GIP    LINES, DRAINS, AND WOUNDS  This is an automated list. Peripheral IVs will be removed prior to discharge.  Midline IV 12/20/18 Left (Active)   Site Assessment Intact;Dry;Clean 12/28/2018  3:30 PM   Dressing Type Transparent;Securing device;Biopatch;Skin barrier 12/28/2018  3:30 PM   Line Status  Flushed;Infusing 12/28/2018  3:30 PM   Dressing Status Intact;Dry;Clean 12/28/2018  3:30 PM   Dressing Intervention N/A 12/28/2018  3:30 PM   Dressing Change Due 01/03/19 12/28/2018  3:30 PM   Date Primary Tubing Changed 12/28/18 12/28/2018  3:30 PM   NEXT Primary Tubing Change  01/01/19 12/28/2018  3:30 PM   Infiltration Grading ([facility], CV) 0 12/28/2018  3:30 PM   Phlebitis Scale ([facility] Only) 0 12/28/2018  3:30 PM       CVC Triple Lumen 12/21/18 Lumen 1: Brown Lumen 2: Blue Lumen 3: White Right Internal jugular (Active)   Consider Removal of Femoral Line Not Applicable 12/22/2018  8:00 AM   Site Assessment Clean;Dry;Intact 12/28/2018  3:30 PM   Lumen 1 Status Normal saline locked 12/28/2018  3:30 PM   Lumen 3 Status Normal saline locked 12/28/2018  3:30 PM   Lumen 2 Status Normal saline locked 12/28/2018  3:30 PM   Dressing Type Biopatch;Transparent 12/28/2018  3:30 PM   Dressing Status Clean;Dry;Intact 12/28/2018  3:30 PM   Dressing Intervention N/A 12/28/2018  3:30 PM   Dressing Change Due 12/29/18 12/28/2018  3:30 PM   Date Primary Tubing Changed 12/25/18 12/26/2018  9:00 AM   Date Secondary Tubing Changed 12/23/18 12/24/2018  9:15 AM   Date IV Connector(s) Changed 12/25/18 12/26/2018  9:00 AM   NEXT Primary Tubing Change  12/29/18 12/26/2018  9:00 AM   NEXT Secondary Tubing Change  12/25/18 12/24/2018  9:15 AM   NEXT IV Connector(s) Change 12/29/18 12/26/2018  9:00 AM   Waveform Not Applicable 12/23/2018  8:00 AM   Line Calibrated Not Applicable 12/23/2018  8:00 AM   Line Necessity Assessed Antibiotic Therapy Greater than 7 Days 12/25/2018  9:00 AM   Line Necessity Reviewed With Carter/Hang 12/22/2018  8:00 AM     Biliary Drain Locking pigtail (loop) RUQ (Active)   Site Description Unable to view 12/28/2018  2:30 PM   Dressing Status Dry;Intact 12/28/2018  2:30 PM   Drainage Color Brown 12/28/2018  2:30 PM   Drain Status Other (Comment) 12/28/2018  2:30 PM   Output (mL) 20 mL 12/28/2018  6:27 PM        Urethral Catheter (Active)   Site Assessment Clean;Skin intact 12/28/2018  2:30 PM   Collection Container Standard drainage bag 12/28/2018  2:30 PM   Urinary Catheter Care Drainage Bag Below Bladder Level and Not on Floor 12/28/2018  2:30 PM   Securement Method Securing device (Describe) 12/28/2018  2:30 PM   Output (mL) 400 mL 12/28/2018  6:27 PM      Wound 11/24/18 Incision Abdomen (Active)       Wound 11/23/18 Incision Abdomen Puncture wound (Active)       Surgical Incision  Incision Abdomen (Active)       Surgical Incision  Incision Right Chest (Active)     CVC Triple Lumen 12/21/18 Lumen 1: Brown Lumen 2: Blue Lumen 3: White Right Internal jugular (Active)   Consider Removal of Femoral Line Not Applicable 12/22/2018  8:00 AM   Site Assessment Clean;Dry;Intact 12/28/2018  3:30 PM   Lumen 1 Status Normal saline locked 12/28/2018  3:30 PM   Lumen 3 Status Normal saline locked 12/28/2018  3:30 PM   Lumen 2 Status Normal saline locked 12/28/2018  3:30 PM   Dressing Type Biopatch;Transparent 12/28/2018  3:30 PM   Dressing Status Clean;Dry;Intact 12/28/2018  3:30 PM   Dressing Intervention N/A 12/28/2018  3:30 PM   Dressing Change Due 12/29/18 12/28/2018  3:30 PM   Date Primary Tubing Changed 12/25/18 12/26/2018  9:00 AM   Date Secondary Tubing Changed 12/23/18 12/24/2018  9:15 AM   Date IV Connector(s) Changed 12/25/18 12/26/2018  9:00 AM   NEXT Primary Tubing Change  12/29/18 12/26/2018  9:00 AM   NEXT Secondary Tubing Change  12/25/18 12/24/2018  9:15 AM   NEXT IV Connector(s) Change 12/29/18 12/26/2018  9:00 AM   Waveform Not Applicable 12/23/2018  8:00 AM   Line Calibrated Not Applicable 12/23/2018  8:00 AM   Line Necessity Assessed Antibiotic Therapy Greater than 7 Days 12/25/2018  9:00 AM   Line Necessity Reviewed With Carter/Hang 12/22/2018  8:00 AM             Urethral Catheter (Active)   Site Assessment Clean;Skin intact 12/28/2018  2:30 PM   Collection Container Standard drainage bag 12/28/2018  2:30  PM   Urinary Catheter Care Drainage Bag Below Bladder Level and Not on Floor 12/28/2018  2:30 PM   Securement Method Securing device (Describe) 12/28/2018  2:30 PM   Output (mL) 400 mL 12/28/2018  6:27 PM        MENTAL STATUS ON TRANSFER  Level of Consciousness: Obtunded  Orientation : Unable to Assess  Speech:  (TAVON)    CONSULTATIONS  Hospice care  Palliative care  ID  Pulmonary medicine  PROCEDURES  None    LABORATORY  Lab Results   Component Value Date    SODIUM 139 12/24/2018    POTASSIUM 3.4 (L) 12/24/2018    CHLORIDE 116 (H) 12/24/2018    CO2 14 (L) 12/24/2018    GLUCOSE 74 12/24/2018    BUN 11 12/24/2018    CREATININE 0.62 12/24/2018        Lab Results   Component Value Date    WBC 10.7 12/21/2018    HEMOGLOBIN 9.8 (L) 12/21/2018    HEMATOCRIT 28.6 (L) 12/21/2018    PLATELETCT 57 (L) 12/21/2018        Total time of the discharge process exceeds 38 minutes.

## 2018-12-29 NOTE — PROGRESS NOTES
Pt pronounced by milka RN and Mainor AVENDANO at 0045. MD, YASSINE, family, tissue bank, and  notified. Pt bodied cleanse, all lines removed, and pt belongings left with pt family. Transport called.

## 2018-12-29 NOTE — DISCHARGE SUMMARY
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     Comorbid Conditions at the Time of Death  1. Cancer-related pain  2. Ampullary cancer with metastasis   3. Necrotic lesion of liver  4. Ileus  5. Sepsis r/t E.coli  6. DVT  7. Hypokalemia  8. Hyponatremia, resolved  9. Fungemia  10. Hospice general inpatient DNR/DNI      History of Presenting Illness and Hospital Course  This is a 73 y.o. female admitted 12/28/2018 with hospice admitting diagnosis of metastatic ampullary carcinoma to liver and Peripancreatic, retroperitoneal and mesenteric lymph nodes. This 74yo F was admitted to [facility] through ED on 12/17/2018 for weakness, lethargy, severe hypnatremia and right-sided abdominal pain. Previous admit with discharge two days prior. She had been undergoing recent chemotherapy treatment for ampullary carcinoma compounded by bacteremia and fungemia. After this admit she received a biliary drain 12/21, and on 12/22 she was found to have ileus with much nausea/vomiting. No PO intake x 2 days, pt is now obtunded and unresponsive other than moaning and grimacing with nursing interventions. Family are at bedside and have consulted with treatment team and hospice team; they would like to choose a comfort model of care for the patient based on her complex medical condition with poor prognosis. The patient was transitioned to hospice general inpatient to assist with management of symptoms. Her symptoms were managed with IV lorazepam, IV morphine and IV glycopyrrolate. Her family will be entered into [facility].    Death Date: 12/29/18   Death Time: 0045         Pronounced By (RN1): Kyndra Holstein   Pronounced By (RN2): Mainor Alva

## 2018-12-29 NOTE — PROGRESS NOTES
Pt transitioned to hospice this shift, pain management continued with ativan and morphine prn. Lopez placed, tolerated well with good output. Education done with family on repositioning and they are more comfortable with repo'ing as long as pt is pre-medicated first. Scopolamine patch applied, and robinul started, good results noted. Comfort maintained t/o shift.

## 2018-12-30 PROCEDURE — 665998 HH PPS REVENUE CREDIT

## 2018-12-30 PROCEDURE — 665999 HH PPS REVENUE DEBIT

## 2018-12-31 PROCEDURE — 665999 HH PPS REVENUE DEBIT

## 2018-12-31 PROCEDURE — 665998 HH PPS REVENUE CREDIT

## 2019-01-01 PROCEDURE — 665998 HH PPS REVENUE CREDIT

## 2019-01-01 PROCEDURE — 665999 HH PPS REVENUE DEBIT

## 2019-01-02 PROCEDURE — 665998 HH PPS REVENUE CREDIT

## 2019-01-02 PROCEDURE — 665999 HH PPS REVENUE DEBIT

## 2019-01-03 PROCEDURE — 665998 HH PPS REVENUE CREDIT

## 2019-01-03 PROCEDURE — 665999 HH PPS REVENUE DEBIT

## 2019-01-04 PROCEDURE — 665999 HH PPS REVENUE DEBIT

## 2019-01-04 PROCEDURE — 665998 HH PPS REVENUE CREDIT

## 2019-01-05 PROCEDURE — 665998 HH PPS REVENUE CREDIT

## 2019-01-05 PROCEDURE — 665999 HH PPS REVENUE DEBIT

## 2019-01-06 PROCEDURE — 665998 HH PPS REVENUE CREDIT

## 2019-01-06 PROCEDURE — 665999 HH PPS REVENUE DEBIT

## 2019-01-07 PROCEDURE — 665999 HH PPS REVENUE DEBIT

## 2019-01-07 PROCEDURE — 665998 HH PPS REVENUE CREDIT

## 2019-01-08 PROCEDURE — 665998 HH PPS REVENUE CREDIT

## 2019-01-08 PROCEDURE — 665999 HH PPS REVENUE DEBIT

## 2019-01-09 PROCEDURE — 665999 HH PPS REVENUE DEBIT

## 2019-01-09 PROCEDURE — 665998 HH PPS REVENUE CREDIT

## 2019-01-10 PROCEDURE — 665999 HH PPS REVENUE DEBIT

## 2019-01-10 PROCEDURE — 665998 HH PPS REVENUE CREDIT

## 2019-01-11 PROCEDURE — 665998 HH PPS REVENUE CREDIT

## 2019-01-11 PROCEDURE — 665999 HH PPS REVENUE DEBIT

## 2019-01-12 PROCEDURE — 665998 HH PPS REVENUE CREDIT

## 2019-01-12 PROCEDURE — 665999 HH PPS REVENUE DEBIT

## 2019-01-13 PROCEDURE — 665998 HH PPS REVENUE CREDIT

## 2019-01-13 PROCEDURE — 665999 HH PPS REVENUE DEBIT

## 2019-01-14 PROCEDURE — 665999 HH PPS REVENUE DEBIT

## 2019-01-14 PROCEDURE — 665998 HH PPS REVENUE CREDIT

## 2019-01-15 PROCEDURE — 665999 HH PPS REVENUE DEBIT

## 2019-01-15 PROCEDURE — 665998 HH PPS REVENUE CREDIT

## 2019-01-16 PROCEDURE — 665998 HH PPS REVENUE CREDIT

## 2019-01-16 PROCEDURE — 665999 HH PPS REVENUE DEBIT

## 2019-01-17 PROCEDURE — 665998 HH PPS REVENUE CREDIT

## 2019-01-17 PROCEDURE — 665999 HH PPS REVENUE DEBIT

## 2019-01-18 LAB
MYCOBACTERIUM SPEC CULT: NORMAL
RHODAMINE-AURAMINE STN SPEC: NORMAL
SIGNIFICANT IND 70042: NORMAL
SITE SITE: NORMAL
SOURCE SOURCE: NORMAL

## 2019-01-18 PROCEDURE — 665999 HH PPS REVENUE DEBIT

## 2019-01-18 PROCEDURE — 665998 HH PPS REVENUE CREDIT

## 2019-01-19 PROCEDURE — 665999 HH PPS REVENUE DEBIT

## 2019-01-19 PROCEDURE — 665998 HH PPS REVENUE CREDIT

## 2019-01-20 PROCEDURE — 665998 HH PPS REVENUE CREDIT

## 2019-01-20 PROCEDURE — 665999 HH PPS REVENUE DEBIT

## 2019-01-21 PROCEDURE — 665998 HH PPS REVENUE CREDIT

## 2019-01-21 PROCEDURE — 665999 HH PPS REVENUE DEBIT

## 2019-01-22 PROCEDURE — 665999 HH PPS REVENUE DEBIT

## 2019-01-22 PROCEDURE — 665998 HH PPS REVENUE CREDIT

## 2019-01-23 PROCEDURE — 665998 HH PPS REVENUE CREDIT

## 2019-01-23 PROCEDURE — 665999 HH PPS REVENUE DEBIT

## 2019-01-24 PROCEDURE — 665999 HH PPS REVENUE DEBIT

## 2019-01-24 PROCEDURE — 665998 HH PPS REVENUE CREDIT

## 2019-01-25 PROCEDURE — 665998 HH PPS REVENUE CREDIT

## 2019-01-25 PROCEDURE — 665999 HH PPS REVENUE DEBIT

## 2019-01-26 PROCEDURE — 665998 HH PPS REVENUE CREDIT

## 2019-01-26 PROCEDURE — 665999 HH PPS REVENUE DEBIT

## 2019-01-27 PROCEDURE — 665999 HH PPS REVENUE DEBIT

## 2019-01-27 PROCEDURE — 665998 HH PPS REVENUE CREDIT

## 2019-01-28 PROCEDURE — 665998 HH PPS REVENUE CREDIT

## 2019-01-28 PROCEDURE — 665999 HH PPS REVENUE DEBIT

## 2019-01-29 PROCEDURE — 665998 HH PPS REVENUE CREDIT

## 2019-01-29 PROCEDURE — 665999 HH PPS REVENUE DEBIT

## 2019-01-30 PROCEDURE — 665998 HH PPS REVENUE CREDIT

## 2019-01-30 PROCEDURE — 665999 HH PPS REVENUE DEBIT

## 2019-01-31 PROCEDURE — 665999 HH PPS REVENUE DEBIT

## 2019-01-31 PROCEDURE — 665998 HH PPS REVENUE CREDIT

## 2019-02-01 PROCEDURE — 665999 HH PPS REVENUE DEBIT

## 2019-02-01 PROCEDURE — 665998 HH PPS REVENUE CREDIT

## 2019-02-02 PROCEDURE — 665999 HH PPS REVENUE DEBIT

## 2019-02-02 PROCEDURE — 665998 HH PPS REVENUE CREDIT

## 2019-02-03 PROCEDURE — 665998 HH PPS REVENUE CREDIT

## 2019-02-03 PROCEDURE — 665999 HH PPS REVENUE DEBIT

## 2019-02-04 PROCEDURE — 665999 HH PPS REVENUE DEBIT

## 2019-02-04 PROCEDURE — 665998 HH PPS REVENUE CREDIT

## 2019-02-05 PROCEDURE — 665998 HH PPS REVENUE CREDIT

## 2019-02-05 PROCEDURE — 665997 HH PPS REVENUE ADJ

## 2019-02-05 PROCEDURE — 665999 HH PPS REVENUE DEBIT

## 2019-02-27 NOTE — ADDENDUM NOTE
Encounter addended by: Adry Turner on: 2/27/2019 12:35 PM<BR>    Actions taken: Charge Capture section accepted

## 2019-03-11 ENCOUNTER — APPOINTMENT (OUTPATIENT)
Dept: VASCULAR LAB | Facility: MEDICAL CENTER | Age: 74
End: 2019-03-11
Payer: MEDICARE

## 2020-01-27 NOTE — CARE PLAN
Problem: Nutritional:  Goal: Achieve adequate nutritional intake  Patient will consume 50% of meals.   Outcome: PROGRESSING SLOWER THAN EXPECTED  PO <25% - 50% for most recent three meals.  PO prior was %.  Per chart review, pt with fatigue and abdominal pain.  Pt is receiving chobani yogurt smoothie and high protein milkshake daily.  RD will continue to follow.       DISPLAY PLAN FREE TEXT n/a

## 2021-03-11 NOTE — PROGRESS NOTES
IR Procedure Note:    Site Marked and Confirmed with MD, patient and RN pre procedure. Dr. Saleem removed the implanted port.  Port taken to lab by Darvin AVENDANO.  The pt tolerated the procedure well; ETCo2 baseline 25, with consistent waveform during the procedure.  Sutures, gauze and tegaderm applied to right chest, CDI and soft.  Pt alert and verbally appropriate post procedure, vital signs stable during procedure and transport, see flow sheet for vital signs.  Report given to Ilene AVENDANO.  RN transported pt to [facility].      Procedure End Time: 0945     March 11, 2021       Jack Teresa MD  [address]  [place] [address]  Via Mail      Patient: Pankaj Stewart   YOB: 2016   Date of Visit: 3/11/2021       Dear Dr. Teresa:    I saw your patient, Pankaj Stewart, for an evaluation. Below are my notes for this visit with him.    If you have questions, please do not hesitate to call me.      Sincerely,        Nahid Martin MD        CC: No Recipients  Nahid Martin MD  3/11/2021  9:16 AM  Signed  3/10/2021    No chief complaint on file.      Problem List Items Addressed This Visit     None          HPI:    Pankaj comes in today for followup of his toe walking and equinus contractures.  He continues in the PT program for a serial casting.  They have obtained significant improvement however after his last cast he had a superficial pressure irritation of the posterior aspect of the right heel.  He is not currently casted.  He does not seem to have any pain and the mother states that he runs around with no sign of discomfort.    ROS:  14 point review of system is negative unless stated above.     Current Medications:      Summary of your Discharge Medications      as of March 10, 2021 12:23 PM     You have not been prescribed any medications.           Relevant Past Medical History:  No past medical history on file.    No past surgical history on file.    ALLERGIES:  No Known Allergies     No family history on file.    Examination:   There were no vitals taken for this visit.  There were no vitals filed for this visit.    Physical Exam:   Constitutional:  Well-developed, well-nourished male in no acute distress.  Psychiatric:  Normal affect  Skin: Warm, dry, intact without rash or lesion.  Neck: Normal appearing neck  Pulmonary: No labored respirations  Musculoskeletal:  Right Ankle Exam   Right ankle exam is normal.    Tenderness   The patient is experiencing no tenderness.  Swelling: none    Muscle Strength   The patient has normal  right ankle strength.    Other   Erythema: absent  Sensation: normal  Pulse: present     Comments:  Dorsiflexion to neutral with the knee flexed and -10° with the knee extended.      Left Ankle Exam   Left ankle exam is normal.    Tenderness   The patient is experiencing no tenderness.   Swelling: none    Muscle Strength   The patient has normal left ankle strength.    Other   Erythema: absent  Sensation: normal  Pulse: present    Comments:  Dorsiflexion to neutral with the knee flexed and -10° with the knee extended.      Right Knee Exam   Right knee exam is normal.    Muscle Strength   The patient has normal right knee strength.    Tenderness   The patient is experiencing no tenderness.     Range of Motion   The patient has normal right knee ROM.      Left Knee Exam   Left knee exam is normal.    Muscle Strength   The patient has normal left knee strength.    Tenderness   The patient is experiencing no tenderness.     Range of Motion   The patient has normal left knee ROM.      Right Hip Exam   Right hip exam is normal.     Tenderness   The patient is experiencing no tenderness.     Range of Motion   The patient has normal right hip ROM.    Muscle Strength   The patient has normal right hip strength.      Left Hip Exam   Left hip exam is normal.    Tenderness   The patient is experiencing no tenderness.     Range of Motion   The patient has normal left hip ROM.    Muscle Strength   The patient has normal left hip strength.              Images/Results:      Assessment:  I reviewed the clinical findings with the mother.  I would like a bit more range of motion prior to going into AFOs.  We will give him a week off of casting to allow his pressure irritation to resolve.    Plan:  I will see him back in 6 weeks for clinical followup and if range of motion has improved we will proceed with AFOs.      No follow-ups on file.

## 2022-01-14 NOTE — ASSESSMENT & PLAN NOTE
Per patient's request.   Comfort care  Meeting with sons and daughter and explained the continued deterioration of patient and that further treatment will likely prove futile - patient has been having pain that had been uncontrolled, ileus likely from carcinomatosis though still without CT evidence of this, persistent/recurrent of infection of MDROs in necrotic liver lesion that is referred to as abscess.  Sea is DPOA and he and his siblings agreed that comfort care is most appropriate treatment for their mother at this time.       Detail Level: Simple

## 2022-08-23 NOTE — PROGRESS NOTES
Subjective:      Elin Poole is a 71 y.o. female who presents for Follow-Up for ampullary cancer.         HPI    Patient seen today in follow-up for ampullary cancer. She is accompanied by her son, Bony for today's visit. Patient has been seen in closely monitored as she has had increased fatigue. She was seen 2 weeks ago by Dr. Krause who held her chemotherapy due to significant fatigue. Patient has been feeling much better and according to her son she has definitely improved in the last 2 weeks. She stated she is slowly getting her energy back and noticing an improvement in her overall clinical status. She was started on Megace for appetite stimulation, but unfortunately due to insurance authorization she was not able to start the medication until 2 days ago. However, according to patient and son they have both noticed a drastic difference in her appetite since starting the Megace. Patient has gained 1 pound in the last 2 weeks and according to both the patient and son. He believed that one pound was due to the last 2 days. Her appetite has definitely improved since starting the Megace. Patient denies any fevers or chills or significant weakness. She does still have some slight numbness and tingling in her knees and so she is using a wheelchair to get around when she walks long distances. Patient with some persistent weakness from treatment. She does have a shower chair available to her and does feel unsteady at times in the shower. She will sit down and utilized a shower chair as needed. She is able to ambulate on her own. She also denies any headaches, chest pain, heart palpitations, swelling in her legs, coughing, wheezing, or shortness of breath. She has intermittent nausea every once in a while that is relieved with anti-emetics. Her bowel movements are normal and she is having a normal bowel movement per her routine. She stated her last bowel movement was yesterday. She is voiding without  normal mood with appropriate affect difficulty and denies any pain. Patient overall is doing well and feeling much better with the two-week break from chemotherapy.    No Known Allergies  Current Outpatient Prescriptions on File Prior to Visit   Medication Sig Dispense Refill   • megestrol (MEGACE) 400 MG/10ML Suspension Take 20 mL by mouth every day. 600 mL 1   • famotidine (PEPCID) 20 MG Tab Take 1 Tab by mouth 2 times a day. 60 Tab 3   • sennosides-docusate sodium (SENOKOT-S) 8.6-50 MG tablet Take 1 Tab by mouth 2 times a day. 60 Tab 3   • ranitidine (ZANTAC) 150 MG Tab Take 1 Tab by mouth 2 times a day. 60 Tab 3   • ondansetron (ZOFRAN) 4 MG Tab tablet Take 1 Tab by mouth every four hours as needed for Nausea/Vomiting. 30 Tab 3   • Misc Natural Products (FIBER 7) Powder Take  by mouth.     • aspirin EC (ECOTRIN) 325 MG Tablet Delayed Response Take 325 mg by mouth every morning.     • prochlorperazine (COMPAZINE) 10 MG Tab Take 10 mg by mouth every 6 hours as needed for Nausea/Vomiting (alternating Q3H with Zofran).     • losartan-hydrochlorothiazide (HYZAAR) 100-25 MG per tablet Take 1 Tab by mouth every morning. Indications: High Blood Pressure       No current facility-administered medications on file prior to visit.       Review of Systems   Constitutional: Positive for malaise/fatigue (still present but much better). Negative for fever, chills and weight loss.   Respiratory: Negative for cough, shortness of breath and wheezing.    Cardiovascular: Negative for chest pain, palpitations and leg swelling.   Gastrointestinal: Positive for nausea (mild and intermittent). Negative for vomiting, diarrhea and constipation.   Genitourinary: Negative for dysuria.   Musculoskeletal: Negative for myalgias and back pain.   Neurological: Positive for tingling (more so in her knees). Negative for headaches.          Objective:     /64 mmHg  Pulse 88  Temp(Src) 36.5 °C (97.7 °F)  Resp 12  Ht 1.524 m (5')  Wt 55.9 kg (123 lb 3.8 oz)  BMI 24.07 kg/m2   SpO2 98%     Physical Exam   Constitutional: She is oriented to person, place, and time. She appears well-developed and well-nourished. No distress.   HENT:   Head: Normocephalic and atraumatic.   Mouth/Throat: Oropharynx is clear and moist. No oropharyngeal exudate.   Alopecia   Eyes: Conjunctivae and EOM are normal. Pupils are equal, round, and reactive to light. Right eye exhibits no discharge. Left eye exhibits no discharge. No scleral icterus.   Cardiovascular: Normal rate, regular rhythm, normal heart sounds and intact distal pulses.  Exam reveals no gallop and no friction rub.    No murmur heard.  Pulmonary/Chest: Effort normal and breath sounds normal. No respiratory distress. She has no wheezes.   Abdominal: Soft. Bowel sounds are normal. She exhibits no distension. There is no tenderness.   Musculoskeletal: Normal range of motion. She exhibits no edema or tenderness.   In a wheelchair d/t mild weakness and distance to ambulate to the office   Neurological: She is alert and oriented to person, place, and time.   Skin: Skin is warm and dry. No rash noted. She is not diaphoretic. No erythema. No pallor.   Psychiatric: She has a normal mood and affect. Her behavior is normal.   Vitals reviewed.              Assessment/Plan:     1. Ampullary carcinoma (CMS-HCC)       Plan  1. Patient is doing very well and feeling well with the 2 weeks off from chemotherapy. Her appetite is improved tremendously with the addition of Megace. Patient clinically feels stable and ready to proceed with treatment again. I discussed with Dr. Krause and she stated patient was feeling well and doing much better that she was okay to start back on treatment. Patient is scheduled to get back on single agent Gemzar on Monday, February 27. Patient's last cycle of chemotherapy was held in the middle of the cycle. We will cancel cycle one and restart back on treatment on Monday as cycle 2, day one of single agent Gemzar. Patient to  complete labs prior to her chemotherapy on Monday.    2. Patient to continue to work on increasing her appetite and weight. She is to continue Megace as prescribed.    3. Patient to follow-up in a week and a half prior to day 8 of her next cycle of chemotherapy or sooner if needed.

## 2023-09-14 NOTE — PROGRESS NOTES
Patient arrived to [facility] for 5FU CADD pump de-access.  Pump stopped with 0ml remaining in reservoir.  Disconnected pump, cleaned pump and placed in pharmacy drawer.  Port flushed per protocol with brisk blood return noted, heparinized, de-accessed and gauze dressing applied.  Confirmed pt's next appt. Pt discharged home in [facility].   Presence Of Scar Tissue (For Histology): absent

## 2023-11-07 NOTE — PROGRESS NOTES
Date of visit: 7/10/2017  3:35 PM      Diagnosis: Metastatic adenocarcinoma of ampulla of vater    Chief compliant: She is here for a follow up visit and to establish care.    History of Presenting Illness:  Elin Poole is a 71 y.o. female with adenocarcinoma of primary ampulla diagnosed in 7/2016.  She was diagnosed due to elevated liver enzymes and acute renal insufficiency.  She is s/p ERCP with stent with good decompression. Biopsy was positive for adenocarcinoma of primary ampulla. She was seen by Dr. Jacobs and underwent an attempted Whipple’s in 8/2016. The surgery was aborted secondary to multiple liver lesions and s/p wedge resection of the liver which was positive for poorly differentiated adenocarcinoma.  PET scan showed uptake in multiple hepatic lesions and uptake in the ampulla. He also had uptake in bilateral hilar area with increased uptake and small pulmonary nodules in the right middle lobe without uptake. She was treated with gemcitabine and Abraxane. She tolerated the treatment fairly well initially. After cycle 3 of chemotherapy she started to have increased fatigue and diarrhea. Repeat imaging showed response to therapy. The diarrhea improve and her regimen was changed to single agent gemcitabine. She received chemotherapy on 1/18/17. Further cycles have been held secondary to increased fatigue.   3/20/17 CT CAP showed thrombus seen in the right external iliac and common femoral vein. Previous foci in the liver are no longer seen and no residual or recurrent mass seen in the surgical bed.  She was started on Eliquis.      She is here for a 3 month follow-up. She is feeling reasonably well with no acute complaints.  Restaging CT scan shows Some heterogeneity in the inferior right hepatic lobe but no discrete enhancing mass is appreciated.    Past Medical History:      Past Medical History   Diagnosis Date   • Jaundice    • Hypertension    • Heart burn    • Indigestion    •  Dental disorder      upper partial   • Cancer (CMS-[facility]) 2016     Ampullary CA   • Cataract 09-     bilat.,no surgery   • Asthma    • Arthritis      hands/fingers/right knee   • Ampullary carcinoma (CMS-[facility])        Past surgical history:       Past Surgical History   Procedure Laterality Date   • Stent placement  7/2016     gallbladder   • Whipple procedure  8/15/2016     Procedure: Exploratoy Laparotomy, [id number];  Surgeon: Kurtis Jacobs M.D.;  Location: SURGERY [facility];  Service:    • Node dissection  8/15/2016     Procedure: omental flap, Liver Wedge, cholecystectomy ;  Surgeon: Kurtis Jacobs M.D.;  Location: SURGERY [facility];  Service:    • Cholecystectomy     • Cath placement Right 9/9/2016     Procedure: CATH PLACEMENT cephalic power port  ;  Surgeon: Kurtis Jacobs M.D.;  Location: SURGERY [facility];  Service:        Allergies:       Review of patient's allergies indicates no known allergies.    Medications:         Current Outpatient Prescriptions   Medication Sig Dispense Refill   • potassium chloride SA (K-DUR) 10 MEQ Tab CR TAKE 2 TABS BY MOUTH EVERY MORNING. 60 Tab 3   • losartan-hydrochlorothiazide (HYZAAR) 50-12.5 MG per tablet Take 1 Tab by mouth every day. 30 Tab 3   • apixaban (ELIQUIS) 5mg Tab Take 1 Tab by mouth 2 Times a Day. Take 10 mg (2 pills) BID x 7 days followed by 5 mg BID 60 Tab 1   • apixaban (ELIQUIS) 5mg Tab Take 1 Tab by mouth 2 Times a Day. 60 Tab 6   • megestrol (MEGACE) 400 MG/10ML Suspension Take 20 mL by mouth every day. 600 mL 1   • famotidine (PEPCID) 20 MG Tab Take 1 Tab by mouth 2 times a day. 60 Tab 3   • sennosides-docusate sodium (SENOKOT-S) 8.6-50 MG tablet Take 1 Tab by mouth 2 times a day. 60 Tab 3   • ondansetron (ZOFRAN) 4 MG Tab tablet Take 1 Tab by mouth every four hours as needed for Nausea/Vomiting. 30 Tab 3   • Misc Natural Products (FIBER 7) Powder Take  by mouth.     • prochlorperazine (COMPAZINE) 10  MG Tab Take 10 mg by mouth every 6 hours as needed for Nausea/Vomiting (alternating Q3H with Zofran).       No current facility-administered medications for this visit.         Social History:     Social History     Social History   • Marital Status:      Spouse Name: N/A   • Number of Children: N/A   • Years of Education: N/A     Occupational History   • Not on file.     Social History Main Topics   • Smoking status: Never Smoker    • Smokeless tobacco: Never Used   • Alcohol Use: No   • Drug Use: No   • Sexual Activity: Not on file     Other Topics Concern   • Not on file     Social History Narrative       Family History:    No family history on file.    Review of Systems:  All other review of systems are negative except what was mentioned above in the HPI.    Constitutional: Negative for fever, chills, weight loss and period improving malaise/fatigue.    HEENT: No new auditory or visual complaints. No sore throat and neck pain.     Respiratory: Negative for cough, sputum production, shortness of breath and wheezing.    Cardiovascular: Negative for chest pain, palpitations, orthopnea and leg swelling.    Gastrointestinal: Negative for heartburn, nausea, vomiting and abdominal pain.    Genitourinary: Negative for dysuria, hematuria    Musculoskeletal: No new arthralgias or myalgias   Skin: Negative for rash and itching.    Neurological: Negative for focal weakness and headaches.    Endo/Heme/Allergies: No abnormal bleed/bruise.    Psychiatric/Behavioral: No new depression/anxiety.    Physical Exam:  Vitals: /70 mmHg  Pulse 83  Temp(Src) 36.8 °C (98.2 °F)  Resp 16  Ht 1.524 m (5')  Wt 64.6 kg (142 lb 6.7 oz)  BMI 27.81 kg/m2  SpO2 97%    General: Not in acute distress, alert and oriented x 3  HEENT: No pallor, icterus. Oropharynx clear.   Neck: Supple, no palpable masses.  Lymph nodes: No palpable cervical, supraclavicular, axillary or inguinal lymphadenopathy.    CVS: regular rate and rhythm, no  rubs or gallops  RESP: Clear to auscultate bilaterally, no wheezing or crackles.   ABD: Soft, non tender, non distended, positive bowel sounds, no palpable organomegaly  EXT: Trace bipedal edema  CNS: Alert and oriented x3, No focal deficits.  Skin- No rash      Labs:   No visits with results within 1 Week(s) from this visit.  Latest known visit with results is:    Hospital Outpatient Visit on 06/07/2017   Component Date Value Ref Range Status   • Sodium 06/07/2017 138  135 - 145 mmol/L Final   • Potassium 06/07/2017 3.6  3.6 - 5.5 mmol/L Final   • Chloride 06/07/2017 105  96 - 112 mmol/L Final   • Co2 06/07/2017 27  20 - 33 mmol/L Final   • Anion Gap 06/07/2017 6.0  0.0 - 11.9 Final   • Glucose 06/07/2017 93  65 - 99 mg/dL Final   • Bun 06/07/2017 12  8 - 22 mg/dL Final   • Creatinine 06/07/2017 0.91  0.50 - 1.40 mg/dL Final   • Calcium 06/07/2017 9.7  8.5 - 10.5 mg/dL Final   • AST(SGOT) 06/07/2017 24  12 - 45 U/L Final   • ALT(SGPT) 06/07/2017 14  2 - 50 U/L Final   • Alkaline Phosphatase 06/07/2017 120* 30 - 99 U/L Final   • Total Bilirubin 06/07/2017 0.7  0.1 - 1.5 mg/dL Final   • Albumin 06/07/2017 3.3  3.2 - 4.9 g/dL Final   • Total Protein 06/07/2017 7.1  6.0 - 8.2 g/dL Final   • Globulin 06/07/2017 3.8* 1.9 - 3.5 g/dL Final   • A-G Ratio 06/07/2017 0.9   Final   • Cholesterol,Tot 06/07/2017 215* 100 - 199 mg/dL Final   • Triglycerides 06/07/2017 111  0 - 149 mg/dL Final   • HDL 06/07/2017 66  >=40 mg/dL Final   • LDL 06/07/2017 127* <100 mg/dL Final   • GFR If  06/07/2017 >60  >60 mL/min/1.73 m 2 Final   • GFR If Non  06/07/2017 >60  >60 mL/min/1.73 m 2 Final             Assessment and Plan:  1. 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 She completed 3 cycles of gemcitabine and Abraxane with good responses. Her regimen was then decreased to single agent gemcitabine. She  had significant fatigue has chemotherapy has been held. Most recent imaging shows continued response with no evidence of progression She is continued on observation. I will do a restaging CT scan in 3 months. She will call if she develops any interim symptoms   2. Extensive DVT- clinically improved. Continue Eliquis.Nutrition: He has noticed improvement in her appetite and appears to be maintaining her caloric intake.  3. Epigastric discomfort: Improved with Pepcid twice a day.  Hypokalemia-secondary to Hyzaar-instructed her to continue KCl 20 mg daily    She agreed and verbalized her agreement and understanding with the current plan.  I answered all questions and concerns she has at this time         Please note that this dictation was created using voice recognition software. I have made every reasonable attempt to correct obvious errors, but I expect that there are errors of grammar and possibly content that I did not discover before finalizing the note.      SIGNATURES:  Claude Cedeno    CC:  Wally Knox D.O.  No ref. provider found       Clothing

## 2024-04-17 NOTE — CARE PLAN
Patient said Larissa told her to call in her BS reading. She said her claudia is linked to the clinic and we should be able to download the data. Please advise.    Problem: Pain Management  Goal: Pain level will decrease to patient's comfort goal  Outcome: PROGRESSING SLOWER THAN EXPECTED  Patient with right upper quadrant pain. Patient grimaces with movement. Patient has been refusing pain medication. Discussed pain medications with patient. Patient agreeable to try tylenol for pain. Patient reported some relief with tylenol use. Patient hopefull that procedure today will improve her pain.     Problem: Skin Integrity  Goal: Risk for impaired skin integrity will decrease  Outcome: PROGRESSING AS EXPECTED  Waffle overlay placed on mattress. Barrier cream and wipes in use.

## 2025-02-06 NOTE — PROGRESS NOTES
Hospital Medicine Daily Progress Note    Date of Service  12/21/2018    Chief Complaint  73 y.o. female admitted 12/17/2018 with weakness.    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 She presented to the ER with progressive weakness and was found to have a severely low sodium of 110 and has been admitted to the ICU in guarded condition.       Interval Problem Update  12/18: RN notes that she has had SBP up to 160's. She is tolerating a full liquid diet though had an episode of emesis last night. She remains on IV fluids with D5 1/2 NS. Sodium is 117 this morning. Her repeat later this morning was 115.   12/19: patient seen in the ICU this morning. RN notes that she has been afebrile. She is tolerating a full liquid diet. Na this morning is down to 113 though was down to 112 last night. Her right arm IV appears to have infiltrated.  12/20: pt seen in the ICU this morning. She vomited this morning. Her abdomen is more distended today. Her sodium remains quite low at 116 despite adding salt tabs.   12/21: Ms. Poole was seen in the ICU. Her sodium remains low. I discussed with Dr. De Los Santos for consultation. She put out 325 ml urine over night. 3% saline at 15 ml/hour. She is on room air.  She is amenable for radiology to place a drain int he abscess.   Consultants/Specialty  Critical Care. I discussed with Dr. Machado on ICU Hot Rounds.   Filiberto  ID. I have consulted Dr. Swenson   Code Status  DNR/DNI    Disposition  ICU    Review of Systems  Review of Systems   Constitutional: Positive for malaise/fatigue. Negative for chills and fever.   Respiratory: Negative for shortness of breath.    Cardiovascular: Positive for leg swelling. Negative for chest pain.   Gastrointestinal: Positive for abdominal pain and nausea.   Neurological: Positive for weakness.   All other systems reviewed and are negative.       Physical Exam  Temp:  [35.4 °C (95.7  alexa\"Have you been to the ER, urgent care clinic since your last visit?  Hospitalized since your last visit?\"    NO    “Have you seen or consulted any other health care providers outside our system since your last visit?”    NO    Have you had a mammogram?”   NO    No breast cancer screening on file      “Have you had a pap smear?”    NO    No cervical cancer screening on file       “Have you had a colorectal cancer screening such as a colonoscopy/FIT/Cologuard?    NO    No colonoscopy on file  No cologuard on file  No FIT/FOBT on file   No flexible sigmoidoscopy on file    Chief Complaint   Patient presents with    New Patient    Diabetes             °F)-36.4 °C (97.6 °F)] 36.4 °C (97.6 °F)  Pulse:  [] 92  Resp:  [10-28] 13    Physical Exam   Constitutional: She is oriented to person, place, and time. No distress.   Generally ill-appearing   HENT:   Head: Normocephalic and atraumatic.   Dry mucous membranes.    Neck: Normal range of motion. Neck supple.   Cardiovascular: Normal rate and regular rhythm.    No murmur heard.  Pulmonary/Chest: Effort normal. No respiratory distress. She has no wheezes.   Abdominal: She exhibits distension. There is tenderness.   Hypoactive bowel sounds   Musculoskeletal: She exhibits edema. She exhibits no tenderness.   Left arm midline catheter   Neurological: She is alert and oriented to person, place, and time.       Skin: Skin is warm and dry. She is not diaphoretic. There is pallor.   Psychiatric: She has a normal mood and affect. Her behavior is normal.   Nursing note and vitals reviewed.      Fluids    Intake/Output Summary (Last 24 hours) at 12/21/18 0749  Last data filed at 12/21/18 0600   Gross per 24 hour   Intake             2233 ml   Output              575 ml   Net             1658 ml       Laboratory      Recent Labs      12/20/18   1700  12/20/18   2336  12/21/18   0523   SODIUM  115*  114*  119*   POTASSIUM  4.7  5.4  4.5   CHLORIDE  93*  94*  97   CO2  14*  13*  15*   GLUCOSE  129*  173*  94   BUN  10  11  9   CREATININE  0.62  0.62  0.54   CALCIUM  8.6  8.0*  8.5                   Imaging  CT-ABDOMEN-PELVIS WITH   Final Result      Distention of the cecum and ascending colon which are filled with fluid and stool. Mild distention is seen of the transverse colon. Findings may represent ileus.      Multiple distended and fluid-filled loops of small bowel are seen. This may be related to ileus. It is difficult to definitively exclude pneumatosis involving a loop of small bowel in the lower abdomen.      Small amount of free fluid in the abdomen and pelvis.      Uterine cavity may be distended with fluid or there  may be endometrial thickening which can be seen in the setting of malignancy. Further evaluation with pelvic ultrasound is recommended.      Status post cholecystectomy. Biliary ductal dilatation and pneumobilia are again noted.      Necrotic mass versus abscess in the right lobe of the liver is again seen. Other smaller hypodense hepatic lesions are unchanged.      Mild stranding adjacent to the pancreatic head. Correlation with pancreatic enzymes is recommended as pancreatitis is not excluded.      Appendix is at the upper limit of normal with surrounding fluid. Mild/early appendicitis is not excluded.      Distended esophagus can be seen in the setting of gastroesophageal reflux. Achalasia not excluded.      Small right pleural effusion and right basilar atelectasis.      Peripancreatic, retroperitoneal and mesenteric lymph nodes compatible with metastatic disease.      Small hypodense renal lesions are too small to characterize. Hypodense right renal lesion likely represents a parapelvic cyst.      Uterine fibroid.      Fat-containing umbilical hernia.      Colonic diverticulosis.      Findings discussed with Dr. Mauricio         CT-CHEST,ABDOMEN,PELVIS WITH   Final Result      1.  New dilatation of the esophagus diffusely. This may represent acute achalasia or reflux.      2.  No thoracic adenopathy or pleural effusion. No pulmonary consolidation.      3.  Stable appearance of low-attenuation area in the right lobe of the liver and several other low-attenuation areas which may represent metastases. Recurrent abscess cannot be excluded.      4.  New marked dilatation of the cecum and ascending colon with stool and fluid which may represent constipation or partial obstruction. No evidence of small bowel obstruction.      5.  No renal obstruction.      6.  No free fluid or abscess.      7.  New low-attenuation area within the uterus which could represent an area of uterine hemorrhage.      CT-HEAD W/O   Final  Result      1.  No CT evidence of acute infarct, hemorrhage or mass.   2.  Mild to moderate global parenchymal atrophy and chronic small ischemic changes.   3.  Old left cerebellar infarct.      DX-CHEST-PORTABLE (1 VIEW)   Final Result      No acute cardiac or pulmonary abnormality is noted.      IR-MIDLINE CATHETER INSERTION >5 YRS    (Results Pending)   IR-MIDLINE CATHETER INSERTION >5 YRS    (Results Pending)        Assessment/Plan  * Hyponatremia- (present on admission)   Assessment & Plan    Severe hyponatremia on admit with a sodium of 110 on admit thus ICU admission.  IV fluids with goal no more of 9.  BMPs q 6 hours.  Na remains low despite IV fluids with NS plus the addition of salt tabs thus 3% saline started at 15 ml/hour.   She is quite symptomatic with the hyponatremia with weakness.   Nephrology consulted.      Liver abscess- (present on admission)   Assessment & Plan    History of liver abscess for which she is on Zyvox, Omnicef, diflucan through 12/20.   Antibiotics have been continued IV and infectious disease will consult.  Repeat CT ordered due worsening abdominal pain which revealed necrotic mass vs abscess  .        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 Cedeno, oncology      Non-intractable vomiting with nausea- (present on admission)   Assessment & Plan    With poor appetite.      DNR (do not resuscitate)- (present on admission)   Assessment & Plan    Per patient's request.           VTE prophylaxis: SCDs

## 2025-05-16 NOTE — ASSESSMENT & PLAN NOTE
Pseudoaneurysm of hepatic artery encircling drain.  IR consult to Dr. Higginbotham who embolized artery.  dc'd eliquis 5mg bid.  JACQUELINE drain discontinued 12/3.       Subjective   Crystal Garsia 